# Patient Record
Sex: MALE | Race: WHITE | NOT HISPANIC OR LATINO | Employment: OTHER | URBAN - METROPOLITAN AREA
[De-identification: names, ages, dates, MRNs, and addresses within clinical notes are randomized per-mention and may not be internally consistent; named-entity substitution may affect disease eponyms.]

---

## 2017-01-23 ENCOUNTER — LAB VISIT (OUTPATIENT)
Dept: LAB | Facility: HOSPITAL | Age: 62
End: 2017-01-23
Attending: PEDIATRICS
Payer: MEDICARE

## 2017-01-23 DIAGNOSIS — Z00.00 WELL ADULT EXAM: Primary | ICD-10-CM

## 2017-01-23 DIAGNOSIS — I10 BENIGN ESSENTIAL HTN: ICD-10-CM

## 2017-01-23 LAB
ALT SERPL W/O P-5'-P-CCNC: 13 U/L
ANION GAP SERPL CALC-SCNC: 9 MMOL/L
AST SERPL-CCNC: 14 U/L
BUN SERPL-MCNC: 20 MG/DL
CALCIUM SERPL-MCNC: 8.9 MG/DL
CHLORIDE SERPL-SCNC: 105 MMOL/L
CHOLEST/HDLC SERPL: 5.7 {RATIO}
CO2 SERPL-SCNC: 26 MMOL/L
CREAT SERPL-MCNC: 1.2 MG/DL
EST. GFR  (AFRICAN AMERICAN): >60 ML/MIN/1.73 M^2
EST. GFR  (NON AFRICAN AMERICAN): >60 ML/MIN/1.73 M^2
GLUCOSE SERPL-MCNC: 104 MG/DL
HDL/CHOLESTEROL RATIO: 17.5 %
HDLC SERPL-MCNC: 189 MG/DL
HDLC SERPL-MCNC: 33 MG/DL
LDLC SERPL CALC-MCNC: 123 MG/DL
NONHDLC SERPL-MCNC: 156 MG/DL
POTASSIUM SERPL-SCNC: 4.4 MMOL/L
SODIUM SERPL-SCNC: 140 MMOL/L
TRIGL SERPL-MCNC: 165 MG/DL

## 2017-01-23 PROCEDURE — 80048 BASIC METABOLIC PNL TOTAL CA: CPT

## 2017-01-23 PROCEDURE — 84460 ALANINE AMINO (ALT) (SGPT): CPT

## 2017-01-23 PROCEDURE — 36415 COLL VENOUS BLD VENIPUNCTURE: CPT | Mod: PO

## 2017-01-23 PROCEDURE — 84450 TRANSFERASE (AST) (SGOT): CPT

## 2017-01-23 PROCEDURE — 80061 LIPID PANEL: CPT

## 2017-01-26 ENCOUNTER — TELEPHONE (OUTPATIENT)
Dept: GASTROENTEROLOGY | Facility: CLINIC | Age: 62
End: 2017-01-26

## 2017-01-26 NOTE — TELEPHONE ENCOUNTER
Mr Jared wants to reschedule his 1/27/17 apt for next month when he receives his SS check. How would you like for me to proceed?

## 2017-01-27 ENCOUNTER — INITIAL CONSULT (OUTPATIENT)
Dept: PSYCHIATRY | Facility: CLINIC | Age: 62
End: 2017-01-27
Payer: MEDICARE

## 2017-01-27 DIAGNOSIS — F43.23 ADJUSTMENT DISORDER WITH MIXED ANXIETY AND DEPRESSED MOOD: ICD-10-CM

## 2017-01-27 PROCEDURE — 99499 UNLISTED E&M SERVICE: CPT | Mod: S$GLB,,, | Performed by: PSYCHIATRY & NEUROLOGY

## 2017-01-27 PROCEDURE — 90792 PSYCH DIAG EVAL W/MED SRVCS: CPT | Mod: S$GLB,,, | Performed by: PSYCHIATRY & NEUROLOGY

## 2017-01-27 RX ORDER — TRAZODONE HYDROCHLORIDE 100 MG/1
TABLET ORAL
Qty: 30 TABLET | Refills: 1 | Status: SHIPPED | OUTPATIENT
Start: 2017-01-27 | End: 2017-06-06

## 2017-01-30 ENCOUNTER — OFFICE VISIT (OUTPATIENT)
Dept: INTERNAL MEDICINE | Facility: CLINIC | Age: 62
End: 2017-01-30
Payer: MEDICARE

## 2017-01-30 VITALS
SYSTOLIC BLOOD PRESSURE: 148 MMHG | BODY MASS INDEX: 38.24 KG/M2 | OXYGEN SATURATION: 96 % | WEIGHT: 273.13 LBS | TEMPERATURE: 98 F | DIASTOLIC BLOOD PRESSURE: 84 MMHG | HEART RATE: 100 BPM | HEIGHT: 71 IN

## 2017-01-30 DIAGNOSIS — I10 ESSENTIAL HYPERTENSION: ICD-10-CM

## 2017-01-30 DIAGNOSIS — G89.29 CHRONIC MIDLINE LOW BACK PAIN WITHOUT SCIATICA: Chronic | ICD-10-CM

## 2017-01-30 DIAGNOSIS — F33.41 RECURRENT MAJOR DEPRESSIVE DISORDER, IN PARTIAL REMISSION: ICD-10-CM

## 2017-01-30 DIAGNOSIS — G47.00 INSOMNIA, UNSPECIFIED TYPE: ICD-10-CM

## 2017-01-30 DIAGNOSIS — Z72.0 TOBACCO ABUSE: ICD-10-CM

## 2017-01-30 DIAGNOSIS — M54.50 CHRONIC MIDLINE LOW BACK PAIN WITHOUT SCIATICA: Chronic | ICD-10-CM

## 2017-01-30 DIAGNOSIS — E66.01 MORBID OBESITY, UNSPECIFIED OBESITY TYPE: ICD-10-CM

## 2017-01-30 DIAGNOSIS — I10 BENIGN ESSENTIAL HTN: Primary | ICD-10-CM

## 2017-01-30 PROCEDURE — 90688 IIV4 VACCINE SPLT 0.5 ML IM: CPT | Mod: S$GLB,,, | Performed by: PEDIATRICS

## 2017-01-30 PROCEDURE — 3077F SYST BP >= 140 MM HG: CPT | Mod: S$GLB,,, | Performed by: PEDIATRICS

## 2017-01-30 PROCEDURE — G0008 ADMIN INFLUENZA VIRUS VAC: HCPCS | Mod: S$GLB,,, | Performed by: PEDIATRICS

## 2017-01-30 PROCEDURE — 99214 OFFICE O/P EST MOD 30 MIN: CPT | Mod: S$GLB,,, | Performed by: PEDIATRICS

## 2017-01-30 PROCEDURE — 99499 UNLISTED E&M SERVICE: CPT | Mod: S$GLB,,, | Performed by: PEDIATRICS

## 2017-01-30 PROCEDURE — 99999 PR PBB SHADOW E&M-EST. PATIENT-LVL III: CPT | Mod: PBBFAC,,, | Performed by: PEDIATRICS

## 2017-01-30 PROCEDURE — 3079F DIAST BP 80-89 MM HG: CPT | Mod: S$GLB,,, | Performed by: PEDIATRICS

## 2017-01-30 PROCEDURE — 1159F MED LIST DOCD IN RCRD: CPT | Mod: S$GLB,,, | Performed by: PEDIATRICS

## 2017-01-30 RX ORDER — CARVEDILOL 3.12 MG/1
3.12 TABLET ORAL 2 TIMES DAILY WITH MEALS
Qty: 60 TABLET | Refills: 11 | Status: SHIPPED | OUTPATIENT
Start: 2017-01-30 | End: 2019-10-09

## 2017-01-30 RX ORDER — SILDENAFIL 100 MG/1
100 TABLET, FILM COATED ORAL DAILY PRN
Qty: 5 TABLET | Refills: 1 | Status: SHIPPED | OUTPATIENT
Start: 2017-01-30 | End: 2017-06-06

## 2017-01-30 NOTE — MR AVS SNAPSHOT
[facility] Internal Medicine  [address]  [place] [address]  Phone: [phone]  Fax: [phone]                  Lj Coleman   2017 1:40 PM   Office Visit    Description:  Male : 1955   Provider:  TIMOTHY Felzi Jr., MD   Department:  [facility] - Internal Medicine           Reason for Visit     Follow-up           Diagnoses this Visit        Comments    Benign essential HTN    -  Primary     Tobacco abuse         Chronic midline low back pain without sciatica         Insomnia, unspecified type         Recurrent major depressive disorder, in partial remission         Essential hypertension         Morbid obesity, unspecified obesity type                To Do List           Future Appointments        Provider Department Dept Phone    2017 8:25 AM LABORATORY, [facility] [phone]    2017 7:55 AM LABORATORY, [facility] [phone]    2017 1:20 PM TIMOTHY Feliz Jr., MD [facility] Internal [facility] [phone]      Goals (5 Years of Data)     None      Follow-Up and Disposition     Return in about 6 months (around 2017).    Follow-up and Disposition History       These Medications        Disp Refills Start End    carvedilol (COREG) 3.125 MG tablet 60 tablet 11 2017    Take 1 tablet (3.125 mg total) by mouth 2 (two) times daily with meals. - Oral    Pharmacy: [facility]/pharmacy #[id number]  [place] - [address] AT [facility] Ph #: [phone]       sildenafil (VIAGRA) 100 MG tablet 5 tablet 1 2017    Take 1 tablet (100 mg total) by mouth daily as needed for Erectile Dysfunction. - Oral    Pharmacy: [facility]/pharmacy #[id number] - [place] - [address] AT [facility] Ph #: [phone]         [facility] On Call     [facility] On Call Nurse Care Line -  Assistance  Registered nurses in the [facility] provide clinical  "advisement, health education, appointment booking, and other advisory services.  Call for this free service at [phone].             Medications           Message regarding Medications     Verify the changes and/or additions to your medication regime listed below are the same as discussed with your clinician today.  If any of these changes or additions are incorrect, please notify your healthcare provider.        START taking these NEW medications        Refills    sildenafil (VIAGRA) 100 MG tablet 1    Sig: Take 1 tablet (100 mg total) by mouth daily as needed for Erectile Dysfunction.    Class: Normal    Route: Oral           Verify that the below list of medications is an accurate representation of the medications you are currently taking.  If none reported, the list may be blank. If incorrect, please contact your healthcare provider. Carry this list with you in case of emergency.           Current Medications     carvedilol (COREG) 3.125 MG tablet Take 1 tablet (3.125 mg total) by mouth 2 (two) times daily with meals.    mycophenolate (MYFORTIC) 360 MG TbEC Take 1 tablet (360 mg total) by mouth every 12 (twelve) hours.    NEXIUM 40 mg capsule TAKE 1 CAPSULE (40 MG TOTAL) BY MOUTH ONCE DAILY.    oxycodone (ROXICODONE) 10 mg Tab immediate release tablet Take 10 mg by mouth 2 (two) times daily as needed.    tacrolimus (PROGRAF) 0.5 MG Cap TAKE  (1)  CAPSULE  TWICE DAILY.    trazodone (DESYREL) 100 MG tablet Take 1/2 to 1 tablet at bedtime as needed for sleep.    sildenafil (VIAGRA) 100 MG tablet Take 1 tablet (100 mg total) by mouth daily as needed for Erectile Dysfunction.           Clinical Reference Information           Vital Signs - Last Recorded  Most recent update: 1/30/2017  1:46 PM by Oxana Leung    BP Pulse Temp Ht    (!) 148/84 (BP Location: Left arm, Patient Position: Sitting, BP Method: Manual) 100 97.5 °F (36.4 °C) (Tympanic) 5' 10.5" (1.791 m)    Wt SpO2 BMI    123.9 kg (273 lb 2.4 oz) 96% " 38.64 kg/m2      Blood Pressure          Most Recent Value    BP  (!)  148/84      Allergies as of 1/30/2017     Codeine      Immunizations Administered on Date of Encounter - 1/30/2017     None      Orders Placed During Today's Visit     Future Labs/Procedures Expected by Expires    Basic metabolic panel  1/30/2017 3/31/2018    Lipid panel  1/30/2017 1/30/2018      Maintenance Dialysis History     Patient has no recorded history of maintenance dialysis.      Transplant Information        Txp Date Organ Coordinator Care Team     Liver Estrella Horn RN Current Hepatologist:  Teresa Gonzalez MD   Current PCP:  Cristopher Jo MD

## 2017-01-30 NOTE — PROGRESS NOTES
Subjective:        Patient ID: Lj Coleman is a 61 y.o. male.     Chief Complaint: Follow-up     HPI Comments: HTN: B/P had been good, no HTNive symptoms. He stopped taking coreg and stopped checking B/P.  He is trying to follow D&E as best he can, limited by chronic back pain. He will address his narcotic use with PMR in effort to go off.  Still smoking cigarettes.  Insomnia is mildly symptomatic, seeing psychiatry for depression      LABS REVIEWED AND DISCUSSED WITH PATIENT     Review of Systems   Constitutional: Negative for fever and unexpected weight change.   HENT: Negative for congestion and rhinorrhea.   Eyes: Negative for discharge and redness.   Respiratory: Negative for cough and wheezing.   Cardiovascular: Negative for chest pain, palpitations and leg swelling.   Gastrointestinal: Negative for constipation, diarrhea and vomiting.   Endocrine: Negative for cold intolerance, heat intolerance, polydipsia, polyphagia and polyuria.   Genitourinary: Negative for decreased urine volume and difficulty urinating.   Musculoskeletal: Positive for back pain. Negative for arthralgias and joint swelling.   Skin: Negative for rash and wound.   Neurological: Negative for syncope and headaches.   Psychiatric/Behavioral: Negative for behavioral problems and sleep disturbance.       Objective:      Physical Exam   Constitutional: He is oriented to person, place, and time. He appears well-developed and well-nourished. No distress.   Neck: Trachea normal and normal range of motion. Neck supple. No JVD present. No thyromegaly present.   Cardiovascular: Normal rate, regular rhythm, S1 normal, S2 normal, normal heart sounds and normal pulses. Exam reveals no gallop and no friction rub.   No murmur heard.  Pulmonary/Chest: Effort normal and breath sounds normal. He has no wheezes. He has no rales.   Abdominal: Soft. Normal appearance and bowel sounds are normal. He exhibits no mass. There is no hepatosplenomegaly.  There is no tenderness. There is no rebound and no guarding.   Large ventral hernia.   Musculoskeletal: Normal range of motion. He exhibits no edema.   Moves stiffly due to back.   Lymphadenopathy:   He has no cervical adenopathy.   Neurological: He is alert and oriented to person, place, and time. He has normal strength. Coordination and gait normal.   Skin: Skin is warm and intact. No rash noted.   Psychiatric: He has a normal mood and affect. His speech is normal and behavior is normal. Judgment and thought content normal.       Assessment:       1. Insomnia, unspecified insomnia    2. Benign essential HTN    3. History of smoking    4. Chronic low back pain   5. depression   6. obesity           Plan:    He will be having Dr Padilla perform ventral hernia, stop smoking. D&E, weight loss discussed. Restart coreg, diet and exercise, weight loss. F/U 6 months with labs.

## 2017-02-02 PROBLEM — F43.23 ADJUSTMENT DISORDER WITH MIXED ANXIETY AND DEPRESSED MOOD: Status: ACTIVE | Noted: 2017-02-02

## 2017-02-02 NOTE — PROGRESS NOTES
"Outpatient Psychiatry Initial Visit (MD/NP)    2017    Lj Coleman, a 61 y.o. male, presenting for initial evaluation visit. Met with patient.    Reason for Encounter: Referral from Dr. Gonzalez. Patient complains of depression, anxiety..    History of Present Illness: This 60 y/o WM with hx of HepC & liver CA s/p liver transplant, DDD, no previous psych hx presents for psych eval due to ongoing mood disturbance for past ~2 years in context of grief related to wife's death, ongoing stress related to troubles with his teenage dtr, & his own health problems. Reports worry, decreased interest in previously enjoyed activities, sad moods, sleep disturbance, anergia, self-reproach, increased appetite & weight gain. Has been instructed to lose weight to be eligible for surgery, but has been able to do so. Daughter moved to [place] with bf, & he feels she's being taken advantage of, but she dismisses his concerns & relationship is tense & complicated.     PsychHx: rebellious rule-breaking behaviors as a teen. Spent 6 months in long-term facility, didn't have further problems.   MedHx: s/p liver transplant following liver CA & hepC, incisional hernia, DDD with chronic back pain  FamHx: dtr with hx of 3 week hospitalization for what he was told was "rosana" (though doesn't think she has bipolar dx?).   SocHx: Grew up in . Normal milestones & development until adolescence, began to rebel, defy authority.  x2; 2nd wife had bpad & hepc &  due to complications of illness while she lacked health insurance. Has 39 y/o son from first marriage & 19 y/o daughter who is living with bf in [place]. His wealthy family supports him, & patient says enables his drug use. Dtr had baby in last year. Also helped raise two stepsons. Former , previously travelled widely.     Review Of Systems:     GENERAL:  No weight gain or loss  SKIN:  No rashes or lacerations  HEAD:  No headaches  EYES:  No exophthalmos, " jaundice or blindness  EARS:  No dizziness, tinnitus or hearing loss  NOSE:  No changes in smell  MOUTH & THROAT:  No dyskinetic movements or obvious goiter  CHEST:  No shortness of breath, hyperventilation or cough  CARDIOVASCULAR:  No tachycardia or chest pain  ABDOMEN:  No nausea, vomiting, pain, constipation or diarrhea  URINARY:  No frequency, dysuria or sexual dysfunction  ENDOCRINE:  No polydipsia, polyuria  MUSCULOSKELETAL:  No pain or stiffness of the joints  NEUROLOGIC:  No weakness, sensory changes, seizures, confusion, memory loss, tremor or other abnormal movements    Current Evaluation:     Nutritional Screening: Considering the patient's height and weight, medications, medical history and preferences, should a referral be made to the dietitian? no    Constitutional  Vitals:  Most recent vital signs, dated less than 90 days prior to this appointment, were not reviewed.    There were no vitals filed for this visit.     General:  age appropriate, overweight     Musculoskeletal  Muscle Strength/Tone:  no tremor, no tic   Gait & Station:  non-ataxic     Psychiatric  Appearance: casually dressed & groomed;   Behavior: calm,   Cooperation: cooperative with assessment  Speech: normal rate, volume, tone  Thought Process: linear, goal-directed  Thought Content: No suicidal or homicidal ideation; no delusions  Affect: anxious  Mood: anxious  Perceptions: No auditory or visual hallucinations  Level of Consciousness: alert throughout interview  Insight: fair  Cognition: Oriented to person, place, time, & situation  Memory: no apparent deficits to general clinical interview; not formally assessed  Attention/Concentration: no apparent deficits to general clinical interview; not formally assessed  Fund of Knowledge: average by vocabulary/education      Relevant Elements of Neurological Exam: normal gait    Functioning in Relationships:  Spouse/partner:    Peers: decreased contact  Employers:  disabled    Laboratory Data  Lab Visit on 01/23/2017   Component Date Value Ref Range Status    Specimen UA 01/23/2017 Urine, Clean Catch   Final    Color, UA 01/23/2017 Yellow  Yellow, Straw, Yahaira Final    Appearance, UA 01/23/2017 Clear  Clear Final    pH, UA 01/23/2017 7.0  5.0 - 8.0 Final    Specific Gravity, UA 01/23/2017 1.015  1.005 - 1.030 Final    Protein, UA 01/23/2017 Negative  Negative Final    Glucose, UA 01/23/2017 Negative  Negative Final    Ketones, UA 01/23/2017 Negative  Negative Final    Bilirubin (UA) 01/23/2017 Negative  Negative Final    Occult Blood UA 01/23/2017 Negative  Negative Final    Nitrite, UA 01/23/2017 Negative  Negative Final    Leukocytes, UA 01/23/2017 Negative  Negative Final   Lab Visit on 01/23/2017   Component Date Value Ref Range Status    Cholesterol 01/23/2017 189  120 - 199 mg/dL Final    Triglycerides 01/23/2017 165* 30 - 150 mg/dL Final    HDL 01/23/2017 33* 40 - 75 mg/dL Final    LDL Cholesterol 01/23/2017 123.0  63.0 - 159.0 mg/dL Final    HDL/Chol Ratio 01/23/2017 17.5* 20.0 - 50.0 % Final    Total Cholesterol/HDL Ratio 01/23/2017 5.7* 2.0 - 5.0 Final    Non-HDL Cholesterol 01/23/2017 156  mg/dL Final    ALT 01/23/2017 13  10 - 44 U/L Final    AST 01/23/2017 14  10 - 40 U/L Final    Sodium 01/23/2017 140  136 - 145 mmol/L Final    Potassium 01/23/2017 4.4  3.5 - 5.1 mmol/L Final    Chloride 01/23/2017 105  95 - 110 mmol/L Final    CO2 01/23/2017 26  23 - 29 mmol/L Final    Glucose 01/23/2017 104  70 - 110 mg/dL Final    BUN, Bld 01/23/2017 20  8 - 23 mg/dL Final    Creatinine 01/23/2017 1.2  0.5 - 1.4 mg/dL Final    Calcium 01/23/2017 8.9  8.7 - 10.5 mg/dL Final    Anion Gap 01/23/2017 9  8 - 16 mmol/L Final    eGFR if African American 01/23/2017 >60.0  >60 mL/min/1.73 m^2 Final    eGFR if non African American 01/23/2017 >60.0  >60 mL/min/1.73 m^2 Final         Medications  Outpatient Encounter Prescriptions as of 1/27/2017    Medication Sig Dispense Refill    mycophenolate (MYFORTIC) 360 MG TbEC Take 1 tablet (360 mg total) by mouth every 12 (twelve) hours. 60 tablet 0    NEXIUM 40 mg capsule TAKE 1 CAPSULE (40 MG TOTAL) BY MOUTH ONCE DAILY. 30 capsule 3    oxycodone (ROXICODONE) 10 mg Tab immediate release tablet Take 10 mg by mouth 2 (two) times daily as needed.      tacrolimus (PROGRAF) 0.5 MG Cap TAKE  (1)  CAPSULE  TWICE DAILY. 60 capsule 0    trazodone (DESYREL) 100 MG tablet Take 1/2 to 1 tablet at bedtime as needed for sleep. 30 tablet 1    [DISCONTINUED] carvedilol (COREG) 3.125 MG tablet Take 1 tablet (3.125 mg total) by mouth 2 (two) times daily with meals. 60 tablet 6     No facility-administered encounter medications on file as of 1/27/2017.        Assessment - Diagnosis - Goals:     Impression: This 60 y/o WM with no previous psych hx, presents for ongoing depression & anxiety amidst numerous stressors related to adolescent dtr, health, disability, grief.     Dx: adjustment disorder with depressed & anxious mood    Treatment Goals:  Specify outcomes written in observable, behavioral terms:   Decrease depression & anxiety by self-report    Treatment Plan/Recommendations:   · psychoeducation today re: stress, functioning amidst stress, self-care, psychotherapy.   · Therapy referral  · Trazodone prn sleep.  · F/u in 2 months, re-evaluate      Return to Clinic: 2 months  Counseling time: 10 minutes  Total time: 50 minutes    MITALI Pierre MD

## 2017-02-24 ENCOUNTER — TELEPHONE (OUTPATIENT)
Dept: GASTROENTEROLOGY | Facility: CLINIC | Age: 62
End: 2017-02-24

## 2017-02-24 NOTE — TELEPHONE ENCOUNTER
Left message on answering machine to return a call to Dr. Gonzalez's office at [facility].  Reschedule appointment.

## 2017-03-20 ENCOUNTER — TELEPHONE (OUTPATIENT)
Dept: TRANSPLANT | Facility: CLINIC | Age: 62
End: 2017-03-20

## 2017-03-20 NOTE — TELEPHONE ENCOUNTER
Pt no showed lab appointment again after multiple no show and reschedule, will send missed lab letter.

## 2017-05-19 ENCOUNTER — TELEPHONE (OUTPATIENT)
Dept: TRANSPLANT | Facility: CLINIC | Age: 62
End: 2017-05-19

## 2017-06-06 ENCOUNTER — OFFICE VISIT (OUTPATIENT)
Dept: INTERNAL MEDICINE | Facility: CLINIC | Age: 62
End: 2017-06-06
Payer: MEDICARE

## 2017-06-06 VITALS
DIASTOLIC BLOOD PRESSURE: 82 MMHG | OXYGEN SATURATION: 96 % | HEIGHT: 71 IN | HEART RATE: 101 BPM | WEIGHT: 255.38 LBS | BODY MASS INDEX: 35.75 KG/M2 | SYSTOLIC BLOOD PRESSURE: 135 MMHG

## 2017-06-06 DIAGNOSIS — Z85.05 HISTORY OF LIVER CANCER: ICD-10-CM

## 2017-06-06 DIAGNOSIS — I25.10 ATHEROSCLEROSIS OF NATIVE CORONARY ARTERY OF NATIVE HEART WITHOUT ANGINA PECTORIS: ICD-10-CM

## 2017-06-06 DIAGNOSIS — M54.50 CHRONIC MIDLINE LOW BACK PAIN WITHOUT SCIATICA: Chronic | ICD-10-CM

## 2017-06-06 DIAGNOSIS — F43.23 ADJUSTMENT DISORDER WITH MIXED ANXIETY AND DEPRESSED MOOD: ICD-10-CM

## 2017-06-06 DIAGNOSIS — G89.29 CHRONIC MIDLINE LOW BACK PAIN WITHOUT SCIATICA: Chronic | ICD-10-CM

## 2017-06-06 DIAGNOSIS — Z94.4 S/P LIVER TRANSPLANT: ICD-10-CM

## 2017-06-06 DIAGNOSIS — F10.21 HISTORY OF ALCOHOL DEPENDENCE: ICD-10-CM

## 2017-06-06 DIAGNOSIS — K21.9 GASTROESOPHAGEAL REFLUX DISEASE WITHOUT ESOPHAGITIS: Chronic | ICD-10-CM

## 2017-06-06 DIAGNOSIS — Z00.00 ENCOUNTER FOR PREVENTIVE HEALTH EXAMINATION: Primary | ICD-10-CM

## 2017-06-06 DIAGNOSIS — K43.9 VENTRAL HERNIA WITHOUT OBSTRUCTION OR GANGRENE: ICD-10-CM

## 2017-06-06 DIAGNOSIS — D84.9 IMMUNOSUPPRESSION: ICD-10-CM

## 2017-06-06 DIAGNOSIS — I10 BENIGN ESSENTIAL HTN: ICD-10-CM

## 2017-06-06 DIAGNOSIS — D69.6 THROMBOCYTOPENIA: ICD-10-CM

## 2017-06-06 DIAGNOSIS — E66.01 SEVERE OBESITY (BMI 35.0-39.9) WITH COMORBIDITY: ICD-10-CM

## 2017-06-06 PROCEDURE — 99499 UNLISTED E&M SERVICE: CPT | Mod: S$GLB,,, | Performed by: PHYSICIAN ASSISTANT

## 2017-06-06 PROCEDURE — 99999 PR PBB SHADOW E&M-EST. PATIENT-LVL III: CPT | Mod: PBBFAC,,, | Performed by: PHYSICIAN ASSISTANT

## 2017-06-06 PROCEDURE — G0439 PPPS, SUBSEQ VISIT: HCPCS | Mod: S$GLB,,, | Performed by: PHYSICIAN ASSISTANT

## 2017-06-06 NOTE — PROGRESS NOTES
"Lj Coleman presented for a  Medicare AWV and comprehensive Health Risk Assessment today. The following components were reviewed and updated:    · Medical history  · Family History  · Social history  · Allergies and Current Medications  · Health Risk Assessment  · Health Maintenance  · Care Team     ** See Completed Assessments for Annual Wellness Visit within the encounter summary.**       The following assessments were completed:  · Living Situation  · CAGE  · Depression Screening  · Timed Get Up and Go  · Whisper Test  · Cognitive Function Screening  · Nutrition Screening  · ADL Screening  · PAQ Screening    Vitals:    06/06/17 1332   BP: 135/82   Pulse: 101   SpO2: 96%   Weight: 115.9 kg (255 lb 6.5 oz)   Height: 5' 10.5" (1.791 m)     Body mass index is 36.13 kg/m².  Physical Exam   Constitutional: He appears well-nourished. He is cooperative. No distress.   HENT:   Head: Normocephalic and atraumatic.   Eyes: Conjunctivae and EOM are normal. Right eye exhibits no discharge. Left eye exhibits no discharge.   Neck: Normal range of motion. Neck supple. No tracheal deviation present. No thyromegaly present.   Cardiovascular: Normal rate, regular rhythm and normal heart sounds.    No murmur heard.  Pulses:       Radial pulses are 2+ on the right side, and 2+ on the left side.   Pulmonary/Chest: Effort normal and breath sounds normal. No respiratory distress. He has no wheezes.   Abdominal: Soft. Bowel sounds are normal. He exhibits no distension. There is no tenderness. There is no rebound and no guarding.   Large ventral hernia   Musculoskeletal: Normal range of motion. He exhibits no edema or tenderness.   Lower back pain   Neurological: He displays no tremor. No cranial nerve deficit.   Grasp equal both hands,No tremors, or muscle fasciculations noted. Toes downgoing, Sensation intact to soft touch. Gait: No ataxia.    Skin: Skin is warm and dry. No rash noted. He is not diaphoretic. No erythema. "   Psychiatric: He has a normal mood and affect. His behavior is normal. Judgment and thought content normal.   Nursing note and vitals reviewed.        Diagnoses and health risks identified today and associated recommendations/orders:    1. Encounter for preventive health examination  Completed today    2. Ventral hernia without obstruction or gangrene  Considering surgery. Following Dr. Ariane Kuo Jr. Continue follow Dr. Kuo.    3. Chronic midline low back pain without sciatica  Stable. On Roxicodone.  Continue current treatment plan as previously prescribed with pain management, Dr. Adonis Mcgarry.    4. Benign essential HTN  Stable. On Coreg. Continue current treatment plan as previously prescribed with your PCP.    5. Gastroesophageal reflux disease without esophagitis  Stable and controlled on Nexium. Continue current treatment plan as previously prescribed with your gastroenterologist.    6. History of liver cancer  S/P Hepatoma  removal 2012. Pt states no  Previous chemo or radiation treatment   S/p liver transplant. Hx of hep C. Stable and controlled. Followed  Liver transplant team and Dr. Gonzalez.    7. Severe obesity (BMI 35.0-39.9) with comorbidity  Encourage wt loss, diet/ exercise. Continue follow PCP    8. Adjustment disorder with mixed anxiety and depressed mood  Follows Dr. Ireland. Precribed Trazodone. Pt reports not taking. Please continue follow with psychiatry.    9. S/P liver transplant  See#6    10. Immunosuppression  Stable and controlled Prograf  And Myfortic daily . Pt asymptomatic    11. Thrombocytopenia  This is a new problem that has been identified during today's visit. Please follow up with your PCP.    12. History of alcohol dependence  Pt reports heavy drinker in the past. Went to AA after DWI. Reports quit drinking after liver transplant.     13. Atherosclerosis of native coronary artery of native heart without angina pectoris  CTA chest  6/11/ 2015 There is some calcific  atherosclerotic plaque in the left anterior descending coronary artery.  Discussed need to continue control BP, lipids, glucose, diet/ exercise, avoid smoking to avoid further arterial wall buildup.    Provided Lj with a 5-10 year written screening schedule and personal prevention plan. Recommendations were developed using the USPSTF age appropriate recommendations. Education, counseling, and referrals were provided as needed. After Visit Summary printed and given to patient which includes a list of additional screenings\tests needed.  Continue to follow with your PCP as scheduled 7/31/17or sooner if necessary.    Vazquez Su PA-C

## 2017-06-06 NOTE — PATIENT INSTRUCTIONS
Counseling and Referral of Other Preventative  (Italic type indicates deductible and co-insurance are waived)    Patient Name: Lj Coleman  Today's Date: 6/6/2017      SERVICE LIMITATIONS RECOMMENDATION    Vaccines    · Pneumococcal (once after 65)    · Influenza (annually)    · Hepatitis B (if medium/high risk)    · Prevnar 13      Hepatitis B medium/high risk factors:       - End-stage renal disease       - Hemophiliacs who received Factor VII or         IX concentrates       - Clients of institutions for the mentally             retarded       - Persons who live in the same house as          a HepB carrier       - Homosexual men       - Illicit injectable drug abusers     Pneumococcal: done 7/2016     Influenza:1/2017 Done, repeat in one year     Hepatitis B: N/A     Prevnar 13: N/A Follow PCP    Prostate cancer screening (annually to age 75)     Prostate specific antigen (PSA) Shared decision making with Provider. Sometimes a co-pay may be required if the patient decides to have this test. The USPSTF no longer recommends prostate cancer screening routinely in medicine: done 4/2015. Follow PCP    Colorectal cancer screening (to age 75)    · Fecal occult blood test (annual)  · Flexible sigmoidoscopy (5y)  · Screening colonoscopy (10y)  · Barium enema   Pt cancel last appt. Will follow with PCP to reschedule.    Diabetes self-management training (no USPSTF recommendations)  Requires referral by treating physician for patient with diabetes or renal disease. 10 hours of initial DSMT sessions of no less than 30 minutes each in a continuous 12-month period. 2 hours of follow-up DSMT in subsequent years.  N/A    Glaucoma screening (no USPSTF recommendation)  Diabetes mellitus, family history   , age 50 or over    American, age 65 or over  Will follow with outside opt    Medical nutrition therapy for diabetes or renal disease (no recommended schedule)  Requires referral by treating  physician for patient with diabetes or renal disease or kidney transplant within the past 3 years.  Can be provided in same year as diabetes self-management training (DSMT), and CMS recommends medical nutrition therapy take place after DSMT. Up to 3 hours for initial year and 2 hours in subsequent years.  N/A    Cardiovascular screening blood tests (every 5 years)  · Fasting lipid panel  Order as a panel if possible  Done this year, repeat every year    Diabetes screening tests (at least every 3 years, Medicare covers annually or at 6-month intervals for prediabetic patients)  · Fasting blood sugar (FBS) or glucose tolerance test (GTT)  Patient must be diagnosed with one of the following:       - Hypertension       - Dyslipidemia       - Obesity (BMI 30kg/m2)       - Previous elevated impaired FBS or GTT       ... or any two of the following:       - Overweight (BMI 25 but <30)       - Family history of diabetes       - Age 65 or older       - History of gestational diabetes or birth of baby weighing more than 9 pounds  Follow PCP    Abdominal aortic aneurysm screening (once)  · Sonogram   Limited to patients who meet one of the following criteria:       - Men who are 65-75 years old and have smoked more than 100 cigarette in their lifetime       - Anyone with a family history of abdominal aortic aneurysm       - Anyone recommended for screening by the USPSTF  Follow PCP    HIV screening (annually for increased risk patients)  · HIV-1 and HIV-2 by EIA, or MIKAYLA, rapid antibody test or oral mucosa transudate  Patients must be at increased risk for HIV infection per USPSTF guidelines or pregnant. Tests covered annually for patient at increased risk or as requested by the patient. Pregnant patients may receive up to 3 tests during pregnancy.  Risks discussed, screening is not recommended    Smoking cessation counseling (up to 8 sessions per year)  Patients must be asymptomatic of tobacco-related conditions to receive  as a preventative service.  Non-smoker    Subsequent annual wellness visit  At least 12 months since last AWV  Return in one year     The following information is provided to all patients.  This information is to help you find resources for any of the problems found today that may be affecting your health:                Living healthy guide: www.[facility].[place].[facility]      Understanding Diabetes: www.diabetes.org      Eating healthy: www.cdc.gov/healthyweight      CDC home safety checklist: www.cdc.gov/steadi/patient.html      Agency on Aging: www.goea.[place].[facility]      Alcoholics anonymous (AA): www.aa.org      Physical Activity: www.elena.nih.gov/zv6glmd      Tobacco use: www.quitwithusla.org

## 2017-07-18 ENCOUNTER — TELEPHONE (OUTPATIENT)
Dept: TRANSPLANT | Facility: CLINIC | Age: 62
End: 2017-07-18

## 2017-08-25 ENCOUNTER — TELEPHONE (OUTPATIENT)
Dept: TRANSPLANT | Facility: CLINIC | Age: 62
End: 2017-08-25

## 2017-08-25 NOTE — TELEPHONE ENCOUNTER
No response from patient after receiving certified missed lab letter. Will changes status to lost to follow. Dr. Gonzalez notified.

## 2018-03-20 ENCOUNTER — PES CALL (OUTPATIENT)
Dept: ADMINISTRATIVE | Facility: CLINIC | Age: 63
End: 2018-03-20

## 2018-05-07 ENCOUNTER — TELEPHONE (OUTPATIENT)
Dept: INTERNAL MEDICINE | Facility: CLINIC | Age: 63
End: 2018-05-07

## 2018-05-07 ENCOUNTER — TELEPHONE (OUTPATIENT)
Dept: GASTROENTEROLOGY | Facility: CLINIC | Age: 63
End: 2018-05-07

## 2018-05-07 NOTE — TELEPHONE ENCOUNTER
----- Message from Caryn Burroughs MA sent at 5/7/2018 10:55 AM CDT -----  Transplant pt would like to speak to nurse about  his next appointment and to set up for blood work... Thank you

## 2018-05-07 NOTE — TELEPHONE ENCOUNTER
----- Message from Caryn Burroughs MA sent at 5/7/2018 10:52 AM CDT -----  Transplant pt. Would like to speak to nurse in reference to his next appt...Thanks for signing up for [facility]!    To access your account, go to [email or web address] and enter your [facility] Username and password.    If you have forgotten your Username and/or password, click the Forgot [facility] Username? or Forgot Password? links to recover your login information.    If you have any further difficulties accessing your account, please call [facility]PL[facility] REAL # or email [email or web address].    [facility] Username: _____________________    PSafe Password: _____________________

## 2018-10-22 ENCOUNTER — PATIENT MESSAGE (OUTPATIENT)
Dept: ADMINISTRATIVE | Facility: HOSPITAL | Age: 63
End: 2018-10-22

## 2019-01-15 ENCOUNTER — DOCUMENTATION ONLY (OUTPATIENT)
Dept: INTERNAL MEDICINE | Facility: CLINIC | Age: 64
End: 2019-01-15

## 2019-05-17 ENCOUNTER — PATIENT OUTREACH (OUTPATIENT)
Dept: ADMINISTRATIVE | Facility: HOSPITAL | Age: 64
End: 2019-05-17

## 2019-05-17 NOTE — PROGRESS NOTES
Contacted patient to schedule annual pcp appointment. Patient will call back at a later date to schedule an appointment.

## 2019-08-06 ENCOUNTER — PES CALL (OUTPATIENT)
Dept: ADMINISTRATIVE | Facility: CLINIC | Age: 64
End: 2019-08-06

## 2019-09-24 ENCOUNTER — TELEPHONE (OUTPATIENT)
Dept: GASTROENTEROLOGY | Facility: CLINIC | Age: 64
End: 2019-09-24

## 2019-09-24 DIAGNOSIS — Z94.4 HISTORY OF TRANSPLANTATION, LIVER: ICD-10-CM

## 2019-09-24 NOTE — TELEPHONE ENCOUNTER
Patient calling for refill of myfortic 360 mg and tacrolimus 0.5 mg, asked about recent labwork, admits to not having labs for quite some time because he moved to [place] and then experienced multiple deaths in family

## 2019-09-24 NOTE — TELEPHONE ENCOUNTER
No answer left voicemail    ----- Message from Leonidas Gómez sent at 9/24/2019 10:53 AM CDT -----  Contact: self [phone]  Pt would like return call regarding question about transplant medication. Please call back at [phone].  Md Klever

## 2019-09-25 RX ORDER — MYCOPHENOLIC ACID 360 MG/1
360 TABLET, DELAYED RELEASE ORAL EVERY 12 HOURS
Qty: 60 TABLET | Refills: 0 | OUTPATIENT
Start: 2019-09-25

## 2019-09-25 RX ORDER — TACROLIMUS 0.5 MG/1
0.5 CAPSULE ORAL
Qty: 60 CAPSULE | Refills: 0 | OUTPATIENT
Start: 2019-09-25

## 2019-09-25 NOTE — TELEPHONE ENCOUNTER
----- Message from Rob Gore sent at 9/25/2019  1:27 PM CDT -----  Contact: Pt  Type:  Patient Returning Call    Who Called:Lj Cloeman  Who Left Message for Patient: JAVIER GRULLON  Does the patient know what this is regarding?: Medication Refill   Would the patient rather a call back or a response via [facility]? Call Back  Best Call Back Number:[phone] ([place])   Additional Information:

## 2019-09-27 ENCOUNTER — TELEPHONE (OUTPATIENT)
Dept: GASTROENTEROLOGY | Facility: CLINIC | Age: 64
End: 2019-09-27

## 2019-09-27 NOTE — TELEPHONE ENCOUNTER
----- Message from Elizabeth Merino sent at 9/27/2019 10:49 AM CDT -----  Contact: patient  Calling concerning needing a refill on his medication Myco phenolic acid 360 mg 2 pills am & 2 pills pm daily and Tacrolimus 0.5 mg 1 cap am & 1 cap @ pm. Patient have not been seen since 2018. [place] for work. Would like to be seen ASAP and requesting medication today please. Please call patient ASAP TODAY URGENT!! @ [phone]. Thanks, lachelle [facility] - [place], [place] - [address]  [address] [place] [address]  Phone: [phone] Fax: [phone]

## 2019-10-04 DIAGNOSIS — Z94.4 HISTORY OF TRANSPLANTATION, LIVER: ICD-10-CM

## 2019-10-04 RX ORDER — TACROLIMUS 0.5 MG/1
CAPSULE ORAL
Qty: 60 CAPSULE | Refills: 11 | Status: SHIPPED | OUTPATIENT
Start: 2019-10-04 | End: 2020-09-17

## 2019-10-04 RX ORDER — MYCOPHENOLIC ACID 360 MG/1
TABLET, DELAYED RELEASE ORAL
Qty: 120 TABLET | Refills: 11 | Status: SHIPPED | OUTPATIENT
Start: 2019-10-04 | End: 2020-09-17

## 2019-10-09 ENCOUNTER — OFFICE VISIT (OUTPATIENT)
Dept: GASTROENTEROLOGY | Facility: CLINIC | Age: 64
End: 2019-10-09
Payer: MEDICARE

## 2019-10-09 VITALS
BODY MASS INDEX: 34.66 KG/M2 | SYSTOLIC BLOOD PRESSURE: 118 MMHG | HEIGHT: 71 IN | DIASTOLIC BLOOD PRESSURE: 76 MMHG | RESPIRATION RATE: 16 BRPM | HEART RATE: 73 BPM | WEIGHT: 247.56 LBS

## 2019-10-09 DIAGNOSIS — D84.9 IMMUNOSUPPRESSION: Primary | ICD-10-CM

## 2019-10-09 DIAGNOSIS — Z94.4 S/P LIVER TRANSPLANT: ICD-10-CM

## 2019-10-09 PROCEDURE — 99499 RISK ADDL DX/OHS AUDIT: ICD-10-PCS | Mod: HCNC,S$GLB,, | Performed by: NURSE PRACTITIONER

## 2019-10-09 PROCEDURE — 3078F PR MOST RECENT DIASTOLIC BLOOD PRESSURE < 80 MM HG: ICD-10-PCS | Mod: HCNC,CPTII,S$GLB, | Performed by: NURSE PRACTITIONER

## 2019-10-09 PROCEDURE — 1101F PT FALLS ASSESS-DOCD LE1/YR: CPT | Mod: HCNC,CPTII,S$GLB, | Performed by: NURSE PRACTITIONER

## 2019-10-09 PROCEDURE — 1101F PR PT FALLS ASSESS DOC 0-1 FALLS W/OUT INJ PAST YR: ICD-10-PCS | Mod: HCNC,CPTII,S$GLB, | Performed by: NURSE PRACTITIONER

## 2019-10-09 PROCEDURE — 3008F PR BODY MASS INDEX (BMI) DOCUMENTED: ICD-10-PCS | Mod: HCNC,CPTII,S$GLB, | Performed by: NURSE PRACTITIONER

## 2019-10-09 PROCEDURE — 3008F BODY MASS INDEX DOCD: CPT | Mod: HCNC,CPTII,S$GLB, | Performed by: NURSE PRACTITIONER

## 2019-10-09 PROCEDURE — 99499 UNLISTED E&M SERVICE: CPT | Mod: HCNC,S$GLB,, | Performed by: NURSE PRACTITIONER

## 2019-10-09 PROCEDURE — 99999 PR PBB SHADOW E&M-EST. PATIENT-LVL III: ICD-10-PCS | Mod: PBBFAC,HCNC,, | Performed by: NURSE PRACTITIONER

## 2019-10-09 PROCEDURE — 3074F PR MOST RECENT SYSTOLIC BLOOD PRESSURE < 130 MM HG: ICD-10-PCS | Mod: HCNC,CPTII,S$GLB, | Performed by: NURSE PRACTITIONER

## 2019-10-09 PROCEDURE — 3074F SYST BP LT 130 MM HG: CPT | Mod: HCNC,CPTII,S$GLB, | Performed by: NURSE PRACTITIONER

## 2019-10-09 PROCEDURE — 99999 PR PBB SHADOW E&M-EST. PATIENT-LVL III: CPT | Mod: PBBFAC,HCNC,, | Performed by: NURSE PRACTITIONER

## 2019-10-09 PROCEDURE — 99214 OFFICE O/P EST MOD 30 MIN: CPT | Mod: HCNC,S$GLB,, | Performed by: NURSE PRACTITIONER

## 2019-10-09 PROCEDURE — 99214 PR OFFICE/OUTPT VISIT, EST, LEVL IV, 30-39 MIN: ICD-10-PCS | Mod: HCNC,S$GLB,, | Performed by: NURSE PRACTITIONER

## 2019-10-09 PROCEDURE — 3078F DIAST BP <80 MM HG: CPT | Mod: HCNC,CPTII,S$GLB, | Performed by: NURSE PRACTITIONER

## 2019-10-10 NOTE — PROGRESS NOTES
Clinic Follow Up:  [facility] Follow Up Note    Reason for Follow Up:  The primary encounter diagnosis was Immunosuppression. A diagnosis of S/P liver transplant was also pertinent to this visit.    PCP: KATE Feliz Jr       HPI:  This is a 63 y.o. male here for follow up of the above  Pt states that he has been feeling overall well without any GI complaints  Pt is S/P liver transplant in 2015.  He has been lost to follow up since 2016 for unclear reason.   He is requesting a refill of medications today.   Denies any abdominal pain.  No nausea or vomiting.  No change in bowel pattern.  No melena or hematochezia. No weight loss.  No upper GI bleeding.  No ascites or BLE.  No overt confusion.      Review of Systems   Constitutional: Negative for chills, fever, malaise/fatigue and weight loss.   Respiratory: Negative for cough.    Cardiovascular: Negative for chest pain.   Gastrointestinal:        Per HPI   Musculoskeletal: Negative for myalgias.   Skin: Negative for itching and rash.   Neurological: Negative for headaches.   Psychiatric/Behavioral: The patient is not nervous/anxious.        Medical History:  Past Medical History:   Diagnosis Date    Alcohol dependence     stopped 2012    Angina pectoris     Arthritis     back    Atherosclerosis of native coronary artery without angina pectoris/ LAD 9/8/2016    Back pain     Chronic hepatitis C with cirrhosis     Depression     Hepatoma     Prior to liver transplant    History of colon polyps 9/9/2015    History of smoking Quit 4/2012    History of transplantation, liver April 2012    History of vertebral fracture     Hypertension     Immune deficiency disorder     Incisional hernia following transplant 4/9/2014    Liver failure November 2011    Related to chemotherapy for hepatoma    Liver transplanted 04/2012    Obesity     Tobacco abuse 9/9/2016    Tobacco dependence     Trouble in sleeping     Vertebral fracture 1975  "      Surgical History:   Past Surgical History:   Procedure Laterality Date    HERNIA REPAIR Right 2013    incisional    LIVER TRANSPLANT  April 2012    MOUTH SURGERY      TONSILLECTOMY  1958       Family History:   Family History   Problem Relation Age of Onset    Cancer Unknown     Cancer Father         bladder    Cancer Maternal Grandmother        Social History:   Social History     Tobacco Use    Smoking status: Former Smoker     Packs/day: 1.00     Years: 35.00     Pack years: 35.00     Types: Cigarettes     Last attempt to quit: 9/2/2016     Years since quitting: 3.1    Smokeless tobacco: Never Used    Tobacco comment: pt states he stop smoking around the 9/2/16   Substance Use Topics    Alcohol use: No     Alcohol/week: 0.0 standard drinks     Comment: Quit alcohol after some alcohol abuse, prior to his liver transplant    Drug use: No       Allergies: Reviewed    Home Medications:  Current Outpatient Medications on File Prior to Visit   Medication Sig Dispense Refill    mycophenolate (MYFORTIC) 360 MG TbEC TAKE (2) TABLETS TWICE DAILY. 120 tablet 11    NEXIUM 40 mg capsule TAKE 1 CAPSULE (40 MG TOTAL) BY MOUTH ONCE DAILY. 30 capsule 3    tacrolimus (PROGRAF) 0.5 MG Cap TAKE  (1)  CAPSULE  TWICE DAILY. 60 capsule 11    oxycodone (ROXICODONE) 10 mg Tab immediate release tablet Take 10 mg by mouth 2 (two) times daily as needed.       No current facility-administered medications on file prior to visit.        Physical Exam:  Vital Signs:  /76 (BP Location: Right arm, Patient Position: Sitting, BP Method: Large (Manual))   Pulse 73   Resp 16   Ht 5' 10.5" (1.791 m)   Wt 112.3 kg (247 lb 9.2 oz)   BMI 35.02 kg/m²   Body mass index is 35.02 kg/m².  Physical Exam   Constitutional: He is oriented to person, place, and time. He appears well-developed.   HENT:   Head: Normocephalic.   Eyes: No scleral icterus.   Neck: Normal range of motion.   Cardiovascular: Normal rate and regular rhythm. "   Pulmonary/Chest: Effort normal and breath sounds normal.   Abdominal: Soft. Bowel sounds are normal. He exhibits no distension. There is no tenderness.   Musculoskeletal: Normal range of motion.   Neurological: He is alert and oriented to person, place, and time.   Skin: Skin is warm and dry.   Psychiatric: He has a normal mood and affect.   Vitals reviewed.      Labs: Pertinent labs reviewed.  Assessment:  1. Immunosuppression    2. S/P liver transplant        Recommendations:  - case discussed with Dr. Gonzalez  - will have pt get updated labs on Friday   - discussed importance of regular labs and follow up with pt.  Pt states understanding.       Return to Clinic:  Follow up to be determined by results of labs.

## 2019-10-11 ENCOUNTER — LAB VISIT (OUTPATIENT)
Dept: LAB | Facility: HOSPITAL | Age: 64
End: 2019-10-11
Payer: MEDICARE

## 2019-10-11 DIAGNOSIS — Z94.4 S/P LIVER TRANSPLANT: ICD-10-CM

## 2019-10-11 DIAGNOSIS — D84.9 IMMUNOSUPPRESSION: ICD-10-CM

## 2019-10-11 LAB
ALBUMIN SERPL BCP-MCNC: 3.7 G/DL (ref 3.5–5.2)
ALP SERPL-CCNC: 102 U/L (ref 55–135)
ALT SERPL W/O P-5'-P-CCNC: 24 U/L (ref 10–44)
ANION GAP SERPL CALC-SCNC: 6 MMOL/L (ref 8–16)
AST SERPL-CCNC: 41 U/L (ref 10–40)
BASOPHILS # BLD AUTO: 0.05 K/UL (ref 0–0.2)
BASOPHILS NFR BLD: 0.7 % (ref 0–1.9)
BILIRUB SERPL-MCNC: 0.5 MG/DL (ref 0.1–1)
BUN SERPL-MCNC: 19 MG/DL (ref 8–23)
CALCIUM SERPL-MCNC: 8.9 MG/DL (ref 8.7–10.5)
CHLORIDE SERPL-SCNC: 108 MMOL/L (ref 95–110)
CO2 SERPL-SCNC: 25 MMOL/L (ref 23–29)
CREAT SERPL-MCNC: 1 MG/DL (ref 0.5–1.4)
DIFFERENTIAL METHOD: ABNORMAL
EOSINOPHIL # BLD AUTO: 0.2 K/UL (ref 0–0.5)
EOSINOPHIL NFR BLD: 3.3 % (ref 0–8)
ERYTHROCYTE [DISTWIDTH] IN BLOOD BY AUTOMATED COUNT: 14.6 % (ref 11.5–14.5)
EST. GFR  (AFRICAN AMERICAN): >60 ML/MIN/1.73 M^2
EST. GFR  (NON AFRICAN AMERICAN): >60 ML/MIN/1.73 M^2
GLUCOSE SERPL-MCNC: 94 MG/DL (ref 70–110)
HCT VFR BLD AUTO: 50.8 % (ref 40–54)
HGB BLD-MCNC: 15.7 G/DL (ref 14–18)
IMM GRANULOCYTES # BLD AUTO: 0.03 K/UL (ref 0–0.04)
IMM GRANULOCYTES NFR BLD AUTO: 0.4 % (ref 0–0.5)
LYMPHOCYTES # BLD AUTO: 1.3 K/UL (ref 1–4.8)
LYMPHOCYTES NFR BLD: 18.9 % (ref 18–48)
MCH RBC QN AUTO: 27.8 PG (ref 27–31)
MCHC RBC AUTO-ENTMCNC: 30.9 G/DL (ref 32–36)
MCV RBC AUTO: 90 FL (ref 82–98)
MONOCYTES # BLD AUTO: 0.6 K/UL (ref 0.3–1)
MONOCYTES NFR BLD: 7.9 % (ref 4–15)
NEUTROPHILS # BLD AUTO: 4.8 K/UL (ref 1.8–7.7)
NEUTROPHILS NFR BLD: 68.8 % (ref 38–73)
NRBC BLD-RTO: 0 /100 WBC
PLATELET # BLD AUTO: 137 K/UL (ref 150–350)
PMV BLD AUTO: 12.5 FL (ref 9.2–12.9)
POTASSIUM SERPL-SCNC: 5.7 MMOL/L (ref 3.5–5.1)
PROT SERPL-MCNC: 7.6 G/DL (ref 6–8.4)
RBC # BLD AUTO: 5.65 M/UL (ref 4.6–6.2)
SODIUM SERPL-SCNC: 139 MMOL/L (ref 136–145)
WBC # BLD AUTO: 6.93 K/UL (ref 3.9–12.7)

## 2019-10-11 PROCEDURE — 36415 COLL VENOUS BLD VENIPUNCTURE: CPT | Mod: HCNC

## 2019-10-11 PROCEDURE — 80053 COMPREHEN METABOLIC PANEL: CPT | Mod: HCNC

## 2019-10-11 PROCEDURE — 85025 COMPLETE CBC W/AUTO DIFF WBC: CPT | Mod: HCNC

## 2019-10-11 PROCEDURE — 80197 ASSAY OF TACROLIMUS: CPT | Mod: HCNC

## 2019-10-12 LAB — TACROLIMUS BLD-MCNC: <1.5 NG/ML (ref 5–15)

## 2019-10-16 ENCOUNTER — CONFERENCE (OUTPATIENT)
Dept: TRANSPLANT | Facility: CLINIC | Age: 64
End: 2019-10-16

## 2019-10-16 ENCOUNTER — TELEPHONE (OUTPATIENT)
Dept: TRANSPLANT | Facility: CLINIC | Age: 64
End: 2019-10-16

## 2019-10-16 NOTE — TELEPHONE ENCOUNTER
Patient's case discussed in the liver meeting today.  Patient w/ h/o noncompliance.   Transferred from [facility] and lost to follow-up in 2017. Seen by Maggie Vargas NP in [place].  Plan/ Recommendation:  Patient to continue care w/ [facility].  If issues of non-compliance persist, will re-discuss patient in the liver meeting.

## 2019-10-16 NOTE — TELEPHONE ENCOUNTER
----- Message from Teersa Gonzalez MD sent at 10/14/2019 12:14 PM CDT -----  Tacrolimus level is undetected.  Need to confirm that patient is in fact taking medication.  Please confirm dosing.  Liver tests slightly up.  Potassium is also slightly up but this seems to be hemolysis.  Repeat labs this week.

## 2019-10-16 NOTE — TELEPHONE ENCOUNTER
Attempted to call pt to discuss lab results, no answer and no voicemail available. Will continue to try and reach pt.

## 2019-10-22 ENCOUNTER — TELEPHONE (OUTPATIENT)
Dept: TRANSPLANT | Facility: CLINIC | Age: 64
End: 2019-10-22

## 2019-10-22 ENCOUNTER — PATIENT MESSAGE (OUTPATIENT)
Dept: GASTROENTEROLOGY | Facility: CLINIC | Age: 64
End: 2019-10-22

## 2019-10-22 ENCOUNTER — TELEPHONE (OUTPATIENT)
Dept: GASTROENTEROLOGY | Facility: CLINIC | Age: 64
End: 2019-10-22

## 2019-10-22 NOTE — TELEPHONE ENCOUNTER
----- Message from Shannan Doe sent at 10/22/2019 11:54 AM CDT -----  Contact: self  needs call back to discuss lab result...[phone]

## 2019-10-22 NOTE — TELEPHONE ENCOUNTER
Attempted to reach pt again to discuss labs, no answer, no voicemail available. Will continue to try and reach pt.

## 2019-10-30 ENCOUNTER — TELEPHONE (OUTPATIENT)
Dept: TRANSPLANT | Facility: CLINIC | Age: 64
End: 2019-10-30

## 2019-11-04 ENCOUNTER — TELEPHONE (OUTPATIENT)
Dept: TRANSPLANT | Facility: CLINIC | Age: 64
End: 2019-11-04

## 2019-11-04 DIAGNOSIS — Z94.4 S/P LIVER TRANSPLANT: Primary | ICD-10-CM

## 2019-11-04 NOTE — TELEPHONE ENCOUNTER
Received call from pt who states he received letter. Informed pt prograf level was undetected, he denies missing any doses of medication. Will schedule repeat labs Thursday 11/7. He verbalizes understanding.

## 2019-11-13 ENCOUNTER — LAB VISIT (OUTPATIENT)
Dept: LAB | Facility: HOSPITAL | Age: 64
End: 2019-11-13
Attending: INTERNAL MEDICINE
Payer: MEDICARE

## 2019-11-13 DIAGNOSIS — Z94.4 S/P LIVER TRANSPLANT: ICD-10-CM

## 2019-11-13 LAB
ALBUMIN SERPL BCP-MCNC: 3.7 G/DL (ref 3.5–5.2)
ALP SERPL-CCNC: 114 U/L (ref 55–135)
ALT SERPL W/O P-5'-P-CCNC: 20 U/L (ref 10–44)
ANION GAP SERPL CALC-SCNC: 7 MMOL/L (ref 8–16)
AST SERPL-CCNC: 23 U/L (ref 10–40)
BASOPHILS # BLD AUTO: 0.08 K/UL (ref 0–0.2)
BASOPHILS NFR BLD: 1 % (ref 0–1.9)
BILIRUB SERPL-MCNC: 0.5 MG/DL (ref 0.1–1)
BUN SERPL-MCNC: 14 MG/DL (ref 8–23)
CALCIUM SERPL-MCNC: 8.8 MG/DL (ref 8.7–10.5)
CHLORIDE SERPL-SCNC: 100 MMOL/L (ref 95–110)
CO2 SERPL-SCNC: 28 MMOL/L (ref 23–29)
CREAT SERPL-MCNC: 1 MG/DL (ref 0.5–1.4)
DIFFERENTIAL METHOD: ABNORMAL
EOSINOPHIL # BLD AUTO: 0.3 K/UL (ref 0–0.5)
EOSINOPHIL NFR BLD: 3.7 % (ref 0–8)
ERYTHROCYTE [DISTWIDTH] IN BLOOD BY AUTOMATED COUNT: 14.1 % (ref 11.5–14.5)
EST. GFR  (AFRICAN AMERICAN): >60 ML/MIN/1.73 M^2
EST. GFR  (NON AFRICAN AMERICAN): >60 ML/MIN/1.73 M^2
GLUCOSE SERPL-MCNC: 90 MG/DL (ref 70–110)
HCT VFR BLD AUTO: 51.6 % (ref 40–54)
HGB BLD-MCNC: 15.7 G/DL (ref 14–18)
IMM GRANULOCYTES # BLD AUTO: 0.07 K/UL (ref 0–0.04)
IMM GRANULOCYTES NFR BLD AUTO: 0.9 % (ref 0–0.5)
LYMPHOCYTES # BLD AUTO: 2.2 K/UL (ref 1–4.8)
LYMPHOCYTES NFR BLD: 26.7 % (ref 18–48)
MCH RBC QN AUTO: 27.9 PG (ref 27–31)
MCHC RBC AUTO-ENTMCNC: 30.4 G/DL (ref 32–36)
MCV RBC AUTO: 92 FL (ref 82–98)
MONOCYTES # BLD AUTO: 0.7 K/UL (ref 0.3–1)
MONOCYTES NFR BLD: 8.9 % (ref 4–15)
NEUTROPHILS # BLD AUTO: 4.8 K/UL (ref 1.8–7.7)
NEUTROPHILS NFR BLD: 58.8 % (ref 38–73)
NRBC BLD-RTO: 0 /100 WBC
PLATELET # BLD AUTO: 143 K/UL (ref 150–350)
PMV BLD AUTO: 12.4 FL (ref 9.2–12.9)
POTASSIUM SERPL-SCNC: 4.1 MMOL/L (ref 3.5–5.1)
PROT SERPL-MCNC: 7.1 G/DL (ref 6–8.4)
RBC # BLD AUTO: 5.63 M/UL (ref 4.6–6.2)
SODIUM SERPL-SCNC: 135 MMOL/L (ref 136–145)
WBC # BLD AUTO: 8.09 K/UL (ref 3.9–12.7)

## 2019-11-13 PROCEDURE — 36415 COLL VENOUS BLD VENIPUNCTURE: CPT | Mod: HCNC

## 2019-11-13 PROCEDURE — 80053 COMPREHEN METABOLIC PANEL: CPT | Mod: HCNC

## 2019-11-13 PROCEDURE — 85025 COMPLETE CBC W/AUTO DIFF WBC: CPT | Mod: HCNC

## 2019-11-13 PROCEDURE — 80197 ASSAY OF TACROLIMUS: CPT | Mod: HCNC

## 2019-11-14 LAB — TACROLIMUS BLD-MCNC: 3 NG/ML (ref 5–15)

## 2019-11-18 ENCOUNTER — TELEPHONE (OUTPATIENT)
Dept: TRANSPLANT | Facility: CLINIC | Age: 64
End: 2019-11-18

## 2019-11-18 DIAGNOSIS — Z94.4 S/P LIVER TRANSPLANT: Primary | ICD-10-CM

## 2019-11-18 NOTE — TELEPHONE ENCOUNTER
----- Message from Teresa Gonzalez MD sent at 11/15/2019  1:35 PM CST -----  Tacrolimus level still low but improved from previous.  Liver tests have also improved.  Repeat labs in 4 weeks.

## 2019-12-09 ENCOUNTER — LAB VISIT (OUTPATIENT)
Dept: LAB | Facility: HOSPITAL | Age: 64
End: 2019-12-09
Attending: INTERNAL MEDICINE
Payer: MEDICARE

## 2019-12-09 DIAGNOSIS — Z94.4 S/P LIVER TRANSPLANT: ICD-10-CM

## 2019-12-09 LAB
ALBUMIN SERPL BCP-MCNC: 3.6 G/DL (ref 3.5–5.2)
ALP SERPL-CCNC: 112 U/L (ref 55–135)
ALT SERPL W/O P-5'-P-CCNC: 10 U/L (ref 10–44)
ANION GAP SERPL CALC-SCNC: 8 MMOL/L (ref 8–16)
AST SERPL-CCNC: 13 U/L (ref 10–40)
BASOPHILS # BLD AUTO: 0.07 K/UL (ref 0–0.2)
BASOPHILS NFR BLD: 0.9 % (ref 0–1.9)
BILIRUB SERPL-MCNC: 0.4 MG/DL (ref 0.1–1)
BUN SERPL-MCNC: 22 MG/DL (ref 8–23)
CALCIUM SERPL-MCNC: 9.1 MG/DL (ref 8.7–10.5)
CHLORIDE SERPL-SCNC: 103 MMOL/L (ref 95–110)
CO2 SERPL-SCNC: 28 MMOL/L (ref 23–29)
CREAT SERPL-MCNC: 1 MG/DL (ref 0.5–1.4)
DIFFERENTIAL METHOD: ABNORMAL
EOSINOPHIL # BLD AUTO: 0.3 K/UL (ref 0–0.5)
EOSINOPHIL NFR BLD: 3.8 % (ref 0–8)
ERYTHROCYTE [DISTWIDTH] IN BLOOD BY AUTOMATED COUNT: 14 % (ref 11.5–14.5)
EST. GFR  (AFRICAN AMERICAN): >60 ML/MIN/1.73 M^2
EST. GFR  (NON AFRICAN AMERICAN): >60 ML/MIN/1.73 M^2
GLUCOSE SERPL-MCNC: 90 MG/DL (ref 70–110)
HCT VFR BLD AUTO: 48.9 % (ref 40–54)
HGB BLD-MCNC: 15 G/DL (ref 14–18)
IMM GRANULOCYTES # BLD AUTO: 0.06 K/UL (ref 0–0.04)
IMM GRANULOCYTES NFR BLD AUTO: 0.8 % (ref 0–0.5)
LYMPHOCYTES # BLD AUTO: 1.6 K/UL (ref 1–4.8)
LYMPHOCYTES NFR BLD: 21.4 % (ref 18–48)
MCH RBC QN AUTO: 27.8 PG (ref 27–31)
MCHC RBC AUTO-ENTMCNC: 30.7 G/DL (ref 32–36)
MCV RBC AUTO: 91 FL (ref 82–98)
MONOCYTES # BLD AUTO: 0.8 K/UL (ref 0.3–1)
MONOCYTES NFR BLD: 10.2 % (ref 4–15)
NEUTROPHILS # BLD AUTO: 4.7 K/UL (ref 1.8–7.7)
NEUTROPHILS NFR BLD: 62.9 % (ref 38–73)
NRBC BLD-RTO: 0 /100 WBC
PLATELET # BLD AUTO: 159 K/UL (ref 150–350)
PMV BLD AUTO: 12.3 FL (ref 9.2–12.9)
POTASSIUM SERPL-SCNC: 4.8 MMOL/L (ref 3.5–5.1)
PROT SERPL-MCNC: 6.9 G/DL (ref 6–8.4)
RBC # BLD AUTO: 5.39 M/UL (ref 4.6–6.2)
SODIUM SERPL-SCNC: 139 MMOL/L (ref 136–145)
WBC # BLD AUTO: 7.42 K/UL (ref 3.9–12.7)

## 2019-12-09 PROCEDURE — 85025 COMPLETE CBC W/AUTO DIFF WBC: CPT | Mod: HCNC

## 2019-12-09 PROCEDURE — 80053 COMPREHEN METABOLIC PANEL: CPT | Mod: HCNC

## 2019-12-09 PROCEDURE — 36415 COLL VENOUS BLD VENIPUNCTURE: CPT | Mod: HCNC

## 2019-12-09 PROCEDURE — 80197 ASSAY OF TACROLIMUS: CPT | Mod: HCNC

## 2019-12-10 LAB — TACROLIMUS BLD-MCNC: 2.3 NG/ML (ref 5–15)

## 2019-12-11 ENCOUNTER — TELEPHONE (OUTPATIENT)
Dept: TRANSPLANT | Facility: CLINIC | Age: 64
End: 2019-12-11

## 2019-12-11 NOTE — TELEPHONE ENCOUNTER
----- Message from Teresa Gonzalez MD sent at 12/11/2019 10:18 AM CST -----  Reviewed, nothing to do; repeat per routine

## 2019-12-27 NOTE — TELEPHONE ENCOUNTER
Left message on answering machine to return a call to Dr. Gonzalez's office at [facility], Appointment made and mailed to patient, SUSAN.   English

## 2020-02-26 RX ORDER — ESOMEPRAZOLE MAGNESIUM 40 MG/1
CAPSULE, DELAYED RELEASE ORAL
Qty: 30 CAPSULE | Refills: 11 | Status: SHIPPED | OUTPATIENT
Start: 2020-02-26 | End: 2021-11-10 | Stop reason: SDUPTHER

## 2020-02-27 ENCOUNTER — OFFICE VISIT (OUTPATIENT)
Dept: INTERNAL MEDICINE | Facility: CLINIC | Age: 65
End: 2020-02-27
Payer: MEDICARE

## 2020-02-27 ENCOUNTER — TELEPHONE (OUTPATIENT)
Dept: ENDOSCOPY | Facility: HOSPITAL | Age: 65
End: 2020-02-27

## 2020-02-27 VITALS
TEMPERATURE: 96 F | WEIGHT: 261.94 LBS | HEART RATE: 103 BPM | SYSTOLIC BLOOD PRESSURE: 142 MMHG | DIASTOLIC BLOOD PRESSURE: 82 MMHG | HEIGHT: 71 IN | BODY MASS INDEX: 36.67 KG/M2 | OXYGEN SATURATION: 96 %

## 2020-02-27 DIAGNOSIS — R60.0 PERIPHERAL EDEMA: Primary | ICD-10-CM

## 2020-02-27 DIAGNOSIS — E78.2 MIXED HYPERLIPIDEMIA: ICD-10-CM

## 2020-02-27 DIAGNOSIS — Z13.6 SCREENING FOR AAA (ABDOMINAL AORTIC ANEURYSM): ICD-10-CM

## 2020-02-27 DIAGNOSIS — Z12.11 COLON CANCER SCREENING: ICD-10-CM

## 2020-02-27 DIAGNOSIS — E66.01 SEVERE OBESITY: ICD-10-CM

## 2020-02-27 DIAGNOSIS — D69.6 THROMBOCYTOPENIA: ICD-10-CM

## 2020-02-27 DIAGNOSIS — F10.21 HISTORY OF ALCOHOL DEPENDENCE: ICD-10-CM

## 2020-02-27 DIAGNOSIS — D84.9 IMMUNOSUPPRESSION: ICD-10-CM

## 2020-02-27 DIAGNOSIS — Z94.4 S/P LIVER TRANSPLANT: ICD-10-CM

## 2020-02-27 DIAGNOSIS — F17.200 TOBACCO USE DISORDER: ICD-10-CM

## 2020-02-27 LAB
BILIRUB UR QL STRIP: NEGATIVE
CLARITY UR REFRACT.AUTO: CLEAR
COLOR UR AUTO: YELLOW
GLUCOSE UR QL STRIP: NEGATIVE
HGB UR QL STRIP: NEGATIVE
KETONES UR QL STRIP: NEGATIVE
LEUKOCYTE ESTERASE UR QL STRIP: NEGATIVE
NITRITE UR QL STRIP: NEGATIVE
PH UR STRIP: 6 [PH] (ref 5–8)
PROT UR QL STRIP: NEGATIVE
SP GR UR STRIP: 1.02 (ref 1–1.03)
URN SPEC COLLECT METH UR: NORMAL

## 2020-02-27 PROCEDURE — 3079F PR MOST RECENT DIASTOLIC BLOOD PRESSURE 80-89 MM HG: ICD-10-PCS | Mod: HCNC,CPTII,S$GLB, | Performed by: INTERNAL MEDICINE

## 2020-02-27 PROCEDURE — 99214 PR OFFICE/OUTPT VISIT, EST, LEVL IV, 30-39 MIN: ICD-10-PCS | Mod: HCNC,S$GLB,, | Performed by: INTERNAL MEDICINE

## 2020-02-27 PROCEDURE — 99999 PR PBB SHADOW E&M-EST. PATIENT-LVL III: ICD-10-PCS | Mod: PBBFAC,HCNC,, | Performed by: INTERNAL MEDICINE

## 2020-02-27 PROCEDURE — 1101F PT FALLS ASSESS-DOCD LE1/YR: CPT | Mod: HCNC,CPTII,S$GLB, | Performed by: INTERNAL MEDICINE

## 2020-02-27 PROCEDURE — 99999 PR PBB SHADOW E&M-EST. PATIENT-LVL III: CPT | Mod: PBBFAC,HCNC,, | Performed by: INTERNAL MEDICINE

## 2020-02-27 PROCEDURE — 1159F MED LIST DOCD IN RCRD: CPT | Mod: HCNC,S$GLB,, | Performed by: INTERNAL MEDICINE

## 2020-02-27 PROCEDURE — 99499 RISK ADDL DX/OHS AUDIT: ICD-10-PCS | Mod: HCNC,S$GLB,, | Performed by: INTERNAL MEDICINE

## 2020-02-27 PROCEDURE — 3077F SYST BP >= 140 MM HG: CPT | Mod: HCNC,CPTII,S$GLB, | Performed by: INTERNAL MEDICINE

## 2020-02-27 PROCEDURE — 1125F PR PAIN SEVERITY QUANTIFIED, PAIN PRESENT: ICD-10-PCS | Mod: HCNC,S$GLB,, | Performed by: INTERNAL MEDICINE

## 2020-02-27 PROCEDURE — 81003 URINALYSIS AUTO W/O SCOPE: CPT | Mod: HCNC

## 2020-02-27 PROCEDURE — 3077F PR MOST RECENT SYSTOLIC BLOOD PRESSURE >= 140 MM HG: ICD-10-PCS | Mod: HCNC,CPTII,S$GLB, | Performed by: INTERNAL MEDICINE

## 2020-02-27 PROCEDURE — 1101F PR PT FALLS ASSESS DOC 0-1 FALLS W/OUT INJ PAST YR: ICD-10-PCS | Mod: HCNC,CPTII,S$GLB, | Performed by: INTERNAL MEDICINE

## 2020-02-27 PROCEDURE — 1125F AMNT PAIN NOTED PAIN PRSNT: CPT | Mod: HCNC,S$GLB,, | Performed by: INTERNAL MEDICINE

## 2020-02-27 PROCEDURE — 99214 OFFICE O/P EST MOD 30 MIN: CPT | Mod: HCNC,S$GLB,, | Performed by: INTERNAL MEDICINE

## 2020-02-27 PROCEDURE — 1159F PR MEDICATION LIST DOCUMENTED IN MEDICAL RECORD: ICD-10-PCS | Mod: HCNC,S$GLB,, | Performed by: INTERNAL MEDICINE

## 2020-02-27 PROCEDURE — 99499 UNLISTED E&M SERVICE: CPT | Mod: HCNC,S$GLB,, | Performed by: INTERNAL MEDICINE

## 2020-02-27 PROCEDURE — 3079F DIAST BP 80-89 MM HG: CPT | Mod: HCNC,CPTII,S$GLB, | Performed by: INTERNAL MEDICINE

## 2020-02-27 NOTE — TELEPHONE ENCOUNTER
Attempted to schedule procedure with patient, he declined.  Stated he was in the middle of doing something else and would call office when ready to schedule- number provided.

## 2020-02-27 NOTE — PROGRESS NOTES
Subjective:       Patient ID: Lj Coleman is a 64 y.o. male.    Chief Complaint: Establish Care    64 y.o. White male     Patient presents with:  Establish Care    HPI: Here today to establish care.  He has been having swelling in his ankles and hands lately. He admits to eating high sodium foods. He denies shortness of breath, orthopnea, PND or decreased urine output.   He has a history of a liver transplant in 2012. He is on immune suppressive agents and managed by hepatology.                     ALT                      10                  12/09/2019                 AST                      13                  12/09/2019                     ALKPHOS                  112                 12/09/2019                 BILITOT                  0.4                 12/09/2019            He has a history of thrombocytopenia and his last platelet count was within normal range.                   PLT                      159                 12/09/2019            HLD--not on medication. His lipids have not been checked recently.                  CHOL                     189                 01/23/2017                      HDL                      33 (L)              01/23/2017                 LDLCALC                  123.0               01/23/2017                 TRIG                     165 (H)             01/23/2017            He is a smoker. He is not interested in quitting.          Past Medical History:  Alcohol dependence      Comment:  stopped 2012  Angina pectoris  Arthritis      Comment:  back  9/8/2016: Atherosclerosis of native coronary artery without angina   pectoris/ LAD  Back pain  Chronic hepatitis C with cirrhosis  Depression  Hepatoma      Comment:  Prior to liver transplant  9/9/2015: History of colon polyps  April 2012: History of transplantation, liver  History of vertebral fracture  Hypertension  Immune deficiency disorder  4/9/2014: Incisional hernia following transplant  November 2011: Liver  failure      Comment:  Related to chemotherapy for hepatoma  04/2012: Liver transplanted  Obesity  9/9/2016: Tobacco abuse  Trouble in sleeping  1975: Vertebral fracture    Past Surgical History:  2013: HERNIA REPAIR; Right      Comment:  incisional  April 2012: LIVER TRANSPLANT  No date: MOUTH SURGERY  1958: TONSILLECTOMY    Review of patient's family history indicates:  Problem: Cancer      Relation: Unknown          Age of Onset: (Not Specified)  Problem: Cancer      Relation: Father          Age of Onset: (Not Specified)          Comment: bladder  Problem: Cancer      Relation: Maternal Grandmother          Age of Onset: (Not Specified)      Current Outpatient Medications on File Prior to Visit:  esomeprazole (NEXIUM) 40 MG capsule, TAKE (1) CAPSULE DAILY., Disp: 30 capsule, Rfl: 11  mycophenolate (MYFORTIC) 360 MG TbEC, TAKE (2) TABLETS TWICE DAILY., Disp: 120 tablet, Rfl: 11  tacrolimus (PROGRAF) 0.5 MG Cap, TAKE  (1)  CAPSULE  TWICE DAILY., Disp: 60 capsule, Rfl: 11    Allergies:   Review of patient's allergies indicates:   -- Codeine -- Other (See Comments)    --  Upset stomach            Review of Systems   Constitutional: Negative for fever and unexpected weight change.   Respiratory: Positive for cough (occasionally). Negative for shortness of breath.    Cardiovascular: Positive for leg swelling. Negative for chest pain.   Gastrointestinal: Negative for abdominal pain.   Genitourinary: Negative for decreased urine volume and difficulty urinating.   Musculoskeletal: Positive for back pain.   Neurological: Negative for dizziness and headaches.       Objective:      Physical Exam   Constitutional: He is oriented to person, place, and time. He appears well-developed and well-nourished. No distress.   Eyes: No scleral icterus.   Cardiovascular: Normal rate, regular rhythm and normal heart sounds.   Pulmonary/Chest: Effort normal and breath sounds normal. No respiratory distress.   Abdominal: Soft. Bowel sounds  are normal. There is no tenderness.   Obese abdomen; no ascites detected   Musculoskeletal: He exhibits edema (bilateral ankles).   Neurological: He is alert and oriented to person, place, and time.   Skin: Skin is warm and dry.   Psychiatric: He has a normal mood and affect.   Vitals reviewed.      Assessment:       1. Peripheral edema    2. S/P liver transplant    3. Immunosuppression    4. Thrombocytopenia    5. Mixed hyperlipidemia    6. Tobacco use disorder    7. History of alcohol dependence    8. Severe obesity    9. Screening for AAA (abdominal aortic aneurysm)    10. Colon cancer screening        Plan:       Lj was seen today for establish care.    Diagnoses and all orders for this visit:    Peripheral edema  -     CBC auto differential; Future  -     Comprehensive metabolic panel; Future  -     Brain natriuretic peptide; Future  -     Urinalysis    S/P liver transplant    Immunosuppression    Thrombocytopenia  -     CBC auto differential; Future    Mixed hyperlipidemia  -     Lipid panel; Future    Tobacco use disorder  -     Discussed cessation    History of alcohol dependence    Severe obesity  -     Lifestyle modifications discussed    Screening for AAA (abdominal aortic aneurysm)  -     US Abdominal Aorta; Future    Colon cancer screening  -     Case request GI: COLONOSCOPY    Schedule labs.

## 2020-03-09 ENCOUNTER — LAB VISIT (OUTPATIENT)
Dept: LAB | Facility: HOSPITAL | Age: 65
End: 2020-03-09
Attending: INTERNAL MEDICINE
Payer: MEDICARE

## 2020-03-09 ENCOUNTER — TELEPHONE (OUTPATIENT)
Dept: RADIOLOGY | Facility: HOSPITAL | Age: 65
End: 2020-03-09

## 2020-03-09 DIAGNOSIS — Z94.4 S/P LIVER TRANSPLANT: ICD-10-CM

## 2020-03-09 LAB
ALBUMIN SERPL BCP-MCNC: 3.4 G/DL (ref 3.5–5.2)
ALP SERPL-CCNC: 100 U/L (ref 55–135)
ALT SERPL W/O P-5'-P-CCNC: 10 U/L (ref 10–44)
ANION GAP SERPL CALC-SCNC: 10 MMOL/L (ref 8–16)
AST SERPL-CCNC: 13 U/L (ref 10–40)
BASOPHILS # BLD AUTO: 0.07 K/UL (ref 0–0.2)
BASOPHILS NFR BLD: 1.1 % (ref 0–1.9)
BILIRUB SERPL-MCNC: 0.4 MG/DL (ref 0.1–1)
BUN SERPL-MCNC: 14 MG/DL (ref 8–23)
CALCIUM SERPL-MCNC: 8.5 MG/DL (ref 8.7–10.5)
CHLORIDE SERPL-SCNC: 104 MMOL/L (ref 95–110)
CO2 SERPL-SCNC: 24 MMOL/L (ref 23–29)
CREAT SERPL-MCNC: 1.1 MG/DL (ref 0.5–1.4)
DIFFERENTIAL METHOD: ABNORMAL
EOSINOPHIL # BLD AUTO: 0.3 K/UL (ref 0–0.5)
EOSINOPHIL NFR BLD: 4.2 % (ref 0–8)
ERYTHROCYTE [DISTWIDTH] IN BLOOD BY AUTOMATED COUNT: 13.9 % (ref 11.5–14.5)
EST. GFR  (AFRICAN AMERICAN): >60 ML/MIN/1.73 M^2
EST. GFR  (NON AFRICAN AMERICAN): >60 ML/MIN/1.73 M^2
GLUCOSE SERPL-MCNC: 98 MG/DL (ref 70–110)
HCT VFR BLD AUTO: 50.3 % (ref 40–54)
HGB BLD-MCNC: 15.3 G/DL (ref 14–18)
IMM GRANULOCYTES # BLD AUTO: 0.03 K/UL (ref 0–0.04)
IMM GRANULOCYTES NFR BLD AUTO: 0.5 % (ref 0–0.5)
LYMPHOCYTES # BLD AUTO: 1.6 K/UL (ref 1–4.8)
LYMPHOCYTES NFR BLD: 23.7 % (ref 18–48)
MCH RBC QN AUTO: 28.1 PG (ref 27–31)
MCHC RBC AUTO-ENTMCNC: 30.4 G/DL (ref 32–36)
MCV RBC AUTO: 93 FL (ref 82–98)
MONOCYTES # BLD AUTO: 0.7 K/UL (ref 0.3–1)
MONOCYTES NFR BLD: 9.8 % (ref 4–15)
NEUTROPHILS # BLD AUTO: 4 K/UL (ref 1.8–7.7)
NEUTROPHILS NFR BLD: 60.7 % (ref 38–73)
NRBC BLD-RTO: 0 /100 WBC
PLATELET # BLD AUTO: 135 K/UL (ref 150–350)
PMV BLD AUTO: 12.8 FL (ref 9.2–12.9)
POTASSIUM SERPL-SCNC: 4.5 MMOL/L (ref 3.5–5.1)
PROT SERPL-MCNC: 6.7 G/DL (ref 6–8.4)
RBC # BLD AUTO: 5.44 M/UL (ref 4.6–6.2)
SODIUM SERPL-SCNC: 138 MMOL/L (ref 136–145)
WBC # BLD AUTO: 6.63 K/UL (ref 3.9–12.7)

## 2020-03-09 PROCEDURE — 80053 COMPREHEN METABOLIC PANEL: CPT | Mod: HCNC

## 2020-03-09 PROCEDURE — 36415 COLL VENOUS BLD VENIPUNCTURE: CPT | Mod: HCNC

## 2020-03-09 PROCEDURE — 85025 COMPLETE CBC W/AUTO DIFF WBC: CPT | Mod: HCNC

## 2020-03-09 PROCEDURE — 80197 ASSAY OF TACROLIMUS: CPT | Mod: HCNC

## 2020-03-10 LAB — TACROLIMUS BLD-MCNC: 2.5 NG/ML (ref 5–15)

## 2020-03-13 ENCOUNTER — TELEPHONE (OUTPATIENT)
Dept: TRANSPLANT | Facility: CLINIC | Age: 65
End: 2020-03-13

## 2020-03-13 NOTE — TELEPHONE ENCOUNTER
----- Message from Teresa Gonzalez MD sent at 3/13/2020  9:49 AM CDT -----  Reviewed, nothing to do; repeat per routine

## 2020-03-13 NOTE — LETTER
March 13, 2020    Lj Coleman  [address]  [address]  [place] [address]          Dear Lj Jared:  MRN: [id number]    This is a follow up to your recent labs, your lab results were stable.  There are no medicine changes.  Please have your labs drawn again on 6/8/20.      If you cannot have your labs drawn on the scheduled date, it is your responsibility to call the transplant department to reschedule.  To reschedule or make an appointment, please as to speak to or leave a message for my assistant, Norma Cobb or Isabella, at [phone].  When leaving a message for Norma Cobb Angela or myself, we ask that you leave a brief message regarding your request.    Sincerely,    Estrella Horn, RN,BSN,CCTC    Your Liver Transplant Coordinator    [facility] [place]  [address] [place] [id number]  [phone]

## 2020-04-27 ENCOUNTER — PATIENT MESSAGE (OUTPATIENT)
Dept: GASTROENTEROLOGY | Facility: CLINIC | Age: 65
End: 2020-04-27

## 2020-06-16 ENCOUNTER — PATIENT OUTREACH (OUTPATIENT)
Dept: ADMINISTRATIVE | Facility: HOSPITAL | Age: 65
End: 2020-06-16

## 2020-06-18 ENCOUNTER — TELEPHONE (OUTPATIENT)
Dept: TRANSPLANT | Facility: CLINIC | Age: 65
End: 2020-06-18

## 2020-06-18 NOTE — LETTER
June 18, 2020    Lj Coleman  [address]  [address]  [facility] [address]          Dear Lj Coleman:  MRN: [id number]    Your lab work was due to be drawn on 6/18/20.  We contacted your lab and were unable to get test results.  It is very important to get your labs drawn as scheduled.  We cannot monitor you for rejection, infections, or drug toxicity side effects without lab results.  Please call us at [phone] as soon as possible to let us know when you plan to have labs drawn.    Sincerely,    Estrella Horn, RN,BSN,CCTC    Your Liver Transplant Coordinator    [facility] [place]  [address] [place] [id number] [phone]

## 2020-06-23 ENCOUNTER — TELEPHONE (OUTPATIENT)
Dept: TRANSPLANT | Facility: CLINIC | Age: 65
End: 2020-06-23

## 2020-06-26 ENCOUNTER — LAB VISIT (OUTPATIENT)
Dept: LAB | Facility: HOSPITAL | Age: 65
End: 2020-06-26
Attending: INTERNAL MEDICINE
Payer: MEDICARE

## 2020-06-26 DIAGNOSIS — Z94.4 S/P LIVER TRANSPLANT: ICD-10-CM

## 2020-06-26 LAB
ALBUMIN SERPL BCP-MCNC: 3.6 G/DL (ref 3.5–5.2)
ALP SERPL-CCNC: 97 U/L (ref 55–135)
ALT SERPL W/O P-5'-P-CCNC: 14 U/L (ref 10–44)
ANION GAP SERPL CALC-SCNC: 8 MMOL/L (ref 8–16)
AST SERPL-CCNC: 23 U/L (ref 10–40)
BASOPHILS # BLD AUTO: 0.08 K/UL (ref 0–0.2)
BASOPHILS NFR BLD: 1.2 % (ref 0–1.9)
BILIRUB SERPL-MCNC: 0.8 MG/DL (ref 0.1–1)
BUN SERPL-MCNC: 18 MG/DL (ref 8–23)
CALCIUM SERPL-MCNC: 9.1 MG/DL (ref 8.7–10.5)
CHLORIDE SERPL-SCNC: 102 MMOL/L (ref 95–110)
CO2 SERPL-SCNC: 28 MMOL/L (ref 23–29)
CREAT SERPL-MCNC: 1.1 MG/DL (ref 0.5–1.4)
DIFFERENTIAL METHOD: ABNORMAL
EOSINOPHIL # BLD AUTO: 0.2 K/UL (ref 0–0.5)
EOSINOPHIL NFR BLD: 3.5 % (ref 0–8)
ERYTHROCYTE [DISTWIDTH] IN BLOOD BY AUTOMATED COUNT: 13.9 % (ref 11.5–14.5)
EST. GFR  (AFRICAN AMERICAN): >60 ML/MIN/1.73 M^2
EST. GFR  (NON AFRICAN AMERICAN): >60 ML/MIN/1.73 M^2
GLUCOSE SERPL-MCNC: 89 MG/DL (ref 70–110)
HCT VFR BLD AUTO: 52.2 % (ref 40–54)
HGB BLD-MCNC: 15.7 G/DL (ref 14–18)
IMM GRANULOCYTES # BLD AUTO: 0.02 K/UL (ref 0–0.04)
IMM GRANULOCYTES NFR BLD AUTO: 0.3 % (ref 0–0.5)
LYMPHOCYTES # BLD AUTO: 1.3 K/UL (ref 1–4.8)
LYMPHOCYTES NFR BLD: 19.4 % (ref 18–48)
MCH RBC QN AUTO: 27.7 PG (ref 27–31)
MCHC RBC AUTO-ENTMCNC: 30.1 G/DL (ref 32–36)
MCV RBC AUTO: 92 FL (ref 82–98)
MONOCYTES # BLD AUTO: 0.6 K/UL (ref 0.3–1)
MONOCYTES NFR BLD: 9.3 % (ref 4–15)
NEUTROPHILS # BLD AUTO: 4.6 K/UL (ref 1.8–7.7)
NEUTROPHILS NFR BLD: 66.3 % (ref 38–73)
NRBC BLD-RTO: 0 /100 WBC
PLATELET # BLD AUTO: 138 K/UL (ref 150–350)
PMV BLD AUTO: 12.1 FL (ref 9.2–12.9)
POTASSIUM SERPL-SCNC: 4.8 MMOL/L (ref 3.5–5.1)
PROT SERPL-MCNC: 7 G/DL (ref 6–8.4)
RBC # BLD AUTO: 5.66 M/UL (ref 4.6–6.2)
SODIUM SERPL-SCNC: 138 MMOL/L (ref 136–145)
WBC # BLD AUTO: 6.86 K/UL (ref 3.9–12.7)

## 2020-06-26 PROCEDURE — 80197 ASSAY OF TACROLIMUS: CPT | Mod: HCNC

## 2020-06-26 PROCEDURE — 80053 COMPREHEN METABOLIC PANEL: CPT | Mod: HCNC

## 2020-06-26 PROCEDURE — 36415 COLL VENOUS BLD VENIPUNCTURE: CPT | Mod: HCNC

## 2020-06-26 PROCEDURE — 85025 COMPLETE CBC W/AUTO DIFF WBC: CPT | Mod: HCNC

## 2020-06-27 LAB — TACROLIMUS BLD-MCNC: 4.4 NG/ML (ref 5–15)

## 2020-06-29 ENCOUNTER — TELEPHONE (OUTPATIENT)
Dept: TRANSPLANT | Facility: CLINIC | Age: 65
End: 2020-06-29

## 2020-06-29 NOTE — LETTER
June 29, 2020    Lj [address]  [address]  [facility] [address]          Dear Lj Coleman:  MRN: [id number]    This is a follow up to your recent labs, your lab results were stable.  There are no medicine changes.  Please have your labs drawn again on 9/28/20.      If you cannot have your labs drawn on the scheduled date, it is your responsibility to call the transplant department to reschedule.  Please call [phone] and ask to speak to Isabella CASTILLO   for all scheduling requests.     Sincerely,    Estrella Horn, RN,BSN,CCTC  Your Liver Transplant Coordinator    [facility] [place]  [address] [place] [phone]

## 2020-06-29 NOTE — TELEPHONE ENCOUNTER
----- Message from Teresa Gonzalez MD sent at 6/29/2020  2:12 PM CDT -----  Reviewed, nothing to do; repeat per routine

## 2020-09-28 ENCOUNTER — LAB VISIT (OUTPATIENT)
Dept: LAB | Facility: HOSPITAL | Age: 65
End: 2020-09-28
Attending: INTERNAL MEDICINE
Payer: MEDICARE

## 2020-09-28 ENCOUNTER — OFFICE VISIT (OUTPATIENT)
Dept: GASTROENTEROLOGY | Facility: CLINIC | Age: 65
End: 2020-09-28
Payer: MEDICARE

## 2020-09-28 VITALS
HEART RATE: 96 BPM | SYSTOLIC BLOOD PRESSURE: 116 MMHG | HEIGHT: 71 IN | WEIGHT: 258.63 LBS | BODY MASS INDEX: 36.21 KG/M2 | DIASTOLIC BLOOD PRESSURE: 78 MMHG

## 2020-09-28 DIAGNOSIS — Z94.4 LIVER TRANSPLANTED: ICD-10-CM

## 2020-09-28 DIAGNOSIS — Z94.4 S/P LIVER TRANSPLANT: ICD-10-CM

## 2020-09-28 DIAGNOSIS — D84.9 IMMUNOSUPPRESSION: Primary | ICD-10-CM

## 2020-09-28 LAB
ALBUMIN SERPL BCP-MCNC: 3.7 G/DL (ref 3.5–5.2)
ALP SERPL-CCNC: 94 U/L (ref 55–135)
ALT SERPL W/O P-5'-P-CCNC: 11 U/L (ref 10–44)
ANION GAP SERPL CALC-SCNC: 8 MMOL/L (ref 8–16)
AST SERPL-CCNC: 15 U/L (ref 10–40)
BASOPHILS # BLD AUTO: 0.09 K/UL (ref 0–0.2)
BASOPHILS NFR BLD: 1.2 % (ref 0–1.9)
BILIRUB SERPL-MCNC: 0.5 MG/DL (ref 0.1–1)
BUN SERPL-MCNC: 20 MG/DL (ref 8–23)
CALCIUM SERPL-MCNC: 8.7 MG/DL (ref 8.7–10.5)
CHLORIDE SERPL-SCNC: 105 MMOL/L (ref 95–110)
CO2 SERPL-SCNC: 24 MMOL/L (ref 23–29)
CREAT SERPL-MCNC: 1 MG/DL (ref 0.5–1.4)
DIFFERENTIAL METHOD: ABNORMAL
EOSINOPHIL # BLD AUTO: 0.3 K/UL (ref 0–0.5)
EOSINOPHIL NFR BLD: 3.9 % (ref 0–8)
ERYTHROCYTE [DISTWIDTH] IN BLOOD BY AUTOMATED COUNT: 13.8 % (ref 11.5–14.5)
EST. GFR  (AFRICAN AMERICAN): >60 ML/MIN/1.73 M^2
EST. GFR  (NON AFRICAN AMERICAN): >60 ML/MIN/1.73 M^2
GLUCOSE SERPL-MCNC: 104 MG/DL (ref 70–110)
HCT VFR BLD AUTO: 53 % (ref 40–54)
HGB BLD-MCNC: 16.2 G/DL (ref 14–18)
IMM GRANULOCYTES # BLD AUTO: 0.04 K/UL (ref 0–0.04)
IMM GRANULOCYTES NFR BLD AUTO: 0.5 % (ref 0–0.5)
LYMPHOCYTES # BLD AUTO: 1.5 K/UL (ref 1–4.8)
LYMPHOCYTES NFR BLD: 19.7 % (ref 18–48)
MCH RBC QN AUTO: 28 PG (ref 27–31)
MCHC RBC AUTO-ENTMCNC: 30.6 G/DL (ref 32–36)
MCV RBC AUTO: 92 FL (ref 82–98)
MONOCYTES # BLD AUTO: 0.7 K/UL (ref 0.3–1)
MONOCYTES NFR BLD: 8.9 % (ref 4–15)
NEUTROPHILS # BLD AUTO: 5 K/UL (ref 1.8–7.7)
NEUTROPHILS NFR BLD: 65.8 % (ref 38–73)
NRBC BLD-RTO: 0 /100 WBC
PLATELET # BLD AUTO: 148 K/UL (ref 150–350)
PMV BLD AUTO: 12.4 FL (ref 9.2–12.9)
POTASSIUM SERPL-SCNC: 4.5 MMOL/L (ref 3.5–5.1)
PROT SERPL-MCNC: 7 G/DL (ref 6–8.4)
RBC # BLD AUTO: 5.79 M/UL (ref 4.6–6.2)
SODIUM SERPL-SCNC: 137 MMOL/L (ref 136–145)
WBC # BLD AUTO: 7.61 K/UL (ref 3.9–12.7)

## 2020-09-28 PROCEDURE — 80053 COMPREHEN METABOLIC PANEL: CPT | Mod: HCNC

## 2020-09-28 PROCEDURE — 99213 PR OFFICE/OUTPT VISIT, EST, LEVL III, 20-29 MIN: ICD-10-PCS | Mod: HCNC,S$GLB,, | Performed by: INTERNAL MEDICINE

## 2020-09-28 PROCEDURE — 1159F PR MEDICATION LIST DOCUMENTED IN MEDICAL RECORD: ICD-10-PCS | Mod: HCNC,S$GLB,, | Performed by: INTERNAL MEDICINE

## 2020-09-28 PROCEDURE — 3078F PR MOST RECENT DIASTOLIC BLOOD PRESSURE < 80 MM HG: ICD-10-PCS | Mod: HCNC,CPTII,S$GLB, | Performed by: INTERNAL MEDICINE

## 2020-09-28 PROCEDURE — 36415 COLL VENOUS BLD VENIPUNCTURE: CPT | Mod: HCNC

## 2020-09-28 PROCEDURE — 99499 RISK ADDL DX/OHS AUDIT: ICD-10-PCS | Mod: HCNC,S$GLB,, | Performed by: INTERNAL MEDICINE

## 2020-09-28 PROCEDURE — 85025 COMPLETE CBC W/AUTO DIFF WBC: CPT | Mod: HCNC

## 2020-09-28 PROCEDURE — 3074F PR MOST RECENT SYSTOLIC BLOOD PRESSURE < 130 MM HG: ICD-10-PCS | Mod: HCNC,CPTII,S$GLB, | Performed by: INTERNAL MEDICINE

## 2020-09-28 PROCEDURE — 1101F PR PT FALLS ASSESS DOC 0-1 FALLS W/OUT INJ PAST YR: ICD-10-PCS | Mod: HCNC,CPTII,S$GLB, | Performed by: INTERNAL MEDICINE

## 2020-09-28 PROCEDURE — 1125F AMNT PAIN NOTED PAIN PRSNT: CPT | Mod: HCNC,S$GLB,, | Performed by: INTERNAL MEDICINE

## 2020-09-28 PROCEDURE — 99213 OFFICE O/P EST LOW 20 MIN: CPT | Mod: HCNC,S$GLB,, | Performed by: INTERNAL MEDICINE

## 2020-09-28 PROCEDURE — 3078F DIAST BP <80 MM HG: CPT | Mod: HCNC,CPTII,S$GLB, | Performed by: INTERNAL MEDICINE

## 2020-09-28 PROCEDURE — 1101F PT FALLS ASSESS-DOCD LE1/YR: CPT | Mod: HCNC,CPTII,S$GLB, | Performed by: INTERNAL MEDICINE

## 2020-09-28 PROCEDURE — 1125F PR PAIN SEVERITY QUANTIFIED, PAIN PRESENT: ICD-10-PCS | Mod: HCNC,S$GLB,, | Performed by: INTERNAL MEDICINE

## 2020-09-28 PROCEDURE — 99999 PR PBB SHADOW E&M-EST. PATIENT-LVL III: ICD-10-PCS | Mod: PBBFAC,HCNC,, | Performed by: INTERNAL MEDICINE

## 2020-09-28 PROCEDURE — 3074F SYST BP LT 130 MM HG: CPT | Mod: HCNC,CPTII,S$GLB, | Performed by: INTERNAL MEDICINE

## 2020-09-28 PROCEDURE — 99999 PR PBB SHADOW E&M-EST. PATIENT-LVL III: CPT | Mod: PBBFAC,HCNC,, | Performed by: INTERNAL MEDICINE

## 2020-09-28 PROCEDURE — 3008F PR BODY MASS INDEX (BMI) DOCUMENTED: ICD-10-PCS | Mod: HCNC,CPTII,S$GLB, | Performed by: INTERNAL MEDICINE

## 2020-09-28 PROCEDURE — 3008F BODY MASS INDEX DOCD: CPT | Mod: HCNC,CPTII,S$GLB, | Performed by: INTERNAL MEDICINE

## 2020-09-28 PROCEDURE — 80197 ASSAY OF TACROLIMUS: CPT | Mod: HCNC

## 2020-09-28 PROCEDURE — 99499 UNLISTED E&M SERVICE: CPT | Mod: HCNC,S$GLB,, | Performed by: INTERNAL MEDICINE

## 2020-09-28 PROCEDURE — 1159F MED LIST DOCD IN RCRD: CPT | Mod: HCNC,S$GLB,, | Performed by: INTERNAL MEDICINE

## 2020-09-28 NOTE — PROGRESS NOTES
Transplant Hepatology  Liver Transplant Recipient Follow-up    Transplant Date: 1/2/2012  UNOS Native Liver Dx:     Lj is here for follow up of his liver transplant.        Subjective:     Interval History:  Currently, he is doing adequately. Current complaints include back issues.  He was taking narcotics for this in seem to have gotten to an issue with narcotics and therefore is now on medications set therapy.  He is also trying to work with his doctors to get off of this.  He denies any major issues in the last year.  He does acknowledge that occasionally he forgets to take his medication.  He is interested in getting off the Myfortic if possible.    Review of Systems    Objective:     Physical Exam  Vitals signs reviewed.   Constitutional:       General: He is not in acute distress.     Appearance: He is well-developed.   HENT:      Head: Normocephalic and atraumatic.      Mouth/Throat:      Pharynx: No oropharyngeal exudate.   Eyes:      General: No scleral icterus.        Right eye: No discharge.         Left eye: No discharge.      Conjunctiva/sclera: Conjunctivae normal.      Pupils: Pupils are equal, round, and reactive to light.   Pulmonary:      Effort: Pulmonary effort is normal. No respiratory distress.      Breath sounds: Normal breath sounds. No wheezing.   Abdominal:      General: There is no distension.      Palpations: Abdomen is soft.      Tenderness: There is no abdominal tenderness.   Neurological:      Mental Status: He is alert and oriented to person, place, and time.   Psychiatric:         Behavior: Behavior normal.         WBC   Date Value Ref Range Status   06/26/2020 6.86 3.90 - 12.70 K/uL Final     Hemoglobin   Date Value Ref Range Status   06/26/2020 15.7 14.0 - 18.0 g/dL Final     Hematocrit   Date Value Ref Range Status   06/26/2020 52.2 40.0 - 54.0 % Final     Platelets   Date Value Ref Range Status   06/26/2020 138 (L) 150 - 350 K/uL Final     BUN, Bld   Date Value Ref Range  Status   06/26/2020 18 8 - 23 mg/dL Final     Creatinine   Date Value Ref Range Status   06/26/2020 1.1 0.5 - 1.4 mg/dL Final     Glucose   Date Value Ref Range Status   06/26/2020 89 70 - 110 mg/dL Final     Calcium   Date Value Ref Range Status   06/26/2020 9.1 8.7 - 10.5 mg/dL Final     Sodium   Date Value Ref Range Status   06/26/2020 138 136 - 145 mmol/L Final     Potassium   Date Value Ref Range Status   06/26/2020 4.8 3.5 - 5.1 mmol/L Final     Chloride   Date Value Ref Range Status   06/26/2020 102 95 - 110 mmol/L Final     Magnesium   Date Value Ref Range Status   06/13/2015 2.1 1.6 - 2.6 mg/dL Final     AST   Date Value Ref Range Status   06/26/2020 23 10 - 40 U/L Final     ALT   Date Value Ref Range Status   06/26/2020 14 10 - 44 U/L Final     Alkaline Phosphatase   Date Value Ref Range Status   06/26/2020 97 55 - 135 U/L Final     Total Bilirubin   Date Value Ref Range Status   06/26/2020 0.8 0.1 - 1.0 mg/dL Final     Comment:     For infants and newborns, interpretation of results should be based  on gestational age, weight and in agreement with clinical  observations.  Premature Infant recommended reference ranges:  Up to 24 hours.............<8.0 mg/dL  Up to 48 hours............<12.0 mg/dL  3-5 days..................<15.0 mg/dL  6-29 days.................<15.0 mg/dL       Albumin   Date Value Ref Range Status   06/26/2020 3.6 3.5 - 5.2 g/dL Final     No results found for: INR  Lab Results   Component Value Date    TACROLIMUS 4.4 (L) 06/26/2020           Assessment/Plan:     1. Immunosuppression    2. Liver transplanted        Immunosuppression  -patient is far out in a from transplant that we can discontinue Myfortic.  Explained that we will need to keep his tacrolimus level between 3-5.  Strongly advised compliance with medications.  -awaiting labs from today  -repeat labs next week to monitor off Myfortic    Liver transplant-good allograft function  -Continue with routine lab monitoring      Return  to clinic in 1 year    Patient was seen in the liver transplant department at [facility].      Teresa Gonzalez MD           [facility] Patient Status  Functional Status: 70% - Cares for self: unable to carry on normal activity or active work  Physical Capacity: Limited Mobility

## 2020-09-29 ENCOUNTER — PATIENT MESSAGE (OUTPATIENT)
Dept: OTHER | Facility: OTHER | Age: 65
End: 2020-09-29

## 2020-09-29 LAB — TACROLIMUS BLD-MCNC: 1.8 NG/ML (ref 5–15)

## 2020-10-02 RX ORDER — TACROLIMUS 0.5 MG/1
1 CAPSULE ORAL EVERY 12 HOURS
Qty: 120 CAPSULE | Refills: 11 | Status: SHIPPED | OUTPATIENT
Start: 2020-10-02 | End: 2021-09-15

## 2020-10-02 NOTE — TELEPHONE ENCOUNTER
Informed pt labs reviewed by Dr. Gonzalez, instructed to increase prograf to 1 mg BID. Will repeat labs 10/5.

## 2020-10-02 NOTE — TELEPHONE ENCOUNTER
----- Message from Teresa Gonzalez MD sent at 10/2/2020  9:48 AM CDT -----  Tacro level is low increase dose to 1mg twice a day and repeat labs in a week

## 2020-10-05 ENCOUNTER — PATIENT MESSAGE (OUTPATIENT)
Dept: GASTROENTEROLOGY | Facility: CLINIC | Age: 65
End: 2020-10-05

## 2020-10-06 ENCOUNTER — LAB VISIT (OUTPATIENT)
Dept: LAB | Facility: HOSPITAL | Age: 65
End: 2020-10-06
Attending: INTERNAL MEDICINE
Payer: MEDICARE

## 2020-10-06 DIAGNOSIS — Z94.4 S/P LIVER TRANSPLANT: ICD-10-CM

## 2020-10-06 LAB
ALBUMIN SERPL BCP-MCNC: 3.7 G/DL (ref 3.5–5.2)
ALP SERPL-CCNC: 93 U/L (ref 55–135)
ALT SERPL W/O P-5'-P-CCNC: 13 U/L (ref 10–44)
ANION GAP SERPL CALC-SCNC: 7 MMOL/L (ref 8–16)
AST SERPL-CCNC: 13 U/L (ref 10–40)
BASOPHILS # BLD AUTO: 0.1 K/UL (ref 0–0.2)
BASOPHILS NFR BLD: 1.2 % (ref 0–1.9)
BILIRUB SERPL-MCNC: 0.5 MG/DL (ref 0.1–1)
BUN SERPL-MCNC: 22 MG/DL (ref 8–23)
CALCIUM SERPL-MCNC: 8.9 MG/DL (ref 8.7–10.5)
CHLORIDE SERPL-SCNC: 104 MMOL/L (ref 95–110)
CO2 SERPL-SCNC: 27 MMOL/L (ref 23–29)
CREAT SERPL-MCNC: 1.1 MG/DL (ref 0.5–1.4)
DIFFERENTIAL METHOD: ABNORMAL
EOSINOPHIL # BLD AUTO: 0.3 K/UL (ref 0–0.5)
EOSINOPHIL NFR BLD: 3.9 % (ref 0–8)
ERYTHROCYTE [DISTWIDTH] IN BLOOD BY AUTOMATED COUNT: 13.8 % (ref 11.5–14.5)
EST. GFR  (AFRICAN AMERICAN): >60 ML/MIN/1.73 M^2
EST. GFR  (NON AFRICAN AMERICAN): >60 ML/MIN/1.73 M^2
GLUCOSE SERPL-MCNC: 95 MG/DL (ref 70–110)
HCT VFR BLD AUTO: 51.2 % (ref 40–54)
HGB BLD-MCNC: 15.6 G/DL (ref 14–18)
IMM GRANULOCYTES # BLD AUTO: 0.03 K/UL (ref 0–0.04)
IMM GRANULOCYTES NFR BLD AUTO: 0.4 % (ref 0–0.5)
LYMPHOCYTES # BLD AUTO: 2 K/UL (ref 1–4.8)
LYMPHOCYTES NFR BLD: 24.7 % (ref 18–48)
MCH RBC QN AUTO: 28.2 PG (ref 27–31)
MCHC RBC AUTO-ENTMCNC: 30.5 G/DL (ref 32–36)
MCV RBC AUTO: 92 FL (ref 82–98)
MONOCYTES # BLD AUTO: 0.7 K/UL (ref 0.3–1)
MONOCYTES NFR BLD: 9.2 % (ref 4–15)
NEUTROPHILS # BLD AUTO: 4.9 K/UL (ref 1.8–7.7)
NEUTROPHILS NFR BLD: 60.6 % (ref 38–73)
NRBC BLD-RTO: 0 /100 WBC
PLATELET # BLD AUTO: 149 K/UL (ref 150–350)
PMV BLD AUTO: 11.9 FL (ref 9.2–12.9)
POTASSIUM SERPL-SCNC: 4.7 MMOL/L (ref 3.5–5.1)
PROT SERPL-MCNC: 6.9 G/DL (ref 6–8.4)
RBC # BLD AUTO: 5.54 M/UL (ref 4.6–6.2)
SODIUM SERPL-SCNC: 138 MMOL/L (ref 136–145)
WBC # BLD AUTO: 8.03 K/UL (ref 3.9–12.7)

## 2020-10-06 PROCEDURE — 36415 COLL VENOUS BLD VENIPUNCTURE: CPT | Mod: HCNC

## 2020-10-06 PROCEDURE — 80197 ASSAY OF TACROLIMUS: CPT | Mod: HCNC

## 2020-10-06 PROCEDURE — 80053 COMPREHEN METABOLIC PANEL: CPT | Mod: HCNC

## 2020-10-06 PROCEDURE — 85025 COMPLETE CBC W/AUTO DIFF WBC: CPT | Mod: HCNC

## 2020-10-07 ENCOUNTER — TELEPHONE (OUTPATIENT)
Dept: GASTROENTEROLOGY | Facility: CLINIC | Age: 65
End: 2020-10-07

## 2020-10-07 LAB — TACROLIMUS BLD-MCNC: 3.7 NG/ML (ref 5–15)

## 2020-10-07 NOTE — TELEPHONE ENCOUNTER
----- Message from Alecia Romero sent at 10/7/2020 12:33 PM CDT -----  Would like to consult with nurse regarding test results, and some questions about medication. Also wants to know when he is due to have more labs. Please give a call back at [phone].

## 2020-10-07 NOTE — TELEPHONE ENCOUNTER
Spoke with patient and made him aware that Dr. Gonzalez has not reviewed lab results as of now.    Once results have been read by physician our office will contact to discuss.    Patient verbalized understanding with no further concerns or questions.    Thanks

## 2020-10-09 ENCOUNTER — TELEPHONE (OUTPATIENT)
Dept: TRANSPLANT | Facility: CLINIC | Age: 65
End: 2020-10-09

## 2020-10-09 ENCOUNTER — PATIENT MESSAGE (OUTPATIENT)
Dept: TRANSPLANT | Facility: CLINIC | Age: 65
End: 2020-10-09

## 2020-10-14 ENCOUNTER — TELEPHONE (OUTPATIENT)
Dept: INTERNAL MEDICINE | Facility: CLINIC | Age: 65
End: 2020-10-14

## 2020-10-14 DIAGNOSIS — Z12.11 COLON CANCER SCREENING: Primary | ICD-10-CM

## 2020-10-14 NOTE — TELEPHONE ENCOUNTER
----- Message from Loan Wheatley sent at 10/14/2020 11:21 AM CDT -----  Contact: pt  The pt has questions concerning a colonoscopy appt, no additional info given and can be reached at [phone]///[email or web address]

## 2020-10-15 ENCOUNTER — PATIENT MESSAGE (OUTPATIENT)
Dept: INTERNAL MEDICINE | Facility: CLINIC | Age: 65
End: 2020-10-15

## 2020-10-19 ENCOUNTER — TELEPHONE (OUTPATIENT)
Dept: GASTROENTEROLOGY | Facility: CLINIC | Age: 65
End: 2020-10-19

## 2020-10-19 NOTE — TELEPHONE ENCOUNTER
----- Message from Yoni Martinez sent at 10/19/2020  8:12 AM CDT -----  Type:  Needs Medical Advice    Who Called: pt  Symptoms (please be specific):    How long has patient had these symptoms:    Pharmacy name and phone #:    Would the patient rather a call back or a response via [facility]? Call   Best Call Back Number: [phone] (home)   Additional Information:   Pt would like a call back in regards to appt on tomorrow 10/20/21

## 2020-10-19 NOTE — TELEPHONE ENCOUNTER
Spoke with patient who informed me that he has rescheduled his labs to tomorrow, 10/29/2020.    Patient states that he will schedule his follow-up appointment once Dr. Gonzalez has reviewed his lab results.    Thanks

## 2020-10-20 ENCOUNTER — LAB VISIT (OUTPATIENT)
Dept: LAB | Facility: HOSPITAL | Age: 65
End: 2020-10-20
Attending: INTERNAL MEDICINE
Payer: MEDICARE

## 2020-10-20 DIAGNOSIS — Z94.4 S/P LIVER TRANSPLANT: ICD-10-CM

## 2020-10-20 LAB
ALBUMIN SERPL BCP-MCNC: 3.7 G/DL (ref 3.5–5.2)
ALP SERPL-CCNC: 88 U/L (ref 55–135)
ALT SERPL W/O P-5'-P-CCNC: 13 U/L (ref 10–44)
ANION GAP SERPL CALC-SCNC: 8 MMOL/L (ref 8–16)
AST SERPL-CCNC: 16 U/L (ref 10–40)
BASOPHILS # BLD AUTO: 0.06 K/UL (ref 0–0.2)
BASOPHILS NFR BLD: 0.8 % (ref 0–1.9)
BILIRUB SERPL-MCNC: 0.6 MG/DL (ref 0.1–1)
BUN SERPL-MCNC: 20 MG/DL (ref 8–23)
CALCIUM SERPL-MCNC: 8.7 MG/DL (ref 8.7–10.5)
CHLORIDE SERPL-SCNC: 108 MMOL/L (ref 95–110)
CO2 SERPL-SCNC: 22 MMOL/L (ref 23–29)
CREAT SERPL-MCNC: 1 MG/DL (ref 0.5–1.4)
DIFFERENTIAL METHOD: ABNORMAL
EOSINOPHIL # BLD AUTO: 0.2 K/UL (ref 0–0.5)
EOSINOPHIL NFR BLD: 3.3 % (ref 0–8)
ERYTHROCYTE [DISTWIDTH] IN BLOOD BY AUTOMATED COUNT: 13.8 % (ref 11.5–14.5)
EST. GFR  (AFRICAN AMERICAN): >60 ML/MIN/1.73 M^2
EST. GFR  (NON AFRICAN AMERICAN): >60 ML/MIN/1.73 M^2
GLUCOSE SERPL-MCNC: 103 MG/DL (ref 70–110)
HCT VFR BLD AUTO: 50.3 % (ref 40–54)
HGB BLD-MCNC: 15.5 G/DL (ref 14–18)
IMM GRANULOCYTES # BLD AUTO: 0.04 K/UL (ref 0–0.04)
IMM GRANULOCYTES NFR BLD AUTO: 0.5 % (ref 0–0.5)
LYMPHOCYTES # BLD AUTO: 1.4 K/UL (ref 1–4.8)
LYMPHOCYTES NFR BLD: 18.8 % (ref 18–48)
MCH RBC QN AUTO: 27.9 PG (ref 27–31)
MCHC RBC AUTO-ENTMCNC: 30.8 G/DL (ref 32–36)
MCV RBC AUTO: 91 FL (ref 82–98)
MONOCYTES # BLD AUTO: 0.6 K/UL (ref 0.3–1)
MONOCYTES NFR BLD: 7.6 % (ref 4–15)
NEUTROPHILS # BLD AUTO: 5 K/UL (ref 1.8–7.7)
NEUTROPHILS NFR BLD: 69 % (ref 38–73)
NRBC BLD-RTO: 0 /100 WBC
PLATELET # BLD AUTO: 137 K/UL (ref 150–350)
PMV BLD AUTO: 12.5 FL (ref 9.2–12.9)
POTASSIUM SERPL-SCNC: 4.5 MMOL/L (ref 3.5–5.1)
PROT SERPL-MCNC: 6.8 G/DL (ref 6–8.4)
RBC # BLD AUTO: 5.55 M/UL (ref 4.6–6.2)
SODIUM SERPL-SCNC: 138 MMOL/L (ref 136–145)
WBC # BLD AUTO: 7.28 K/UL (ref 3.9–12.7)

## 2020-10-20 PROCEDURE — 80197 ASSAY OF TACROLIMUS: CPT | Mod: HCNC

## 2020-10-20 PROCEDURE — 85025 COMPLETE CBC W/AUTO DIFF WBC: CPT | Mod: HCNC

## 2020-10-20 PROCEDURE — 36415 COLL VENOUS BLD VENIPUNCTURE: CPT | Mod: HCNC

## 2020-10-20 PROCEDURE — 80053 COMPREHEN METABOLIC PANEL: CPT | Mod: HCNC

## 2020-10-21 ENCOUNTER — PATIENT MESSAGE (OUTPATIENT)
Dept: GASTROENTEROLOGY | Facility: CLINIC | Age: 65
End: 2020-10-21

## 2020-10-21 LAB — TACROLIMUS BLD-MCNC: 2.6 NG/ML (ref 5–15)

## 2020-10-22 ENCOUNTER — TELEPHONE (OUTPATIENT)
Dept: TRANSPLANT | Facility: CLINIC | Age: 65
End: 2020-10-22

## 2020-10-22 NOTE — LETTER
October 22, 2020    Lj Melara [address]  [address]  [facility] [address]          Dear Lj Coleman:  MRN: [id number]    This is a follow up to your recent labs, your lab results were stable.  There are no medicine changes.  Please have your labs drawn again on 1/19/21.      If you cannot have your labs drawn on the scheduled date, it is your responsibility to call the transplant department to reschedule.  Please call [phone] and ask to speak to Isabella CASTILLO   for all scheduling requests.     Sincerely,    Estrella Horn, RN,BSN,CCTC    Your Liver Transplant Coordinator    [facility]  [address] [place] [address] [phone]

## 2020-10-22 NOTE — TELEPHONE ENCOUNTER
Labs reviewed and are stable, continue q 3 months. Letter sent.     ----- Message from Teresa Gonzalez MD sent at 10/21/2020  6:01 PM CDT -----  Reviewed, nothing to do; repeat per routine

## 2020-10-23 ENCOUNTER — TELEPHONE (OUTPATIENT)
Dept: ENDOSCOPY | Facility: HOSPITAL | Age: 65
End: 2020-10-23

## 2020-10-23 DIAGNOSIS — Z13.9 SCREENING PROCEDURE: Primary | ICD-10-CM

## 2020-10-23 DIAGNOSIS — E66.9 SUPER OBESE: Primary | ICD-10-CM

## 2020-10-23 RX ORDER — SODIUM, POTASSIUM,MAG SULFATES 17.5-3.13G
1 SOLUTION, RECONSTITUTED, ORAL ORAL DAILY
Qty: 1 KIT | Refills: 0 | Status: SHIPPED | OUTPATIENT
Start: 2020-10-23 | End: 2020-10-25

## 2020-10-23 NOTE — TELEPHONE ENCOUNTER
Pt scheduled on 11-04-20 w/ Dr. Ellsworth. Emailed  Instructions, order covid, rrouted prep to [facility].

## 2020-11-01 ENCOUNTER — LAB VISIT (OUTPATIENT)
Dept: URGENT CARE | Facility: CLINIC | Age: 65
End: 2020-11-01
Payer: MEDICARE

## 2020-11-01 DIAGNOSIS — Z13.9 SCREENING PROCEDURE: ICD-10-CM

## 2020-11-01 PROCEDURE — U0003 INFECTIOUS AGENT DETECTION BY NUCLEIC ACID (DNA OR RNA); SEVERE ACUTE RESPIRATORY SYNDROME CORONAVIRUS 2 (SARS-COV-2) (CORONAVIRUS DISEASE [COVID-19]), AMPLIFIED PROBE TECHNIQUE, MAKING USE OF HIGH THROUGHPUT TECHNOLOGIES AS DESCRIBED BY CMS-2020-01-R: HCPCS | Mod: HCNC

## 2020-11-02 ENCOUNTER — OFFICE VISIT (OUTPATIENT)
Dept: INTERNAL MEDICINE | Facility: CLINIC | Age: 65
End: 2020-11-02
Payer: MEDICARE

## 2020-11-02 ENCOUNTER — TELEPHONE (OUTPATIENT)
Dept: ENDOSCOPY | Facility: HOSPITAL | Age: 65
End: 2020-11-02

## 2020-11-02 VITALS
SYSTOLIC BLOOD PRESSURE: 142 MMHG | WEIGHT: 258.19 LBS | HEIGHT: 69 IN | DIASTOLIC BLOOD PRESSURE: 90 MMHG | OXYGEN SATURATION: 95 % | HEART RATE: 101 BPM | BODY MASS INDEX: 38.24 KG/M2

## 2020-11-02 DIAGNOSIS — F43.23 ADJUSTMENT DISORDER WITH MIXED ANXIETY AND DEPRESSED MOOD: ICD-10-CM

## 2020-11-02 DIAGNOSIS — F10.21 HISTORY OF ALCOHOL DEPENDENCE: ICD-10-CM

## 2020-11-02 DIAGNOSIS — D69.6 THROMBOCYTOPENIA: ICD-10-CM

## 2020-11-02 DIAGNOSIS — R03.0 ELEVATED BLOOD PRESSURE READING: ICD-10-CM

## 2020-11-02 DIAGNOSIS — Z86.19 HISTORY OF HEPATITIS C: ICD-10-CM

## 2020-11-02 DIAGNOSIS — I25.10 ATHEROSCLEROSIS OF CORONARY ARTERY, ANGINA PRESENCE UNSPECIFIED, UNSPECIFIED VESSEL OR LESION TYPE, UNSPECIFIED WHETHER NATIVE OR TRANSPLANTED HEART: ICD-10-CM

## 2020-11-02 DIAGNOSIS — E66.9 OBESITY (BMI 35.0-39.9 WITHOUT COMORBIDITY): ICD-10-CM

## 2020-11-02 DIAGNOSIS — Z85.05 HISTORY OF LIVER CANCER: ICD-10-CM

## 2020-11-02 DIAGNOSIS — D84.9 IMMUNOSUPPRESSION: ICD-10-CM

## 2020-11-02 DIAGNOSIS — Z72.0 TOBACCO USE: ICD-10-CM

## 2020-11-02 DIAGNOSIS — Z94.4 S/P LIVER TRANSPLANT: ICD-10-CM

## 2020-11-02 DIAGNOSIS — Z00.00 ENCOUNTER FOR PREVENTIVE HEALTH EXAMINATION: Primary | ICD-10-CM

## 2020-11-02 LAB — SARS-COV-2 RNA RESP QL NAA+PROBE: NOT DETECTED

## 2020-11-02 PROCEDURE — 3077F PR MOST RECENT SYSTOLIC BLOOD PRESSURE >= 140 MM HG: ICD-10-PCS | Mod: HCNC,CPTII,S$GLB, | Performed by: NURSE PRACTITIONER

## 2020-11-02 PROCEDURE — 99499 RISK ADDL DX/OHS AUDIT: ICD-10-PCS | Mod: S$GLB,,, | Performed by: NURSE PRACTITIONER

## 2020-11-02 PROCEDURE — G0439 PR MEDICARE ANNUAL WELLNESS SUBSEQUENT VISIT: ICD-10-PCS | Mod: HCNC,S$GLB,, | Performed by: NURSE PRACTITIONER

## 2020-11-02 PROCEDURE — G0439 PPPS, SUBSEQ VISIT: HCPCS | Mod: HCNC,S$GLB,, | Performed by: NURSE PRACTITIONER

## 2020-11-02 PROCEDURE — 99499 UNLISTED E&M SERVICE: CPT | Mod: S$GLB,,, | Performed by: NURSE PRACTITIONER

## 2020-11-02 PROCEDURE — 3080F DIAST BP >= 90 MM HG: CPT | Mod: HCNC,CPTII,S$GLB, | Performed by: NURSE PRACTITIONER

## 2020-11-02 PROCEDURE — 99999 PR PBB SHADOW E&M-EST. PATIENT-LVL III: ICD-10-PCS | Mod: PBBFAC,HCNC,, | Performed by: NURSE PRACTITIONER

## 2020-11-02 PROCEDURE — 3080F PR MOST RECENT DIASTOLIC BLOOD PRESSURE >= 90 MM HG: ICD-10-PCS | Mod: HCNC,CPTII,S$GLB, | Performed by: NURSE PRACTITIONER

## 2020-11-02 PROCEDURE — 3077F SYST BP >= 140 MM HG: CPT | Mod: HCNC,CPTII,S$GLB, | Performed by: NURSE PRACTITIONER

## 2020-11-02 PROCEDURE — 99999 PR PBB SHADOW E&M-EST. PATIENT-LVL III: CPT | Mod: PBBFAC,HCNC,, | Performed by: NURSE PRACTITIONER

## 2020-11-02 RX ORDER — BUPRENORPHINE HYDROCHLORIDE 8 MG/1
TABLET SUBLINGUAL DAILY
COMMUNITY
End: 2021-11-10

## 2020-11-02 NOTE — PATIENT INSTRUCTIONS
Counseling and Referral of Other Preventative  (Italic type indicates deductible and co-insurance are waived)    Patient Name: Lj Coleman  Today's Date: 11/2/2020    Health Maintenance       Date Due Completion Date    TETANUS VACCINE 12/23/1971 ---    Aspirin/Antiplatelet Therapy 12/23/1971 ---    High Dose Statin 12/23/1974 ---    Shingles Vaccine (1 of 2) 12/23/2003 ---    Colorectal Cancer Screening 12/23/2003 ---    Lipid Panel 01/23/2018 1/23/2017    Pneumococcal Vaccine (65+ High/Highest Risk) (1 of 2 - PCV13) 12/23/2018 7/28/2016    Abdominal Aortic Aneurysm Screening 12/23/2018 ---    Influenza Vaccine (1) 08/01/2020 1/30/2017        No orders of the defined types were placed in this encounter.    The following information is provided to all patients.  This information is to help you find resources for any of the problems found today that may be affecting your health:                Living healthy guide: www.[facility].[place]      Understanding Diabetes: www.diabetes.org      Eating healthy: www.cdc.gov/healthyweight      CDC home safety checklist: www.cdc.gov/steadi/patient.html      Agency on Aging: www.goea.[place]      Alcoholics anonymous (AA): www.aa.org      Physical Activity: www.elena.nih.gov/rg4zogl      Tobacco use: www.quitwithusla.org

## 2020-11-02 NOTE — Clinical Note
Your patient was seen today for AWV. Abnormalities bolded. Please review.   Thank you,   SERJIO Palma

## 2020-11-02 NOTE — PROGRESS NOTES
"  Lj Coleman presented for a  Medicare AWV and comprehensive Health Risk Assessment today. The following components were reviewed and updated:    · Medical history  · Family History  · Social history  · Allergies and Current Medications  · Health Risk Assessment  · Health Maintenance  · Care Team     ** See Completed Assessments for Annual Wellness Visit within the encounter summary.**         The following assessments were completed:  · Living Situation  · CAGE  · Depression Screening  · Timed Get Up and Go  · Whisper Test-na  · Cognitive Function Screening  · Nutrition Screening  · ADL Screening  · PAQ Screening        Vitals:    11/02/20 1053   BP: (!) 142/90   Pulse: 101   SpO2: 95%   Weight: 117.1 kg (258 lb 2.5 oz)   Height: 5' 9" (1.753 m)     Body mass index is 38.12 kg/m².  Physical Exam  Vitals signs and nursing note reviewed.   Constitutional:       Appearance: He is well-developed.   HENT:      Head: Normocephalic.   Cardiovascular:      Rate and Rhythm: Normal rate and regular rhythm.      Heart sounds: Normal heart sounds. No murmur.   Pulmonary:      Effort: Pulmonary effort is normal. No respiratory distress.      Breath sounds: Normal breath sounds.   Abdominal:      Palpations: Abdomen is soft. There is no mass.      Tenderness: There is no abdominal tenderness.   Musculoskeletal: Normal range of motion.   Skin:     General: Skin is warm and dry.   Neurological:      Mental Status: He is alert and oriented to person, place, and time.      Motor: No abnormal muscle tone.   Psychiatric:         Speech: Speech normal.         Behavior: Behavior normal.               Diagnoses and health risks identified today and associated recommendations/orders:    1. Encounter for preventive health examination  He will discuss vaccines with gastroenterologist.   He will discuss health maintenance with PCP at upcoming appointment.      Scheduled  PCP    2. Atherosclerosis of coronary artery, angina presence " unspecified, unspecified vessel or lesion type, unspecified whether native or transplanted heart  cta 6/2015  Discussed diagnosis and risk reduction.   Advised to follow up with PCP for further recommendations. Patient expressed understanding.       3. Adjustment disorder with mixed anxiety and depressed mood  PHQ 2-1  Discussed counseling. Psychiatry department contact information given to patient, along with information on providers in department.    Advised to follow up with PCP for further evaluation and treatment. He expressed understanding.      4. S/P liver transplant  Continue current treatment plan as previously prescribed with your gastroenterologist.     5. Immunosuppression  See #4    6. Thrombocytopenia  Decreased from prior check. Continue current treatment plan as previously prescribed with your PCP.     7. History of liver cancer  See #4    8. History of alcohol dependence  Sober since 2012  Continue current treatment plan as previously prescribed with your providers.     9. History of hepatitis C  See #4    10. Tobacco use  Discussed the importance of smoking cessation and advised to quit smoking. Patient expressed understanding. Declines smoking cessation program.     11. Obesity (BMI 35.0-39.9 without comorbidity)  Encouraged healthy diet and exercise as tolerated to help bring BMI into normal range.   Continue current treatment plan as previously prescribed with your PCP.     12. Elevated blood pressure reading  BP elevated at today's visit. Discussed s/s of MI and stroke (patient denies any s/s) and advised to go to the ER if occur. Advised patient to monitor BP (keep a log) and if continues to stay elevated (greater than 130/80) to follow up with PCP for further evaluation and treatment. He will also bring in machine to correlate.  He expressed understanding.        Provided Lj with a 5-10 year written screening schedule and personal prevention plan. Recommendations were developed using  the USPSTF age appropriate recommendations. Education, counseling, and referrals were provided as needed. After Visit Summary printed and given to patient which includes a list of additional screenings\tests needed.    Follow up in about 1 year (around 11/2/2021) for awv.    Aicha Kirkland NP  I offered to discuss end of life issues, including information on how to make advance directives that the patient could use to name someone who would make medical decisions on their behalf if they became too ill to make themselves.    ___Patient declined  _X_Patient is interested, I provided paper work and offered to discuss.

## 2020-11-02 NOTE — TELEPHONE ENCOUNTER
Pt called regarding procedure on 11-. Per recording phone not taking calls at this time.Tried  emergency contact no answer.

## 2020-11-03 ENCOUNTER — TELEPHONE (OUTPATIENT)
Dept: ENDOSCOPY | Facility: HOSPITAL | Age: 65
End: 2020-11-03

## 2020-11-03 NOTE — TELEPHONE ENCOUNTER
----- Message from Josué Conde sent at 11/3/2020  3:28 PM CST -----  Contact: Lj Calhoun is requesting a call back to reschedule procedure that is schedule for tomorrow 11/4/2020. Please call him back at [phone].    Thanks   DD

## 2020-11-03 NOTE — TELEPHONE ENCOUNTER
----- Message from Lucille Ray sent at 11/3/2020  4:43 PM CST -----  Regarding: pt  Pt would like a call from nurse in regards to rescheduling his procedure . Please kunal back at .[phone] (home)         Thank You ,   Lucille Ray

## 2020-11-03 NOTE — TELEPHONE ENCOUNTER
Attempted to contact patient to reschedule procedure, no answer. Unable to leave message due to phone kept ringing and voicemail never picked up.

## 2020-11-03 NOTE — TELEPHONE ENCOUNTER
Spoke with patient in regards to cancelling procedure for 11/04/2020. Patient states he will call back to reschedule, number provided.

## 2020-11-04 ENCOUNTER — TELEPHONE (OUTPATIENT)
Dept: ENDOSCOPY | Facility: HOSPITAL | Age: 65
End: 2020-11-04

## 2020-11-04 DIAGNOSIS — Z12.11 SCREENING FOR COLON CANCER: Primary | ICD-10-CM

## 2020-11-04 NOTE — TELEPHONE ENCOUNTER
Spoke with patient in regards to rescheduling colonoscopy procedure. Procedure instructions with new dates and times of pre-procedure covid test and prep instructions sent through patient portal.

## 2020-11-27 ENCOUNTER — TELEPHONE (OUTPATIENT)
Dept: ENDOSCOPY | Facility: HOSPITAL | Age: 65
End: 2020-11-27

## 2020-11-27 NOTE — TELEPHONE ENCOUNTER
Spoke with patient regarding procedure scheduled for 12-2-2020.  Patient declined same day procedure with a different provider.  Rescheduled procedure to 12- with dr mcadams and instructions sent via portal.

## 2020-12-11 ENCOUNTER — PATIENT MESSAGE (OUTPATIENT)
Dept: OTHER | Facility: OTHER | Age: 65
End: 2020-12-11

## 2020-12-14 ENCOUNTER — PATIENT OUTREACH (OUTPATIENT)
Dept: ADMINISTRATIVE | Facility: HOSPITAL | Age: 65
End: 2020-12-14

## 2020-12-15 ENCOUNTER — TELEPHONE (OUTPATIENT)
Dept: ENDOSCOPY | Facility: HOSPITAL | Age: 65
End: 2020-12-15

## 2021-01-08 ENCOUNTER — PATIENT OUTREACH (OUTPATIENT)
Dept: ADMINISTRATIVE | Facility: HOSPITAL | Age: 66
End: 2021-01-08

## 2021-01-08 ENCOUNTER — PATIENT MESSAGE (OUTPATIENT)
Dept: TRANSPLANT | Facility: CLINIC | Age: 66
End: 2021-01-08

## 2021-01-14 ENCOUNTER — TELEPHONE (OUTPATIENT)
Dept: ENDOSCOPY | Facility: HOSPITAL | Age: 66
End: 2021-01-14

## 2021-01-17 ENCOUNTER — LAB VISIT (OUTPATIENT)
Dept: URGENT CARE | Facility: CLINIC | Age: 66
End: 2021-01-17
Payer: MEDICARE

## 2021-01-17 DIAGNOSIS — Z12.11 SCREENING FOR COLON CANCER: ICD-10-CM

## 2021-01-17 PROCEDURE — U0003 INFECTIOUS AGENT DETECTION BY NUCLEIC ACID (DNA OR RNA); SEVERE ACUTE RESPIRATORY SYNDROME CORONAVIRUS 2 (SARS-COV-2) (CORONAVIRUS DISEASE [COVID-19]), AMPLIFIED PROBE TECHNIQUE, MAKING USE OF HIGH THROUGHPUT TECHNOLOGIES AS DESCRIBED BY CMS-2020-01-R: HCPCS

## 2021-01-18 LAB — SARS-COV-2 RNA RESP QL NAA+PROBE: NOT DETECTED

## 2021-01-19 ENCOUNTER — LAB VISIT (OUTPATIENT)
Dept: LAB | Facility: HOSPITAL | Age: 66
End: 2021-01-19
Attending: INTERNAL MEDICINE
Payer: MEDICARE

## 2021-01-19 DIAGNOSIS — Z94.4 S/P LIVER TRANSPLANT: ICD-10-CM

## 2021-01-19 LAB
ALBUMIN SERPL BCP-MCNC: 3.9 G/DL (ref 3.5–5.2)
ALP SERPL-CCNC: 97 U/L (ref 55–135)
ALT SERPL W/O P-5'-P-CCNC: 12 U/L (ref 10–44)
ANION GAP SERPL CALC-SCNC: 10 MMOL/L (ref 8–16)
AST SERPL-CCNC: 14 U/L (ref 10–40)
BASOPHILS # BLD AUTO: 0.09 K/UL (ref 0–0.2)
BASOPHILS NFR BLD: 1 % (ref 0–1.9)
BILIRUB SERPL-MCNC: 0.8 MG/DL (ref 0.1–1)
BUN SERPL-MCNC: 27 MG/DL (ref 8–23)
CALCIUM SERPL-MCNC: 8.9 MG/DL (ref 8.7–10.5)
CHLORIDE SERPL-SCNC: 104 MMOL/L (ref 95–110)
CO2 SERPL-SCNC: 23 MMOL/L (ref 23–29)
CREAT SERPL-MCNC: 1.2 MG/DL (ref 0.5–1.4)
DIFFERENTIAL METHOD: NORMAL
EOSINOPHIL # BLD AUTO: 0.3 K/UL (ref 0–0.5)
EOSINOPHIL NFR BLD: 3.3 % (ref 0–8)
ERYTHROCYTE [DISTWIDTH] IN BLOOD BY AUTOMATED COUNT: 14.1 % (ref 11.5–14.5)
EST. GFR  (AFRICAN AMERICAN): >60 ML/MIN/1.73 M^2
EST. GFR  (NON AFRICAN AMERICAN): >60 ML/MIN/1.73 M^2
GLUCOSE SERPL-MCNC: 93 MG/DL (ref 70–110)
HCT VFR BLD AUTO: 50.6 % (ref 40–54)
HGB BLD-MCNC: 16.2 G/DL (ref 14–18)
IMM GRANULOCYTES # BLD AUTO: 0.04 K/UL (ref 0–0.04)
IMM GRANULOCYTES NFR BLD AUTO: 0.4 % (ref 0–0.5)
LYMPHOCYTES # BLD AUTO: 2 K/UL (ref 1–4.8)
LYMPHOCYTES NFR BLD: 21.7 % (ref 18–48)
MCH RBC QN AUTO: 28.7 PG (ref 27–31)
MCHC RBC AUTO-ENTMCNC: 32 G/DL (ref 32–36)
MCV RBC AUTO: 90 FL (ref 82–98)
MONOCYTES # BLD AUTO: 0.7 K/UL (ref 0.3–1)
MONOCYTES NFR BLD: 7.3 % (ref 4–15)
NEUTROPHILS # BLD AUTO: 6 K/UL (ref 1.8–7.7)
NEUTROPHILS NFR BLD: 66.3 % (ref 38–73)
NRBC BLD-RTO: 0 /100 WBC
PLATELET # BLD AUTO: 155 K/UL (ref 150–350)
PMV BLD AUTO: 11.8 FL (ref 9.2–12.9)
POTASSIUM SERPL-SCNC: 4.7 MMOL/L (ref 3.5–5.1)
PROT SERPL-MCNC: 7.3 G/DL (ref 6–8.4)
RBC # BLD AUTO: 5.64 M/UL (ref 4.6–6.2)
SODIUM SERPL-SCNC: 137 MMOL/L (ref 136–145)
WBC # BLD AUTO: 9.03 K/UL (ref 3.9–12.7)

## 2021-01-19 PROCEDURE — 85025 COMPLETE CBC W/AUTO DIFF WBC: CPT

## 2021-01-19 PROCEDURE — 80053 COMPREHEN METABOLIC PANEL: CPT

## 2021-01-19 PROCEDURE — 36415 COLL VENOUS BLD VENIPUNCTURE: CPT

## 2021-01-19 PROCEDURE — 80197 ASSAY OF TACROLIMUS: CPT

## 2021-01-20 ENCOUNTER — HOSPITAL ENCOUNTER (OUTPATIENT)
Facility: HOSPITAL | Age: 66
Discharge: HOME | End: 2021-01-20
Attending: COLON & RECTAL SURGERY | Admitting: COLON & RECTAL SURGERY
Payer: MEDICARE

## 2021-01-20 ENCOUNTER — ANESTHESIA (OUTPATIENT)
Dept: ENDOSCOPY | Facility: HOSPITAL | Age: 66
End: 2021-01-20
Payer: MEDICARE

## 2021-01-20 ENCOUNTER — ANESTHESIA EVENT (OUTPATIENT)
Dept: ENDOSCOPY | Facility: HOSPITAL | Age: 66
End: 2021-01-20
Payer: MEDICARE

## 2021-01-20 VITALS
RESPIRATION RATE: 18 BRPM | SYSTOLIC BLOOD PRESSURE: 132 MMHG | HEIGHT: 69 IN | TEMPERATURE: 98 F | HEART RATE: 82 BPM | DIASTOLIC BLOOD PRESSURE: 79 MMHG | OXYGEN SATURATION: 95 % | WEIGHT: 254.63 LBS | BODY MASS INDEX: 37.71 KG/M2

## 2021-01-20 DIAGNOSIS — D84.9 IMMUNOSUPPRESSION: ICD-10-CM

## 2021-01-20 DIAGNOSIS — Z86.010 PERSONAL HISTORY OF COLONIC POLYPS: ICD-10-CM

## 2021-01-20 DIAGNOSIS — Z94.4 S/P LIVER TRANSPLANT: ICD-10-CM

## 2021-01-20 DIAGNOSIS — Z12.11 SPECIAL SCREENING FOR MALIGNANT NEOPLASMS, COLON: Primary | ICD-10-CM

## 2021-01-20 LAB — TACROLIMUS BLD-MCNC: 5.3 NG/ML (ref 5–15)

## 2021-01-20 PROCEDURE — 45385 PR COLONOSCOPY,REMV LESN,SNARE: ICD-10-PCS | Mod: 22,PT,, | Performed by: COLON & RECTAL SURGERY

## 2021-01-20 PROCEDURE — 27201089 HC SNARE, DISP (ANY): Performed by: COLON & RECTAL SURGERY

## 2021-01-20 PROCEDURE — 45385 COLONOSCOPY W/LESION REMOVAL: CPT | Mod: 22,PT,, | Performed by: COLON & RECTAL SURGERY

## 2021-01-20 PROCEDURE — 45385 COLONOSCOPY W/LESION REMOVAL: CPT | Performed by: COLON & RECTAL SURGERY

## 2021-01-20 PROCEDURE — 88305 TISSUE EXAM BY PATHOLOGIST: ICD-10-PCS | Mod: 26,,, | Performed by: PATHOLOGY

## 2021-01-20 PROCEDURE — 25000003 PHARM REV CODE 250: Performed by: NURSE ANESTHETIST, CERTIFIED REGISTERED

## 2021-01-20 PROCEDURE — 88305 TISSUE EXAM BY PATHOLOGIST: CPT | Mod: 59 | Performed by: PATHOLOGY

## 2021-01-20 PROCEDURE — 37000008 HC ANESTHESIA 1ST 15 MINUTES: Performed by: COLON & RECTAL SURGERY

## 2021-01-20 PROCEDURE — 88305 TISSUE EXAM BY PATHOLOGIST: CPT | Mod: 26,,, | Performed by: PATHOLOGY

## 2021-01-20 PROCEDURE — 37000009 HC ANESTHESIA EA ADD 15 MINS: Performed by: COLON & RECTAL SURGERY

## 2021-01-20 PROCEDURE — 45380 COLONOSCOPY AND BIOPSY: CPT | Performed by: COLON & RECTAL SURGERY

## 2021-01-20 PROCEDURE — 45380 PR COLONOSCOPY,BIOPSY: ICD-10-PCS | Mod: 59,,, | Performed by: COLON & RECTAL SURGERY

## 2021-01-20 PROCEDURE — 63600175 PHARM REV CODE 636 W HCPCS: Performed by: SURGERY

## 2021-01-20 PROCEDURE — 63600175 PHARM REV CODE 636 W HCPCS: Performed by: NURSE ANESTHETIST, CERTIFIED REGISTERED

## 2021-01-20 PROCEDURE — 27201012 HC FORCEPS, HOT/COLD, DISP: Performed by: COLON & RECTAL SURGERY

## 2021-01-20 PROCEDURE — 45380 COLONOSCOPY AND BIOPSY: CPT | Mod: 59,,, | Performed by: COLON & RECTAL SURGERY

## 2021-01-20 RX ORDER — SODIUM CHLORIDE 0.9 % (FLUSH) 0.9 %
2 SYRINGE (ML) INJECTION
Status: ACTIVE | OUTPATIENT
Start: 2021-01-20

## 2021-01-20 RX ORDER — SODIUM CHLORIDE, SODIUM LACTATE, POTASSIUM CHLORIDE, CALCIUM CHLORIDE 600; 310; 30; 20 MG/100ML; MG/100ML; MG/100ML; MG/100ML
INJECTION, SOLUTION INTRAVENOUS CONTINUOUS
Status: ACTIVE | OUTPATIENT
Start: 2021-01-20

## 2021-01-20 RX ORDER — LIDOCAINE HYDROCHLORIDE 10 MG/ML
INJECTION, SOLUTION EPIDURAL; INFILTRATION; INTRACAUDAL; PERINEURAL
Status: DISCONTINUED | OUTPATIENT
Start: 2021-01-20 | End: 2021-01-20

## 2021-01-20 RX ORDER — PROPOFOL 10 MG/ML
VIAL (ML) INTRAVENOUS
Status: DISCONTINUED | OUTPATIENT
Start: 2021-01-20 | End: 2021-01-20

## 2021-01-20 RX ORDER — SODIUM CHLORIDE, SODIUM LACTATE, POTASSIUM CHLORIDE, CALCIUM CHLORIDE 600; 310; 30; 20 MG/100ML; MG/100ML; MG/100ML; MG/100ML
INJECTION, SOLUTION INTRAVENOUS CONTINUOUS
Status: DISCONTINUED | OUTPATIENT
Start: 2021-01-20 | End: 2021-01-20 | Stop reason: HOSPADM

## 2021-01-20 RX ADMIN — PROPOFOL 30 MG: 10 INJECTION, EMULSION INTRAVENOUS at 08:01

## 2021-01-20 RX ADMIN — PROPOFOL 80 MG: 10 INJECTION, EMULSION INTRAVENOUS at 07:01

## 2021-01-20 RX ADMIN — LIDOCAINE HYDROCHLORIDE 50 MG: 10 INJECTION, SOLUTION EPIDURAL; INFILTRATION; INTRACAUDAL; PERINEURAL at 07:01

## 2021-01-20 RX ADMIN — PROPOFOL 40 MG: 10 INJECTION, EMULSION INTRAVENOUS at 07:01

## 2021-01-20 RX ADMIN — SODIUM CHLORIDE, SODIUM LACTATE, POTASSIUM CHLORIDE, AND CALCIUM CHLORIDE: 600; 310; 30; 20 INJECTION, SOLUTION INTRAVENOUS at 07:01

## 2021-01-21 ENCOUNTER — PATIENT MESSAGE (OUTPATIENT)
Dept: HEPATOLOGY | Facility: CLINIC | Age: 66
End: 2021-01-21

## 2021-01-21 ENCOUNTER — TELEPHONE (OUTPATIENT)
Dept: TRANSPLANT | Facility: CLINIC | Age: 66
End: 2021-01-21

## 2021-01-25 LAB
FINAL PATHOLOGIC DIAGNOSIS: NORMAL
GROSS: NORMAL
Lab: NORMAL

## 2021-03-30 ENCOUNTER — PATIENT MESSAGE (OUTPATIENT)
Dept: HEPATOLOGY | Facility: CLINIC | Age: 66
End: 2021-03-30

## 2021-04-13 ENCOUNTER — PATIENT MESSAGE (OUTPATIENT)
Dept: ADMINISTRATIVE | Facility: HOSPITAL | Age: 66
End: 2021-04-13

## 2021-04-13 ENCOUNTER — PATIENT OUTREACH (OUTPATIENT)
Dept: ADMINISTRATIVE | Facility: HOSPITAL | Age: 66
End: 2021-04-13

## 2021-04-21 ENCOUNTER — TELEPHONE (OUTPATIENT)
Dept: TRANSPLANT | Facility: CLINIC | Age: 66
End: 2021-04-21

## 2021-04-21 DIAGNOSIS — Z94.4 S/P LIVER TRANSPLANT: Primary | ICD-10-CM

## 2021-04-22 ENCOUNTER — LAB VISIT (OUTPATIENT)
Dept: LAB | Facility: HOSPITAL | Age: 66
End: 2021-04-22
Attending: INTERNAL MEDICINE
Payer: MEDICARE

## 2021-04-22 DIAGNOSIS — Z94.4 S/P LIVER TRANSPLANT: ICD-10-CM

## 2021-04-22 LAB
ALBUMIN SERPL BCP-MCNC: 3.5 G/DL (ref 3.5–5.2)
ALP SERPL-CCNC: 110 U/L (ref 55–135)
ALT SERPL W/O P-5'-P-CCNC: 13 U/L (ref 10–44)
ANION GAP SERPL CALC-SCNC: 9 MMOL/L (ref 8–16)
AST SERPL-CCNC: 18 U/L (ref 10–40)
BASOPHILS # BLD AUTO: 0.1 K/UL (ref 0–0.2)
BASOPHILS NFR BLD: 1.2 % (ref 0–1.9)
BILIRUB SERPL-MCNC: 0.4 MG/DL (ref 0.1–1)
BUN SERPL-MCNC: 18 MG/DL (ref 8–23)
CALCIUM SERPL-MCNC: 8.7 MG/DL (ref 8.7–10.5)
CHLORIDE SERPL-SCNC: 105 MMOL/L (ref 95–110)
CO2 SERPL-SCNC: 25 MMOL/L (ref 23–29)
CREAT SERPL-MCNC: 1.1 MG/DL (ref 0.5–1.4)
DIFFERENTIAL METHOD: ABNORMAL
EOSINOPHIL # BLD AUTO: 0.5 K/UL (ref 0–0.5)
EOSINOPHIL NFR BLD: 5.2 % (ref 0–8)
ERYTHROCYTE [DISTWIDTH] IN BLOOD BY AUTOMATED COUNT: 14.1 % (ref 11.5–14.5)
EST. GFR  (AFRICAN AMERICAN): >60 ML/MIN/1.73 M^2
EST. GFR  (NON AFRICAN AMERICAN): >60 ML/MIN/1.73 M^2
GLUCOSE SERPL-MCNC: 91 MG/DL (ref 70–110)
HCT VFR BLD AUTO: 51.5 % (ref 40–54)
HGB BLD-MCNC: 16.2 G/DL (ref 14–18)
IMM GRANULOCYTES # BLD AUTO: 0.06 K/UL (ref 0–0.04)
IMM GRANULOCYTES NFR BLD AUTO: 0.7 % (ref 0–0.5)
LYMPHOCYTES # BLD AUTO: 2.1 K/UL (ref 1–4.8)
LYMPHOCYTES NFR BLD: 24.2 % (ref 18–48)
MCH RBC QN AUTO: 29 PG (ref 27–31)
MCHC RBC AUTO-ENTMCNC: 31.5 G/DL (ref 32–36)
MCV RBC AUTO: 92 FL (ref 82–98)
MONOCYTES # BLD AUTO: 0.7 K/UL (ref 0.3–1)
MONOCYTES NFR BLD: 8.2 % (ref 4–15)
NEUTROPHILS # BLD AUTO: 5.2 K/UL (ref 1.8–7.7)
NEUTROPHILS NFR BLD: 60.5 % (ref 38–73)
NRBC BLD-RTO: 0 /100 WBC
PLATELET # BLD AUTO: 167 K/UL (ref 150–450)
PMV BLD AUTO: 12.2 FL (ref 9.2–12.9)
POTASSIUM SERPL-SCNC: 5 MMOL/L (ref 3.5–5.1)
PROT SERPL-MCNC: 7.1 G/DL (ref 6–8.4)
RBC # BLD AUTO: 5.58 M/UL (ref 4.6–6.2)
SODIUM SERPL-SCNC: 139 MMOL/L (ref 136–145)
WBC # BLD AUTO: 8.64 K/UL (ref 3.9–12.7)

## 2021-04-22 PROCEDURE — 36415 COLL VENOUS BLD VENIPUNCTURE: CPT | Performed by: INTERNAL MEDICINE

## 2021-04-22 PROCEDURE — 85025 COMPLETE CBC W/AUTO DIFF WBC: CPT | Performed by: INTERNAL MEDICINE

## 2021-04-22 PROCEDURE — 80053 COMPREHEN METABOLIC PANEL: CPT | Performed by: INTERNAL MEDICINE

## 2021-04-22 PROCEDURE — 80197 ASSAY OF TACROLIMUS: CPT | Performed by: INTERNAL MEDICINE

## 2021-04-23 LAB — TACROLIMUS BLD-MCNC: 3.5 NG/ML (ref 5–15)

## 2021-04-26 ENCOUNTER — TELEPHONE (OUTPATIENT)
Dept: TRANSPLANT | Facility: CLINIC | Age: 66
End: 2021-04-26

## 2021-04-28 ENCOUNTER — PATIENT MESSAGE (OUTPATIENT)
Dept: RESEARCH | Facility: HOSPITAL | Age: 66
End: 2021-04-28

## 2021-05-07 ENCOUNTER — IMMUNIZATION (OUTPATIENT)
Dept: INTERNAL MEDICINE | Facility: CLINIC | Age: 66
End: 2021-05-07
Payer: MEDICARE

## 2021-05-07 DIAGNOSIS — Z23 NEED FOR VACCINATION: Primary | ICD-10-CM

## 2021-05-07 PROCEDURE — 91300 COVID-19, MRNA, LNP-S, PF, 30 MCG/0.3 ML DOSE VACCINE: CPT | Mod: PBBFAC | Performed by: FAMILY MEDICINE

## 2021-05-27 ENCOUNTER — PATIENT MESSAGE (OUTPATIENT)
Dept: INTERNAL MEDICINE | Facility: CLINIC | Age: 66
End: 2021-05-27

## 2021-05-27 ENCOUNTER — PATIENT OUTREACH (OUTPATIENT)
Dept: INTERNAL MEDICINE | Facility: CLINIC | Age: 66
End: 2021-05-27

## 2021-05-27 ENCOUNTER — TELEPHONE (OUTPATIENT)
Dept: INTERNAL MEDICINE | Facility: CLINIC | Age: 66
End: 2021-05-27

## 2021-05-28 ENCOUNTER — IMMUNIZATION (OUTPATIENT)
Dept: INTERNAL MEDICINE | Facility: CLINIC | Age: 66
End: 2021-05-28
Payer: MEDICARE

## 2021-05-28 DIAGNOSIS — Z23 NEED FOR VACCINATION: Primary | ICD-10-CM

## 2021-05-28 PROCEDURE — 91300 COVID-19, MRNA, LNP-S, PF, 30 MCG/0.3 ML DOSE VACCINE: CPT | Mod: PBBFAC | Performed by: FAMILY MEDICINE

## 2021-05-28 PROCEDURE — 0002A COVID-19, MRNA, LNP-S, PF, 30 MCG/0.3 ML DOSE VACCINE: CPT | Mod: PBBFAC | Performed by: FAMILY MEDICINE

## 2021-06-04 ENCOUNTER — OFFICE VISIT (OUTPATIENT)
Dept: INTERNAL MEDICINE | Facility: CLINIC | Age: 66
End: 2021-06-04
Payer: MEDICARE

## 2021-06-04 VITALS
DIASTOLIC BLOOD PRESSURE: 72 MMHG | SYSTOLIC BLOOD PRESSURE: 120 MMHG | RESPIRATION RATE: 17 BRPM | HEIGHT: 66 IN | WEIGHT: 252.44 LBS | OXYGEN SATURATION: 96 % | BODY MASS INDEX: 40.57 KG/M2 | HEART RATE: 110 BPM | TEMPERATURE: 98 F

## 2021-06-04 DIAGNOSIS — F17.200 TOBACCO USE DISORDER: ICD-10-CM

## 2021-06-04 DIAGNOSIS — R06.02 SHORTNESS OF BREATH: ICD-10-CM

## 2021-06-04 DIAGNOSIS — Z00.00 ANNUAL PHYSICAL EXAM: Primary | ICD-10-CM

## 2021-06-04 DIAGNOSIS — F11.21 OPIOID DEPENDENCE IN REMISSION: ICD-10-CM

## 2021-06-04 DIAGNOSIS — I25.10 ATHEROSCLEROSIS OF NATIVE CORONARY ARTERY OF NATIVE HEART WITHOUT ANGINA PECTORIS: ICD-10-CM

## 2021-06-04 PROCEDURE — 3288F PR FALLS RISK ASSESSMENT DOCUMENTED: ICD-10-PCS | Mod: CPTII,S$GLB,, | Performed by: INTERNAL MEDICINE

## 2021-06-04 PROCEDURE — 99499 RISK ADDL DX/OHS AUDIT: ICD-10-PCS | Mod: HCNC,S$GLB,, | Performed by: INTERNAL MEDICINE

## 2021-06-04 PROCEDURE — 1159F MED LIST DOCD IN RCRD: CPT | Mod: S$GLB,,, | Performed by: INTERNAL MEDICINE

## 2021-06-04 PROCEDURE — 99214 PR OFFICE/OUTPT VISIT, EST, LEVL IV, 30-39 MIN: ICD-10-PCS | Mod: S$GLB,,, | Performed by: INTERNAL MEDICINE

## 2021-06-04 PROCEDURE — 99999 PR PBB SHADOW E&M-EST. PATIENT-LVL IV: ICD-10-PCS | Mod: PBBFAC,,, | Performed by: INTERNAL MEDICINE

## 2021-06-04 PROCEDURE — 1125F AMNT PAIN NOTED PAIN PRSNT: CPT | Mod: S$GLB,,, | Performed by: INTERNAL MEDICINE

## 2021-06-04 PROCEDURE — 99499 UNLISTED E&M SERVICE: CPT | Mod: HCNC,S$GLB,, | Performed by: INTERNAL MEDICINE

## 2021-06-04 PROCEDURE — 1101F PR PT FALLS ASSESS DOC 0-1 FALLS W/OUT INJ PAST YR: ICD-10-PCS | Mod: CPTII,S$GLB,, | Performed by: INTERNAL MEDICINE

## 2021-06-04 PROCEDURE — 3288F FALL RISK ASSESSMENT DOCD: CPT | Mod: CPTII,S$GLB,, | Performed by: INTERNAL MEDICINE

## 2021-06-04 PROCEDURE — 1159F PR MEDICATION LIST DOCUMENTED IN MEDICAL RECORD: ICD-10-PCS | Mod: S$GLB,,, | Performed by: INTERNAL MEDICINE

## 2021-06-04 PROCEDURE — 1125F PR PAIN SEVERITY QUANTIFIED, PAIN PRESENT: ICD-10-PCS | Mod: S$GLB,,, | Performed by: INTERNAL MEDICINE

## 2021-06-04 PROCEDURE — 3008F BODY MASS INDEX DOCD: CPT | Mod: CPTII,S$GLB,, | Performed by: INTERNAL MEDICINE

## 2021-06-04 PROCEDURE — 3008F PR BODY MASS INDEX (BMI) DOCUMENTED: ICD-10-PCS | Mod: CPTII,S$GLB,, | Performed by: INTERNAL MEDICINE

## 2021-06-04 PROCEDURE — 99214 OFFICE O/P EST MOD 30 MIN: CPT | Mod: S$GLB,,, | Performed by: INTERNAL MEDICINE

## 2021-06-04 PROCEDURE — 99999 PR PBB SHADOW E&M-EST. PATIENT-LVL IV: CPT | Mod: PBBFAC,,, | Performed by: INTERNAL MEDICINE

## 2021-06-04 PROCEDURE — 1101F PT FALLS ASSESS-DOCD LE1/YR: CPT | Mod: CPTII,S$GLB,, | Performed by: INTERNAL MEDICINE

## 2021-06-07 DIAGNOSIS — I10 BENIGN ESSENTIAL HTN: Primary | ICD-10-CM

## 2021-06-14 ENCOUNTER — HOSPITAL ENCOUNTER (OUTPATIENT)
Dept: CARDIOLOGY | Facility: HOSPITAL | Age: 66
Discharge: HOME | End: 2021-06-14
Attending: INTERNAL MEDICINE
Payer: MEDICARE

## 2021-06-14 ENCOUNTER — OFFICE VISIT (OUTPATIENT)
Dept: CARDIOLOGY | Facility: CLINIC | Age: 66
End: 2021-06-14
Attending: INTERNAL MEDICINE
Payer: MEDICARE

## 2021-06-14 VITALS
WEIGHT: 255.75 LBS | HEART RATE: 80 BPM | OXYGEN SATURATION: 98 % | SYSTOLIC BLOOD PRESSURE: 118 MMHG | BODY MASS INDEX: 41.28 KG/M2 | DIASTOLIC BLOOD PRESSURE: 66 MMHG

## 2021-06-14 DIAGNOSIS — R06.09 DOE (DYSPNEA ON EXERTION): ICD-10-CM

## 2021-06-14 DIAGNOSIS — R07.89 OTHER CHEST PAIN: Primary | ICD-10-CM

## 2021-06-14 DIAGNOSIS — I25.10 ATHEROSCLEROSIS OF NATIVE CORONARY ARTERY OF NATIVE HEART WITHOUT ANGINA PECTORIS: ICD-10-CM

## 2021-06-14 DIAGNOSIS — I10 BENIGN ESSENTIAL HTN: ICD-10-CM

## 2021-06-14 DIAGNOSIS — R06.02 SHORTNESS OF BREATH: ICD-10-CM

## 2021-06-14 DIAGNOSIS — F17.200 SMOKER: ICD-10-CM

## 2021-06-14 DIAGNOSIS — I49.3 PVC (PREMATURE VENTRICULAR CONTRACTION): ICD-10-CM

## 2021-06-14 PROCEDURE — 99999 PR PBB SHADOW E&M-EST. PATIENT-LVL III: ICD-10-PCS | Mod: PBBFAC,,, | Performed by: INTERNAL MEDICINE

## 2021-06-14 PROCEDURE — 3008F BODY MASS INDEX DOCD: CPT | Mod: CPTII,S$GLB,, | Performed by: INTERNAL MEDICINE

## 2021-06-14 PROCEDURE — 99205 OFFICE O/P NEW HI 60 MIN: CPT | Mod: S$GLB,,, | Performed by: INTERNAL MEDICINE

## 2021-06-14 PROCEDURE — 93010 ELECTROCARDIOGRAM REPORT: CPT | Mod: ,,, | Performed by: INTERNAL MEDICINE

## 2021-06-14 PROCEDURE — 3008F PR BODY MASS INDEX (BMI) DOCUMENTED: ICD-10-PCS | Mod: CPTII,S$GLB,, | Performed by: INTERNAL MEDICINE

## 2021-06-14 PROCEDURE — 93005 ELECTROCARDIOGRAM TRACING: CPT

## 2021-06-14 PROCEDURE — 99205 PR OFFICE/OUTPT VISIT, NEW, LEVL V, 60-74 MIN: ICD-10-PCS | Mod: S$GLB,,, | Performed by: INTERNAL MEDICINE

## 2021-06-14 PROCEDURE — 99999 PR PBB SHADOW E&M-EST. PATIENT-LVL III: CPT | Mod: PBBFAC,,, | Performed by: INTERNAL MEDICINE

## 2021-06-14 PROCEDURE — 93010 EKG 12-LEAD: ICD-10-PCS | Mod: ,,, | Performed by: INTERNAL MEDICINE

## 2021-06-14 PROCEDURE — 1159F PR MEDICATION LIST DOCUMENTED IN MEDICAL RECORD: ICD-10-PCS | Mod: S$GLB,,, | Performed by: INTERNAL MEDICINE

## 2021-06-14 PROCEDURE — 1159F MED LIST DOCD IN RCRD: CPT | Mod: S$GLB,,, | Performed by: INTERNAL MEDICINE

## 2021-07-15 ENCOUNTER — HOSPITAL ENCOUNTER (OUTPATIENT)
Dept: RADIOLOGY | Facility: HOSPITAL | Age: 66
Discharge: HOME | End: 2021-07-15
Attending: INTERNAL MEDICINE
Payer: MEDICARE

## 2021-07-15 ENCOUNTER — HOSPITAL ENCOUNTER (OUTPATIENT)
Dept: CARDIOLOGY | Facility: HOSPITAL | Age: 66
Discharge: HOME | End: 2021-07-15
Attending: INTERNAL MEDICINE
Payer: MEDICARE

## 2021-07-15 VITALS
SYSTOLIC BLOOD PRESSURE: 118 MMHG | BODY MASS INDEX: 40.98 KG/M2 | HEIGHT: 66 IN | WEIGHT: 255 LBS | DIASTOLIC BLOOD PRESSURE: 66 MMHG

## 2021-07-15 DIAGNOSIS — R07.89 OTHER CHEST PAIN: ICD-10-CM

## 2021-07-15 LAB
AORTIC ROOT ANNULUS: 3.5 CM
AV INDEX (PROSTH): 0.75
AV MEAN GRADIENT: 3 MMHG
AV PEAK GRADIENT: 5 MMHG
AV VALVE AREA: 2.44 CM2
AV VELOCITY RATIO: 0.76
BSA FOR ECHO PROCEDURE: 2.32 M2
CV ECHO LV RWT: 0.49 CM
CV STRESS BASE HR: 91 BPM
DIASTOLIC BLOOD PRESSURE: 76 MMHG
DOP CALC AO PEAK VEL: 1.15 M/S
DOP CALC AO VTI: 23.58 CM
DOP CALC LVOT AREA: 3.2 CM2
DOP CALC LVOT DIAMETER: 2.03 CM
DOP CALC LVOT PEAK VEL: 0.87 M/S
DOP CALC LVOT STROKE VOLUME: 57.48 CM3
DOP CALC RVOT PEAK VEL: 0.64 M/S
DOP CALC RVOT VTI: 14.43 CM
DOP CALCLVOT PEAK VEL VTI: 17.77 CM
E WAVE DECELERATION TIME: 203.3 MSEC
E/A RATIO: 0.99
E/E' RATIO: 8.22 M/S
ECHO LV POSTERIOR WALL: 1.16 CM (ref 0.6–1.1)
EJECTION FRACTION: 60 %
FRACTIONAL SHORTENING: 27 % (ref 28–44)
INTERVENTRICULAR SEPTUM: 1.02 CM (ref 0.6–1.1)
IVRT: 99.9 MSEC
LA MAJOR: 4.59 CM
LA MINOR: 5.03 CM
LA WIDTH: 3.31 CM
LEFT ATRIUM SIZE: 3.68 CM
LEFT ATRIUM VOLUME INDEX: 22.4 ML/M2
LEFT ATRIUM VOLUME: 49.7 CM3
LEFT INTERNAL DIMENSION IN SYSTOLE: 3.45 CM (ref 2.1–4)
LEFT VENTRICLE DIASTOLIC VOLUME INDEX: 46.57 ML/M2
LEFT VENTRICLE DIASTOLIC VOLUME: 103.38 ML
LEFT VENTRICLE MASS INDEX: 84 G/M2
LEFT VENTRICLE SYSTOLIC VOLUME INDEX: 22.1 ML/M2
LEFT VENTRICLE SYSTOLIC VOLUME: 49.1 ML
LEFT VENTRICULAR INTERNAL DIMENSION IN DIASTOLE: 4.72 CM (ref 3.5–6)
LEFT VENTRICULAR MASS: 186.43 G
LV LATERAL E/E' RATIO: 7.4 M/S
LV SEPTAL E/E' RATIO: 9.25 M/S
MV PEAK A VEL: 0.75 M/S
MV PEAK E VEL: 0.74 M/S
MV STENOSIS PRESSURE HALF TIME: 58.96 MS
MV VALVE AREA P 1/2 METHOD: 3.73 CM2
NUC REST EJECTION FRACTION: 72
NUC STRESS EJECTION FRACTION: 63 %
OHS CV CPX 85 PERCENT MAX PREDICTED HEART RATE MALE: 130
OHS CV CPX MAX PREDICTED HEART RATE: 153
OHS CV CPX PATIENT IS FEMALE: 0
OHS CV CPX PATIENT IS MALE: 1
OHS CV CPX PEAK DIASTOLIC BLOOD PRESSURE: 79 MMHG
OHS CV CPX PEAK HEAR RATE: 110 BPM
OHS CV CPX PEAK RATE PRESSURE PRODUCT: NORMAL
OHS CV CPX PEAK SYSTOLIC BLOOD PRESSURE: 144 MMHG
OHS CV CPX PERCENT MAX PREDICTED HEART RATE ACHIEVED: 72
OHS CV CPX RATE PRESSURE PRODUCT PRESENTING: 9919
PISA TR MAX VEL: 2.33 M/S
PULM VEIN S/D RATIO: 1.81
PV MEAN GRADIENT: 1 MMHG
PV PEAK D VEL: 0.37 M/S
PV PEAK S VEL: 0.67 M/S
PV PEAK VELOCITY: 1.05 CM/S
RA MAJOR: 4.12 CM
RA PRESSURE: 3 MMHG
RA WIDTH: 3.93 CM
RIGHT VENTRICULAR END-DIASTOLIC DIMENSION: 4.17 CM
SINUS: 3.42 CM
STJ: 3.28 CM
STRESS ECHO POST EXERCISE DUR MIN: 1 MINUTES
STRESS ECHO POST EXERCISE DUR SEC: 7 SECONDS
SYSTOLIC BLOOD PRESSURE: 109 MMHG
TDI LATERAL: 0.1 M/S
TDI SEPTAL: 0.08 M/S
TDI: 0.09 M/S
TR MAX PG: 22 MMHG
TRICUSPID ANNULAR PLANE SYSTOLIC EXCURSION: 2.62 CM
TV REST PULMONARY ARTERY PRESSURE: 25 MMHG

## 2021-07-15 PROCEDURE — 93227 XTRNL ECG REC<48 HR R&I: CPT | Mod: ,,, | Performed by: INTERNAL MEDICINE

## 2021-07-15 PROCEDURE — 93018 STRESS TEST WITH MYOCARDIAL PERFUSION (CUPID ONLY): ICD-10-PCS | Mod: ,,, | Performed by: INTERNAL MEDICINE

## 2021-07-15 PROCEDURE — A9502 TC99M TETROFOSMIN: HCPCS

## 2021-07-15 PROCEDURE — 93227 HOLTER MONITOR - 48 HOUR (CUPID ONLY): ICD-10-PCS | Mod: ,,, | Performed by: INTERNAL MEDICINE

## 2021-07-15 PROCEDURE — 93226 XTRNL ECG REC<48 HR SCAN A/R: CPT

## 2021-07-15 PROCEDURE — 63600175 PHARM REV CODE 636 W HCPCS: Performed by: INTERNAL MEDICINE

## 2021-07-15 PROCEDURE — 93016 STRESS TEST WITH MYOCARDIAL PERFUSION (CUPID ONLY): ICD-10-PCS | Mod: ,,, | Performed by: INTERNAL MEDICINE

## 2021-07-15 PROCEDURE — 78452 HT MUSCLE IMAGE SPECT MULT: CPT

## 2021-07-15 PROCEDURE — 93018 CV STRESS TEST I&R ONLY: CPT | Mod: ,,, | Performed by: INTERNAL MEDICINE

## 2021-07-15 PROCEDURE — 93016 CV STRESS TEST SUPVJ ONLY: CPT | Mod: ,,, | Performed by: INTERNAL MEDICINE

## 2021-07-15 PROCEDURE — 93306 ECHO (CUPID ONLY): ICD-10-PCS | Mod: 26,,, | Performed by: INTERNAL MEDICINE

## 2021-07-15 PROCEDURE — 93306 TTE W/DOPPLER COMPLETE: CPT

## 2021-07-15 PROCEDURE — 93017 CV STRESS TEST TRACING ONLY: CPT

## 2021-07-15 PROCEDURE — 78452 HT MUSCLE IMAGE SPECT MULT: CPT | Mod: 26,,, | Performed by: INTERNAL MEDICINE

## 2021-07-15 PROCEDURE — 78452 STRESS TEST WITH MYOCARDIAL PERFUSION (CUPID ONLY): ICD-10-PCS | Mod: 26,,, | Performed by: INTERNAL MEDICINE

## 2021-07-15 PROCEDURE — 93306 TTE W/DOPPLER COMPLETE: CPT | Mod: 26,,, | Performed by: INTERNAL MEDICINE

## 2021-07-15 RX ORDER — REGADENOSON 0.08 MG/ML
0.4 INJECTION, SOLUTION INTRAVENOUS ONCE
Status: COMPLETED | OUTPATIENT
Start: 2021-07-15 | End: 2021-07-15

## 2021-07-15 RX ADMIN — REGADENOSON 0.4 MG: 0.08 INJECTION, SOLUTION INTRAVENOUS at 01:07

## 2021-07-16 ENCOUNTER — TELEPHONE (OUTPATIENT)
Dept: CARDIOLOGY | Facility: CLINIC | Age: 66
End: 2021-07-16

## 2021-07-19 LAB
OHS CV EVENT MONITOR DAY: 2
OHS CV HOLTER LENGTH DECIMAL HOURS: 96
OHS CV HOLTER LENGTH HOURS: 48
OHS CV HOLTER LENGTH MINUTES: 0

## 2021-07-20 ENCOUNTER — PES CALL (OUTPATIENT)
Dept: ADMINISTRATIVE | Facility: CLINIC | Age: 66
End: 2021-07-20

## 2021-07-21 ENCOUNTER — TELEPHONE (OUTPATIENT)
Dept: CARDIOLOGY | Facility: CLINIC | Age: 66
End: 2021-07-21

## 2021-07-21 ENCOUNTER — PATIENT MESSAGE (OUTPATIENT)
Dept: TRANSPLANT | Facility: CLINIC | Age: 66
End: 2021-07-21

## 2021-07-23 ENCOUNTER — LAB VISIT (OUTPATIENT)
Dept: LAB | Facility: HOSPITAL | Age: 66
End: 2021-07-23
Attending: INTERNAL MEDICINE
Payer: MEDICARE

## 2021-07-23 ENCOUNTER — OFFICE VISIT (OUTPATIENT)
Dept: CARDIOLOGY | Facility: CLINIC | Age: 66
End: 2021-07-23
Payer: MEDICARE

## 2021-07-23 VITALS
HEART RATE: 93 BPM | DIASTOLIC BLOOD PRESSURE: 84 MMHG | WEIGHT: 254.19 LBS | OXYGEN SATURATION: 98 % | BODY MASS INDEX: 41.03 KG/M2 | SYSTOLIC BLOOD PRESSURE: 146 MMHG

## 2021-07-23 DIAGNOSIS — I10 BENIGN ESSENTIAL HTN: ICD-10-CM

## 2021-07-23 DIAGNOSIS — Z94.4 S/P LIVER TRANSPLANT: ICD-10-CM

## 2021-07-23 DIAGNOSIS — I49.3 PVC (PREMATURE VENTRICULAR CONTRACTION): Primary | ICD-10-CM

## 2021-07-23 DIAGNOSIS — I25.10 ATHEROSCLEROSIS OF NATIVE CORONARY ARTERY OF NATIVE HEART WITHOUT ANGINA PECTORIS: ICD-10-CM

## 2021-07-23 DIAGNOSIS — Z94.4 LIVER TRANSPLANTED: ICD-10-CM

## 2021-07-23 LAB
ALBUMIN SERPL BCP-MCNC: 3.7 G/DL (ref 3.5–5.2)
ALP SERPL-CCNC: 101 U/L (ref 55–135)
ALT SERPL W/O P-5'-P-CCNC: 18 U/L (ref 10–44)
ANION GAP SERPL CALC-SCNC: 11 MMOL/L (ref 8–16)
AST SERPL-CCNC: 20 U/L (ref 10–40)
BASOPHILS # BLD AUTO: 0.09 K/UL (ref 0–0.2)
BASOPHILS NFR BLD: 1.1 % (ref 0–1.9)
BILIRUB SERPL-MCNC: 0.5 MG/DL (ref 0.1–1)
BUN SERPL-MCNC: 19 MG/DL (ref 8–23)
CALCIUM SERPL-MCNC: 9.1 MG/DL (ref 8.7–10.5)
CHLORIDE SERPL-SCNC: 103 MMOL/L (ref 95–110)
CO2 SERPL-SCNC: 23 MMOL/L (ref 23–29)
CREAT SERPL-MCNC: 1.2 MG/DL (ref 0.5–1.4)
DIFFERENTIAL METHOD: ABNORMAL
EOSINOPHIL # BLD AUTO: 0.4 K/UL (ref 0–0.5)
EOSINOPHIL NFR BLD: 4.9 % (ref 0–8)
ERYTHROCYTE [DISTWIDTH] IN BLOOD BY AUTOMATED COUNT: 14 % (ref 11.5–14.5)
EST. GFR  (AFRICAN AMERICAN): >60 ML/MIN/1.73 M^2
EST. GFR  (NON AFRICAN AMERICAN): >60 ML/MIN/1.73 M^2
GLUCOSE SERPL-MCNC: 102 MG/DL (ref 70–110)
HCT VFR BLD AUTO: 50.5 % (ref 40–54)
HGB BLD-MCNC: 16.1 G/DL (ref 14–18)
IMM GRANULOCYTES # BLD AUTO: 0.05 K/UL (ref 0–0.04)
IMM GRANULOCYTES NFR BLD AUTO: 0.6 % (ref 0–0.5)
LYMPHOCYTES # BLD AUTO: 2.1 K/UL (ref 1–4.8)
LYMPHOCYTES NFR BLD: 25.3 % (ref 18–48)
MCH RBC QN AUTO: 28.9 PG (ref 27–31)
MCHC RBC AUTO-ENTMCNC: 31.9 G/DL (ref 32–36)
MCV RBC AUTO: 91 FL (ref 82–98)
MONOCYTES # BLD AUTO: 0.7 K/UL (ref 0.3–1)
MONOCYTES NFR BLD: 8.3 % (ref 4–15)
NEUTROPHILS # BLD AUTO: 5 K/UL (ref 1.8–7.7)
NEUTROPHILS NFR BLD: 59.8 % (ref 38–73)
NRBC BLD-RTO: 0 /100 WBC
PLATELET # BLD AUTO: 156 K/UL (ref 150–450)
PMV BLD AUTO: 12.9 FL (ref 9.2–12.9)
POTASSIUM SERPL-SCNC: 4.3 MMOL/L (ref 3.5–5.1)
PROT SERPL-MCNC: 7.1 G/DL (ref 6–8.4)
RBC # BLD AUTO: 5.57 M/UL (ref 4.6–6.2)
SODIUM SERPL-SCNC: 137 MMOL/L (ref 136–145)
WBC # BLD AUTO: 8.31 K/UL (ref 3.9–12.7)

## 2021-07-23 PROCEDURE — 3079F PR MOST RECENT DIASTOLIC BLOOD PRESSURE 80-89 MM HG: ICD-10-PCS | Mod: CPTII,S$GLB,, | Performed by: INTERNAL MEDICINE

## 2021-07-23 PROCEDURE — 99499 RISK ADDL DX/OHS AUDIT: ICD-10-PCS | Mod: HCNC,S$GLB,, | Performed by: INTERNAL MEDICINE

## 2021-07-23 PROCEDURE — 3008F BODY MASS INDEX DOCD: CPT | Mod: CPTII,S$GLB,, | Performed by: INTERNAL MEDICINE

## 2021-07-23 PROCEDURE — 1159F MED LIST DOCD IN RCRD: CPT | Mod: CPTII,S$GLB,, | Performed by: INTERNAL MEDICINE

## 2021-07-23 PROCEDURE — 3077F SYST BP >= 140 MM HG: CPT | Mod: CPTII,S$GLB,, | Performed by: INTERNAL MEDICINE

## 2021-07-23 PROCEDURE — 99214 OFFICE O/P EST MOD 30 MIN: CPT | Mod: S$GLB,,, | Performed by: INTERNAL MEDICINE

## 2021-07-23 PROCEDURE — 99214 PR OFFICE/OUTPT VISIT, EST, LEVL IV, 30-39 MIN: ICD-10-PCS | Mod: S$GLB,,, | Performed by: INTERNAL MEDICINE

## 2021-07-23 PROCEDURE — 36415 COLL VENOUS BLD VENIPUNCTURE: CPT | Performed by: INTERNAL MEDICINE

## 2021-07-23 PROCEDURE — 85025 COMPLETE CBC W/AUTO DIFF WBC: CPT | Performed by: INTERNAL MEDICINE

## 2021-07-23 PROCEDURE — 80197 ASSAY OF TACROLIMUS: CPT | Performed by: INTERNAL MEDICINE

## 2021-07-23 PROCEDURE — 99499 UNLISTED E&M SERVICE: CPT | Mod: HCNC,S$GLB,, | Performed by: INTERNAL MEDICINE

## 2021-07-23 PROCEDURE — 3079F DIAST BP 80-89 MM HG: CPT | Mod: CPTII,S$GLB,, | Performed by: INTERNAL MEDICINE

## 2021-07-23 PROCEDURE — 1159F PR MEDICATION LIST DOCUMENTED IN MEDICAL RECORD: ICD-10-PCS | Mod: CPTII,S$GLB,, | Performed by: INTERNAL MEDICINE

## 2021-07-23 PROCEDURE — 3008F PR BODY MASS INDEX (BMI) DOCUMENTED: ICD-10-PCS | Mod: CPTII,S$GLB,, | Performed by: INTERNAL MEDICINE

## 2021-07-23 PROCEDURE — 1126F PR PAIN SEVERITY QUANTIFIED, NO PAIN PRESENT: ICD-10-PCS | Mod: CPTII,S$GLB,, | Performed by: INTERNAL MEDICINE

## 2021-07-23 PROCEDURE — 1126F AMNT PAIN NOTED NONE PRSNT: CPT | Mod: CPTII,S$GLB,, | Performed by: INTERNAL MEDICINE

## 2021-07-23 PROCEDURE — 80053 COMPREHEN METABOLIC PANEL: CPT | Performed by: INTERNAL MEDICINE

## 2021-07-23 PROCEDURE — 99999 PR PBB SHADOW E&M-EST. PATIENT-LVL III: ICD-10-PCS | Mod: PBBFAC,,, | Performed by: INTERNAL MEDICINE

## 2021-07-23 PROCEDURE — 99999 PR PBB SHADOW E&M-EST. PATIENT-LVL III: CPT | Mod: PBBFAC,,, | Performed by: INTERNAL MEDICINE

## 2021-07-23 PROCEDURE — 3077F PR MOST RECENT SYSTOLIC BLOOD PRESSURE >= 140 MM HG: ICD-10-PCS | Mod: CPTII,S$GLB,, | Performed by: INTERNAL MEDICINE

## 2021-07-23 RX ORDER — METOPROLOL SUCCINATE 25 MG/1
25 TABLET, EXTENDED RELEASE ORAL DAILY
Qty: 90 TABLET | Refills: 2 | Status: SHIPPED | OUTPATIENT
Start: 2021-07-23 | End: 2021-09-24

## 2021-07-23 RX ORDER — METOPROLOL SUCCINATE 25 MG/1
25 TABLET, EXTENDED RELEASE ORAL DAILY
Qty: 30 TABLET | Refills: 5 | Status: SHIPPED | OUTPATIENT
Start: 2021-07-23 | End: 2021-07-23 | Stop reason: SDUPTHER

## 2021-07-24 LAB
TACROLIMUS BLD-MCNC: 2.8 NG/ML (ref 5–15)
TACROLIMUS, NORMALIZED: 2.8 NG/ML (ref 5–15)

## 2021-07-25 ENCOUNTER — TELEPHONE (OUTPATIENT)
Dept: CARDIOLOGY | Facility: CLINIC | Age: 66
End: 2021-07-25

## 2021-07-26 ENCOUNTER — TELEPHONE (OUTPATIENT)
Dept: TRANSPLANT | Facility: CLINIC | Age: 66
End: 2021-07-26

## 2021-07-26 ENCOUNTER — TELEPHONE (OUTPATIENT)
Dept: CARDIOLOGY | Facility: CLINIC | Age: 66
End: 2021-07-26

## 2021-08-20 ENCOUNTER — PATIENT MESSAGE (OUTPATIENT)
Dept: INTERNAL MEDICINE | Facility: CLINIC | Age: 66
End: 2021-08-20

## 2021-09-16 ENCOUNTER — PATIENT OUTREACH (OUTPATIENT)
Dept: ADMINISTRATIVE | Facility: HOSPITAL | Age: 66
End: 2021-09-16

## 2021-11-10 ENCOUNTER — OFFICE VISIT (OUTPATIENT)
Dept: HEPATOLOGY | Facility: CLINIC | Age: 66
End: 2021-11-10
Payer: MEDICARE

## 2021-11-10 ENCOUNTER — IMMUNIZATION (OUTPATIENT)
Dept: INTERNAL MEDICINE | Facility: CLINIC | Age: 66
End: 2021-11-10
Payer: MEDICARE

## 2021-11-10 VITALS
HEIGHT: 68 IN | DIASTOLIC BLOOD PRESSURE: 70 MMHG | WEIGHT: 250.69 LBS | HEART RATE: 66 BPM | SYSTOLIC BLOOD PRESSURE: 120 MMHG | BODY MASS INDEX: 37.99 KG/M2

## 2021-11-10 DIAGNOSIS — Z12.83 SKIN CANCER SCREENING: ICD-10-CM

## 2021-11-10 DIAGNOSIS — K63.5 POLYP OF COLON, UNSPECIFIED PART OF COLON, UNSPECIFIED TYPE: ICD-10-CM

## 2021-11-10 DIAGNOSIS — D84.9 IMMUNOSUPPRESSION: Primary | ICD-10-CM

## 2021-11-10 DIAGNOSIS — K21.9 GASTROESOPHAGEAL REFLUX DISEASE WITHOUT ESOPHAGITIS: ICD-10-CM

## 2021-11-10 DIAGNOSIS — Z94.4 LIVER TRANSPLANTED: ICD-10-CM

## 2021-11-10 PROCEDURE — 99213 PR OFFICE/OUTPT VISIT, EST, LEVL III, 20-29 MIN: ICD-10-PCS | Mod: HCNC,S$GLB,, | Performed by: INTERNAL MEDICINE

## 2021-11-10 PROCEDURE — 99999 PR PBB SHADOW E&M-EST. PATIENT-LVL III: CPT | Mod: PBBFAC,,, | Performed by: INTERNAL MEDICINE

## 2021-11-10 PROCEDURE — G0008 FLU VACCINE - QUADRIVALENT - ADJUVANTED: ICD-10-PCS | Mod: HCNC,S$GLB,, | Performed by: INTERNAL MEDICINE

## 2021-11-10 PROCEDURE — 1160F RVW MEDS BY RX/DR IN RCRD: CPT | Mod: HCNC,CPTII,S$GLB, | Performed by: INTERNAL MEDICINE

## 2021-11-10 PROCEDURE — 99499 RISK ADDL DX/OHS AUDIT: ICD-10-PCS | Mod: HCNC,S$GLB,, | Performed by: INTERNAL MEDICINE

## 2021-11-10 PROCEDURE — 99213 OFFICE O/P EST LOW 20 MIN: CPT | Mod: HCNC,S$GLB,, | Performed by: INTERNAL MEDICINE

## 2021-11-10 PROCEDURE — 3074F PR MOST RECENT SYSTOLIC BLOOD PRESSURE < 130 MM HG: ICD-10-PCS | Mod: HCNC,CPTII,S$GLB, | Performed by: INTERNAL MEDICINE

## 2021-11-10 PROCEDURE — 1159F PR MEDICATION LIST DOCUMENTED IN MEDICAL RECORD: ICD-10-PCS | Mod: HCNC,CPTII,S$GLB, | Performed by: INTERNAL MEDICINE

## 2021-11-10 PROCEDURE — 3078F PR MOST RECENT DIASTOLIC BLOOD PRESSURE < 80 MM HG: ICD-10-PCS | Mod: HCNC,CPTII,S$GLB, | Performed by: INTERNAL MEDICINE

## 2021-11-10 PROCEDURE — 99499 UNLISTED E&M SERVICE: CPT | Mod: HCNC,S$GLB,, | Performed by: INTERNAL MEDICINE

## 2021-11-10 PROCEDURE — 99999 PR PBB SHADOW E&M-EST. PATIENT-LVL III: ICD-10-PCS | Mod: PBBFAC,,, | Performed by: INTERNAL MEDICINE

## 2021-11-10 PROCEDURE — 3288F PR FALLS RISK ASSESSMENT DOCUMENTED: ICD-10-PCS | Mod: HCNC,CPTII,S$GLB, | Performed by: INTERNAL MEDICINE

## 2021-11-10 PROCEDURE — G0008 ADMIN INFLUENZA VIRUS VAC: HCPCS | Mod: HCNC,S$GLB,, | Performed by: INTERNAL MEDICINE

## 2021-11-10 PROCEDURE — 3288F FALL RISK ASSESSMENT DOCD: CPT | Mod: HCNC,CPTII,S$GLB, | Performed by: INTERNAL MEDICINE

## 2021-11-10 PROCEDURE — 3078F DIAST BP <80 MM HG: CPT | Mod: HCNC,CPTII,S$GLB, | Performed by: INTERNAL MEDICINE

## 2021-11-10 PROCEDURE — 1160F PR REVIEW ALL MEDS BY PRESCRIBER/CLIN PHARMACIST DOCUMENTED: ICD-10-PCS | Mod: HCNC,CPTII,S$GLB, | Performed by: INTERNAL MEDICINE

## 2021-11-10 PROCEDURE — 90694 VACC AIIV4 NO PRSRV 0.5ML IM: CPT | Mod: HCNC,S$GLB,, | Performed by: INTERNAL MEDICINE

## 2021-11-10 PROCEDURE — 3008F PR BODY MASS INDEX (BMI) DOCUMENTED: ICD-10-PCS | Mod: HCNC,CPTII,S$GLB, | Performed by: INTERNAL MEDICINE

## 2021-11-10 PROCEDURE — 3074F SYST BP LT 130 MM HG: CPT | Mod: HCNC,CPTII,S$GLB, | Performed by: INTERNAL MEDICINE

## 2021-11-10 PROCEDURE — 1159F MED LIST DOCD IN RCRD: CPT | Mod: HCNC,CPTII,S$GLB, | Performed by: INTERNAL MEDICINE

## 2021-11-10 PROCEDURE — 1101F PR PT FALLS ASSESS DOC 0-1 FALLS W/OUT INJ PAST YR: ICD-10-PCS | Mod: HCNC,CPTII,S$GLB, | Performed by: INTERNAL MEDICINE

## 2021-11-10 PROCEDURE — 1126F AMNT PAIN NOTED NONE PRSNT: CPT | Mod: HCNC,CPTII,S$GLB, | Performed by: INTERNAL MEDICINE

## 2021-11-10 PROCEDURE — 3008F BODY MASS INDEX DOCD: CPT | Mod: HCNC,CPTII,S$GLB, | Performed by: INTERNAL MEDICINE

## 2021-11-10 PROCEDURE — 1126F PR PAIN SEVERITY QUANTIFIED, NO PAIN PRESENT: ICD-10-PCS | Mod: HCNC,CPTII,S$GLB, | Performed by: INTERNAL MEDICINE

## 2021-11-10 PROCEDURE — 1101F PT FALLS ASSESS-DOCD LE1/YR: CPT | Mod: HCNC,CPTII,S$GLB, | Performed by: INTERNAL MEDICINE

## 2021-11-10 PROCEDURE — 90694 FLU VACCINE - QUADRIVALENT - ADJUVANTED: ICD-10-PCS | Mod: HCNC,S$GLB,, | Performed by: INTERNAL MEDICINE

## 2021-11-10 RX ORDER — ESOMEPRAZOLE MAGNESIUM 40 MG/1
40 CAPSULE, DELAYED RELEASE ORAL DAILY
Qty: 90 CAPSULE | Refills: 3 | Status: SHIPPED | OUTPATIENT
Start: 2021-11-10 | End: 2023-12-12 | Stop reason: SDUPTHER

## 2021-11-10 RX ORDER — TACROLIMUS 0.5 MG/1
1 CAPSULE ORAL EVERY 12 HOURS
Qty: 180 CAPSULE | Refills: 3 | Status: SHIPPED | OUTPATIENT
Start: 2021-11-10 | End: 2022-04-06

## 2021-11-15 ENCOUNTER — IMMUNIZATION (OUTPATIENT)
Dept: PHARMACY | Facility: CLINIC | Age: 66
End: 2021-11-15
Payer: MEDICARE

## 2021-11-15 DIAGNOSIS — Z23 NEED FOR VACCINATION: Primary | ICD-10-CM

## 2021-12-16 ENCOUNTER — TELEPHONE (OUTPATIENT)
Dept: CARDIOLOGY | Facility: CLINIC | Age: 66
End: 2021-12-16
Payer: MEDICARE

## 2022-01-20 DIAGNOSIS — I49.3 PVC (PREMATURE VENTRICULAR CONTRACTION): Primary | ICD-10-CM

## 2022-01-24 ENCOUNTER — OFFICE VISIT (OUTPATIENT)
Dept: CARDIOLOGY | Facility: CLINIC | Age: 67
End: 2022-01-24
Payer: MEDICARE

## 2022-01-24 ENCOUNTER — HOSPITAL ENCOUNTER (OUTPATIENT)
Dept: CARDIOLOGY | Facility: HOSPITAL | Age: 67
Discharge: HOME | End: 2022-01-24
Attending: INTERNAL MEDICINE
Payer: MEDICARE

## 2022-01-24 VITALS
DIASTOLIC BLOOD PRESSURE: 72 MMHG | WEIGHT: 252.44 LBS | OXYGEN SATURATION: 97 % | SYSTOLIC BLOOD PRESSURE: 122 MMHG | HEART RATE: 88 BPM | BODY MASS INDEX: 38.38 KG/M2

## 2022-01-24 DIAGNOSIS — I10 BENIGN ESSENTIAL HTN: ICD-10-CM

## 2022-01-24 DIAGNOSIS — Z94.4 LIVER TRANSPLANTED: ICD-10-CM

## 2022-01-24 DIAGNOSIS — I49.3 PVC (PREMATURE VENTRICULAR CONTRACTION): ICD-10-CM

## 2022-01-24 DIAGNOSIS — I49.3 PVC (PREMATURE VENTRICULAR CONTRACTION): Primary | ICD-10-CM

## 2022-01-24 DIAGNOSIS — I25.10 ATHEROSCLEROSIS OF NATIVE CORONARY ARTERY OF NATIVE HEART WITHOUT ANGINA PECTORIS: ICD-10-CM

## 2022-01-24 PROCEDURE — 3078F PR MOST RECENT DIASTOLIC BLOOD PRESSURE < 80 MM HG: ICD-10-PCS | Mod: HCNC,CPTII,S$GLB, | Performed by: INTERNAL MEDICINE

## 2022-01-24 PROCEDURE — 99999 PR PBB SHADOW E&M-EST. PATIENT-LVL III: ICD-10-PCS | Mod: PBBFAC,HCNC,, | Performed by: INTERNAL MEDICINE

## 2022-01-24 PROCEDURE — 3008F BODY MASS INDEX DOCD: CPT | Mod: HCNC,CPTII,S$GLB, | Performed by: INTERNAL MEDICINE

## 2022-01-24 PROCEDURE — 93010 ELECTROCARDIOGRAM REPORT: CPT | Mod: HCNC,,, | Performed by: STUDENT IN AN ORGANIZED HEALTH CARE EDUCATION/TRAINING PROGRAM

## 2022-01-24 PROCEDURE — 3074F PR MOST RECENT SYSTOLIC BLOOD PRESSURE < 130 MM HG: ICD-10-PCS | Mod: HCNC,CPTII,S$GLB, | Performed by: INTERNAL MEDICINE

## 2022-01-24 PROCEDURE — 1159F PR MEDICATION LIST DOCUMENTED IN MEDICAL RECORD: ICD-10-PCS | Mod: HCNC,CPTII,S$GLB, | Performed by: INTERNAL MEDICINE

## 2022-01-24 PROCEDURE — 3074F SYST BP LT 130 MM HG: CPT | Mod: HCNC,CPTII,S$GLB, | Performed by: INTERNAL MEDICINE

## 2022-01-24 PROCEDURE — 3078F DIAST BP <80 MM HG: CPT | Mod: HCNC,CPTII,S$GLB, | Performed by: INTERNAL MEDICINE

## 2022-01-24 PROCEDURE — 1159F MED LIST DOCD IN RCRD: CPT | Mod: HCNC,CPTII,S$GLB, | Performed by: INTERNAL MEDICINE

## 2022-01-24 PROCEDURE — 93005 ELECTROCARDIOGRAM TRACING: CPT | Mod: HCNC

## 2022-01-24 PROCEDURE — 99999 PR PBB SHADOW E&M-EST. PATIENT-LVL III: CPT | Mod: PBBFAC,HCNC,, | Performed by: INTERNAL MEDICINE

## 2022-01-24 PROCEDURE — 1160F PR REVIEW ALL MEDS BY PRESCRIBER/CLIN PHARMACIST DOCUMENTED: ICD-10-PCS | Mod: HCNC,CPTII,S$GLB, | Performed by: INTERNAL MEDICINE

## 2022-01-24 PROCEDURE — 99214 PR OFFICE/OUTPT VISIT, EST, LEVL IV, 30-39 MIN: ICD-10-PCS | Mod: HCNC,S$GLB,, | Performed by: INTERNAL MEDICINE

## 2022-01-24 PROCEDURE — 93010 EKG 12-LEAD: ICD-10-PCS | Mod: HCNC,,, | Performed by: STUDENT IN AN ORGANIZED HEALTH CARE EDUCATION/TRAINING PROGRAM

## 2022-01-24 PROCEDURE — 99214 OFFICE O/P EST MOD 30 MIN: CPT | Mod: HCNC,S$GLB,, | Performed by: INTERNAL MEDICINE

## 2022-01-24 PROCEDURE — 1160F RVW MEDS BY RX/DR IN RCRD: CPT | Mod: HCNC,CPTII,S$GLB, | Performed by: INTERNAL MEDICINE

## 2022-01-24 PROCEDURE — 3008F PR BODY MASS INDEX (BMI) DOCUMENTED: ICD-10-PCS | Mod: HCNC,CPTII,S$GLB, | Performed by: INTERNAL MEDICINE

## 2022-01-24 RX ORDER — INFLUENZA VACCINE, ADJUVANTED 15; 15; 15 UG/.5ML; UG/.5ML; UG/.5ML
INJECTION, SUSPENSION INTRAMUSCULAR
COMMUNITY
Start: 2021-11-10 | End: 2023-04-04

## 2022-01-24 NOTE — PROGRESS NOTES
Subjective:   Patient ID:  Lj Coleman is a 66 y.o. male who presents for follow up of No chief complaint on file.      66 yo male, came in routine f/u  PMH h/o Liver Tx in 2012 at [facility], h/o alcohol, PVCs, ongoing smoker,   2015 echo showed normal EF  2015 Chest CTA showed  atherosclerosis of LAD.  Occasional chest pain, few times a year.   Recent fatigue  Dizziness if moving up quickly  No claudication  EKG NSR and PVCs.  No regular exercise     visit   Phar. MPI showed no ischemia; ECHO normal EF and mild TR; HOLTER 14% PVCs  EKG PVC LBBB pattern with inferior pattern  Pt had occasional palpitation. Dizziness if moving fast    Interval history  After added ToprolX, palpitation improved, and ekg today NSR  Still some smoker. Limited exercise due to lower back pain. The pain med caused him crazy. Wean off the oxycodone  No chest pain .  Some mild SOB and dizziness if getting up fast      Past Medical History:   Diagnosis Date    Alcohol dependence     stopped 2012    Angina pectoris     Arthritis     back    Atherosclerosis of native coronary artery without angina pectoris/ LAD 9/8/2016    Back pain     Chronic hepatitis C with cirrhosis     Depression     Hepatoma     Prior to liver transplant    History of colon polyps 9/9/2015    History of transplantation, liver April 2012    History of vertebral fracture     Hypertension     Immune deficiency disorder     Incisional hernia following transplant 4/9/2014    Liver failure November 2011    Related to chemotherapy for hepatoma    Liver transplanted 04/2012    Obesity     Tobacco abuse 9/9/2016    Trouble in sleeping     Vertebral fracture 1975       Past Surgical History:   Procedure Laterality Date    COLONOSCOPY N/A 1/20/2021    Procedure: COLONOSCOPY;  Surgeon: Emerson Helm MD;  Location: [facility];  Service: Endoscopy;  Laterality: N/A;    HERNIA REPAIR Right 2013    incisional    LIVER TRANSPLANT  April  2012    MOUTH SURGERY      TONSILLECTOMY  1958       Social History     Tobacco Use    Smoking status: Current Some Day Smoker     Packs/day: 0.25     Years: 35.00     Pack years: 8.75     Types: Cigarettes    Smokeless tobacco: Never Used   Substance Use Topics    Alcohol use: No     Alcohol/week: 0.0 standard drinks     Comment: Quit alcohol after some alcohol abuse, prior to his liver transplant    Drug use: No       Family History   Problem Relation Age of Onset    Cancer Unknown     Cancer Mother         lung    Cancer Father         bladder    Cancer Maternal Grandmother         uterine?    Diabetes Neg Hx     Heart disease Neg Hx     Kidney disease Neg Hx     Stroke Neg Hx          Review of Systems   Constitutional: Positive for malaise/fatigue. Negative for decreased appetite, diaphoresis, fever and night sweats.   HENT: Negative for nosebleeds.    Eyes: Negative for blurred vision and double vision.   Cardiovascular: Positive for dyspnea on exertion. Negative for claudication, irregular heartbeat, leg swelling, near-syncope, orthopnea, palpitations, paroxysmal nocturnal dyspnea and syncope.   Respiratory: Negative for cough, shortness of breath, sleep disturbances due to breathing, snoring, sputum production and wheezing.    Endocrine: Negative for cold intolerance and polyuria.   Hematologic/Lymphatic: Does not bruise/bleed easily.   Skin: Negative for rash.   Musculoskeletal: Positive for back pain. Negative for falls, joint pain, joint swelling and neck pain.   Gastrointestinal: Negative for abdominal pain, heartburn, nausea and vomiting.   Genitourinary: Negative for dysuria, frequency and hematuria.   Neurological: Positive for dizziness. Negative for difficulty with concentration, focal weakness, headaches, light-headedness, numbness, seizures and weakness.   Psychiatric/Behavioral: Negative for depression, memory loss and substance abuse. The patient does not have insomnia.     Allergic/Immunologic: Negative for HIV exposure and hives.       Objective:   Physical Exam  HENT:      Head: Normocephalic.   Eyes:      Pupils: Pupils are equal, round, and reactive to light.   Neck:      Thyroid: No thyromegaly.      Vascular: Normal carotid pulses. No carotid bruit or JVD.   Cardiovascular:      Rate and Rhythm: Normal rate and regular rhythm.  No extrasystoles are present.     Chest Wall: PMI is not displaced.      Pulses: Normal pulses.      Heart sounds: Normal heart sounds. No murmur heard.  No gallop. No S3 sounds.    Pulmonary:      Effort: No respiratory distress.      Breath sounds: Normal breath sounds. No stridor.   Abdominal:      General: Bowel sounds are normal.      Palpations: Abdomen is soft.      Tenderness: There is no abdominal tenderness. There is no rebound.   Musculoskeletal:         General: Normal range of motion.   Skin:     Findings: No rash.   Neurological:      Mental Status: He is alert and oriented to person, place, and time.   Psychiatric:         Behavior: Behavior normal.         Lab Results   Component Value Date    CHOL 189 01/23/2017    CHOL 186 07/21/2016    CHOL 176 06/13/2015     Lab Results   Component Value Date    HDL 33 (L) 01/23/2017    HDL 35 (L) 07/21/2016    HDL 44 06/13/2015     Lab Results   Component Value Date    LDLCALC 123.0 01/23/2017    LDLCALC 123.6 07/21/2016    LDLCALC 114.6 06/13/2015     Lab Results   Component Value Date    TRIG 165 (H) 01/23/2017    TRIG 137 07/21/2016    TRIG 87 06/13/2015     Lab Results   Component Value Date    CHOLHDL 17.5 (L) 01/23/2017    CHOLHDL 18.8 (L) 07/21/2016    CHOLHDL 25.0 06/13/2015       Chemistry        Component Value Date/Time     07/23/2021 0753    K 4.3 07/23/2021 0753     07/23/2021 0753    CO2 23 07/23/2021 0753    BUN 19 07/23/2021 0753    CREATININE 1.2 07/23/2021 0753     07/23/2021 0753        Component Value Date/Time    CALCIUM 9.1 07/23/2021 0753    ALKPHOS 101  07/23/2021 0753    AST 20 07/23/2021 0753    ALT 18 07/23/2021 0753    BILITOT 0.5 07/23/2021 0753    ESTGFRAFRICA >60.0 07/23/2021 0753    EGFRNONAA >60.0 07/23/2021 0753          Lab Results   Component Value Date    HGBA1C 5.5 07/21/2016     Lab Results   Component Value Date    TSH 1.184 04/10/2015     No results found for: INR, PROTIME  Lab Results   Component Value Date    WBC 8.31 07/23/2021    HGB 16.1 07/23/2021    HCT 50.5 07/23/2021    MCV 91 07/23/2021     07/23/2021     BMP  Sodium   Date Value Ref Range Status   07/23/2021 137 136 - 145 mmol/L Final     Potassium   Date Value Ref Range Status   07/23/2021 4.3 3.5 - 5.1 mmol/L Final     Chloride   Date Value Ref Range Status   07/23/2021 103 95 - 110 mmol/L Final     CO2   Date Value Ref Range Status   07/23/2021 23 23 - 29 mmol/L Final     BUN   Date Value Ref Range Status   07/23/2021 19 8 - 23 mg/dL Final     Creatinine   Date Value Ref Range Status   07/23/2021 1.2 0.5 - 1.4 mg/dL Final     Calcium   Date Value Ref Range Status   07/23/2021 9.1 8.7 - 10.5 mg/dL Final     Anion Gap   Date Value Ref Range Status   07/23/2021 11 8 - 16 mmol/L Final     eGFR if    Date Value Ref Range Status   07/23/2021 >60.0 >60 mL/min/1.73 m^2 Final     eGFR if non    Date Value Ref Range Status   07/23/2021 >60.0 >60 mL/min/1.73 m^2 Final     Comment:     Calculation used to obtain the estimated glomerular filtration  rate (eGFR) is the CKD-EPI equation.        BNP  @LABRCNTIP(BNP,BNPTRIAGEBLO)@  @LABRCNTIP(troponini)@  CrCl cannot be calculated (Patient's most recent lab result is older than the maximum 7 days allowed.).  No results found in the last 24 hours.  No results found in the last 24 hours.  No results found in the last 24 hours.    Assessment:      1. PVC (premature ventricular contraction)    2. Benign essential HTN    3. Atherosclerosis of native coronary artery of native heart without angina pectoris    4. Liver  transplanted        Plan:   Continue ToprolX 2 5mg  Encourage  Hydration    Counseled DASH  Check Lipid profile in 6 months  Recommend heart-healthy diet, weight control and regular exercise.  Benjy. Risk modification.   I have reviewed all pertinent labs and cardiac studies independently. Plans and recommendations have been formulated under my direct supervision. All questions answered and patient voiced understanding.   If symptoms persist go to the ED  RTC in 6 months

## 2022-01-26 ENCOUNTER — PATIENT OUTREACH (OUTPATIENT)
Dept: ADMINISTRATIVE | Facility: HOSPITAL | Age: 67
End: 2022-01-26
Payer: MEDICARE

## 2022-01-26 ENCOUNTER — PATIENT MESSAGE (OUTPATIENT)
Dept: ADMINISTRATIVE | Facility: HOSPITAL | Age: 67
End: 2022-01-26
Payer: MEDICARE

## 2022-01-26 NOTE — PROGRESS NOTES
Working [employer] Report:     Reviewing chart for statin therapy compliance, or intolerance. Neither noted. Sent message to PCP to review.

## 2022-01-26 NOTE — Clinical Note
Marly Griffin has flagged this patient for needing  High dose statin. Patient is not on it currently. Please review for the need or if patient has an intolerance.   Thank You, Theresa Downing Coordination

## 2022-01-26 NOTE — PROGRESS NOTES
Received message from PCP:     DO Theresa Watson LPN  He has not had a lipid panel since 2017. If you can get that scheduled and f/u I will discuss with him.     Called to discuss scheduling, no answer left message. Also sent [employer] message.

## 2022-01-27 ENCOUNTER — TELEPHONE (OUTPATIENT)
Dept: TRANSPLANT | Facility: CLINIC | Age: 67
End: 2022-01-27
Payer: MEDICARE

## 2022-01-27 NOTE — LETTER
January 27, 2022    Lj Melara [address]  [address]  [facility] [address]          Dear Lj Coleman:  MRN: [id number]    This is a follow up to your recent labs, your lab results were stable.  There are no medicine changes.  Please have your labs drawn again on 6/6/2022.      If you cannot have your labs drawn on the scheduled date, it is your responsibility to call the transplant department to reschedule.  Please call [phone] and ask to speak to Isabella CASTILLO   for all scheduling requests.     Sincerely,    Gertrudis SLOANN, RN    Your Liver Transplant Coordinator    [facility]  [address] [place] [address]  [phone]

## 2022-01-27 NOTE — TELEPHONE ENCOUNTER
Letter sent to patient stating: Your labs have been reviewed by your Transplant physician, no action required. Next labs due 6/6/2022        ----- Message from Teresa Gonzalez MD sent at 1/27/2022 11:16 AM CST -----  Reviewed, nothing to do; repeat per routine

## 2022-04-27 ENCOUNTER — PATIENT MESSAGE (OUTPATIENT)
Dept: ADMINISTRATIVE | Facility: HOSPITAL | Age: 67
End: 2022-04-27
Payer: MEDICARE

## 2022-05-11 ENCOUNTER — PATIENT MESSAGE (OUTPATIENT)
Dept: RESEARCH | Facility: CLINIC | Age: 67
End: 2022-05-11
Payer: MEDICARE

## 2022-06-07 ENCOUNTER — PATIENT MESSAGE (OUTPATIENT)
Dept: TRANSPLANT | Facility: CLINIC | Age: 67
End: 2022-06-07
Payer: MEDICARE

## 2022-06-08 ENCOUNTER — LAB VISIT (OUTPATIENT)
Dept: LAB | Facility: HOSPITAL | Age: 67
End: 2022-06-08
Attending: INTERNAL MEDICINE
Payer: MEDICARE

## 2022-06-08 DIAGNOSIS — D84.9 IMMUNOSUPPRESSION: ICD-10-CM

## 2022-06-08 DIAGNOSIS — Z94.4 LIVER TRANSPLANTED: ICD-10-CM

## 2022-06-08 LAB
ALBUMIN SERPL BCP-MCNC: 3.7 G/DL (ref 3.5–5.2)
ALP SERPL-CCNC: 108 U/L (ref 55–135)
ALT SERPL W/O P-5'-P-CCNC: 9 U/L (ref 10–44)
ANION GAP SERPL CALC-SCNC: 11 MMOL/L (ref 8–16)
AST SERPL-CCNC: 14 U/L (ref 10–40)
BASOPHILS # BLD AUTO: 0.09 K/UL (ref 0–0.2)
BASOPHILS NFR BLD: 1.2 % (ref 0–1.9)
BILIRUB SERPL-MCNC: 0.5 MG/DL (ref 0.1–1)
BUN SERPL-MCNC: 18 MG/DL (ref 8–23)
CALCIUM SERPL-MCNC: 9 MG/DL (ref 8.7–10.5)
CHLORIDE SERPL-SCNC: 104 MMOL/L (ref 95–110)
CO2 SERPL-SCNC: 21 MMOL/L (ref 23–29)
CREAT SERPL-MCNC: 1.2 MG/DL (ref 0.5–1.4)
DIFFERENTIAL METHOD: NORMAL
EOSINOPHIL # BLD AUTO: 0.4 K/UL (ref 0–0.5)
EOSINOPHIL NFR BLD: 5.3 % (ref 0–8)
ERYTHROCYTE [DISTWIDTH] IN BLOOD BY AUTOMATED COUNT: 13.5 % (ref 11.5–14.5)
EST. GFR  (AFRICAN AMERICAN): >60 ML/MIN/1.73 M^2
EST. GFR  (NON AFRICAN AMERICAN): >60 ML/MIN/1.73 M^2
GLUCOSE SERPL-MCNC: 92 MG/DL (ref 70–110)
HCT VFR BLD AUTO: 49.4 % (ref 40–54)
HGB BLD-MCNC: 15.9 G/DL (ref 14–18)
IMM GRANULOCYTES # BLD AUTO: 0.03 K/UL (ref 0–0.04)
IMM GRANULOCYTES NFR BLD AUTO: 0.4 % (ref 0–0.5)
LYMPHOCYTES # BLD AUTO: 1.9 K/UL (ref 1–4.8)
LYMPHOCYTES NFR BLD: 24.8 % (ref 18–48)
MCH RBC QN AUTO: 29 PG (ref 27–31)
MCHC RBC AUTO-ENTMCNC: 32.2 G/DL (ref 32–36)
MCV RBC AUTO: 90 FL (ref 82–98)
MONOCYTES # BLD AUTO: 0.6 K/UL (ref 0.3–1)
MONOCYTES NFR BLD: 7.8 % (ref 4–15)
NEUTROPHILS # BLD AUTO: 4.6 K/UL (ref 1.8–7.7)
NEUTROPHILS NFR BLD: 60.5 % (ref 38–73)
NRBC BLD-RTO: 0 /100 WBC
PLATELET # BLD AUTO: 162 K/UL (ref 150–450)
PMV BLD AUTO: 12 FL (ref 9.2–12.9)
POTASSIUM SERPL-SCNC: 4.5 MMOL/L (ref 3.5–5.1)
PROT SERPL-MCNC: 6.9 G/DL (ref 6–8.4)
RBC # BLD AUTO: 5.49 M/UL (ref 4.6–6.2)
SODIUM SERPL-SCNC: 136 MMOL/L (ref 136–145)
WBC # BLD AUTO: 7.53 K/UL (ref 3.9–12.7)

## 2022-06-08 PROCEDURE — 80197 ASSAY OF TACROLIMUS: CPT | Performed by: INTERNAL MEDICINE

## 2022-06-08 PROCEDURE — 85025 COMPLETE CBC W/AUTO DIFF WBC: CPT | Performed by: INTERNAL MEDICINE

## 2022-06-08 PROCEDURE — 36415 COLL VENOUS BLD VENIPUNCTURE: CPT | Performed by: INTERNAL MEDICINE

## 2022-06-08 PROCEDURE — 80053 COMPREHEN METABOLIC PANEL: CPT | Performed by: INTERNAL MEDICINE

## 2022-06-09 ENCOUNTER — TELEPHONE (OUTPATIENT)
Dept: TRANSPLANT | Facility: CLINIC | Age: 67
End: 2022-06-09
Payer: MEDICARE

## 2022-06-09 LAB — TACROLIMUS BLD-MCNC: 4.3 NG/ML (ref 5–15)

## 2022-06-09 NOTE — LETTER
June 9, 2022    Lj Melara [address]  [address]  [facility] [address]          Dear Lj Coleman:  MRN: [id number]    This is a follow up to your recent labs, your lab results were stable.  There are no medicine changes.  Please have your labs drawn again on 12/5/22.      If you cannot have your labs drawn on the scheduled date, it is your responsibility to call the transplant department to reschedule.  Please call [phone] and ask to speak to Isabella CASTILLO   for all scheduling requests.     Sincerely,    Gertrudis SLOANN, RN        Your Liver Transplant Coordinator    [facility]  [address] [place] [id number]  [phone]

## 2022-06-09 NOTE — TELEPHONE ENCOUNTER
Letter sent to patient stating: Your labs have been reviewed by your Transplant physician, no action required. Next labs due 12/5/22        ----- Message from Teresa Gonzalez MD sent at 6/9/2022 11:01 AM CDT -----  Reviewed, nothing to do; repeat per routine

## 2022-07-19 DIAGNOSIS — I49.3 PVC (PREMATURE VENTRICULAR CONTRACTION): Primary | ICD-10-CM

## 2022-07-26 DIAGNOSIS — Z94.4 S/P LIVER TRANSPLANT: Primary | ICD-10-CM

## 2022-07-27 ENCOUNTER — PATIENT MESSAGE (OUTPATIENT)
Dept: ADMINISTRATIVE | Facility: HOSPITAL | Age: 67
End: 2022-07-27
Payer: MEDICARE

## 2022-08-17 ENCOUNTER — PATIENT OUTREACH (OUTPATIENT)
Dept: ADMINISTRATIVE | Facility: HOSPITAL | Age: 67
End: 2022-08-17
Payer: MEDICARE

## 2022-08-17 NOTE — PROGRESS NOTES
Working [employer] Statin Report: Patient is currently not on any statin.  Pt last seen June 2021.  Attempted to contact pt to schedule annual exam. No answer, voice mail not set up.  Contacted emergency number and was told they did not know pt.

## 2022-08-25 ENCOUNTER — PES CALL (OUTPATIENT)
Dept: ADMINISTRATIVE | Facility: CLINIC | Age: 67
End: 2022-08-25
Payer: MEDICARE

## 2022-08-30 ENCOUNTER — TELEPHONE (OUTPATIENT)
Dept: INTERNAL MEDICINE | Facility: CLINIC | Age: 67
End: 2022-08-30
Payer: MEDICARE

## 2022-08-30 NOTE — TELEPHONE ENCOUNTER
Unable to reach patient to confirm the AWV with Aicha Kirkland scheduled for 8/31/22. The was no voicemail available.

## 2022-09-07 ENCOUNTER — OFFICE VISIT (OUTPATIENT)
Dept: INTERNAL MEDICINE | Facility: CLINIC | Age: 67
End: 2022-09-07
Payer: MEDICARE

## 2022-09-07 VITALS
WEIGHT: 241.38 LBS | HEIGHT: 69 IN | DIASTOLIC BLOOD PRESSURE: 78 MMHG | BODY MASS INDEX: 35.75 KG/M2 | SYSTOLIC BLOOD PRESSURE: 130 MMHG | OXYGEN SATURATION: 95 % | HEART RATE: 89 BPM

## 2022-09-07 DIAGNOSIS — Z00.00 ENCOUNTER FOR PREVENTIVE HEALTH EXAMINATION: Primary | ICD-10-CM

## 2022-09-07 DIAGNOSIS — Z59.9 FINANCIAL DIFFICULTIES: ICD-10-CM

## 2022-09-07 DIAGNOSIS — E66.01 SEVERE OBESITY (BMI 35.0-39.9) WITH COMORBIDITY: ICD-10-CM

## 2022-09-07 DIAGNOSIS — F43.23 ADJUSTMENT DISORDER WITH MIXED ANXIETY AND DEPRESSED MOOD: ICD-10-CM

## 2022-09-07 DIAGNOSIS — R41.3 MEMORY DIFFICULTIES: ICD-10-CM

## 2022-09-07 DIAGNOSIS — Z94.4 LIVER TRANSPLANTED: ICD-10-CM

## 2022-09-07 DIAGNOSIS — R20.0 NUMBNESS AND TINGLING IN BOTH HANDS: ICD-10-CM

## 2022-09-07 DIAGNOSIS — D84.9 IMMUNOSUPPRESSION: ICD-10-CM

## 2022-09-07 DIAGNOSIS — R68.81 EARLY SATIETY: ICD-10-CM

## 2022-09-07 DIAGNOSIS — R20.2 NUMBNESS AND TINGLING IN BOTH HANDS: ICD-10-CM

## 2022-09-07 DIAGNOSIS — Z85.05 HISTORY OF LIVER CANCER: ICD-10-CM

## 2022-09-07 DIAGNOSIS — I10 HYPERTENSION, UNSPECIFIED TYPE: ICD-10-CM

## 2022-09-07 DIAGNOSIS — Z13.6 ENCOUNTER FOR ABDOMINAL AORTIC ANEURYSM (AAA) SCREENING: ICD-10-CM

## 2022-09-07 DIAGNOSIS — D69.6 THROMBOCYTOPENIA: ICD-10-CM

## 2022-09-07 DIAGNOSIS — I25.10 ATHEROSCLEROSIS OF CORONARY ARTERY, UNSPECIFIED VESSEL OR LESION TYPE, UNSPECIFIED WHETHER ANGINA PRESENT, UNSPECIFIED WHETHER NATIVE OR TRANSPLANTED HEART: ICD-10-CM

## 2022-09-07 DIAGNOSIS — Z72.0 TOBACCO USE: ICD-10-CM

## 2022-09-07 DIAGNOSIS — R05.3 CHRONIC COUGH: ICD-10-CM

## 2022-09-07 PROCEDURE — 99499 RISK ADDL DX/OHS AUDIT: ICD-10-PCS | Mod: HCNC,S$GLB,, | Performed by: NURSE PRACTITIONER

## 2022-09-07 PROCEDURE — 1160F RVW MEDS BY RX/DR IN RCRD: CPT | Mod: CPTII,S$GLB,, | Performed by: NURSE PRACTITIONER

## 2022-09-07 PROCEDURE — 3288F FALL RISK ASSESSMENT DOCD: CPT | Mod: CPTII,S$GLB,, | Performed by: NURSE PRACTITIONER

## 2022-09-07 PROCEDURE — 1170F FXNL STATUS ASSESSED: CPT | Mod: CPTII,S$GLB,, | Performed by: NURSE PRACTITIONER

## 2022-09-07 PROCEDURE — 99999 PR PBB SHADOW E&M-EST. PATIENT-LVL IV: CPT | Mod: PBBFAC,,, | Performed by: NURSE PRACTITIONER

## 2022-09-07 PROCEDURE — G0439 PR MEDICARE ANNUAL WELLNESS SUBSEQUENT VISIT: ICD-10-PCS | Mod: S$GLB,,, | Performed by: NURSE PRACTITIONER

## 2022-09-07 PROCEDURE — 3288F PR FALLS RISK ASSESSMENT DOCUMENTED: ICD-10-PCS | Mod: CPTII,S$GLB,, | Performed by: NURSE PRACTITIONER

## 2022-09-07 PROCEDURE — 1160F PR REVIEW ALL MEDS BY PRESCRIBER/CLIN PHARMACIST DOCUMENTED: ICD-10-PCS | Mod: CPTII,S$GLB,, | Performed by: NURSE PRACTITIONER

## 2022-09-07 PROCEDURE — 1170F PR FUNCTIONAL STATUS ASSESSED: ICD-10-PCS | Mod: CPTII,S$GLB,, | Performed by: NURSE PRACTITIONER

## 2022-09-07 PROCEDURE — G0439 PPPS, SUBSEQ VISIT: HCPCS | Mod: S$GLB,,, | Performed by: NURSE PRACTITIONER

## 2022-09-07 PROCEDURE — 3078F DIAST BP <80 MM HG: CPT | Mod: CPTII,S$GLB,, | Performed by: NURSE PRACTITIONER

## 2022-09-07 PROCEDURE — 1159F PR MEDICATION LIST DOCUMENTED IN MEDICAL RECORD: ICD-10-PCS | Mod: CPTII,S$GLB,, | Performed by: NURSE PRACTITIONER

## 2022-09-07 PROCEDURE — 1125F PR PAIN SEVERITY QUANTIFIED, PAIN PRESENT: ICD-10-PCS | Mod: CPTII,S$GLB,, | Performed by: NURSE PRACTITIONER

## 2022-09-07 PROCEDURE — 3075F PR MOST RECENT SYSTOLIC BLOOD PRESS GE 130-139MM HG: ICD-10-PCS | Mod: CPTII,S$GLB,, | Performed by: NURSE PRACTITIONER

## 2022-09-07 PROCEDURE — 3075F SYST BP GE 130 - 139MM HG: CPT | Mod: CPTII,S$GLB,, | Performed by: NURSE PRACTITIONER

## 2022-09-07 PROCEDURE — 1101F PT FALLS ASSESS-DOCD LE1/YR: CPT | Mod: CPTII,S$GLB,, | Performed by: NURSE PRACTITIONER

## 2022-09-07 PROCEDURE — 3008F PR BODY MASS INDEX (BMI) DOCUMENTED: ICD-10-PCS | Mod: CPTII,S$GLB,, | Performed by: NURSE PRACTITIONER

## 2022-09-07 PROCEDURE — 1125F AMNT PAIN NOTED PAIN PRSNT: CPT | Mod: CPTII,S$GLB,, | Performed by: NURSE PRACTITIONER

## 2022-09-07 PROCEDURE — 99499 UNLISTED E&M SERVICE: CPT | Mod: HCNC,S$GLB,, | Performed by: NURSE PRACTITIONER

## 2022-09-07 PROCEDURE — 1159F MED LIST DOCD IN RCRD: CPT | Mod: CPTII,S$GLB,, | Performed by: NURSE PRACTITIONER

## 2022-09-07 PROCEDURE — 1101F PR PT FALLS ASSESS DOC 0-1 FALLS W/OUT INJ PAST YR: ICD-10-PCS | Mod: CPTII,S$GLB,, | Performed by: NURSE PRACTITIONER

## 2022-09-07 PROCEDURE — 99999 PR PBB SHADOW E&M-EST. PATIENT-LVL IV: ICD-10-PCS | Mod: PBBFAC,,, | Performed by: NURSE PRACTITIONER

## 2022-09-07 PROCEDURE — 3078F PR MOST RECENT DIASTOLIC BLOOD PRESSURE < 80 MM HG: ICD-10-PCS | Mod: CPTII,S$GLB,, | Performed by: NURSE PRACTITIONER

## 2022-09-07 PROCEDURE — 3008F BODY MASS INDEX DOCD: CPT | Mod: CPTII,S$GLB,, | Performed by: NURSE PRACTITIONER

## 2022-09-07 SDOH — SOCIAL DETERMINANTS OF HEALTH (SDOH): PROBLEM RELATED TO HOUSING AND ECONOMIC CIRCUMSTANCES, UNSPECIFIED: Z59.9

## 2022-09-07 NOTE — PROGRESS NOTES
"  Lj Coleman presented for a  Medicare AWV and comprehensive Health Risk Assessment today. The following components were reviewed and updated:    Medical history  Family History  Social history  Allergies and Current Medications  Health Risk Assessment  Health Maintenance  Care Team         ** See Completed Assessments for Annual Wellness Visit within the encounter summary.**         The following assessments were completed:  Living Situation  CAGE  Depression Screening  Timed Get Up and Go  Whisper Test-na  Cognitive Function Screening  Nutrition Screening  ADL Screening  PAQ Screening        Vitals:    09/07/22 1014   BP: 130/78   Pulse: 89   SpO2: 95%   Weight: 109.5 kg (241 lb 6.5 oz)   Height: 5' 8.5" (1.74 m)     Body mass index is 36.17 kg/m².  Physical Exam  Vitals and nursing note reviewed.   Constitutional:       Appearance: He is well-developed.   HENT:      Head: Normocephalic.   Cardiovascular:      Rate and Rhythm: Normal rate.      Heart sounds: Normal heart sounds.      Comments: Ectopic beats noted.   Pulmonary:      Effort: Pulmonary effort is normal. No respiratory distress.      Breath sounds: Normal breath sounds.   Abdominal:      Palpations: Abdomen is soft. There is no mass.      Tenderness: There is no abdominal tenderness.   Musculoskeletal:         General: Normal range of motion.   Skin:     General: Skin is warm and dry.   Neurological:      Mental Status: He is alert and oriented to person, place, and time.      Motor: No abnormal muscle tone.   Psychiatric:         Speech: Speech normal.         Behavior: Behavior normal.             Diagnoses and health risks identified today and associated recommendations/orders:    1. Encounter for preventive health examination  Discussed locations to receive flu vaccine  He will discuss vaccines with transplant.   Defer lipid and PSA to PCP    Reports cardiac conditions are stable.    Reports he is at his respiratory baseline. "     Discussed s/s of heart failure (patient denies any s/s) and advised to follow up with cardiologist/ER (if severe) if occur. Patient expressed understanding.      MA to schedule  PCP  Cardiology  Optometry     2. Atherosclerosis of coronary artery, unspecified vessel or lesion type, unspecified whether angina present, unspecified whether native or transplanted heart  Continue current treatment plan as previously prescribed with your  cardiologist.     3. Adjustment disorder with mixed anxiety and depressed mood  Reports stress  PHQ 2-1  Discussed counseling. Psychiatry department contact information given to patient, along with information on providers in department.    Advised to follow up with PCP for further evaluation and recommendations. Patient expressed understanding.      4. Liver transplanted  Continue current treatment plan as previously prescribed with transplant.     5. Immunosuppression  See #4    6. Thrombocytopenia  Continue current treatment plan as previously prescribed with your  pcp     7. History of liver cancer  See #4    8. Hypertension, unspecified type  Continue current treatment plan as previously prescribed with your  cardiologist.     9. Severe obesity (BMI 35.0-39.9) with comorbidity  Encouraged healthy diet and exercise as tolerated to help bring BMI into normal range.    Continue current treatment plan as previously prescribed with your  pcp     10. Tobacco use  Discussed the importance of smoking cessation and advised to quit smoking. Patient expressed understanding. Declines smoking cessation program.     11. Financial difficulties  [facility] financial resources information page given to patient.    - Ambulatory referral/consult to Outpatient Case Management    12. Numbness and tingling in both hands  Intermittent   Chronic  Advised to follow up with PCP for further evaluation and recommendations. Patient expressed understanding.      13. Chronic cough  Intermittent, mainly at night  and in am  Minimally productive  Advised to follow up with PCP for further evaluation and recommendations. Patient expressed understanding.      14. Early satiety  Causing a decrease in food intake  Advised to follow up with PCP for further evaluation and recommendations. Patient expressed understanding.      15. Memory difficulties  Reported per pt  Normal cognitive function screening.     Advised to follow up with PCP for further evaluation and recommendations. Patient expressed understanding.      16. Encounter for abdominal aortic aneurysm (AAA) screening  - [place] AAA Screening; Future      Provided Lj with a 5-10 year written screening schedule and personal prevention plan. Recommendations were developed using the USPSTF age appropriate recommendations. Education, counseling, and referrals were provided as needed. After Visit Summary printed and given to patient which includes a list of additional screenings\tests needed.    Follow up in about 1 year (around 9/7/2023) for awv.    Aicha Kirkland NP  I offered to discuss advanced care planning, including how to pick a person who would make decisions for you if you were unable to make them for yourself, called a health care power of , and what kind of decisions you might make such as use of life sustaining treatments such as ventilators and tube feeding when faced with a life limiting illness recorded on a living will that they will need to know. (How you want to be cared for as you near the end of your natural life)     X Patient is interested in learning more about how to make advanced directives.  I provided them paperwork and offered to discuss this with them.

## 2022-09-07 NOTE — PATIENT INSTRUCTIONS
Counseling and Referral of Other Preventative  (Italic type indicates deductible and co-insurance are waived)    Patient Name: Lj Coleman  Today's Date: 9/7/2022    Health Maintenance       Date Due Completion Date    TETANUS VACCINE Never done ---    Aspirin/Antiplatelet Therapy Never done ---    Shingles Vaccine (1 of 2) Never done ---    High Dose Statin Never done ---    Pneumococcal Vaccines (Age 65+) (2 - PCV) 07/28/2017 7/28/2016    Lipid Panel 01/23/2018 1/23/2017    Abdominal Aortic Aneurysm Screening Never done ---    COVID-19 Vaccine (4 - Booster for Pfizer series) 02/07/2022 11/15/2021    Influenza Vaccine (1) 09/01/2022 11/10/2021    Colorectal Cancer Screening 01/20/2031 1/20/2021        Orders Placed This Encounter   Procedures    US AAA Screening    Ambulatory referral/consult to Outpatient Case Management       The following information is provided to all patients.  This information is to help you find resources for any of the problems found today that may be affecting your health:                Living healthy guide: www.[facility].[place].gov      Understanding Diabetes: www.diabetes.org      Eating healthy: www.cdc.gov/healthyweight      CDC home safety checklist: www.cdc.gov/steadi/patient.html      Agency on Aging: www.goea.[place].gov      Alcoholics anonymous (AA): www.aa.org      Physical Activity: www.elena.nih.gov/ca7nbfq      Tobacco use: www.quitwithusla.org

## 2022-09-12 ENCOUNTER — TELEPHONE (OUTPATIENT)
Dept: INTERNAL MEDICINE | Facility: CLINIC | Age: 67
End: 2022-09-12
Payer: MEDICARE

## 2022-09-12 NOTE — TELEPHONE ENCOUNTER
Mr Calhoun has scheduled his appointments that were recommended by Aicha Kirkland from [facility].

## 2022-09-12 NOTE — TELEPHONE ENCOUNTER
----- Message from Aicha Kirkland NP sent at 9/7/2022 10:23 AM CDT -----  Please schedule  Diaz-fu  PCP/MITALI Duran- annual/fu awv-sooner than later  Optometry-annual eye exam, new pt  AAA screening  Thanks,   CS

## 2022-09-13 ENCOUNTER — OUTPATIENT CASE MANAGEMENT (OUTPATIENT)
Dept: ADMINISTRATIVE | Facility: OTHER | Age: 67
End: 2022-09-13
Payer: MEDICARE

## 2022-09-13 NOTE — PROGRESS NOTES
Attempt #:  1  This LCSW attempted to reach patient/caregiver to provide resource and LCSW unable to leave a message requesting a return call.

## 2022-09-14 ENCOUNTER — TELEPHONE (OUTPATIENT)
Dept: HEPATOLOGY | Facility: CLINIC | Age: 67
End: 2022-09-14
Payer: MEDICARE

## 2022-09-14 NOTE — TELEPHONE ENCOUNTER
"Attempted to return patient's call, phone rang several times with no answer or voicemail. Patient phoned stating "that someone from Hepatology cancelled his medication" Upon checking his chart we did not cancel his medication.   "

## 2022-09-14 NOTE — TELEPHONE ENCOUNTER
----- Message from Yael Louis LCSW sent at 9/14/2022  2:03 PM CDT -----  Regarding: Nexium prescription  Hello,    Patient reports that he recently received notification that his esomeprazole (Nexium) prescription was canceled by his provider. Pt is trying to find out if it was cancelled by someone with Hepatology. Please call patient to advised about this prescription. He says he cannot go without it.    Thank you,     Yael Louis LCSW  Outpatient Care Management

## 2022-09-14 NOTE — PROGRESS NOTES
Outpatient Care Management   - Low Risk Patient Assessment    Patient: Lj Coleman  MRN:  [id number]  Date of Service:  9/14/2022  Completed by:  Yael Louis LCSW  Referral Date: 09/07/2022    Reason for Visit   Patient presents with    OPCM SW First Assessment Attempt     9/13/2022    Social Work Assessment - Low/Mod Risk    Plan Of Care    Case Closure     Brief Summary:  received referral for patient from Aicha Kirkland NP. Patient reported living alone and reported being independent with ADLs. Pt stated that he did not use assistive devices to ambulate. Pt reported providing his own transportation to doctor's appointments. He reported having difficulty affording food cost. Pt was agreeable to receiving local food resources. Pt reported having part time employment at this time. He reported having limited contact with family including daughter who he indicated being worried and stressed about issues she is facing. Pt reported most stressors related to family struggles. Sw discussed possible referral to counseling/therapy services and Pt declined. Sw offered to send Pt information via mail and he stated that she would seek resources if needed in the future. Pt reported he has not completed HPOA forms and expressed interest in receiving forms in the mail to review. No other needs or concerns identified by Pt. Resources mailed to Pt's address on file. Case closed    Patient Summary     OPCM Social Work Assessment (Low/Moderate Risk)    General  Level of Caregiver support: Member independent and does not need caregiver assistance  Have you had to make a decision between paying for any of the following in the last 2 months?: Food  Transportation means: Self  Employment status: Part time  Assessments  Was the PHQ Depression Screening completed this visit?: No  Was the STEVAN-7 Screening completed this visit?: No         Complex Care Plan     Care plan was discussed and completed  today with input from patient and/or caregiver.    Patient Instructions     No follow-ups on file.    Todays OPCM Self-Management Care Plan was developed with the patients/caregivers input and was based on identified barriers from todays assessment.  Goals were written today with the patient/caregiver and the patient has agreed to work towards these goals to improve his/her overall well-being. Patient verbalized understanding of the care plan, goals, and all of today's instructions. Encouraged patient/caregiver to communicate with his/her physician and health care team about health conditions and the treatment plan.  Provided my contact information today and encouraged patient/caregiver to call me with any questions as needed.

## 2022-09-29 ENCOUNTER — OFFICE VISIT (OUTPATIENT)
Dept: OPHTHALMOLOGY | Facility: CLINIC | Age: 67
End: 2022-09-29
Payer: MEDICARE

## 2022-09-29 DIAGNOSIS — H25.13 CATARACT, NUCLEAR SCLEROTIC SENILE, BILATERAL: Primary | ICD-10-CM

## 2022-09-29 DIAGNOSIS — H52.7 REFRACTIVE ERRORS: ICD-10-CM

## 2022-09-29 DIAGNOSIS — I10 BENIGN ESSENTIAL HTN: ICD-10-CM

## 2022-09-29 PROCEDURE — 92004 COMPRE OPH EXAM NEW PT 1/>: CPT | Mod: S$GLB,,, | Performed by: OPTOMETRIST

## 2022-09-29 PROCEDURE — 1159F PR MEDICATION LIST DOCUMENTED IN MEDICAL RECORD: ICD-10-PCS | Mod: CPTII,S$GLB,, | Performed by: OPTOMETRIST

## 2022-09-29 PROCEDURE — 99999 PR PBB SHADOW E&M-EST. PATIENT-LVL II: ICD-10-PCS | Mod: PBBFAC,,, | Performed by: OPTOMETRIST

## 2022-09-29 PROCEDURE — 92004 PR EYE EXAM, NEW PATIENT,COMPREHESV: ICD-10-PCS | Mod: S$GLB,,, | Performed by: OPTOMETRIST

## 2022-09-29 PROCEDURE — 99999 PR PBB SHADOW E&M-EST. PATIENT-LVL II: CPT | Mod: PBBFAC,,, | Performed by: OPTOMETRIST

## 2022-09-29 PROCEDURE — 1159F MED LIST DOCD IN RCRD: CPT | Mod: CPTII,S$GLB,, | Performed by: OPTOMETRIST

## 2022-09-29 PROCEDURE — 92015 PR REFRACTION: ICD-10-PCS | Mod: S$GLB,,, | Performed by: OPTOMETRIST

## 2022-09-29 PROCEDURE — 92015 DETERMINE REFRACTIVE STATE: CPT | Mod: S$GLB,,, | Performed by: OPTOMETRIST

## 2022-09-29 NOTE — PROGRESS NOTES
HPI    Decrease near visual acuity.  Patient wears OTC readers.  New patient last eye exam 5 years  Update glasses RX.  Last edited by Cassy Wheatley MA on 9/29/2022  9:52 AM.            Assessment /Plan     For exam results, see Encounter Report.    Cataract, nuclear sclerotic senile, bilateral    Benign essential HTN    Refractive errors      Moderate cataracts OU, not surgical    No HTN Retinopathy    Dispense Final Rx for glasses.  RTC 1 year  Discussed above and answered questions.

## 2022-10-11 ENCOUNTER — PATIENT OUTREACH (OUTPATIENT)
Dept: ADMINISTRATIVE | Facility: HOSPITAL | Age: 67
End: 2022-10-11
Payer: MEDICARE

## 2022-10-11 NOTE — PROGRESS NOTES
Pt  returned call.  Advised due for annual exam, Appt scheduled, 10/17/22 while in clinic to see cardio.  Discussed statin. States he has never taken anything for his cholesterol.

## 2022-10-11 NOTE — PROGRESS NOTES
[employer] Statin Report-CVD    [employer] recommends moderate/high dose statin due to diagnosis of CAD.  Pt last seen by pcp, June 2021.  Attempted to contact pt to schedule annual exam, discuss statin. No answer, voice mail not set up.

## 2022-10-17 ENCOUNTER — PATIENT MESSAGE (OUTPATIENT)
Dept: RESEARCH | Facility: HOSPITAL | Age: 67
End: 2022-10-17
Payer: MEDICARE

## 2022-11-11 ENCOUNTER — IMMUNIZATION (OUTPATIENT)
Dept: INTERNAL MEDICINE | Facility: CLINIC | Age: 67
End: 2022-11-11
Payer: MEDICARE

## 2022-11-11 PROCEDURE — G0008 ADMIN INFLUENZA VIRUS VAC: HCPCS | Mod: S$GLB,,, | Performed by: INTERNAL MEDICINE

## 2022-11-11 PROCEDURE — G0008 FLU VACCINE - QUADRIVALENT - ADJUVANTED: ICD-10-PCS | Mod: S$GLB,,, | Performed by: INTERNAL MEDICINE

## 2022-11-11 PROCEDURE — 90694 FLU VACCINE - QUADRIVALENT - ADJUVANTED: ICD-10-PCS | Mod: S$GLB,,, | Performed by: INTERNAL MEDICINE

## 2022-11-11 PROCEDURE — 90694 VACC AIIV4 NO PRSRV 0.5ML IM: CPT | Mod: S$GLB,,, | Performed by: INTERNAL MEDICINE

## 2022-12-09 ENCOUNTER — LAB VISIT (OUTPATIENT)
Dept: LAB | Facility: HOSPITAL | Age: 67
End: 2022-12-09
Attending: INTERNAL MEDICINE
Payer: MEDICARE

## 2022-12-09 DIAGNOSIS — Z94.4 S/P LIVER TRANSPLANT: ICD-10-CM

## 2022-12-09 LAB
ALBUMIN SERPL BCP-MCNC: 3.6 G/DL (ref 3.5–5.2)
ALP SERPL-CCNC: 110 U/L (ref 55–135)
ALT SERPL W/O P-5'-P-CCNC: 12 U/L (ref 10–44)
ANION GAP SERPL CALC-SCNC: 8 MMOL/L (ref 8–16)
AST SERPL-CCNC: 16 U/L (ref 10–40)
BASOPHILS # BLD AUTO: 0.09 K/UL (ref 0–0.2)
BASOPHILS NFR BLD: 1.1 % (ref 0–1.9)
BILIRUB SERPL-MCNC: 0.8 MG/DL (ref 0.1–1)
BUN SERPL-MCNC: 17 MG/DL (ref 8–23)
CALCIUM SERPL-MCNC: 9.2 MG/DL (ref 8.7–10.5)
CHLORIDE SERPL-SCNC: 104 MMOL/L (ref 95–110)
CO2 SERPL-SCNC: 23 MMOL/L (ref 23–29)
CREAT SERPL-MCNC: 1.1 MG/DL (ref 0.5–1.4)
DIFFERENTIAL METHOD: ABNORMAL
EOSINOPHIL # BLD AUTO: 0.4 K/UL (ref 0–0.5)
EOSINOPHIL NFR BLD: 4.6 % (ref 0–8)
ERYTHROCYTE [DISTWIDTH] IN BLOOD BY AUTOMATED COUNT: 13.4 % (ref 11.5–14.5)
EST. GFR  (NO RACE VARIABLE): >60 ML/MIN/1.73 M^2
GLUCOSE SERPL-MCNC: 93 MG/DL (ref 70–110)
HCT VFR BLD AUTO: 50.1 % (ref 40–54)
HGB BLD-MCNC: 16.3 G/DL (ref 14–18)
IMM GRANULOCYTES # BLD AUTO: 0.05 K/UL (ref 0–0.04)
IMM GRANULOCYTES NFR BLD AUTO: 0.6 % (ref 0–0.5)
LYMPHOCYTES # BLD AUTO: 1.9 K/UL (ref 1–4.8)
LYMPHOCYTES NFR BLD: 22.1 % (ref 18–48)
MCH RBC QN AUTO: 29.9 PG (ref 27–31)
MCHC RBC AUTO-ENTMCNC: 32.5 G/DL (ref 32–36)
MCV RBC AUTO: 92 FL (ref 82–98)
MONOCYTES # BLD AUTO: 0.7 K/UL (ref 0.3–1)
MONOCYTES NFR BLD: 7.7 % (ref 4–15)
NEUTROPHILS # BLD AUTO: 5.4 K/UL (ref 1.8–7.7)
NEUTROPHILS NFR BLD: 63.9 % (ref 38–73)
NRBC BLD-RTO: 0 /100 WBC
PLATELET # BLD AUTO: 140 K/UL (ref 150–450)
PMV BLD AUTO: 12.5 FL (ref 9.2–12.9)
POTASSIUM SERPL-SCNC: 4.4 MMOL/L (ref 3.5–5.1)
PROT SERPL-MCNC: 7.3 G/DL (ref 6–8.4)
RBC # BLD AUTO: 5.45 M/UL (ref 4.6–6.2)
SODIUM SERPL-SCNC: 135 MMOL/L (ref 136–145)
WBC # BLD AUTO: 8.41 K/UL (ref 3.9–12.7)

## 2022-12-09 PROCEDURE — 85025 COMPLETE CBC W/AUTO DIFF WBC: CPT | Performed by: INTERNAL MEDICINE

## 2022-12-09 PROCEDURE — 80053 COMPREHEN METABOLIC PANEL: CPT | Performed by: INTERNAL MEDICINE

## 2022-12-09 PROCEDURE — 36415 COLL VENOUS BLD VENIPUNCTURE: CPT | Performed by: INTERNAL MEDICINE

## 2022-12-09 PROCEDURE — 80197 ASSAY OF TACROLIMUS: CPT | Performed by: INTERNAL MEDICINE

## 2022-12-10 LAB — TACROLIMUS BLD-MCNC: 5.4 NG/ML (ref 5–15)

## 2022-12-15 ENCOUNTER — TELEPHONE (OUTPATIENT)
Dept: TRANSPLANT | Facility: CLINIC | Age: 67
End: 2022-12-15
Payer: MEDICARE

## 2022-12-15 NOTE — TELEPHONE ENCOUNTER
Letter sent to patient stating: Your labs have been reviewed by your Transplant physician, no action required. Next labs due 4/3/2023 so that they are done prior to your April physician visit.          ----- Message from Teresa Gonzalez MD sent at 12/14/2022  8:12 AM CST -----  Reviewed, nothing to do; repeat per routine

## 2022-12-15 NOTE — LETTER
December 15, 2022    Lj Melara [address]  [address]  [facility] [address]          Dear Lj Coleman:  MRN: [id number]    This is a follow up to your recent labs, your lab results were stable.  There are no medicine changes.  Please have your labs drawn again on 4/3/23 so they are done prior to your April physician visit.      If you cannot have your labs drawn on the scheduled date, it is your responsibility to call the transplant department to reschedule.  Please call [phone] and ask to speak to Isabella CASTILLO -  for all scheduling requests.     Sincerely,    Gertrudis SLOANN, RN        Your Liver Transplant Coordinator    [facility]  [address] [place] [id number]  [phone]

## 2023-01-09 ENCOUNTER — TELEPHONE (OUTPATIENT)
Dept: INTERNAL MEDICINE | Facility: CLINIC | Age: 68
End: 2023-01-09
Payer: MEDICARE

## 2023-01-09 NOTE — TELEPHONE ENCOUNTER
Patient states that he notice that his toe was infected on Friday. Denies any fever. Its swollen no pain at this time. Just swollen and infected. I advised patient if he beginning to run a fever he should go to ER or urgent care. Patient is scheduled to see Jemma Alva on 1/10/23.

## 2023-01-10 ENCOUNTER — OFFICE VISIT (OUTPATIENT)
Dept: INTERNAL MEDICINE | Facility: CLINIC | Age: 68
End: 2023-01-10
Payer: MEDICARE

## 2023-01-10 VITALS
SYSTOLIC BLOOD PRESSURE: 124 MMHG | BODY MASS INDEX: 36.99 KG/M2 | HEART RATE: 62 BPM | DIASTOLIC BLOOD PRESSURE: 72 MMHG | OXYGEN SATURATION: 96 % | HEIGHT: 68 IN | WEIGHT: 244.06 LBS | TEMPERATURE: 98 F | RESPIRATION RATE: 18 BRPM

## 2023-01-10 DIAGNOSIS — M79.674 GREAT TOE PAIN, RIGHT: ICD-10-CM

## 2023-01-10 DIAGNOSIS — S91.109A OPEN WOUND OF TOE, INITIAL ENCOUNTER: Primary | ICD-10-CM

## 2023-01-10 PROCEDURE — 1125F PR PAIN SEVERITY QUANTIFIED, PAIN PRESENT: ICD-10-PCS | Mod: HCNC,CPTII,S$GLB, | Performed by: NURSE PRACTITIONER

## 2023-01-10 PROCEDURE — 3008F PR BODY MASS INDEX (BMI) DOCUMENTED: ICD-10-PCS | Mod: HCNC,CPTII,S$GLB, | Performed by: NURSE PRACTITIONER

## 2023-01-10 PROCEDURE — 3078F DIAST BP <80 MM HG: CPT | Mod: HCNC,CPTII,S$GLB, | Performed by: NURSE PRACTITIONER

## 2023-01-10 PROCEDURE — 3074F PR MOST RECENT SYSTOLIC BLOOD PRESSURE < 130 MM HG: ICD-10-PCS | Mod: HCNC,CPTII,S$GLB, | Performed by: NURSE PRACTITIONER

## 2023-01-10 PROCEDURE — 1101F PR PT FALLS ASSESS DOC 0-1 FALLS W/OUT INJ PAST YR: ICD-10-PCS | Mod: HCNC,CPTII,S$GLB, | Performed by: NURSE PRACTITIONER

## 2023-01-10 PROCEDURE — 3288F FALL RISK ASSESSMENT DOCD: CPT | Mod: HCNC,CPTII,S$GLB, | Performed by: NURSE PRACTITIONER

## 2023-01-10 PROCEDURE — 1159F PR MEDICATION LIST DOCUMENTED IN MEDICAL RECORD: ICD-10-PCS | Mod: HCNC,CPTII,S$GLB, | Performed by: NURSE PRACTITIONER

## 2023-01-10 PROCEDURE — 3074F SYST BP LT 130 MM HG: CPT | Mod: HCNC,CPTII,S$GLB, | Performed by: NURSE PRACTITIONER

## 2023-01-10 PROCEDURE — 1160F RVW MEDS BY RX/DR IN RCRD: CPT | Mod: HCNC,CPTII,S$GLB, | Performed by: NURSE PRACTITIONER

## 2023-01-10 PROCEDURE — 1159F MED LIST DOCD IN RCRD: CPT | Mod: HCNC,CPTII,S$GLB, | Performed by: NURSE PRACTITIONER

## 2023-01-10 PROCEDURE — 99999 PR PBB SHADOW E&M-EST. PATIENT-LVL IV: ICD-10-PCS | Mod: PBBFAC,HCNC,, | Performed by: NURSE PRACTITIONER

## 2023-01-10 PROCEDURE — 3008F BODY MASS INDEX DOCD: CPT | Mod: HCNC,CPTII,S$GLB, | Performed by: NURSE PRACTITIONER

## 2023-01-10 PROCEDURE — 1125F AMNT PAIN NOTED PAIN PRSNT: CPT | Mod: HCNC,CPTII,S$GLB, | Performed by: NURSE PRACTITIONER

## 2023-01-10 PROCEDURE — 1160F PR REVIEW ALL MEDS BY PRESCRIBER/CLIN PHARMACIST DOCUMENTED: ICD-10-PCS | Mod: HCNC,CPTII,S$GLB, | Performed by: NURSE PRACTITIONER

## 2023-01-10 PROCEDURE — 99214 OFFICE O/P EST MOD 30 MIN: CPT | Mod: HCNC,S$GLB,, | Performed by: NURSE PRACTITIONER

## 2023-01-10 PROCEDURE — 3078F PR MOST RECENT DIASTOLIC BLOOD PRESSURE < 80 MM HG: ICD-10-PCS | Mod: HCNC,CPTII,S$GLB, | Performed by: NURSE PRACTITIONER

## 2023-01-10 PROCEDURE — 3288F PR FALLS RISK ASSESSMENT DOCUMENTED: ICD-10-PCS | Mod: HCNC,CPTII,S$GLB, | Performed by: NURSE PRACTITIONER

## 2023-01-10 PROCEDURE — 99214 PR OFFICE/OUTPT VISIT, EST, LEVL IV, 30-39 MIN: ICD-10-PCS | Mod: HCNC,S$GLB,, | Performed by: NURSE PRACTITIONER

## 2023-01-10 PROCEDURE — 99999 PR PBB SHADOW E&M-EST. PATIENT-LVL IV: CPT | Mod: PBBFAC,HCNC,, | Performed by: NURSE PRACTITIONER

## 2023-01-10 PROCEDURE — 1101F PT FALLS ASSESS-DOCD LE1/YR: CPT | Mod: HCNC,CPTII,S$GLB, | Performed by: NURSE PRACTITIONER

## 2023-01-10 RX ORDER — CEPHALEXIN 500 MG/1
500 CAPSULE ORAL EVERY 8 HOURS
Qty: 21 CAPSULE | Refills: 0 | Status: SHIPPED | OUTPATIENT
Start: 2023-01-10 | End: 2023-01-17

## 2023-01-10 RX ORDER — MUPIROCIN 20 MG/G
OINTMENT TOPICAL 3 TIMES DAILY
Qty: 1 EACH | Refills: 0 | Status: SHIPPED | OUTPATIENT
Start: 2023-01-10 | End: 2023-04-04

## 2023-01-10 NOTE — PROGRESS NOTES
Lj Marquezramandeep  01/12/2023  [id number]    Isabella Sutton DO  Patient Care Team:  Isabella Sutton DO as PCP - General (Internal Medicine)  Teresa Gonzalez MD (Gastroenterology)  Justin Diaz MD as Consulting Physician (Cardiology)          Visit Type:an urgent visit for a new problem    Chief Complaint:  Chief Complaint   Patient presents with    Toe Injury     (R) Toe, Last Td about 2years ago.       History of Present Illness:      68 yo male reports he was cutting Friday night pt cut his right great toe toe nails with clipper and cut his skin. The next day skin was red, swollen and red and has been using epson salt soaks and neosporin. This am pt reports improvement ut painful when ambulating. Reports he is UTD on tetanus   History:  Past Medical History:   Diagnosis Date    Alcohol dependence     stopped 2012    Angina pectoris     Arthritis     back    Atherosclerosis of native coronary artery without angina pectoris/ LAD 09/08/2016    Back pain     Chronic hepatitis C with cirrhosis     Depression     Hepatoma     Prior to liver transplant    History of colon polyps 09/09/2015    History of transplantation, liver 04/2012    History of vertebral fracture     Hypertension     Immune deficiency disorder     Incisional hernia following transplant 04/09/2014    Liver failure 11/2011    Related to chemotherapy for hepatoma    Liver transplanted 04/2012    Obesity     Tobacco abuse 09/09/2016    Trouble in sleeping     Vertebral fracture 1975     Past Surgical History:   Procedure Laterality Date    COLONOSCOPY N/A 1/20/2021    Procedure: COLONOSCOPY;  Surgeon: Emerson Helm MD;  Location: [facility];  Service: Endoscopy;  Laterality: N/A;    HERNIA REPAIR Right 2013    incisional    LIVER TRANSPLANT  April 2012    MOUTH SURGERY      TONSILLECTOMY  1958     Family History   Problem Relation Age of Onset    Cancer Mother         lung    Cancer Father         bladder    Diabetes Maternal Uncle     Cancer Maternal  Grandmother         uterine?    Cancer Other     Heart disease Neg Hx     Kidney disease Neg Hx     Stroke Neg Hx      Social History     Socioeconomic History    Marital status:     Highest education level: 10th grade   Tobacco Use    Smoking status: Every Day     Packs/day: 0.25     Years: 35.00     Pack years: 8.75     Types: Cigarettes    Smokeless tobacco: Never   Substance and Sexual Activity    Alcohol use: No     Alcohol/week: 0.0 standard drinks     Comment: Quit alcohol after some alcohol abuse, prior to his liver transplant    Drug use: No    Sexual activity: Not Currently     Social Determinants of Health     Financial Resource Strain: High Risk    Difficulty of Paying Living Expenses: Hard   Food Insecurity: No Food Insecurity    Worried About Running Out of Food in the Last Year: Never true    Ran Out of Food in the Last Year: Never true   Transportation Needs: No Transportation Needs    Lack of Transportation (Medical): No    Lack of Transportation (Non-Medical): No   Physical Activity: Inactive    Days of Exercise per Week: 0 days    Minutes of Exercise per Session: 0 min   Stress: Stress Concern Present    Feeling of Stress : To some extent   Social Connections: Moderately Integrated    Frequency of Communication with Friends and Family: More than three times a week    Frequency of Social Gatherings with Friends and Family: Never    Attends Protestant Services: More than 4 times per year    Active Member of Clubs or Organizations: Yes    Attends Club or Organization Meetings: More than 4 times per year    Marital Status:    Housing Stability: Low Risk     Unable to Pay for Housing in the Last Year: No    Number of Places Lived in the Last Year: 1    Unstable Housing in the Last Year: No     Patient Active Problem List   Diagnosis    Obesity (BMI 35.0-39.9 without comorbidity)    Chronic low back pain    GERD (gastroesophageal reflux disease)    Insomnia    History of hepatitis C     Immunosuppression    Liver transplanted    History of liver cancer    Benign essential HTN    Tinnitus    Atherosclerosis of native coronary artery without angina pectoris/ LAD    Adjustment disorder with mixed anxiety and depressed mood    History of alcohol dependence    Thrombocytopenia    Ventral hernia    Other chest pain    HARDING (dyspnea on exertion)    PVC (premature ventricular contraction)    Smoker     Review of patient's allergies indicates:   Allergen Reactions    Codeine Other (See Comments)     Upset stomach       The following were reviewed at this visit: active problem list, medication list, allergies, family history, social history, and health maintenance.    Medications:  Current Outpatient Medications on File Prior to Visit   Medication Sig Dispense Refill    esomeprazole (NEXIUM) 40 MG capsule Take 1 capsule (40 mg total) by mouth once daily. 90 capsule 3    metoprolol succinate (TOPROL-XL) 25 MG 24 hr tablet TAKE 1 TABLET EVERY DAY 90 tablet 2    tacrolimus (PROGRAF) 0.5 MG Cap TAKE 2 CAPSULES EVERY 12 HOURS 360 capsule 3    FLUAD [id number], 65 YR UP,,PF, 45 mcg (15 mcg x 3)/0.5 mL Syrg        Current Facility-Administered Medications on File Prior to Visit   Medication Dose Route Frequency Provider Last Rate Last Admin    lactated ringers infusion   Intravenous Continuous Linwood Ellsworth MD   New Bag at 01/20/21 0734    sodium chloride 0.9% flush 2 mL  2 mL Intravenous PRN Linwood Ellsworth MD           Medications have been reviewed and reconciled with patient at this visit.  Barriers to medications reviewed with patient.    Adverse reactions to current medications reviewed with patient..    Over the counter medications reviewed and reconciled with patient.    Exam:  Wt Readings from Last 3 Encounters:   01/10/23 110.7 kg (244 lb 0.8 oz)   09/07/22 109.5 kg (241 lb 6.5 oz)   01/24/22 114.5 kg (252 lb 6.8 oz)     Temp Readings from Last 3 Encounters:   01/10/23 97.5 °F (36.4 °C) (Tympanic)   06/04/21 98.3  °F (36.8 °C)   01/20/21 97.7 °F (36.5 °C) (Temporal)     BP Readings from Last 3 Encounters:   01/10/23 124/72   09/07/22 130/78   01/24/22 122/72     Pulse Readings from Last 3 Encounters:   01/10/23 62   09/07/22 89   01/24/22 88     Body mass index is 37.11 kg/m².      Review of Systems   Skin:  Rash: wound right great toe.   All other systems reviewed and are negative.  Physical Exam  Vitals and nursing note reviewed.   Constitutional:       Appearance: Normal appearance. He is obese.   HENT:      Head: Normocephalic and atraumatic.      Right Ear: Tympanic membrane, ear canal and external ear normal.      Left Ear: Tympanic membrane, ear canal and external ear normal.      Nose: Nose normal.      Mouth/Throat:      Mouth: Mucous membranes are moist.      Pharynx: Oropharynx is clear.   Eyes:      Extraocular Movements: Extraocular movements intact.      Conjunctiva/sclera: Conjunctivae normal.      Pupils: Pupils are equal, round, and reactive to light.   Cardiovascular:      Rate and Rhythm: Normal rate and regular rhythm.      Pulses: Normal pulses.      Heart sounds: Normal heart sounds.   Pulmonary:      Effort: Pulmonary effort is normal.      Breath sounds: Normal breath sounds.   Abdominal:      General: Bowel sounds are normal.      Palpations: Abdomen is soft.   Musculoskeletal:         General: Normal range of motion.      Cervical back: Normal range of motion and neck supple.   Skin:     Capillary Refill: Capillary refill takes less than 2 seconds.             Comments: See photo   Neurological:      General: No focal deficit present.      Mental Status: He is alert and oriented to person, place, and time.   Psychiatric:         Mood and Affect: Mood normal.         Behavior: Behavior normal.         Thought Content: Thought content normal.         Judgment: Judgment normal.       Laboratory Reviewed ({Yes)  Lab Results   Component Value Date    WBC 8.41 12/09/2022    HGB 16.3 12/09/2022    HCT 50.1  12/09/2022     (L) 12/09/2022    CHOL 189 01/23/2017    TRIG 165 (H) 01/23/2017    HDL 33 (L) 01/23/2017    ALT 12 12/09/2022    AST 16 12/09/2022     (L) 12/09/2022    K 4.4 12/09/2022     12/09/2022    CREATININE 1.1 12/09/2022    BUN 17 12/09/2022    CO2 23 12/09/2022    TSH 1.184 04/10/2015    PSA 0.41 04/10/2015    HGBA1C 5.5 07/21/2016       Lj was seen today for toe injury.    Diagnoses and all orders for this visit:    Open wound of toe, initial encounter  -     Ambulatory referral/consult to Podiatry; Future    Great toe pain, right    Other orders  -     Td Vaccine (Adult)  -     cephALEXin (KEFLEX) 500 MG capsule; Take 1 capsule (500 mg total) by mouth every 8 (eight) hours. for 7 days  -     mupirocin (BACTROBAN) 2 % ointment; Apply topically 3 (three) times daily.            Care Plan/Goals: Reviewed    Goals    None       Lj was seen today for toe injury.    Diagnoses and all orders for this visit:    Open wound of toe, initial encounter  -     Ambulatory referral/consult to Podiatry; Future    Great toe pain, right    Other orders  -     Td Vaccine (Adult)  -     cephALEXin (KEFLEX) 500 MG capsule; Take 1 capsule (500 mg total) by mouth every 8 (eight) hours. for 7 days  -     mupirocin (BACTROBAN) 2 % ointment; Apply topically 3 (three) times daily.       Follow up: Follow up if symptoms worsen or fail to improve.    After visit summary was printed and given to patient upon discharge today.  Patient goals and care plan are included in After Visit Summary.

## 2023-01-17 ENCOUNTER — OFFICE VISIT (OUTPATIENT)
Dept: PODIATRY | Facility: CLINIC | Age: 68
End: 2023-01-17
Payer: MEDICARE

## 2023-01-17 DIAGNOSIS — L03.031 CELLULITIS OF GREAT TOE, RIGHT: Primary | ICD-10-CM

## 2023-01-17 DIAGNOSIS — Z86.19 HISTORY OF HEPATITIS C: ICD-10-CM

## 2023-01-17 DIAGNOSIS — D84.9 IMMUNOSUPPRESSION: ICD-10-CM

## 2023-01-17 DIAGNOSIS — Z94.4 LIVER TRANSPLANTED: ICD-10-CM

## 2023-01-17 DIAGNOSIS — S91.109S OPEN WOUND OF TOE, SEQUELA: ICD-10-CM

## 2023-01-17 PROCEDURE — 1101F PR PT FALLS ASSESS DOC 0-1 FALLS W/OUT INJ PAST YR: ICD-10-PCS | Mod: HCNC,CPTII,S$GLB, | Performed by: PODIATRIST

## 2023-01-17 PROCEDURE — 3288F FALL RISK ASSESSMENT DOCD: CPT | Mod: HCNC,CPTII,S$GLB, | Performed by: PODIATRIST

## 2023-01-17 PROCEDURE — 99999 PR PBB SHADOW E&M-EST. PATIENT-LVL II: ICD-10-PCS | Mod: PBBFAC,HCNC,, | Performed by: PODIATRIST

## 2023-01-17 PROCEDURE — 1160F PR REVIEW ALL MEDS BY PRESCRIBER/CLIN PHARMACIST DOCUMENTED: ICD-10-PCS | Mod: HCNC,CPTII,S$GLB, | Performed by: PODIATRIST

## 2023-01-17 PROCEDURE — 99204 PR OFFICE/OUTPT VISIT, NEW, LEVL IV, 45-59 MIN: ICD-10-PCS | Mod: HCNC,S$GLB,, | Performed by: PODIATRIST

## 2023-01-17 PROCEDURE — 1159F MED LIST DOCD IN RCRD: CPT | Mod: HCNC,CPTII,S$GLB, | Performed by: PODIATRIST

## 2023-01-17 PROCEDURE — 1101F PT FALLS ASSESS-DOCD LE1/YR: CPT | Mod: HCNC,CPTII,S$GLB, | Performed by: PODIATRIST

## 2023-01-17 PROCEDURE — 3288F PR FALLS RISK ASSESSMENT DOCUMENTED: ICD-10-PCS | Mod: HCNC,CPTII,S$GLB, | Performed by: PODIATRIST

## 2023-01-17 PROCEDURE — 99999 PR PBB SHADOW E&M-EST. PATIENT-LVL II: CPT | Mod: PBBFAC,HCNC,, | Performed by: PODIATRIST

## 2023-01-17 PROCEDURE — 1159F PR MEDICATION LIST DOCUMENTED IN MEDICAL RECORD: ICD-10-PCS | Mod: HCNC,CPTII,S$GLB, | Performed by: PODIATRIST

## 2023-01-17 PROCEDURE — 1160F RVW MEDS BY RX/DR IN RCRD: CPT | Mod: HCNC,CPTII,S$GLB, | Performed by: PODIATRIST

## 2023-01-17 PROCEDURE — 99204 OFFICE O/P NEW MOD 45 MIN: CPT | Mod: HCNC,S$GLB,, | Performed by: PODIATRIST

## 2023-01-17 RX ORDER — SULFAMETHOXAZOLE AND TRIMETHOPRIM 800; 160 MG/1; MG/1
1 TABLET ORAL 2 TIMES DAILY
Qty: 20 TABLET | Refills: 0 | Status: SHIPPED | OUTPATIENT
Start: 2023-01-17 | End: 2023-01-27

## 2023-01-17 NOTE — PROGRESS NOTES
Subjective:       Patient ID: Lj Coleman is a 67 y.o. male.    Chief Complaint: Nail Problem (Patient reports clipping right hallux nail with pliers and clipping skin due to not being able to see well. He complains of 4/10 pain at present to area. Hallux is red and swollen. )    HPI: Lj Coleman presents to the office today today at the referral of Jemma davis NP after patient sustained an injury to the right great toe.  He relates that he was cutting his toenails with a pair of pliers and has difficulty seeing.  Relates that he subsequently developed a wound to this toe.  Was seen by Internal Medicine and started on oral Keflex in conjunction with topical mupirocin.  He states seeing no significant improvement with the mupirocin and subsequently switched to over-the-counter Neosporin medication.  States 4/10 pain.  Does states some increase in redness and swelling to the great toe.  Does have history of hepatitis and liver transplant.  Currently on immunosuppressive medication.      Review of patient's allergies indicates:   Allergen Reactions    Codeine Other (See Comments)     Upset stomach       Past Medical History:   Diagnosis Date    Alcohol dependence     stopped 2012    Angina pectoris     Arthritis     back    Atherosclerosis of native coronary artery without angina pectoris/ LAD 09/08/2016    Back pain     Chronic hepatitis C with cirrhosis     Depression     Hepatoma     Prior to liver transplant    History of colon polyps 09/09/2015    History of transplantation, liver 04/2012    History of vertebral fracture     Hypertension     Immune deficiency disorder     Incisional hernia following transplant 04/09/2014    Liver failure 11/2011    Related to chemotherapy for hepatoma    Liver transplanted 04/2012    Obesity     Tobacco abuse 09/09/2016    Trouble in sleeping     Vertebral fracture 1975       Family History   Problem Relation Age of Onset    Cancer Mother         lung     Cancer Father         bladder    Diabetes Maternal Uncle     Cancer Maternal Grandmother         uterine?    Cancer Other     Heart disease Neg Hx     Kidney disease Neg Hx     Stroke Neg Hx        Social History     Socioeconomic History    Marital status:     Highest education level: 10th grade   Tobacco Use    Smoking status: Every Day     Packs/day: 0.25     Years: 35.00     Pack years: 8.75     Types: Cigarettes    Smokeless tobacco: Never   Substance and Sexual Activity    Alcohol use: No     Alcohol/week: 0.0 standard drinks     Comment: Quit alcohol after some alcohol abuse, prior to his liver transplant    Drug use: No    Sexual activity: Not Currently     Social Determinants of Health     Financial Resource Strain: High Risk    Difficulty of Paying Living Expenses: Hard   Food Insecurity: No Food Insecurity    Worried About Running Out of Food in the Last Year: Never true    Ran Out of Food in the Last Year: Never true   Transportation Needs: No Transportation Needs    Lack of Transportation (Medical): No    Lack of Transportation (Non-Medical): No   Physical Activity: Inactive    Days of Exercise per Week: 0 days    Minutes of Exercise per Session: 0 min   Stress: Stress Concern Present    Feeling of Stress : To some extent   Social Connections: Moderately Integrated    Frequency of Communication with Friends and Family: More than three times a week    Frequency of Social Gatherings with Friends and Family: Never    Attends Jain Services: More than 4 times per year    Active Member of Clubs or Organizations: Yes    Attends Club or Organization Meetings: More than 4 times per year    Marital Status:    Housing Stability: Low Risk     Unable to Pay for Housing in the Last Year: No    Number of Places Lived in the Last Year: 1    Unstable Housing in the Last Year: No       Past Surgical History:   Procedure Laterality Date    COLONOSCOPY N/A 1/20/2021    Procedure: COLONOSCOPY;   Surgeon: Emerson Helm MD;  Location: [facility];  Service: Endoscopy;  Laterality: N/A;    HERNIA REPAIR Right 2013    incisional    LIVER TRANSPLANT  April 2012    MOUTH SURGERY      TONSILLECTOMY  1958       Review of Systems       Objective:   There were no vitals taken for this visit.    Holter monitor - 48 hour  · Rhythm Predominant Rhythm Sinus rhythm with heart rates varying between   63 and 118 bpm with an average of 83 bpm.  · PVC Ventricular Arrhythmias There were very frequent PVCs totalling   76303 and averaging 314.59 per hour.  · PAC Supraventricular Arrhythmias There were very rare PACs totalling 120   and averaging 1.25 per hour.  · SVT There were rare runs sinus tachycardia. The rate varied between 78   and 118 bpm.  · Circadian The circadian pattern of sinus rate variability followed a   typical curve.     Monitoring started at 3:04 PM and continued for 47 hr 59 min. The average   heart rate was 83 BPM. The minimum heart  rate was 63 BPM, occurring at 5:56:19 AM D2. The maximum heart rate was   118 BPM, occurring at 8:28:04 AM D1.  Ventricular ectopic activity consisted of 92405 beats, of which, 9 were in   triplets, 264 were in couplets, 33027 were in  single PVCs, 1765 were single VEs, 1794 were in bigeminy, 27936 were in   trigeminy.  The patient's rhythm included 22 min 16 sec of tachycardia. The fastest   single episode of tachycardia occurred at 8:27:53  AM D1, lasting 14 sec, with maximum heart rate of 118 BPM.  Supraventricular ectopic activity consisted of 120 beats, of which, 38   were in 8 runs, 6 were in atrial couplets, 1 was late  beat, 75 were single PACs. The longest R-R interval was 1.4 seconds   occurring at 4:45:12 AM D2. The longest N-N interval was  1.1 seconds occurring at 5:59:46 AM D2. The longest supraventricular run   occurred at 1:20:57 AM D2 consisting of 10 beats, with  maximum heart rate of 138 BPM. The fastest supraventricular run occurred   at 5:30:21 AM D2,  consisting of 3 beats, with  maximum heart rate of 160 BPM.       Physical Exam   LOWER EXTREMITY PHYSICAL EXAMINATION    Vascular:  The right dorsalis pedis pulse 2/4 and the right posterior tibial pulse 2/4.  The left dorsalis pedis pulse 2/4 and posterior tibial pulse on the left is 2/4.  Capillary refill is intact.  Pedal hair growth intact     Musculoskeletal: Manual Muscle Testing is 5/5 with dorsiflexion, plantar flexion, abduction, and adduction.   There is normal range of motion in the forefoot, hindfoot, and Ankle joint.       Dermatological:  Skin is supple and moist.  Cellulitis present to the right great toe.  There is no evidence of open wound visible.  Moderate cellulitis does progress proximally to the metatarsophalangeal joint.  There is mild to moderate swelling present.  Hallux nail is thickened dystrophic elevated from the underlying nail bed.  Does continue to be firmly attached proximal aspect of the nail plate and nail bed.      Assessment:     1. Cellulitis of great toe, right    2. Open wound of toe, sequela    3. History of hepatitis C    4. Immunosuppression    5. Liver transplanted        Plan:     Cellulitis of great toe, right    Open wound of toe, sequela  -     Ambulatory referral/consult to Podiatry    History of hepatitis C    Immunosuppression    Liver transplanted    Other orders  -     sulfamethoxazole-trimethoprim 800-160mg (BACTRIM DS) 800-160 mg Tab; Take 1 tablet by mouth 2 (two) times daily. for 10 days  Dispense: 20 tablet; Refill: 0      Thorough discussion is had with the patient today, concerning the diagnosis, its etiology, and the treatment algorithm at present.    Based on patient's symptoms, would recommend that he discontinue the use of Keflex as this does not have MRSA coverage.  Will initiate patient on Bactrim which has broad-spectrum coverage and does typically a better job with bacteria that can cause cellulitis to the foot and ankle region.    Patient is at  increased risk opportunistic infection as he is currently on long-term immunosuppressive medication.    Updated labs were also reviewed showing normal renal function.  Continue to monitor this.    We discussed the importance of not utilizing a pair of pliers which likely were not disinfected to cut toenails as this could lead to risk of complications and could theoretically cause a iatrogenic amputation.      Future Appointments   Date Time Provider Department Center   4/17/2023  9:30 AM Teresa Gonzalez MD [facility]

## 2023-01-25 ENCOUNTER — HOSPITAL ENCOUNTER (OUTPATIENT)
Dept: RADIOLOGY | Facility: HOSPITAL | Age: 68
Discharge: HOME | End: 2023-01-25
Attending: PODIATRIST
Payer: MEDICARE

## 2023-01-25 ENCOUNTER — OFFICE VISIT (OUTPATIENT)
Dept: PODIATRY | Facility: CLINIC | Age: 68
End: 2023-01-25
Payer: MEDICARE

## 2023-01-25 DIAGNOSIS — Z86.19 HISTORY OF HEPATITIS C: ICD-10-CM

## 2023-01-25 DIAGNOSIS — M10.9 GOUT INVOLVING TOE OF RIGHT FOOT, UNSPECIFIED CAUSE, UNSPECIFIED CHRONICITY: Primary | ICD-10-CM

## 2023-01-25 DIAGNOSIS — D84.9 IMMUNOSUPPRESSION: ICD-10-CM

## 2023-01-25 DIAGNOSIS — L03.031 ABSCESS OF GREAT TOENAIL, RIGHT: Primary | ICD-10-CM

## 2023-01-25 DIAGNOSIS — L03.031 CELLULITIS OF GREAT TOE, RIGHT: ICD-10-CM

## 2023-01-25 DIAGNOSIS — Z94.4 LIVER TRANSPLANTED: ICD-10-CM

## 2023-01-25 DIAGNOSIS — M10.9 GOUT INVOLVING TOE OF RIGHT FOOT, UNSPECIFIED CAUSE, UNSPECIFIED CHRONICITY: ICD-10-CM

## 2023-01-25 PROCEDURE — 1160F PR REVIEW ALL MEDS BY PRESCRIBER/CLIN PHARMACIST DOCUMENTED: ICD-10-PCS | Mod: HCNC,CPTII,S$GLB, | Performed by: PODIATRIST

## 2023-01-25 PROCEDURE — 73630 XR FOOT COMPLETE 3 VIEW RIGHT: ICD-10-PCS | Mod: 26,HCNC,RT, | Performed by: RADIOLOGY

## 2023-01-25 PROCEDURE — 99999 PR PBB SHADOW E&M-EST. PATIENT-LVL I: CPT | Mod: PBBFAC,HCNC,, | Performed by: PODIATRIST

## 2023-01-25 PROCEDURE — 1159F MED LIST DOCD IN RCRD: CPT | Mod: HCNC,CPTII,S$GLB, | Performed by: PODIATRIST

## 2023-01-25 PROCEDURE — 1160F RVW MEDS BY RX/DR IN RCRD: CPT | Mod: HCNC,CPTII,S$GLB, | Performed by: PODIATRIST

## 2023-01-25 PROCEDURE — 99214 OFFICE O/P EST MOD 30 MIN: CPT | Mod: HCNC,S$GLB,, | Performed by: PODIATRIST

## 2023-01-25 PROCEDURE — 99214 PR OFFICE/OUTPT VISIT, EST, LEVL IV, 30-39 MIN: ICD-10-PCS | Mod: HCNC,S$GLB,, | Performed by: PODIATRIST

## 2023-01-25 PROCEDURE — 99999 PR PBB SHADOW E&M-EST. PATIENT-LVL I: ICD-10-PCS | Mod: PBBFAC,HCNC,, | Performed by: PODIATRIST

## 2023-01-25 PROCEDURE — 73630 X-RAY EXAM OF FOOT: CPT | Mod: TC,HCNC,RT

## 2023-01-25 PROCEDURE — 87070 CULTURE OTHR SPECIMN AEROBIC: CPT | Mod: HCNC | Performed by: PODIATRIST

## 2023-01-25 PROCEDURE — 73630 X-RAY EXAM OF FOOT: CPT | Mod: 26,HCNC,RT, | Performed by: RADIOLOGY

## 2023-01-25 PROCEDURE — 1159F PR MEDICATION LIST DOCUMENTED IN MEDICAL RECORD: ICD-10-PCS | Mod: HCNC,CPTII,S$GLB, | Performed by: PODIATRIST

## 2023-01-25 NOTE — PROGRESS NOTES
Subjective:       Patient ID: Lj Coleman is a 67 y.o. male.    Chief Complaint: Toe Pain (Patient complains of 8/10 pain to right hallux. He states pain is increasing to entire toe now. )    HPI: Lj Coleman presents to the office today to follow up on the  right great toe.  Stated 8/10 pain. Tolerating bactrim well. Relates worsening pain. Does states some increase in redness and swelling to the great toe.  Does have history of hepatitis and liver transplant.  Currently on immunosuppressive medication.      Review of patient's allergies indicates:   Allergen Reactions    Codeine Other (See Comments)     Upset stomach       Past Medical History:   Diagnosis Date    Alcohol dependence     stopped 2012    Angina pectoris     Arthritis     back    Atherosclerosis of native coronary artery without angina pectoris/ LAD 09/08/2016    Back pain     Chronic hepatitis C with cirrhosis     Depression     Hepatoma     Prior to liver transplant    History of colon polyps 09/09/2015    History of transplantation, liver 04/2012    History of vertebral fracture     Hypertension     Immune deficiency disorder     Incisional hernia following transplant 04/09/2014    Liver failure 11/2011    Related to chemotherapy for hepatoma    Liver transplanted 04/2012    Obesity     Tobacco abuse 09/09/2016    Trouble in sleeping     Vertebral fracture 1975       Family History   Problem Relation Age of Onset    Cancer Mother         lung    Cancer Father         bladder    Diabetes Maternal Uncle     Cancer Maternal Grandmother         uterine?    Cancer Other     Heart disease Neg Hx     Kidney disease Neg Hx     Stroke Neg Hx        Social History     Socioeconomic History    Marital status:     Highest education level: 10th grade   Tobacco Use    Smoking status: Every Day     Packs/day: 0.25     Years: 35.00     Pack years: 8.75     Types: Cigarettes    Smokeless tobacco: Never   Substance and Sexual Activity     Alcohol use: No     Alcohol/week: 0.0 standard drinks     Comment: Quit alcohol after some alcohol abuse, prior to his liver transplant    Drug use: No    Sexual activity: Not Currently     Social Determinants of Health     Financial Resource Strain: High Risk    Difficulty of Paying Living Expenses: Hard   Food Insecurity: No Food Insecurity    Worried About Running Out of Food in the Last Year: Never true    Ran Out of Food in the Last Year: Never true   Transportation Needs: No Transportation Needs    Lack of Transportation (Medical): No    Lack of Transportation (Non-Medical): No   Physical Activity: Inactive    Days of Exercise per Week: 0 days    Minutes of Exercise per Session: 0 min   Stress: Stress Concern Present    Feeling of Stress : To some extent   Social Connections: Moderately Integrated    Frequency of Communication with Friends and Family: More than three times a week    Frequency of Social Gatherings with Friends and Family: Never    Attends Anabaptism Services: More than 4 times per year    Active Member of Clubs or Organizations: Yes    Attends Club or Organization Meetings: More than 4 times per year    Marital Status:    Housing Stability: Low Risk     Unable to Pay for Housing in the Last Year: No    Number of Places Lived in the Last Year: 1    Unstable Housing in the Last Year: No       Past Surgical History:   Procedure Laterality Date    COLONOSCOPY N/A 1/20/2021    Procedure: COLONOSCOPY;  Surgeon: Emerson Helm MD;  Location: [facility];  Service: Endoscopy;  Laterality: N/A;    HERNIA REPAIR Right 2013    incisional    LIVER TRANSPLANT  April 2012    MOUTH SURGERY      TONSILLECTOMY  1958       Review of Systems       Objective:   There were no vitals taken for this visit.    X-Ray Foot Complete Right  Narrative: EXAM: XR FOOT COMPLETE 3 VIEW RIGHT    CLINICAL INDICATION:   [M10.9]-Gout, unspecified.    FINDINGS:  No acute fracture or dislocation.  Bandage material  overlies the great toe obscuring osseous detail.  There is some osseous erosion near the distal tuft of the great toe.  Impression: Osseous erosion involving the distal tuft of the right great toe findings suggest osteomyelitis.    Finalized on: 1/25/2023 4:20 PM By:  Myles Mcghee MD  R# [id number]      2023-01-25 16:22:[id number]    [facility]       Physical Exam   LOWER EXTREMITY PHYSICAL EXAMINATION    Vascular:  The right dorsalis pedis pulse 2/4 and the right posterior tibial pulse 2/4.  The left dorsalis pedis pulse 2/4 and posterior tibial pulse on the left is 2/4.  Capillary refill is intact.  Pedal hair growth intact     Musculoskeletal: Manual Muscle Testing is 5/5 with dorsiflexion, plantar flexion, abduction, and adduction.   There is normal range of motion in the forefoot, hindfoot, and Ankle joint.       Dermatological:  Skin is supple and moist.  Cellulitis present to the right great toe.  There is no evidence of open wound visible.  Moderate cellulitis does progress proximally to the metatarsophalangeal joint.  There is mild to moderate swelling present.  Hallux nail is thickened dystrophic elevated from the underlying nail bed.  Does continue to be firmly attached proximal aspect of the nail plate and nail bed. Fluctuance present to the proximal nail fold    Assessment:     1. Abscess of great toenail, right    2. Cellulitis of great toe, right    3. History of hepatitis C    4. Liver transplanted    5. Immunosuppression        Plan:     Abscess of great toenail, right  -     Aerobic culture    Cellulitis of great toe, right    History of hepatitis C    Liver transplanted    Immunosuppression      Thorough discussion is had with the patient today, concerning the diagnosis, its etiology, and the treatment algorithm at present.    Patient is at increased risk opportunistic infection as he is currently on long-term immunosuppressive medication.    Discuss potential for an abscess to the proximal nail fold.   Hallux was locally injected with 3 cc of 1% lidocaine plain.  Once the hallux was notably anesthetize a small elevator was inserted to elevate the proximal nail fold and the medial nail fold.  Upon direct pressure of the hallux proximally, there was noted to be projection of purulent material from the distal medial nail fold.  This was cultured.  All purulence was expressed.    Did discussed possibility of total nail avulsion foot patient was hesitant given his history transplant and fear of needles.  He would like to salvage the nail if possible.    We did discuss taking x-rays of the foot to assess for underlying infection given his history of transplant and hepatitis make him increased risk for opportunistic infections.  He will continue and complete his antibiotic therapy until this has been completed.  He will apply antibiotic cream to the area and continues change dressings daily.    Future Appointments   Date Time Provider Department Center   4/17/2023  9:30 AM Teresa Gonzalez MD [facility]

## 2023-01-28 LAB — BACTERIA SPEC AEROBE CULT: NORMAL

## 2023-01-31 ENCOUNTER — PATIENT MESSAGE (OUTPATIENT)
Dept: PODIATRY | Facility: CLINIC | Age: 68
End: 2023-01-31
Payer: MEDICARE

## 2023-01-31 RX ORDER — SULFAMETHOXAZOLE AND TRIMETHOPRIM 800; 160 MG/1; MG/1
1 TABLET ORAL 2 TIMES DAILY
Qty: 14 TABLET | Refills: 0 | Status: SHIPPED | OUTPATIENT
Start: 2023-01-31 | End: 2023-02-07

## 2023-02-06 ENCOUNTER — OFFICE VISIT (OUTPATIENT)
Dept: PODIATRY | Facility: CLINIC | Age: 68
End: 2023-02-06
Payer: MEDICARE

## 2023-02-06 VITALS — HEIGHT: 68 IN | BODY MASS INDEX: 36.98 KG/M2 | WEIGHT: 244 LBS

## 2023-02-06 DIAGNOSIS — D84.9 IMMUNOSUPPRESSION: ICD-10-CM

## 2023-02-06 DIAGNOSIS — Z94.4 LIVER TRANSPLANTED: ICD-10-CM

## 2023-02-06 DIAGNOSIS — L03.031 CELLULITIS OF GREAT TOE, RIGHT: ICD-10-CM

## 2023-02-06 DIAGNOSIS — L60.3 ONYCHODYSTROPHY: Primary | ICD-10-CM

## 2023-02-06 DIAGNOSIS — Z86.19 HISTORY OF HEPATITIS C: ICD-10-CM

## 2023-02-06 DIAGNOSIS — L03.031 ABSCESS OF GREAT TOENAIL, RIGHT: ICD-10-CM

## 2023-02-06 PROCEDURE — 1159F PR MEDICATION LIST DOCUMENTED IN MEDICAL RECORD: ICD-10-PCS | Mod: HCNC,CPTII,S$GLB, | Performed by: PODIATRIST

## 2023-02-06 PROCEDURE — 11730 AVULSION NAIL PLATE SIMPLE 1: CPT | Mod: T5,HCNC,S$GLB, | Performed by: PODIATRIST

## 2023-02-06 PROCEDURE — 3008F PR BODY MASS INDEX (BMI) DOCUMENTED: ICD-10-PCS | Mod: HCNC,CPTII,S$GLB, | Performed by: PODIATRIST

## 2023-02-06 PROCEDURE — 1101F PR PT FALLS ASSESS DOC 0-1 FALLS W/OUT INJ PAST YR: ICD-10-PCS | Mod: HCNC,CPTII,S$GLB, | Performed by: PODIATRIST

## 2023-02-06 PROCEDURE — 1159F MED LIST DOCD IN RCRD: CPT | Mod: HCNC,CPTII,S$GLB, | Performed by: PODIATRIST

## 2023-02-06 PROCEDURE — 1125F PR PAIN SEVERITY QUANTIFIED, PAIN PRESENT: ICD-10-PCS | Mod: HCNC,CPTII,S$GLB, | Performed by: PODIATRIST

## 2023-02-06 PROCEDURE — 99999 PR PBB SHADOW E&M-EST. PATIENT-LVL III: CPT | Mod: PBBFAC,HCNC,, | Performed by: PODIATRIST

## 2023-02-06 PROCEDURE — 1160F RVW MEDS BY RX/DR IN RCRD: CPT | Mod: HCNC,CPTII,S$GLB, | Performed by: PODIATRIST

## 2023-02-06 PROCEDURE — 99214 OFFICE O/P EST MOD 30 MIN: CPT | Mod: 25,HCNC,S$GLB, | Performed by: PODIATRIST

## 2023-02-06 PROCEDURE — 1160F PR REVIEW ALL MEDS BY PRESCRIBER/CLIN PHARMACIST DOCUMENTED: ICD-10-PCS | Mod: HCNC,CPTII,S$GLB, | Performed by: PODIATRIST

## 2023-02-06 PROCEDURE — 3288F FALL RISK ASSESSMENT DOCD: CPT | Mod: HCNC,CPTII,S$GLB, | Performed by: PODIATRIST

## 2023-02-06 PROCEDURE — 11730 NAIL REMOVAL: ICD-10-PCS | Mod: T5,HCNC,S$GLB, | Performed by: PODIATRIST

## 2023-02-06 PROCEDURE — 1125F AMNT PAIN NOTED PAIN PRSNT: CPT | Mod: HCNC,CPTII,S$GLB, | Performed by: PODIATRIST

## 2023-02-06 PROCEDURE — 3008F BODY MASS INDEX DOCD: CPT | Mod: HCNC,CPTII,S$GLB, | Performed by: PODIATRIST

## 2023-02-06 PROCEDURE — 1101F PT FALLS ASSESS-DOCD LE1/YR: CPT | Mod: HCNC,CPTII,S$GLB, | Performed by: PODIATRIST

## 2023-02-06 PROCEDURE — 3288F PR FALLS RISK ASSESSMENT DOCUMENTED: ICD-10-PCS | Mod: HCNC,CPTII,S$GLB, | Performed by: PODIATRIST

## 2023-02-06 PROCEDURE — 99999 PR PBB SHADOW E&M-EST. PATIENT-LVL III: ICD-10-PCS | Mod: PBBFAC,HCNC,, | Performed by: PODIATRIST

## 2023-02-06 PROCEDURE — 99214 PR OFFICE/OUTPT VISIT, EST, LEVL IV, 30-39 MIN: ICD-10-PCS | Mod: 25,HCNC,S$GLB, | Performed by: PODIATRIST

## 2023-02-06 RX ORDER — TRAMADOL HYDROCHLORIDE 50 MG/1
50 TABLET ORAL EVERY 6 HOURS PRN
Qty: 10 TABLET | Refills: 0 | Status: SHIPPED | OUTPATIENT
Start: 2023-02-06 | End: 2023-04-17

## 2023-02-06 RX ORDER — DOXYCYCLINE HYCLATE 100 MG
100 TABLET ORAL 2 TIMES DAILY
Qty: 20 TABLET | Refills: 0 | Status: SHIPPED | OUTPATIENT
Start: 2023-02-06 | End: 2023-02-16

## 2023-02-06 NOTE — PROGRESS NOTES
Subjective:       Patient ID: Lj Coleman is a 67 y.o. male.    Chief Complaint: Follow-up (F/u for right great toenail removal, right foot, rates pain 7/10, pt states that he has been soaking his right foot in epsom salt and applying antibiotic ointment, non-diabetic, wears slippers and socks, last seen PCP Dr. Sutton on 11/11/22)    HPI: Lj Coleman presents to the office today to follow up on the  right great toe.  Stated 7/10 pain. Does states some increase in redness and swelling to the great toe.  Does have history of hepatitis and liver transplant.  Currently on immunosuppressive medication.  Reports he has been soaking his foot in warm water and Epson salt apply antibiotic cream as previously discussed.    Review of patient's allergies indicates:   Allergen Reactions    Codeine Other (See Comments)     Upset stomach       Past Medical History:   Diagnosis Date    Alcohol dependence     stopped 2012    Angina pectoris     Arthritis     back    Atherosclerosis of native coronary artery without angina pectoris/ LAD 09/08/2016    Back pain     Chronic hepatitis C with cirrhosis     Depression     Hepatoma     Prior to liver transplant    History of colon polyps 09/09/2015    History of transplantation, liver 04/2012    History of vertebral fracture     Hypertension     Immune deficiency disorder     Incisional hernia following transplant 04/09/2014    Liver failure 11/2011    Related to chemotherapy for hepatoma    Liver transplanted 04/2012    Obesity     Tobacco abuse 09/09/2016    Trouble in sleeping     Vertebral fracture 1975       Family History   Problem Relation Age of Onset    Cancer Mother         lung    Cancer Father         bladder    Diabetes Maternal Uncle     Cancer Maternal Grandmother         uterine?    Cancer Other     Heart disease Neg Hx     Kidney disease Neg Hx     Stroke Neg Hx        Social History     Socioeconomic History    Marital status:     Highest  "education level: 10th grade   Tobacco Use    Smoking status: Every Day     Packs/day: 0.25     Years: 35.00     Pack years: 8.75     Types: Cigarettes    Smokeless tobacco: Never   Substance and Sexual Activity    Alcohol use: No     Alcohol/week: 0.0 standard drinks     Comment: Quit alcohol after some alcohol abuse, prior to his liver transplant    Drug use: No    Sexual activity: Not Currently     Social Determinants of Health     Financial Resource Strain: High Risk    Difficulty of Paying Living Expenses: Hard   Food Insecurity: No Food Insecurity    Worried About Running Out of Food in the Last Year: Never true    Ran Out of Food in the Last Year: Never true   Transportation Needs: No Transportation Needs    Lack of Transportation (Medical): No    Lack of Transportation (Non-Medical): No   Physical Activity: Inactive    Days of Exercise per Week: 0 days    Minutes of Exercise per Session: 0 min   Stress: Stress Concern Present    Feeling of Stress : To some extent   Social Connections: Moderately Integrated    Frequency of Communication with Friends and Family: More than three times a week    Frequency of Social Gatherings with Friends and Family: Never    Attends Orthodox Services: More than 4 times per year    Active Member of Clubs or Organizations: Yes    Attends Club or Organization Meetings: More than 4 times per year    Marital Status:    Housing Stability: Low Risk     Unable to Pay for Housing in the Last Year: No    Number of Places Lived in the Last Year: 1    Unstable Housing in the Last Year: No       Past Surgical History:   Procedure Laterality Date    COLONOSCOPY N/A 1/20/2021    Procedure: COLONOSCOPY;  Surgeon: Emerson Helm MD;  Location: [facility];  Service: Endoscopy;  Laterality: N/A;    HERNIA REPAIR Right 2013    incisional    LIVER TRANSPLANT  April 2012    MOUTH SURGERY      TONSILLECTOMY  1958       Review of Systems       Objective:   Ht 5' 8" (1.727 m)   Wt 110.7 kg " (244 lb)   BMI 37.10 kg/m²     X-Ray Foot Complete Right  Narrative: EXAM: XR FOOT COMPLETE 3 VIEW RIGHT    CLINICAL INDICATION:   [M10.9]-Gout, unspecified.    FINDINGS:  No acute fracture or dislocation.  Bandage material overlies the great toe obscuring osseous detail.  There is some osseous erosion near the distal tuft of the great toe.  Impression: Osseous erosion involving the distal tuft of the right great toe findings suggest osteomyelitis.    Finalized on: 1/25/2023 4:20 PM By:  Myles Mcghee MD  [facility]# [id number]      2023-01-25 16:22:[id number]    [facility]       Physical Exam   LOWER EXTREMITY PHYSICAL EXAMINATION    Vascular:  The right dorsalis pedis pulse 2/4 and the right posterior tibial pulse 2/4.  The left dorsalis pedis pulse 2/4 and posterior tibial pulse on the left is 2/4.  Capillary refill is intact.  Pedal hair growth intact     Musculoskeletal: Manual Muscle Testing is 5/5 with dorsiflexion, plantar flexion, abduction, and adduction.   There is normal range of motion in the forefoot, hindfoot, and Ankle joint.       Dermatological:  Skin is supple and moist.  Cellulitis present to the right great toe.  There is no evidence of open wound visible.  Moderate cellulitis does progress proximally to the metatarsophalangeal joint.  There is mild to moderate swelling present.  Hallux nail is thickened dystrophic elevated from the underlying nail bed.  Does continue to be firmly attached proximal aspect of the nail plate and nail bed. Fluctuance present to the proximal nail fold    Assessment:     1. Onychodystrophy    2. Abscess of great toenail, right    3. Cellulitis of great toe, right    4. History of hepatitis C    5. Liver transplanted    6. Immunosuppression          Plan:     Onychodystrophy    Abscess of great toenail, right    Cellulitis of great toe, right    History of hepatitis C    Liver transplanted    Immunosuppression    Other orders  -     traMADoL (ULTRAM) 50 mg tablet; Take 1 tablet (50 mg  total) by mouth every 6 (six) hours as needed for Pain.  Dispense: 10 tablet; Refill: 0  -     doxycycline (VIBRA-TABS) 100 MG tablet; Take 1 tablet (100 mg total) by mouth 2 (two) times daily. for 10 days  Dispense: 20 tablet; Refill: 0        Thorough discussion is had with the patient today, concerning the diagnosis, its etiology, and the treatment algorithm at present.    Patient is at increased risk opportunistic infection as he is currently on long-term immunosuppressive medication.    We discussed patient's options for treating the dystrophic toenail.  We temporary complete avulsion, and attempted permanent matricectomy.  Patient would like to proceed with temporary avulsion with expression.  Discussed the risk and benefits of the procedure.  Discussed risk of recurrence.  Patient did give written consent to proceed with procedure.    Patient tolerated procedure well without complications.  Right hallux nail was removed without complications.  History patient will start soaking in warm water and Epson salt twice daily.  Apply antibiotic cream and a Band-Aid to the affected borders following soaking and showering.  Patient will call if there is any acute signs of infection associated with increased redness, swelling, abnormal drainage, increased pain.  Did discussed possibility of total nail avulsion foot patient was hesitant given his history transplant and fear of needles.  He would like to salvage the nail if possible.    X-rays were reviewed and are concerning for possible bone infection to the distal phalanx.  Would recommend referral to Infectious Disease  given his history of transplant and hepatitis make him increased risk for opportunistic infections.  Infectious disease referral placed.     Abscess wound culture was reviewed showing normal skin hair without a predominant organism present.  As he was previously on Bactrim, we will transition him to doxycycline to see if this is helpful to improve the  erythema.  Will defer further antibiotic management to Infectious Disease.    He will continue and complete his antibiotic therapy until this has been completed.  He will apply antibiotic cream to the area and continues change dressings daily.    Future Appointments   Date Time Provider Department Center   2/20/2023  1:15 PM Renee Edwards DPM [facility]   4/17/2023  9:30 AM Teresa Gonzalez MD [facility]

## 2023-02-06 NOTE — PROCEDURES
Nail Removal    Date/Time: 2/6/2023 1:15 PM  Performed by: Renee Edwards DPM  Authorized by: Renee Edwards DPM     Consent Done?:  Yes (Written)  Location:     Location:  Right foot    Location detail:  Right big toe  Anesthesia:     Anesthesia:  Digital block    Local anesthetic:  Lidocaine 1% without epinephrine    Anesthetic total (ml):  3  Procedure Details:     Preparation:  Skin prepped with alcohol and skin prepped with Betadine    Amount removed:  Complete    Wedge excision of skin of nail fold: No      Nail bed sutured?: No      Nail matrix removed:  None    Removed nail replaced and anchored: No      Dressing applied:  4x4, antibiotic ointment and dressing applied    Patient tolerance:  Patient tolerated the procedure well with no immediate complications

## 2023-02-07 DIAGNOSIS — Z00.00 ENCOUNTER FOR MEDICARE ANNUAL WELLNESS EXAM: ICD-10-CM

## 2023-02-08 ENCOUNTER — E-CONSULT (OUTPATIENT)
Dept: INFECTIOUS DISEASES | Facility: HOSPITAL | Age: 68
End: 2023-02-08
Payer: MEDICARE

## 2023-02-08 ENCOUNTER — TELEPHONE (OUTPATIENT)
Dept: INFECTIOUS DISEASES | Facility: CLINIC | Age: 68
End: 2023-02-08
Payer: MEDICARE

## 2023-02-08 DIAGNOSIS — M86.9 OSTEOMYELITIS OF ANKLE AND FOOT: Primary | ICD-10-CM

## 2023-02-08 RX ORDER — METRONIDAZOLE 500 MG/1
500 TABLET ORAL 3 TIMES DAILY
Qty: 123 TABLET | Refills: 0 | Status: SHIPPED | OUTPATIENT
Start: 2023-02-08 | End: 2023-03-21

## 2023-02-08 NOTE — PROGRESS NOTES
Infectious Diseases  Response for E-Consult     Patient Name: Lj Coleman  MRN: [id number]  Primary Care Provider: Isabella Sutton DO   Requesting Provider: Renee Edwards DPM      Findings:   67 year old man .  Referred for     Long standing ingrown nail with abscess to the right great toe. History of transplant. xrays obtained showing possible osteomyelitis.      Plan - will start IV Daptomycin at 700mg daily   IV Cefepime 2 gram every 8 hours   Will treat for 6 weeks   Will need ESR,CRP,CBC,CMP,CPK  Eoc -03/23/23.  Insert PICC line   Contact infusion company    I did not speak to the requesting provider verbally about this.     Total time of Consultation: 5 minute    Percentage of time spent on verbal/written discussion: 50%     *This eConsult is based on the clinical data available to me and is furnished without benefit of a physical examination. The eConsult will need to be interpreted in light of any clinical issues or changes in patient status not available to me at the time of filing this eConsults. Significant changes in patient condition or level of acuity should result in immediate formal consultation and reevaluation. Please alert me if you have further questions.    Thank you for your consult.     Shivam Beavers MD, [facility]  Infectious Diseases

## 2023-02-08 NOTE — TELEPHONE ENCOUNTER
----- Message from Shivam Beavers MD, [facility] sent at 2/8/2023  5:46 AM CST -----  Plan - will start IV Daptomycin at 700mg daily   IV Cefepime 2 gram every 8 hours   Will treat for 6 weeks   Will need ESR,CRP,CBC,CMP,CPK  Eoc -03/23/23.  Insert PICC line   Contact infusion company    See in clinic

## 2023-02-09 DIAGNOSIS — Z00.00 ENCOUNTER FOR MEDICARE ANNUAL WELLNESS EXAM: ICD-10-CM

## 2023-02-20 ENCOUNTER — OFFICE VISIT (OUTPATIENT)
Dept: PODIATRY | Facility: CLINIC | Age: 68
End: 2023-02-20
Payer: MEDICARE

## 2023-02-20 VITALS — HEIGHT: 68 IN | WEIGHT: 244.06 LBS | BODY MASS INDEX: 36.99 KG/M2

## 2023-02-20 DIAGNOSIS — Z86.19 HISTORY OF HEPATITIS C: ICD-10-CM

## 2023-02-20 DIAGNOSIS — D84.9 IMMUNOSUPPRESSION: ICD-10-CM

## 2023-02-20 DIAGNOSIS — Z94.4 LIVER TRANSPLANTED: ICD-10-CM

## 2023-02-20 DIAGNOSIS — L03.031 ABSCESS OF GREAT TOENAIL, RIGHT: Primary | ICD-10-CM

## 2023-02-20 DIAGNOSIS — M84.674S: ICD-10-CM

## 2023-02-20 DIAGNOSIS — M86.671 CHRONIC OSTEOMYELITIS OF TOE OF RIGHT FOOT: ICD-10-CM

## 2023-02-20 PROCEDURE — 99214 OFFICE O/P EST MOD 30 MIN: CPT | Mod: HCNC,S$GLB,, | Performed by: PODIATRIST

## 2023-02-20 PROCEDURE — 3288F PR FALLS RISK ASSESSMENT DOCUMENTED: ICD-10-PCS | Mod: HCNC,CPTII,S$GLB, | Performed by: PODIATRIST

## 2023-02-20 PROCEDURE — 3008F BODY MASS INDEX DOCD: CPT | Mod: HCNC,CPTII,S$GLB, | Performed by: PODIATRIST

## 2023-02-20 PROCEDURE — 1159F PR MEDICATION LIST DOCUMENTED IN MEDICAL RECORD: ICD-10-PCS | Mod: HCNC,CPTII,S$GLB, | Performed by: PODIATRIST

## 2023-02-20 PROCEDURE — 3008F PR BODY MASS INDEX (BMI) DOCUMENTED: ICD-10-PCS | Mod: HCNC,CPTII,S$GLB, | Performed by: PODIATRIST

## 2023-02-20 PROCEDURE — 1160F PR REVIEW ALL MEDS BY PRESCRIBER/CLIN PHARMACIST DOCUMENTED: ICD-10-PCS | Mod: HCNC,CPTII,S$GLB, | Performed by: PODIATRIST

## 2023-02-20 PROCEDURE — 1159F MED LIST DOCD IN RCRD: CPT | Mod: HCNC,CPTII,S$GLB, | Performed by: PODIATRIST

## 2023-02-20 PROCEDURE — 1101F PT FALLS ASSESS-DOCD LE1/YR: CPT | Mod: HCNC,CPTII,S$GLB, | Performed by: PODIATRIST

## 2023-02-20 PROCEDURE — 1160F RVW MEDS BY RX/DR IN RCRD: CPT | Mod: HCNC,CPTII,S$GLB, | Performed by: PODIATRIST

## 2023-02-20 PROCEDURE — 1101F PR PT FALLS ASSESS DOC 0-1 FALLS W/OUT INJ PAST YR: ICD-10-PCS | Mod: HCNC,CPTII,S$GLB, | Performed by: PODIATRIST

## 2023-02-20 PROCEDURE — 99999 PR PBB SHADOW E&M-EST. PATIENT-LVL III: CPT | Mod: PBBFAC,HCNC,, | Performed by: PODIATRIST

## 2023-02-20 PROCEDURE — 99214 PR OFFICE/OUTPT VISIT, EST, LEVL IV, 30-39 MIN: ICD-10-PCS | Mod: HCNC,S$GLB,, | Performed by: PODIATRIST

## 2023-02-20 PROCEDURE — 3288F FALL RISK ASSESSMENT DOCD: CPT | Mod: HCNC,CPTII,S$GLB, | Performed by: PODIATRIST

## 2023-02-20 PROCEDURE — 1125F AMNT PAIN NOTED PAIN PRSNT: CPT | Mod: HCNC,CPTII,S$GLB, | Performed by: PODIATRIST

## 2023-02-20 PROCEDURE — 99999 PR PBB SHADOW E&M-EST. PATIENT-LVL III: ICD-10-PCS | Mod: PBBFAC,HCNC,, | Performed by: PODIATRIST

## 2023-02-20 PROCEDURE — 1125F PR PAIN SEVERITY QUANTIFIED, PAIN PRESENT: ICD-10-PCS | Mod: HCNC,CPTII,S$GLB, | Performed by: PODIATRIST

## 2023-02-20 RX ORDER — DOXYCYCLINE HYCLATE 100 MG
100 TABLET ORAL 2 TIMES DAILY
Qty: 20 TABLET | Refills: 0 | Status: SHIPPED | OUTPATIENT
Start: 2023-02-20 | End: 2023-03-02

## 2023-02-20 NOTE — PROGRESS NOTES
Subjective:       Patient ID: Lj Coleman is a 67 y.o. male.    Chief Complaint: Follow-up (Coming in for f/u on infected toenail,(pt explains the toe is still red and swollen) rates pain 3/10, nondiabetic, wearing socks and shoes, last seen PCP Dr. Sutotn on 11/11/2022.)    HPI: Lj Coleman presents to the office today to follow up on the  right great toe.  Considerable improvement in the swelling and redness.  Still does have swelling present.  States pain has significantly improved and is now rated 3/10.  He reports that he has been compliant with utilizing the doxycycline antibiotic but has not received any correspondence from Infectious Disease.  Does have history of hepatitis and liver transplant.  Currently on immunosuppressive medication.  Reports he has been soaking his foot in warm water and Epson salt apply antibiotic cream as previously discussed.    Review of patient's allergies indicates:   Allergen Reactions    Codeine Other (See Comments)     Upset stomach       Past Medical History:   Diagnosis Date    Alcohol dependence     stopped 2012    Angina pectoris     Arthritis     back    Atherosclerosis of native coronary artery without angina pectoris/ LAD 09/08/2016    Back pain     Chronic hepatitis C with cirrhosis     Depression     Hepatoma     Prior to liver transplant    History of colon polyps 09/09/2015    History of transplantation, liver 04/2012    History of vertebral fracture     Hypertension     Immune deficiency disorder     Incisional hernia following transplant 04/09/2014    Liver failure 11/2011    Related to chemotherapy for hepatoma    Liver transplanted 04/2012    Obesity     Tobacco abuse 09/09/2016    Trouble in sleeping     Vertebral fracture 1975       Family History   Problem Relation Age of Onset    Cancer Mother         lung    Cancer Father         bladder    Diabetes Maternal Uncle     Cancer Maternal Grandmother         uterine?    Cancer Other      Heart disease Neg Hx     Kidney disease Neg Hx     Stroke Neg Hx        Social History     Socioeconomic History    Marital status:     Highest education level: 10th grade   Tobacco Use    Smoking status: Every Day     Packs/day: 0.25     Years: 35.00     Pack years: 8.75     Types: Cigarettes    Smokeless tobacco: Never   Substance and Sexual Activity    Alcohol use: No     Alcohol/week: 0.0 standard drinks     Comment: Quit alcohol after some alcohol abuse, prior to his liver transplant    Drug use: No    Sexual activity: Not Currently     Social Determinants of Health     Financial Resource Strain: High Risk    Difficulty of Paying Living Expenses: Hard   Food Insecurity: No Food Insecurity    Worried About Running Out of Food in the Last Year: Never true    Ran Out of Food in the Last Year: Never true   Transportation Needs: No Transportation Needs    Lack of Transportation (Medical): No    Lack of Transportation (Non-Medical): No   Physical Activity: Inactive    Days of Exercise per Week: 0 days    Minutes of Exercise per Session: 0 min   Stress: Stress Concern Present    Feeling of Stress : To some extent   Social Connections: Moderately Integrated    Frequency of Communication with Friends and Family: More than three times a week    Frequency of Social Gatherings with Friends and Family: Never    Attends Baptism Services: More than 4 times per year    Active Member of Clubs or Organizations: Yes    Attends Club or Organization Meetings: More than 4 times per year    Marital Status:    Housing Stability: Low Risk     Unable to Pay for Housing in the Last Year: No    Number of Places Lived in the Last Year: 1    Unstable Housing in the Last Year: No       Past Surgical History:   Procedure Laterality Date    COLONOSCOPY N/A 1/20/2021    Procedure: COLONOSCOPY;  Surgeon: Emerson Helm MD;  Location: [facility];  Service: Endoscopy;  Laterality: N/A;    HERNIA REPAIR Right 2013    incisional  "   LIVER TRANSPLANT  April 2012    MOUTH SURGERY      TONSILLECTOMY  1958       Review of Systems       Objective:   Ht 5' 8" (1.727 m)   Wt 110.7 kg (244 lb 0.8 oz)   BMI 37.11 kg/m²     X-Ray Foot Complete Right  Narrative: EXAM: XR FOOT COMPLETE 3 VIEW RIGHT    CLINICAL INDICATION:   [M10.9]-Gout, unspecified.    FINDINGS:  No acute fracture or dislocation.  Bandage material overlies the great toe obscuring osseous detail.  There is some osseous erosion near the distal tuft of the great toe.  Impression: Osseous erosion involving the distal tuft of the right great toe findings suggest osteomyelitis.    Finalized on: 1/25/2023 4:20 PM By:  Myles Mcghee MD  [facility]# [id number]      2023-01-25 16:22:[id number]    [facility]       Physical Exam   LOWER EXTREMITY PHYSICAL EXAMINATION    Vascular:  The right dorsalis pedis pulse 2/4 and the right posterior tibial pulse 2/4.  The left dorsalis pedis pulse 2/4 and posterior tibial pulse on the left is 2/4.  Capillary refill is intact.  Pedal hair growth intact     Musculoskeletal: Manual Muscle Testing is 5/5 with dorsiflexion, plantar flexion, abduction, and adduction.   There is normal range of motion in the forefoot, hindfoot, and Ankle joint.       Dermatological:  Skin is supple and moist.  Reducing cellulitis present to the right great toe.  Redness resolved.  Mild swelling remains.  Nail bed appears to be healing well.  There is some serous drainage present to the distal aspect of the great toe.  Skin appears to be slightly macerated to the nail bed.    Assessment:     1. Abscess of great toenail, right    2. Chronic osteomyelitis of toe of right foot    3. Pathological fracture of phalanx of toe of right foot due to other disease, sequela    4. History of hepatitis C    5. Liver transplanted    6. Immunosuppression            Plan:     Abscess of great toenail, right    Chronic osteomyelitis of toe of right foot    Pathological fracture of phalanx of toe of right foot due to " other disease, sequela    History of hepatitis C    Liver transplanted    Immunosuppression    Other orders  -     doxycycline (VIBRA-TABS) 100 MG tablet; Take 1 tablet (100 mg total) by mouth 2 (two) times daily. for 10 days  Dispense: 20 tablet; Refill: 0          Thorough discussion is had with the patient today, concerning the diagnosis, its etiology, and the treatment algorithm at present.    Patient is at increased risk opportunistic infection as he is currently on long-term immunosuppressive medication.    Currently awaiting referral from Infectious Disease.  E consult was placed in completed on 02/08 per his records.  Although patient has not received any correspondence in regards to the antibiotic plan, we will refill his doxycycline as there has been considerable improvement in his cellulitis. -message sent on 2/20/2023 to follow up    Awaiting antibiotic plan and IV antibiotics per  Infectious Disease  given his history of transplant and hepatitis make him increased risk for opportunistic infections.  Infectious disease referral placed.     He will continue and complete his antibiotic therapy until this has been completed.  He will apply antibiotic cream to the area and continues change dressings daily.    Will place patient in a postoperative shoe as there appears to be pathologic fracture of the distal phalanx at the distal tuft.  This will help limit excessive range of motion with ambulation to protect the toe and allow this to heal the appropriate position.  Did discuss that another opportunity would be to go in and remove the fragment but given patient's increased history of infection due to immunosuppressive, we will attempt to utilize antibiotics for treatment to decrease risk to the patient.    Future Appointments   Date Time Provider Department Center   4/3/2023  8:30 AM LABORATORY, [facility]   4/17/2023  9:30 AM Teresa Gonzalez MD [facility]

## 2023-02-20 NOTE — H&P (VIEW-ONLY)
Subjective:       Patient ID: Lj Coleman is a 67 y.o. male.    Chief Complaint: Follow-up (Coming in for f/u on infected toenail,(pt explains the toe is still red and swollen) rates pain 3/10, nondiabetic, wearing socks and shoes, last seen PCP Dr. Sutton on 11/11/2022.)    HPI: Lj Coleman presents to the office today to follow up on the  right great toe.  Considerable improvement in the swelling and redness.  Still does have swelling present.  States pain has significantly improved and is now rated 3/10.  He reports that he has been compliant with utilizing the doxycycline antibiotic but has not received any correspondence from Infectious Disease.  Does have history of hepatitis and liver transplant.  Currently on immunosuppressive medication.  Reports he has been soaking his foot in warm water and Epson salt apply antibiotic cream as previously discussed.    Review of patient's allergies indicates:   Allergen Reactions    Codeine Other (See Comments)     Upset stomach       Past Medical History:   Diagnosis Date    Alcohol dependence     stopped 2012    Angina pectoris     Arthritis     back    Atherosclerosis of native coronary artery without angina pectoris/ LAD 09/08/2016    Back pain     Chronic hepatitis C with cirrhosis     Depression     Hepatoma     Prior to liver transplant    History of colon polyps 09/09/2015    History of transplantation, liver 04/2012    History of vertebral fracture     Hypertension     Immune deficiency disorder     Incisional hernia following transplant 04/09/2014    Liver failure 11/2011    Related to chemotherapy for hepatoma    Liver transplanted 04/2012    Obesity     Tobacco abuse 09/09/2016    Trouble in sleeping     Vertebral fracture 1975       Family History   Problem Relation Age of Onset    Cancer Mother         lung    Cancer Father         bladder    Diabetes Maternal Uncle     Cancer Maternal Grandmother         uterine?    Cancer Other      Heart disease Neg Hx     Kidney disease Neg Hx     Stroke Neg Hx        Social History     Socioeconomic History    Marital status:     Highest education level: 10th grade   Tobacco Use    Smoking status: Every Day     Packs/day: 0.25     Years: 35.00     Pack years: 8.75     Types: Cigarettes    Smokeless tobacco: Never   Substance and Sexual Activity    Alcohol use: No     Alcohol/week: 0.0 standard drinks     Comment: Quit alcohol after some alcohol abuse, prior to his liver transplant    Drug use: No    Sexual activity: Not Currently     Social Determinants of Health     Financial Resource Strain: High Risk    Difficulty of Paying Living Expenses: Hard   Food Insecurity: No Food Insecurity    Worried About Running Out of Food in the Last Year: Never true    Ran Out of Food in the Last Year: Never true   Transportation Needs: No Transportation Needs    Lack of Transportation (Medical): No    Lack of Transportation (Non-Medical): No   Physical Activity: Inactive    Days of Exercise per Week: 0 days    Minutes of Exercise per Session: 0 min   Stress: Stress Concern Present    Feeling of Stress : To some extent   Social Connections: Moderately Integrated    Frequency of Communication with Friends and Family: More than three times a week    Frequency of Social Gatherings with Friends and Family: Never    Attends Yazdanism Services: More than 4 times per year    Active Member of Clubs or Organizations: Yes    Attends Club or Organization Meetings: More than 4 times per year    Marital Status:    Housing Stability: Low Risk     Unable to Pay for Housing in the Last Year: No    Number of Places Lived in the Last Year: 1    Unstable Housing in the Last Year: No       Past Surgical History:   Procedure Laterality Date    COLONOSCOPY N/A 1/20/2021    Procedure: COLONOSCOPY;  Surgeon: Emerson Helm MD;  Location: [facility];  Service: Endoscopy;  Laterality: N/A;    HERNIA REPAIR Right 2013    incisional  "   LIVER TRANSPLANT  April 2012    MOUTH SURGERY      TONSILLECTOMY  1958       Review of Systems       Objective:   Ht 5' 8" (1.727 m)   Wt 110.7 kg (244 lb 0.8 oz)   BMI 37.11 kg/m²     X-Ray Foot Complete Right  Narrative: EXAM: XR FOOT COMPLETE 3 VIEW RIGHT    CLINICAL INDICATION:   [M10.9]-Gout, unspecified.    FINDINGS:  No acute fracture or dislocation.  Bandage material overlies the great toe obscuring osseous detail.  There is some osseous erosion near the distal tuft of the great toe.  Impression: Osseous erosion involving the distal tuft of the right great toe findings suggest osteomyelitis.    Finalized on: 1/25/2023 4:20 PM By:  Myles Mcghee MD  [facility]# [id number]      2023-01-25 16:22:25.767    [facility]       Physical Exam   LOWER EXTREMITY PHYSICAL EXAMINATION    Vascular:  The right dorsalis pedis pulse 2/4 and the right posterior tibial pulse 2/4.  The left dorsalis pedis pulse 2/4 and posterior tibial pulse on the left is 2/4.  Capillary refill is intact.  Pedal hair growth intact     Musculoskeletal: Manual Muscle Testing is 5/5 with dorsiflexion, plantar flexion, abduction, and adduction.   There is normal range of motion in the forefoot, hindfoot, and Ankle joint.       Dermatological:  Skin is supple and moist.  Reducing cellulitis present to the right great toe.  Redness resolved.  Mild swelling remains.  Nail bed appears to be healing well.  There is some serous drainage present to the distal aspect of the great toe.  Skin appears to be slightly macerated to the nail bed.    Assessment:     1. Abscess of great toenail, right    2. Chronic osteomyelitis of toe of right foot    3. Pathological fracture of phalanx of toe of right foot due to other disease, sequela    4. History of hepatitis C    5. Liver transplanted    6. Immunosuppression            Plan:     Abscess of great toenail, right    Chronic osteomyelitis of toe of right foot    Pathological fracture of phalanx of toe of right foot due to " other disease, sequela    History of hepatitis C    Liver transplanted    Immunosuppression    Other orders  -     doxycycline (VIBRA-TABS) 100 MG tablet; Take 1 tablet (100 mg total) by mouth 2 (two) times daily. for 10 days  Dispense: 20 tablet; Refill: 0          Thorough discussion is had with the patient today, concerning the diagnosis, its etiology, and the treatment algorithm at present.    Patient is at increased risk opportunistic infection as he is currently on long-term immunosuppressive medication.    Currently awaiting referral from Infectious Disease.  E consult was placed in completed on 02/08 per his records.  Although patient has not received any correspondence in regards to the antibiotic plan, we will refill his doxycycline as there has been considerable improvement in his cellulitis. -message sent on 2/20/2023 to follow up    Awaiting antibiotic plan and IV antibiotics per  Infectious Disease  given his history of transplant and hepatitis make him increased risk for opportunistic infections.  Infectious disease referral placed.     He will continue and complete his antibiotic therapy until this has been completed.  He will apply antibiotic cream to the area and continues change dressings daily.    Will place patient in a postoperative shoe as there appears to be pathologic fracture of the distal phalanx at the distal tuft.  This will help limit excessive range of motion with ambulation to protect the toe and allow this to heal the appropriate position.  Did discuss that another opportunity would be to go in and remove the fragment but given patient's increased history of infection due to immunosuppressive, we will attempt to utilize antibiotics for treatment to decrease risk to the patient.    Future Appointments   Date Time Provider Department Center   4/3/2023  8:30 AM LABORATORY, [facility]   4/17/2023  9:30 AM Teresa Gonzalez MD [facility]

## 2023-02-22 ENCOUNTER — TELEPHONE (OUTPATIENT)
Dept: INFECTIOUS DISEASES | Facility: CLINIC | Age: 68
End: 2023-02-22
Payer: MEDICARE

## 2023-02-22 NOTE — TELEPHONE ENCOUNTER
This patient was called on 2/9/23.  He refused iv meds when [facility] called to get him started. He was very confused about the whole process. I spoke with him at length today about IV meds and he is ready to get started. I called [facility] so they could get him started.

## 2023-02-22 NOTE — TELEPHONE ENCOUNTER
----- Message from Shivam Beavers MD, MORENA sent at 2/22/2023  8:15 AM CST -----  Regarding: FW: consult  Did we call this patient for an appointment?  Let us see in clinic    ----- Message -----  From: Renee Edwards DPM  Sent: 2/20/2023   1:25 PM CST  To: Shivam Beavers MD, MORENA  Subject: consult                                          Patient is here in clinic today to request refill of antibiotics. He is unaware of any e-consult being placed or discussing with ID specialist. Please call to discuss with patient given his immunosupressed state. Thanks

## 2023-02-22 NOTE — TELEPHONE ENCOUNTER
S/w pt today.  He was originally contacted on 2/9/23 and he refused treatment when [facility] called him. After talking to him today, he was confused about the 1st call. After much information and instruction has been given he is ok to start treatment.  I will again advise [facility] to start treatment.

## 2023-02-24 ENCOUNTER — PATIENT MESSAGE (OUTPATIENT)
Dept: PODIATRY | Facility: CLINIC | Age: 68
End: 2023-02-24
Payer: MEDICARE

## 2023-02-24 ENCOUNTER — TELEPHONE (OUTPATIENT)
Dept: INFECTIOUS DISEASES | Facility: CLINIC | Age: 68
End: 2023-02-24
Payer: MEDICARE

## 2023-02-24 NOTE — TELEPHONE ENCOUNTER
----- Message from Zaire Ray sent at 2/24/2023 11:37 AM CST -----  Contact: [phone]  Pt is calling regarding IV medication/appt . Please call back at [phone] . Thanks/dj

## 2023-02-24 NOTE — TELEPHONE ENCOUNTER
I am trying to find out if the [facility] indigent program willl help with pts medication through [facility].

## 2023-03-01 ENCOUNTER — TELEPHONE (OUTPATIENT)
Dept: INFECTIOUS DISEASES | Facility: CLINIC | Age: 68
End: 2023-03-01
Payer: MEDICARE

## 2023-03-01 ENCOUNTER — PATIENT MESSAGE (OUTPATIENT)
Dept: PODIATRY | Facility: CLINIC | Age: 68
End: 2023-03-01
Payer: MEDICARE

## 2023-03-01 NOTE — TELEPHONE ENCOUNTER
S/w pt.  We have tried to get financial assistance for the $1000 his insurance is not paying for infusion therapy.    Pt wants to know if it is possible to take just oral meds.  I sent a message to Dr. Beavers to find out.  Pt is also contacting [employer] financial aid to see if they can cover the extra cost if we use [facility] home infusion.

## 2023-03-01 NOTE — TELEPHONE ENCOUNTER
----- Message from Cyndi Ray sent at 3/1/2023  3:58 PM CST -----  Contact: Lj Storm is calling in regards to the coverage of his medication.Please call back at [phone]          Thanks  NORIS

## 2023-03-02 DIAGNOSIS — M86.9 OSTEOMYELITIS OF ANKLE AND FOOT: Primary | ICD-10-CM

## 2023-03-03 RX ORDER — DOXYCYCLINE 100 MG/1
100 CAPSULE ORAL EVERY 12 HOURS
Qty: 28 CAPSULE | Refills: 0 | Status: SHIPPED | OUTPATIENT
Start: 2023-03-03 | End: 2023-03-17

## 2023-03-06 ENCOUNTER — TELEPHONE (OUTPATIENT)
Dept: INFECTIOUS DISEASES | Facility: CLINIC | Age: 68
End: 2023-03-06
Payer: MEDICARE

## 2023-03-06 ENCOUNTER — HOSPITAL ENCOUNTER (OUTPATIENT)
Dept: RADIOLOGY | Facility: HOSPITAL | Age: 68
Discharge: HOME | End: 2023-03-06
Attending: INTERNAL MEDICINE
Payer: MEDICARE

## 2023-03-06 ENCOUNTER — NURSE TRIAGE (OUTPATIENT)
Dept: ADMINISTRATIVE | Facility: CLINIC | Age: 68
End: 2023-03-06
Payer: MEDICARE

## 2023-03-06 ENCOUNTER — HOSPITAL ENCOUNTER (EMERGENCY)
Facility: HOSPITAL | Age: 68
Discharge: HOME | End: 2023-03-06
Attending: EMERGENCY MEDICINE
Payer: MEDICARE

## 2023-03-06 VITALS
WEIGHT: 237.44 LBS | BODY MASS INDEX: 36.1 KG/M2 | OXYGEN SATURATION: 98 % | HEART RATE: 105 BPM | SYSTOLIC BLOOD PRESSURE: 145 MMHG | RESPIRATION RATE: 20 BRPM | TEMPERATURE: 98 F | DIASTOLIC BLOOD PRESSURE: 88 MMHG

## 2023-03-06 DIAGNOSIS — M86.9 OSTEOMYELITIS OF ANKLE AND FOOT: ICD-10-CM

## 2023-03-06 DIAGNOSIS — Z78.9 PROBLEM WITH VASCULAR ACCESS: Primary | ICD-10-CM

## 2023-03-06 PROCEDURE — 36573 INSJ PICC RS&I 5 YR+: CPT | Mod: HCNC

## 2023-03-06 PROCEDURE — 36573 INSJ PICC RS&I 5 YR+: CPT | Mod: HCNC,,, | Performed by: RADIOLOGY

## 2023-03-06 PROCEDURE — 36573 FL PICC LINE PLACEMENT W/O PORT, W/IMG > 5 Y/O: ICD-10-PCS | Mod: HCNC,,, | Performed by: RADIOLOGY

## 2023-03-06 PROCEDURE — 99281 EMR DPT VST MAYX REQ PHY/QHP: CPT | Mod: HCNC

## 2023-03-06 NOTE — TELEPHONE ENCOUNTER
----- Message from Marya Craven sent at 3/6/2023  9:30 AM CST -----  Pt was advised to fast until the appt this afternoon. He would like the nurse to call him back at [phone]. Thx. EL

## 2023-03-06 NOTE — TELEPHONE ENCOUNTER
Reason for Disposition   Nursing judgment    Additional Information   Negative: SEVERE difficulty breathing (e.g., struggling for each breath, speaks in single words)   Negative: Shock suspected (e.g., cold/pale/clammy skin, too weak to stand, low BP, rapid pulse)   Negative: Cracked or broken central line or PICC Line   Negative: Sounds like a life-threatening emergency to the triager   Negative: [1] Pain, redness, swelling, or pus at IV site AND [2] IV running normally   Negative: [1] Chest pain or shortness of breath AND [2] has central or PICC line   Negative: [1] Chest or neck swelling AND [2] has a central or PICC line  (Exception: Mild puffiness or swelling just around IV site.)   Negative: [1] Arm or leg swelling AND [2] has a central or PICC line  (Exception: Mild puffiness or swelling just around IV site.)   Negative: Patient sounds very sick or weak to the triager   Negative: Fever > 100.4 F (38.0 C)   Negative: Skin swelling at IV site   Negative: Skin redness at IV site   Negative: Pain at IV site or shooting up arm   Negative: IV fluid leaking out of insertion site (skin hole where IV catheter enters skin)   Negative: Central or PICC line has moved out of position (or can see cuff slipping out of skin; or more line externally exposed than before)   Negative: [1] Central line (e.g., [place], [place], [place]) not running or running slowly AND [2] has not tried Care Advice   Negative: [1] PICC line not running or running slowly AND [2] has not tried Care Advice   Negative: [1] Peripheral IV not running or running slowly AND [2] has not tried Care Advice    Protocols used: IV Not Running or Running Slowly-A-, No Guideline or Reference [id number]  Pt states he had a PICC line placed today and has a bandage under his arm. He is trying to reach his doctor to report he thinks the site is bleeding.    He denies a saturated dressing or blood leaking, but states he can't see it where it's located. The   states she cannot reach the office.     Pt advised to go to Urgent Care now to have a Provider look at the site. He refused and hung.

## 2023-03-06 NOTE — TELEPHONE ENCOUNTER
Called patient back and spoke to him about pic line bleeding. He stated that he is nervous and is going to [facility] ER to get it checked out,----- Message from Cyndi Ray sent at 3/6/2023  4:33 PM CST -----  Contact: Lj Storm is calling in regards to his peek line does not look right and its bleeding.Please call back at [phone]          Thanks  NORIS

## 2023-03-06 NOTE — DISCHARGE SUMMARY
[facility])  Discharge Note  Short Stay    FL PICC Line Placement w/o Port w/ Img > 6 Y/O      OUTCOME: Patient tolerated treatment/procedure well without complication and is now ready for discharge.    DISPOSITION: Home or Self Care    FINAL DIAGNOSIS:  <principal problem not specified>    FOLLOWUP: In clinic    DISCHARGE INSTRUCTIONS:  No discharge procedures on file.     TIME SPENT ON DISCHARGE: 15 minutes     Pre Op Diagnosis: osteomyelitis     Post Op Diagnosis: same     Procedure:  PICC line     Procedure performed by: Terri SALVADOR, Dylan AYERS     Written Informed Consent Obtained: Yes     Specimen Removed:  no    Estimated Blood Loss:  minimal     Findings: no     The patient tolerated the procedure well and there were no complications.      Disposition:  F/U in clinic or with ordering physician    Discharge Instructions:  Keep PICC clean and dry.    Sterile technique was performed in the LUE, lidocaine was used as a local anesthetic.  A 4 Ma[place] DLPP 44 cm was inserted into the basilic and into the SVC/Right atrium junction.  Pt tolerated the procedure well without immediate complications.  Please see radiologist report for details. F/u with PCP and/or ordering physician. PICC is ready to use

## 2023-03-07 ENCOUNTER — TELEPHONE (OUTPATIENT)
Dept: INFECTIOUS DISEASES | Facility: CLINIC | Age: 68
End: 2023-03-07
Payer: MEDICARE

## 2023-03-07 NOTE — ED NOTES
Patient identifiers verified and correct for Lj LOVE Jared.    Pt present to er with vas access bleeding and need dsg reinforced..    LOC: The patient is awake, alert and aware of environment with an appropriate affect, the patient is oriented x 3 and speaking appropriately.  APPEARANCE: Patient resting comfortably and in no acute distress, patient is clean and well groomed, patient's clothing is properly fastened.  SKIN: The skin is warm and dry, color consistent with ethnicity, patient has normal skin turgor and moist mucus membranes, skin intact, no breakdown or bruising noted.  MUSCULOSKELETAL: Patient moving all extremities spontaneously.  RESPIRATORY: Airway is open and patent, respirations are spontaneous.  CARDIAC: Patient has a normal rate, no periphreal edema noted, capillary refill < 3 seconds.  ABDOMEN: Soft and non tender to palpation.

## 2023-03-07 NOTE — ED PROVIDER NOTES
SCRIBE #1 NOTE: I, Carmen Lovelace, am scribing for, and in the presence of, Mata Duffy Jr., MD. I have scribed the entire note.      History      Chief Complaint   Patient presents with    Vascular Access Problem     PICC line placed today around 1515 today, reports bleeding under bandage.       Review of patient's allergies indicates:   Allergen Reactions    Codeine Other (See Comments)     Upset stomach        HPI   HPI    3/6/2023, 6:00 PM   History obtained from the patient      History of Present Illness: Lj Coleman is a 67 y.o. male patient who presents to the Emergency Department for an evaluation because he is experiencing bleeding underneath his PICC line bandage. Pt had a PICC line placed at approximately 1515 today for osteomyelitis of his ankle and foot. Now, he is noticing blood underneath his PICC line bandage, so he came to the ER for an evaluation. Symptoms are constant and moderate in severity. No mitigating or exacerbating factors reported. No associated sxs reported. Patient denies any light-headedness, dizziness, numbness, weakness, N/V/D, and all other sxs at this time. No prior Tx reported. No further complaints or concerns at this time.         Arrival mode: Personal vehicle    PCP: Isabella Sutton DO       Past Medical History:  Past Medical History:   Diagnosis Date    Alcohol dependence     stopped 2012    Angina pectoris     Arthritis     back    Atherosclerosis of native coronary artery without angina pectoris/ LAD 09/08/2016    Back pain     Chronic hepatitis C with cirrhosis     Depression     Hepatoma     Prior to liver transplant    History of colon polyps 09/09/2015    History of transplantation, liver 04/2012    History of vertebral fracture     Hypertension     Immune deficiency disorder     Incisional hernia following transplant 04/09/2014    Liver failure 11/2011    Related to chemotherapy for hepatoma    Liver transplanted 04/2012    Obesity     Tobacco abuse  09/09/2016    Trouble in sleeping     Vertebral fracture 1975       Past Surgical History:  Past Surgical History:   Procedure Laterality Date    COLONOSCOPY N/A 1/20/2021    Procedure: COLONOSCOPY;  Surgeon: Emerson Helm MD;  Location: [facility];  Service: Endoscopy;  Laterality: N/A;    HERNIA REPAIR Right 2013    incisional    LIVER TRANSPLANT  April 2012    MOUTH SURGERY      TONSILLECTOMY  1958         Family History:  Family History   Problem Relation Age of Onset    Cancer Mother         lung    Cancer Father         bladder    Diabetes Maternal Uncle     Cancer Maternal Grandmother         uterine?    Cancer Other     Heart disease Neg Hx     Kidney disease Neg Hx     Stroke Neg Hx        Social History:  Social History     Tobacco Use    Smoking status: Every Day     Packs/day: 0.25     Years: 35.00     Pack years: 8.75     Types: Cigarettes    Smokeless tobacco: Never   Substance and Sexual Activity    Alcohol use: No     Alcohol/week: 0.0 standard drinks     Comment: Quit alcohol after some alcohol abuse, prior to his liver transplant    Drug use: No    Sexual activity: Not Currently       ROS   Review of Systems   Constitutional:  Negative for fever.   HENT:  Negative for sore throat.    Respiratory:  Negative for shortness of breath.    Cardiovascular:  Negative for chest pain.   Gastrointestinal:  Negative for diarrhea, nausea and vomiting.   Genitourinary:  Negative for dysuria.   Musculoskeletal:  Negative for back pain.   Skin:  Negative for rash.   Neurological:  Negative for dizziness, weakness, light-headedness and numbness.   Hematological:  Does not bruise/bleed easily.        (+) bleeding underneath PICC line bandage    All other systems reviewed and are negative.    Physical Exam      Initial Vitals [03/06/23 1750]   BP Pulse Resp Temp SpO2   (!) 145/88 105 20 98.3 °F (36.8 °C) 98 %      MAP       --          Physical Exam  Nursing Notes and Vital Signs Reviewed.  Constitutional:  Patient is in no acute distress. Well-developed and well-nourished.  Head: Atraumatic. Normocephalic.  Eyes:  EOM intact.  No scleral icterus.  ENT: Mucous membranes are moist.  Nares clear   Musculoskeletal: Moves all extremities. No obvious deformities.  5 x 5 strength in all extremities   Skin: Warm and dry.  PICC line in left AC.  There is blood underneath the bandage.  Bandage is been taken down.  There is no active bleeding.  This appears to be mild skin bleed that has since resolved.  Neurological:  Alert, awake, and appropriate.  Normal speech.  No acute focal neurological deficits are appreciated.  Two through 12 intact bilaterally.  Left median radial ulnar musculocutaneous axillary nerves intact on exam.  Normal pulses.  Psychiatric: Normal affect. Good eye contact. Appropriate in content.      ED Course    Procedures  ED Vital Signs:  Vitals:    03/06/23 1750   BP: (!) 145/88   Pulse: 105   Resp: 20   Temp: 98.3 °F (36.8 °C)   TempSrc: Oral   SpO2: 98%   Weight: 107.7 kg (237 lb 7 oz)       Abnormal Lab Results:  Labs Reviewed - No data to display         Imaging Results:  Imaging Results    None                 The Emergency Provider reviewed the vital signs and test results, which are outlined above.    ED Discussion     6:08 PM: Reassessed pt at this time.   Discussed with pt all pertinent ED information and results. Discussed pt dx and plan of tx. Gave pt all f/u and return to the ED instructions. All questions and concerns were addressed at this time. Pt expresses understanding of information and instructions, and is comfortable with plan to discharge. Pt is stable for discharge.    I discussed with patient and/or family/caretaker that evaluation in the ED does not suggest any emergent or life threatening medical conditions requiring immediate intervention beyond what was provided in the ED, and I believe patient is safe for discharge.  Regardless, an unremarkable evaluation in the ED does not  preclude the development or presence of a serious of life threatening condition. As such, patient was instructed to return immediately for any worsening or change in current symptoms.           ED Medication(s):  Medications - No data to display     Follow-up Information       Isabella Sutton DO.    Specialty: Internal Medicine  Contact information:  [address] [place] [address] DR Yanet ALFONSO [id number] [phone]                             New Prescriptions    No medications on file         Medical Decision Making    Medical Decision Making:   History:   Old Medical Records: I decided to obtain old medical records.  Old Records Summarized: records from clinic visits.       <> Summary of Records: I reviewed patient's chart regarding his insertion of PICC line for IV antibiotics.  Initial Assessment:   Patient with blood on the bandage the PICC.  Differential Diagnosis:   Active bleed verses PICC line malfunction.  Patient is stable nontoxic.  Patient has some mild bleeding post insertion of PICC line which she was not prepared for.  This is since stopped.  Patient will be remanded is discharged home without difficulty.  Patient verbalized understanding room with the plan of care and seems reliable.  Safe discharge my opinion.         Scribe Attestation:   Scribe #1: I performed the above scribed service and the documentation accurately describes the services I performed. I attest to the accuracy of the note.    Attending:   Physician Attestation Statement for Scribe #1: I, Mata Duffy Jr., MD, personally performed the services described in this documentation, as scribed by Carmen Lovelace, in my presence, and it is both accurate and complete.          Clinical Impression       ICD-10-CM ICD-9-CM   1. Problem with vascular access  Z78.9 V49.9       Disposition:   Disposition: Discharged  Condition: Stable       Mata Duffy Jr., MD  03/06/23 5136

## 2023-03-07 NOTE — TELEPHONE ENCOUNTER
----- Message from Telma Graff LPN sent at 3/6/2023  4:53 PM CST -----  Contact: Lj    ----- Message -----  From: Cyndi Ray  Sent: 3/6/2023   4:35 PM CST  To: Nimesh Langley Staff    Pt is calling in regards to his peek line does not look right and its bleeding.Please call back at [phone]          Thanks  NORIS

## 2023-03-14 ENCOUNTER — TELEPHONE (OUTPATIENT)
Dept: INFUSION THERAPY | Facility: HOSPITAL | Age: 68
End: 2023-03-14
Payer: MEDICARE

## 2023-03-14 ENCOUNTER — OFFICE VISIT (OUTPATIENT)
Dept: INFECTIOUS DISEASES | Facility: CLINIC | Age: 68
End: 2023-03-14
Payer: MEDICARE

## 2023-03-14 VITALS
BODY MASS INDEX: 35.99 KG/M2 | HEIGHT: 68 IN | DIASTOLIC BLOOD PRESSURE: 83 MMHG | WEIGHT: 237.44 LBS | SYSTOLIC BLOOD PRESSURE: 132 MMHG | HEART RATE: 99 BPM

## 2023-03-14 DIAGNOSIS — Z86.19 HISTORY OF HEPATITIS C: ICD-10-CM

## 2023-03-14 DIAGNOSIS — I10 BENIGN ESSENTIAL HTN: ICD-10-CM

## 2023-03-14 DIAGNOSIS — M86.9 OSTEOMYELITIS OF ANKLE AND FOOT: ICD-10-CM

## 2023-03-14 DIAGNOSIS — I49.3 PVC (PREMATURE VENTRICULAR CONTRACTION): ICD-10-CM

## 2023-03-14 DIAGNOSIS — Z94.4 LIVER TRANSPLANTED: ICD-10-CM

## 2023-03-14 PROCEDURE — 99999 PR PBB SHADOW E&M-EST. PATIENT-LVL III: CPT | Mod: PBBFAC,HCNC,, | Performed by: INTERNAL MEDICINE

## 2023-03-14 PROCEDURE — 1101F PR PT FALLS ASSESS DOC 0-1 FALLS W/OUT INJ PAST YR: ICD-10-PCS | Mod: HCNC,CPTII,S$GLB, | Performed by: INTERNAL MEDICINE

## 2023-03-14 PROCEDURE — 3288F FALL RISK ASSESSMENT DOCD: CPT | Mod: HCNC,CPTII,S$GLB, | Performed by: INTERNAL MEDICINE

## 2023-03-14 PROCEDURE — 3288F PR FALLS RISK ASSESSMENT DOCUMENTED: ICD-10-PCS | Mod: HCNC,CPTII,S$GLB, | Performed by: INTERNAL MEDICINE

## 2023-03-14 PROCEDURE — 1159F PR MEDICATION LIST DOCUMENTED IN MEDICAL RECORD: ICD-10-PCS | Mod: HCNC,CPTII,S$GLB, | Performed by: INTERNAL MEDICINE

## 2023-03-14 PROCEDURE — 3075F PR MOST RECENT SYSTOLIC BLOOD PRESS GE 130-139MM HG: ICD-10-PCS | Mod: HCNC,CPTII,S$GLB, | Performed by: INTERNAL MEDICINE

## 2023-03-14 PROCEDURE — 3079F PR MOST RECENT DIASTOLIC BLOOD PRESSURE 80-89 MM HG: ICD-10-PCS | Mod: HCNC,CPTII,S$GLB, | Performed by: INTERNAL MEDICINE

## 2023-03-14 PROCEDURE — 99999 PR PBB SHADOW E&M-EST. PATIENT-LVL III: ICD-10-PCS | Mod: PBBFAC,HCNC,, | Performed by: INTERNAL MEDICINE

## 2023-03-14 PROCEDURE — 1101F PT FALLS ASSESS-DOCD LE1/YR: CPT | Mod: HCNC,CPTII,S$GLB, | Performed by: INTERNAL MEDICINE

## 2023-03-14 PROCEDURE — 3075F SYST BP GE 130 - 139MM HG: CPT | Mod: HCNC,CPTII,S$GLB, | Performed by: INTERNAL MEDICINE

## 2023-03-14 PROCEDURE — 3079F DIAST BP 80-89 MM HG: CPT | Mod: HCNC,CPTII,S$GLB, | Performed by: INTERNAL MEDICINE

## 2023-03-14 PROCEDURE — 99204 OFFICE O/P NEW MOD 45 MIN: CPT | Mod: HCNC,S$GLB,, | Performed by: INTERNAL MEDICINE

## 2023-03-14 PROCEDURE — 99204 PR OFFICE/OUTPT VISIT, NEW, LEVL IV, 45-59 MIN: ICD-10-PCS | Mod: HCNC,S$GLB,, | Performed by: INTERNAL MEDICINE

## 2023-03-14 PROCEDURE — 1159F MED LIST DOCD IN RCRD: CPT | Mod: HCNC,CPTII,S$GLB, | Performed by: INTERNAL MEDICINE

## 2023-03-14 PROCEDURE — 3008F PR BODY MASS INDEX (BMI) DOCUMENTED: ICD-10-PCS | Mod: HCNC,CPTII,S$GLB, | Performed by: INTERNAL MEDICINE

## 2023-03-14 PROCEDURE — 3008F BODY MASS INDEX DOCD: CPT | Mod: HCNC,CPTII,S$GLB, | Performed by: INTERNAL MEDICINE

## 2023-03-14 NOTE — TELEPHONE ENCOUNTER
Patient did not come for scheduled appointment. Call placed, no answer, recording states voice mail not set up.

## 2023-03-14 NOTE — PROGRESS NOTES
Subjective:       Patient ID: Lj Coleman is a 67 y.o. male.    Chief Complaint: f/up infection     HPI    Last ID note-  02/08/23  Referred for      Long standing ingrown nail with abscess to the right great toe. History of transplant. xrays obtained showing possible osteomyelitis.           Plan - will start IV Daptomycin at 700mg daily   IV Cefepime 2 gram every 8 hours   Will treat for 6 weeks   Will need ESR,CRP,CBC,CMP,CPK  Eoc -03/23/23.  Insert PICC line   Contact infusion company       3/14- 67 year old man with PMH as listed below who was referred for toe osteomyelitis .  Toe imaging -  01/25-  Osseous erosion involving the distal tuft of the right great toe findings suggest osteomyelitis    Cultures- 01/25- no growth .  0n 03/07- he was given  Daptomycin and Cefepime and developed Hives after Cefepime.        Past Medical History:   Diagnosis Date    Alcohol dependence     stopped 2012    Angina pectoris     Arthritis     back    Atherosclerosis of native coronary artery without angina pectoris/ LAD 09/08/2016    Back pain     Chronic hepatitis C with cirrhosis     Depression     Hepatoma     Prior to liver transplant    History of colon polyps 09/09/2015    History of transplantation, liver 04/2012    History of vertebral fracture     Hypertension     Immune deficiency disorder     Incisional hernia following transplant 04/09/2014    Liver failure 11/2011    Related to chemotherapy for hepatoma    Liver transplanted 04/2012    Obesity     Tobacco abuse 09/09/2016    Trouble in sleeping     Vertebral fracture 1975     Past Surgical History:   Procedure Laterality Date    COLONOSCOPY N/A 1/20/2021    Procedure: COLONOSCOPY;  Surgeon: Emerson Helm MD;  Location: [facility];  Service: Endoscopy;  Laterality: N/A;    HERNIA REPAIR Right 2013    incisional    LIVER TRANSPLANT  April 2012    MOUTH SURGERY      TONSILLECTOMY  1958     Family History   Problem Relation Age of Onset    Cancer Mother          lung    Cancer Father         bladder    Diabetes Maternal Uncle     Cancer Maternal Grandmother         uterine?    Cancer Other     Heart disease Neg Hx     Kidney disease Neg Hx     Stroke Neg Hx      Social History     Socioeconomic History    Marital status:     Highest education level: 10th grade   Tobacco Use    Smoking status: Every Day     Packs/day: 0.25     Years: 35.00     Pack years: 8.75     Types: Cigarettes    Smokeless tobacco: Never   Substance and Sexual Activity    Alcohol use: No     Alcohol/week: 0.0 standard drinks     Comment: Quit alcohol after some alcohol abuse, prior to his liver transplant    Drug use: No    Sexual activity: Not Currently     Social Determinants of Health     Financial Resource Strain: High Risk    Difficulty of Paying Living Expenses: Hard   Food Insecurity: No Food Insecurity    Worried About Running Out of Food in the Last Year: Never true    Ran Out of Food in the Last Year: Never true   Transportation Needs: No Transportation Needs    Lack of Transportation (Medical): No    Lack of Transportation (Non-Medical): No   Physical Activity: Inactive    Days of Exercise per Week: 0 days    Minutes of Exercise per Session: 0 min   Stress: Stress Concern Present    Feeling of Stress : To some extent   Social Connections: Moderately Integrated    Frequency of Communication with Friends and Family: More than three times a week    Frequency of Social Gatherings with Friends and Family: Never    Attends Gnosticism Services: More than 4 times per year    Active Member of Clubs or Organizations: Yes    Attends Club or Organization Meetings: More than 4 times per year    Marital Status:    Housing Stability: Low Risk     Unable to Pay for Housing in the Last Year: No    Number of Places Lived in the Last Year: 1    Unstable Housing in the Last Year: No     Review of Systems   Constitutional:  Negative for activity change and appetite change.   HENT:  Negative  for congestion.    Eyes:  Negative for discharge.   Respiratory:  Negative for apnea and chest tightness.    Neurological:  Negative for dizziness and facial asymmetry.   Hematological:  Negative for adenopathy.   Psychiatric/Behavioral:  Negative for agitation.      Objective:      Physical Exam  Vitals and nursing note reviewed.   HENT:      Head: Normocephalic.      Nose: Nose normal.      Mouth/Throat:      Mouth: Mucous membranes are moist.   Cardiovascular:      Rate and Rhythm: Normal rate.   Pulmonary:      Effort: Pulmonary effort is normal.   Abdominal:      General: Abdomen is flat.   Musculoskeletal:         General: Normal range of motion.      Comments: See pics   Neurological:      Mental Status: He is alert and oriented to person, place, and time.         Assessment:       1. Benign essential HTN        2. PVC (premature ventricular contraction)        3. Osteomyelitis of ankle and foot        4. History of hepatitis C             Plan:             Problem List Items Addressed This Visit       History of hepatitis C     GI follow up         Liver transplanted     GI fellow up          Benign essential HTN     BP control as per primary tem          PVC (premature ventricular contraction)     On metoprolol         Osteomyelitis of ankle and foot     Wound cultures neg till date -  He developed hives with Cefepime and had history of PVCS ,so levaquin us ewill need  Will continue Daptomycin.  IV Aztreonam 2 gram every 8 hours will be challenging to do for him a she lives alone.  IV Ertapenem can be another option for GNR coverage   Will inform infusion company to add IV Ertapenem one gram daily for now.  Will use Daptomycin and Ertapenem to complete regime .  EOC-

## 2023-03-14 NOTE — ASSESSMENT & PLAN NOTE
Wound cultures neg till date -  He developed hives with Cefepime and had history of PVCS ,so levaquin us ewill need  Will continue Daptomycin.  IV Aztreonam 2 gram every 8 hours will be challenging to do for him a she lives alone.  IV Ertapenem can be another option for GNR coverage   Will inform infusion company to add IV Ertapenem one gram daily for now.  Will use Daptomycin and Ertapenem to complete regime .  EOC-

## 2023-03-17 ENCOUNTER — TREATMENT PLANNING (OUTPATIENT)
Dept: INFECTIOUS DISEASES | Facility: HOSPITAL | Age: 68
End: 2023-03-17
Payer: MEDICARE

## 2023-03-17 ENCOUNTER — INFUSION (OUTPATIENT)
Dept: INFUSION THERAPY | Facility: HOSPITAL | Age: 68
End: 2023-03-17
Attending: INTERNAL MEDICINE
Payer: MEDICARE

## 2023-03-17 VITALS
HEART RATE: 91 BPM | OXYGEN SATURATION: 94 % | DIASTOLIC BLOOD PRESSURE: 83 MMHG | RESPIRATION RATE: 18 BRPM | TEMPERATURE: 97 F | SYSTOLIC BLOOD PRESSURE: 134 MMHG

## 2023-03-17 DIAGNOSIS — M86.9 OSTEOMYELITIS OF ANKLE AND FOOT: Primary | ICD-10-CM

## 2023-03-17 PROCEDURE — 25000003 PHARM REV CODE 250: Mod: HCNC | Performed by: INTERNAL MEDICINE

## 2023-03-17 PROCEDURE — A4216 STERILE WATER/SALINE, 10 ML: HCPCS | Mod: HCNC | Performed by: INTERNAL MEDICINE

## 2023-03-17 PROCEDURE — 36592 COLLECT BLOOD FROM PICC: CPT | Mod: HCNC

## 2023-03-17 PROCEDURE — 96523 IRRIG DRUG DELIVERY DEVICE: CPT | Mod: HCNC

## 2023-03-17 RX ORDER — SODIUM CHLORIDE 0.9 % (FLUSH) 0.9 %
10 SYRINGE (ML) INJECTION
Status: CANCELLED | OUTPATIENT
Start: 2023-03-17

## 2023-03-17 RX ORDER — SODIUM CHLORIDE 0.9 % (FLUSH) 0.9 %
10 SYRINGE (ML) INJECTION
Status: COMPLETED | OUTPATIENT
Start: 2023-03-17 | End: 2023-03-17

## 2023-03-17 RX ORDER — HEPARIN 100 UNIT/ML
500 SYRINGE INTRAVENOUS
Status: CANCELLED | OUTPATIENT
Start: 2023-03-17

## 2023-03-17 RX ADMIN — Medication 10 ML: at 08:03

## 2023-03-17 NOTE — DISCHARGE INSTRUCTIONS
..[facility]  [id number] [facility]  [address]  [phone] phone     [phone] fax  Hours of Operation: Monday- Friday 8:00am- 5:00pm  After hours phone  [phone]  Hematology / Oncology Physicians on call    Dr. Fabian Bashir      Nurse Practitioners:    Lynnette Sloan, ROBER Oates, ROBER Gardiner, ROBER Gómez, ROBER Salamanca, ALDA Pierre      Please don't hesitate to call if you have any concerns.

## 2023-03-17 NOTE — PROGRESS NOTES
Therapy plan addendum -  I was contacted by home health to add a therapy plan for PICC line.    Patient will need picc line flush.  Will follow with home health

## 2023-03-23 ENCOUNTER — INFUSION (OUTPATIENT)
Dept: INFUSION THERAPY | Facility: HOSPITAL | Age: 68
End: 2023-03-23
Attending: INTERNAL MEDICINE
Payer: MEDICARE

## 2023-03-23 VITALS
OXYGEN SATURATION: 95 % | RESPIRATION RATE: 18 BRPM | HEART RATE: 90 BPM | TEMPERATURE: 97 F | DIASTOLIC BLOOD PRESSURE: 82 MMHG | SYSTOLIC BLOOD PRESSURE: 131 MMHG

## 2023-03-23 DIAGNOSIS — M86.9 OSTEOMYELITIS OF TOE: ICD-10-CM

## 2023-03-23 DIAGNOSIS — M86.9 OSTEOMYELITIS OF ANKLE AND FOOT: Primary | ICD-10-CM

## 2023-03-23 LAB
ALBUMIN SERPL BCP-MCNC: 3.4 G/DL (ref 3.5–5.2)
ALP SERPL-CCNC: 116 U/L (ref 55–135)
ALT SERPL W/O P-5'-P-CCNC: 17 U/L (ref 10–44)
ANION GAP SERPL CALC-SCNC: 9 MMOL/L (ref 8–16)
AST SERPL-CCNC: 25 U/L (ref 10–40)
BASOPHILS # BLD AUTO: 0.11 K/UL (ref 0–0.2)
BASOPHILS NFR BLD: 1.2 % (ref 0–1.9)
BILIRUB SERPL-MCNC: 0.5 MG/DL (ref 0.1–1)
BUN SERPL-MCNC: 15 MG/DL (ref 8–23)
CALCIUM SERPL-MCNC: 9.4 MG/DL (ref 8.7–10.5)
CHLORIDE SERPL-SCNC: 103 MMOL/L (ref 95–110)
CK SERPL-CCNC: 147 U/L (ref 20–200)
CO2 SERPL-SCNC: 27 MMOL/L (ref 23–29)
CREAT SERPL-MCNC: 1 MG/DL (ref 0.5–1.4)
CRP SERPL-MCNC: 25.4 MG/L (ref 0–8.2)
DIFFERENTIAL METHOD: NORMAL
EOSINOPHIL # BLD AUTO: 0.4 K/UL (ref 0–0.5)
EOSINOPHIL NFR BLD: 4.5 % (ref 0–8)
ERYTHROCYTE [DISTWIDTH] IN BLOOD BY AUTOMATED COUNT: 13.3 % (ref 11.5–14.5)
ERYTHROCYTE [SEDIMENTATION RATE] IN BLOOD BY PHOTOMETRIC METHOD: 45 MM/HR (ref 0–23)
EST. GFR  (NO RACE VARIABLE): >60 ML/MIN/1.73 M^2
GLUCOSE SERPL-MCNC: 89 MG/DL (ref 70–110)
HCT VFR BLD AUTO: 45.8 % (ref 40–54)
HGB BLD-MCNC: 15.3 G/DL (ref 14–18)
IMM GRANULOCYTES # BLD AUTO: 0.04 K/UL (ref 0–0.04)
IMM GRANULOCYTES NFR BLD AUTO: 0.4 % (ref 0–0.5)
LYMPHOCYTES # BLD AUTO: 1.9 K/UL (ref 1–4.8)
LYMPHOCYTES NFR BLD: 20.4 % (ref 18–48)
MCH RBC QN AUTO: 29.1 PG (ref 27–31)
MCHC RBC AUTO-ENTMCNC: 33.4 G/DL (ref 32–36)
MCV RBC AUTO: 87 FL (ref 82–98)
MONOCYTES # BLD AUTO: 0.5 K/UL (ref 0.3–1)
MONOCYTES NFR BLD: 5.7 % (ref 4–15)
NEUTROPHILS # BLD AUTO: 6.3 K/UL (ref 1.8–7.7)
NEUTROPHILS NFR BLD: 67.8 % (ref 38–73)
NRBC BLD-RTO: 0 /100 WBC
PLATELET # BLD AUTO: 186 K/UL (ref 150–450)
PMV BLD AUTO: 11.8 FL (ref 9.2–12.9)
POTASSIUM SERPL-SCNC: 4.1 MMOL/L (ref 3.5–5.1)
PROT SERPL-MCNC: 7.4 G/DL (ref 6–8.4)
RBC # BLD AUTO: 5.26 M/UL (ref 4.6–6.2)
SODIUM SERPL-SCNC: 139 MMOL/L (ref 136–145)
WBC # BLD AUTO: 9.27 K/UL (ref 3.9–12.7)

## 2023-03-23 PROCEDURE — 36592 COLLECT BLOOD FROM PICC: CPT | Mod: HCNC

## 2023-03-23 PROCEDURE — 85025 COMPLETE CBC W/AUTO DIFF WBC: CPT | Mod: HCNC | Performed by: INTERNAL MEDICINE

## 2023-03-23 PROCEDURE — 85652 RBC SED RATE AUTOMATED: CPT | Mod: HCNC | Performed by: INTERNAL MEDICINE

## 2023-03-23 PROCEDURE — 80053 COMPREHEN METABOLIC PANEL: CPT | Mod: HCNC | Performed by: INTERNAL MEDICINE

## 2023-03-23 PROCEDURE — 63600175 PHARM REV CODE 636 W HCPCS: Mod: HCNC | Performed by: INTERNAL MEDICINE

## 2023-03-23 PROCEDURE — 86140 C-REACTIVE PROTEIN: CPT | Mod: HCNC | Performed by: INTERNAL MEDICINE

## 2023-03-23 PROCEDURE — 82550 ASSAY OF CK (CPK): CPT | Mod: HCNC | Performed by: INTERNAL MEDICINE

## 2023-03-23 RX ORDER — HEPARIN 100 UNIT/ML
500 SYRINGE INTRAVENOUS
Status: DISCONTINUED | OUTPATIENT
Start: 2023-03-23 | End: 2023-03-23 | Stop reason: HOSPADM

## 2023-03-23 RX ORDER — HEPARIN 100 UNIT/ML
500 SYRINGE INTRAVENOUS
Status: CANCELLED | OUTPATIENT
Start: 2023-03-23

## 2023-03-23 RX ORDER — SODIUM CHLORIDE 0.9 % (FLUSH) 0.9 %
10 SYRINGE (ML) INJECTION
Status: DISCONTINUED | OUTPATIENT
Start: 2023-03-23 | End: 2023-03-23 | Stop reason: HOSPADM

## 2023-03-23 RX ORDER — SODIUM CHLORIDE 0.9 % (FLUSH) 0.9 %
10 SYRINGE (ML) INJECTION
Status: CANCELLED | OUTPATIENT
Start: 2023-03-23

## 2023-03-23 RX ADMIN — HEPARIN 500 UNITS: 100 SYRINGE at 11:03

## 2023-03-23 NOTE — NURSING
Lab draw and dressing change w/o incident. Next appts verified w/ pt and pt asked that next weeks drsg change and labs be on Wednesday. Appt rescheduled as requested.

## 2023-03-29 ENCOUNTER — INFUSION (OUTPATIENT)
Dept: INFUSION THERAPY | Facility: HOSPITAL | Age: 68
End: 2023-03-29
Attending: INTERNAL MEDICINE
Payer: MEDICARE

## 2023-03-29 VITALS
HEART RATE: 85 BPM | OXYGEN SATURATION: 97 % | TEMPERATURE: 97 F | DIASTOLIC BLOOD PRESSURE: 80 MMHG | RESPIRATION RATE: 18 BRPM | SYSTOLIC BLOOD PRESSURE: 150 MMHG

## 2023-03-29 DIAGNOSIS — M86.9 OSTEOMYELITIS OF ANKLE AND FOOT: Primary | ICD-10-CM

## 2023-03-29 PROCEDURE — 36592 COLLECT BLOOD FROM PICC: CPT | Mod: HCNC

## 2023-03-29 PROCEDURE — A4216 STERILE WATER/SALINE, 10 ML: HCPCS | Mod: HCNC | Performed by: INTERNAL MEDICINE

## 2023-03-29 PROCEDURE — 63600175 PHARM REV CODE 636 W HCPCS: Mod: HCNC | Performed by: INTERNAL MEDICINE

## 2023-03-29 PROCEDURE — 25000003 PHARM REV CODE 250: Mod: HCNC | Performed by: INTERNAL MEDICINE

## 2023-03-29 RX ORDER — SODIUM CHLORIDE 0.9 % (FLUSH) 0.9 %
10 SYRINGE (ML) INJECTION
Status: COMPLETED | OUTPATIENT
Start: 2023-03-29 | End: 2023-03-29

## 2023-03-29 RX ORDER — SODIUM CHLORIDE 0.9 % (FLUSH) 0.9 %
10 SYRINGE (ML) INJECTION
OUTPATIENT
Start: 2023-03-29

## 2023-03-29 RX ORDER — HEPARIN 100 UNIT/ML
500 SYRINGE INTRAVENOUS
Status: COMPLETED | OUTPATIENT
Start: 2023-03-29 | End: 2023-03-29

## 2023-03-29 RX ORDER — HEPARIN 100 UNIT/ML
500 SYRINGE INTRAVENOUS
OUTPATIENT
Start: 2023-03-29

## 2023-03-29 RX ADMIN — Medication 10 ML: at 11:03

## 2023-03-29 RX ADMIN — HEPARIN 500 UNITS: 100 SYRINGE at 11:03

## 2023-04-03 ENCOUNTER — LAB VISIT (OUTPATIENT)
Dept: LAB | Facility: HOSPITAL | Age: 68
End: 2023-04-03
Attending: INTERNAL MEDICINE
Payer: MEDICARE

## 2023-04-03 DIAGNOSIS — Z94.4 S/P LIVER TRANSPLANT: ICD-10-CM

## 2023-04-03 LAB
ALBUMIN SERPL BCP-MCNC: 3.4 G/DL (ref 3.5–5.2)
ALP SERPL-CCNC: 139 U/L (ref 55–135)
ALT SERPL W/O P-5'-P-CCNC: 14 U/L (ref 10–44)
ANION GAP SERPL CALC-SCNC: 11 MMOL/L (ref 8–16)
AST SERPL-CCNC: 17 U/L (ref 10–40)
BASOPHILS # BLD AUTO: 0.08 K/UL (ref 0–0.2)
BASOPHILS NFR BLD: 0.8 % (ref 0–1.9)
BILIRUB SERPL-MCNC: 0.7 MG/DL (ref 0.1–1)
BUN SERPL-MCNC: 12 MG/DL (ref 8–23)
CALCIUM SERPL-MCNC: 8.8 MG/DL (ref 8.7–10.5)
CHLORIDE SERPL-SCNC: 105 MMOL/L (ref 95–110)
CO2 SERPL-SCNC: 22 MMOL/L (ref 23–29)
CREAT SERPL-MCNC: 1.1 MG/DL (ref 0.5–1.4)
DIFFERENTIAL METHOD: ABNORMAL
EOSINOPHIL # BLD AUTO: 0.4 K/UL (ref 0–0.5)
EOSINOPHIL NFR BLD: 3.7 % (ref 0–8)
ERYTHROCYTE [DISTWIDTH] IN BLOOD BY AUTOMATED COUNT: 13.6 % (ref 11.5–14.5)
EST. GFR  (NO RACE VARIABLE): >60 ML/MIN/1.73 M^2
GLUCOSE SERPL-MCNC: 119 MG/DL (ref 70–110)
HCT VFR BLD AUTO: 49.6 % (ref 40–54)
HGB BLD-MCNC: 15.8 G/DL (ref 14–18)
IMM GRANULOCYTES # BLD AUTO: 0.06 K/UL (ref 0–0.04)
IMM GRANULOCYTES NFR BLD AUTO: 0.6 % (ref 0–0.5)
LYMPHOCYTES # BLD AUTO: 1.6 K/UL (ref 1–4.8)
LYMPHOCYTES NFR BLD: 16.8 % (ref 18–48)
MCH RBC QN AUTO: 28.1 PG (ref 27–31)
MCHC RBC AUTO-ENTMCNC: 31.9 G/DL (ref 32–36)
MCV RBC AUTO: 88 FL (ref 82–98)
MONOCYTES # BLD AUTO: 0.6 K/UL (ref 0.3–1)
MONOCYTES NFR BLD: 6.4 % (ref 4–15)
NEUTROPHILS # BLD AUTO: 7 K/UL (ref 1.8–7.7)
NEUTROPHILS NFR BLD: 71.7 % (ref 38–73)
NRBC BLD-RTO: 0 /100 WBC
PLATELET # BLD AUTO: 189 K/UL (ref 150–450)
PMV BLD AUTO: 12.2 FL (ref 9.2–12.9)
POTASSIUM SERPL-SCNC: 3.9 MMOL/L (ref 3.5–5.1)
PROT SERPL-MCNC: 7.1 G/DL (ref 6–8.4)
RBC # BLD AUTO: 5.62 M/UL (ref 4.6–6.2)
SODIUM SERPL-SCNC: 138 MMOL/L (ref 136–145)
WBC # BLD AUTO: 9.72 K/UL (ref 3.9–12.7)

## 2023-04-03 PROCEDURE — 85025 COMPLETE CBC W/AUTO DIFF WBC: CPT | Mod: HCNC | Performed by: INTERNAL MEDICINE

## 2023-04-03 PROCEDURE — 36415 COLL VENOUS BLD VENIPUNCTURE: CPT | Mod: HCNC | Performed by: INTERNAL MEDICINE

## 2023-04-03 PROCEDURE — 80197 ASSAY OF TACROLIMUS: CPT | Mod: HCNC | Performed by: INTERNAL MEDICINE

## 2023-04-03 PROCEDURE — 80053 COMPREHEN METABOLIC PANEL: CPT | Mod: HCNC | Performed by: INTERNAL MEDICINE

## 2023-04-04 ENCOUNTER — HOSPITAL ENCOUNTER (OUTPATIENT)
Facility: HOSPITAL | Age: 68
Discharge: HOME | End: 2023-04-06
Attending: EMERGENCY MEDICINE | Admitting: INTERNAL MEDICINE
Payer: MEDICARE

## 2023-04-04 DIAGNOSIS — Z45.2 PICC (PERIPHERALLY INSERTED CENTRAL CATHETER) IN PLACE: ICD-10-CM

## 2023-04-04 DIAGNOSIS — I82.622 ARM DVT (DEEP VENOUS THROMBOEMBOLISM), ACUTE, LEFT: Primary | ICD-10-CM

## 2023-04-04 DIAGNOSIS — R07.9 CHEST PAIN: ICD-10-CM

## 2023-04-04 LAB
ALBUMIN SERPL BCP-MCNC: 3.2 G/DL (ref 3.5–5.2)
ALP SERPL-CCNC: 123 U/L (ref 55–135)
ALT SERPL W/O P-5'-P-CCNC: 13 U/L (ref 10–44)
ANION GAP SERPL CALC-SCNC: 11 MMOL/L (ref 8–16)
APTT PPP: 25 SEC (ref 21–32)
APTT PPP: 27.3 SEC (ref 21–32)
APTT PPP: 49.8 SEC (ref 21–32)
AST SERPL-CCNC: 17 U/L (ref 10–40)
BASOPHILS # BLD AUTO: 0.07 K/UL (ref 0–0.2)
BASOPHILS # BLD AUTO: 0.09 K/UL (ref 0–0.2)
BASOPHILS NFR BLD: 0.8 % (ref 0–1.9)
BASOPHILS NFR BLD: 1 % (ref 0–1.9)
BILIRUB SERPL-MCNC: 0.4 MG/DL (ref 0.1–1)
BUN SERPL-MCNC: 16 MG/DL (ref 8–23)
CALCIUM SERPL-MCNC: 8.5 MG/DL (ref 8.7–10.5)
CHLORIDE SERPL-SCNC: 106 MMOL/L (ref 95–110)
CO2 SERPL-SCNC: 23 MMOL/L (ref 23–29)
CREAT SERPL-MCNC: 1 MG/DL (ref 0.5–1.4)
DIFFERENTIAL METHOD: ABNORMAL
DIFFERENTIAL METHOD: NORMAL
EOSINOPHIL # BLD AUTO: 0.4 K/UL (ref 0–0.5)
EOSINOPHIL # BLD AUTO: 0.5 K/UL (ref 0–0.5)
EOSINOPHIL NFR BLD: 4.8 % (ref 0–8)
EOSINOPHIL NFR BLD: 5 % (ref 0–8)
ERYTHROCYTE [DISTWIDTH] IN BLOOD BY AUTOMATED COUNT: 13.5 % (ref 11.5–14.5)
ERYTHROCYTE [DISTWIDTH] IN BLOOD BY AUTOMATED COUNT: 13.7 % (ref 11.5–14.5)
EST. GFR  (NO RACE VARIABLE): >60 ML/MIN/1.73 M^2
GLUCOSE SERPL-MCNC: 131 MG/DL (ref 70–110)
HCT VFR BLD AUTO: 43.5 % (ref 40–54)
HCT VFR BLD AUTO: 45.1 % (ref 40–54)
HGB BLD-MCNC: 14.8 G/DL (ref 14–18)
HGB BLD-MCNC: 15.1 G/DL (ref 14–18)
HIV 1+2 AB+HIV1 P24 AG SERPL QL IA: NEGATIVE
IMM GRANULOCYTES # BLD AUTO: 0.03 K/UL (ref 0–0.04)
IMM GRANULOCYTES # BLD AUTO: 0.04 K/UL (ref 0–0.04)
IMM GRANULOCYTES NFR BLD AUTO: 0.3 % (ref 0–0.5)
IMM GRANULOCYTES NFR BLD AUTO: 0.4 % (ref 0–0.5)
INR PPP: 1 (ref 0.8–1.2)
INR PPP: 1 (ref 0.8–1.2)
LYMPHOCYTES # BLD AUTO: 1.5 K/UL (ref 1–4.8)
LYMPHOCYTES # BLD AUTO: 1.9 K/UL (ref 1–4.8)
LYMPHOCYTES NFR BLD: 16.7 % (ref 18–48)
LYMPHOCYTES NFR BLD: 20.3 % (ref 18–48)
MCH RBC QN AUTO: 28.8 PG (ref 27–31)
MCH RBC QN AUTO: 29.2 PG (ref 27–31)
MCHC RBC AUTO-ENTMCNC: 33.5 G/DL (ref 32–36)
MCHC RBC AUTO-ENTMCNC: 34 G/DL (ref 32–36)
MCV RBC AUTO: 86 FL (ref 82–98)
MCV RBC AUTO: 86 FL (ref 82–98)
MONOCYTES # BLD AUTO: 0.5 K/UL (ref 0.3–1)
MONOCYTES # BLD AUTO: 0.6 K/UL (ref 0.3–1)
MONOCYTES NFR BLD: 6.1 % (ref 4–15)
MONOCYTES NFR BLD: 6.4 % (ref 4–15)
NEUTROPHILS # BLD AUTO: 6.2 K/UL (ref 1.8–7.7)
NEUTROPHILS # BLD AUTO: 6.3 K/UL (ref 1.8–7.7)
NEUTROPHILS NFR BLD: 66.9 % (ref 38–73)
NEUTROPHILS NFR BLD: 71.3 % (ref 38–73)
NRBC BLD-RTO: 0 /100 WBC
NRBC BLD-RTO: 0 /100 WBC
PLATELET # BLD AUTO: 150 K/UL (ref 150–450)
PLATELET # BLD AUTO: 167 K/UL (ref 150–450)
PMV BLD AUTO: 10.7 FL (ref 9.2–12.9)
PMV BLD AUTO: 11.2 FL (ref 9.2–12.9)
POTASSIUM SERPL-SCNC: 3.4 MMOL/L (ref 3.5–5.1)
PROT SERPL-MCNC: 7.2 G/DL (ref 6–8.4)
PROTHROMBIN TIME: 11 SEC (ref 9–12.5)
PROTHROMBIN TIME: 11.2 SEC (ref 9–12.5)
RBC # BLD AUTO: 5.07 M/UL (ref 4.6–6.2)
RBC # BLD AUTO: 5.24 M/UL (ref 4.6–6.2)
SODIUM SERPL-SCNC: 140 MMOL/L (ref 136–145)
TACROLIMUS BLD-MCNC: 5.8 NG/ML (ref 5–15)
WBC # BLD AUTO: 8.81 K/UL (ref 3.9–12.7)
WBC # BLD AUTO: 9.33 K/UL (ref 3.9–12.7)

## 2023-04-04 PROCEDURE — G0378 HOSPITAL OBSERVATION PER HR: HCPCS | Mod: HCNC

## 2023-04-04 PROCEDURE — 25000003 PHARM REV CODE 250: Mod: HCNC | Performed by: NURSE PRACTITIONER

## 2023-04-04 PROCEDURE — 85025 COMPLETE CBC W/AUTO DIFF WBC: CPT | Mod: 91,HCNC | Performed by: NURSE PRACTITIONER

## 2023-04-04 PROCEDURE — 80197 ASSAY OF TACROLIMUS: CPT | Mod: HCNC | Performed by: NURSE PRACTITIONER

## 2023-04-04 PROCEDURE — 99285 EMERGENCY DEPT VISIT HI MDM: CPT | Mod: 25,HCNC

## 2023-04-04 PROCEDURE — 85730 THROMBOPLASTIN TIME PARTIAL: CPT | Mod: 91,HCNC | Performed by: INTERNAL MEDICINE

## 2023-04-04 PROCEDURE — S4991 NICOTINE PATCH NONLEGEND: HCPCS | Mod: HCNC | Performed by: NURSE PRACTITIONER

## 2023-04-04 PROCEDURE — 85610 PROTHROMBIN TIME: CPT | Mod: HCNC | Performed by: NURSE PRACTITIONER

## 2023-04-04 PROCEDURE — 80053 COMPREHEN METABOLIC PANEL: CPT | Mod: HCNC | Performed by: PHYSICIAN ASSISTANT

## 2023-04-04 PROCEDURE — 85730 THROMBOPLASTIN TIME PARTIAL: CPT | Mod: HCNC | Performed by: NURSE PRACTITIONER

## 2023-04-04 PROCEDURE — 36415 COLL VENOUS BLD VENIPUNCTURE: CPT | Mod: HCNC | Performed by: NURSE PRACTITIONER

## 2023-04-04 PROCEDURE — 96376 TX/PRO/DX INJ SAME DRUG ADON: CPT | Mod: HCNC

## 2023-04-04 PROCEDURE — 96367 TX/PROPH/DG ADDL SEQ IV INF: CPT | Mod: HCNC

## 2023-04-04 PROCEDURE — 87389 HIV-1 AG W/HIV-1&-2 AB AG IA: CPT | Mod: HCNC | Performed by: EMERGENCY MEDICINE

## 2023-04-04 PROCEDURE — 36415 COLL VENOUS BLD VENIPUNCTURE: CPT | Mod: HCNC | Performed by: INTERNAL MEDICINE

## 2023-04-04 PROCEDURE — 85730 THROMBOPLASTIN TIME PARTIAL: CPT | Mod: 91,HCNC | Performed by: EMERGENCY MEDICINE

## 2023-04-04 PROCEDURE — 85025 COMPLETE CBC W/AUTO DIFF WBC: CPT | Mod: HCNC | Performed by: PHYSICIAN ASSISTANT

## 2023-04-04 PROCEDURE — 96366 THER/PROPH/DIAG IV INF ADDON: CPT

## 2023-04-04 PROCEDURE — 25500020 PHARM REV CODE 255: Mod: HCNC | Performed by: INTERNAL MEDICINE

## 2023-04-04 PROCEDURE — 96366 THER/PROPH/DIAG IV INF ADDON: CPT | Mod: HCNC

## 2023-04-04 PROCEDURE — 85610 PROTHROMBIN TIME: CPT | Mod: 91,HCNC | Performed by: EMERGENCY MEDICINE

## 2023-04-04 PROCEDURE — 63600175 PHARM REV CODE 636 W HCPCS: Mod: HCNC | Performed by: NURSE PRACTITIONER

## 2023-04-04 PROCEDURE — 96365 THER/PROPH/DIAG IV INF INIT: CPT | Mod: HCNC

## 2023-04-04 RX ORDER — NALOXONE HCL 0.4 MG/ML
0.02 VIAL (ML) INJECTION
Status: DISCONTINUED | OUTPATIENT
Start: 2023-04-04 | End: 2023-04-06 | Stop reason: HOSPADM

## 2023-04-04 RX ORDER — DAPTOMYCIN 50 MG/ML
500 INJECTION, POWDER, LYOPHILIZED, FOR SOLUTION INTRAVENOUS DAILY
Status: ON HOLD | COMMUNITY
Start: 2023-03-30 | End: 2023-04-06 | Stop reason: HOSPADM

## 2023-04-04 RX ORDER — TALC
6 POWDER (GRAM) TOPICAL NIGHTLY PRN
Status: DISCONTINUED | OUTPATIENT
Start: 2023-04-04 | End: 2023-04-06 | Stop reason: HOSPADM

## 2023-04-04 RX ORDER — METOPROLOL SUCCINATE 25 MG/1
25 TABLET, EXTENDED RELEASE ORAL DAILY
Status: DISCONTINUED | OUTPATIENT
Start: 2023-04-05 | End: 2023-04-06 | Stop reason: HOSPADM

## 2023-04-04 RX ORDER — HEPARIN SODIUM,PORCINE/D5W 25000/250
0-40 INTRAVENOUS SOLUTION INTRAVENOUS CONTINUOUS
Status: DISCONTINUED | OUTPATIENT
Start: 2023-04-04 | End: 2023-04-06

## 2023-04-04 RX ORDER — DEXTROSE 40 %
30 GEL (GRAM) ORAL
Status: DISCONTINUED | OUTPATIENT
Start: 2023-04-04 | End: 2023-04-06 | Stop reason: HOSPADM

## 2023-04-04 RX ORDER — ERTAPENEM 1 G/1
1 INJECTION, POWDER, LYOPHILIZED, FOR SOLUTION INTRAMUSCULAR; INTRAVENOUS DAILY
Status: ON HOLD | COMMUNITY
Start: 2023-03-30 | End: 2023-04-06 | Stop reason: HOSPADM

## 2023-04-04 RX ORDER — TACROLIMUS 1 MG/1
1 CAPSULE ORAL 2 TIMES DAILY
Status: DISCONTINUED | OUTPATIENT
Start: 2023-04-04 | End: 2023-04-06 | Stop reason: HOSPADM

## 2023-04-04 RX ORDER — SODIUM CHLORIDE 0.9 % (FLUSH) 0.9 %
10 SYRINGE (ML) INJECTION EVERY 12 HOURS PRN
Status: DISCONTINUED | OUTPATIENT
Start: 2023-04-04 | End: 2023-04-06 | Stop reason: HOSPADM

## 2023-04-04 RX ORDER — ONDANSETRON 2 MG/ML
4 INJECTION INTRAMUSCULAR; INTRAVENOUS EVERY 8 HOURS PRN
Status: DISCONTINUED | OUTPATIENT
Start: 2023-04-04 | End: 2023-04-06 | Stop reason: HOSPADM

## 2023-04-04 RX ORDER — ACETAMINOPHEN 325 MG/1
650 TABLET ORAL EVERY 4 HOURS PRN
Status: DISCONTINUED | OUTPATIENT
Start: 2023-04-04 | End: 2023-04-06 | Stop reason: HOSPADM

## 2023-04-04 RX ORDER — ERTAPENEM 1 G/1
1 INJECTION, POWDER, LYOPHILIZED, FOR SOLUTION INTRAMUSCULAR; INTRAVENOUS DAILY
Status: DISCONTINUED | OUTPATIENT
Start: 2023-04-04 | End: 2023-04-04

## 2023-04-04 RX ORDER — IBUPROFEN 200 MG
1 TABLET ORAL DAILY
Status: DISCONTINUED | OUTPATIENT
Start: 2023-04-04 | End: 2023-04-06 | Stop reason: HOSPADM

## 2023-04-04 RX ORDER — TRAMADOL HYDROCHLORIDE 50 MG/1
50 TABLET ORAL EVERY 6 HOURS PRN
Status: DISCONTINUED | OUTPATIENT
Start: 2023-04-04 | End: 2023-04-06 | Stop reason: HOSPADM

## 2023-04-04 RX ORDER — BISACODYL 10 MG
10 SUPPOSITORY, RECTAL RECTAL DAILY PRN
Status: DISCONTINUED | OUTPATIENT
Start: 2023-04-04 | End: 2023-04-06 | Stop reason: HOSPADM

## 2023-04-04 RX ORDER — HEPARIN SODIUM,PORCINE/D5W 25000/250
0-40 INTRAVENOUS SOLUTION INTRAVENOUS CONTINUOUS
Status: DISCONTINUED | OUTPATIENT
Start: 2023-04-04 | End: 2023-04-04 | Stop reason: SDUPTHER

## 2023-04-04 RX ORDER — DAPTOMYCIN 50 MG/ML
500 INJECTION, POWDER, LYOPHILIZED, FOR SOLUTION INTRAVENOUS DAILY
Status: DISCONTINUED | OUTPATIENT
Start: 2023-04-04 | End: 2023-04-04

## 2023-04-04 RX ORDER — DEXTROSE 40 %
15 GEL (GRAM) ORAL
Status: DISCONTINUED | OUTPATIENT
Start: 2023-04-04 | End: 2023-04-06 | Stop reason: HOSPADM

## 2023-04-04 RX ORDER — GLUCAGON 1 MG
1 KIT INJECTION
Status: DISCONTINUED | OUTPATIENT
Start: 2023-04-04 | End: 2023-04-06 | Stop reason: HOSPADM

## 2023-04-04 RX ADMIN — ERTAPENEM 1 G: 1 INJECTION INTRAMUSCULAR; INTRAVENOUS at 07:04

## 2023-04-04 RX ADMIN — TACROLIMUS 1 MG: 1 CAPSULE ORAL at 05:04

## 2023-04-04 RX ADMIN — IOHEXOL 100 ML: 350 INJECTION, SOLUTION INTRAVENOUS at 04:04

## 2023-04-04 RX ADMIN — NICOTINE 1 PATCH: 21 PATCH, EXTENDED RELEASE TRANSDERMAL at 05:04

## 2023-04-04 RX ADMIN — DAPTOMYCIN 700 MG: 350 INJECTION, POWDER, LYOPHILIZED, FOR SOLUTION INTRAVENOUS at 06:04

## 2023-04-04 RX ADMIN — HEPARIN SODIUM 18 UNITS/KG/HR: 10000 INJECTION, SOLUTION INTRAVENOUS at 03:04

## 2023-04-04 NOTE — SUBJECTIVE & OBJECTIVE
Past Medical History:   Diagnosis Date    Alcohol dependence     stopped 2012    Angina pectoris     Arthritis     back    Atherosclerosis of native coronary artery without angina pectoris/ LAD 09/08/2016    Back pain     Chronic hepatitis C with cirrhosis     Depression     Hepatoma     Prior to liver transplant    History of colon polyps 09/09/2015    History of transplantation, liver 04/2012    History of vertebral fracture     Hypertension     Immune deficiency disorder     Incisional hernia following transplant 04/09/2014    Liver failure 11/2011    Related to chemotherapy for hepatoma    Liver transplanted 04/2012    Obesity     Tobacco abuse 09/09/2016    Trouble in sleeping     Vertebral fracture 1975       Past Surgical History:   Procedure Laterality Date    COLONOSCOPY N/A 1/20/2021    Procedure: COLONOSCOPY;  Surgeon: Emerson Helm MD;  Location: [facility];  Service: Endoscopy;  Laterality: N/A;    HERNIA REPAIR Right 2013    incisional    LIVER TRANSPLANT  April 2012    MOUTH SURGERY      TONSILLECTOMY  1958       Review of patient's allergies indicates:   Allergen Reactions    Codeine Other (See Comments)     Upset stomach       Current Facility-Administered Medications on File Prior to Encounter   Medication    lactated ringers infusion    sodium chloride 0.9% flush 2 mL     Current Outpatient Medications on File Prior to Encounter   Medication Sig    DAPTOmycin (CUBICIN) 500 mg injection Inject 500 mg into the vein once daily.    ertapenem (INVANZ) 1 gram injection Inject 1 g into the vein once daily.    esomeprazole (NEXIUM) 40 MG capsule Take 1 capsule (40 mg total) by mouth once daily.    metoprolol succinate (TOPROL-XL) 25 MG 24 hr tablet Take 1 tablet (25 mg total) by mouth once daily.    tacrolimus (PROGRAF) 0.5 MG Cap TAKE 2 CAPSULES EVERY 12 HOURS    traMADoL (ULTRAM) 50 mg tablet Take 1 tablet (50 mg total) by mouth every 6 (six) hours as needed for Pain.    [DISCONTINUED] FLUAD  7330-0029, 65 YR UP,,PF, 45 mcg (15 mcg x 3)/0.5 mL Syrg     [DISCONTINUED] mupirocin (BACTROBAN) 2 % ointment Apply topically 3 (three) times daily.     Family History       Problem Relation (Age of Onset)    Cancer Mother, Father, Maternal Grandmother, Other    Diabetes Maternal Uncle          Tobacco Use    Smoking status: Every Day     Packs/day: 0.25     Years: 35.00     Pack years: 8.75     Types: Cigarettes    Smokeless tobacco: Never   Substance and Sexual Activity    Alcohol use: No     Alcohol/week: 0.0 standard drinks     Comment: Quit alcohol after some alcohol abuse, prior to his liver transplant    Drug use: No    Sexual activity: Not Currently     Review of Systems   Constitutional:  Positive for fatigue. Negative for activity change, appetite change, chills, diaphoresis, fever and unexpected weight change.   HENT:  Negative for congestion, hearing loss, nosebleeds, postnasal drip, rhinorrhea and trouble swallowing.    Eyes:  Negative for discharge and visual disturbance.   Respiratory:  Positive for shortness of breath. Negative for cough and chest tightness.    Cardiovascular:  Negative for chest pain, palpitations and leg swelling.   Gastrointestinal:  Negative for abdominal distention, abdominal pain, constipation, diarrhea, nausea and vomiting.   Endocrine: Negative for cold intolerance and heat intolerance.   Genitourinary:  Negative for difficulty urinating, dysuria, frequency and hematuria.   Musculoskeletal:  Positive for arthralgias and back pain. Negative for gait problem and myalgias.   Skin:  Positive for color change (LUE purple discoloration).   Neurological:  Negative for dizziness, weakness, light-headedness and headaches.   Hematological:  Negative for adenopathy. Does not bruise/bleed easily.   Psychiatric/Behavioral:  Negative for agitation, behavioral problems and confusion. The patient is nervous/anxious.    Objective:     Vital Signs (Most Recent):  Temp: 98.5 °F (36.9 °C)  (04/04/23 1251)  Pulse: (!) 111 (04/04/23 1251)  Resp: 20 (04/04/23 1251)  BP: (!) 175/89 (04/04/23 1251)  SpO2: 95 % (04/04/23 1251)   Vital Signs (24h Range):  Temp:  [98.5 °F (36.9 °C)] 98.5 °F (36.9 °C)  Pulse:  [111] 111  Resp:  [20] 20  SpO2:  [95 %] 95 %  BP: (175)/(89) 175/89     Weight: 105.8 kg (233 lb 5.7 oz)  Body mass index is 35.48 kg/m².    Physical Exam  Vitals and nursing note reviewed.   Constitutional:       General: He is not in acute distress.     Appearance: He is well-developed. He is not diaphoretic.   HENT:      Head: Normocephalic and atraumatic.      Right Ear: Hearing and external ear normal.      Left Ear: Hearing and external ear normal.      Nose: Nose normal. No mucosal edema or rhinorrhea.      Mouth/Throat:      Pharynx: Uvula midline.   Eyes:      General:         Right eye: No discharge.         Left eye: No discharge.      Conjunctiva/sclera: Conjunctivae normal.      Right eye: No chemosis.     Left eye: No chemosis.     Pupils: Pupils are equal, round, and reactive to light.   Neck:      Thyroid: No thyroid mass or thyromegaly.      Trachea: Trachea normal.   Cardiovascular:      Rate and Rhythm: Normal rate and regular rhythm.      Pulses:           Radial pulses are 2+ on the right side and 1+ on the left side.        Dorsalis pedis pulses are 2+ on the right side and 2+ on the left side.      Heart sounds: Normal heart sounds. No murmur heard.     Comments: LUE- edema, darkened  Pulmonary:      Effort: Pulmonary effort is normal. No respiratory distress.      Breath sounds: Normal breath sounds. No decreased breath sounds or wheezing.   Abdominal:      General: Bowel sounds are normal. There is no distension.      Palpations: Abdomen is soft.      Tenderness: There is no abdominal tenderness.   Musculoskeletal:         General: Normal range of motion.      Cervical back: Normal range of motion and neck supple.   Lymphadenopathy:      Cervical: No cervical adenopathy.       Upper Body:      Right upper body: No supraclavicular adenopathy.      Left upper body: No supraclavicular adenopathy.   Skin:     General: Skin is warm and dry.      Capillary Refill: Capillary refill takes less than 2 seconds.      Findings: No rash.          Neurological:      Mental Status: He is alert and oriented to person, place, and time.   Psychiatric:         Mood and Affect: Mood is not anxious.         Speech: Speech normal.         Behavior: Behavior normal.         Thought Content: Thought content normal.         Judgment: Judgment normal.         CRANIAL NERVES     CN III, IV, VI   Pupils are equal, round, and reactive to light.     Significant Labs: All pertinent labs within the past 24 hours have been reviewed.  CBC:   Recent Labs   Lab 04/03/23  0813 04/04/23  1327 04/04/23  1532   WBC 9.72 8.81 9.33   HGB 15.8 15.1 14.8   HCT 49.6 45.1 43.5    167 150     CMP:   Recent Labs   Lab 04/03/23  0813 04/04/23  1327    140   K 3.9 3.4*    106   CO2 22* 23   * 131*   BUN 12 16   CREATININE 1.1 1.0   CALCIUM 8.8 8.5*   PROT 7.1 7.2   ALBUMIN 3.4* 3.2*   BILITOT 0.7 0.4   ALKPHOS 139* 123   AST 17 17   ALT 14 13   ANIONGAP 11 11       Significant Imaging: I have reviewed all pertinent imaging results/findings within the past 24 hours.

## 2023-04-04 NOTE — FIRST PROVIDER EVALUATION
Emergency Department TeleTriage Encounter Note      CHIEF COMPLAINT    Chief Complaint   Patient presents with    Arm Swelling     LUE swelling since this am; on IV abx at home for osteomyelitis of toe       VITAL SIGNS   Initial Vitals [04/04/23 1251]   BP Pulse Resp Temp SpO2   (!) 175/89 (!) 111 20 98.5 °F (36.9 °C) 95 %      MAP       --            ALLERGIES    Review of patient's allergies indicates:   Allergen Reactions    Codeine Other (See Comments)     Upset stomach       PROVIDER TRIAGE NOTE  Patient presents with complaint of left arm swelling that started today. Has PICC Line in place for IV antibiotics. Feels like he may have pulled it out some. Denied fever. No redness to arm, per patient. Had labs done yesterday,adelaide awan.      Phy:   Constitutional: well nourished, well developed, appearing stated age, NAD   HEENT: NCAT, symmetrical lids, No obvious facial deformity.  Normal phonation. Normal Conjunctiva   Neck: NAROM   Respiratory: Normal effort.  No obvious use of accessory muscles   Musculoskeletal: Moved upper extremities well   Neuro: Alert, answers questions appropriately    Psych: appropriate mood and affect      Initial orders will be placed and care will be transferred to an alternate provider when patient is roomed for a full evaluation. Any additional orders and the final disposition will be determined by that provider.        ORDERS  Labs Reviewed   HIV 1 / 2 ANTIBODY   CBC W/ AUTO DIFFERENTIAL   COMPREHENSIVE METABOLIC PANEL       ED Orders (720h ago, onward)      Start Ordered     Status Ordering Provider    04/04/23 1315 04/04/23 1315  CBC auto differential  STAT         Ordered PEYMAN WILLIS    04/04/23 1315 04/04/23 1315  Comprehensive metabolic panel  STAT         Ordered PEYMAN WILLIS    04/04/23 1315 04/04/23 1315  US Upper Extremity Veins Left  1 time imaging         Ordered PEYMAN WILLIS    04/04/23 1315 04/04/23 1315  X-Ray Chest 1  View  1 time imaging         Ordered CHRISSYMONICA PEYMAN ZAPATA    04/04/23 1253 04/04/23 1252  HIV 1/2 Ag/Ab (4th Gen)  STAT         Ordered MARITZA MARCOS              Virtual Visit Note: The provider triage portion of this emergency department evaluation and documentation was performed via [employer], a HIPAA-compliant telemedicine application, in concert with a tele-presenter in the room. A face to face patient evaluation with one of my colleagues will occur once the patient is placed in an emergency department room.      DISCLAIMER: This note was prepared with [employer] voice recognition transcription software. Garbled syntax, mangled pronouns, and other bizarre constructions may be attributed to that software system.

## 2023-04-04 NOTE — ED PROVIDER NOTES
"SCRIBE #1 NOTE: I, Carmen Lovelace, am scribing for, and in the presence of, Vani Mack MD. I have scribed the entire note.      History      Chief Complaint   Patient presents with    Arm Swelling     LUE swelling since this am; on IV abx at home for osteomyelitis of toe       Review of patient's allergies indicates:   Allergen Reactions    Codeine Other (See Comments)     Upset stomach        HPI   HPI    4/4/2023, 3:20 PM   History obtained from the patient      History of Present Illness: Lj Coleman is a 67 y.o. male patient with a PMHx of angina pectoris, atherosclerosis of native coronary, chronic hepatitis C with cirrhosis, liver transplant, HTN, and tobacco abuse who presents to the Emergency Department for LUE swelling which onset at 1130 this morning. Pt states, "It feels like I have no circulation in my L arm." Pt currently has a LUE PICC line as he is receiving IV abx for osteomyelitis of his toe. Symptoms are constant and moderate in severity. No mitigating or exacerbating factors reported. No associated sxs reported. Patient denies any fever, chills, N/V/D, CP, SOB, weakness, and all other sxs at this time. No prior Tx reported. No further complaints or concerns at this time.         Arrival mode: Personal vehicle      PCP: Isabella Sutton DO       Past Medical History:  Past Medical History:   Diagnosis Date    Alcohol dependence     stopped 2012    Angina pectoris     Arthritis     back    Atherosclerosis of native coronary artery without angina pectoris/ LAD 09/08/2016    Back pain     Chronic hepatitis C with cirrhosis     Depression     Hepatoma     Prior to liver transplant    History of colon polyps 09/09/2015    History of transplantation, liver 04/2012    History of vertebral fracture     Hypertension     Immune deficiency disorder     Incisional hernia following transplant 04/09/2014    Liver failure 11/2011    Related to chemotherapy for hepatoma    Liver transplanted 04/2012 "    Obesity     Tobacco abuse 09/09/2016    Trouble in sleeping     Vertebral fracture 1975       Past Surgical History:  Past Surgical History:   Procedure Laterality Date    COLONOSCOPY N/A 1/20/2021    Procedure: COLONOSCOPY;  Surgeon: Emerson Helm MD;  Location: [facility];  Service: Endoscopy;  Laterality: N/A;    HERNIA REPAIR Right 2013    incisional    LIVER TRANSPLANT  April 2012    MOUTH SURGERY      TONSILLECTOMY  1958         Family History:  Family History   Problem Relation Age of Onset    Cancer Mother         lung    Cancer Father         bladder    Diabetes Maternal Uncle     Cancer Maternal Grandmother         uterine?    Cancer Other     Heart disease Neg Hx     Kidney disease Neg Hx     Stroke Neg Hx        Social History:  Social History     Tobacco Use    Smoking status: Every Day     Packs/day: 0.25     Years: 35.00     Pack years: 8.75     Types: Cigarettes    Smokeless tobacco: Never   Substance and Sexual Activity    Alcohol use: No     Alcohol/week: 0.0 standard drinks     Comment: Quit alcohol after some alcohol abuse, prior to his liver transplant    Drug use: No    Sexual activity: Not Currently       ROS   Review of Systems   Constitutional:  Negative for chills and fever.   HENT:  Negative for sore throat.    Respiratory:  Negative for shortness of breath.    Cardiovascular:  Negative for chest pain.   Gastrointestinal:  Negative for diarrhea, nausea and vomiting.   Genitourinary:  Negative for dysuria.   Musculoskeletal:  Negative for back pain.        (+) LUE swelling   Skin:  Negative for rash.   Neurological:  Negative for weakness.   Hematological:  Does not bruise/bleed easily.   All other systems reviewed and are negative.    Physical Exam      Initial Vitals [04/04/23 1251]   BP Pulse Resp Temp SpO2   (!) 175/89 (!) 111 20 98.5 °F (36.9 °C) 95 %      MAP       --          Physical Exam  Nursing Notes and Vital Signs Reviewed.  Constitutional: Patient is in no acute  distress. Well-developed and well-nourished.  Head: Atraumatic. Normocephalic.  Eyes: PERRL. EOM intact. Conjunctivae are not pale. No scleral icterus.  ENT: Mucous membranes are moist. Oropharynx is clear and symmetric.    Neck: Supple. Full ROM. No lymphadenopathy.  Cardiovascular: Regular rate. Regular rhythm. No murmurs, rubs, or gallops. Distal pulses are 2+ and symmetric.  Pulmonary/Chest: No respiratory distress. Clear to auscultation bilaterally. No wheezing or rales.  Abdominal: Soft and non-distended.  There is no tenderness.  No rebound, guarding, or rigidity.   Musculoskeletal: Moves all extremities. No obvious deformities.   LUE: There is extensive soft tissue swelling from the L hand all the way up the L arm. Good pulses. Pt is neurovascularly intact.  Skin: Warm and dry.  Neurological:  Alert, awake, and appropriate.  Normal speech.  No acute focal neurological deficits are appreciated.  Psychiatric: Normal affect. Good eye contact. Appropriate in content.    ED Course    Critical Care    Date/Time: 4/4/2023 3:37 PM  Performed by: Vani Mack MD  Authorized by: Vani Mack MD   Direct patient critical care time: 25 minutes  Additional history critical care time: 5 minutes  Ordering / reviewing critical care time: 5 minutes  Documentation critical care time: 5 minutes  Consulting other physicians critical care time: 5 minutes  Total critical care time (exclusive of procedural time) : 45 minutes  Critical care was necessary to treat or prevent imminent or life-threatening deterioration of the following conditions: left arm dvt from picc line.  Critical care was time spent personally by me on the following activities: blood draw for specimens, development of treatment plan with patient or surrogate, discussions with consultants, discussions with primary provider, interpretation of cardiac output measurements, evaluation of patient's response to treatment, examination of patient, obtaining  "history from patient or surrogate, ordering and performing treatments and interventions, pulse oximetry, re-evaluation of patient's condition and review of old charts.      ED Vital Signs:  Vitals:    04/04/23 1251   BP: (!) 175/89   Pulse: (!) 111   Resp: 20   Temp: 98.5 °F (36.9 °C)   TempSrc: Oral   SpO2: 95%   Weight: 105.8 kg (233 lb 5.7 oz)   Height: 5' 8" (1.727 m)       Abnormal Lab Results:  Labs Reviewed   CBC W/ AUTO DIFFERENTIAL - Abnormal; Notable for the following components:       Result Value    Lymph % 16.7 (*)     All other components within normal limits   COMPREHENSIVE METABOLIC PANEL - Abnormal; Notable for the following components:    Potassium 3.4 (*)     Glucose 131 (*)     Calcium 8.5 (*)     Albumin 3.2 (*)     All other components within normal limits   HIV 1 / 2 ANTIBODY    Narrative:     Release to patient->Immediate   PROTIME-INR   APTT   APTT   PROTIME-INR   CBC W/ AUTO DIFFERENTIAL        All Lab Results:  Results for orders placed or performed during the hospital encounter of 04/04/23   HIV 1/2 Ag/Ab (4th Gen)   Result Value Ref Range    HIV 1/2 Ag/Ab Negative Negative   CBC auto differential   Result Value Ref Range    WBC 8.81 3.90 - 12.70 K/uL    RBC 5.24 4.60 - 6.20 M/uL    Hemoglobin 15.1 14.0 - 18.0 g/dL    Hematocrit 45.1 40.0 - 54.0 %    MCV 86 82 - 98 fL    MCH 28.8 27.0 - 31.0 pg    MCHC 33.5 32.0 - 36.0 g/dL    RDW 13.5 11.5 - 14.5 %    Platelets 167 150 - 450 K/uL    MPV 11.2 9.2 - 12.9 fL    Immature Granulocytes 0.3 0.0 - 0.5 %    Gran # (ANC) 6.3 1.8 - 7.7 K/uL    Immature Grans (Abs) 0.03 0.00 - 0.04 K/uL    Lymph # 1.5 1.0 - 4.8 K/uL    Mono # 0.5 0.3 - 1.0 K/uL    Eos # 0.4 0.0 - 0.5 K/uL    Baso # 0.07 0.00 - 0.20 K/uL    nRBC 0 0 /100 WBC    Gran % 71.3 38.0 - 73.0 %    Lymph % 16.7 (L) 18.0 - 48.0 %    Mono % 6.1 4.0 - 15.0 %    Eosinophil % 4.8 0.0 - 8.0 %    Basophil % 0.8 0.0 - 1.9 %    Differential Method Automated    Comprehensive metabolic panel   Result " Value Ref Range    Sodium 140 136 - 145 mmol/L    Potassium 3.4 (L) 3.5 - 5.1 mmol/L    Chloride 106 95 - 110 mmol/L    CO2 23 23 - 29 mmol/L    Glucose 131 (H) 70 - 110 mg/dL    BUN 16 8 - 23 mg/dL    Creatinine 1.0 0.5 - 1.4 mg/dL    Calcium 8.5 (L) 8.7 - 10.5 mg/dL    Total Protein 7.2 6.0 - 8.4 g/dL    Albumin 3.2 (L) 3.5 - 5.2 g/dL    Total Bilirubin 0.4 0.1 - 1.0 mg/dL    Alkaline Phosphatase 123 55 - 135 U/L    AST 17 10 - 40 U/L    ALT 13 10 - 44 U/L    Anion Gap 11 8 - 16 mmol/L    eGFR >60 >60 mL/min/1.73 m^2   Protime-INR   Result Value Ref Range    Prothrombin Time 11.0 9.0 - 12.5 sec    INR 1.0 0.8 - 1.2   APTT   Result Value Ref Range    aPTT 25.0 21.0 - 32.0 sec         Imaging Results:  Imaging Results              X-Ray Chest 1 View (Final result)  Result time 04/04/23 15:18:26      Final result by Marlon Caballero MD (04/04/23 15:18:26)                   Impression:      1.  Negative for acute process involving the chest.    2.  Stable and incidental findings as noted above.      Electronically signed by: Marlon Caballero MD  Date:    04/04/2023  Time:    15:18               Narrative:    EXAMINATION:  XR CHEST 1 VIEW    CLINICAL HISTORY:  Encounter for adjustment and management of vascular access device    COMPARISON:  June 11, 2015    FINDINGS:  The lungs are clear. The cardiac silhouette size is normal. The trachea is midline and the mediastinal width is normal. Negative for focal infiltrate, effusion or pneumothorax. Pulmonary vasculature is normal. Negative for osseous abnormalities. Cardiophrenic fat pads.  Tortuous aorta.  Degenerative changes of the spine and both shoulder girdles.  Convex-right curvature of the thoracolumbar junction.    A left arm PICC line is in place with the tip in the proximal SVC.                                       US Upper Extremity Veins Left (Final result)  Result time 04/04/23 14:54:38      Final result by Myles Murray MD (04/04/23 14:54:38)                    Impression:      Upper extremity deep venous thrombus associated with the PICC line.  Thrombus is also present in the left brachial vein.    This critical information above was relayed by myself  by epic chat to MD Martina and Brent AYERS on April 4, 2023 at 1451.      Electronically signed by: Myles Murray  Date:    04/04/2023  Time:    14:54               Narrative:    EXAMINATION:  US UPPER EXTREMITY VEINS LEFT    CLINICAL HISTORY:  ARM SWELLING;    TECHNIQUE:  Duplex and color flow Doppler evaluation and dynamic compression was performed of the left upper extremity veins.    COMPARISON:  None    FINDINGS:  Central veins: The internal jugular vein is patent and free of thrombus.  There is thrombus in the left subclavian vein.    Arm veins: There is thrombus in the left axillary, left brachial, and left basilic veins.  There is a left upper extremity PICC line.    Contralateral subclavian/internal jugular veins: The right subclavian and internal jugular veins are patent and free of thrombus.    Other findings: None.                                              The Emergency Provider reviewed the vital signs and test results, which are outlined above.    ED Discussion     3:25 PM: Discussed case with Dr. Lovelace ([facility]). Dr. Lovelace agrees with current care and management of pt and accepts admission.   Admitting Service: [facility]  Admitting Physician: Dr. Lovelace  Admit to: Obs    3:26 PM: Re-evaluated pt. I have discussed test results, shared treatment plan, and the need for admission with patient and family at bedside. Pt and family express understanding at this time and agree with all information. All questions answered. Pt and family have no further questions or concerns at this time. Pt is ready for admit.                 ED Medication(s):  Medications   heparin 25,000 units in dextrose 5% (100 units/ml) IV bolus from bag INITIAL BOLUS (has no administration in time range)   heparin  25,000 units in dextrose 5% 250 mL (100 units/mL) infusion HIGH INTENSITY nomogram - [facility] (has no administration in time range)   heparin 25,000 units in dextrose 5% (100 units/ml) IV bolus from bag - ADDITIONAL PRN BOLUS - 60 units/kg (has no administration in time range)   heparin 25,000 units in dextrose 5% (100 units/ml) IV bolus from bag - ADDITIONAL PRN BOLUS - 30 units/kg (has no administration in time range)   heparin 25,000 units in dextrose 5% (100 units/ml) IV bolus from bag INITIAL BOLUS (has no administration in time range)   heparin 25,000 units in dextrose 5% 250 mL (100 units/mL) infusion HIGH INTENSITY nomogram - [facility] (has no administration in time range)   heparin 25,000 units in dextrose 5% (100 units/ml) IV bolus from bag - ADDITIONAL PRN BOLUS - 60 units/kg (has no administration in time range)   heparin 25,000 units in dextrose 5% (100 units/ml) IV bolus from bag - ADDITIONAL PRN BOLUS - 30 units/kg (has no administration in time range)           New Prescriptions    No medications on file         Medical Decision Making    Medical Decision Making:   Clinical Tests:   Lab Tests: Ordered and Reviewed  Radiological Study: Ordered and Reviewed  ED Management:  Patient with picc line for 4 weeks receiving iv antibiotics at home for osteo of his foot, developed arm swelling today, lab work reviewed and at baseline, US consistent with extensive DVT, hospital medicine consulted for admission, heparin drip started and picc line pulled.          Scribe Attestation:   Scribe #1: I performed the above scribed service and the documentation accurately describes the services I performed. I attest to the accuracy of the note.    Attending:   Physician Attestation Statement for Scribe #1: I, Vani Mack MD, personally performed the services described in this documentation, as scribed by Carmen Lovelace, in my presence, and it is both accurate and complete.          Clinical Impression       ICD-10-CM ICD-9-CM    1. Arm DVT (deep venous thromboembolism), acute, left  I82.622 453.82   2. PICC (peripherally inserted central catheter) in place  Z45.2 V58.81       Disposition:   Disposition: Placed in Observation  Condition: Susy Mack MD  04/04/23 8519

## 2023-04-04 NOTE — PHARMACY MED REC
"Admission Medication History     The home medication history was taken by Paul Wheatley.    You may go to "Admission" then "Reconcile Home Medications" tabs to review and/or act upon these items.     The home medication list has been updated by the Pharmacy department.   Please read ALL comments highlighted in yellow.   Please address this information as you see fit.    Feel free to contact us if you have any questions or require assistance.      Medications listed below were obtained from: Analytic software- [employer] and Medical records  (Not in a hospital admission)        Paul Wheatley  [id number]    Current Outpatient Medications on File Prior to Encounter   Medication Sig Dispense Refill Last Dose    esomeprazole (NEXIUM) 40 MG capsule Take 1 capsule (40 mg total) by mouth once daily. 90 capsule 3 4/4/2023    metoprolol succinate (TOPROL-XL) 25 MG 24 hr tablet Take 1 tablet (25 mg total) by mouth once daily. 30 tablet 11 4/4/2023    tacrolimus (PROGRAF) 0.5 MG Cap TAKE 2 CAPSULES EVERY 12 HOURS 360 capsule 3 4/4/2023    traMADoL (ULTRAM) 50 mg tablet Take 1 tablet (50 mg total) by mouth every 6 (six) hours as needed for Pain. 10 tablet 0 4/4/2023                         .        " [FreeTextEntry1] : no lab testing is needed since the took the prophylactic dose of doxycycline, she will follow up in the office for any bullseye rash, fever or new joint pain

## 2023-04-04 NOTE — ASSESSMENT & PLAN NOTE
Assistance with smoking cessation was offered, including:  [x]  Medications  [x]  Counseling  []  Printed Information on Smoking Cessation  [x]  Referral to a Smoking Cessation Program    Patient was counseled regarding smoking for >10 minutes.    --Nicotine patch ordered

## 2023-04-04 NOTE — ASSESSMENT & PLAN NOTE
US LUE: extensive DVT to left subclavian vein, Left axillary, left brachial, left basilic veins associated with PICC line. PICC line to LUE for long-term abx.     --D/C PICC Line  --Heparin Infusion per nomogram  --Consult IR for thrombectomy- Neurovascular intact, will eval in AM

## 2023-04-04 NOTE — HPI
"67 y.o. male patient with a PMHx of angina pectoris, atherosclerosis of native coronary, chronic hepatitis C with cirrhosis, liver transplant, HTN, and tobacco abuse who presents to the Emergency Department for LUE swelling which onset at 1130 this morning. Pt states, "It feels like I have no circulation in my L arm." Pt currently has a LUE PICC line as he is receiving IV abx for osteomyelitis of his toe. Symptoms are constant and moderate in severity. No mitigating or exacerbating factors reported. No associated sxs reported. Patient denies any fever, chills, N/V/D, CP, SOB, weakness, and all other sxs at this time. In the ED, US of LUE: DVT associated with PICC line. CTA chest: Negative for PE. Initiated on Heparin. Patient is a full code, surrogate decision maker is . Placed in observation under the care of Hospital Medicine for management of DVT LUE.   "

## 2023-04-04 NOTE — ASSESSMENT & PLAN NOTE
Managed as OP by Dr. Beavers with Daptomycin and Ertapenem, Patient wants to discuss with ID if changing to a PO regimen is possible. Does not want to continue IV Abx if able to change    --Continue current regimen  --Consult ID

## 2023-04-04 NOTE — H&P
"[facility] Emergency Dept.  [facility] Medicine  History & Physical    Patient Name: Lj Coleman  MRN: [id number]  Patient Class: OP- Observation  Admission Date: 4/4/2023  Attending Physician: Kristina Lovelace MD   Primary Care Provider: Isabella Sutton DO         Patient information was obtained from patient, past medical records and ER records.     Subjective:     Principal Problem:Acute deep vein thrombosis (DVT) of left upper extremity    Chief Complaint:   Chief Complaint   Patient presents with    Arm Swelling     LUE swelling since this am; on IV abx at home for osteomyelitis of toe        HPI: 67 y.o. male patient with a PMHx of angina pectoris, atherosclerosis of native coronary, chronic hepatitis C with cirrhosis, liver transplant, HTN, and tobacco abuse who presents to the Emergency Department for LUE swelling which onset at 1130 this morning. Pt states, "It feels like I have no circulation in my L arm." Pt currently has a LUE PICC line as he is receiving IV abx for osteomyelitis of his toe. Symptoms are constant and moderate in severity. No mitigating or exacerbating factors reported. No associated sxs reported. Patient denies any fever, chills, N/V/D, CP, SOB, weakness, and all other sxs at this time. In the ED, US of LUE: DVT associated with PICC line. CTA chest: Negative for PE. Initiated on Heparin. Patient is a full code, surrogate decision maker is . Placed in observation under the care of Hospital Medicine for management of DVT LUE.       Past Medical History:   Diagnosis Date    Alcohol dependence     stopped 2012    Angina pectoris     Arthritis     back    Atherosclerosis of native coronary artery without angina pectoris/ LAD 09/08/2016    Back pain     Chronic hepatitis C with cirrhosis     Depression     Hepatoma     Prior to liver transplant    History of colon polyps 09/09/2015    History of transplantation, liver 04/2012    History of vertebral fracture     Hypertension  "    Immune deficiency disorder     Incisional hernia following transplant 04/09/2014    Liver failure 11/2011    Related to chemotherapy for hepatoma    Liver transplanted 04/2012    Obesity     Tobacco abuse 09/09/2016    Trouble in sleeping     Vertebral fracture 1975       Past Surgical History:   Procedure Laterality Date    COLONOSCOPY N/A 1/20/2021    Procedure: COLONOSCOPY;  Surgeon: Emerson Helm MD;  Location: [facility];  Service: Endoscopy;  Laterality: N/A;    HERNIA REPAIR Right 2013    incisional    LIVER TRANSPLANT  April 2012    MOUTH SURGERY      TONSILLECTOMY  1958       Review of patient's allergies indicates:   Allergen Reactions    Codeine Other (See Comments)     Upset stomach       Current Facility-Administered Medications on File Prior to Encounter   Medication    lactated ringers infusion    sodium chloride 0.9% flush 2 mL     Current Outpatient Medications on File Prior to Encounter   Medication Sig    DAPTOmycin (CUBICIN) 500 mg injection Inject 500 mg into the vein once daily.    ertapenem (INVANZ) 1 gram injection Inject 1 g into the vein once daily.    esomeprazole (NEXIUM) 40 MG capsule Take 1 capsule (40 mg total) by mouth once daily.    metoprolol succinate (TOPROL-XL) 25 MG 24 hr tablet Take 1 tablet (25 mg total) by mouth once daily.    tacrolimus (PROGRAF) 0.5 MG Cap TAKE 2 CAPSULES EVERY 12 HOURS    traMADoL (ULTRAM) 50 mg tablet Take 1 tablet (50 mg total) by mouth every 6 (six) hours as needed for Pain.    [DISCONTINUED] FLUAD 0811-5541, 65 YR UP,,PF, 45 mcg (15 mcg x 3)/0.5 mL Syrg     [DISCONTINUED] mupirocin (BACTROBAN) 2 % ointment Apply topically 3 (three) times daily.     Family History       Problem Relation (Age of Onset)    Cancer Mother, Father, Maternal Grandmother, Other    Diabetes Maternal Uncle          Tobacco Use    Smoking status: Every Day     Packs/day: 0.25     Years: 35.00     Pack years: 8.75     Types: Cigarettes    Smokeless  tobacco: Never   Substance and Sexual Activity    Alcohol use: No     Alcohol/week: 0.0 standard drinks     Comment: Quit alcohol after some alcohol abuse, prior to his liver transplant    Drug use: No    Sexual activity: Not Currently     Review of Systems   Constitutional:  Positive for fatigue. Negative for activity change, appetite change, chills, diaphoresis, fever and unexpected weight change.   HENT:  Negative for congestion, hearing loss, nosebleeds, postnasal drip, rhinorrhea and trouble swallowing.    Eyes:  Negative for discharge and visual disturbance.   Respiratory:  Positive for shortness of breath. Negative for cough and chest tightness.    Cardiovascular:  Negative for chest pain, palpitations and leg swelling.   Gastrointestinal:  Negative for abdominal distention, abdominal pain, constipation, diarrhea, nausea and vomiting.   Endocrine: Negative for cold intolerance and heat intolerance.   Genitourinary:  Negative for difficulty urinating, dysuria, frequency and hematuria.   Musculoskeletal:  Positive for arthralgias and back pain. Negative for gait problem and myalgias.   Skin:  Positive for color change (LUE purple discoloration).   Neurological:  Negative for dizziness, weakness, light-headedness and headaches.   Hematological:  Negative for adenopathy. Does not bruise/bleed easily.   Psychiatric/Behavioral:  Negative for agitation, behavioral problems and confusion. The patient is nervous/anxious.    Objective:     Vital Signs (Most Recent):  Temp: 98.5 °F (36.9 °C) (04/04/23 1251)  Pulse: (!) 111 (04/04/23 1251)  Resp: 20 (04/04/23 1251)  BP: (!) 175/89 (04/04/23 1251)  SpO2: 95 % (04/04/23 1251)   Vital Signs (24h Range):  Temp:  [98.5 °F (36.9 °C)] 98.5 °F (36.9 °C)  Pulse:  [111] 111  Resp:  [20] 20  SpO2:  [95 %] 95 %  BP: (175)/(89) 175/89     Weight: 105.8 kg (233 lb 5.7 oz)  Body mass index is 35.48 kg/m².    Physical Exam  Vitals and nursing note reviewed.   Constitutional:        General: He is not in acute distress.     Appearance: He is well-developed. He is not diaphoretic.   HENT:      Head: Normocephalic and atraumatic.      Right Ear: Hearing and external ear normal.      Left Ear: Hearing and external ear normal.      Nose: Nose normal. No mucosal edema or rhinorrhea.      Mouth/Throat:      Pharynx: Uvula midline.   Eyes:      General:         Right eye: No discharge.         Left eye: No discharge.      Conjunctiva/sclera: Conjunctivae normal.      Right eye: No chemosis.     Left eye: No chemosis.     Pupils: Pupils are equal, round, and reactive to light.   Neck:      Thyroid: No thyroid mass or thyromegaly.      Trachea: Trachea normal.   Cardiovascular:      Rate and Rhythm: Normal rate and regular rhythm.      Pulses:           Radial pulses are 2+ on the right side and 1+ on the left side.        Dorsalis pedis pulses are 2+ on the right side and 2+ on the left side.      Heart sounds: Normal heart sounds. No murmur heard.     Comments: LUE- edema, darkened  Pulmonary:      Effort: Pulmonary effort is normal. No respiratory distress.      Breath sounds: Normal breath sounds. No decreased breath sounds or wheezing.   Abdominal:      General: Bowel sounds are normal. There is no distension.      Palpations: Abdomen is soft.      Tenderness: There is no abdominal tenderness.   Musculoskeletal:         General: Normal range of motion.      Cervical back: Normal range of motion and neck supple.   Lymphadenopathy:      Cervical: No cervical adenopathy.      Upper Body:      Right upper body: No supraclavicular adenopathy.      Left upper body: No supraclavicular adenopathy.   Skin:     General: Skin is warm and dry.      Capillary Refill: Capillary refill takes less than 2 seconds.      Findings: No rash.          Neurological:      Mental Status: He is alert and oriented to person, place, and time.   Psychiatric:         Mood and Affect: Mood is not anxious.         Speech: Speech  normal.         Behavior: Behavior normal.         Thought Content: Thought content normal.         Judgment: Judgment normal.         CRANIAL NERVES     CN III, IV, VI   Pupils are equal, round, and reactive to light.     Significant Labs: All pertinent labs within the past 24 hours have been reviewed.  CBC:   Recent Labs   Lab 04/03/23  0813 04/04/23  1327 04/04/23  1532   WBC 9.72 8.81 9.33   HGB 15.8 15.1 14.8   HCT 49.6 45.1 43.5    167 150     CMP:   Recent Labs   Lab 04/03/23  0813 04/04/23  1327    140   K 3.9 3.4*    106   CO2 22* 23   * 131*   BUN 12 16   CREATININE 1.1 1.0   CALCIUM 8.8 8.5*   PROT 7.1 7.2   ALBUMIN 3.4* 3.2*   BILITOT 0.7 0.4   ALKPHOS 139* 123   AST 17 17   ALT 14 13   ANIONGAP 11 11       Significant Imaging: I have reviewed all pertinent imaging results/findings within the past 24 hours.    Assessment/Plan:     * Acute deep vein thrombosis (DVT) of left upper extremity  US LUE: extensive DVT to left subclavian vein, Left axillary, left brachial, left basilic veins associated with PICC line. PICC line to LUE for long-term abx.     --D/C PICC Line  --Heparin Infusion per nomogram  --Consult IR for thrombectomy        Osteomyelitis of ankle and foot  Managed as OP by Dr. Beavers with Daptomycin and Ertapenem, Patient wants to discuss with ID if changing to a PO regimen is possible. Does not want to continue IV Abx if able to change    --Continue current regimen  --Consult ID      Smoker  Assistance with smoking cessation was offered, including:  [x]  Medications  [x]  Counseling  []  Printed Information on Smoking Cessation  [x]  Referral to a Smoking Cessation Program    Patient was counseled regarding smoking for >10 minutes.    --Nicotine patch ordered        Benign essential HTN  Stable, controlled    --Continue metoprolol      Liver transplanted  S/P Liver transplant 2012, managed with prograf. Last level 4/3/23: 5.8    --Continue prograf 1mg PO  BID      Immunosuppression  Chronic due to prograf secondary to Liver Transplant    --Continue prograf 1mg PO BID          VTE Risk Mitigation (From admission, onward)         Ordered     heparin 25,000 units in dextrose 5% (100 units/ml) IV bolus from bag - ADDITIONAL PRN BOLUS - 60 units/kg  As needed (PRN)        Question:  Heparin Infusion Adjustment (DO NOT MODIFY ANSWER)  Answer:  \\[email or web address]\epic\Images\Pharmacy\HeparinInfusions\heparin HIGH INTENSITY nomogram for [facility] [id number].pdf    04/04/23 1525     heparin 25,000 units in dextrose 5% (100 units/ml) IV bolus from bag - ADDITIONAL PRN BOLUS - 30 units/kg  As needed (PRN)        Question:  Heparin Infusion Adjustment (DO NOT MODIFY ANSWER)  Answer:  \\[email or web address]\epic\Images\Pharmacy\HeparinInfusions\heparin HIGH INTENSITY nomogram for [facility] [id number].pdf    04/04/23 1525     Reason for No Pharmacological VTE Prophylaxis  Once        Question:  Reasons:  Answer:  Physician Provided (leave comment)    04/04/23 1603     IP VTE HIGH RISK PATIENT  Once         04/04/23 1603     Place sequential compression device  Until discontinued         04/04/23 1603     heparin 25,000 units in dextrose 5% 250 mL (100 units/mL) infusion HIGH INTENSITY nomogram - [facility]  Continuous        Question Answer Comment   Heparin Infusion Adjustment (DO NOT MODIFY ANSWER) \\[email or web address]\epic\Images\Pharmacy\HeparinInfusions\heparin HIGH INTENSITY nomogram for [facility] [id number].pdf    Begin at (in units/kg/hr) 18        04/04/23 1525                     On 04/04/2023, patient should be placed in hospital observation services under my care in collaboration with Kristina Lovelace MD.      Asya Gregory NP  Department of Hospital Medicine  [facility] - Emergency Dept.

## 2023-04-04 NOTE — ASSESSMENT & PLAN NOTE
S/P Liver transplant 2012, managed with prograf. Last level 4/3/23: 5.8    --Continue prograf 1mg PO BID

## 2023-04-05 ENCOUNTER — TELEPHONE (OUTPATIENT)
Dept: TRANSPLANT | Facility: CLINIC | Age: 68
End: 2023-04-05
Payer: MEDICARE

## 2023-04-05 ENCOUNTER — CLINICAL SUPPORT (OUTPATIENT)
Dept: SMOKING CESSATION | Facility: CLINIC | Age: 68
End: 2023-04-05
Payer: COMMERCIAL

## 2023-04-05 DIAGNOSIS — F17.200 NICOTINE DEPENDENCE: Primary | ICD-10-CM

## 2023-04-05 LAB
ALBUMIN SERPL BCP-MCNC: 2.8 G/DL (ref 3.5–5.2)
ALP SERPL-CCNC: 112 U/L (ref 55–135)
ALT SERPL W/O P-5'-P-CCNC: 18 U/L (ref 10–44)
ANION GAP SERPL CALC-SCNC: 8 MMOL/L (ref 8–16)
APTT PPP: 39 SEC (ref 21–32)
AST SERPL-CCNC: 27 U/L (ref 10–40)
BASOPHILS # BLD AUTO: 0.09 K/UL (ref 0–0.2)
BASOPHILS NFR BLD: 1.2 % (ref 0–1.9)
BILIRUB SERPL-MCNC: 0.5 MG/DL (ref 0.1–1)
BUN SERPL-MCNC: 15 MG/DL (ref 8–23)
CALCIUM SERPL-MCNC: 8.2 MG/DL (ref 8.7–10.5)
CHLORIDE SERPL-SCNC: 108 MMOL/L (ref 95–110)
CO2 SERPL-SCNC: 23 MMOL/L (ref 23–29)
CREAT SERPL-MCNC: 1 MG/DL (ref 0.5–1.4)
DIFFERENTIAL METHOD: ABNORMAL
EOSINOPHIL # BLD AUTO: 0.5 K/UL (ref 0–0.5)
EOSINOPHIL NFR BLD: 7 % (ref 0–8)
ERYTHROCYTE [DISTWIDTH] IN BLOOD BY AUTOMATED COUNT: 13.5 % (ref 11.5–14.5)
EST. GFR  (NO RACE VARIABLE): >60 ML/MIN/1.73 M^2
GLUCOSE SERPL-MCNC: 102 MG/DL (ref 70–110)
HCT VFR BLD AUTO: 41.4 % (ref 40–54)
HGB BLD-MCNC: 13.8 G/DL (ref 14–18)
IMM GRANULOCYTES # BLD AUTO: 0.04 K/UL (ref 0–0.04)
IMM GRANULOCYTES NFR BLD AUTO: 0.5 % (ref 0–0.5)
LYMPHOCYTES # BLD AUTO: 1.7 K/UL (ref 1–4.8)
LYMPHOCYTES NFR BLD: 22.9 % (ref 18–48)
MAGNESIUM SERPL-MCNC: 2 MG/DL (ref 1.6–2.6)
MCH RBC QN AUTO: 28.5 PG (ref 27–31)
MCHC RBC AUTO-ENTMCNC: 33.3 G/DL (ref 32–36)
MCV RBC AUTO: 86 FL (ref 82–98)
MONOCYTES # BLD AUTO: 0.6 K/UL (ref 0.3–1)
MONOCYTES NFR BLD: 8.1 % (ref 4–15)
NEUTROPHILS # BLD AUTO: 4.5 K/UL (ref 1.8–7.7)
NEUTROPHILS NFR BLD: 60.3 % (ref 38–73)
NRBC BLD-RTO: 0 /100 WBC
PHOSPHATE SERPL-MCNC: 2.5 MG/DL (ref 2.7–4.5)
PLATELET # BLD AUTO: 133 K/UL (ref 150–450)
PMV BLD AUTO: 11 FL (ref 9.2–12.9)
POTASSIUM SERPL-SCNC: 3.7 MMOL/L (ref 3.5–5.1)
PROT SERPL-MCNC: 6.3 G/DL (ref 6–8.4)
RBC # BLD AUTO: 4.84 M/UL (ref 4.6–6.2)
SODIUM SERPL-SCNC: 139 MMOL/L (ref 136–145)
TACROLIMUS BLD-MCNC: 5.9 NG/ML (ref 5–15)
WBC # BLD AUTO: 7.52 K/UL (ref 3.9–12.7)

## 2023-04-05 PROCEDURE — S4991 NICOTINE PATCH NONLEGEND: HCPCS | Mod: HCNC | Performed by: NURSE PRACTITIONER

## 2023-04-05 PROCEDURE — 99407 BEHAV CHNG SMOKING > 10 MIN: CPT | Mod: S$GLB,,,

## 2023-04-05 PROCEDURE — 63600175 PHARM REV CODE 636 W HCPCS: Mod: HCNC | Performed by: NURSE PRACTITIONER

## 2023-04-05 PROCEDURE — 85730 THROMBOPLASTIN TIME PARTIAL: CPT | Mod: HCNC | Performed by: STUDENT IN AN ORGANIZED HEALTH CARE EDUCATION/TRAINING PROGRAM

## 2023-04-05 PROCEDURE — 99407 PR TOBACCO USE CESSATION INTENSIVE >10 MINUTES: ICD-10-PCS | Mod: S$GLB,,,

## 2023-04-05 PROCEDURE — 36415 COLL VENOUS BLD VENIPUNCTURE: CPT | Mod: HCNC | Performed by: NURSE PRACTITIONER

## 2023-04-05 PROCEDURE — 84100 ASSAY OF PHOSPHORUS: CPT | Mod: HCNC | Performed by: NURSE PRACTITIONER

## 2023-04-05 PROCEDURE — 80053 COMPREHEN METABOLIC PANEL: CPT | Mod: HCNC | Performed by: NURSE PRACTITIONER

## 2023-04-05 PROCEDURE — 25000003 PHARM REV CODE 250: Mod: HCNC | Performed by: NURSE PRACTITIONER

## 2023-04-05 PROCEDURE — G0378 HOSPITAL OBSERVATION PER HR: HCPCS | Mod: HCNC

## 2023-04-05 PROCEDURE — 83735 ASSAY OF MAGNESIUM: CPT | Mod: HCNC | Performed by: NURSE PRACTITIONER

## 2023-04-05 PROCEDURE — 85025 COMPLETE CBC W/AUTO DIFF WBC: CPT | Mod: HCNC | Performed by: NURSE PRACTITIONER

## 2023-04-05 PROCEDURE — 96366 THER/PROPH/DIAG IV INF ADDON: CPT

## 2023-04-05 RX ADMIN — HEPARIN SODIUM 18 UNITS/KG/HR: 10000 INJECTION, SOLUTION INTRAVENOUS at 03:04

## 2023-04-05 RX ADMIN — NICOTINE 1 PATCH: 21 PATCH, EXTENDED RELEASE TRANSDERMAL at 08:04

## 2023-04-05 RX ADMIN — TACROLIMUS 1 MG: 1 CAPSULE ORAL at 05:04

## 2023-04-05 RX ADMIN — HEPARIN SODIUM 18 UNITS/KG/HR: 10000 INJECTION, SOLUTION INTRAVENOUS at 10:04

## 2023-04-05 RX ADMIN — TACROLIMUS 1 MG: 1 CAPSULE ORAL at 08:04

## 2023-04-05 RX ADMIN — METOPROLOL SUCCINATE 25 MG: 25 TABLET, EXTENDED RELEASE ORAL at 08:04

## 2023-04-05 RX ADMIN — DAPTOMYCIN 700 MG: 350 INJECTION, POWDER, LYOPHILIZED, FOR SOLUTION INTRAVENOUS at 08:04

## 2023-04-05 RX ADMIN — ERTAPENEM 1 G: 1 INJECTION INTRAMUSCULAR; INTRAVENOUS at 06:04

## 2023-04-05 NOTE — PROGRESS NOTES
"[facility]  Progress Note    Patient Name: Lj Coleman  MRN: [id number]  Patient Class: OP- Observation   Admission Date: 4/4/2023  Length of Stay: 0 days  Attending Physician: Bia Sims, *  Primary Care Provider: Isabella Sutton DO        Subjective:     Principal Problem:Acute deep vein thrombosis (DVT) of left upper extremity        HPI:  67 y.o. male patient with a PMHx of angina pectoris, atherosclerosis of native coronary, chronic hepatitis C with cirrhosis, liver transplant, HTN, and tobacco abuse who presents to the Emergency Department for LUE swelling which onset at 1130 this morning. Pt states, "It feels like I have no circulation in my L arm." Pt currently has a LUE PICC line as he is receiving IV abx for osteomyelitis of his toe. Symptoms are constant and moderate in severity. No mitigating or exacerbating factors reported. No associated sxs reported. Patient denies any fever, chills, N/V/D, CP, SOB, weakness, and all other sxs at this time. In the ED, US of LUE: DVT associated with PICC line. CTA chest: Negative for PE. Initiated on Heparin. Patient is a full code, surrogate decision maker is . Placed in observation under the care of Hospital Medicine for management of DVT LUE.       Overview/Hospital Course:  66 yo male s/p liver transplant with recent osteomyelitis of right great toe requiring PICC for long term antibiotics admitted with extensive LUE DVT. PICC line removed. IR and ID consulted. Per IR continue medical management, no plan for thrombectomy, recommend repeat US in 10-14 days. Patient to follow up OP. Patient request to complete therapy with po antibiotics if possible. ID recs pending.                 Interval History: no acute events overnight. No plan for intervention per IR. Awaiting ID recs.     Review of Systems   Constitutional:  Negative for fever.   Respiratory:  Negative for shortness of breath.    Cardiovascular:  Negative for chest " pain.   Gastrointestinal:  Negative for abdominal pain, nausea and vomiting.   Musculoskeletal:         Left arm swelling    Skin:  Positive for color change.   Objective:     Vital Signs (Most Recent):  Temp: 98.6 °F (37 °C) (04/05/23 1301)  Pulse: 84 (04/05/23 1301)  Resp: 18 (04/05/23 1301)  BP: 133/78 (04/05/23 1301)  SpO2: 95 % (04/05/23 1301) Vital Signs (24h Range):  Temp:  [97.7 °F (36.5 °C)-98.6 °F (37 °C)] 98.6 °F (37 °C)  Pulse:  [74-88] 84  Resp:  [18] 18  SpO2:  [91 %-98 %] 95 %  BP: (130-158)/(77-90) 133/78     Weight: 105.8 kg (233 lb 5.7 oz)  Body mass index is 35.48 kg/m².    Intake/Output Summary (Last 24 hours) at 4/5/2023 1458  Last data filed at 4/5/2023 1317  Gross per 24 hour   Intake 1007.37 ml   Output 600 ml   Net 407.37 ml      Physical Exam  Vitals and nursing note reviewed.   Constitutional:       General: He is not in acute distress.     Appearance: He is well-developed.   HENT:      Head: Normocephalic and atraumatic.   Eyes:      Conjunctiva/sclera: Conjunctivae normal.      Pupils: Pupils are equal, round, and reactive to light.   Neck:      Vascular: No JVD.   Cardiovascular:      Rate and Rhythm: Normal rate and regular rhythm.      Heart sounds: Normal heart sounds.   Pulmonary:      Effort: Pulmonary effort is normal. No respiratory distress.      Breath sounds: Normal breath sounds. No wheezing.   Abdominal:      General: Bowel sounds are normal. There is no distension.      Palpations: Abdomen is soft.      Tenderness: There is no abdominal tenderness. There is no guarding.   Musculoskeletal:         General: Swelling (LUE) present. No tenderness. Normal range of motion.      Cervical back: Normal range of motion and neck supple.   Skin:     General: Skin is warm and dry.      Capillary Refill: Capillary refill takes less than 2 seconds.      Findings: Bruising present. No erythema.   Neurological:      Mental Status: He is alert and oriented to person, place, and time.       Sensory: No sensory deficit.   Psychiatric:         Behavior: Behavior normal.       Significant Labs: All pertinent labs within the past 24 hours have been reviewed.  CBC:   Recent Labs   Lab 04/04/23  1327 04/04/23  1532 04/05/23  0421   WBC 8.81 9.33 7.52   HGB 15.1 14.8 13.8*   HCT 45.1 43.5 41.4    150 133*     CMP:   Recent Labs   Lab 04/04/23  1327 04/05/23  0421    139   K 3.4* 3.7    108   CO2 23 23   * 102   BUN 16 15   CREATININE 1.0 1.0   CALCIUM 8.5* 8.2*   PROT 7.2 6.3   ALBUMIN 3.2* 2.8*   BILITOT 0.4 0.5   ALKPHOS 123 112   AST 17 27   ALT 13 18   ANIONGAP 11 8       Significant Imaging: I have reviewed all pertinent imaging results/findings within the past 24 hours.      Assessment/Plan:      * Acute deep vein thrombosis (DVT) of left upper extremity  US LUE: extensive DVT to left subclavian vein, Left axillary, left brachial, left basilic veins associated with PICC line. PICC line to LUE for long-term abx.     --D/C PICC Line  --Heparin Infusion per nomogram  --Consult IR for thrombectomy- Neurovascular intact, will eval in AM    4/5  --No plan for thrombectomy per IR   -- continue medical management   -- NV intact on exam         Osteomyelitis of ankle and foot  Managed as OP by Dr. Beavers with Daptomycin and Ertapenem, Patient wants to discuss with ID if changing to a PO regimen is possible. Does not want to continue IV Abx if able to change    --Continue current regimen  --Consult ID      Smoker  Assistance with smoking cessation was offered, including:  [x]  Medications  [x]  Counseling  []  Printed Information on Smoking Cessation  [x]  Referral to a Smoking Cessation Program    Patient was counseled regarding smoking for >10 minutes.    --Nicotine patch ordered        Benign essential HTN  Stable, controlled    --Continue metoprolol      Liver transplanted  S/P Liver transplant 2012, managed with prograf. Last level 4/3/23: 5.8    --Continue prograf 1mg PO  BID      Immunosuppression  Chronic due to prograf secondary to Liver Transplant    --Continue prograf 1mg PO BID          VTE Risk Mitigation (From admission, onward)         Ordered     heparin 25,000 units in dextrose 5% (100 units/ml) IV bolus from bag - ADDITIONAL PRN BOLUS - 60 units/kg  As needed (PRN)        Question:  Heparin Infusion Adjustment (DO NOT MODIFY ANSWER)  Answer:  \\[email or web address]\epic\Images\Pharmacy\HeparinInfusions\heparin HIGH INTENSITY nomogram for [facility] [id number].pdf    04/04/23 1525     heparin 25,000 units in dextrose 5% (100 units/ml) IV bolus from bag - ADDITIONAL PRN BOLUS - 30 units/kg  As needed (PRN)        Question:  Heparin Infusion Adjustment (DO NOT MODIFY ANSWER)  Answer:  \\[email or web address]\epic\Images\Pharmacy\HeparinInfusions\heparin HIGH INTENSITY nomogram for [facility] [id number].pdf    04/04/23 1525     Reason for No Pharmacological VTE Prophylaxis  Once        Question:  Reasons:  Answer:  Physician Provided (leave comment)    04/04/23 1603     IP VTE HIGH RISK PATIENT  Once         04/04/23 1603     Place sequential compression device  Until discontinued         04/04/23 1603     heparin 25,000 units in dextrose 5% 250 mL (100 units/mL) infusion HIGH INTENSITY nomogram - [facility]  Continuous        Question Answer Comment   Heparin Infusion Adjustment (DO NOT MODIFY ANSWER) \\[email or web address]\epic\Images\Pharmacy\HeparinInfusions\heparin HIGH INTENSITY nomogram for [facility] [id number].pdf    Begin at (in units/kg/hr) 18        04/04/23 1525                Discharge Planning   ALEXIS: 4/6/23    Code Status: Full Code   Is the patient medically ready for discharge?:     Reason for patient still in hospital (select all that apply): Patient trending condition, Consult recommendations and Pending disposition  Discharge Plan A: Home with family                  Norma Bower NP  Department of Hospital Medicine   [facility] - Med Surg

## 2023-04-05 NOTE — PLAN OF CARE
Discussed poc with pt, pt verbalized understanding    Purposeful rounding every 2hours    VS wnl  Cardiac monitoring in use, pt is NSR, tele monitor #[id number]    Fall precautions in place, remains injury free      Heparin Infusing  Accurate I&Os    Bed locked at lowest position  Call light within reach    Chart check complete  Will cont with POC

## 2023-04-05 NOTE — CONSULTS
IR consulted regarding EMILY DVT.  68 yo man with liver transplant and extensive LUE DVT Patient had PICC line for IV Abx for toe infection.  Line has been removed.  . Bedside evaluation performed.  US reviewed.   There is some discoloration of the left upper extremity.  There is some swelling as well. He says that the pain he had previously has resolved.  Motor function equal to right UE.      Recommendations:  Continue medical management.  Please order epeat US in 10-14 days if swelling persists.  Refer to IR for consideration of OP procedure if repeat US is positive and symptoms persist.  IR is signing off.  Thank you for this consult.

## 2023-04-05 NOTE — TELEPHONE ENCOUNTER
Letter sent to patient stating: Your labs have been reviewed by your Transplant physician, no action required. Next labs due 4/17/2023 as a follow up to hospitalization.          ----- Message from Teresa Gonzalez MD sent at 4/5/2023  1:03 PM CDT -----  Reviewed, nothing to do; repeat per routine

## 2023-04-05 NOTE — SUBJECTIVE & OBJECTIVE
Interval History: no acute events overnight. No plan for intervention per IR. Awaiting ID recs.     Review of Systems   Constitutional:  Negative for fever.   Respiratory:  Negative for shortness of breath.    Cardiovascular:  Negative for chest pain.   Gastrointestinal:  Negative for abdominal pain, nausea and vomiting.   Musculoskeletal:         Left arm swelling    Skin:  Positive for color change.   Objective:     Vital Signs (Most Recent):  Temp: 98.6 °F (37 °C) (04/05/23 1301)  Pulse: 84 (04/05/23 1301)  Resp: 18 (04/05/23 1301)  BP: 133/78 (04/05/23 1301)  SpO2: 95 % (04/05/23 1301) Vital Signs (24h Range):  Temp:  [97.7 °F (36.5 °C)-98.6 °F (37 °C)] 98.6 °F (37 °C)  Pulse:  [74-88] 84  Resp:  [18] 18  SpO2:  [91 %-98 %] 95 %  BP: (130-158)/(77-90) 133/78     Weight: 105.8 kg (233 lb 5.7 oz)  Body mass index is 35.48 kg/m².    Intake/Output Summary (Last 24 hours) at 4/5/2023 1458  Last data filed at 4/5/2023 1317  Gross per 24 hour   Intake 1007.37 ml   Output 600 ml   Net 407.37 ml      Physical Exam  Vitals and nursing note reviewed.   Constitutional:       General: He is not in acute distress.     Appearance: He is well-developed.   HENT:      Head: Normocephalic and atraumatic.   Eyes:      Conjunctiva/sclera: Conjunctivae normal.      Pupils: Pupils are equal, round, and reactive to light.   Neck:      Vascular: No JVD.   Cardiovascular:      Rate and Rhythm: Normal rate and regular rhythm.      Heart sounds: Normal heart sounds.   Pulmonary:      Effort: Pulmonary effort is normal. No respiratory distress.      Breath sounds: Normal breath sounds. No wheezing.   Abdominal:      General: Bowel sounds are normal. There is no distension.      Palpations: Abdomen is soft.      Tenderness: There is no abdominal tenderness. There is no guarding.   Musculoskeletal:         General: Swelling (LUE) present. No tenderness. Normal range of motion.      Cervical back: Normal range of motion and neck supple.    Skin:     General: Skin is warm and dry.      Capillary Refill: Capillary refill takes less than 2 seconds.      Findings: Bruising present. No erythema.   Neurological:      Mental Status: He is alert and oriented to person, place, and time.      Sensory: No sensory deficit.   Psychiatric:         Behavior: Behavior normal.       Significant Labs: All pertinent labs within the past 24 hours have been reviewed.  CBC:   Recent Labs   Lab 04/04/23  1327 04/04/23  1532 04/05/23  0421   WBC 8.81 9.33 7.52   HGB 15.1 14.8 13.8*   HCT 45.1 43.5 41.4    150 133*     CMP:   Recent Labs   Lab 04/04/23  1327 04/05/23  0421    139   K 3.4* 3.7    108   CO2 23 23   * 102   BUN 16 15   CREATININE 1.0 1.0   CALCIUM 8.5* 8.2*   PROT 7.2 6.3   ALBUMIN 3.2* 2.8*   BILITOT 0.4 0.5   ALKPHOS 123 112   AST 17 27   ALT 13 18   ANIONGAP 11 8       Significant Imaging: I have reviewed all pertinent imaging results/findings within the past 24 hours.

## 2023-04-05 NOTE — ASSESSMENT & PLAN NOTE
US LUE: extensive DVT to left subclavian vein, Left axillary, left brachial, left basilic veins associated with PICC line. PICC line to LUE for long-term abx.     --D/C PICC Line  --Heparin Infusion per nomogram  --Consult IR for thrombectomy- Neurovascular intact, will eval in AM    4/5  --No plan for thrombectomy per IR   -- continue medical management   -- NV intact on exam

## 2023-04-05 NOTE — HOSPITAL COURSE
66 yo male s/p liver transplant with recent osteomyelitis of right great toe requiring PICC for long term antibiotics admitted with extensive LUE DVT. PICC line removed. Heparin gtt initiated. IR and ID consulted. Per IR continue medical management, no plan for thrombectomy, recommend repeat US in 10-14 days. Will initiate apixaban at discharge. Patient to follow up OP. Per ID complete treatment for right great toe osteomyelitis with doxycycline 100 mg  BID x 2 weeks. Follow up with Dr. Beavers in 1 week. Patient stable for discharge. Recommend f/u with pcp in 1 week.

## 2023-04-05 NOTE — PLAN OF CARE
[facility]  Initial Discharge Assessment       Primary Care Provider: Isabella Sutton DO    Admission Diagnosis: PICC (peripherally inserted central catheter) in place [Z45.2]  Chest pain [R07.9]  Arm DVT (deep venous thromboembolism), acute, left [I82.622]    Admission Date: 4/4/2023  Expected Discharge Date:     Discharge Barriers Identified: None    Payor: HU[employer] MANAGED MEDICARE / Plan: HU[employer] TOTAL CARE ADVANTAGE / Product Type: Medicare Advantage /     Extended Emergency Contact Information  Primary Emergency Contact: Linda Khan  Mobile Phone: [phone]  Relation: Daughter  Secondary Emergency Contact: Shaneka Brody  Mobile Phone: [phone]  Relation: Sister    Discharge Plan A: Home with family         [id number] - [place] - [address] [place]  [address] [place] [address]  Phone: [phone] Fax: [phone]    [facility] - [place]  [address]  [address] [place] [address] [address] [place] [address]  Phone: [phone] Fax: [phone]    [id number]  [place] [facility] [facility] & [address]  [place] [address]  Phone: [phone] Fax: [phone]    [id number] - [place] - [address] [facility] [address]  [place] [address]  Phone: [phone] Fax: [phone]    [facility] - [place], [place] - [address]  [address] [facility] [place] [address]  Phone: [phone] Fax: [phone]    [id number] - [place] - [address] [facility]  [address]  [place] [address]  Phone: [phone] Fax: [phone]    [facility] - [place], [place] - [address]  [address]  [facility] [address]  Phone: [phone] Fax: [phone]      Initial Assessment (most recent)       Adult  Discharge Assessment - 04/05/23 1003          Discharge Assessment    Assessment Type Discharge Planning Assessment     Confirmed/corrected address, phone number and insurance Yes     Confirmed Demographics Correct on Facesheet     Source of Information patient;family     Communicated ALEXIS with patient/caregiver Date not available/Unable to determine     Reason For Admission Acute deep vein thrombosis (DVT) of left upper extremity     People in Home alone     Facility Arrived From: home     Do you expect to return to your current living situation? Yes     Do you have help at home or someone to help you manage your care at home? Yes     Who are your caregiver(s) and their phone number(s)? family     Prior to hospitilization cognitive status: Alert/Oriented     Current cognitive status: Alert/Oriented     Equipment Currently Used at Home none     Readmission within 30 days? No     Patient currently being followed by outpatient case management? No     Do you currently have service(s) that help you manage your care at home? No     Do you take prescription medications? Yes     Do you have prescription coverage? Yes     Coverage [employer] Medicare - Total Care Advantage     Do you have any problems affording any of your prescribed medications? No     Is the patient taking medications as prescribed? yes     Who is going to help you get home at discharge? family     How do you get to doctors appointments? car, drives self     Are you on dialysis? No     Do you take coumadin? No     Discharge Plan A Home with family     DME Needed Upon Discharge  none     Discharge Plan discussed with: Patient     Discharge Barriers Identified None                      Anticipated DC dispo: home with family  Prior Level of Function: independent with ADLs  PCP: Isabella Sutton DO     Comments:  Jer met with patient at bedside to introduce role and discuss discharge planning. Patient lives alone, but has family who will be able to help at home and  can provide transport at time of discharge. CM discharge needs depends on hospital progress. Swer will continue following to assist with other needs.

## 2023-04-05 NOTE — PLAN OF CARE
AOx3 resp even, unlabored VSS on RA, telemetry monitor in place. Denies any pain/discomfort,call light in reach, bed in low position side rails up X2 NAD noted.

## 2023-04-05 NOTE — PROGRESS NOTES
Pt does not want an appt with the smoking cessation clinic, but accepted a pamphlet. Denies need for nicotine patches.

## 2023-04-06 ENCOUNTER — TELEPHONE (OUTPATIENT)
Dept: INTERNAL MEDICINE | Facility: CLINIC | Age: 68
End: 2023-04-06
Payer: MEDICARE

## 2023-04-06 ENCOUNTER — NURSE TRIAGE (OUTPATIENT)
Dept: ADMINISTRATIVE | Facility: CLINIC | Age: 68
End: 2023-04-06
Payer: MEDICARE

## 2023-04-06 VITALS
SYSTOLIC BLOOD PRESSURE: 103 MMHG | TEMPERATURE: 98 F | HEIGHT: 68 IN | BODY MASS INDEX: 35.37 KG/M2 | WEIGHT: 233.38 LBS | RESPIRATION RATE: 18 BRPM | OXYGEN SATURATION: 96 % | DIASTOLIC BLOOD PRESSURE: 67 MMHG | HEART RATE: 84 BPM

## 2023-04-06 DIAGNOSIS — I82.622 ACUTE DEEP VEIN THROMBOSIS (DVT) OF LEFT UPPER EXTREMITY, UNSPECIFIED VEIN: Primary | ICD-10-CM

## 2023-04-06 LAB
ALBUMIN SERPL BCP-MCNC: 2.8 G/DL (ref 3.5–5.2)
ALP SERPL-CCNC: 129 U/L (ref 55–135)
ALT SERPL W/O P-5'-P-CCNC: 24 U/L (ref 10–44)
ANION GAP SERPL CALC-SCNC: 8 MMOL/L (ref 8–16)
APTT PPP: 52.8 SEC (ref 21–32)
AST SERPL-CCNC: 34 U/L (ref 10–40)
BASOPHILS # BLD AUTO: 0.07 K/UL (ref 0–0.2)
BASOPHILS # BLD AUTO: 0.1 K/UL (ref 0–0.2)
BASOPHILS NFR BLD: 1.1 % (ref 0–1.9)
BASOPHILS NFR BLD: 1.5 % (ref 0–1.9)
BILIRUB SERPL-MCNC: 0.5 MG/DL (ref 0.1–1)
BUN SERPL-MCNC: 13 MG/DL (ref 8–23)
CALCIUM SERPL-MCNC: 8.4 MG/DL (ref 8.7–10.5)
CHLORIDE SERPL-SCNC: 107 MMOL/L (ref 95–110)
CO2 SERPL-SCNC: 21 MMOL/L (ref 23–29)
CREAT SERPL-MCNC: 0.9 MG/DL (ref 0.5–1.4)
DIFFERENTIAL METHOD: ABNORMAL
DIFFERENTIAL METHOD: ABNORMAL
EOSINOPHIL # BLD AUTO: 0.4 K/UL (ref 0–0.5)
EOSINOPHIL # BLD AUTO: 0.4 K/UL (ref 0–0.5)
EOSINOPHIL NFR BLD: 6.4 % (ref 0–8)
EOSINOPHIL NFR BLD: 6.4 % (ref 0–8)
ERYTHROCYTE [DISTWIDTH] IN BLOOD BY AUTOMATED COUNT: 13.3 % (ref 11.5–14.5)
ERYTHROCYTE [DISTWIDTH] IN BLOOD BY AUTOMATED COUNT: 13.4 % (ref 11.5–14.5)
EST. GFR  (NO RACE VARIABLE): >60 ML/MIN/1.73 M^2
GLUCOSE SERPL-MCNC: 88 MG/DL (ref 70–110)
HCT VFR BLD AUTO: 42.4 % (ref 40–54)
HCT VFR BLD AUTO: 42.8 % (ref 40–54)
HGB BLD-MCNC: 14.3 G/DL (ref 14–18)
HGB BLD-MCNC: 14.3 G/DL (ref 14–18)
IMM GRANULOCYTES # BLD AUTO: 0.03 K/UL (ref 0–0.04)
IMM GRANULOCYTES # BLD AUTO: 0.04 K/UL (ref 0–0.04)
IMM GRANULOCYTES NFR BLD AUTO: 0.5 % (ref 0–0.5)
IMM GRANULOCYTES NFR BLD AUTO: 0.6 % (ref 0–0.5)
LYMPHOCYTES # BLD AUTO: 1.3 K/UL (ref 1–4.8)
LYMPHOCYTES # BLD AUTO: 1.4 K/UL (ref 1–4.8)
LYMPHOCYTES NFR BLD: 20.5 % (ref 18–48)
LYMPHOCYTES NFR BLD: 20.8 % (ref 18–48)
MAGNESIUM SERPL-MCNC: 1.8 MG/DL (ref 1.6–2.6)
MCH RBC QN AUTO: 28.5 PG (ref 27–31)
MCH RBC QN AUTO: 28.8 PG (ref 27–31)
MCHC RBC AUTO-ENTMCNC: 33.4 G/DL (ref 32–36)
MCHC RBC AUTO-ENTMCNC: 33.7 G/DL (ref 32–36)
MCV RBC AUTO: 85 FL (ref 82–98)
MCV RBC AUTO: 86 FL (ref 82–98)
MONOCYTES # BLD AUTO: 0.5 K/UL (ref 0.3–1)
MONOCYTES # BLD AUTO: 0.5 K/UL (ref 0.3–1)
MONOCYTES NFR BLD: 7.2 % (ref 4–15)
MONOCYTES NFR BLD: 7.2 % (ref 4–15)
NEUTROPHILS # BLD AUTO: 4.2 K/UL (ref 1.8–7.7)
NEUTROPHILS # BLD AUTO: 4.2 K/UL (ref 1.8–7.7)
NEUTROPHILS NFR BLD: 63.6 % (ref 38–73)
NEUTROPHILS NFR BLD: 64.2 % (ref 38–73)
NRBC BLD-RTO: 0 /100 WBC
NRBC BLD-RTO: 0 /100 WBC
PHOSPHATE SERPL-MCNC: 2 MG/DL (ref 2.7–4.5)
PLATELET # BLD AUTO: 131 K/UL (ref 150–450)
PLATELET # BLD AUTO: 139 K/UL (ref 150–450)
PMV BLD AUTO: 11.2 FL (ref 9.2–12.9)
PMV BLD AUTO: 11.5 FL (ref 9.2–12.9)
POTASSIUM SERPL-SCNC: 4 MMOL/L (ref 3.5–5.1)
PROT SERPL-MCNC: 6.6 G/DL (ref 6–8.4)
RBC # BLD AUTO: 4.96 M/UL (ref 4.6–6.2)
RBC # BLD AUTO: 5.02 M/UL (ref 4.6–6.2)
SODIUM SERPL-SCNC: 136 MMOL/L (ref 136–145)
WBC # BLD AUTO: 6.54 K/UL (ref 3.9–12.7)
WBC # BLD AUTO: 6.55 K/UL (ref 3.9–12.7)

## 2023-04-06 PROCEDURE — 25000003 PHARM REV CODE 250: Mod: HCNC | Performed by: NURSE PRACTITIONER

## 2023-04-06 PROCEDURE — 63600175 PHARM REV CODE 636 W HCPCS: Mod: HCNC | Performed by: NURSE PRACTITIONER

## 2023-04-06 PROCEDURE — 83735 ASSAY OF MAGNESIUM: CPT | Mod: HCNC | Performed by: NURSE PRACTITIONER

## 2023-04-06 PROCEDURE — 80053 COMPREHEN METABOLIC PANEL: CPT | Mod: HCNC | Performed by: NURSE PRACTITIONER

## 2023-04-06 PROCEDURE — 85730 THROMBOPLASTIN TIME PARTIAL: CPT | Mod: HCNC | Performed by: STUDENT IN AN ORGANIZED HEALTH CARE EDUCATION/TRAINING PROGRAM

## 2023-04-06 PROCEDURE — 85025 COMPLETE CBC W/AUTO DIFF WBC: CPT | Mod: 91,HCNC | Performed by: NURSE PRACTITIONER

## 2023-04-06 PROCEDURE — 36415 COLL VENOUS BLD VENIPUNCTURE: CPT | Mod: HCNC | Performed by: NURSE PRACTITIONER

## 2023-04-06 PROCEDURE — G0378 HOSPITAL OBSERVATION PER HR: HCPCS | Mod: HCNC

## 2023-04-06 PROCEDURE — 96366 THER/PROPH/DIAG IV INF ADDON: CPT

## 2023-04-06 PROCEDURE — 84100 ASSAY OF PHOSPHORUS: CPT | Mod: HCNC | Performed by: NURSE PRACTITIONER

## 2023-04-06 PROCEDURE — S4991 NICOTINE PATCH NONLEGEND: HCPCS | Mod: HCNC | Performed by: NURSE PRACTITIONER

## 2023-04-06 RX ORDER — DOXYCYCLINE 100 MG/1
100 CAPSULE ORAL EVERY 12 HOURS
Qty: 28 CAPSULE | Refills: 0 | Status: SHIPPED | OUTPATIENT
Start: 2023-04-06 | End: 2023-04-20

## 2023-04-06 RX ADMIN — HEPARIN SODIUM 18 UNITS/KG/HR: 10000 INJECTION, SOLUTION INTRAVENOUS at 02:04

## 2023-04-06 RX ADMIN — TACROLIMUS 1 MG: 1 CAPSULE ORAL at 08:04

## 2023-04-06 RX ADMIN — APIXABAN 10 MG: 2.5 TABLET, FILM COATED ORAL at 11:04

## 2023-04-06 RX ADMIN — NICOTINE 1 PATCH: 21 PATCH, EXTENDED RELEASE TRANSDERMAL at 08:04

## 2023-04-06 RX ADMIN — METOPROLOL SUCCINATE 25 MG: 25 TABLET, EXTENDED RELEASE ORAL at 08:04

## 2023-04-06 NOTE — DISCHARGE SUMMARY
"[facility]  Discharge Summary      Patient Name: Lj Coleman  MRN: [id number]  MARCI: [id number]  Patient Class: OP- Observation  Admission Date: 4/4/2023  Hospital Length of Stay: 0 days  Discharge Date and Time:  04/06/2023 1:36 PM  Attending Physician: Bia Sims, *   Discharging Provider: Norma Bower NP  Primary Care Provider: Isabella Sutton DO    Primary Care Team: Networked reference to record PCT     HPI:   67 y.o. male patient with a PMHx of angina pectoris, atherosclerosis of native coronary, chronic hepatitis C with cirrhosis, liver transplant, HTN, and tobacco abuse who presents to the Emergency Department for LUE swelling which onset at 1130 this morning. Pt states, "It feels like I have no circulation in my L arm." Pt currently has a LUE PICC line as he is receiving IV abx for osteomyelitis of his toe. Symptoms are constant and moderate in severity. No mitigating or exacerbating factors reported. No associated sxs reported. Patient denies any fever, chills, N/V/D, CP, SOB, weakness, and all other sxs at this time. In the ED, US of LUE: DVT associated with PICC line. CTA chest: Negative for PE. Initiated on Heparin. Patient is a full code, surrogate decision maker is . Placed in observation under the care of Hospital Medicine for management of DVT LUE.       * No surgery found *      Hospital Course:   66 yo male s/p liver transplant with recent osteomyelitis of right great toe requiring PICC for long term antibiotics admitted with extensive LUE DVT. PICC line removed. Heparin gtt initiated. IR and ID consulted. Per IR continue medical management, no plan for thrombectomy, recommend repeat US in 10-14 days. Will initiate apixaban at discharge. Patient to follow up OP. Per ID complete treatment for right great toe osteomyelitis with doxycycline 100 mg  BID x 2 weeks. Follow up with Dr. Beavers in 1 week. Patient stable for discharge. Recommend f/u with pcp " in 1 week.                        Goals of Care Treatment Preferences:  Code Status: Full Code      Consults:   Consults (From admission, onward)        Status Ordering Provider     Inpatient consult to Interventional Radiology  Once        Provider:  Myles Murray MD    Completed URVASHI JEAN BAPTISTE     Inpatient consult to Infectious Diseases  Once        Provider:  Shivam Beavers MD, FIDSA    Acknowledged URVASHI JEAN BAPTISTE.          ID  Osteomyelitis of ankle and foot  Managed as OP by Dr. Beavers with Daptomycin and Ertapenem, Patient wants to discuss with ID if changing to a PO regimen is possible. Does not want to continue IV Abx if able to change    --Continue current regimen  --Consult ID    4/6  Discontinue IV Daptomycin and Ertapenem  complete therapy with doxycycline 100 mg BID x 2 weeks   Follow up with ID in 1 week     Other  * Acute deep vein thrombosis (DVT) of left upper extremity  US LUE: extensive DVT to left subclavian vein, Left axillary, left brachial, left basilic veins associated with PICC line. PICC line to LUE for long-term abx.     --D/C PICC Line  --Heparin Infusion per nomogram  --Consult IR for thrombectomy- Neurovascular intact, will eval in AM    4/5  --No plan for thrombectomy per IR   -- continue medical management   -- NV intact on exam   --Apixaban at discharge         Final Active Diagnoses:    Diagnosis Date Noted POA    PRINCIPAL PROBLEM:  Acute deep vein thrombosis (DVT) of left upper extremity [I82.622] 04/04/2023 Yes    Osteomyelitis of ankle and foot [M86.9] 03/14/2023 Yes    Smoker [F17.200] 06/14/2021 Yes    Benign essential HTN [I10] 07/28/2016 Yes    Liver transplanted [Z94.4] 09/09/2015 Not Applicable    Immunosuppression [D84.9] 09/09/2015 Yes      Problems Resolved During this Admission:       Discharged Condition: stable    Disposition: Home or Self Care    Follow Up:   Follow-up Information     Isabella Sutton DO. Go in 1 week(s).    Specialty: Internal  Medicine  Why: Recomemnd repeat left upper extremity US in 2 weeks  Contact information:  [address] [place] [address]   [place] [address]  [phone]             Shivam Beavers MD, [facility]. Schedule an appointment as soon as possible for a visit in 1 week(s).    Specialties: Infectious Diseases, Hospitalist  Contact information:  [address] [place] [address] [place] [address]  [phone]                       Patient Instructions:   No discharge procedures on file.    Significant Diagnostic Studies: Labs:   CMP   Recent Labs   Lab 04/05/23  0421 04/06/23  0633    136   K 3.7 4.0    107   CO2 23 21*    88   BUN 15 13   CREATININE 1.0 0.9   CALCIUM 8.2* 8.4*   PROT 6.3 6.6   ALBUMIN 2.8* 2.8*   BILITOT 0.5 0.5   ALKPHOS 112 129   AST 27 34   ALT 18 24   ANIONGAP 8 8    and CBC   Recent Labs   Lab 04/04/23  1532 04/05/23  0421 04/06/23  0633   WBC 9.33 7.52 6.55  6.54   HGB 14.8 13.8* 14.3  14.3   HCT 43.5 41.4 42.8  42.4    133* 139*  131*       Pending Diagnostic Studies:     None         Medications:  Reconciled Home Medications:      Medication List      START taking these medications    * apixaban 5 mg Tab  Commonly known as: ELIQUIS  Take 2 tablets (10 mg total) by mouth 2 (two) times daily. for 7 days     * apixaban 5 mg (74 tabs) Dspk  Commonly known as: ELIQUIS  Take 2 tablets (10 mg total) by mouth 2 (two) times daily for 7 days then Take 1 tablet (5 mg total) by mouth 2 (two) times daily thereafter     doxycycline 100 MG Cap  Commonly known as: VIBRAMYCIN  Take 1 capsule (100 mg total) by mouth every 12 (twelve) hours. for 14 days         * This list has 2 medication(s) that are the same as other medications prescribed for you. Read the directions carefully, and ask your doctor or other care provider to review them with you.            CONTINUE taking these medications    esomeprazole 40 MG capsule  Commonly known as: NEXIUM  Take 1 capsule (40 mg total) by mouth once daily.      metoprolol succinate 25 MG 24 hr tablet  Commonly known as: TOPROL-XL  Take 1 tablet (25 mg total) by mouth once daily.     tacrolimus 0.5 MG Cap  Commonly known as: PROGRAF  TAKE 2 CAPSULES EVERY 12 HOURS     traMADoL 50 mg tablet  Commonly known as: ULTRAM  Take 1 tablet (50 mg total) by mouth every 6 (six) hours as needed for Pain.        STOP taking these medications    DAPTOmycin 500 mg injection  Commonly known as: CUBICIN     ertapenem 1 gram injection  Commonly known as: INVANZ            Indwelling Lines/Drains at time of discharge:   Lines/Drains/Airways     None                 Time spent on the discharge of patient: 30 minutes         Norma Bower NP  Department of Hospital Medicine  [facility]

## 2023-04-06 NOTE — ASSESSMENT & PLAN NOTE
Managed as OP by Dr. Beavers with Daptomycin and Ertapenem, Patient wants to discuss with ID if changing to a PO regimen is possible. Does not want to continue IV Abx if able to change    --Continue current regimen  --Consult ID    4/6  Discontinue IV Daptomycin and Ertapenem  complete therapy with doxycycline 100 mg BID x 2 weeks   Follow up with ID in 1 week

## 2023-04-06 NOTE — HPI
Previous ID notes   02/08/23  Referred for      Long standing ingrown nail with abscess to the right great toe. History of transplant. xrays obtained showing possible osteomyelitis.           Plan - will start IV Daptomycin at 700mg daily   IV Cefepime 2 gram every 8 hours   Will treat for 6 weeks   Will need ESR,CRP,CBC,CMP,CPK  Eoc -03/23/23.  Insert PICC line   Contact infusion [employer]        3/14- 67 year old man with PMH as listed below who was referred for toe osteomyelitis .  Toe imaging -  01/25-  Osseous erosion involving the distal tuft of the right great toe findings suggest osteomyelitis     Cultures- 01/25- no growth .  0n 03/07- he was given  Daptomycin and Cefepime and developed Hives after Cefepime.

## 2023-04-06 NOTE — PLAN OF CARE
[facility]  Discharge Final Note    Primary Care Provider: Isabella Sutton DO    Expected Discharge Date: 4/6/2023    Final Discharge Note (most recent)       Final Note - 04/06/23 1542          Final Note    Assessment Type Final Discharge Note     Anticipated Discharge Disposition Home or Self Care     Hospital Resources/Appts/Education Provided Appointments scheduled and added to AVS        Post-Acute Status    Discharge Delays None known at this time                     Important Message from Medicare             Contact Info       Isabella Sutton DO   Specialty: Internal Medicine   Relationship: PCP - General    [address] [place] [address] [address] [place] [place] [address]   Phone: [phone]       Next Steps: Go in 1 week(s)    Instructions: Recomemnd repeat left upper extremity US in 2 weeks    Shivam Beavers MD, JEFFREY   Specialty: Infectious Diseases, Hospitalist    [address] [place] [address] [place] [facility] [place] [address]   Phone: [phone]       Next Steps: Schedule an appointment as soon as possible for a visit in 1 week(s)

## 2023-04-06 NOTE — TELEPHONE ENCOUNTER
Reason for Disposition   Caller has medicine question only, adult not sick, AND triager answers question    Additional Information   Negative: [1] Intentional drug overdose AND [2] suicidal thoughts or ideas   Negative: Drug overdose and triager unable to answer question   Negative: Caller requesting a renewal or refill of a medicine patient is currently taking   Negative: Caller requesting information unrelated to medicine   Negative: Caller requesting information about COVID-19 Vaccine   Negative: Caller requesting information about Emergency Contraception   Negative: Caller requesting information about Combined Birth Control Pills   Negative: Caller requesting information about Progestin Birth Control Pills   Negative: Caller requesting information about Post-Op pain or medicines   Negative: Caller requesting a prescription antibiotic (such as Penicillin) for Strep throat and has a positive culture result   Negative: Caller requesting a prescription anti-viral med (such as Tamiflu) and has influenza (flu) symptoms   Negative: Immunization reaction suspected   Negative: Rash while taking a medicine or within 3 days of stopping it   Negative: [1] Asthma and [2] having symptoms of asthma (cough, wheezing, etc.)   Negative: [1] Symptom of illness (e.g., headache, abdominal pain, earache, vomiting) AND [2] more than mild   Negative: Breastfeeding questions about mother's medicines and diet   Negative: MORE THAN A DOUBLE DOSE of a prescription or over-the-counter (OTC) drug   Negative: [1] DOUBLE DOSE (an extra dose or lesser amount) of prescription drug AND [2] any symptoms (e.g., dizziness, nausea, pain, sleepiness)   Negative: [1] DOUBLE DOSE (an extra dose or lesser amount) of over-the-counter (OTC) drug AND [2] any symptoms (e.g., dizziness, nausea, pain, sleepiness)   Negative: Took another person's prescription drug   Negative: [1] DOUBLE DOSE (an extra dose or lesser amount) of prescription drug AND [2] NO  symptoms (Exception: a double dose of antibiotics)   Negative: Diabetes drug error or overdose (e.g., took wrong type of insulin or took extra dose)   Negative: [1] Prescription not at pharmacy AND [2] was prescribed by PCP recently (Exception: triager has access to EMR and prescription is recorded there. Go to Home Care and confirm for pharmacy.)   Negative: [1] Pharmacy calling with prescription question AND [2] triager unable to answer question   Negative: [1] Caller has URGENT medicine question about med that PCP or specialist prescribed AND [2] triager unable to answer question   Negative: Medicine patch causing local rash or itching   Negative: [1] Caller has medicine question about med NOT prescribed by PCP AND [2] triager unable to answer question (e.g., compatibility with other med, storage)   Negative: Prescription request for new medicine (not a refill)   Negative: [1] Caller has NON-URGENT medicine question about med that PCP prescribed AND [2] triager unable to answer question   Negative: Caller wants to use a complementary or alternative medicine   Negative: [1] Prescription prescribed recently is not at pharmacy AND [2] triager has access to patient's EMR AND [3] prescription is recorded in the EMR   Negative: [1] DOUBLE DOSE (an extra dose or lesser amount) of over-the-counter (OTC) drug AND [2] NO symptoms   Negative: [1] DOUBLE DOSE (an extra dose or lesser amount) of antibiotic drug AND [2] NO symptoms    Protocols used: Medication Question Call-A-  Pt states she just discharged from [facility]. States he needs to know if he took his Eliquis today. Reviewed Hospital MAR. Advised pt he received 10 mg at 1116 by Nurse Jacob.  Pt asked about the Prograft. Advised that was dispensed at 0839 and due again tonight. Pt verbalized understanding and has no further questions.

## 2023-04-06 NOTE — ASSESSMENT & PLAN NOTE
US LUE: extensive DVT to left subclavian vein, Left axillary, left brachial, left basilic veins associated with PICC line. PICC line to LUE for long-term abx.     --D/C PICC Line  --Heparin Infusion per nomogram  --Consult IR for thrombectomy- Neurovascular intact, will eval in AM    4/5  --No plan for thrombectomy per IR   -- continue medical management   -- NV intact on exam   --Apixaban at discharge

## 2023-04-06 NOTE — PLAN OF CARE
Discussed poc with pt, pt verbalized understanding    Purposeful rounding every 2hours    VS wnl  Cardiac monitoring in use, pt is NSR, tele monitor #[id number]    Fall precautions in place, remains injury free    Heparin infusing  Accurate I&Os    Bed locked at lowest position  Call light within reach    Chart check complete  Will cont with POC

## 2023-04-06 NOTE — TELEPHONE ENCOUNTER
----- Message from Noni Jacob RN sent at 4/6/2023  2:46 PM CDT -----  Regarding: hospital f/u  Please schedule pt 2 week hospital f/u and US to [facility]. Thanks.

## 2023-04-14 ENCOUNTER — TELEPHONE (OUTPATIENT)
Dept: ADMINISTRATIVE | Facility: HOSPITAL | Age: 68
End: 2023-04-14
Payer: MEDICARE

## 2023-04-14 NOTE — TELEPHONE ENCOUNTER
Pt states the site where he had is picline is causing him pain. He states the pain comes and goes but at times it hurts very bad like a sharp stabbing pain. He would like a call back to ask further questions and to see how to resolve it. Pt can be reached at [phone]

## 2023-04-17 ENCOUNTER — OFFICE VISIT (OUTPATIENT)
Dept: HEPATOLOGY | Facility: CLINIC | Age: 68
End: 2023-04-17
Payer: MEDICARE

## 2023-04-17 ENCOUNTER — LAB VISIT (OUTPATIENT)
Dept: LAB | Facility: HOSPITAL | Age: 68
End: 2023-04-17
Attending: INTERNAL MEDICINE
Payer: MEDICARE

## 2023-04-17 VITALS
DIASTOLIC BLOOD PRESSURE: 83 MMHG | OXYGEN SATURATION: 96 % | SYSTOLIC BLOOD PRESSURE: 128 MMHG | WEIGHT: 226.19 LBS | HEART RATE: 95 BPM | BODY MASS INDEX: 34.28 KG/M2 | HEIGHT: 68 IN

## 2023-04-17 DIAGNOSIS — Z86.010 HISTORY OF COLONIC POLYPS: ICD-10-CM

## 2023-04-17 DIAGNOSIS — Z94.4 LIVER TRANSPLANTED: ICD-10-CM

## 2023-04-17 DIAGNOSIS — D84.9 IMMUNOSUPPRESSION: Primary | ICD-10-CM

## 2023-04-17 DIAGNOSIS — Z94.4 S/P LIVER TRANSPLANT: ICD-10-CM

## 2023-04-17 LAB
ALBUMIN SERPL BCP-MCNC: 3.3 G/DL (ref 3.5–5.2)
ALP SERPL-CCNC: 128 U/L (ref 55–135)
ALT SERPL W/O P-5'-P-CCNC: 14 U/L (ref 10–44)
ANION GAP SERPL CALC-SCNC: 12 MMOL/L (ref 8–16)
AST SERPL-CCNC: 15 U/L (ref 10–40)
BASOPHILS # BLD AUTO: 0.07 K/UL (ref 0–0.2)
BASOPHILS NFR BLD: 1.1 % (ref 0–1.9)
BILIRUB SERPL-MCNC: 0.6 MG/DL (ref 0.1–1)
BUN SERPL-MCNC: 12 MG/DL (ref 8–23)
CALCIUM SERPL-MCNC: 9.1 MG/DL (ref 8.7–10.5)
CHLORIDE SERPL-SCNC: 104 MMOL/L (ref 95–110)
CO2 SERPL-SCNC: 23 MMOL/L (ref 23–29)
CREAT SERPL-MCNC: 1.1 MG/DL (ref 0.5–1.4)
DIFFERENTIAL METHOD: NORMAL
EOSINOPHIL # BLD AUTO: 0.3 K/UL (ref 0–0.5)
EOSINOPHIL NFR BLD: 5.2 % (ref 0–8)
ERYTHROCYTE [DISTWIDTH] IN BLOOD BY AUTOMATED COUNT: 14.3 % (ref 11.5–14.5)
EST. GFR  (NO RACE VARIABLE): >60 ML/MIN/1.73 M^2
GLUCOSE SERPL-MCNC: 92 MG/DL (ref 70–110)
HCT VFR BLD AUTO: 45.9 % (ref 40–54)
HGB BLD-MCNC: 14.8 G/DL (ref 14–18)
IMM GRANULOCYTES # BLD AUTO: 0.03 K/UL (ref 0–0.04)
IMM GRANULOCYTES NFR BLD AUTO: 0.5 % (ref 0–0.5)
LYMPHOCYTES # BLD AUTO: 1.2 K/UL (ref 1–4.8)
LYMPHOCYTES NFR BLD: 18.7 % (ref 18–48)
MCH RBC QN AUTO: 28.6 PG (ref 27–31)
MCHC RBC AUTO-ENTMCNC: 32.2 G/DL (ref 32–36)
MCV RBC AUTO: 89 FL (ref 82–98)
MONOCYTES # BLD AUTO: 0.5 K/UL (ref 0.3–1)
MONOCYTES NFR BLD: 7.6 % (ref 4–15)
NEUTROPHILS # BLD AUTO: 4.3 K/UL (ref 1.8–7.7)
NEUTROPHILS NFR BLD: 66.9 % (ref 38–73)
NRBC BLD-RTO: 0 /100 WBC
PLATELET # BLD AUTO: 172 K/UL (ref 150–450)
PMV BLD AUTO: 12.2 FL (ref 9.2–12.9)
POTASSIUM SERPL-SCNC: 4.2 MMOL/L (ref 3.5–5.1)
PROT SERPL-MCNC: 7.1 G/DL (ref 6–8.4)
RBC # BLD AUTO: 5.17 M/UL (ref 4.6–6.2)
SODIUM SERPL-SCNC: 139 MMOL/L (ref 136–145)
WBC # BLD AUTO: 6.48 K/UL (ref 3.9–12.7)

## 2023-04-17 PROCEDURE — 1160F PR REVIEW ALL MEDS BY PRESCRIBER/CLIN PHARMACIST DOCUMENTED: ICD-10-PCS | Mod: HCNC,CPTII,S$GLB, | Performed by: INTERNAL MEDICINE

## 2023-04-17 PROCEDURE — 99214 PR OFFICE/OUTPT VISIT, EST, LEVL IV, 30-39 MIN: ICD-10-PCS | Mod: HCNC,S$GLB,, | Performed by: INTERNAL MEDICINE

## 2023-04-17 PROCEDURE — 3074F SYST BP LT 130 MM HG: CPT | Mod: HCNC,CPTII,S$GLB, | Performed by: INTERNAL MEDICINE

## 2023-04-17 PROCEDURE — 99999 PR PBB SHADOW E&M-EST. PATIENT-LVL III: CPT | Mod: PBBFAC,HCNC,, | Performed by: INTERNAL MEDICINE

## 2023-04-17 PROCEDURE — 3079F PR MOST RECENT DIASTOLIC BLOOD PRESSURE 80-89 MM HG: ICD-10-PCS | Mod: HCNC,CPTII,S$GLB, | Performed by: INTERNAL MEDICINE

## 2023-04-17 PROCEDURE — 1159F PR MEDICATION LIST DOCUMENTED IN MEDICAL RECORD: ICD-10-PCS | Mod: HCNC,CPTII,S$GLB, | Performed by: INTERNAL MEDICINE

## 2023-04-17 PROCEDURE — 36415 COLL VENOUS BLD VENIPUNCTURE: CPT | Mod: HCNC | Performed by: INTERNAL MEDICINE

## 2023-04-17 PROCEDURE — 3074F PR MOST RECENT SYSTOLIC BLOOD PRESSURE < 130 MM HG: ICD-10-PCS | Mod: HCNC,CPTII,S$GLB, | Performed by: INTERNAL MEDICINE

## 2023-04-17 PROCEDURE — 80053 COMPREHEN METABOLIC PANEL: CPT | Mod: HCNC | Performed by: INTERNAL MEDICINE

## 2023-04-17 PROCEDURE — 80197 ASSAY OF TACROLIMUS: CPT | Mod: HCNC | Performed by: INTERNAL MEDICINE

## 2023-04-17 PROCEDURE — 1159F MED LIST DOCD IN RCRD: CPT | Mod: HCNC,CPTII,S$GLB, | Performed by: INTERNAL MEDICINE

## 2023-04-17 PROCEDURE — 3008F PR BODY MASS INDEX (BMI) DOCUMENTED: ICD-10-PCS | Mod: HCNC,CPTII,S$GLB, | Performed by: INTERNAL MEDICINE

## 2023-04-17 PROCEDURE — 85025 COMPLETE CBC W/AUTO DIFF WBC: CPT | Mod: HCNC | Performed by: INTERNAL MEDICINE

## 2023-04-17 PROCEDURE — 99999 PR PBB SHADOW E&M-EST. PATIENT-LVL III: ICD-10-PCS | Mod: PBBFAC,HCNC,, | Performed by: INTERNAL MEDICINE

## 2023-04-17 PROCEDURE — 99214 OFFICE O/P EST MOD 30 MIN: CPT | Mod: HCNC,S$GLB,, | Performed by: INTERNAL MEDICINE

## 2023-04-17 PROCEDURE — 3008F BODY MASS INDEX DOCD: CPT | Mod: HCNC,CPTII,S$GLB, | Performed by: INTERNAL MEDICINE

## 2023-04-17 PROCEDURE — 3079F DIAST BP 80-89 MM HG: CPT | Mod: HCNC,CPTII,S$GLB, | Performed by: INTERNAL MEDICINE

## 2023-04-17 PROCEDURE — 1160F RVW MEDS BY RX/DR IN RCRD: CPT | Mod: HCNC,CPTII,S$GLB, | Performed by: INTERNAL MEDICINE

## 2023-04-17 NOTE — PROGRESS NOTES
Transplant Hepatology  Liver Transplant Recipient Follow-up    Transplant Date: 1/2/2012  [facility] Native Liver Dx:     Lj is here for follow up of his liver transplant.        Subjective:     Interval History:  Currently, he is doing adequately. Current complaints include with infection his right toe for which he was on IV antibiotics.  He then developed a blood clot at the site of his catheter.  He is now on anticoagulation with Eliquis.  Since last visit he did have a colonoscopy where he had multiple polyps.  The recommendation was for follow-up in 3 months but the patient never had that done.  He also has for to Dermatology but is uncertain if he did see a dermatologist and has not been seen 1 regularly.    As relates to his liver there been no major issues.  His liver tests have remained normal.    Review of Systems    Objective:     Physical Exam  Vitals reviewed.   Constitutional:       General: He is not in acute distress.     Appearance: He is well-developed.   HENT:      Head: Normocephalic and atraumatic.      Mouth/Throat:      Pharynx: No oropharyngeal exudate.   Eyes:      General: No scleral icterus.        Right eye: No discharge.         Left eye: No discharge.      Conjunctiva/sclera: Conjunctivae normal.      Pupils: Pupils are equal, round, and reactive to light.   Pulmonary:      Effort: Pulmonary effort is normal. No respiratory distress.      Breath sounds: Normal breath sounds. No wheezing.   Abdominal:      General: There is no distension.      Palpations: Abdomen is soft.      Tenderness: There is no abdominal tenderness.   Neurological:      Mental Status: He is alert and oriented to person, place, and time.   Psychiatric:         Behavior: Behavior normal.       WBC   Date Value Ref Range Status   04/06/2023 6.54 3.90 - 12.70 K/uL Final   04/06/2023 6.55 3.90 - 12.70 K/uL Final     Hemoglobin   Date Value Ref Range Status   04/06/2023 14.3 14.0 - 18.0 g/dL Final   04/06/2023 14.3 14.0 -  18.0 g/dL Final     Hematocrit   Date Value Ref Range Status   04/06/2023 42.4 40.0 - 54.0 % Final   04/06/2023 42.8 40.0 - 54.0 % Final     Platelets   Date Value Ref Range Status   04/06/2023 131 (L) 150 - 450 K/uL Final   04/06/2023 139 (L) 150 - 450 K/uL Final     BUN   Date Value Ref Range Status   04/06/2023 13 8 - 23 mg/dL Final     Creatinine   Date Value Ref Range Status   04/06/2023 0.9 0.5 - 1.4 mg/dL Final     Glucose   Date Value Ref Range Status   04/06/2023 88 70 - 110 mg/dL Final     Calcium   Date Value Ref Range Status   04/06/2023 8.4 (L) 8.7 - 10.5 mg/dL Final     Sodium   Date Value Ref Range Status   04/06/2023 136 136 - 145 mmol/L Final     Potassium   Date Value Ref Range Status   04/06/2023 4.0 3.5 - 5.1 mmol/L Final     Chloride   Date Value Ref Range Status   04/06/2023 107 95 - 110 mmol/L Final     Magnesium   Date Value Ref Range Status   04/06/2023 1.8 1.6 - 2.6 mg/dL Final     AST   Date Value Ref Range Status   04/06/2023 34 10 - 40 U/L Final     ALT   Date Value Ref Range Status   04/06/2023 24 10 - 44 U/L Final     Alkaline Phosphatase   Date Value Ref Range Status   04/06/2023 129 55 - 135 U/L Final     Total Bilirubin   Date Value Ref Range Status   04/06/2023 0.5 0.1 - 1.0 mg/dL Final     Comment:     For infants and newborns, interpretation of results should be based  on gestational age, weight and in agreement with clinical  observations.    Premature Infant recommended reference ranges:  Up to 24 hours.............<8.0 mg/dL  Up to 48 hours............<12.0 mg/dL  3-5 days..................<15.0 mg/dL  6-29 days.................<15.0 mg/dL       Albumin   Date Value Ref Range Status   04/06/2023 2.8 (L) 3.5 - 5.2 g/dL Final     INR   Date Value Ref Range Status   04/04/2023 1.0 0.8 - 1.2 Final     Comment:     Coumadin Therapy:  2.0 - 3.0 for INR for all indicators except mechanical heart valves  and antiphospholipid syndromes which should use 2.5 - 3.5.       Lab Results    Component Value Date    TACROLIMUS 5.9 04/04/2023           Assessment/Plan:     1. Immunosuppression    2. Liver transplanted    3. History of colonic polyps        Immunosuppression  -Continue current IS  -advised patient that if he has recurrent issues with infection that we need to consider decreasing immunosuppression    Liver transplant-good allograft function  -Continue with routine lab monitoring  -Continue with PCP f/u  -Cancer screening- patient advised about increased risks of cancer and need for skin protection, skin exam and regular age appropriate cancer screening    History of colon polyp-overdue for follow-up  -advised patient once he is done with his antibiotics and anticoagulation that he should proceed with repeat colonoscopy as he is high risk for colon cancer given his history of significant colon polyps and he is overdue for his colonoscopy    Return to clinic in 1 year    Patient was seen in the liver transplant department at [facility].    Teresa Gonzalez MD           [facility] Patient Status  Functional Status: 80% - Normal activity with effort: some symptoms of disease  Physical Capacity: No Limitations

## 2023-04-18 ENCOUNTER — OFFICE VISIT (OUTPATIENT)
Dept: INFECTIOUS DISEASES | Facility: CLINIC | Age: 68
End: 2023-04-18
Payer: MEDICARE

## 2023-04-18 VITALS
SYSTOLIC BLOOD PRESSURE: 162 MMHG | WEIGHT: 226.19 LBS | BODY MASS INDEX: 34.28 KG/M2 | HEART RATE: 93 BPM | HEIGHT: 68 IN | DIASTOLIC BLOOD PRESSURE: 99 MMHG

## 2023-04-18 DIAGNOSIS — F17.200 SMOKER: ICD-10-CM

## 2023-04-18 DIAGNOSIS — I82.622 ACUTE DEEP VEIN THROMBOSIS (DVT) OF OTHER VEIN OF LEFT UPPER EXTREMITY: ICD-10-CM

## 2023-04-18 DIAGNOSIS — Z94.4 LIVER TRANSPLANTED: ICD-10-CM

## 2023-04-18 DIAGNOSIS — M86.9 OSTEOMYELITIS OF ANKLE AND FOOT: ICD-10-CM

## 2023-04-18 LAB — TACROLIMUS BLD-MCNC: 5.9 NG/ML (ref 5–15)

## 2023-04-18 PROCEDURE — 99214 OFFICE O/P EST MOD 30 MIN: CPT | Mod: HCNC,S$GLB,, | Performed by: INTERNAL MEDICINE

## 2023-04-18 PROCEDURE — 3288F FALL RISK ASSESSMENT DOCD: CPT | Mod: HCNC,CPTII,S$GLB, | Performed by: INTERNAL MEDICINE

## 2023-04-18 PROCEDURE — 3077F SYST BP >= 140 MM HG: CPT | Mod: HCNC,CPTII,S$GLB, | Performed by: INTERNAL MEDICINE

## 2023-04-18 PROCEDURE — 3008F BODY MASS INDEX DOCD: CPT | Mod: HCNC,CPTII,S$GLB, | Performed by: INTERNAL MEDICINE

## 2023-04-18 PROCEDURE — 3288F PR FALLS RISK ASSESSMENT DOCUMENTED: ICD-10-PCS | Mod: HCNC,CPTII,S$GLB, | Performed by: INTERNAL MEDICINE

## 2023-04-18 PROCEDURE — 1101F PR PT FALLS ASSESS DOC 0-1 FALLS W/OUT INJ PAST YR: ICD-10-PCS | Mod: HCNC,CPTII,S$GLB, | Performed by: INTERNAL MEDICINE

## 2023-04-18 PROCEDURE — 99999 PR PBB SHADOW E&M-EST. PATIENT-LVL III: ICD-10-PCS | Mod: PBBFAC,HCNC,, | Performed by: INTERNAL MEDICINE

## 2023-04-18 PROCEDURE — 1101F PT FALLS ASSESS-DOCD LE1/YR: CPT | Mod: HCNC,CPTII,S$GLB, | Performed by: INTERNAL MEDICINE

## 2023-04-18 PROCEDURE — 3077F PR MOST RECENT SYSTOLIC BLOOD PRESSURE >= 140 MM HG: ICD-10-PCS | Mod: HCNC,CPTII,S$GLB, | Performed by: INTERNAL MEDICINE

## 2023-04-18 PROCEDURE — 3080F PR MOST RECENT DIASTOLIC BLOOD PRESSURE >= 90 MM HG: ICD-10-PCS | Mod: HCNC,CPTII,S$GLB, | Performed by: INTERNAL MEDICINE

## 2023-04-18 PROCEDURE — 99999 PR PBB SHADOW E&M-EST. PATIENT-LVL III: CPT | Mod: PBBFAC,HCNC,, | Performed by: INTERNAL MEDICINE

## 2023-04-18 PROCEDURE — 1159F PR MEDICATION LIST DOCUMENTED IN MEDICAL RECORD: ICD-10-PCS | Mod: HCNC,CPTII,S$GLB, | Performed by: INTERNAL MEDICINE

## 2023-04-18 PROCEDURE — 1159F MED LIST DOCD IN RCRD: CPT | Mod: HCNC,CPTII,S$GLB, | Performed by: INTERNAL MEDICINE

## 2023-04-18 PROCEDURE — 3008F PR BODY MASS INDEX (BMI) DOCUMENTED: ICD-10-PCS | Mod: HCNC,CPTII,S$GLB, | Performed by: INTERNAL MEDICINE

## 2023-04-18 PROCEDURE — 99214 PR OFFICE/OUTPT VISIT, EST, LEVL IV, 30-39 MIN: ICD-10-PCS | Mod: HCNC,S$GLB,, | Performed by: INTERNAL MEDICINE

## 2023-04-18 PROCEDURE — 3080F DIAST BP >= 90 MM HG: CPT | Mod: HCNC,CPTII,S$GLB, | Performed by: INTERNAL MEDICINE

## 2023-04-18 NOTE — PROGRESS NOTES
Subjective     Patient ID: Lj Coleman is a 67 y.o. male.    Chief Complaint: Follow-up    HPI  Last ID note-  02/08/23  Referred for      Long standing ingrown nail with abscess to the right great toe. History of transplant. xrays obtained showing possible osteomyelitis.           Plan - will start IV Daptomycin at 700mg daily   IV Cefepime 2 gram every 8 hours   Will treat for 6 weeks   Will need ESR,CRP,CBC,CMP,CPK  Eoc -03/23/23.  Insert PICC line   Contact infusion company        3/14- 67 year old man with PMH as listed below who was referred for toe osteomyelitis .  Toe imaging -  01/25-  Osseous erosion involving the distal tuft of the right great toe findings suggest osteomyelitis     Cultures- 01/25- no growth .  0n 03/07- he was given  Daptomycin and Cefepime and developed Hives after Cefepime.       04/18-   He comes for follow up.  He was recently seen in the hospital -04/06/23   admitted with extensive LUE DVT. He was now switched with doxycycline 100 mg  BID x 2 weeks.   He is concerned about the blood clot.  Review of Systems   Constitutional:  Negative for activity change and appetite change.   HENT:  Negative for dental problem.    Respiratory:  Negative for apnea and chest tightness.    Neurological:  Negative for dizziness, coordination difficulties and coordination difficulties.   Hematological:  Negative for adenopathy. Does not bruise/bleed easily.        Objective     Physical Exam  Vitals and nursing note reviewed.   HENT:      Head: Normocephalic.      Nose: Nose normal.   Cardiovascular:      Rate and Rhythm: Normal rate.      Pulses: Normal pulses.   Pulmonary:      Effort: Pulmonary effort is normal.   Musculoskeletal:         General: Normal range of motion.   Skin:     General: Skin is warm.   Neurological:      General: No focal deficit present.      Mental Status: He is alert and oriented to person, place, and time.            Assessment and Plan     Problem List Items  Addressed This Visit       Liver transplanted     Follow transplant team           Smoker     Encouraged to quit            Osteomyelitis of ankle and foot     Now treated , no further need for therapy -complete 4 day of doxycycline           Acute deep vein thrombosis (DVT) of left upper extremity     On eliquis

## 2023-04-19 ENCOUNTER — HOSPITAL ENCOUNTER (OUTPATIENT)
Dept: RADIOLOGY | Facility: HOSPITAL | Age: 68
Discharge: HOME | End: 2023-04-19
Attending: INTERNAL MEDICINE
Payer: MEDICARE

## 2023-04-19 DIAGNOSIS — I82.622 ACUTE DEEP VEIN THROMBOSIS (DVT) OF LEFT UPPER EXTREMITY, UNSPECIFIED VEIN: ICD-10-CM

## 2023-04-19 PROCEDURE — 93971 EXTREMITY STUDY: CPT | Mod: 26,HCNC,LT, | Performed by: RADIOLOGY

## 2023-04-19 PROCEDURE — 93971 EXTREMITY STUDY: CPT | Mod: TC,HCNC,LT

## 2023-04-19 PROCEDURE — 93971 US UPPER EXTREMITY VEINS LEFT: ICD-10-PCS | Mod: 26,HCNC,LT, | Performed by: RADIOLOGY

## 2023-05-01 ENCOUNTER — PATIENT MESSAGE (OUTPATIENT)
Dept: INTERNAL MEDICINE | Facility: CLINIC | Age: 68
End: 2023-05-01
Payer: MEDICARE

## 2023-05-04 NOTE — TELEPHONE ENCOUNTER
----- Message from Balbina Bronson sent at 5/4/2023  2:44 PM CDT -----  Pt is requesting a call back concerning a medication. Call back at [phone]  Thx jm

## 2023-05-05 ENCOUNTER — OFFICE VISIT (OUTPATIENT)
Dept: INTERNAL MEDICINE | Facility: CLINIC | Age: 68
End: 2023-05-05
Payer: MEDICARE

## 2023-05-05 ENCOUNTER — LAB VISIT (OUTPATIENT)
Dept: LAB | Facility: HOSPITAL | Age: 68
End: 2023-05-05
Attending: INTERNAL MEDICINE
Payer: MEDICARE

## 2023-05-05 ENCOUNTER — PATIENT MESSAGE (OUTPATIENT)
Dept: INTERNAL MEDICINE | Facility: CLINIC | Age: 68
End: 2023-05-05

## 2023-05-05 VITALS
SYSTOLIC BLOOD PRESSURE: 124 MMHG | HEIGHT: 68 IN | OXYGEN SATURATION: 97 % | WEIGHT: 232.13 LBS | DIASTOLIC BLOOD PRESSURE: 80 MMHG | BODY MASS INDEX: 35.18 KG/M2 | RESPIRATION RATE: 20 BRPM | TEMPERATURE: 97 F | HEART RATE: 90 BPM

## 2023-05-05 DIAGNOSIS — I82.622 ACUTE DEEP VEIN THROMBOSIS (DVT) OF OTHER VEIN OF LEFT UPPER EXTREMITY: ICD-10-CM

## 2023-05-05 DIAGNOSIS — R73.9 BLOOD GLUCOSE ELEVATED: ICD-10-CM

## 2023-05-05 DIAGNOSIS — Z79.01 ON ANTICOAGULANT THERAPY: ICD-10-CM

## 2023-05-05 DIAGNOSIS — Z13.6 SCREENING, ISCHEMIC HEART DISEASE: ICD-10-CM

## 2023-05-05 DIAGNOSIS — M79.674 GREAT TOE PAIN, RIGHT: Primary | ICD-10-CM

## 2023-05-05 DIAGNOSIS — Z23 IMMUNIZATION DUE: ICD-10-CM

## 2023-05-05 LAB
CHOLEST SERPL-MCNC: 181 MG/DL (ref 120–199)
CHOLEST/HDLC SERPL: 6 {RATIO} (ref 2–5)
ESTIMATED AVG GLUCOSE: 108 MG/DL (ref 68–131)
HBA1C MFR BLD: 5.4 % (ref 4–5.6)
HDLC SERPL-MCNC: 30 MG/DL (ref 40–75)
HDLC SERPL: 16.6 % (ref 20–50)
LDLC SERPL CALC-MCNC: 126.6 MG/DL (ref 63–159)
NONHDLC SERPL-MCNC: 151 MG/DL
TRIGL SERPL-MCNC: 122 MG/DL (ref 30–150)

## 2023-05-05 PROCEDURE — 36415 COLL VENOUS BLD VENIPUNCTURE: CPT | Mod: HCNC | Performed by: INTERNAL MEDICINE

## 2023-05-05 PROCEDURE — 3074F SYST BP LT 130 MM HG: CPT | Mod: HCNC,CPTII,S$GLB, | Performed by: INTERNAL MEDICINE

## 2023-05-05 PROCEDURE — 90677 PNEUMOCOCCAL CONJUGATE VACCINE 20-VALENT: ICD-10-PCS | Mod: HCNC,S$GLB,, | Performed by: INTERNAL MEDICINE

## 2023-05-05 PROCEDURE — 80061 LIPID PANEL: CPT | Mod: HCNC | Performed by: INTERNAL MEDICINE

## 2023-05-05 PROCEDURE — 1125F AMNT PAIN NOTED PAIN PRSNT: CPT | Mod: HCNC,CPTII,S$GLB, | Performed by: INTERNAL MEDICINE

## 2023-05-05 PROCEDURE — 1159F PR MEDICATION LIST DOCUMENTED IN MEDICAL RECORD: ICD-10-PCS | Mod: HCNC,CPTII,S$GLB, | Performed by: INTERNAL MEDICINE

## 2023-05-05 PROCEDURE — 3008F PR BODY MASS INDEX (BMI) DOCUMENTED: ICD-10-PCS | Mod: HCNC,CPTII,S$GLB, | Performed by: INTERNAL MEDICINE

## 2023-05-05 PROCEDURE — 1160F PR REVIEW ALL MEDS BY PRESCRIBER/CLIN PHARMACIST DOCUMENTED: ICD-10-PCS | Mod: HCNC,CPTII,S$GLB, | Performed by: INTERNAL MEDICINE

## 2023-05-05 PROCEDURE — 3079F DIAST BP 80-89 MM HG: CPT | Mod: HCNC,CPTII,S$GLB, | Performed by: INTERNAL MEDICINE

## 2023-05-05 PROCEDURE — G0009 PNEUMOCOCCAL CONJUGATE VACCINE 20-VALENT: ICD-10-PCS | Mod: HCNC,S$GLB,, | Performed by: INTERNAL MEDICINE

## 2023-05-05 PROCEDURE — 99999 PR PBB SHADOW E&M-EST. PATIENT-LVL IV: CPT | Mod: PBBFAC,HCNC,, | Performed by: INTERNAL MEDICINE

## 2023-05-05 PROCEDURE — 3079F PR MOST RECENT DIASTOLIC BLOOD PRESSURE 80-89 MM HG: ICD-10-PCS | Mod: HCNC,CPTII,S$GLB, | Performed by: INTERNAL MEDICINE

## 2023-05-05 PROCEDURE — 3288F FALL RISK ASSESSMENT DOCD: CPT | Mod: HCNC,CPTII,S$GLB, | Performed by: INTERNAL MEDICINE

## 2023-05-05 PROCEDURE — G0009 ADMIN PNEUMOCOCCAL VACCINE: HCPCS | Mod: HCNC,S$GLB,, | Performed by: INTERNAL MEDICINE

## 2023-05-05 PROCEDURE — 1159F MED LIST DOCD IN RCRD: CPT | Mod: HCNC,CPTII,S$GLB, | Performed by: INTERNAL MEDICINE

## 2023-05-05 PROCEDURE — 99214 PR OFFICE/OUTPT VISIT, EST, LEVL IV, 30-39 MIN: ICD-10-PCS | Mod: 25,HCNC,S$GLB, | Performed by: INTERNAL MEDICINE

## 2023-05-05 PROCEDURE — 1125F PR PAIN SEVERITY QUANTIFIED, PAIN PRESENT: ICD-10-PCS | Mod: HCNC,CPTII,S$GLB, | Performed by: INTERNAL MEDICINE

## 2023-05-05 PROCEDURE — 1101F PR PT FALLS ASSESS DOC 0-1 FALLS W/OUT INJ PAST YR: ICD-10-PCS | Mod: HCNC,CPTII,S$GLB, | Performed by: INTERNAL MEDICINE

## 2023-05-05 PROCEDURE — 3288F PR FALLS RISK ASSESSMENT DOCUMENTED: ICD-10-PCS | Mod: HCNC,CPTII,S$GLB, | Performed by: INTERNAL MEDICINE

## 2023-05-05 PROCEDURE — 83036 HEMOGLOBIN GLYCOSYLATED A1C: CPT | Mod: HCNC | Performed by: INTERNAL MEDICINE

## 2023-05-05 PROCEDURE — 1160F RVW MEDS BY RX/DR IN RCRD: CPT | Mod: HCNC,CPTII,S$GLB, | Performed by: INTERNAL MEDICINE

## 2023-05-05 PROCEDURE — 99214 OFFICE O/P EST MOD 30 MIN: CPT | Mod: 25,HCNC,S$GLB, | Performed by: INTERNAL MEDICINE

## 2023-05-05 PROCEDURE — 90677 PCV20 VACCINE IM: CPT | Mod: HCNC,S$GLB,, | Performed by: INTERNAL MEDICINE

## 2023-05-05 PROCEDURE — 99999 PR PBB SHADOW E&M-EST. PATIENT-LVL IV: ICD-10-PCS | Mod: PBBFAC,HCNC,, | Performed by: INTERNAL MEDICINE

## 2023-05-05 PROCEDURE — 3074F PR MOST RECENT SYSTOLIC BLOOD PRESSURE < 130 MM HG: ICD-10-PCS | Mod: HCNC,CPTII,S$GLB, | Performed by: INTERNAL MEDICINE

## 2023-05-05 PROCEDURE — 3008F BODY MASS INDEX DOCD: CPT | Mod: HCNC,CPTII,S$GLB, | Performed by: INTERNAL MEDICINE

## 2023-05-05 PROCEDURE — 1101F PT FALLS ASSESS-DOCD LE1/YR: CPT | Mod: HCNC,CPTII,S$GLB, | Performed by: INTERNAL MEDICINE

## 2023-05-05 RX ORDER — TRAMADOL HYDROCHLORIDE 50 MG/1
50 TABLET ORAL EVERY 6 HOURS PRN
Qty: 28 TABLET | Refills: 0 | Status: SHIPPED | OUTPATIENT
Start: 2023-05-05 | End: 2023-10-03 | Stop reason: SDUPTHER

## 2023-05-05 RX ORDER — LINEZOLID 600 MG/1
600 TABLET, FILM COATED ORAL 2 TIMES DAILY
Qty: 20 TABLET | Refills: 0 | Status: SHIPPED | OUTPATIENT
Start: 2023-05-05 | End: 2023-05-15

## 2023-05-05 NOTE — PROGRESS NOTES
Lj Sextonviolet  67 y.o. White male  [id number]    Chief Complaint:  Chief Complaint   Patient presents with    Toe Pain     Right foot        History of Present Illness:  Presents to the clinic with complaint of right great toe pain. He was recently treated for osteomyelitis of the right great toe. His last antibiotic was approximately 2 weeks ago. He was under the care of ID.   He  noticed his toe is more red and swollen for the past 3 days.   He does not have diabetes but has had some mildly elevated glucoses recently.   He had a left upper extremity DVT due to a PICC line, which has been removed. He is on apixaban.   DVT was on 4/4/23.     Laboratory:  Lab Results   Component Value Date    WBC 6.48 04/17/2023    HGB 14.8 04/17/2023    HCT 45.9 04/17/2023     04/17/2023    CHOL 189 01/23/2017    TRIG 165 (H) 01/23/2017    HDL 33 (L) 01/23/2017    ALT 14 04/17/2023    AST 15 04/17/2023     04/17/2023    K 4.2 04/17/2023     04/17/2023    CREATININE 1.1 04/17/2023    BUN 12 04/17/2023    CO2 23 04/17/2023    TSH 1.184 04/10/2015    PSA 0.41 04/10/2015    INR 1.0 04/04/2023    HGBA1C 5.5 07/21/2016       History:  Past Medical History:   Diagnosis Date    Alcohol dependence     stopped 2012    Angina pectoris     Arthritis     back    Atherosclerosis of native coronary artery without angina pectoris/ LAD 09/08/2016    Back pain     Chronic hepatitis C with cirrhosis     Depression     Hepatoma     Prior to liver transplant    History of colon polyps 09/09/2015    History of transplantation, liver 04/2012    History of vertebral fracture     Hypertension     Immune deficiency disorder     Incisional hernia following transplant 04/09/2014    Liver failure 11/2011    Related to chemotherapy for hepatoma    Liver transplanted 04/2012    Obesity     Tobacco abuse 09/09/2016    Trouble in sleeping     Vertebral fracture 1975       Medications:  Current Outpatient Medications on File Prior to Visit    Medication Sig Dispense Refill    esomeprazole (NEXIUM) 40 MG capsule Take 1 capsule (40 mg total) by mouth once daily. 90 capsule 3    metoprolol succinate (TOPROL-XL) 25 MG 24 hr tablet Take 1 tablet (25 mg total) by mouth once daily. 30 tablet 11    tacrolimus (PROGRAF) 0.5 MG Cap TAKE 2 CAPSULES EVERY 12 HOURS 360 capsule 3    [DISCONTINUED] apixaban (ELIQUIS) 5 mg Tab Take 1 tablet (5 mg total) by mouth 2 (two) times daily. 60 tablet 2     Current Facility-Administered Medications on File Prior to Visit   Medication Dose Route Frequency Provider Last Rate Last Admin    lactated ringers infusion   Intravenous Continuous Linwood Ellsworth MD   New Bag at 01/20/21 0734    sodium chloride 0.9% flush 2 mL  2 mL Intravenous PRN Linwood Ellsworth MD           Allergies:  Review of patient's allergies indicates:   Allergen Reactions    Codeine Other (See Comments)     Upset stomach       Review of Systems   Constitutional:  Negative for chills and fever.   Cardiovascular:  Negative for leg swelling.   Musculoskeletal:  Positive for back pain and joint pain.   Neurological:  Negative for tingling and weakness.     Exam:  Vitals:    05/05/23 0811   BP: 124/80   Pulse: 90   Resp: 20   Temp: 96.5 °F (35.8 °C)     Weight: 105.3 kg (232 lb 2.3 oz)   Body mass index is 35.3 kg/m².      Physical Exam  Vitals reviewed.   Constitutional:       General: He is not in acute distress.     Appearance: He is well-developed. He is not ill-appearing.   Eyes:      General: No scleral icterus.  Cardiovascular:      Rate and Rhythm: Normal rate.   Pulmonary:      Effort: Pulmonary effort is normal. No respiratory distress.   Skin:     General: Skin is warm and dry.      Comments: Right great toe erythema and swelling   Neurological:      Mental Status: He is alert and oriented to person, place, and time.   Psychiatric:         Behavior: Behavior normal.           Assessment:  The primary encounter diagnosis was Great toe pain, right. Diagnoses of  Blood glucose elevated, Acute deep vein thrombosis (DVT) of other vein of left upper extremity, On anticoagulant therapy, Screening, ischemic heart disease, and Immunization due were also pertinent to this visit.    Plan:  Great toe pain, right  -     traMADoL (ULTRAM) 50 mg tablet; Take 1 tablet (50 mg total) by mouth every 6 (six) hours as needed for Pain.  Dispense: 28 tablet; Refill: 0  -     spoke with Dr. Beavers and patient will need more antibiotics--patient notified    Blood glucose elevated  -     Hemoglobin A1C; Future; Expected date: 08/05/2023    Acute deep vein thrombosis (DVT) of other vein of left upper extremity  -     continue apixaban (ELIQUIS) 5 mg Tab; Take 1 tablet (5 mg total) by mouth 2 (two) times daily.  Dispense: 60 tablet; Refill: 2    On anticoagulant therapy    Screening, ischemic heart disease  -     Lipid Panel; Future; Expected date: 11/05/2023    Immunization due  -     (In Office Administered) Pneumococcal Conjugate Vaccine (20 Valent) (IM)    Labs today.     F/U with ID.

## 2023-05-09 ENCOUNTER — OFFICE VISIT (OUTPATIENT)
Dept: INFECTIOUS DISEASES | Facility: CLINIC | Age: 68
End: 2023-05-09
Payer: MEDICARE

## 2023-05-09 VITALS
TEMPERATURE: 98 F | BODY MASS INDEX: 35.18 KG/M2 | SYSTOLIC BLOOD PRESSURE: 131 MMHG | HEART RATE: 93 BPM | HEIGHT: 68 IN | WEIGHT: 232.13 LBS | DIASTOLIC BLOOD PRESSURE: 84 MMHG

## 2023-05-09 DIAGNOSIS — Z94.4 LIVER TRANSPLANTED: ICD-10-CM

## 2023-05-09 DIAGNOSIS — I82.622 ACUTE DEEP VEIN THROMBOSIS (DVT) OF OTHER VEIN OF LEFT UPPER EXTREMITY: ICD-10-CM

## 2023-05-09 DIAGNOSIS — M86.179 OTHER ACUTE OSTEOMYELITIS, UNSPECIFIED ANKLE AND FOOT: Primary | ICD-10-CM

## 2023-05-09 DIAGNOSIS — Z94.4 LIVER TRANSPLANTED: Primary | ICD-10-CM

## 2023-05-09 DIAGNOSIS — M86.9 OSTEOMYELITIS OF ANKLE AND FOOT: ICD-10-CM

## 2023-05-09 DIAGNOSIS — F17.200 SMOKER: ICD-10-CM

## 2023-05-09 PROCEDURE — 1159F MED LIST DOCD IN RCRD: CPT | Mod: HCNC,CPTII,S$GLB, | Performed by: INTERNAL MEDICINE

## 2023-05-09 PROCEDURE — 3008F PR BODY MASS INDEX (BMI) DOCUMENTED: ICD-10-PCS | Mod: HCNC,CPTII,S$GLB, | Performed by: INTERNAL MEDICINE

## 2023-05-09 PROCEDURE — 3075F PR MOST RECENT SYSTOLIC BLOOD PRESS GE 130-139MM HG: ICD-10-PCS | Mod: HCNC,CPTII,S$GLB, | Performed by: INTERNAL MEDICINE

## 2023-05-09 PROCEDURE — 3075F SYST BP GE 130 - 139MM HG: CPT | Mod: HCNC,CPTII,S$GLB, | Performed by: INTERNAL MEDICINE

## 2023-05-09 PROCEDURE — 1101F PT FALLS ASSESS-DOCD LE1/YR: CPT | Mod: HCNC,CPTII,S$GLB, | Performed by: INTERNAL MEDICINE

## 2023-05-09 PROCEDURE — 3079F PR MOST RECENT DIASTOLIC BLOOD PRESSURE 80-89 MM HG: ICD-10-PCS | Mod: HCNC,CPTII,S$GLB, | Performed by: INTERNAL MEDICINE

## 2023-05-09 PROCEDURE — 99999 PR PBB SHADOW E&M-EST. PATIENT-LVL III: ICD-10-PCS | Mod: PBBFAC,HCNC,, | Performed by: INTERNAL MEDICINE

## 2023-05-09 PROCEDURE — 3288F FALL RISK ASSESSMENT DOCD: CPT | Mod: HCNC,CPTII,S$GLB, | Performed by: INTERNAL MEDICINE

## 2023-05-09 PROCEDURE — 3079F DIAST BP 80-89 MM HG: CPT | Mod: HCNC,CPTII,S$GLB, | Performed by: INTERNAL MEDICINE

## 2023-05-09 PROCEDURE — 99214 PR OFFICE/OUTPT VISIT, EST, LEVL IV, 30-39 MIN: ICD-10-PCS | Mod: HCNC,S$GLB,, | Performed by: INTERNAL MEDICINE

## 2023-05-09 PROCEDURE — 1159F PR MEDICATION LIST DOCUMENTED IN MEDICAL RECORD: ICD-10-PCS | Mod: HCNC,CPTII,S$GLB, | Performed by: INTERNAL MEDICINE

## 2023-05-09 PROCEDURE — 1101F PR PT FALLS ASSESS DOC 0-1 FALLS W/OUT INJ PAST YR: ICD-10-PCS | Mod: HCNC,CPTII,S$GLB, | Performed by: INTERNAL MEDICINE

## 2023-05-09 PROCEDURE — 99214 OFFICE O/P EST MOD 30 MIN: CPT | Mod: HCNC,S$GLB,, | Performed by: INTERNAL MEDICINE

## 2023-05-09 PROCEDURE — 3044F PR MOST RECENT HEMOGLOBIN A1C LEVEL <7.0%: ICD-10-PCS | Mod: HCNC,CPTII,S$GLB, | Performed by: INTERNAL MEDICINE

## 2023-05-09 PROCEDURE — 3288F PR FALLS RISK ASSESSMENT DOCUMENTED: ICD-10-PCS | Mod: HCNC,CPTII,S$GLB, | Performed by: INTERNAL MEDICINE

## 2023-05-09 PROCEDURE — 3044F HG A1C LEVEL LT 7.0%: CPT | Mod: HCNC,CPTII,S$GLB, | Performed by: INTERNAL MEDICINE

## 2023-05-09 PROCEDURE — 3008F BODY MASS INDEX DOCD: CPT | Mod: HCNC,CPTII,S$GLB, | Performed by: INTERNAL MEDICINE

## 2023-05-09 PROCEDURE — 99999 PR PBB SHADOW E&M-EST. PATIENT-LVL III: CPT | Mod: PBBFAC,HCNC,, | Performed by: INTERNAL MEDICINE

## 2023-05-09 NOTE — PROGRESS NOTES
Subjective     Patient ID: Lj Coleman is a 67 y.o. male.    Chief Complaint:   Follow up  HPI    Last ID note-  02/08/23  Referred for      Long standing ingrown nail with abscess to the right great toe. History of transplant. xrays obtained showing possible osteomyelitis.           Plan - will start IV Daptomycin at 700mg daily   IV Cefepime 2 gram every 8 hours   Will treat for 6 weeks   Will need ESR,CRP,CBC,CMP,CPK  Eoc -03/23/23.  Insert PICC line   Contact infusion company        3/14- 67 year old man with PMH as listed below who was referred for toe osteomyelitis .  Toe imaging -  01/25-  Osseous erosion involving the distal tuft of the right great toe findings suggest osteomyelitis     Cultures- 01/25- no growth .  0n 03/07- he was given  Daptomycin and Cefepime and developed Hives after Cefepime.        04/18-   He comes for follow up.  He was recently seen in the hospital -04/06/23   admitted with extensive LUE DVT. He was now switched with doxycycline 100 mg  BID x 2 weeks.   He is concerned about the blood clot.    05/9/23  He was recently seen by his PCP on 05/05 and on that visit,it was noted that he had more erythema- zyvox was given .  He reported that he bumped the toe at the bottom of the washing machine and noticed purulence after the visit with DR Sutton      Review of Systems   Constitutional:  Negative for activity change, appetite change, chills and diaphoresis.   Respiratory:  Negative for apnea and chest tightness.    Neurological:  Negative for dizziness, headaches, coordination difficulties and coordination difficulties.        Objective     Physical Exam  Vitals and nursing note reviewed.   HENT:      Head: Normocephalic.   Musculoskeletal:         General: Normal range of motion.      Cervical back: Normal range of motion.      Comments: See pics   Neurological:      Mental Status: He is alert and oriented to person, place, and time.            Assessment and Plan     Problem  List Items Addressed This Visit       Liver transplanted     Follow transplant team            Smoker     ADVISED TO QUIT smoking            Osteomyelitis of ankle and foot     This is suspected- will do MRI of the right foot to rule out osteomyelitis   Will do ESR and CRP. He was tarted on Zyvox for 10 days on 05/ 5/23           Acute deep vein thrombosis (DVT) of left upper extremity     On eliquis             Other Visit Diagnoses       Other acute osteomyelitis, unspecified ankle and foot    -  Primary    Relevant Orders    MRI Foot (Forefoot) Right W W/O Contrast

## 2023-05-09 NOTE — ASSESSMENT & PLAN NOTE
This is suspected- will do MRI of the right foot to rule out osteomyelitis   Will do ESR and CRP. He was tarted on Zyvox for 10 days on 05/ 5/23

## 2023-05-14 ENCOUNTER — PATIENT MESSAGE (OUTPATIENT)
Dept: INTERNAL MEDICINE | Facility: CLINIC | Age: 68
End: 2023-05-14
Payer: MEDICARE

## 2023-05-15 ENCOUNTER — TELEPHONE (OUTPATIENT)
Dept: INFECTIOUS DISEASES | Facility: CLINIC | Age: 68
End: 2023-05-15

## 2023-05-15 RX ORDER — LINEZOLID 600 MG/1
600 TABLET, FILM COATED ORAL 2 TIMES DAILY
Qty: 20 TABLET | Refills: 0 | Status: CANCELLED | OUTPATIENT
Start: 2023-05-15 | End: 2023-05-25

## 2023-05-15 NOTE — TELEPHONE ENCOUNTER
----- Message from Willy Briggs sent at 5/15/2023  9:46 AM CDT -----  Contact: Patient  Lj Coleman would like a call back at [phone], in regards to if he can get a prescription renewal for his antibiotics. Pt preferred pharmacy is listed below.   [id number] - [address] [place] [address] [place] [address]  Phone: [phone] Fax: [phone]

## 2023-05-16 ENCOUNTER — HOSPITAL ENCOUNTER (OUTPATIENT)
Dept: RADIOLOGY | Facility: HOSPITAL | Age: 68
Discharge: HOME | End: 2023-05-16
Attending: INTERNAL MEDICINE
Payer: MEDICARE

## 2023-05-16 DIAGNOSIS — M86.179 OTHER ACUTE OSTEOMYELITIS, UNSPECIFIED ANKLE AND FOOT: ICD-10-CM

## 2023-05-16 PROCEDURE — 73720 MRI LWR EXTREMITY W/O&W/DYE: CPT | Mod: TC,HCNC,RT

## 2023-05-16 PROCEDURE — 73720 MRI LWR EXTREMITY W/O&W/DYE: CPT | Mod: 26,HCNC,RT, | Performed by: RADIOLOGY

## 2023-05-16 PROCEDURE — 25500020 PHARM REV CODE 255: Mod: HCNC | Performed by: INTERNAL MEDICINE

## 2023-05-16 PROCEDURE — 73720 MRI FOOT (FOREFOOT) RIGHT W W/O CONTRAST: ICD-10-PCS | Mod: 26,HCNC,RT, | Performed by: RADIOLOGY

## 2023-05-16 PROCEDURE — A9585 GADOBUTROL INJECTION: HCPCS | Mod: HCNC | Performed by: INTERNAL MEDICINE

## 2023-05-16 RX ORDER — GADOBUTROL 604.72 MG/ML
10 INJECTION INTRAVENOUS
Status: COMPLETED | OUTPATIENT
Start: 2023-05-16 | End: 2023-05-16

## 2023-05-16 RX ADMIN — GADOBUTROL 10 ML: 604.72 INJECTION INTRAVENOUS at 10:05

## 2023-05-18 ENCOUNTER — TELEPHONE (OUTPATIENT)
Dept: INFECTIOUS DISEASES | Facility: CLINIC | Age: 68
End: 2023-05-18
Payer: MEDICARE

## 2023-05-18 NOTE — TELEPHONE ENCOUNTER
----- Message from Shivam Beavers MD, MORENA sent at 5/18/2023  8:44 AM CDT -----  Contact: Lj  Which medication does he need  ----- Message -----  From: Telma Graff LPN  Sent: 5/17/2023   8:31 AM CDT  To: Shivam Beavers MD, MORENA      ----- Message -----  From: Carla Steven  Sent: 5/17/2023   8:20 AM CDT  To: Nimesh Langley Staff Cal[place] is needing a call back regarding him being out of his antibiotics and needing them sent in. Please call him back at [phone].

## 2023-05-19 DIAGNOSIS — M86.9 OSTEOMYELITIS OF ANKLE AND FOOT: Primary | ICD-10-CM

## 2023-05-19 RX ORDER — LINEZOLID 600 MG/1
600 TABLET, FILM COATED ORAL 2 TIMES DAILY
Qty: 20 TABLET | Refills: 0 | Status: SHIPPED | OUTPATIENT
Start: 2023-05-19 | End: 2023-05-30

## 2023-05-30 ENCOUNTER — TELEPHONE (OUTPATIENT)
Dept: CARDIOLOGY | Facility: CLINIC | Age: 68
End: 2023-05-30
Payer: MEDICARE

## 2023-05-30 ENCOUNTER — OFFICE VISIT (OUTPATIENT)
Dept: INTERNAL MEDICINE | Facility: CLINIC | Age: 68
End: 2023-05-30
Payer: MEDICARE

## 2023-05-30 VITALS
WEIGHT: 232.56 LBS | DIASTOLIC BLOOD PRESSURE: 74 MMHG | HEART RATE: 98 BPM | BODY MASS INDEX: 34.45 KG/M2 | HEIGHT: 69 IN | OXYGEN SATURATION: 96 % | SYSTOLIC BLOOD PRESSURE: 102 MMHG

## 2023-05-30 DIAGNOSIS — Z00.00 ENCOUNTER FOR MEDICARE ANNUAL WELLNESS EXAM: ICD-10-CM

## 2023-05-30 DIAGNOSIS — D84.9 IMMUNOSUPPRESSION: ICD-10-CM

## 2023-05-30 DIAGNOSIS — I25.10 ATHEROSCLEROSIS OF NATIVE CORONARY ARTERY OF NATIVE HEART WITHOUT ANGINA PECTORIS: ICD-10-CM

## 2023-05-30 DIAGNOSIS — R20.2 NUMBNESS AND TINGLING: ICD-10-CM

## 2023-05-30 DIAGNOSIS — E66.9 OBESITY (BMI 30.0-34.9): ICD-10-CM

## 2023-05-30 DIAGNOSIS — Z72.0 TOBACCO USE: ICD-10-CM

## 2023-05-30 DIAGNOSIS — R20.0 NUMBNESS AND TINGLING: ICD-10-CM

## 2023-05-30 DIAGNOSIS — Z00.00 ENCOUNTER FOR PREVENTIVE HEALTH EXAMINATION: Primary | ICD-10-CM

## 2023-05-30 DIAGNOSIS — Z85.05 HISTORY OF LIVER CANCER: ICD-10-CM

## 2023-05-30 DIAGNOSIS — I10 HYPERTENSION, UNSPECIFIED TYPE: ICD-10-CM

## 2023-05-30 DIAGNOSIS — F10.21 HISTORY OF ALCOHOL DEPENDENCE: ICD-10-CM

## 2023-05-30 DIAGNOSIS — F43.23 ADJUSTMENT DISORDER WITH MIXED ANXIETY AND DEPRESSED MOOD: ICD-10-CM

## 2023-05-30 DIAGNOSIS — Z59.9 FINANCIAL DIFFICULTIES: ICD-10-CM

## 2023-05-30 DIAGNOSIS — I82.90 THROMBUS: ICD-10-CM

## 2023-05-30 DIAGNOSIS — M86.9 OSTEOMYELITIS OF ANKLE AND FOOT: ICD-10-CM

## 2023-05-30 DIAGNOSIS — Z94.4 LIVER TRANSPLANTED: ICD-10-CM

## 2023-05-30 DIAGNOSIS — D69.6 THROMBOCYTOPENIA: ICD-10-CM

## 2023-05-30 PROCEDURE — G0439 PR MEDICARE ANNUAL WELLNESS SUBSEQUENT VISIT: ICD-10-PCS | Mod: HCNC,S$GLB,, | Performed by: NURSE PRACTITIONER

## 2023-05-30 PROCEDURE — 3288F PR FALLS RISK ASSESSMENT DOCUMENTED: ICD-10-PCS | Mod: HCNC,CPTII,S$GLB, | Performed by: NURSE PRACTITIONER

## 2023-05-30 PROCEDURE — 3074F SYST BP LT 130 MM HG: CPT | Mod: HCNC,CPTII,S$GLB, | Performed by: NURSE PRACTITIONER

## 2023-05-30 PROCEDURE — 1159F MED LIST DOCD IN RCRD: CPT | Mod: HCNC,CPTII,S$GLB, | Performed by: NURSE PRACTITIONER

## 2023-05-30 PROCEDURE — 1170F PR FUNCTIONAL STATUS ASSESSED: ICD-10-PCS | Mod: HCNC,CPTII,S$GLB, | Performed by: NURSE PRACTITIONER

## 2023-05-30 PROCEDURE — 3078F PR MOST RECENT DIASTOLIC BLOOD PRESSURE < 80 MM HG: ICD-10-PCS | Mod: HCNC,CPTII,S$GLB, | Performed by: NURSE PRACTITIONER

## 2023-05-30 PROCEDURE — 3008F PR BODY MASS INDEX (BMI) DOCUMENTED: ICD-10-PCS | Mod: HCNC,CPTII,S$GLB, | Performed by: NURSE PRACTITIONER

## 2023-05-30 PROCEDURE — 1160F RVW MEDS BY RX/DR IN RCRD: CPT | Mod: HCNC,CPTII,S$GLB, | Performed by: NURSE PRACTITIONER

## 2023-05-30 PROCEDURE — 99999 PR PBB SHADOW E&M-EST. PATIENT-LVL V: ICD-10-PCS | Mod: PBBFAC,HCNC,, | Performed by: NURSE PRACTITIONER

## 2023-05-30 PROCEDURE — 1159F PR MEDICATION LIST DOCUMENTED IN MEDICAL RECORD: ICD-10-PCS | Mod: HCNC,CPTII,S$GLB, | Performed by: NURSE PRACTITIONER

## 2023-05-30 PROCEDURE — 3044F HG A1C LEVEL LT 7.0%: CPT | Mod: HCNC,CPTII,S$GLB, | Performed by: NURSE PRACTITIONER

## 2023-05-30 PROCEDURE — 1125F PR PAIN SEVERITY QUANTIFIED, PAIN PRESENT: ICD-10-PCS | Mod: HCNC,CPTII,S$GLB, | Performed by: NURSE PRACTITIONER

## 2023-05-30 PROCEDURE — 3044F PR MOST RECENT HEMOGLOBIN A1C LEVEL <7.0%: ICD-10-PCS | Mod: HCNC,CPTII,S$GLB, | Performed by: NURSE PRACTITIONER

## 2023-05-30 PROCEDURE — 3074F PR MOST RECENT SYSTOLIC BLOOD PRESSURE < 130 MM HG: ICD-10-PCS | Mod: HCNC,CPTII,S$GLB, | Performed by: NURSE PRACTITIONER

## 2023-05-30 PROCEDURE — G0439 PPPS, SUBSEQ VISIT: HCPCS | Mod: HCNC,S$GLB,, | Performed by: NURSE PRACTITIONER

## 2023-05-30 PROCEDURE — 1125F AMNT PAIN NOTED PAIN PRSNT: CPT | Mod: HCNC,CPTII,S$GLB, | Performed by: NURSE PRACTITIONER

## 2023-05-30 PROCEDURE — 1170F FXNL STATUS ASSESSED: CPT | Mod: HCNC,CPTII,S$GLB, | Performed by: NURSE PRACTITIONER

## 2023-05-30 PROCEDURE — 99999 PR PBB SHADOW E&M-EST. PATIENT-LVL V: CPT | Mod: PBBFAC,HCNC,, | Performed by: NURSE PRACTITIONER

## 2023-05-30 PROCEDURE — 3008F BODY MASS INDEX DOCD: CPT | Mod: HCNC,CPTII,S$GLB, | Performed by: NURSE PRACTITIONER

## 2023-05-30 PROCEDURE — 1101F PT FALLS ASSESS-DOCD LE1/YR: CPT | Mod: HCNC,CPTII,S$GLB, | Performed by: NURSE PRACTITIONER

## 2023-05-30 PROCEDURE — 3288F FALL RISK ASSESSMENT DOCD: CPT | Mod: HCNC,CPTII,S$GLB, | Performed by: NURSE PRACTITIONER

## 2023-05-30 PROCEDURE — 3078F DIAST BP <80 MM HG: CPT | Mod: HCNC,CPTII,S$GLB, | Performed by: NURSE PRACTITIONER

## 2023-05-30 PROCEDURE — 1101F PR PT FALLS ASSESS DOC 0-1 FALLS W/OUT INJ PAST YR: ICD-10-PCS | Mod: HCNC,CPTII,S$GLB, | Performed by: NURSE PRACTITIONER

## 2023-05-30 PROCEDURE — 1160F PR REVIEW ALL MEDS BY PRESCRIBER/CLIN PHARMACIST DOCUMENTED: ICD-10-PCS | Mod: HCNC,CPTII,S$GLB, | Performed by: NURSE PRACTITIONER

## 2023-05-30 SDOH — SOCIAL DETERMINANTS OF HEALTH (SDOH): PROBLEM RELATED TO HOUSING AND ECONOMIC CIRCUMSTANCES, UNSPECIFIED: Z59.9

## 2023-05-30 NOTE — PATIENT INSTRUCTIONS
Counseling and Referral of Other Preventative  (Italic type indicates deductible and co-insurance are waived)    Patient Name: Lj Coleman  Today's Date: 5/30/2023    Health Maintenance       Date Due Completion Date    TETANUS VACCINE Never done ---    Shingles Vaccine (1 of 2) Never done ---    High Dose Statin Never done ---    Abdominal Aortic Aneurysm Screening Never done ---    COVID-19 Vaccine (4 - Booster for Pfizer series) 01/10/2022 11/15/2021    LDCT Lung Screen 04/04/2024 4/4/2023    Lipid Panel 05/05/2024 5/5/2023    Hemoglobin A1c (Diabetic Prevention Screening) 05/05/2026 5/5/2023    Colorectal Cancer Screening 01/20/2031 1/20/2021        Orders Placed This Encounter   Procedures    Ambulatory referral/consult to Smoking Cessation Program    Ambulatory referral/consult to Outpatient Case Management       The following information is provided to all patients.  This information is to help you find resources for any of the problems found today that may be affecting your health:                Living healthy guide: www.[facility].[place].gov      Understanding Diabetes: www.diabetes.org      Eating healthy: www.cdc.gov/healthyweight      CDC home safety checklist: www.cdc.gov/steadi/patient.html      Agency on Aging: www.goea.[place].gov      Alcoholics anonymous (AA): www.aa.org      Physical Activity: www.elena.nih.gov/mg6pcqz      Tobacco use: www.quitwithusla.org

## 2023-05-30 NOTE — PROGRESS NOTES
"  Lj Coleman presented for a  Medicare AWV and comprehensive Health Risk Assessment today. The following components were reviewed and updated:    Medical history  Family History  Social history  Allergies and Current Medications  Health Risk Assessment  Health Maintenance  Care Team         ** See Completed Assessments for Annual Wellness Visit within the encounter summary.**         The following assessments were completed:  Living Situation  CAGE  Depression Screening  Timed Get Up and Go  Whisper Test-na  Cognitive Function Screening  Nutrition Screening  ADL Screening  PAQ Screening        Vitals:    05/30/23 1341   BP: 102/74   Pulse: 98   SpO2: 96%   Weight: 105.5 kg (232 lb 9.4 oz)   Height: 5' 8.7" (1.745 m)     Body mass index is 34.65 kg/m².  Physical Exam  Vitals and nursing note reviewed.   Constitutional:       Appearance: He is well-developed.   HENT:      Head: Normocephalic.   Cardiovascular:      Rate and Rhythm: Normal rate and regular rhythm.      Heart sounds: Normal heart sounds.   Pulmonary:      Effort: Pulmonary effort is normal. No respiratory distress.      Breath sounds: Normal breath sounds.   Abdominal:      Palpations: Abdomen is soft. There is no mass.      Tenderness: There is no abdominal tenderness.   Musculoskeletal:         General: Normal range of motion.   Skin:     General: Skin is warm and dry.   Neurological:      Mental Status: He is alert and oriented to person, place, and time.      Motor: No abnormal muscle tone.   Psychiatric:         Speech: Speech normal.         Behavior: Behavior normal.             Diagnoses and health risks identified today and associated recommendations/orders:    1. Encounter for preventive health examination     Review for Opioid Screening: Pt does not have Rx for Opioids      Review for Substance Use Disorders: Patient does not use substance  per chart     Scheduled  PCP  Message sent to Dr. Diaz's staff to please contact to schedule. "     He will discuss PSA with PCP  Declines scheduling AAA screening at this time    He will discuss all vaccines with transplant and ID provider.     2. Osteomyelitis of ankle and foot  Continue current treatment plan as previously prescribed with your  ID provider.     3. Liver transplanted  Continue current treatment plan as previously prescribed with your  transplant provider.     4. Immunosuppression  Continue current treatment plan as previously prescribed with your  transplant provider.     5. Thrombocytopenia  .Stable and controlled. Continue current treatment plan as previously prescribed with your PCP.      6. Obesity (BMI 30.0-34.9)  Encouraged healthy diet and exercise as tolerated to help bring BMI into normal range.    Continue current treatment plan as previously prescribed with your  pcp     7. Atherosclerosis of native coronary artery of native heart without angina pectoris  Continue current treatment plan as previously prescribed with your  cardiologist.     8. Adjustment disorder with mixed anxiety and depressed mood  Discussed counseling. Psychiatry department contact information given to patient.   PHQ 2-1\  Advised to follow up with PCP for further evaluation and recommendations. Patient expressed understanding.        9. Thrombus  US 4/23  Continue current treatment plan as previously prescribed with your  pcp and ID provider.     10. History of liver cancer  Continue current treatment plan as previously prescribed with your  transplant provider.     11. Hypertension, unspecified type  Continue current treatment plan as previously prescribed with your  providers.     12. Tobacco use  Discussed the importance of smoking cessation and advised to quit smoking. Patient expressed understanding.   - Ambulatory referral/consult to Smoking Cessation Program; Future    13. Encounter for Medicare annual wellness exam  - Ambulatory Referral/Consult to Enhanced Annual Wellness Visit (eAWV)    14. History  of alcohol dependence  CAGE-1  See #1  Continue current treatment plan as previously prescribed with your  pcp     15. Financial difficulties  [facility] financial resources information page given to patient.    - Ambulatory referral/consult to Outpatient Case Management    16. Numbness and tingling  Chronic  Advised to follow up with PCP for further evaluation and recommendations. Patient expressed understanding.      Reports fatigue  Discussed s/s of heart failure (patient denies any s/s) and advised to follow up with cardiologist/ER (if severe) if occur. Patient expressed understanding.    Advised to follow up with PCP/cardiologist for further evaluation and recommendations. Patient expressed understanding.      Provided Lj with a 5-10 year written screening schedule and personal prevention plan. Recommendations were developed using the USPSTF age appropriate recommendations. Education, counseling, and referrals were provided as needed. After Visit Summary printed and given to patient which includes a list of additional screenings\tests needed.    Follow up in about 1 year (around 5/30/2024) for awv.    Aicha Kirkland NP  I offered to discuss advanced care planning, including how to pick a person who would make decisions for you if you were unable to make them for yourself, called a health care power of , and what kind of decisions you might make such as use of life sustaining treatments such as ventilators and tube feeding when faced with a life limiting illness recorded on a living will that they will need to know. (How you want to be cared for as you near the end of your natural life)     X Patient is interested in learning more about how to make advanced directives.  I provided them paperwork and offered to discuss this with them.

## 2023-05-30 NOTE — TELEPHONE ENCOUNTER
LVM for pt to call back in regards to scheduling appt.          ----- Message from Aicha Kirkland NP sent at 5/30/2023  2:35 PM CDT -----  Pt would like a call back to discuss scheduling apt.   Thanks,   Aicha

## 2023-05-31 DIAGNOSIS — I82.622 ACUTE DEEP VEIN THROMBOSIS (DVT) OF OTHER VEIN OF LEFT UPPER EXTREMITY: ICD-10-CM

## 2023-06-06 ENCOUNTER — LAB VISIT (OUTPATIENT)
Dept: LAB | Facility: HOSPITAL | Age: 68
End: 2023-06-06
Attending: PHYSICIAN ASSISTANT
Payer: MEDICARE

## 2023-06-06 ENCOUNTER — OFFICE VISIT (OUTPATIENT)
Dept: INTERNAL MEDICINE | Facility: CLINIC | Age: 68
End: 2023-06-06
Payer: MEDICARE

## 2023-06-06 VITALS
SYSTOLIC BLOOD PRESSURE: 122 MMHG | OXYGEN SATURATION: 96 % | HEIGHT: 69 IN | HEART RATE: 106 BPM | TEMPERATURE: 98 F | BODY MASS INDEX: 34.12 KG/M2 | DIASTOLIC BLOOD PRESSURE: 78 MMHG | WEIGHT: 230.38 LBS | RESPIRATION RATE: 17 BRPM

## 2023-06-06 DIAGNOSIS — R39.198 DECREASED URINE STREAM: ICD-10-CM

## 2023-06-06 DIAGNOSIS — Z72.0 TOBACCO ABUSE DISORDER: ICD-10-CM

## 2023-06-06 DIAGNOSIS — Z12.5 SCREENING FOR PROSTATE CANCER: ICD-10-CM

## 2023-06-06 DIAGNOSIS — R53.83 FATIGUE, UNSPECIFIED TYPE: ICD-10-CM

## 2023-06-06 DIAGNOSIS — I10 BENIGN ESSENTIAL HTN: Primary | ICD-10-CM

## 2023-06-06 DIAGNOSIS — R06.02 SHORTNESS OF BREATH: ICD-10-CM

## 2023-06-06 DIAGNOSIS — Z80.52 FAMILY HISTORY OF BLADDER CANCER: ICD-10-CM

## 2023-06-06 LAB
BILIRUB UR QL STRIP: NEGATIVE
CLARITY UR: CLEAR
COLOR UR: YELLOW
GLUCOSE UR QL STRIP: NEGATIVE
HGB UR QL STRIP: NEGATIVE
KETONES UR QL STRIP: NEGATIVE
LEUKOCYTE ESTERASE UR QL STRIP: NEGATIVE
NITRITE UR QL STRIP: NEGATIVE
PH UR STRIP: 6 [PH] (ref 5–8)
PROT UR QL STRIP: ABNORMAL
SP GR UR STRIP: 1.02 (ref 1–1.03)
URN SPEC COLLECT METH UR: ABNORMAL
UROBILINOGEN UR STRIP-ACNC: NEGATIVE EU/DL

## 2023-06-06 PROCEDURE — 99999 PR PBB SHADOW E&M-EST. PATIENT-LVL V: ICD-10-PCS | Mod: PBBFAC,HCNC,, | Performed by: PHYSICIAN ASSISTANT

## 2023-06-06 PROCEDURE — 3078F DIAST BP <80 MM HG: CPT | Mod: HCNC,CPTII,S$GLB, | Performed by: PHYSICIAN ASSISTANT

## 2023-06-06 PROCEDURE — 99214 OFFICE O/P EST MOD 30 MIN: CPT | Mod: HCNC,S$GLB,, | Performed by: PHYSICIAN ASSISTANT

## 2023-06-06 PROCEDURE — 1159F PR MEDICATION LIST DOCUMENTED IN MEDICAL RECORD: ICD-10-PCS | Mod: HCNC,CPTII,S$GLB, | Performed by: PHYSICIAN ASSISTANT

## 2023-06-06 PROCEDURE — 3074F SYST BP LT 130 MM HG: CPT | Mod: HCNC,CPTII,S$GLB, | Performed by: PHYSICIAN ASSISTANT

## 2023-06-06 PROCEDURE — 3044F HG A1C LEVEL LT 7.0%: CPT | Mod: HCNC,CPTII,S$GLB, | Performed by: PHYSICIAN ASSISTANT

## 2023-06-06 PROCEDURE — 99214 PR OFFICE/OUTPT VISIT, EST, LEVL IV, 30-39 MIN: ICD-10-PCS | Mod: HCNC,S$GLB,, | Performed by: PHYSICIAN ASSISTANT

## 2023-06-06 PROCEDURE — 3008F BODY MASS INDEX DOCD: CPT | Mod: HCNC,CPTII,S$GLB, | Performed by: PHYSICIAN ASSISTANT

## 2023-06-06 PROCEDURE — 1101F PT FALLS ASSESS-DOCD LE1/YR: CPT | Mod: HCNC,CPTII,S$GLB, | Performed by: PHYSICIAN ASSISTANT

## 2023-06-06 PROCEDURE — 81003 URINALYSIS AUTO W/O SCOPE: CPT | Mod: HCNC | Performed by: PHYSICIAN ASSISTANT

## 2023-06-06 PROCEDURE — 1126F PR PAIN SEVERITY QUANTIFIED, NO PAIN PRESENT: ICD-10-PCS | Mod: HCNC,CPTII,S$GLB, | Performed by: PHYSICIAN ASSISTANT

## 2023-06-06 PROCEDURE — 3008F PR BODY MASS INDEX (BMI) DOCUMENTED: ICD-10-PCS | Mod: HCNC,CPTII,S$GLB, | Performed by: PHYSICIAN ASSISTANT

## 2023-06-06 PROCEDURE — 1160F PR REVIEW ALL MEDS BY PRESCRIBER/CLIN PHARMACIST DOCUMENTED: ICD-10-PCS | Mod: HCNC,CPTII,S$GLB, | Performed by: PHYSICIAN ASSISTANT

## 2023-06-06 PROCEDURE — 3288F PR FALLS RISK ASSESSMENT DOCUMENTED: ICD-10-PCS | Mod: HCNC,CPTII,S$GLB, | Performed by: PHYSICIAN ASSISTANT

## 2023-06-06 PROCEDURE — 1160F RVW MEDS BY RX/DR IN RCRD: CPT | Mod: HCNC,CPTII,S$GLB, | Performed by: PHYSICIAN ASSISTANT

## 2023-06-06 PROCEDURE — 3044F PR MOST RECENT HEMOGLOBIN A1C LEVEL <7.0%: ICD-10-PCS | Mod: HCNC,CPTII,S$GLB, | Performed by: PHYSICIAN ASSISTANT

## 2023-06-06 PROCEDURE — 3074F PR MOST RECENT SYSTOLIC BLOOD PRESSURE < 130 MM HG: ICD-10-PCS | Mod: HCNC,CPTII,S$GLB, | Performed by: PHYSICIAN ASSISTANT

## 2023-06-06 PROCEDURE — 1101F PR PT FALLS ASSESS DOC 0-1 FALLS W/OUT INJ PAST YR: ICD-10-PCS | Mod: HCNC,CPTII,S$GLB, | Performed by: PHYSICIAN ASSISTANT

## 2023-06-06 PROCEDURE — 87086 URINE CULTURE/COLONY COUNT: CPT | Mod: HCNC | Performed by: PHYSICIAN ASSISTANT

## 2023-06-06 PROCEDURE — 3288F FALL RISK ASSESSMENT DOCD: CPT | Mod: HCNC,CPTII,S$GLB, | Performed by: PHYSICIAN ASSISTANT

## 2023-06-06 PROCEDURE — 99999 PR PBB SHADOW E&M-EST. PATIENT-LVL V: CPT | Mod: PBBFAC,HCNC,, | Performed by: PHYSICIAN ASSISTANT

## 2023-06-06 PROCEDURE — 1159F MED LIST DOCD IN RCRD: CPT | Mod: HCNC,CPTII,S$GLB, | Performed by: PHYSICIAN ASSISTANT

## 2023-06-06 PROCEDURE — 1126F AMNT PAIN NOTED NONE PRSNT: CPT | Mod: HCNC,CPTII,S$GLB, | Performed by: PHYSICIAN ASSISTANT

## 2023-06-06 PROCEDURE — 3078F PR MOST RECENT DIASTOLIC BLOOD PRESSURE < 80 MM HG: ICD-10-PCS | Mod: HCNC,CPTII,S$GLB, | Performed by: PHYSICIAN ASSISTANT

## 2023-06-06 NOTE — PROGRESS NOTES
"Subjective:      Patient ID: Lj Coleman is a 67 y.o. male.    Chief Complaint: Fatigue and Weak urine Stream    Patient is new to me, being seen today for fatigue and weak urine stream, gradually worsening over the past year.     Labs recently completed April/May, stable     Last visit May 2023 with PCP     Review of Systems   Constitutional:  Positive for fatigue. Negative for chills, diaphoresis and fever.   HENT:  Negative for congestion, rhinorrhea and sore throat.    Respiratory:  Positive for cough (at night, dry, chronic) and shortness of breath (winded with exertion). Negative for wheezing.         Smoker, 3cigs/day, much less than he used to  CXR and CT chest April 2203, no acute findings   Gastrointestinal:  Negative for abdominal pain, constipation, diarrhea, nausea and vomiting.   Genitourinary:  Positive for decreased urine volume (stream with decreased pressure). Negative for dysuria, frequency and hematuria.        Denies nocturia   Dad with h/o bladder cancer   Skin:  Negative for rash.   Neurological:  Negative for dizziness, light-headedness and headaches.     Objective:   /78   Pulse 106   Temp 97.7 °F (36.5 °C)   Resp 17   Ht 5' 8.7" (1.745 m)   Wt 104.5 kg (230 lb 6.1 oz)   SpO2 96%   BMI 34.32 kg/m²   Physical Exam  Constitutional:       General: He is not in acute distress.     Appearance: Normal appearance. He is well-developed. He is not ill-appearing.   HENT:      Head: Normocephalic and atraumatic.   Cardiovascular:      Rate and Rhythm: Normal rate and regular rhythm.      Heart sounds: Normal heart sounds. No murmur heard.  Pulmonary:      Effort: Pulmonary effort is normal. No respiratory distress.      Breath sounds: Normal breath sounds. No decreased breath sounds.   Musculoskeletal:      Right lower leg: No edema.      Left lower leg: No edema.   Skin:     General: Skin is warm and dry.      Findings: No rash.   Psychiatric:         Speech: Speech normal.    "      Behavior: Behavior normal.         Thought Content: Thought content normal.     Assessment:      1. Benign essential HTN    2. Decreased urine stream    3. Fatigue, unspecified type    4. Screening for prostate cancer    5. Shortness of breath    6. Tobacco abuse disorder    7. Family history of bladder cancer       Plan:   Benign essential HTN   Controlled     Decreased urine stream  -     Urinalysis; Future; Expected date: 06/06/2023  -     Urine culture; Future; Expected date: 06/06/2023  -     PSA, Screening; Future; Expected date: 06/06/2023  -     Ambulatory referral/consult to Urology; Future; Expected date: 06/13/2023    Fatigue, unspecified type  -     CBC Auto Differential; Future; Expected date: 06/06/2023  -     Comprehensive Metabolic Panel; Future; Expected date: 06/06/2023  -     TSH; Future; Expected date: 06/06/2023    Screening for prostate cancer  -     PSA, Screening; Future; Expected date: 06/06/2023    Shortness of breath  -     Ambulatory referral/consult to Pulmonology; Future; Expected date: 06/13/2023    Tobacco abuse disorder  -     Ambulatory referral/consult to Pulmonology; Future; Expected date: 06/13/2023    Family history of bladder cancer  -     Ambulatory referral/consult to Urology; Future; Expected date: 06/13/2023      Additional recs pending above eval    6mth f/u PCP Nov 2023    Discussed worsening signs/symptoms and when to return to clinic or go to ED.   Patient expresses understanding and agrees with treatment plan.

## 2023-06-07 ENCOUNTER — TELEPHONE (OUTPATIENT)
Dept: INFECTIOUS DISEASES | Facility: CLINIC | Age: 68
End: 2023-06-07
Payer: MEDICARE

## 2023-06-08 LAB — BACTERIA UR CULT: NORMAL

## 2023-06-12 ENCOUNTER — OUTPATIENT CASE MANAGEMENT (OUTPATIENT)
Dept: ADMINISTRATIVE | Facility: OTHER | Age: 68
End: 2023-06-12
Payer: MEDICARE

## 2023-06-26 DIAGNOSIS — I82.622 ACUTE DEEP VEIN THROMBOSIS (DVT) OF OTHER VEIN OF LEFT UPPER EXTREMITY: ICD-10-CM

## 2023-06-26 NOTE — TELEPHONE ENCOUNTER
Refill Routing Note   Medication(s) are not appropriate for processing by [facility] for the following reason(s):      Medication outside of protocol    [facility] action(s):  Route None identified          Appointments  past 12m or future 3m with PCP    Date Provider   Last Visit   Visit date not found Shraddha Troncoso MD   Next Visit   Visit date not found Shraddha Troncoso MD   ED visits in past 90 days: 0        Note composed:6:11 PM 06/26/2023

## 2023-06-27 RX ORDER — APIXABAN 5 MG (74)
KIT ORAL
Qty: 74 EACH | OUTPATIENT
Start: 2023-06-27

## 2023-06-30 ENCOUNTER — TELEPHONE (OUTPATIENT)
Dept: INTERNAL MEDICINE | Facility: CLINIC | Age: 68
End: 2023-06-30
Payer: MEDICARE

## 2023-06-30 NOTE — TELEPHONE ENCOUNTER
----- Message from Cora Chiang sent at 6/30/2023 10:14 AM CDT -----  Contact: Lj Storm called in regarding his medication apixaban (ELIQUIS) 5 mg Tab. The pharmacy states his prescription is delayed. He also needs clarification if he is supposed to take 1 pill 2 times per day or 2 pills 2 times per day. Please call him back at [phone].    Thanks  TS

## 2023-07-01 NOTE — TELEPHONE ENCOUNTER
Called patient back and explained that he should be taking one pill twice a day. He will need a refill sent to his pharmacy since he has been taking them as two pills bid. He has about 5 days left of one pill bid.     Explained to him that if we send it in they may make him pay OOP because of insurance. Explained that he may could try a good rx card or another discount card to help buffer that OOP cost. I have sent you a refill request.

## 2023-07-10 ENCOUNTER — TELEPHONE (OUTPATIENT)
Dept: INTERNAL MEDICINE | Facility: CLINIC | Age: 68
End: 2023-07-10
Payer: MEDICARE

## 2023-07-10 DIAGNOSIS — I82.622 ACUTE DEEP VEIN THROMBOSIS (DVT) OF OTHER VEIN OF LEFT UPPER EXTREMITY: ICD-10-CM

## 2023-07-10 RX ORDER — PNEUMOCOCCAL 20-VALENT CONJUGATE VACCINE 2.2; 2.2; 2.2; 2.2; 2.2; 2.2; 2.2; 2.2; 2.2; 2.2; 2.2; 2.2; 2.2; 2.2; 2.2; 2.2; 4.4; 2.2; 2.2; 2.2 UG/.5ML; UG/.5ML; UG/.5ML; UG/.5ML; UG/.5ML; UG/.5ML; UG/.5ML; UG/.5ML; UG/.5ML; UG/.5ML; UG/.5ML; UG/.5ML; UG/.5ML; UG/.5ML; UG/.5ML; UG/.5ML; UG/.5ML; UG/.5ML; UG/.5ML; UG/.5ML
INJECTION, SUSPENSION INTRAMUSCULAR
COMMUNITY
Start: 2023-05-05 | End: 2023-09-27

## 2023-07-10 NOTE — TELEPHONE ENCOUNTER
----- Message from Margareth Villagran sent at 7/10/2023  9:17 AM CDT -----  Contact: [phone]  Pharmacy is telling pt they did not get refill auth that was e prescribed on 6/27/23 for apixaban (ELIQUIS) 5 mg Tab. Pt states he is out of medication and left a message last week but has not heard back. He is concerned about suddenly stopping the blood thinner Can you please resend this to the pharmacy and contact pt to let him know?    Thanks      [id number] - [address] [facility] [address] [place] [address] [address] [address]  Phone: [phone] Fax: [phone]

## 2023-07-19 ENCOUNTER — OFFICE VISIT (OUTPATIENT)
Dept: UROLOGY | Facility: CLINIC | Age: 68
End: 2023-07-19
Payer: MEDICARE

## 2023-07-19 VITALS
WEIGHT: 230.5 LBS | BODY MASS INDEX: 34.93 KG/M2 | HEART RATE: 90 BPM | HEIGHT: 68 IN | DIASTOLIC BLOOD PRESSURE: 91 MMHG | SYSTOLIC BLOOD PRESSURE: 142 MMHG

## 2023-07-19 DIAGNOSIS — N40.0 BENIGN PROSTATIC HYPERPLASIA WITHOUT LOWER URINARY TRACT SYMPTOMS: Primary | ICD-10-CM

## 2023-07-19 DIAGNOSIS — R39.198 DECREASED URINE STREAM: ICD-10-CM

## 2023-07-19 DIAGNOSIS — Z80.52 FAMILY HISTORY OF BLADDER CANCER: ICD-10-CM

## 2023-07-19 PROBLEM — R07.89 OTHER CHEST PAIN: Status: RESOLVED | Noted: 2021-06-14 | Resolved: 2023-07-19

## 2023-07-19 PROCEDURE — 99204 OFFICE O/P NEW MOD 45 MIN: CPT | Mod: HCNC,S$GLB,, | Performed by: NURSE PRACTITIONER

## 2023-07-19 PROCEDURE — 3080F PR MOST RECENT DIASTOLIC BLOOD PRESSURE >= 90 MM HG: ICD-10-PCS | Mod: HCNC,CPTII,S$GLB, | Performed by: NURSE PRACTITIONER

## 2023-07-19 PROCEDURE — 99204 PR OFFICE/OUTPT VISIT, NEW, LEVL IV, 45-59 MIN: ICD-10-PCS | Mod: HCNC,S$GLB,, | Performed by: NURSE PRACTITIONER

## 2023-07-19 PROCEDURE — 3044F HG A1C LEVEL LT 7.0%: CPT | Mod: HCNC,CPTII,S$GLB, | Performed by: NURSE PRACTITIONER

## 2023-07-19 PROCEDURE — 3077F PR MOST RECENT SYSTOLIC BLOOD PRESSURE >= 140 MM HG: ICD-10-PCS | Mod: HCNC,CPTII,S$GLB, | Performed by: NURSE PRACTITIONER

## 2023-07-19 PROCEDURE — 51798 US URINE CAPACITY MEASURE: CPT | Mod: HCNC,S$GLB,, | Performed by: NURSE PRACTITIONER

## 2023-07-19 PROCEDURE — 1126F AMNT PAIN NOTED NONE PRSNT: CPT | Mod: HCNC,CPTII,S$GLB, | Performed by: NURSE PRACTITIONER

## 2023-07-19 PROCEDURE — 3080F DIAST BP >= 90 MM HG: CPT | Mod: HCNC,CPTII,S$GLB, | Performed by: NURSE PRACTITIONER

## 2023-07-19 PROCEDURE — 1159F PR MEDICATION LIST DOCUMENTED IN MEDICAL RECORD: ICD-10-PCS | Mod: HCNC,CPTII,S$GLB, | Performed by: NURSE PRACTITIONER

## 2023-07-19 PROCEDURE — 3044F PR MOST RECENT HEMOGLOBIN A1C LEVEL <7.0%: ICD-10-PCS | Mod: HCNC,CPTII,S$GLB, | Performed by: NURSE PRACTITIONER

## 2023-07-19 PROCEDURE — 51798 PR MEAS,POST-VOID RES,US,NON-IMAGING: ICD-10-PCS | Mod: HCNC,S$GLB,, | Performed by: NURSE PRACTITIONER

## 2023-07-19 PROCEDURE — 81003 URINALYSIS AUTO W/O SCOPE: CPT | Mod: QW,HCNC,S$GLB, | Performed by: NURSE PRACTITIONER

## 2023-07-19 PROCEDURE — 81003 PR URINALYSIS, AUTO, W/O SCOPE: ICD-10-PCS | Mod: QW,HCNC,S$GLB, | Performed by: NURSE PRACTITIONER

## 2023-07-19 PROCEDURE — 3008F PR BODY MASS INDEX (BMI) DOCUMENTED: ICD-10-PCS | Mod: HCNC,CPTII,S$GLB, | Performed by: NURSE PRACTITIONER

## 2023-07-19 PROCEDURE — 3077F SYST BP >= 140 MM HG: CPT | Mod: HCNC,CPTII,S$GLB, | Performed by: NURSE PRACTITIONER

## 2023-07-19 PROCEDURE — 3008F BODY MASS INDEX DOCD: CPT | Mod: HCNC,CPTII,S$GLB, | Performed by: NURSE PRACTITIONER

## 2023-07-19 PROCEDURE — 99999 PR PBB SHADOW E&M-EST. PATIENT-LVL III: CPT | Mod: PBBFAC,HCNC,, | Performed by: NURSE PRACTITIONER

## 2023-07-19 PROCEDURE — 1126F PR PAIN SEVERITY QUANTIFIED, NO PAIN PRESENT: ICD-10-PCS | Mod: HCNC,CPTII,S$GLB, | Performed by: NURSE PRACTITIONER

## 2023-07-19 PROCEDURE — 1159F MED LIST DOCD IN RCRD: CPT | Mod: HCNC,CPTII,S$GLB, | Performed by: NURSE PRACTITIONER

## 2023-07-19 PROCEDURE — 99999 PR PBB SHADOW E&M-EST. PATIENT-LVL III: ICD-10-PCS | Mod: PBBFAC,HCNC,, | Performed by: NURSE PRACTITIONER

## 2023-07-19 RX ORDER — TAMSULOSIN HYDROCHLORIDE 0.4 MG/1
0.4 CAPSULE ORAL DAILY
Qty: 30 CAPSULE | Refills: 11 | Status: SHIPPED | OUTPATIENT
Start: 2023-07-19 | End: 2024-07-18

## 2023-07-19 NOTE — PROGRESS NOTES
Chief Complaint:   BPH    HPI:   Patient is 67-year-old male that is presenting with nocturia, 3-4 times nightly and a slow urinary stream.  Urine in clinic is negative and PVR is 29 mL.  Patient denies gross hematuria.  Recent PSA was normal.  Patient states that father  of bladder cancer.  Allergies:  Codeine    Medications:  has a current medication list which includes the following prescription(s): apixaban, esomeprazole, metoprolol succinate, tacrolimus, prevnar 20 (pf), and tamsulosin, and the following Facility-Administered Medications: lactated ringers and sodium chloride 0.9%.    Review of Systems:  General: No fever, chills, fatigability, or weight loss.  Skin: No rashes, itching, or changes in color or texture of skin.  Chest: Denies HARDING, cyanosis, wheezing, cough, and sputum production.  Abdomen: Appetite fine. No weight loss. Denies diarrhea, abdominal pain, hematemesis, or blood in stool.  Musculoskeletal: No joint stiffness or swelling. Denies back pain.  : As above.  All other review of systems negative.    PMH:   has a past medical history of Alcohol dependence, Angina pectoris, Arthritis, Atherosclerosis of native coronary artery without angina pectoris/ LAD (2016), Back pain, Chronic hepatitis C with cirrhosis, Depression, Hepatoma, History of colon polyps (2015), History of transplantation, liver (2012), History of vertebral fracture, Hypertension, Immune deficiency disorder, Incisional hernia following transplant (2014), Liver failure (2011), Liver transplanted (2012), Obesity, Tobacco abuse (2016), Trouble in sleeping, and Vertebral fracture ().    PSH:   has a past surgical history that includes Liver transplant (2012); Hernia repair (Right, ); Tonsillectomy (); Mouth surgery; and Colonoscopy (N/A, 2021).    FamHx: family history includes Cancer in his father, maternal grandmother, mother, and another family member; Diabetes in his  maternal uncle.    SocHx:  reports that he has been smoking cigarettes. He started smoking about 35 years ago. He has a 26.25 pack-year smoking history. He has never used smokeless tobacco. He reports that he does not drink alcohol and does not use drugs.      Physical Exam:  Vitals:    07/19/23 0925   BP: (!) 142/91   Pulse: 90     General:  Morbidly obese male, A&Ox3, no apparent distress, no deformities, strong tobacco odor  Neck: No masses, normal thyroid  Lungs: normal inspiration, no use of accessory muscles  Heart: normal pulse, no arrhythmias  Abdomen: Soft, NT, ND, no masses, no hernias, no hepatosplenomegaly  Lymphatic: Neck and groin nodes negative  Skin: The skin is warm and dry. No jaundice.    Labs/Studies:   See HPI   Latest Reference Range & Units 04/10/15 09:44 06/06/23 11:45   PSA, Screen 0.00 - 4.00 ng/mL 0.41 0.55     Impression/Plan:   BPH  Patient was educated on behavior modifications needed to decrease BPH symptoms.  Tamsulosin was sent to his local pharmacy and he will return to clinic for re-evaluation in 6-8 weeks.

## 2023-08-15 ENCOUNTER — OFFICE VISIT (OUTPATIENT)
Dept: PULMONOLOGY | Facility: CLINIC | Age: 68
End: 2023-08-15
Payer: MEDICARE

## 2023-08-15 VITALS
BODY MASS INDEX: 34.46 KG/M2 | RESPIRATION RATE: 18 BRPM | OXYGEN SATURATION: 96 % | WEIGHT: 227.38 LBS | HEART RATE: 84 BPM | HEIGHT: 68 IN | SYSTOLIC BLOOD PRESSURE: 133 MMHG | DIASTOLIC BLOOD PRESSURE: 70 MMHG

## 2023-08-15 DIAGNOSIS — R06.02 SHORTNESS OF BREATH: ICD-10-CM

## 2023-08-15 DIAGNOSIS — I10 BENIGN ESSENTIAL HTN: ICD-10-CM

## 2023-08-15 DIAGNOSIS — J44.9 COPD SUGGESTED BY INITIAL EVALUATION: Primary | ICD-10-CM

## 2023-08-15 DIAGNOSIS — D84.9 IMMUNOSUPPRESSION: ICD-10-CM

## 2023-08-15 DIAGNOSIS — Z72.0 TOBACCO ABUSE DISORDER: ICD-10-CM

## 2023-08-15 PROCEDURE — 1101F PR PT FALLS ASSESS DOC 0-1 FALLS W/OUT INJ PAST YR: ICD-10-PCS | Mod: HCNC,CPTII,S$GLB, | Performed by: PHYSICIAN ASSISTANT

## 2023-08-15 PROCEDURE — 1101F PT FALLS ASSESS-DOCD LE1/YR: CPT | Mod: HCNC,CPTII,S$GLB, | Performed by: PHYSICIAN ASSISTANT

## 2023-08-15 PROCEDURE — 1160F PR REVIEW ALL MEDS BY PRESCRIBER/CLIN PHARMACIST DOCUMENTED: ICD-10-PCS | Mod: HCNC,CPTII,S$GLB, | Performed by: PHYSICIAN ASSISTANT

## 2023-08-15 PROCEDURE — 3078F PR MOST RECENT DIASTOLIC BLOOD PRESSURE < 80 MM HG: ICD-10-PCS | Mod: HCNC,CPTII,S$GLB, | Performed by: PHYSICIAN ASSISTANT

## 2023-08-15 PROCEDURE — 3008F BODY MASS INDEX DOCD: CPT | Mod: HCNC,CPTII,S$GLB, | Performed by: PHYSICIAN ASSISTANT

## 2023-08-15 PROCEDURE — 3075F PR MOST RECENT SYSTOLIC BLOOD PRESS GE 130-139MM HG: ICD-10-PCS | Mod: HCNC,CPTII,S$GLB, | Performed by: PHYSICIAN ASSISTANT

## 2023-08-15 PROCEDURE — 3075F SYST BP GE 130 - 139MM HG: CPT | Mod: HCNC,CPTII,S$GLB, | Performed by: PHYSICIAN ASSISTANT

## 2023-08-15 PROCEDURE — 3288F PR FALLS RISK ASSESSMENT DOCUMENTED: ICD-10-PCS | Mod: HCNC,CPTII,S$GLB, | Performed by: PHYSICIAN ASSISTANT

## 2023-08-15 PROCEDURE — 3288F FALL RISK ASSESSMENT DOCD: CPT | Mod: HCNC,CPTII,S$GLB, | Performed by: PHYSICIAN ASSISTANT

## 2023-08-15 PROCEDURE — 99999 PR PBB SHADOW E&M-EST. PATIENT-LVL IV: CPT | Mod: PBBFAC,HCNC,, | Performed by: PHYSICIAN ASSISTANT

## 2023-08-15 PROCEDURE — 1159F MED LIST DOCD IN RCRD: CPT | Mod: HCNC,CPTII,S$GLB, | Performed by: PHYSICIAN ASSISTANT

## 2023-08-15 PROCEDURE — 3044F HG A1C LEVEL LT 7.0%: CPT | Mod: HCNC,CPTII,S$GLB, | Performed by: PHYSICIAN ASSISTANT

## 2023-08-15 PROCEDURE — 3044F PR MOST RECENT HEMOGLOBIN A1C LEVEL <7.0%: ICD-10-PCS | Mod: HCNC,CPTII,S$GLB, | Performed by: PHYSICIAN ASSISTANT

## 2023-08-15 PROCEDURE — 99999 PR PBB SHADOW E&M-EST. PATIENT-LVL IV: ICD-10-PCS | Mod: PBBFAC,HCNC,, | Performed by: PHYSICIAN ASSISTANT

## 2023-08-15 PROCEDURE — 99204 PR OFFICE/OUTPT VISIT, NEW, LEVL IV, 45-59 MIN: ICD-10-PCS | Mod: HCNC,S$GLB,, | Performed by: PHYSICIAN ASSISTANT

## 2023-08-15 PROCEDURE — 3078F DIAST BP <80 MM HG: CPT | Mod: HCNC,CPTII,S$GLB, | Performed by: PHYSICIAN ASSISTANT

## 2023-08-15 PROCEDURE — 3008F PR BODY MASS INDEX (BMI) DOCUMENTED: ICD-10-PCS | Mod: HCNC,CPTII,S$GLB, | Performed by: PHYSICIAN ASSISTANT

## 2023-08-15 PROCEDURE — 1160F RVW MEDS BY RX/DR IN RCRD: CPT | Mod: HCNC,CPTII,S$GLB, | Performed by: PHYSICIAN ASSISTANT

## 2023-08-15 PROCEDURE — 99204 OFFICE O/P NEW MOD 45 MIN: CPT | Mod: HCNC,S$GLB,, | Performed by: PHYSICIAN ASSISTANT

## 2023-08-15 PROCEDURE — 1159F PR MEDICATION LIST DOCUMENTED IN MEDICAL RECORD: ICD-10-PCS | Mod: HCNC,CPTII,S$GLB, | Performed by: PHYSICIAN ASSISTANT

## 2023-08-15 NOTE — PROGRESS NOTES
Subjective:       Patient ID: Lj Coleman is a 67 y.o. male.    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  Mother  of lung cancer  S/p liver transplant  for liver cancer    Immunization History   Administered Date(s) Administered    COVID-19, MRNA, LN-S, PF (Pfizer) (Purple Cap) 2021, 2021, 11/15/2021    Hepatitis A / Hepatitis B 2009    Influenza (FLUAD) - Quadrivalent - Adjuvanted - PF *Preferred* (65+) 11/10/2021, 2022    Influenza - Quadrivalent 2017    Pneumococcal Conjugate - 20 Valent 2023    Pneumococcal Polysaccharide - 23 Valent 2016      Tobacco Use: High Risk (8/15/2023)    Patient History     Smoking Tobacco Use: Every Day     Smokeless Tobacco Use: Never     Passive Exposure: Not on file      Past Medical History:   Diagnosis Date    Alcohol dependence     stopped     Angina pectoris     Arthritis     back    Atherosclerosis of native coronary artery without angina pectoris/ LAD 2016    Back pain     Chronic hepatitis C with cirrhosis     Depression     Hepatoma     Prior to liver transplant    History of colon polyps 2015    History of transplantation, liver 2012    History of vertebral fracture     Hypertension     Immune deficiency disorder     Incisional hernia following transplant 2014    Liver failure 2011    Related to chemotherapy for hepatoma    Liver transplanted 2012    Obesity     Tobacco abuse 2016    Trouble in sleeping     Vertebral fracture 1975      Current  "Outpatient Medications on File Prior to Visit   Medication Sig Dispense Refill    apixaban (ELIQUIS) 5 mg Tab Take 1 tablet (5 mg total) by mouth 2 (two) times daily. 180 tablet 0    esomeprazole (NEXIUM) 40 MG capsule Take 1 capsule (40 mg total) by mouth once daily. 90 capsule 3    tacrolimus (PROGRAF) 0.5 MG Cap TAKE 2 CAPSULES EVERY 12 HOURS 360 capsule 3    tamsulosin (FLOMAX) 0.4 mg Cap Take 1 capsule (0.4 mg total) by mouth once daily. 30 capsule 11    metoprolol succinate (TOPROL-XL) 25 MG 24 hr tablet Take 1 tablet (25 mg total) by mouth once daily. (Patient not taking: Reported on 8/15/2023) 30 tablet 11    PREVNAR 20, PF, 0.5 mL Syrg injection        Current Facility-Administered Medications on File Prior to Visit   Medication Dose Route Frequency Provider Last Rate Last Admin    lactated ringers infusion   Intravenous Continuous Linwood Ellsworth MD   New Bag at 01/20/21 0734    sodium chloride 0.9% flush 2 mL  2 mL Intravenous PRN Linwood Ellsworth MD            Review of Systems   Constitutional:  Negative for fever, weight loss, appetite change and weakness.   HENT:  Negative for postnasal drip, rhinorrhea, sinus pressure, trouble swallowing and congestion.    Respiratory:  Positive for cough and dyspnea on extertion. Negative for sputum production, choking, chest tightness, shortness of breath and wheezing.    Cardiovascular:  Negative for chest pain and leg swelling.   Musculoskeletal:  Negative for joint swelling.   Gastrointestinal:  Negative for nausea, vomiting and abdominal pain.   Neurological:  Negative for dizziness, weakness and headaches.   All other systems reviewed and are negative.      Objective:       Vitals:    08/15/23 0829   BP: 133/70   Pulse: 84   Resp: 18   SpO2: 96%   Weight: 103.1 kg (227 lb 6.5 oz)   Height: 5' 8" (1.727 m)       Physical Exam   Constitutional: He is oriented to person, place, and time. He appears well-developed and well-nourished. No distress.   HENT:   Head: " Normocephalic.   Nose: Nose normal.   Mouth/Throat: Oropharynx is clear and moist.   Cardiovascular: Normal rate and regular rhythm.   Pulmonary/Chest: Effort normal. No respiratory distress. He has no wheezes. He has no rhonchi. He has no rales.   Musculoskeletal:         General: No edema.      Cervical back: Normal range of motion and neck supple.   Neurological: He is alert and oriented to person, place, and time. Gait normal.   Skin: Skin is warm and dry.   Psychiatric: He has a normal mood and affect.   Vitals reviewed.    Personal Diagnostic Review    EXAMINATION:  CTA CHEST NON CORONARY (PE STUDIES)     CLINICAL HISTORY:  Pulmonary embolism (PE) suspected, high prob;     TECHNIQUE:  Angiographic technique PE protocol with MIPS and post processing volumetric     Iterative technique with low-dose parameters for diminishing radiation dose as reasonably achievable     COMPARISON:  Radiographic correlation     FINDINGS:  No pulmonary embolus seen     No pleural effusion midline intrathoracic lymph nodes similar to prior exam      Soft tissue stranding in the left axilla may be inflammatory     No sizable pulmonary consolidation.  Bronchial wall thickening likely bilateral.  Endobronchial mucous.     Impression:     Negative for PE        Electronically signed by: Jennifer Kolb  Date:                                            2023  Time:                                           16:58    Assessment/Plan:       Problem List Items Addressed This Visit          Pulmonary    COPD suggested by initial evaluation - Primary    Relevant Orders    IgE    Alpha-1-Antitrypsin    Alpha 1 Antitrypsin Phenotype    Stress test, pulmonary    Complete PFT with bronchodilator    Fraction of  Nitric Oxide       Cardiac/Vascular    Benign essential HTN     Stable, continue current medication management             Immunology/Multi System    Immunosuppression     prograf            Other    Tobacco abuse  disorder     Assistance with smoking cessation was offered, including:  [x]  Medications  [x]  Counseling  [x]  Printed Information on Smoking Cessation  Patient was counseled regarding smoking for 3-10 minutes.  Patient declines and says he wants to go to [facility] smoking cessation.                Other Visit Diagnoses       Shortness of breath                Follow up in about 5 weeks (around 9/19/2023) for labs, pft, walk, feno .    Discussed diagnosis, its evaluation, treatment and usual course. All questions answered.    Patient verbalized understanding of plan and left in no acute distress    Thank you for the courtesy of participating in the care of this patient    Jody Campos PA-C  [facility]

## 2023-08-15 NOTE — ASSESSMENT & PLAN NOTE
Assistance with smoking cessation was offered, including:  [x]  Medications  [x]  Counseling  [x]  Printed Information on Smoking Cessation  Patient was counseled regarding smoking for 3-10 minutes.  [facility] declines [facility]

## 2023-09-18 ENCOUNTER — TELEPHONE (OUTPATIENT)
Dept: PULMONOLOGY | Facility: CLINIC | Age: 68
End: 2023-09-18
Payer: MEDICARE

## 2023-09-19 ENCOUNTER — TELEPHONE (OUTPATIENT)
Dept: PULMONOLOGY | Facility: CLINIC | Age: 68
End: 2023-09-19
Payer: MEDICARE

## 2023-09-20 DIAGNOSIS — I82.622 ACUTE DEEP VEIN THROMBOSIS (DVT) OF OTHER VEIN OF LEFT UPPER EXTREMITY: ICD-10-CM

## 2023-09-20 NOTE — TELEPHONE ENCOUNTER
Refill Routing Note     Refill Routing Note   Medication(s) are not appropriate for processing by [facility] for the following reason(s):      Medication outside of protocol    [facility] action(s):  Route Care Due:  None identified            Appointments  past 12m or future 3m with PCP    Date Provider   Last Visit   5/5/2023 Isabella Sutton DO   Next Visit   12/12/2023 Isabella Sutton DO   ED visits in past 90 days: 0        Note composed:11:31 AM 09/20/2023

## 2023-09-25 ENCOUNTER — TELEPHONE (OUTPATIENT)
Dept: UROLOGY | Facility: CLINIC | Age: 68
End: 2023-09-25
Payer: MEDICARE

## 2023-09-25 NOTE — TELEPHONE ENCOUNTER
I personally called patient and he is requesting to reschedule Wednesdays appointment.  Staff is aware.

## 2023-09-25 NOTE — TELEPHONE ENCOUNTER
----- Message from Melina Hanna sent at 9/25/2023 11:44 AM CDT -----  Contact: Lj  Patient is calling to speak with a nurse regarding urinalysis . Please give a call back at [phone] (home)  or respond through portal .

## 2023-09-27 ENCOUNTER — OFFICE VISIT (OUTPATIENT)
Dept: INTERNAL MEDICINE | Facility: CLINIC | Age: 68
End: 2023-09-27
Payer: MEDICARE

## 2023-09-27 VITALS
TEMPERATURE: 97 F | BODY MASS INDEX: 34.15 KG/M2 | WEIGHT: 225.31 LBS | OXYGEN SATURATION: 94 % | HEIGHT: 68 IN | HEART RATE: 98 BPM | RESPIRATION RATE: 23 BRPM | SYSTOLIC BLOOD PRESSURE: 124 MMHG | DIASTOLIC BLOOD PRESSURE: 74 MMHG

## 2023-09-27 DIAGNOSIS — I10 BENIGN ESSENTIAL HTN: ICD-10-CM

## 2023-09-27 DIAGNOSIS — G89.29 CHRONIC MIDLINE LOW BACK PAIN WITHOUT SCIATICA: Primary | Chronic | ICD-10-CM

## 2023-09-27 DIAGNOSIS — R06.2 WHEEZING: ICD-10-CM

## 2023-09-27 DIAGNOSIS — Z72.0 TOBACCO ABUSE DISORDER: ICD-10-CM

## 2023-09-27 DIAGNOSIS — M54.50 CHRONIC MIDLINE LOW BACK PAIN WITHOUT SCIATICA: Primary | Chronic | ICD-10-CM

## 2023-09-27 PROCEDURE — 3074F PR MOST RECENT SYSTOLIC BLOOD PRESSURE < 130 MM HG: ICD-10-PCS | Mod: HCNC,CPTII,S$GLB, | Performed by: PHYSICIAN ASSISTANT

## 2023-09-27 PROCEDURE — 1159F PR MEDICATION LIST DOCUMENTED IN MEDICAL RECORD: ICD-10-PCS | Mod: HCNC,CPTII,S$GLB, | Performed by: PHYSICIAN ASSISTANT

## 2023-09-27 PROCEDURE — 99214 PR OFFICE/OUTPT VISIT, EST, LEVL IV, 30-39 MIN: ICD-10-PCS | Mod: HCNC,S$GLB,, | Performed by: PHYSICIAN ASSISTANT

## 2023-09-27 PROCEDURE — 3074F SYST BP LT 130 MM HG: CPT | Mod: HCNC,CPTII,S$GLB, | Performed by: PHYSICIAN ASSISTANT

## 2023-09-27 PROCEDURE — 1101F PT FALLS ASSESS-DOCD LE1/YR: CPT | Mod: HCNC,CPTII,S$GLB, | Performed by: PHYSICIAN ASSISTANT

## 2023-09-27 PROCEDURE — 99999 PR PBB SHADOW E&M-EST. PATIENT-LVL V: CPT | Mod: PBBFAC,HCNC,, | Performed by: PHYSICIAN ASSISTANT

## 2023-09-27 PROCEDURE — 3078F DIAST BP <80 MM HG: CPT | Mod: HCNC,CPTII,S$GLB, | Performed by: PHYSICIAN ASSISTANT

## 2023-09-27 PROCEDURE — 1159F MED LIST DOCD IN RCRD: CPT | Mod: HCNC,CPTII,S$GLB, | Performed by: PHYSICIAN ASSISTANT

## 2023-09-27 PROCEDURE — 1160F RVW MEDS BY RX/DR IN RCRD: CPT | Mod: HCNC,CPTII,S$GLB, | Performed by: PHYSICIAN ASSISTANT

## 2023-09-27 PROCEDURE — 1125F PR PAIN SEVERITY QUANTIFIED, PAIN PRESENT: ICD-10-PCS | Mod: HCNC,CPTII,S$GLB, | Performed by: PHYSICIAN ASSISTANT

## 2023-09-27 PROCEDURE — 3044F HG A1C LEVEL LT 7.0%: CPT | Mod: HCNC,CPTII,S$GLB, | Performed by: PHYSICIAN ASSISTANT

## 2023-09-27 PROCEDURE — 3008F PR BODY MASS INDEX (BMI) DOCUMENTED: ICD-10-PCS | Mod: HCNC,CPTII,S$GLB, | Performed by: PHYSICIAN ASSISTANT

## 2023-09-27 PROCEDURE — 3078F PR MOST RECENT DIASTOLIC BLOOD PRESSURE < 80 MM HG: ICD-10-PCS | Mod: HCNC,CPTII,S$GLB, | Performed by: PHYSICIAN ASSISTANT

## 2023-09-27 PROCEDURE — 3288F FALL RISK ASSESSMENT DOCD: CPT | Mod: HCNC,CPTII,S$GLB, | Performed by: PHYSICIAN ASSISTANT

## 2023-09-27 PROCEDURE — 3288F PR FALLS RISK ASSESSMENT DOCUMENTED: ICD-10-PCS | Mod: HCNC,CPTII,S$GLB, | Performed by: PHYSICIAN ASSISTANT

## 2023-09-27 PROCEDURE — 1160F PR REVIEW ALL MEDS BY PRESCRIBER/CLIN PHARMACIST DOCUMENTED: ICD-10-PCS | Mod: HCNC,CPTII,S$GLB, | Performed by: PHYSICIAN ASSISTANT

## 2023-09-27 PROCEDURE — 3044F PR MOST RECENT HEMOGLOBIN A1C LEVEL <7.0%: ICD-10-PCS | Mod: HCNC,CPTII,S$GLB, | Performed by: PHYSICIAN ASSISTANT

## 2023-09-27 PROCEDURE — 1101F PR PT FALLS ASSESS DOC 0-1 FALLS W/OUT INJ PAST YR: ICD-10-PCS | Mod: HCNC,CPTII,S$GLB, | Performed by: PHYSICIAN ASSISTANT

## 2023-09-27 PROCEDURE — 3008F BODY MASS INDEX DOCD: CPT | Mod: HCNC,CPTII,S$GLB, | Performed by: PHYSICIAN ASSISTANT

## 2023-09-27 PROCEDURE — 1125F AMNT PAIN NOTED PAIN PRSNT: CPT | Mod: HCNC,CPTII,S$GLB, | Performed by: PHYSICIAN ASSISTANT

## 2023-09-27 PROCEDURE — 99999 PR PBB SHADOW E&M-EST. PATIENT-LVL V: ICD-10-PCS | Mod: PBBFAC,HCNC,, | Performed by: PHYSICIAN ASSISTANT

## 2023-09-27 PROCEDURE — 99214 OFFICE O/P EST MOD 30 MIN: CPT | Mod: HCNC,S$GLB,, | Performed by: PHYSICIAN ASSISTANT

## 2023-09-27 RX ORDER — DICLOFENAC SODIUM 10 MG/G
2 GEL TOPICAL 4 TIMES DAILY
Qty: 100 G | Refills: 0 | Status: SHIPPED | OUTPATIENT
Start: 2023-09-27 | End: 2023-09-27 | Stop reason: SDUPTHER

## 2023-09-27 RX ORDER — DICLOFENAC SODIUM 10 MG/G
2 GEL TOPICAL 4 TIMES DAILY
Qty: 100 G | Refills: 0 | Status: SHIPPED | OUTPATIENT
Start: 2023-09-27 | End: 2023-12-12

## 2023-09-27 NOTE — PROGRESS NOTES
"Subjective:      Patient ID: Lj Coleman is a 67 y.o. male.    Chief Complaint: Back Pain and GI Problem    Patient is known to me, being seen today for back pain for years, worse lately.  Pain does not radiate.  Denies numbness/tingling.  Worse in AM and improves as day goes on.    Saw someone many years ago and was prescribed pain medication, then had to be weaned off  Previously received injections as well   Has been years since seeing specialist   Tried tramadol with PCP months ago with some relief, taking prn     Last visit June 2023 with myself.       Review of Systems   Constitutional:  Negative for chills, diaphoresis and fever.   HENT:  Negative for congestion, rhinorrhea and sore throat.    Respiratory:  Positive for cough (chronic, no change) and shortness of breath (patient reports this is ongoing, no recent change). Negative for wheezing.         Suspect underlying COPD, being followed by Pulm, testing pended   Gastrointestinal:  Negative for abdominal pain, constipation, diarrhea, nausea and vomiting.   Musculoskeletal:  Positive for back pain.   Skin:  Negative for rash.   Neurological:  Negative for dizziness, light-headedness and headaches.       Objective:   /74   Pulse 98   Temp 97.2 °F (36.2 °C)   Resp (!) 23   Ht 5' 8" (1.727 m)   Wt 102.2 kg (225 lb 5 oz)   SpO2 (!) 94%   BMI 34.26 kg/m²   Physical Exam  Constitutional:       General: He is not in acute distress.     Appearance: Normal appearance. He is well-developed. He is not ill-appearing.   HENT:      Head: Normocephalic and atraumatic.   Cardiovascular:      Rate and Rhythm: Normal rate and regular rhythm.      Heart sounds: Normal heart sounds. No murmur heard.  Pulmonary:      Effort: Pulmonary effort is normal. No respiratory distress.      Breath sounds: Wheezing (occasional) present.   Musculoskeletal:      Lumbar back: No bony tenderness (describes pain as deep mid-back).      Right lower leg: No edema.      " Left lower leg: No edema.   Skin:     General: Skin is warm and dry.      Findings: No rash.   Psychiatric:         Speech: Speech normal.         Behavior: Behavior normal.         Thought Content: Thought content normal.       Assessment:      1. Chronic midline low back pain without sciatica    2. Benign essential HTN    3. Tobacco abuse disorder    4. Wheezing       Plan:   Chronic midline low back pain without sciatica  -     X-Ray Lumbar Spine AP And Lateral; Future; Expected date: 09/27/2023  -     Discontinue: diclofenac sodium (VOLTAREN) 1 % Gel; Apply 2 g topically 4 (four) times daily. for 10 days  Dispense: 100 g; Refill: 0  -     Ambulatory referral/consult to Physical/Occupational Therapy; Future; Expected date: 10/04/2023  -     diclofenac sodium (VOLTAREN) 1 % Gel; Apply 2 g topically 4 (four) times daily. for 10 days  Dispense: 100 g; Refill: 0  -     Ambulatory referral/consult to Pain Clinic; Future; Expected date: 10/04/2023    Benign essential HTN   Controlled     Tobacco abuse disorder    Wheezing  -     X-Ray Chest PA And Lateral; Future; Expected date: 09/27/2023      F/u Pulm regarding rescheduling pulmonary tests     Discussed topical NSAIDs, warm heat, PT  F/u Pain Med to be scheduled   Requests refill on tramadol, reports PCP prescribed previously and stated she would continue if needed, will discuss with PCP     Discussed worsening signs/symptoms and when to return to clinic or go to ED.   Patient expresses understanding and agrees with treatment plan.     Addendum: At the close of visit patient states he had a very sharp pain to his L lower abdomen last week, it caused him to double over.  He is unsure of cause.  States this pain is more concerning to him than his back pain.  Unfortunately, due to time constraints and the mention of this problem at the end of our visit we can not address today.  He is not currently having pain and has not had fever.  Recommend a follow up visit to  address and evaluation so we can order appropriate tests at that time.  Should his symptoms worsen in the mean time, he is to go to ER.  He expresses understanding and agrees.

## 2023-09-27 NOTE — Clinical Note
Patient seen today for back pain for years, worse lately.  Pain does not radiate.  Denies numbness/tingling.  Worse in AM and improves as day goes on.   Saw someone many years ago and was prescribed narcotic pain medication, then had to be weaned off Previously received injections as well  Has been years since seeing specialist  States was prescribed tramadol by you months ago with some relief, taking prn Requests refill on tramadol, patient states you told him you would refill if needed Upon chart review this was originally prescribed for foot pain  He can not take oral NSAIDs  I did order x-ray of lumbar spine and referred to PT and Pain Med, was also given topical voltaren  Advises patient I would defer to you regarding tramadol refill and let him know Please let me know if this is something you would be comfortable filling.   Thanks,  Catia

## 2023-10-02 ENCOUNTER — TELEPHONE (OUTPATIENT)
Dept: UROLOGY | Facility: CLINIC | Age: 68
End: 2023-10-02
Payer: MEDICARE

## 2023-10-02 NOTE — TELEPHONE ENCOUNTER
Patient wanted to follow-up to make sure that appointment was rescheduled correctly. Informed patient that appointment was scheduled correctly, patient verbalized understanding.

## 2023-10-02 NOTE — TELEPHONE ENCOUNTER
----- Message from Marya Craven sent at 10/2/2023 10:58 AM CDT -----  Pt is requesting a call back regarding the appointment he had today. Call back number is [phone]. Thx EL

## 2023-10-03 DIAGNOSIS — M54.50 CHRONIC MIDLINE LOW BACK PAIN WITHOUT SCIATICA: ICD-10-CM

## 2023-10-03 DIAGNOSIS — G89.29 CHRONIC MIDLINE LOW BACK PAIN WITHOUT SCIATICA: ICD-10-CM

## 2023-10-03 NOTE — TELEPHONE ENCOUNTER
No care due was identified.  [facility] Embedded Care Due Messages. Reference number: [id number].   10/03/2023 4:14:16 PM CDT

## 2023-10-04 ENCOUNTER — LAB VISIT (OUTPATIENT)
Dept: LAB | Facility: HOSPITAL | Age: 68
End: 2023-10-04
Attending: INTERNAL MEDICINE
Payer: MEDICARE

## 2023-10-04 DIAGNOSIS — Z94.4 S/P LIVER TRANSPLANT: ICD-10-CM

## 2023-10-04 LAB
ALBUMIN SERPL BCP-MCNC: 3.6 G/DL (ref 3.5–5.2)
ALP SERPL-CCNC: 107 U/L (ref 55–135)
ALT SERPL W/O P-5'-P-CCNC: 11 U/L (ref 10–44)
ANION GAP SERPL CALC-SCNC: 7 MMOL/L (ref 8–16)
AST SERPL-CCNC: 13 U/L (ref 10–40)
BASOPHILS # BLD AUTO: 0.08 K/UL (ref 0–0.2)
BASOPHILS NFR BLD: 1 % (ref 0–1.9)
BILIRUB SERPL-MCNC: 0.6 MG/DL (ref 0.1–1)
BUN SERPL-MCNC: 12 MG/DL (ref 8–23)
CALCIUM SERPL-MCNC: 8.7 MG/DL (ref 8.7–10.5)
CHLORIDE SERPL-SCNC: 106 MMOL/L (ref 95–110)
CO2 SERPL-SCNC: 22 MMOL/L (ref 23–29)
CREAT SERPL-MCNC: 1.1 MG/DL (ref 0.5–1.4)
DIFFERENTIAL METHOD: NORMAL
EOSINOPHIL # BLD AUTO: 0.4 K/UL (ref 0–0.5)
EOSINOPHIL NFR BLD: 5.3 % (ref 0–8)
ERYTHROCYTE [DISTWIDTH] IN BLOOD BY AUTOMATED COUNT: 13.8 % (ref 11.5–14.5)
EST. GFR  (NO RACE VARIABLE): >60 ML/MIN/1.73 M^2
GLUCOSE SERPL-MCNC: 90 MG/DL (ref 70–110)
HCT VFR BLD AUTO: 47.1 % (ref 40–54)
HGB BLD-MCNC: 15.7 G/DL (ref 14–18)
IMM GRANULOCYTES # BLD AUTO: 0.04 K/UL (ref 0–0.04)
IMM GRANULOCYTES NFR BLD AUTO: 0.5 % (ref 0–0.5)
LYMPHOCYTES # BLD AUTO: 1.8 K/UL (ref 1–4.8)
LYMPHOCYTES NFR BLD: 22.2 % (ref 18–48)
MCH RBC QN AUTO: 28.7 PG (ref 27–31)
MCHC RBC AUTO-ENTMCNC: 33.3 G/DL (ref 32–36)
MCV RBC AUTO: 86 FL (ref 82–98)
MONOCYTES # BLD AUTO: 0.7 K/UL (ref 0.3–1)
MONOCYTES NFR BLD: 8.2 % (ref 4–15)
NEUTROPHILS # BLD AUTO: 4.9 K/UL (ref 1.8–7.7)
NEUTROPHILS NFR BLD: 62.8 % (ref 38–73)
NRBC BLD-RTO: 0 /100 WBC
PLATELET # BLD AUTO: 172 K/UL (ref 150–450)
PMV BLD AUTO: 12.1 FL (ref 9.2–12.9)
POTASSIUM SERPL-SCNC: 3.9 MMOL/L (ref 3.5–5.1)
PROT SERPL-MCNC: 7.3 G/DL (ref 6–8.4)
RBC # BLD AUTO: 5.47 M/UL (ref 4.6–6.2)
SODIUM SERPL-SCNC: 135 MMOL/L (ref 136–145)
WBC # BLD AUTO: 7.88 K/UL (ref 3.9–12.7)

## 2023-10-04 PROCEDURE — 36415 COLL VENOUS BLD VENIPUNCTURE: CPT | Mod: HCNC | Performed by: INTERNAL MEDICINE

## 2023-10-04 PROCEDURE — 80053 COMPREHEN METABOLIC PANEL: CPT | Mod: HCNC | Performed by: INTERNAL MEDICINE

## 2023-10-04 PROCEDURE — 85025 COMPLETE CBC W/AUTO DIFF WBC: CPT | Mod: HCNC | Performed by: INTERNAL MEDICINE

## 2023-10-04 PROCEDURE — 80197 ASSAY OF TACROLIMUS: CPT | Mod: HCNC | Performed by: INTERNAL MEDICINE

## 2023-10-05 ENCOUNTER — HOSPITAL ENCOUNTER (OUTPATIENT)
Dept: RADIOLOGY | Facility: HOSPITAL | Age: 68
Discharge: HOME | End: 2023-10-05
Attending: PHYSICIAN ASSISTANT
Payer: MEDICARE

## 2023-10-05 ENCOUNTER — TELEPHONE (OUTPATIENT)
Dept: TRANSPLANT | Facility: CLINIC | Age: 68
End: 2023-10-05
Payer: MEDICARE

## 2023-10-05 ENCOUNTER — OFFICE VISIT (OUTPATIENT)
Dept: INTERNAL MEDICINE | Facility: CLINIC | Age: 68
End: 2023-10-05
Payer: MEDICARE

## 2023-10-05 VITALS
DIASTOLIC BLOOD PRESSURE: 80 MMHG | HEART RATE: 92 BPM | RESPIRATION RATE: 20 BRPM | BODY MASS INDEX: 34.17 KG/M2 | HEIGHT: 68 IN | WEIGHT: 225.5 LBS | TEMPERATURE: 98 F | OXYGEN SATURATION: 97 % | SYSTOLIC BLOOD PRESSURE: 138 MMHG

## 2023-10-05 DIAGNOSIS — R06.2 WHEEZING: ICD-10-CM

## 2023-10-05 DIAGNOSIS — G89.29 CHRONIC MIDLINE LOW BACK PAIN WITHOUT SCIATICA: Chronic | ICD-10-CM

## 2023-10-05 DIAGNOSIS — R10.32 LEFT LOWER QUADRANT ABDOMINAL PAIN: ICD-10-CM

## 2023-10-05 DIAGNOSIS — I10 BENIGN ESSENTIAL HTN: ICD-10-CM

## 2023-10-05 DIAGNOSIS — M54.50 CHRONIC MIDLINE LOW BACK PAIN WITHOUT SCIATICA: Chronic | ICD-10-CM

## 2023-10-05 DIAGNOSIS — R10.32 LEFT LOWER QUADRANT ABDOMINAL PAIN: Primary | ICD-10-CM

## 2023-10-05 LAB — TACROLIMUS BLD-MCNC: 3.7 NG/ML (ref 5–15)

## 2023-10-05 PROCEDURE — 3008F BODY MASS INDEX DOCD: CPT | Mod: HCNC,CPTII,S$GLB, | Performed by: PHYSICIAN ASSISTANT

## 2023-10-05 PROCEDURE — 72100 X-RAY EXAM L-S SPINE 2/3 VWS: CPT | Mod: TC,HCNC

## 2023-10-05 PROCEDURE — 99214 OFFICE O/P EST MOD 30 MIN: CPT | Mod: HCNC,S$GLB,, | Performed by: PHYSICIAN ASSISTANT

## 2023-10-05 PROCEDURE — 74019 RADEX ABDOMEN 2 VIEWS: CPT | Mod: TC,HCNC

## 2023-10-05 PROCEDURE — 3288F PR FALLS RISK ASSESSMENT DOCUMENTED: ICD-10-PCS | Mod: HCNC,CPTII,S$GLB, | Performed by: PHYSICIAN ASSISTANT

## 2023-10-05 PROCEDURE — 3075F PR MOST RECENT SYSTOLIC BLOOD PRESS GE 130-139MM HG: ICD-10-PCS | Mod: HCNC,CPTII,S$GLB, | Performed by: PHYSICIAN ASSISTANT

## 2023-10-05 PROCEDURE — G0008 ADMIN INFLUENZA VIRUS VAC: HCPCS | Mod: HCNC,S$GLB,, | Performed by: PHYSICIAN ASSISTANT

## 2023-10-05 PROCEDURE — 1159F MED LIST DOCD IN RCRD: CPT | Mod: HCNC,CPTII,S$GLB, | Performed by: PHYSICIAN ASSISTANT

## 2023-10-05 PROCEDURE — 1101F PR PT FALLS ASSESS DOC 0-1 FALLS W/OUT INJ PAST YR: ICD-10-PCS | Mod: HCNC,CPTII,S$GLB, | Performed by: PHYSICIAN ASSISTANT

## 2023-10-05 PROCEDURE — 72100 X-RAY EXAM L-S SPINE 2/3 VWS: CPT | Mod: 26,HCNC,, | Performed by: RADIOLOGY

## 2023-10-05 PROCEDURE — 1160F PR REVIEW ALL MEDS BY PRESCRIBER/CLIN PHARMACIST DOCUMENTED: ICD-10-PCS | Mod: HCNC,CPTII,S$GLB, | Performed by: PHYSICIAN ASSISTANT

## 2023-10-05 PROCEDURE — 74019 XR ABDOMEN FLAT AND ERECT: ICD-10-PCS | Mod: 26,HCNC,, | Performed by: RADIOLOGY

## 2023-10-05 PROCEDURE — 3079F DIAST BP 80-89 MM HG: CPT | Mod: HCNC,CPTII,S$GLB, | Performed by: PHYSICIAN ASSISTANT

## 2023-10-05 PROCEDURE — 71046 X-RAY EXAM CHEST 2 VIEWS: CPT | Mod: 26,HCNC,, | Performed by: RADIOLOGY

## 2023-10-05 PROCEDURE — 99214 PR OFFICE/OUTPT VISIT, EST, LEVL IV, 30-39 MIN: ICD-10-PCS | Mod: HCNC,S$GLB,, | Performed by: PHYSICIAN ASSISTANT

## 2023-10-05 PROCEDURE — 1125F PR PAIN SEVERITY QUANTIFIED, PAIN PRESENT: ICD-10-PCS | Mod: HCNC,CPTII,S$GLB, | Performed by: PHYSICIAN ASSISTANT

## 2023-10-05 PROCEDURE — 99999 PR PBB SHADOW E&M-EST. PATIENT-LVL IV: CPT | Mod: PBBFAC,HCNC,, | Performed by: PHYSICIAN ASSISTANT

## 2023-10-05 PROCEDURE — 3044F PR MOST RECENT HEMOGLOBIN A1C LEVEL <7.0%: ICD-10-PCS | Mod: HCNC,CPTII,S$GLB, | Performed by: PHYSICIAN ASSISTANT

## 2023-10-05 PROCEDURE — 99999 PR PBB SHADOW E&M-EST. PATIENT-LVL IV: ICD-10-PCS | Mod: PBBFAC,HCNC,, | Performed by: PHYSICIAN ASSISTANT

## 2023-10-05 PROCEDURE — G0008 FLU VACCINE - QUADRIVALENT - ADJUVANTED: ICD-10-PCS | Mod: HCNC,S$GLB,, | Performed by: PHYSICIAN ASSISTANT

## 2023-10-05 PROCEDURE — 1159F PR MEDICATION LIST DOCUMENTED IN MEDICAL RECORD: ICD-10-PCS | Mod: HCNC,CPTII,S$GLB, | Performed by: PHYSICIAN ASSISTANT

## 2023-10-05 PROCEDURE — 1101F PT FALLS ASSESS-DOCD LE1/YR: CPT | Mod: HCNC,CPTII,S$GLB, | Performed by: PHYSICIAN ASSISTANT

## 2023-10-05 PROCEDURE — 72100 XR LUMBAR SPINE AP AND LATERAL: ICD-10-PCS | Mod: 26,HCNC,, | Performed by: RADIOLOGY

## 2023-10-05 PROCEDURE — 3288F FALL RISK ASSESSMENT DOCD: CPT | Mod: HCNC,CPTII,S$GLB, | Performed by: PHYSICIAN ASSISTANT

## 2023-10-05 PROCEDURE — 71046 XR CHEST PA AND LATERAL: ICD-10-PCS | Mod: 26,HCNC,, | Performed by: RADIOLOGY

## 2023-10-05 PROCEDURE — 3044F HG A1C LEVEL LT 7.0%: CPT | Mod: HCNC,CPTII,S$GLB, | Performed by: PHYSICIAN ASSISTANT

## 2023-10-05 PROCEDURE — 1160F RVW MEDS BY RX/DR IN RCRD: CPT | Mod: HCNC,CPTII,S$GLB, | Performed by: PHYSICIAN ASSISTANT

## 2023-10-05 PROCEDURE — 1125F AMNT PAIN NOTED PAIN PRSNT: CPT | Mod: HCNC,CPTII,S$GLB, | Performed by: PHYSICIAN ASSISTANT

## 2023-10-05 PROCEDURE — 3079F PR MOST RECENT DIASTOLIC BLOOD PRESSURE 80-89 MM HG: ICD-10-PCS | Mod: HCNC,CPTII,S$GLB, | Performed by: PHYSICIAN ASSISTANT

## 2023-10-05 PROCEDURE — 90694 VACC AIIV4 NO PRSRV 0.5ML IM: CPT | Mod: HCNC,S$GLB,, | Performed by: PHYSICIAN ASSISTANT

## 2023-10-05 PROCEDURE — 71046 X-RAY EXAM CHEST 2 VIEWS: CPT | Mod: TC,HCNC

## 2023-10-05 PROCEDURE — 74019 RADEX ABDOMEN 2 VIEWS: CPT | Mod: 26,HCNC,, | Performed by: RADIOLOGY

## 2023-10-05 PROCEDURE — 3075F SYST BP GE 130 - 139MM HG: CPT | Mod: HCNC,CPTII,S$GLB, | Performed by: PHYSICIAN ASSISTANT

## 2023-10-05 PROCEDURE — 3008F PR BODY MASS INDEX (BMI) DOCUMENTED: ICD-10-PCS | Mod: HCNC,CPTII,S$GLB, | Performed by: PHYSICIAN ASSISTANT

## 2023-10-05 PROCEDURE — 90694 FLU VACCINE - QUADRIVALENT - ADJUVANTED: ICD-10-PCS | Mod: HCNC,S$GLB,, | Performed by: PHYSICIAN ASSISTANT

## 2023-10-05 NOTE — LETTER
October 5, 2023    [address]  [address]  [facility] [address]          Dear Lj Coleman:  MRN: [id number]    This is a follow up to your recent labs, your lab results were stable.  There are no medicine changes.  Please have your labs drawn again on 4/1/2024.      If you cannot have your labs drawn on the scheduled date, it is your responsibility to call the transplant department to reschedule.  Please call [phone] and ask to speak to Isabella CASTILLO   for all scheduling requests.     Sincerely,    Gertrudis SLOANN, RN      Your Liver Transplant Coordinator    [facility]  [address] [place] [id number]  [phone]

## 2023-10-05 NOTE — PROGRESS NOTES
"Subjective:      Patient ID: Lj Coleman is a 67 y.o. male.    Chief Complaint: Follow-up (Abd Pain)    Patient is known to me, being seen today for abdominal pain.  Reports when he moves his R leg, he has stabbing/burning pain to L lower abdomen.     CBC and CMP     Last visit Sept 2023 with myself.       Review of Systems   Constitutional:  Negative for chills, diaphoresis and fever.   HENT:  Negative for congestion, rhinorrhea and sore throat.    Respiratory:  Negative for cough, shortness of breath and wheezing.    Gastrointestinal:  Positive for abdominal pain (LLQ), constipation and diarrhea. Negative for blood in stool, nausea and vomiting.        Reports some diarrhea but not "real bowel movement" in a few days   Genitourinary:  Negative for dysuria, flank pain and hematuria.   Skin:  Negative for rash.   Neurological:  Negative for dizziness, light-headedness and headaches.       Objective:   /80   Pulse 92   Temp 97.7 °F (36.5 °C)   Resp 20   Ht 5' 8" (1.727 m)   Wt 102.3 kg (225 lb 8.5 oz)   SpO2 97%   BMI 34.29 kg/m²   Physical Exam  Constitutional:       General: He is not in acute distress.     Appearance: Normal appearance. He is well-developed. He is not ill-appearing.   HENT:      Head: Normocephalic and atraumatic.   Cardiovascular:      Rate and Rhythm: Normal rate and regular rhythm.      Heart sounds: Normal heart sounds. No murmur heard.  Pulmonary:      Effort: Pulmonary effort is normal. No respiratory distress.      Breath sounds: Normal breath sounds. No decreased breath sounds.   Abdominal:      General: Bowel sounds are normal.      Palpations: Abdomen is soft.      Tenderness: There is abdominal tenderness in the left lower quadrant.   Musculoskeletal:      Right lower leg: No edema.      Left lower leg: No edema.   Skin:     General: Skin is warm and dry.      Findings: No rash.   Psychiatric:         Speech: Speech normal.         Behavior: Behavior normal.       "   Thought Content: Thought content normal.       Assessment:      1. Left lower quadrant abdominal pain    2. Benign essential HTN       Plan:   Left lower quadrant abdominal pain  -     Urinalysis; Future; Expected date: 10/05/2023  -     Urine culture  -     X-Ray Abdomen Flat And Erect; Future; Expected date: 10/05/2023    Benign essential HTN   Controlled     Ensure adequate hydration and fiber, daily stool softeners, miralax prn     Discussed worsening signs/symptoms and when to return to clinic or go to ED.   Patient expresses understanding and agrees with treatment plan.

## 2023-10-05 NOTE — TELEPHONE ENCOUNTER
Letter sent to patient stating: Your labs have been reviewed by your Transplant physician, no action required. Next labs due 4/1/2024        ----- Message from Teresa Cartagena MD sent at 10/5/2023 10:40 AM CDT -----  Reviewed, nothing to do; repeat per routine

## 2023-10-05 NOTE — LETTER
October 5, 2023    Lj Melara [address]  [address]  [facility] [address]          Dear Lj Coleman:  MRN: [id number]    This is a follow up to your recent labs, your lab results were stable.  There are no medicine changes.  Please have your labs drawn again on 4/1/2024.      If you cannot have your labs drawn on the scheduled date, it is your responsibility to call the transplant department to reschedule.  Please call [phone] and ask to speak to Isabella CASTILLO   for all scheduling requests.     Sincerely,  Gertrudis SLOANN, RN      Your Liver Transplant Coordinator    [facility]  [address] [place] [id number]  [phone]

## 2023-10-06 ENCOUNTER — PATIENT MESSAGE (OUTPATIENT)
Dept: ADMINISTRATIVE | Facility: OTHER | Age: 68
End: 2023-10-06
Payer: MEDICARE

## 2023-10-07 DIAGNOSIS — I82.622 ACUTE DEEP VEIN THROMBOSIS (DVT) OF OTHER VEIN OF LEFT UPPER EXTREMITY: ICD-10-CM

## 2023-10-09 ENCOUNTER — TELEPHONE (OUTPATIENT)
Dept: INTERNAL MEDICINE | Facility: CLINIC | Age: 68
End: 2023-10-09
Payer: MEDICARE

## 2023-10-09 NOTE — TELEPHONE ENCOUNTER
----- Message from Karlee Moffett sent at 10/9/2023 11:32 AM CDT -----  Regarding: RX Refill  Contact: Lj  .Type:  RX Refill Request    Who Called:  Lj   Refill or New Rx: refill   RX Name and Strength: apixaban (ELIQUIS) 5 mg Tab  How is the patient currently taking it? (ex. 1XDay): Route: Take 1 tablet (5 mg total) by mouth 2 (two) times daily. - Oral  Is this a 30 day or 90 day RX: 180 tablet  Preferred Pharmacy with phone number:   Local or Mail Order: local   Ordering Provider: SUZANNE Chiang   Would the patient rather a call back or a response via [facility]? call  Best Call Back Number:[phone]  Additional Information: The patient received medication ointment that he knows nothing about.

## 2023-10-09 NOTE — TELEPHONE ENCOUNTER
Refill Routing Note   Medication(s) are not appropriate for processing by [facility] for the following reason(s):      Medication outside of protocol    [facility] action(s):  Route Care Due:  None identified            Appointments  past 12m or future 3m with PCP    Date Provider   Last Visit   5/5/2023 Isabella Sutton DO   Next Visit   12/12/2023 Isabella Sutton DO   ED visits in past 90 days: 0        Note composed:7:54 AM 10/09/2023

## 2023-10-10 RX ORDER — TRAMADOL HYDROCHLORIDE 50 MG/1
50 TABLET ORAL DAILY PRN
Qty: 30 TABLET | Refills: 0 | Status: SHIPPED | OUTPATIENT
Start: 2023-10-10

## 2023-10-11 ENCOUNTER — OFFICE VISIT (OUTPATIENT)
Dept: UROLOGY | Facility: CLINIC | Age: 68
End: 2023-10-11
Payer: MEDICARE

## 2023-10-11 VITALS
BODY MASS INDEX: 34.59 KG/M2 | SYSTOLIC BLOOD PRESSURE: 133 MMHG | DIASTOLIC BLOOD PRESSURE: 88 MMHG | HEART RATE: 95 BPM | WEIGHT: 227.5 LBS | RESPIRATION RATE: 18 BRPM

## 2023-10-11 DIAGNOSIS — Z12.5 PROSTATE CANCER SCREENING: Primary | ICD-10-CM

## 2023-10-11 PROCEDURE — 1159F PR MEDICATION LIST DOCUMENTED IN MEDICAL RECORD: ICD-10-PCS | Mod: HCNC,CPTII,S$GLB, | Performed by: NURSE PRACTITIONER

## 2023-10-11 PROCEDURE — 3044F PR MOST RECENT HEMOGLOBIN A1C LEVEL <7.0%: ICD-10-PCS | Mod: HCNC,CPTII,S$GLB, | Performed by: NURSE PRACTITIONER

## 2023-10-11 PROCEDURE — 3075F SYST BP GE 130 - 139MM HG: CPT | Mod: HCNC,CPTII,S$GLB, | Performed by: NURSE PRACTITIONER

## 2023-10-11 PROCEDURE — 3008F BODY MASS INDEX DOCD: CPT | Mod: HCNC,CPTII,S$GLB, | Performed by: NURSE PRACTITIONER

## 2023-10-11 PROCEDURE — 3075F PR MOST RECENT SYSTOLIC BLOOD PRESS GE 130-139MM HG: ICD-10-PCS | Mod: HCNC,CPTII,S$GLB, | Performed by: NURSE PRACTITIONER

## 2023-10-11 PROCEDURE — 3079F PR MOST RECENT DIASTOLIC BLOOD PRESSURE 80-89 MM HG: ICD-10-PCS | Mod: HCNC,CPTII,S$GLB, | Performed by: NURSE PRACTITIONER

## 2023-10-11 PROCEDURE — 99999 PR PBB SHADOW E&M-EST. PATIENT-LVL III: ICD-10-PCS | Mod: PBBFAC,HCNC,, | Performed by: NURSE PRACTITIONER

## 2023-10-11 PROCEDURE — 1159F MED LIST DOCD IN RCRD: CPT | Mod: HCNC,CPTII,S$GLB, | Performed by: NURSE PRACTITIONER

## 2023-10-11 PROCEDURE — 1126F PR PAIN SEVERITY QUANTIFIED, NO PAIN PRESENT: ICD-10-PCS | Mod: HCNC,CPTII,S$GLB, | Performed by: NURSE PRACTITIONER

## 2023-10-11 PROCEDURE — 3288F PR FALLS RISK ASSESSMENT DOCUMENTED: ICD-10-PCS | Mod: HCNC,CPTII,S$GLB, | Performed by: NURSE PRACTITIONER

## 2023-10-11 PROCEDURE — 1101F PT FALLS ASSESS-DOCD LE1/YR: CPT | Mod: HCNC,CPTII,S$GLB, | Performed by: NURSE PRACTITIONER

## 2023-10-11 PROCEDURE — 1101F PR PT FALLS ASSESS DOC 0-1 FALLS W/OUT INJ PAST YR: ICD-10-PCS | Mod: HCNC,CPTII,S$GLB, | Performed by: NURSE PRACTITIONER

## 2023-10-11 PROCEDURE — 99214 OFFICE O/P EST MOD 30 MIN: CPT | Mod: HCNC,S$GLB,, | Performed by: NURSE PRACTITIONER

## 2023-10-11 PROCEDURE — 3044F HG A1C LEVEL LT 7.0%: CPT | Mod: HCNC,CPTII,S$GLB, | Performed by: NURSE PRACTITIONER

## 2023-10-11 PROCEDURE — 3079F DIAST BP 80-89 MM HG: CPT | Mod: HCNC,CPTII,S$GLB, | Performed by: NURSE PRACTITIONER

## 2023-10-11 PROCEDURE — 3288F FALL RISK ASSESSMENT DOCD: CPT | Mod: HCNC,CPTII,S$GLB, | Performed by: NURSE PRACTITIONER

## 2023-10-11 PROCEDURE — 99999 PR PBB SHADOW E&M-EST. PATIENT-LVL III: CPT | Mod: PBBFAC,HCNC,, | Performed by: NURSE PRACTITIONER

## 2023-10-11 PROCEDURE — 1126F AMNT PAIN NOTED NONE PRSNT: CPT | Mod: HCNC,CPTII,S$GLB, | Performed by: NURSE PRACTITIONER

## 2023-10-11 PROCEDURE — 3008F PR BODY MASS INDEX (BMI) DOCUMENTED: ICD-10-PCS | Mod: HCNC,CPTII,S$GLB, | Performed by: NURSE PRACTITIONER

## 2023-10-11 PROCEDURE — 99214 PR OFFICE/OUTPT VISIT, EST, LEVL IV, 30-39 MIN: ICD-10-PCS | Mod: HCNC,S$GLB,, | Performed by: NURSE PRACTITIONER

## 2023-10-11 NOTE — PROGRESS NOTES
Chief Complaint:   BPH    HPI:   Patient is presenting as a follow-up to tamsulosin.  Patient was prescribed tamsulosin 6 weeks ago, however, started medication 1 week ago.  States that stream is much stronger nocturia is once nightly.  Denies adverse side effects to medication.  Urine in clinic is negative and PVR is 43 mL.  2023  Patient is 67-year-old male that is presenting with nocturia, 3-4 times nightly and a slow urinary stream.  Urine in clinic is negative and PVR is 29 mL.  Patient denies gross hematuria.  Recent PSA was normal.  Patient states that father  of bladder cancer.    Allergies:  Codeine    Medications:  has a current medication list which includes the following prescription(s): apixaban, diclofenac sodium, esomeprazole, metoprolol succinate, tacrolimus, tamsulosin, and tramadol, and the following Facility-Administered Medications: lactated ringers and sodium chloride 0.9%.    Review of Systems:  General: No fever, chills, fatigability, or weight loss.  Skin: No rashes, itching, or changes in color or texture of skin.  Chest: Denies HARDING, cyanosis, wheezing, cough, and sputum production.  Abdomen: Appetite fine. No weight loss. Denies diarrhea, abdominal pain, hematemesis, or blood in stool.  Musculoskeletal: No joint stiffness or swelling. Denies back pain.  : As above.  All other review of systems negative.    PMH:   has a past medical history of Alcohol dependence, Angina pectoris, Arthritis, Atherosclerosis of native coronary artery without angina pectoris/ LAD (2016), Back pain, Chronic hepatitis C with cirrhosis, Depression, Hepatoma, History of colon polyps (2015), History of transplantation, liver (2012), History of vertebral fracture, Hypertension, Immune deficiency disorder, Incisional hernia following transplant (2014), Liver failure (2011), Liver transplanted (2012), Obesity, Tobacco abuse (2016), Trouble in sleeping, and Vertebral fracture  (1975).    PSH:   has a past surgical history that includes Liver transplant (April 2012); Hernia repair (Right, 2013); Tonsillectomy (1958); Mouth surgery; and Colonoscopy (N/A, 1/20/2021).    FamHx: family history includes Cancer in his father, maternal grandmother, mother, and another family member; Diabetes in his maternal uncle.    SocHx:  reports that he has been smoking cigarettes. He started smoking about 35 years ago. He has a 26.8 pack-year smoking history. He has never used smokeless tobacco. He reports that he does not drink alcohol and does not use drugs.      Physical Exam:  General: A&Ox3, no apparent distress, no deformities  Neck: No masses, normal thyroid  Lungs: normal inspiration, no use of accessory muscles  Heart: normal pulse, no arrhythmias  Abdomen: Soft, NT, ND, no masses, no hernias, no hepatosplenomegaly  Lymphatic: Neck and groin nodes negative  Skin: The skin is warm and dry. No jaundice.  Labs/Studies:    Latest Reference Range & Units 04/10/15 09:44 06/06/23 11:45   Prostate Specific Antigen 0.00 - 4.00 ng/mL 0.41 0.55       Impression/Plan:  BPH  Patient reports an improvement in BPH symptoms with an one-week, without adverse side effects.  Will remain on tamsulosin and return to clinic in 8 months for TRACY and PSA.     DISPLAY PLAN FREE TEXT

## 2023-10-13 ENCOUNTER — TELEPHONE (OUTPATIENT)
Dept: TRANSPLANT | Facility: CLINIC | Age: 68
End: 2023-10-13
Payer: MEDICARE

## 2023-10-18 ENCOUNTER — OFFICE VISIT (OUTPATIENT)
Dept: INTERNAL MEDICINE | Facility: CLINIC | Age: 68
End: 2023-10-18
Payer: MEDICARE

## 2023-10-18 VITALS
RESPIRATION RATE: 20 BRPM | TEMPERATURE: 96 F | HEIGHT: 68 IN | BODY MASS INDEX: 34.38 KG/M2 | WEIGHT: 226.88 LBS | OXYGEN SATURATION: 97 % | SYSTOLIC BLOOD PRESSURE: 126 MMHG | DIASTOLIC BLOOD PRESSURE: 70 MMHG | HEART RATE: 72 BPM

## 2023-10-18 DIAGNOSIS — I10 ESSENTIAL HYPERTENSION: Primary | ICD-10-CM

## 2023-10-18 DIAGNOSIS — Z13.6 SCREENING FOR AAA (ABDOMINAL AORTIC ANEURYSM): ICD-10-CM

## 2023-10-18 PROCEDURE — 3078F PR MOST RECENT DIASTOLIC BLOOD PRESSURE < 80 MM HG: ICD-10-PCS | Mod: HCNC,CPTII,S$GLB, | Performed by: INTERNAL MEDICINE

## 2023-10-18 PROCEDURE — 3044F HG A1C LEVEL LT 7.0%: CPT | Mod: HCNC,CPTII,S$GLB, | Performed by: INTERNAL MEDICINE

## 2023-10-18 PROCEDURE — 1125F AMNT PAIN NOTED PAIN PRSNT: CPT | Mod: HCNC,CPTII,S$GLB, | Performed by: INTERNAL MEDICINE

## 2023-10-18 PROCEDURE — 99999 PR PBB SHADOW E&M-EST. PATIENT-LVL IV: CPT | Mod: PBBFAC,HCNC,, | Performed by: INTERNAL MEDICINE

## 2023-10-18 PROCEDURE — 3288F FALL RISK ASSESSMENT DOCD: CPT | Mod: HCNC,CPTII,S$GLB, | Performed by: INTERNAL MEDICINE

## 2023-10-18 PROCEDURE — 1160F RVW MEDS BY RX/DR IN RCRD: CPT | Mod: HCNC,CPTII,S$GLB, | Performed by: INTERNAL MEDICINE

## 2023-10-18 PROCEDURE — 99999 PR PBB SHADOW E&M-EST. PATIENT-LVL IV: ICD-10-PCS | Mod: PBBFAC,HCNC,, | Performed by: INTERNAL MEDICINE

## 2023-10-18 PROCEDURE — 1159F PR MEDICATION LIST DOCUMENTED IN MEDICAL RECORD: ICD-10-PCS | Mod: HCNC,CPTII,S$GLB, | Performed by: INTERNAL MEDICINE

## 2023-10-18 PROCEDURE — 3078F DIAST BP <80 MM HG: CPT | Mod: HCNC,CPTII,S$GLB, | Performed by: INTERNAL MEDICINE

## 2023-10-18 PROCEDURE — 3044F PR MOST RECENT HEMOGLOBIN A1C LEVEL <7.0%: ICD-10-PCS | Mod: HCNC,CPTII,S$GLB, | Performed by: INTERNAL MEDICINE

## 2023-10-18 PROCEDURE — 1101F PR PT FALLS ASSESS DOC 0-1 FALLS W/OUT INJ PAST YR: ICD-10-PCS | Mod: HCNC,CPTII,S$GLB, | Performed by: INTERNAL MEDICINE

## 2023-10-18 PROCEDURE — 1160F PR REVIEW ALL MEDS BY PRESCRIBER/CLIN PHARMACIST DOCUMENTED: ICD-10-PCS | Mod: HCNC,CPTII,S$GLB, | Performed by: INTERNAL MEDICINE

## 2023-10-18 PROCEDURE — 99213 OFFICE O/P EST LOW 20 MIN: CPT | Mod: HCNC,S$GLB,, | Performed by: INTERNAL MEDICINE

## 2023-10-18 PROCEDURE — 99213 PR OFFICE/OUTPT VISIT, EST, LEVL III, 20-29 MIN: ICD-10-PCS | Mod: HCNC,S$GLB,, | Performed by: INTERNAL MEDICINE

## 2023-10-18 PROCEDURE — 1101F PT FALLS ASSESS-DOCD LE1/YR: CPT | Mod: HCNC,CPTII,S$GLB, | Performed by: INTERNAL MEDICINE

## 2023-10-18 PROCEDURE — 1159F MED LIST DOCD IN RCRD: CPT | Mod: HCNC,CPTII,S$GLB, | Performed by: INTERNAL MEDICINE

## 2023-10-18 PROCEDURE — 1125F PR PAIN SEVERITY QUANTIFIED, PAIN PRESENT: ICD-10-PCS | Mod: HCNC,CPTII,S$GLB, | Performed by: INTERNAL MEDICINE

## 2023-10-18 PROCEDURE — 3008F BODY MASS INDEX DOCD: CPT | Mod: HCNC,CPTII,S$GLB, | Performed by: INTERNAL MEDICINE

## 2023-10-18 PROCEDURE — 3288F PR FALLS RISK ASSESSMENT DOCUMENTED: ICD-10-PCS | Mod: HCNC,CPTII,S$GLB, | Performed by: INTERNAL MEDICINE

## 2023-10-18 PROCEDURE — 3074F SYST BP LT 130 MM HG: CPT | Mod: HCNC,CPTII,S$GLB, | Performed by: INTERNAL MEDICINE

## 2023-10-18 PROCEDURE — 3074F PR MOST RECENT SYSTOLIC BLOOD PRESSURE < 130 MM HG: ICD-10-PCS | Mod: HCNC,CPTII,S$GLB, | Performed by: INTERNAL MEDICINE

## 2023-10-18 PROCEDURE — 3008F PR BODY MASS INDEX (BMI) DOCUMENTED: ICD-10-PCS | Mod: HCNC,CPTII,S$GLB, | Performed by: INTERNAL MEDICINE

## 2023-10-18 NOTE — PROGRESS NOTES
Lj Coleman  67 y.o. White male    HPI: Presents to the clinic to follow up on hypertension. He is participating in the Digital Hypertension program and his b/p readings have been consistently higher than 140/90. His office readings have been in the 120's-130's/70's-80's.   He is on metoprolol but it was prescribed for PVC's/palpitations.     Past Medical History:   Diagnosis Date    Alcohol dependence     stopped 2012    Angina pectoris     Arthritis     back    Atherosclerosis of native coronary artery without angina pectoris/ LAD 09/08/2016    Back pain     Chronic hepatitis C with cirrhosis     Depression     Hepatoma     Prior to liver transplant    History of colon polyps 09/09/2015    History of transplantation, liver 04/2012    History of vertebral fracture     Hypertension     Immune deficiency disorder     Incisional hernia following transplant 04/09/2014    Liver failure 11/2011    Related to chemotherapy for hepatoma    Liver transplanted 04/2012    Obesity     PVCs (premature ventricular contractions)     Tobacco abuse 09/09/2016    Trouble in sleeping     Vertebral fracture 1975         Current Outpatient Medications:     apixaban (ELIQUIS) 5 mg Tab, Take 1 tablet (5 mg total) by mouth 2 (two) times daily., Disp: 180 tablet, Rfl: 0    diclofenac sodium (VOLTAREN) 1 % Gel, Apply 2 g topically 4 (four) times daily. for 10 days, Disp: 100 g, Rfl: 0    esomeprazole (NEXIUM) 40 MG capsule, Take 1 capsule (40 mg total) by mouth once daily., Disp: 90 capsule, Rfl: 3    metoprolol succinate (TOPROL-XL) 25 MG 24 hr tablet, Take 1 tablet (25 mg total) by mouth once daily., Disp: 30 tablet, Rfl: 11    tacrolimus (PROGRAF) 0.5 MG Cap, TAKE 2 CAPSULES EVERY 12 HOURS, Disp: 360 capsule, Rfl: 3    tamsulosin (FLOMAX) 0.4 mg Cap, Take 1 capsule (0.4 mg total) by mouth once daily., Disp: 30 capsule, Rfl: 11    traMADoL (ULTRAM) 50 mg tablet, Take 1 tablet (50 mg total) by mouth daily as needed for Pain.,  Disp: 30 tablet, Rfl: 0  No current facility-administered medications for this visit.    Facility-Administered Medications Ordered in Other Visits:     lactated ringers infusion, , Intravenous, Continuous, Linwood Ellsworth MD, New Bag at 01/20/21 0734    sodium chloride 0.9% flush 2 mL, 2 mL, Intravenous, PRN, Linwood Ellsworth MD    Review of patient's allergies indicates:   Allergen Reactions    Codeine Other (See Comments)     Upset stomach       ROS: Denies dizziness, headache, chest pain, shortness of breath or edema    PE: Vital signs reviewed   GEN: Alert and oriented, no acute distress  HEART: Regular rate   LUNGS: Respirations unlabored    ASSESSMENT/PLAN:  Lj was seen today for hypertension.    Diagnoses and all orders for this visit:    Essential hypertension  -     recommend bringing b/p cuff in to the clinic to be checked    Screening for AAA (abdominal aortic aneurysm)  -     US Abdominal Aorta; Future    Schedule nurse visit.

## 2023-10-25 ENCOUNTER — TELEPHONE (OUTPATIENT)
Dept: INTERNAL MEDICINE | Facility: CLINIC | Age: 68
End: 2023-10-25
Payer: MEDICARE

## 2023-10-25 NOTE — TELEPHONE ENCOUNTER
----- Message from Isabella Sutton DO sent at 10/24/2023  8:12 PM CDT -----  It's a one time screening test for any male over the age of 65 who has ever smoked. It is looking for an aneurysm in the abdominal aorta (large blood vessel coming from the heart that can be detrimental if it ruptures).   We discussed this at his visit.   ----- Message -----  From: Albert Flores  Sent: 10/24/2023   2:18 PM CDT  To: Isabella Sutton DO; Herman Clark    Good Afternoon,    Mr. Coleman would like someone to call him regarding his ultrasound scheduled for tomorrow. He has questions and would like to know why this exam is needed.   Please call him at your earliest convenience.     Thank you,  Albert  Radiology Dept.

## 2023-11-03 ENCOUNTER — CLINICAL SUPPORT (OUTPATIENT)
Dept: INTERNAL MEDICINE | Facility: CLINIC | Age: 68
End: 2023-11-03
Payer: MEDICARE

## 2023-11-03 VITALS
HEART RATE: 100 BPM | RESPIRATION RATE: 20 BRPM | HEIGHT: 68 IN | OXYGEN SATURATION: 98 % | BODY MASS INDEX: 34.49 KG/M2 | DIASTOLIC BLOOD PRESSURE: 82 MMHG | SYSTOLIC BLOOD PRESSURE: 124 MMHG

## 2023-11-03 DIAGNOSIS — I10 ESSENTIAL HYPERTENSION: Primary | ICD-10-CM

## 2023-11-03 PROCEDURE — 99999 PR PBB SHADOW E&M-EST. PATIENT-LVL II: ICD-10-PCS | Mod: PBBFAC,HCNC,,

## 2023-11-03 PROCEDURE — 99999 PR PBB SHADOW E&M-EST. PATIENT-LVL II: CPT | Mod: PBBFAC,HCNC,,

## 2023-12-04 ENCOUNTER — HOSPITAL ENCOUNTER (OUTPATIENT)
Dept: RADIOLOGY | Facility: HOSPITAL | Age: 68
Discharge: HOME | End: 2023-12-04
Attending: INTERNAL MEDICINE
Payer: MEDICARE

## 2023-12-04 DIAGNOSIS — I82.622 ACUTE DEEP VEIN THROMBOSIS (DVT) OF OTHER VEIN OF LEFT UPPER EXTREMITY: ICD-10-CM

## 2023-12-04 DIAGNOSIS — Z13.6 SCREENING FOR AAA (ABDOMINAL AORTIC ANEURYSM): ICD-10-CM

## 2023-12-04 PROCEDURE — 76775 US EXAM ABDO BACK WALL LIM: CPT | Mod: 26,HCNC,, | Performed by: RADIOLOGY

## 2023-12-04 PROCEDURE — 76775 US EXAM ABDO BACK WALL LIM: CPT | Mod: TC,HCNC

## 2023-12-04 PROCEDURE — 76775 US ABDOMINAL AORTA: ICD-10-PCS | Mod: 26,HCNC,, | Performed by: RADIOLOGY

## 2023-12-04 RX ORDER — INFLUENZA A VIRUS A/VICTORIA/4897/2022 IVR-238 (H1N1) ANTIGEN (FORMALDEHYDE INACTIVATED), INFLUENZA A VIRUS A/DARWIN/6/2021 IVR-227 (H3N2) ANTIGEN (FORMALDEHYDE INACTIVATED), INFLUENZA B VIRUS B/AUSTRIA/1359417/2021 BVR-26 ANTIGEN (FORMALDEHYDE INACTIVATED), INFLUENZA B VIRUS B/PHUKET/3073/2013 BVR-1B ANTIGEN (FORMALDEHYDE INACTIVATED) 15; 15; 15; 15 UG/.5ML; UG/.5ML; UG/.5ML; UG/.5ML
INJECTION, SUSPENSION INTRAMUSCULAR
COMMUNITY
Start: 2023-10-05

## 2023-12-04 NOTE — TELEPHONE ENCOUNTER
Refill Routing Note   Medication(s) are not appropriate for processing by [facility] for the following reason(s):        Outside of protocol    [facility] action(s):  Route               Appointments  past 12m or future 3m with PCP    Date Provider   Last Visit   10/18/2023 Isabella Sutton DO   Next Visit   12/12/2023 Isabella Sutton DO   ED visits in past 90 days: 0        Note composed:11:46 AM 12/04/2023

## 2023-12-12 ENCOUNTER — OFFICE VISIT (OUTPATIENT)
Dept: INTERNAL MEDICINE | Facility: CLINIC | Age: 68
End: 2023-12-12
Payer: MEDICARE

## 2023-12-12 VITALS
OXYGEN SATURATION: 99 % | TEMPERATURE: 96 F | RESPIRATION RATE: 20 BRPM | SYSTOLIC BLOOD PRESSURE: 124 MMHG | DIASTOLIC BLOOD PRESSURE: 72 MMHG | WEIGHT: 222.69 LBS | HEIGHT: 68 IN | BODY MASS INDEX: 33.75 KG/M2 | HEART RATE: 95 BPM

## 2023-12-12 DIAGNOSIS — I10 ESSENTIAL HYPERTENSION: Primary | ICD-10-CM

## 2023-12-12 DIAGNOSIS — K21.9 GASTROESOPHAGEAL REFLUX DISEASE WITHOUT ESOPHAGITIS: ICD-10-CM

## 2023-12-12 DIAGNOSIS — I82.622 DEEP VEIN THROMBOSIS (DVT) OF LEFT UPPER EXTREMITY, UNSPECIFIED CHRONICITY, UNSPECIFIED VEIN: ICD-10-CM

## 2023-12-12 DIAGNOSIS — Z12.11 COLON CANCER SCREENING: ICD-10-CM

## 2023-12-12 DIAGNOSIS — F17.210 CIGARETTE NICOTINE DEPENDENCE WITHOUT COMPLICATION: ICD-10-CM

## 2023-12-12 PROCEDURE — 1101F PR PT FALLS ASSESS DOC 0-1 FALLS W/OUT INJ PAST YR: ICD-10-PCS | Mod: HCNC,CPTII,S$GLB, | Performed by: INTERNAL MEDICINE

## 2023-12-12 PROCEDURE — 3288F PR FALLS RISK ASSESSMENT DOCUMENTED: ICD-10-PCS | Mod: HCNC,CPTII,S$GLB, | Performed by: INTERNAL MEDICINE

## 2023-12-12 PROCEDURE — 3044F PR MOST RECENT HEMOGLOBIN A1C LEVEL <7.0%: ICD-10-PCS | Mod: HCNC,CPTII,S$GLB, | Performed by: INTERNAL MEDICINE

## 2023-12-12 PROCEDURE — 3044F HG A1C LEVEL LT 7.0%: CPT | Mod: HCNC,CPTII,S$GLB, | Performed by: INTERNAL MEDICINE

## 2023-12-12 PROCEDURE — 1125F AMNT PAIN NOTED PAIN PRSNT: CPT | Mod: HCNC,CPTII,S$GLB, | Performed by: INTERNAL MEDICINE

## 2023-12-12 PROCEDURE — 3008F BODY MASS INDEX DOCD: CPT | Mod: HCNC,CPTII,S$GLB, | Performed by: INTERNAL MEDICINE

## 2023-12-12 PROCEDURE — 3074F SYST BP LT 130 MM HG: CPT | Mod: HCNC,CPTII,S$GLB, | Performed by: INTERNAL MEDICINE

## 2023-12-12 PROCEDURE — 1125F PR PAIN SEVERITY QUANTIFIED, PAIN PRESENT: ICD-10-PCS | Mod: HCNC,CPTII,S$GLB, | Performed by: INTERNAL MEDICINE

## 2023-12-12 PROCEDURE — 1160F RVW MEDS BY RX/DR IN RCRD: CPT | Mod: HCNC,CPTII,S$GLB, | Performed by: INTERNAL MEDICINE

## 2023-12-12 PROCEDURE — 99999 PR PBB SHADOW E&M-EST. PATIENT-LVL V: CPT | Mod: PBBFAC,HCNC,, | Performed by: INTERNAL MEDICINE

## 2023-12-12 PROCEDURE — 3288F FALL RISK ASSESSMENT DOCD: CPT | Mod: HCNC,CPTII,S$GLB, | Performed by: INTERNAL MEDICINE

## 2023-12-12 PROCEDURE — 3074F PR MOST RECENT SYSTOLIC BLOOD PRESSURE < 130 MM HG: ICD-10-PCS | Mod: HCNC,CPTII,S$GLB, | Performed by: INTERNAL MEDICINE

## 2023-12-12 PROCEDURE — 3078F PR MOST RECENT DIASTOLIC BLOOD PRESSURE < 80 MM HG: ICD-10-PCS | Mod: HCNC,CPTII,S$GLB, | Performed by: INTERNAL MEDICINE

## 2023-12-12 PROCEDURE — 3008F PR BODY MASS INDEX (BMI) DOCUMENTED: ICD-10-PCS | Mod: HCNC,CPTII,S$GLB, | Performed by: INTERNAL MEDICINE

## 2023-12-12 PROCEDURE — 1160F PR REVIEW ALL MEDS BY PRESCRIBER/CLIN PHARMACIST DOCUMENTED: ICD-10-PCS | Mod: HCNC,CPTII,S$GLB, | Performed by: INTERNAL MEDICINE

## 2023-12-12 PROCEDURE — 99214 OFFICE O/P EST MOD 30 MIN: CPT | Mod: HCNC,S$GLB,, | Performed by: INTERNAL MEDICINE

## 2023-12-12 PROCEDURE — 3078F DIAST BP <80 MM HG: CPT | Mod: HCNC,CPTII,S$GLB, | Performed by: INTERNAL MEDICINE

## 2023-12-12 PROCEDURE — 99214 PR OFFICE/OUTPT VISIT, EST, LEVL IV, 30-39 MIN: ICD-10-PCS | Mod: HCNC,S$GLB,, | Performed by: INTERNAL MEDICINE

## 2023-12-12 PROCEDURE — 1101F PT FALLS ASSESS-DOCD LE1/YR: CPT | Mod: HCNC,CPTII,S$GLB, | Performed by: INTERNAL MEDICINE

## 2023-12-12 PROCEDURE — 99999 PR PBB SHADOW E&M-EST. PATIENT-LVL V: ICD-10-PCS | Mod: PBBFAC,HCNC,, | Performed by: INTERNAL MEDICINE

## 2023-12-12 PROCEDURE — 1159F PR MEDICATION LIST DOCUMENTED IN MEDICAL RECORD: ICD-10-PCS | Mod: HCNC,CPTII,S$GLB, | Performed by: INTERNAL MEDICINE

## 2023-12-12 PROCEDURE — 1159F MED LIST DOCD IN RCRD: CPT | Mod: HCNC,CPTII,S$GLB, | Performed by: INTERNAL MEDICINE

## 2023-12-12 RX ORDER — ESOMEPRAZOLE MAGNESIUM 40 MG/1
40 CAPSULE, DELAYED RELEASE ORAL DAILY
Qty: 90 CAPSULE | Refills: 3 | Status: SHIPPED | OUTPATIENT
Start: 2023-12-12

## 2023-12-12 NOTE — PROGRESS NOTES
Lj LOVE Jared  67 y.o. White male  [id number]    Chief Complaint:  Chief Complaint   Patient presents with    Follow-up       History of Present Illness:  HTN--stable.   DVT--left arm. Has been on apixaban since April. DVT was due to a PICC line.   GERD--stable on Nexium. Needs refills.   He smokes, but has cut back.     Laboratory:  Lab Results   Component Value Date    WBC 7.88 10/04/2023    HGB 15.7 10/04/2023    HCT 47.1 10/04/2023     10/04/2023    CHOL 181 05/05/2023    TRIG 122 05/05/2023    HDL 30 (L) 05/05/2023    ALT 11 10/04/2023    AST 13 10/04/2023     (L) 10/04/2023    K 3.9 10/04/2023     10/04/2023    CREATININE 1.1 10/04/2023    BUN 12 10/04/2023    CO2 22 (L) 10/04/2023    TSH 1.168 06/06/2023    PSA 0.55 06/06/2023    INR 1.0 04/04/2023    HGBA1C 5.4 05/05/2023       History:  Past Medical History:   Diagnosis Date    Alcohol dependence     stopped 2012    Angina pectoris     Arthritis     back    Atherosclerosis of native coronary artery without angina pectoris/ LAD 09/08/2016    Back pain     Chronic hepatitis C with cirrhosis     Depression     Hepatoma     Prior to liver transplant    History of colon polyps 09/09/2015    History of transplantation, liver 04/2012    History of vertebral fracture     Hypertension     Immune deficiency disorder     Incisional hernia following transplant 04/09/2014    Liver failure 11/2011    Related to chemotherapy for hepatoma    Liver transplanted 04/2012    Obesity     PVCs (premature ventricular contractions)     Tobacco abuse 09/09/2016    Trouble in sleeping     Vertebral fracture 1975       Medications:  Current Outpatient Medications on File Prior to Visit   Medication Sig Dispense Refill    apixaban (ELIQUIS) 5 mg Tab Take 1 tablet (5 mg total) by mouth 2 (two) times daily. 180 tablet 0    diclofenac sodium (VOLTAREN) 1 % Gel Apply 2 g topically 4 (four) times daily. for 10 days 100 g 0    FLUAD QUAD 2023-24,65Y UP,,PF, 60 mcg  (15 mcg x 4)/0.5 mL Syrg       tacrolimus (PROGRAF) 0.5 MG Cap TAKE 2 CAPSULES EVERY 12 HOURS 360 capsule 3    tamsulosin (FLOMAX) 0.4 mg Cap Take 1 capsule (0.4 mg total) by mouth once daily. 30 capsule 11    traMADoL (ULTRAM) 50 mg tablet Take 1 tablet (50 mg total) by mouth daily as needed for Pain. 30 tablet 0     Current Facility-Administered Medications on File Prior to Visit   Medication Dose Route Frequency Provider Last Rate Last Admin    lactated ringers infusion   Intravenous Continuous Linwood Ellsworth MD   New Bag at 01/20/21 0734    sodium chloride 0.9% flush 2 mL  2 mL Intravenous PRN Linwood Ellsworth MD           Allergies:  Review of patient's allergies indicates:   Allergen Reactions    Codeine Other (See Comments)     Upset stomach       Review of Systems   Respiratory:  Negative for shortness of breath.    Cardiovascular:  Negative for chest pain.   Gastrointestinal:  Negative for blood in stool.   Genitourinary:  Negative for dysuria.   Neurological:  Negative for dizziness and headaches.       Exam:  Vitals:    12/12/23 1016   BP: 124/72   Pulse: 95   Resp: 20   Temp: 96 °F (35.6 °C)     Weight: 101 kg (222 lb 10.6 oz)   Body mass index is 33.86 kg/m².      Physical Exam  Vitals reviewed.   Constitutional:       General: He is not in acute distress.     Appearance: He is well-developed. He is not ill-appearing.   Eyes:      General: No scleral icterus.  Cardiovascular:      Rate and Rhythm: Normal rate and regular rhythm.      Heart sounds: Normal heart sounds.   Pulmonary:      Effort: Pulmonary effort is normal. No respiratory distress.      Breath sounds: Normal breath sounds.   Skin:     General: Skin is warm and dry.   Neurological:      Mental Status: He is alert and oriented to person, place, and time.   Psychiatric:         Behavior: Behavior normal.         Assessment:  The primary encounter diagnosis was Essential hypertension. Diagnoses of Deep vein thrombosis (DVT) of left upper extremity,  unspecified chronicity, unspecified vein, Gastroesophageal reflux disease without esophagitis, Cigarette nicotine dependence without complication, and Colon cancer screening were also pertinent to this visit.    Plan:  Essential hypertension  -     continue  metoprolol    Deep vein thrombosis (DVT) of left upper extremity, unspecified chronicity, unspecified vein  -      Upper Extremity Veins Left; Future; Expected date: 12/12/2023    Gastroesophageal reflux disease without esophagitis  -     refill esomeprazole (NEXIUM) 40 MG capsule; Take 1 capsule (40 mg total) by mouth once daily.  Dispense: 90 capsule; Refill: 3    Cigarette nicotine dependence without complication  -     recommend cessation    Colon cancer screening  -     Ambulatory referral/consult to Endo Procedure ; Future; Expected date: 12/13/2023    Follow up in about 6 months (around 6/12/2024).

## 2023-12-13 ENCOUNTER — HOSPITAL ENCOUNTER (OUTPATIENT)
Dept: PREADMISSION TESTING | Facility: HOSPITAL | Age: 68
Discharge: HOME | End: 2023-12-13
Attending: INTERNAL MEDICINE
Payer: MEDICARE

## 2023-12-13 DIAGNOSIS — Z12.11 COLON CANCER SCREENING: ICD-10-CM

## 2023-12-18 RX ORDER — METOPROLOL SUCCINATE 25 MG/1
25 TABLET, EXTENDED RELEASE ORAL
Qty: 90 TABLET | Refills: 3 | Status: SHIPPED | OUTPATIENT
Start: 2023-12-18

## 2023-12-19 ENCOUNTER — HOSPITAL ENCOUNTER (OUTPATIENT)
Dept: PREADMISSION TESTING | Facility: HOSPITAL | Age: 68
Discharge: HOME | End: 2023-12-19
Attending: INTERNAL MEDICINE
Payer: MEDICARE

## 2023-12-28 ENCOUNTER — HOSPITAL ENCOUNTER (OUTPATIENT)
Dept: RADIOLOGY | Facility: HOSPITAL | Age: 68
Discharge: HOME | End: 2023-12-28
Attending: INTERNAL MEDICINE
Payer: MEDICARE

## 2023-12-28 DIAGNOSIS — I82.622 DEEP VEIN THROMBOSIS (DVT) OF LEFT UPPER EXTREMITY, UNSPECIFIED CHRONICITY, UNSPECIFIED VEIN: ICD-10-CM

## 2023-12-28 PROCEDURE — 93971 EXTREMITY STUDY: CPT | Mod: 26,HCNC,LT, | Performed by: RADIOLOGY

## 2023-12-28 PROCEDURE — 93971 US UPPER EXTREMITY VEINS LEFT: ICD-10-PCS | Mod: 26,HCNC,LT, | Performed by: RADIOLOGY

## 2023-12-28 PROCEDURE — 93971 EXTREMITY STUDY: CPT | Mod: TC,HCNC,LT

## 2024-01-10 ENCOUNTER — OFFICE VISIT (OUTPATIENT)
Dept: PAIN MEDICINE | Facility: CLINIC | Age: 69
End: 2024-01-10
Payer: MEDICARE

## 2024-01-10 VITALS
RESPIRATION RATE: 17 BRPM | BODY MASS INDEX: 33.75 KG/M2 | HEIGHT: 68 IN | HEART RATE: 90 BPM | SYSTOLIC BLOOD PRESSURE: 125 MMHG | WEIGHT: 222.69 LBS | DIASTOLIC BLOOD PRESSURE: 70 MMHG

## 2024-01-10 DIAGNOSIS — G89.29 CHRONIC MIDLINE LOW BACK PAIN WITHOUT SCIATICA: Chronic | ICD-10-CM

## 2024-01-10 DIAGNOSIS — M54.50 CHRONIC MIDLINE LOW BACK PAIN WITHOUT SCIATICA: Chronic | ICD-10-CM

## 2024-01-10 DIAGNOSIS — M47.896 OTHER SPONDYLOSIS, LUMBAR REGION: Primary | ICD-10-CM

## 2024-01-10 PROCEDURE — 1125F AMNT PAIN NOTED PAIN PRSNT: CPT | Mod: HCNC,CPTII,S$GLB, | Performed by: PAIN MEDICINE

## 2024-01-10 PROCEDURE — 99204 OFFICE O/P NEW MOD 45 MIN: CPT | Mod: HCNC,S$GLB,, | Performed by: PAIN MEDICINE

## 2024-01-10 PROCEDURE — 3074F SYST BP LT 130 MM HG: CPT | Mod: HCNC,CPTII,S$GLB, | Performed by: PAIN MEDICINE

## 2024-01-10 PROCEDURE — 3078F DIAST BP <80 MM HG: CPT | Mod: HCNC,CPTII,S$GLB, | Performed by: PAIN MEDICINE

## 2024-01-10 PROCEDURE — 3008F BODY MASS INDEX DOCD: CPT | Mod: HCNC,CPTII,S$GLB, | Performed by: PAIN MEDICINE

## 2024-01-10 PROCEDURE — 99999 PR PBB SHADOW E&M-EST. PATIENT-LVL III: CPT | Mod: PBBFAC,HCNC,, | Performed by: PAIN MEDICINE

## 2024-01-10 PROCEDURE — 1159F MED LIST DOCD IN RCRD: CPT | Mod: HCNC,CPTII,S$GLB, | Performed by: PAIN MEDICINE

## 2024-01-10 NOTE — PROGRESS NOTES
New Patient Chronic Pain Note (Initial Visit)    Referring Physician: Mago Chiang, *    PCP: Isabella Sutton DO    Chief Complaint:   Chief Complaint   Patient presents with    Low-back Pain    Leg Pain        SUBJECTIVE:    01/10/2024 Initial visit:  Lj Coleman is a 68 y.o. male who presents to the clinic for the evaluation of chronic low back pain that started 1974 after he sustained a compression fracture in his lower back.  It healed but started having lower back pain since then that gradually worsened over the years.It is axial without radiation to the legs.  Felt as constant ache.  It was worse with moving around and in the mornings and with repetitive flexion.  It is improved with medications.  Denies tingling numbness or weakness in the legs except for occasionally.  Denies loss of bowel or bladder control.  He has chronic prostatism.  He rates it today at 4/10.    Treatment History:  Was treated with oxycodone in 2012 and became dependent on opiates and had to be treated with Suboxone.  Obviously, he does not want narcotics anymore.  Little relief with tramadol with his PCP.  Partial relief with Voltaren gel.  He had only 1 session of physical therapy so far.  Had 50% relief for a few days only after lumbar SHEYLA with Dr. Mcgarry in [place] about 4 years ago.    Radiological workup: No MRI or CT of the spine for more than 4 years.    Pain Disability Index Review:         1/10/2024    12:02 PM   Last 3 PDI Scores   Pain Disability Index (PDI) 28        report:  Reviewed and consistent with medication use as prescribed.      Past Medical History:   Diagnosis Date    Alcohol dependence     stopped 2012    Angina pectoris     Arthritis     back    Atherosclerosis of native coronary artery without angina pectoris/ LAD 09/08/2016    Back pain     Chronic hepatitis C with cirrhosis     Depression     Hepatoma     Prior to liver transplant    History of colon polyps 09/09/2015    History of  transplantation, liver 04/2012    History of vertebral fracture     Hypertension     Immune deficiency disorder     Incisional hernia following transplant 04/09/2014    Liver failure 11/2011    Related to chemotherapy for hepatoma    Liver transplanted 04/2012    Obesity     PVCs (premature ventricular contractions)     Tobacco abuse 09/09/2016    Trouble in sleeping     Vertebral fracture 1975     Past Surgical History:   Procedure Laterality Date    COLONOSCOPY N/A 1/20/2021    Procedure: COLONOSCOPY;  Surgeon: Emerson Helm MD;  Location[facility];  Service: Endoscopy;  Laterality: N/A;    HERNIA REPAIR Right 2013    incisional    LIVER TRANSPLANT  April 2012    MOUTH SURGERY      TONSILLECTOMY  1958     Social History     Socioeconomic History    Marital status:     Highest education level: 10th grade   Tobacco Use    Smoking status: Every Day     Current packs/day: 0.75     Average packs/day: 0.8 packs/day for 36.0 years (27.0 ttl pk-yrs)     Types: Cigarettes     Start date: 1988    Smokeless tobacco: Never   Substance and Sexual Activity    Alcohol use: No     Alcohol/week: 0.0 standard drinks of alcohol     Comment: Quit alcohol after some alcohol abuse, prior to his liver transplant    Drug use: No    Sexual activity: Not Currently     Social Determinants of Health     Financial Resource Strain: Medium Risk (5/30/2023)    Overall Financial Resource Strain (CARDIA)     Difficulty of Paying Living Expenses: Somewhat hard   Food Insecurity: No Food Insecurity (5/30/2023)    Hunger Vital Sign     Worried About Running Out of Food in the Last Year: Never true     Ran Out of Food in the Last Year: Never true   Transportation Needs: No Transportation Needs (5/30/2023)    PRAPARE - Transportation     Lack of Transportation (Medical): No     Lack of Transportation (Non-Medical): No   Physical Activity: Insufficiently Active (5/30/2023)    Exercise Vital Sign     Days of Exercise per Week: 1 day      Minutes of Exercise per Session: 10 min   Stress: No Stress Concern Present (5/30/2023)    [place] [employer] - Occupational Stress Questionnaire     Feeling of Stress : Not at all   Social Connections: Moderately Isolated (5/30/2023)    Social Connection and Isolation Panel [NHANES]     Frequency of Communication with Friends and Family: More than three times a week     Frequency of Social Gatherings with Friends and Family: Once a week     Attends Taoism Services: 1 to 4 times per year     Active Member of Clubs or Organizations: No     Marital Status:    Housing Stability: Low Risk  (5/30/2023)    Housing Stability Vital Sign     Unable to Pay for Housing in the Last Year: No     Number of Places Lived in the Last Year: 1     Unstable Housing in the Last Year: No     Family History   Problem Relation Age of Onset    Cancer Mother         lung    Cancer Father         bladder    Diabetes Maternal Uncle     Cancer Maternal Grandmother         uterine?    Cancer Other     Heart disease Neg Hx     Kidney disease Neg Hx     Stroke Neg Hx        Review of patient's allergies indicates:   Allergen Reactions    Codeine Other (See Comments)     Upset stomach       Current Outpatient Medications   Medication Sig    apixaban (ELIQUIS) 5 mg Tab Take 1 tablet (5 mg total) by mouth 2 (two) times daily.    esomeprazole (NEXIUM) 40 MG capsule Take 1 capsule (40 mg total) by mouth once daily.    FLUAD QUAD 2023-24,65Y UP,,PF, 60 mcg (15 mcg x 4)/0.5 mL Syrg     metoprolol succinate (TOPROL-XL) 25 MG 24 hr tablet TAKE 1 TABLET EVERY DAY    tacrolimus (PROGRAF) 0.5 MG Cap TAKE 2 CAPSULES EVERY 12 HOURS    tamsulosin (FLOMAX) 0.4 mg Cap Take 1 capsule (0.4 mg total) by mouth once daily.    traMADoL (ULTRAM) 50 mg tablet Take 1 tablet (50 mg total) by mouth daily as needed for Pain.    diclofenac sodium (VOLTAREN) 1 % Gel Apply 2 g topically 4 (four) times daily. for 10 days     No current  "facility-administered medications for this visit.     Facility-Administered Medications Ordered in Other Visits   Medication    lactated ringers infusion    sodium chloride 0.9% flush 2 mL       Review of Systems     GENERAL:  No weight loss, malaise or fevers.  HEENT:   No recent changes in vision or hearing  NECK:  Negative for lumps, no difficulty with swallowing.  RESPIRATORY:  Negative for cough, wheezing or shortness of breath, patient denies any recent URI.  CARDIOVASCULAR:  Negative for chest pain, leg swelling or palpitations.  GI:  Negative for abdominal discomfort, blood in stools or black stools or change in bowel habits.  MUSCULOSKELETAL:  See HPI.  SKIN:  Negative for lesions, rash, and itching.  PSYCH:  No mood disorder or recent psychosocial stressors.  Patients sleep is not disturbed secondary to pain.  HEMATOLOGY/LYMPHOLOGY:  Negative for prolonged bleeding, bruising easily or swollen nodes.  Patient is not currently taking any anti-coagulants  NEURO:   No history of headaches, syncope, paralysis, seizures or tremors.  All other reviewed and negative other than HPI.    OBJECTIVE:    /70   Pulse 90   Resp 17   Ht 5' 8" (1.727 m)   Wt 101 kg (222 lb 10.6 oz)   BMI 33.86 kg/m²         Physical Exam    GENERAL: Well appearing, in no acute distress, alert and oriented x3.  PSYCH:  Mood and affect appropriate.  SKIN: Skin color, texture, turgor normal, no rashes or lesions.  HEAD/FACE:  Normocephalic, atraumatic.  CV: No edema.  PULM: No evidence of respiratory difficulty, symmetric chest rise.  GI:  Abdomen soft and non-tender.    GAIT: Normal Slow   LUMBAR SPINE:   Palpation: Tenderness over bilateral facet joints and paraspinous muscles. No trigger points.  ROM: Pain with flexion, extension and rotation.  Straight leg raising is negative.  SI JOINTS: bilateral SI joint, no tenderness, negative Placido's   HIPS: normal and nonpainful ROM of both hip joints.  NEURO:   MOTOR: Bilateral lower " extremity muscle strength is normal. No atrophy or tone abnormalities are noted.  SENSORY: No loss of sensation in L3 through S1 dermatomes bilaterally.  DTR's: Reflexes are physiologic and symmetric in lower extremities. No clonus.    IMAGING:   IMAGING      XR LUMBAR SPINE  10/5/2023     FINDINGS:  No fracture or other acute disease is seen in the lumbar spine.  Slight curvature of the thoracolumbar spine.  Moderate to severe multilevel degenerative disc disease with osteophytosis. Mild to moderate lower lumbar facet arthropathy. The visualized sacrum and sacroiliac joints appear intact.  Possible pseudoarticulation of the L5 transverse process and the sacrum, potential source of pain.        Impression:   No fracture or other acute disease is seen in the lumbar spine.  Possible pseudoarticulation of the L5 transverse process and the sacrum, potential source of pain. Degenerative changes, as above.     Finalized on: 10/5/2023 9:47 AM By:  Myles Mcghee MD      LABS:  Lab Results   Component Value Date    WBC 7.88 10/04/2023    HGB 15.7 10/04/2023    HCT 47.1 10/04/2023    MCV 86 10/04/2023     10/04/2023       CMP  Sodium   Date Value Ref Range Status   10/04/2023 135 (L) 136 - 145 mmol/L Final     Potassium   Date Value Ref Range Status   10/04/2023 3.9 3.5 - 5.1 mmol/L Final     Chloride   Date Value Ref Range Status   10/04/2023 106 95 - 110 mmol/L Final     CO2   Date Value Ref Range Status   10/04/2023 22 (L) 23 - 29 mmol/L Final     Glucose   Date Value Ref Range Status   10/04/2023 90 70 - 110 mg/dL Final     BUN   Date Value Ref Range Status   10/04/2023 12 8 - 23 mg/dL Final     Creatinine   Date Value Ref Range Status   10/04/2023 1.1 0.5 - 1.4 mg/dL Final     Calcium   Date Value Ref Range Status   10/04/2023 8.7 8.7 - 10.5 mg/dL Final     Total Protein   Date Value Ref Range Status   10/04/2023 7.3 6.0 - 8.4 g/dL Final     Albumin   Date Value Ref Range Status   10/04/2023 3.6 3.5 - 5.2 g/dL  Final     Total Bilirubin   Date Value Ref Range Status   10/04/2023 0.6 0.1 - 1.0 mg/dL Final     Comment:     For infants and newborns, interpretation of results should be based  on gestational age, weight and in agreement with clinical  observations.    Premature Infant recommended reference ranges:  Up to 24 hours.............<8.0 mg/dL  Up to 48 hours............<12.0 mg/dL  3-5 days..................<15.0 mg/dL  6-29 days.................<15.0 mg/dL       Alkaline Phosphatase   Date Value Ref Range Status   10/04/2023 107 55 - 135 U/L Final     AST   Date Value Ref Range Status   10/04/2023 13 10 - 40 U/L Final     ALT   Date Value Ref Range Status   10/04/2023 11 10 - 44 U/L Final     Anion Gap   Date Value Ref Range Status   10/04/2023 7 (L) 8 - 16 mmol/L Final     eGFR if    Date Value Ref Range Status   06/08/2022 >60.0 >60 mL/min/1.73 m^2 Final     eGFR if non    Date Value Ref Range Status   06/08/2022 >60.0 >60 mL/min/1.73 m^2 Final     Comment:     Calculation used to obtain the estimated glomerular filtration  rate (eGFR) is the CKD-EPI equation.          Lab Results   Component Value Date    HGBA1C 5.4 05/05/2023         Assessment & Plan      1. Other spondylosis, lumbar region    2. Chronic midline low back pain without sciatica  -     Ambulatory referral/consult to Pain Clinic    I instructed him to resume physical therapy, finish it and continue home exercises.  We discussed diagnostic lumbar facet blocks, and if positive, RFA.  Briefly discussed potential risks and he is scared of doing those.  He prefers to hold off and see how he does with physical therapy.  We will avoid opiates given his history of opiate dependence and alcoholism in the past.  Regarding medication therapy, we will avoid NSAIDs and Tylenol given his liver disease and status post liver transplant.  Return to clinic if not better with physical therapy and home exercises at which time we may  revisit addressing his facet joints.  Reviewed old records from treating physicians and imaging         - Counseled patient regarding the importance of activity modification  - This condition does not require this patient to take time off of work, and the primary goal of our Pain Management services is to improve the patient's functional capacity.  - Patient Questions: Answered all of the patient's questions regarding diagnosis, therapy, and treatment    The above plan and management options were discussed at length with patient. Patient is in agreement with the above and verbalized understanding.      Hao Carrasco MD  Interventional Pain Management  [facility]    Disclaimer:  This note was prepared using voice recognition system and is likely to have sound alike errors that may have been overlooked even after proof reading.  Please call me with any questions

## 2024-01-27 DIAGNOSIS — D84.9 IMMUNOSUPPRESSION: ICD-10-CM

## 2024-01-27 DIAGNOSIS — Z94.4 LIVER TRANSPLANTED: ICD-10-CM

## 2024-01-29 RX ORDER — TACROLIMUS 0.5 MG/1
CAPSULE ORAL
Qty: 360 CAPSULE | Refills: 3 | Status: SHIPPED | OUTPATIENT
Start: 2024-01-29

## 2024-02-08 ENCOUNTER — TELEPHONE (OUTPATIENT)
Dept: TRANSPLANT | Facility: CLINIC | Age: 69
End: 2024-02-08

## 2024-02-08 NOTE — TELEPHONE ENCOUNTER
Writer returned call asking how to address a missed dose and to get back on schedule. Questions answered.      ----- Message from Teresa Cartagena MD sent at 2/8/2024  1:35 PM CST -----  Regarding: FW: Lab  Please assist  ----- Message -----  From: Jacques Villalpando  Sent: 2/7/2024  12:22 PM CST  To: Teresa Cartagena MD  Subject: Lab                                              Good morning, pt has questions about a walk in lab. Please call pt back at [phone]. Thanks!

## 2024-02-16 DIAGNOSIS — I82.622 ACUTE DEEP VEIN THROMBOSIS (DVT) OF OTHER VEIN OF LEFT UPPER EXTREMITY: ICD-10-CM

## 2024-02-16 RX ORDER — APIXABAN 5 MG/1
5 TABLET, FILM COATED ORAL 2 TIMES DAILY
Qty: 180 TABLET | Refills: 0 | Status: SHIPPED | OUTPATIENT
Start: 2024-02-16 | End: 2024-04-29

## 2024-02-29 PROBLEM — Z86.2 HISTORY OF THROMBOCYTOPENIA: Status: ACTIVE | Noted: 2017-06-06

## 2024-02-29 PROBLEM — I82.722 CHRONIC DEEP VEIN THROMBOSIS (DVT) OF LEFT UPPER EXTREMITY: Status: ACTIVE | Noted: 2023-04-04

## 2024-03-06 ENCOUNTER — TELEPHONE (OUTPATIENT)
Dept: INTERNAL MEDICINE | Facility: CLINIC | Age: 69
End: 2024-03-06

## 2024-03-06 ENCOUNTER — OFFICE VISIT (OUTPATIENT)
Dept: URGENT CARE | Facility: CLINIC | Age: 69
End: 2024-03-06
Payer: MEDICARE

## 2024-03-06 VITALS
DIASTOLIC BLOOD PRESSURE: 90 MMHG | HEIGHT: 69 IN | RESPIRATION RATE: 18 BRPM | HEART RATE: 91 BPM | WEIGHT: 219.88 LBS | OXYGEN SATURATION: 96 % | BODY MASS INDEX: 32.57 KG/M2 | SYSTOLIC BLOOD PRESSURE: 149 MMHG | TEMPERATURE: 98 F

## 2024-03-06 DIAGNOSIS — J06.9 VIRAL URI: ICD-10-CM

## 2024-03-06 DIAGNOSIS — R05.9 COUGH, UNSPECIFIED TYPE: Primary | ICD-10-CM

## 2024-03-06 LAB
CTP QC/QA: YES
CTP QC/QA: YES
POC MOLECULAR INFLUENZA A AGN: NEGATIVE
POC MOLECULAR INFLUENZA B AGN: NEGATIVE
SARS-COV-2 AG RESP QL IA.RAPID: NEGATIVE

## 2024-03-06 PROCEDURE — 87811 SARS-COV-2 COVID19 W/OPTIC: CPT | Mod: QW,S$GLB,,

## 2024-03-06 PROCEDURE — 87502 INFLUENZA DNA AMP PROBE: CPT | Mod: QW,S$GLB,,

## 2024-03-06 PROCEDURE — 99213 OFFICE O/P EST LOW 20 MIN: CPT | Mod: S$GLB,,,

## 2024-03-06 RX ORDER — BENZONATATE 100 MG/1
100 CAPSULE ORAL 3 TIMES DAILY PRN
Qty: 30 CAPSULE | Refills: 0 | Status: SHIPPED | OUTPATIENT
Start: 2024-03-06 | End: 2024-03-07 | Stop reason: SDUPTHER

## 2024-03-06 RX ORDER — PREDNISONE 20 MG/1
40 TABLET ORAL
Status: COMPLETED | OUTPATIENT
Start: 2024-03-06 | End: 2024-03-06

## 2024-03-06 RX ORDER — PREDNISONE 20 MG/1
40 TABLET ORAL DAILY
Qty: 8 TABLET | Refills: 0 | Status: SHIPPED | OUTPATIENT
Start: 2024-03-06 | End: 2024-03-07 | Stop reason: SDUPTHER

## 2024-03-06 RX ORDER — FLUTICASONE PROPIONATE 50 MCG
1 SPRAY, SUSPENSION (ML) NASAL DAILY
Qty: 9.9 ML | Refills: 0 | Status: SHIPPED | OUTPATIENT
Start: 2024-03-06 | End: 2024-03-07 | Stop reason: SDUPTHER

## 2024-03-06 RX ADMIN — PREDNISONE 40 MG: 20 TABLET ORAL at 06:03

## 2024-03-06 NOTE — PROGRESS NOTES
"Subjective:      Patient ID: Lj Coleman is a 68 y.o. male.    Vitals:  height is 5' 9.29" (1.76 m) and weight is 99.7 kg (219 lb 14.4 oz). His oral temperature is 98.1 °F (36.7 °C). His blood pressure is 149/90 (abnormal) and his pulse is 91. His respiration is 18.     Chief Complaint: Nasal Congestion    67yo male pt presents to the clinic for nasal congestion, cough, body aches. Pt reports symptom onset x 2 days. No treatments. Pain 7/10.         Constitution: Negative for chills and fever.   HENT:  Positive for congestion and postnasal drip. Negative for ear pain and sore throat.    Respiratory:  Positive for cough. Negative for shortness of breath and wheezing.    Gastrointestinal:  Negative for nausea and vomiting.      Objective:     Physical Exam   Constitutional: He is oriented to person, place, and time. He appears well-developed. He is cooperative.  Non-toxic appearance. He does not appear ill. No distress.   HENT:   Head: Normocephalic and atraumatic.   Ears:   Right Ear: Hearing, tympanic membrane, external ear and ear canal normal.   Left Ear: Hearing, tympanic membrane, external ear and ear canal normal.   Nose: Congestion present. No mucosal edema, rhinorrhea or nasal deformity. Right sinus exhibits no maxillary sinus tenderness and no frontal sinus tenderness. Left sinus exhibits no maxillary sinus tenderness and no frontal sinus tenderness.   Mouth/Throat: Uvula is midline, oropharynx is clear and moist and mucous membranes are normal. No trismus in the jaw. No uvula swelling. No oropharyngeal exudate, posterior oropharyngeal edema or posterior oropharyngeal erythema.   Eyes: Lids are normal.   Neck: Trachea normal and phonation normal. Neck supple. No edema present. No erythema present. No neck rigidity present.   Cardiovascular: Normal rate, regular rhythm, normal heart sounds and normal pulses.   No murmur heard.  Pulmonary/Chest: Effort normal and breath sounds normal. No respiratory " distress. He has no decreased breath sounds. He has no wheezes. He has no rhonchi.   Abdominal: Normal appearance.   Musculoskeletal: Normal range of motion.         General: Normal range of motion.   Neurological: He is alert and oriented to person, place, and time. He exhibits normal muscle tone.   Skin: Skin is warm, dry, intact and not diaphoretic.   Psychiatric: His speech is normal and behavior is normal. Judgment and thought content normal.   Nursing note and vitals reviewed.    Results for orders placed or performed in visit on 03/06/24   SARS Coronavirus 2 Antigen, POCT Manual Read   Result Value Ref Range    SARS Coronavirus 2 Antigen Negative Negative     Acceptable Yes    POCT Influenza A/B MOLECULAR   Result Value Ref Range    POC Molecular Influenza A Ag Negative Negative, Not Reported    POC Molecular Influenza B Ag Negative Negative, Not Reported     Acceptable Yes        Assessment:     1. Cough, unspecified type    2. Viral URI        Plan:       Cough, unspecified type  -     SARS Coronavirus 2 Antigen, POCT Manual Read  -     POCT Influenza A/B MOLECULAR    Viral URI    Other orders  -     benzonatate (TESSALON) 100 MG capsule; Take 1 capsule (100 mg total) by mouth 3 (three) times daily as needed for Cough.  Dispense: 30 capsule; Refill: 0  -     predniSONE (DELTASONE) 20 MG tablet; Take 2 tablets (40 mg total) by mouth once daily. for 4 days  Dispense: 8 tablet; Refill: 0  -     fluticasone propionate (FLONASE) 50 mcg/actuation nasal spray; 1 spray (50 mcg total) by Each Nostril route once daily.  Dispense: 9.9 mL; Refill: 0  -     predniSONE tablet 40 mg      Patient no acute distress.  Vital signs reassuring.  Reviewed negative flu and COVID test results in detail with patient.  Discussed that symptoms are likely due to viral upper respiratory infection.  Discussed OTC medications in detail and prescription sent for Tessalon Perles, oral steroids and flonase. Pt  was requesting steroid injection, discussed SE in detail and discussed oral steroids instead. Discussed the importance of further evaluation if symptoms worsen. Patient stated verbal understanding.    Patient Instructions     Patient Instructions   PLEASE READ YOUR DISCHARGE INSTRUCTIONS ENTIRELY AS IT CONTAINS IMPORTANT INFORMATION.     Please drink plenty of fluids.     Please get plenty of rest.     Please return here or go to the Emergency Department for any concerns or worsening of condition.     Please take an over the counter antihistamine medication (allegra/Claritin/Zyrtec) of your choice as directed.     If you do have Hypertension or palpitations, it is safe to take Coricidin HBP for relief of sinus symptoms.     If not allergic, please take over the counter Tylenol (Acetaminophen) and/or Motrin (Ibuprofen) as directed for control of pain and/or fever.  Please follow up with your primary care doctor or specialist as needed.     Sore throat recommendations: Warm fluids, warm salt water gargles, throat lozenges, tea, honey, soup, rest, hydration.     Use over the counter flonase: one spray each nostril twice daily OR two sprays each nostril once daily.      If you  smoke, please stop smoking.     Please return or see your primary care doctor if you develop new or worsening symptoms.      Please arrange follow up with your primary medical clinic as soon as possible. You must understand that you've received an Urgent Care treatment only and that you may be released before all of your medical problems are known or treated. You, the patient, will arrange for follow up as instructed. If your symptoms worsen or fail to improve you should go to the Emergency Room.

## 2024-03-06 NOTE — TELEPHONE ENCOUNTER
Informed patient that he will need to be evaluated before any medication can be sent to the pharmacy. Patient denies first available. I informed patient he can go to his nearest urgent care if he would like to be seen today. I gave patient [place] location info.

## 2024-03-06 NOTE — TELEPHONE ENCOUNTER
----- Message from Beatris Harrington sent at 3/6/2024  3:45 PM CST -----  Type:  Needs Medical Advice    Who Called: pt   Symptoms (please be specific): sinus cold    How long has patient had these symptoms:  1 day   Pharmacy name and phone #:  [facility] - [place], [place] - [address]   Phone: [phone]  Fax: [phone]        Would the patient rather a call back or a response via [facility]? Call   Best Call Back Number: [phone]  Additional Information:

## 2024-03-07 ENCOUNTER — TELEPHONE (OUTPATIENT)
Dept: URGENT CARE | Facility: CLINIC | Age: 69
End: 2024-03-07

## 2024-03-07 RX ORDER — PREDNISONE 20 MG/1
40 TABLET ORAL DAILY
Qty: 8 TABLET | Refills: 0 | Status: CANCELLED | OUTPATIENT
Start: 2024-03-07 | End: 2024-03-11

## 2024-03-07 RX ORDER — FLUTICASONE PROPIONATE 50 MCG
1 SPRAY, SUSPENSION (ML) NASAL DAILY
Qty: 9.9 ML | Refills: 0 | Status: CANCELLED | OUTPATIENT
Start: 2024-03-07

## 2024-03-07 RX ORDER — FLUTICASONE PROPIONATE 50 MCG
1 SPRAY, SUSPENSION (ML) NASAL DAILY
Qty: 9.9 ML | Refills: 0 | Status: SHIPPED | OUTPATIENT
Start: 2024-03-07

## 2024-03-07 RX ORDER — BENZONATATE 100 MG/1
100 CAPSULE ORAL 3 TIMES DAILY PRN
Qty: 30 CAPSULE | Refills: 0 | Status: SHIPPED | OUTPATIENT
Start: 2024-03-07 | End: 2024-03-21

## 2024-03-07 RX ORDER — BENZONATATE 100 MG/1
100 CAPSULE ORAL 3 TIMES DAILY PRN
Qty: 30 CAPSULE | Refills: 0 | Status: CANCELLED | OUTPATIENT
Start: 2024-03-07 | End: 2024-03-17

## 2024-03-07 RX ORDER — PREDNISONE 20 MG/1
40 TABLET ORAL DAILY
Qty: 8 TABLET | Refills: 0 | Status: SHIPPED | OUTPATIENT
Start: 2024-03-07 | End: 2024-03-11

## 2024-03-07 NOTE — TELEPHONE ENCOUNTER
Pt called regarding Rx sent to his mail order pharmacy yesterday.    He needs them resent to local [facility] on [address] and [facility].  New orders are pending.

## 2024-03-07 NOTE — PATIENT INSTRUCTIONS
Patient Instructions   PLEASE READ YOUR DISCHARGE INSTRUCTIONS ENTIRELY AS IT CONTAINS IMPORTANT INFORMATION.     Please drink plenty of fluids.     Please get plenty of rest.     Please return here or go to the Emergency Department for any concerns or worsening of condition.     Please take an over the counter antihistamine medication (allegra/Claritin/Zyrtec) of your choice as directed.     If you do have Hypertension or palpitations, it is safe to take Coricidin HBP for relief of sinus symptoms.     If not allergic, please take over the counter Tylenol (Acetaminophen) and/or Motrin (Ibuprofen) as directed for control of pain and/or fever.  Please follow up with your primary care doctor or specialist as needed.     Sore throat recommendations: Warm fluids, warm salt water gargles, throat lozenges, tea, honey, soup, rest, hydration.     Use over the counter flonase: one spray each nostril twice daily OR two sprays each nostril once daily.      If you  smoke, please stop smoking.     Please return or see your primary care doctor if you develop new or worsening symptoms.      Please arrange follow up with your primary medical clinic as soon as possible. You must understand that you've received an Urgent Care treatment only and that you may be released before all of your medical problems are known or treated. You, the patient, will arrange for follow up as instructed. If your symptoms worsen or fail to improve you should go to the Emergency Room.

## 2024-03-13 ENCOUNTER — TELEPHONE (OUTPATIENT)
Dept: PULMONOLOGY | Facility: CLINIC | Age: 69
End: 2024-03-13

## 2024-03-13 ENCOUNTER — OFFICE VISIT (OUTPATIENT)
Dept: INTERNAL MEDICINE | Facility: CLINIC | Age: 69
End: 2024-03-13
Payer: MEDICARE

## 2024-03-13 VITALS
HEART RATE: 84 BPM | HEIGHT: 69 IN | WEIGHT: 216.94 LBS | SYSTOLIC BLOOD PRESSURE: 116 MMHG | DIASTOLIC BLOOD PRESSURE: 82 MMHG | BODY MASS INDEX: 32.13 KG/M2 | OXYGEN SATURATION: 95 %

## 2024-03-13 DIAGNOSIS — D69.2 SENILE PURPURA: ICD-10-CM

## 2024-03-13 DIAGNOSIS — Z94.4 LIVER TRANSPLANTED: ICD-10-CM

## 2024-03-13 DIAGNOSIS — Z85.05 HISTORY OF LIVER CANCER: ICD-10-CM

## 2024-03-13 DIAGNOSIS — R41.3 MEMORY DIFFICULTIES: ICD-10-CM

## 2024-03-13 DIAGNOSIS — H91.90 DECREASED HEARING, UNSPECIFIED LATERALITY: ICD-10-CM

## 2024-03-13 DIAGNOSIS — I25.10 ATHEROSCLEROSIS OF NATIVE CORONARY ARTERY OF NATIVE HEART WITHOUT ANGINA PECTORIS: ICD-10-CM

## 2024-03-13 DIAGNOSIS — R63.4 WEIGHT LOSS: ICD-10-CM

## 2024-03-13 DIAGNOSIS — E66.9 OBESITY (BMI 30.0-34.9): ICD-10-CM

## 2024-03-13 DIAGNOSIS — Z72.0 TOBACCO USE: ICD-10-CM

## 2024-03-13 DIAGNOSIS — Z00.00 ENCOUNTER FOR PREVENTIVE HEALTH EXAMINATION: Primary | ICD-10-CM

## 2024-03-13 DIAGNOSIS — R20.2 NUMBNESS AND TINGLING: ICD-10-CM

## 2024-03-13 DIAGNOSIS — F43.23 ADJUSTMENT DISORDER WITH MIXED ANXIETY AND DEPRESSED MOOD: ICD-10-CM

## 2024-03-13 DIAGNOSIS — I82.722 CHRONIC DEEP VEIN THROMBOSIS (DVT) OF LEFT UPPER EXTREMITY, UNSPECIFIED VEIN: ICD-10-CM

## 2024-03-13 DIAGNOSIS — D84.9 IMMUNOSUPPRESSION: ICD-10-CM

## 2024-03-13 DIAGNOSIS — Z59.9 FINANCIAL DIFFICULTIES: ICD-10-CM

## 2024-03-13 DIAGNOSIS — I10 ESSENTIAL HYPERTENSION: ICD-10-CM

## 2024-03-13 DIAGNOSIS — J44.9 COPD SUGGESTED BY INITIAL EVALUATION: ICD-10-CM

## 2024-03-13 DIAGNOSIS — R20.0 NUMBNESS AND TINGLING: ICD-10-CM

## 2024-03-13 PROCEDURE — 3288F FALL RISK ASSESSMENT DOCD: CPT | Mod: HCNC,CPTII,S$GLB, | Performed by: NURSE PRACTITIONER

## 2024-03-13 PROCEDURE — 1170F FXNL STATUS ASSESSED: CPT | Mod: HCNC,CPTII,S$GLB, | Performed by: NURSE PRACTITIONER

## 2024-03-13 PROCEDURE — 3079F DIAST BP 80-89 MM HG: CPT | Mod: HCNC,CPTII,S$GLB, | Performed by: NURSE PRACTITIONER

## 2024-03-13 PROCEDURE — 99999 PR PBB SHADOW E&M-EST. PATIENT-LVL V: CPT | Mod: PBBFAC,HCNC,, | Performed by: NURSE PRACTITIONER

## 2024-03-13 PROCEDURE — 1101F PT FALLS ASSESS-DOCD LE1/YR: CPT | Mod: HCNC,CPTII,S$GLB, | Performed by: NURSE PRACTITIONER

## 2024-03-13 PROCEDURE — 1125F AMNT PAIN NOTED PAIN PRSNT: CPT | Mod: HCNC,CPTII,S$GLB, | Performed by: NURSE PRACTITIONER

## 2024-03-13 PROCEDURE — G0439 PPPS, SUBSEQ VISIT: HCPCS | Mod: HCNC,S$GLB,, | Performed by: NURSE PRACTITIONER

## 2024-03-13 PROCEDURE — 1160F RVW MEDS BY RX/DR IN RCRD: CPT | Mod: HCNC,CPTII,S$GLB, | Performed by: NURSE PRACTITIONER

## 2024-03-13 PROCEDURE — 1159F MED LIST DOCD IN RCRD: CPT | Mod: HCNC,CPTII,S$GLB, | Performed by: NURSE PRACTITIONER

## 2024-03-13 PROCEDURE — 3074F SYST BP LT 130 MM HG: CPT | Mod: HCNC,CPTII,S$GLB, | Performed by: NURSE PRACTITIONER

## 2024-03-13 SDOH — SOCIAL DETERMINANTS OF HEALTH (SDOH): PROBLEM RELATED TO HOUSING AND ECONOMIC CIRCUMSTANCES, UNSPECIFIED: Z59.9

## 2024-03-13 NOTE — PATIENT INSTRUCTIONS
Counseling and Referral of Other Preventative  (Italic type indicates deductible and co-insurance are waived)    Patient Name: Lj Coleman  Today's Date: 3/13/2024    Health Maintenance       Date Due Completion Date    TETANUS VACCINE Never done ---    Shingles Vaccine (1 of 2) Never done ---    High Dose Statin Never done ---    RSV Vaccine (Age 60+ and Pregnant patients) (1 - 1-dose 60+ series) Never done ---    COVID-19 Vaccine (4 - 2023-24 season) 03/13/2025 (Originally 9/1/2023) 11/15/2021    LDCT Lung Screen 04/04/2024 4/4/2023    Lipid Panel 05/05/2024 5/5/2023    Hemoglobin A1c (Diabetic Prevention Screening) 05/05/2026 5/5/2023    Colorectal Cancer Screening 01/20/2031 1/20/2021        Orders Placed This Encounter   Procedures    Ambulatory referral/consult to Audiology    Ambulatory referral/consult to Smoking Cessation Program    Ambulatory referral/consult to Outpatient Case Management       The following information is provided to all patients.  This information is to help you find resources for any of the problems found today that may be affecting your health:                  Living healthy guide: www.[facility].[place].gov      Understanding Diabetes: www.diabetes.org      Eating healthy: www.cdc.gov/healthyweight      CDC home safety checklist: www.cdc.gov/steadi/patient.html      Agency on Aging: www.goea.[place].[facility]      Alcoholics anonymous (AA): www.aa.org      Physical Activity: www.elena.nih.gov/xa0zzhg      Tobacco use: www.quitwithusla.org

## 2024-03-13 NOTE — PROGRESS NOTES
"  Lj Coleman presented for a  Medicare AWV and comprehensive Health Risk Assessment today. The following components were reviewed and updated:    Medical history  Family History  Social history  Allergies and Current Medications  Health Risk Assessment  Health Maintenance  Care Team         ** See Completed Assessments for Annual Wellness Visit within the encounter summary.**         The following assessments were completed:  Living Situation  CAGE  Depression Screening  Timed Get Up and Go  Whisper Test  Cognitive Function Screening  Nutrition Screening  ADL Screening  PAQ Screening      Opioid documentation:      Patient does not have a current opioid prescription.     Review for Substance Use Disorders: Patient does not use substance  per chart         Vitals:    03/13/24 1224   BP: 116/82   Pulse: 84   SpO2: 95%   Weight: 98.4 kg (216 lb 14.9 oz)   Height: 5' 9" (1.753 m)     Body mass index is 32.04 kg/m².  Physical Exam  Vitals and nursing note reviewed.   Constitutional:       Appearance: He is well-developed.   HENT:      Head: Normocephalic.      Right Ear: There is impacted cerumen.   Cardiovascular:      Rate and Rhythm: Normal rate and regular rhythm.      Heart sounds: Normal heart sounds.   Pulmonary:      Effort: Pulmonary effort is normal. No respiratory distress.      Breath sounds: Normal breath sounds.   Abdominal:      Palpations: Abdomen is soft. There is no mass.      Tenderness: There is no abdominal tenderness.   Musculoskeletal:         General: Normal range of motion.   Skin:     General: Skin is warm and dry.   Neurological:      Mental Status: He is alert and oriented to person, place, and time.      Motor: No abnormal muscle tone.   Psychiatric:         Speech: Speech normal.         Behavior: Behavior normal.               Diagnoses and health risks identified today and associated recommendations/orders:    1. Encounter for preventive health examination  Reports he is at his " respiratory baseline  Declines COVID vaccine  He will discuss other vaccines with transplant.   Message sent to pulmonary to please contact to schedule      2. Liver transplanted  Continue current treatment plan as previously prescribed with your  hepatologist.     3. Immunosuppression  Continue current treatment plan as previously prescribed with your  hepatologist.     4. Chronic deep vein thrombosis (DVT) of left upper extremity, unspecified vein  Continue current treatment plan as previously prescribed with your  pcp    5. Senile purpura  Chronic   See pic for documentation  Advised to follow up with PCP for further evaluation and recommendations. Patient expressed understanding.       6. Adjustment disorder with mixed anxiety and depressed mood  PHQ 2-0  Continue current treatment plan as previously prescribed with your  pcp     7. Atherosclerosis of native coronary artery of native heart without angina pectoris  Continue current treatment plan as previously prescribed with your  cardiologist.     8. Essential hypertension  Continue current treatment plan as previously prescribed with your  pcp and cardiologist.     9. History of liver cancer  Continue current treatment plan as previously prescribed with your  hepatologist.     10. Tobacco use  Discussed the importance of smoking cessation and advised to quit smoking. Patient expressed understanding.   - Ambulatory referral/consult to Smoking Cessation Program; Future    11. Obesity (BMI 30.0-34.9)  Encouraged healthy diet and exercise as tolerated to help bring BMI into normal range.    Continue current treatment plan as previously prescribed with your  pcp     12. COPD suggested by initial evaluation  Advised to follow up with pulmonary for further evaluation and recommendations. Patient expressed understanding.      13. Decreased hearing, unspecified laterality  Abnormal whisper test  Cerumen impaction noted on exam  Advise to follow up with PCP/ENT ( ENT  prior to audio) for further evaluation and recommendations. Patient expressed understanding.    - Ambulatory referral/consult to Audiology; Future  Scheduled    14. Financial difficulties  [facility] financial resources information page given to patient.    - Ambulatory referral/consult to Outpatient Case Management    15. Numbness and tingling  BUE  Chronic  Advised to follow up with PCP for further evaluation and recommendations. Patient expressed understanding.      16. Memory difficulties  Reported per pt  Normal cognitive function screening.     Advised to follow up with PCP for further evaluation and recommendations. Patient expressed understanding.      17. Weight loss  Attributes to daughter was sick during last 3 months/stress.   Advised to monitor weight, keep a log, and follow up with PCP IMMEDIATELY for unplanned weight loss. He expressed understanding.    Advised to follow up with PCP for further evaluation and recommendations. Patient expressed understanding.        Provided Lj with a 5-10 year written screening schedule and personal prevention plan. Recommendations were developed using the USPSTF age appropriate recommendations. Education, counseling, and referrals were provided as needed. After Visit Summary printed and given to patient which includes a list of additional screenings\tests needed.    Follow up in about 1 year (around 3/13/2025) for awv.    Aicha Kirkland NP  I offered to discuss advanced care planning, including how to pick a person who would make decisions for you if you were unable to make them for yourself, called a health care power of , and what kind of decisions you might make such as use of life sustaining treatments such as ventilators and tube feeding when faced with a life limiting illness recorded on a living will that they will need to know. (How you want to be cared for as you near the end of your natural life)     X Patient is interested in learning more  about how to make advanced directives.  I provided them paperwork and offered to discuss this with them.

## 2024-03-13 NOTE — TELEPHONE ENCOUNTER
----- Message from Aicha Kirkland NP sent at 3/13/2024 12:21 PM CDT -----  Pt would like a call back to discuss scheduling apt.   Thanks,   Aicha

## 2024-03-21 ENCOUNTER — OFFICE VISIT (OUTPATIENT)
Dept: OTOLARYNGOLOGY | Facility: CLINIC | Age: 69
End: 2024-03-21
Payer: MEDICARE

## 2024-03-21 ENCOUNTER — CLINICAL SUPPORT (OUTPATIENT)
Dept: AUDIOLOGY | Facility: CLINIC | Age: 69
End: 2024-03-21
Payer: MEDICARE

## 2024-03-21 ENCOUNTER — TELEPHONE (OUTPATIENT)
Dept: SMOKING CESSATION | Facility: CLINIC | Age: 69
End: 2024-03-21

## 2024-03-21 VITALS — BODY MASS INDEX: 32.2 KG/M2 | TEMPERATURE: 98 F | HEIGHT: 69 IN | WEIGHT: 217.38 LBS

## 2024-03-21 DIAGNOSIS — H61.21 IMPACTED CERUMEN OF RIGHT EAR: ICD-10-CM

## 2024-03-21 DIAGNOSIS — H91.90 DECREASED HEARING, UNSPECIFIED LATERALITY: ICD-10-CM

## 2024-03-21 DIAGNOSIS — H93.13 TINNITUS OF BOTH EARS: ICD-10-CM

## 2024-03-21 DIAGNOSIS — H91.93 CHANGE IN HEARING, BILATERAL: Primary | ICD-10-CM

## 2024-03-21 DIAGNOSIS — H61.21 IMPACTED CERUMEN OF RIGHT EAR: Primary | ICD-10-CM

## 2024-03-21 PROCEDURE — 69210 REMOVE IMPACTED EAR WAX UNI: CPT | Mod: HCNC,S$GLB,, | Performed by: PHYSICIAN ASSISTANT

## 2024-03-21 PROCEDURE — 99212 OFFICE O/P EST SF 10 MIN: CPT | Mod: 25,HCNC,S$GLB, | Performed by: PHYSICIAN ASSISTANT

## 2024-03-21 PROCEDURE — 99999 PR PBB SHADOW E&M-EST. PATIENT-LVL I: CPT | Mod: PBBFAC,HCNC,, | Performed by: AUDIOLOGIST-HEARING AID FITTER

## 2024-03-21 PROCEDURE — 1126F AMNT PAIN NOTED NONE PRSNT: CPT | Mod: HCNC,CPTII,S$GLB, | Performed by: PHYSICIAN ASSISTANT

## 2024-03-21 PROCEDURE — 99999 PR PBB SHADOW E&M-EST. PATIENT-LVL III: CPT | Mod: PBBFAC,HCNC,, | Performed by: PHYSICIAN ASSISTANT

## 2024-03-21 PROCEDURE — 3008F BODY MASS INDEX DOCD: CPT | Mod: HCNC,CPTII,S$GLB, | Performed by: PHYSICIAN ASSISTANT

## 2024-03-21 PROCEDURE — 1159F MED LIST DOCD IN RCRD: CPT | Mod: HCNC,CPTII,S$GLB, | Performed by: PHYSICIAN ASSISTANT

## 2024-03-21 PROCEDURE — 3288F FALL RISK ASSESSMENT DOCD: CPT | Mod: HCNC,CPTII,S$GLB, | Performed by: PHYSICIAN ASSISTANT

## 2024-03-21 PROCEDURE — 1101F PT FALLS ASSESS-DOCD LE1/YR: CPT | Mod: HCNC,CPTII,S$GLB, | Performed by: PHYSICIAN ASSISTANT

## 2024-03-21 NOTE — PROGRESS NOTES
Referring provider: Aicha Kirkland NP    Lj Coleman was to be seen 03/21/2024 for an audiological evaluation.  Incomplete resolution of cerumen impaction today. Patient will return after using Debrox to see ENT followed by audiogram.

## 2024-03-21 NOTE — PROGRESS NOTES
Subjective     Patient ID: Lj Coleman is a 68 y.o. male.    Chief Complaint: Cerumen Impaction (Ear cleaning)    Patient is a very pleasant 68 y.o. male here to see me today for the first time for evaluation of hearing loss.  He reports hearing loss that has been gradually progressing over the last several years.  He has noted a difference in hearing between the ears, with the left ear being the better hearing ear.  He has noted tinnitus in both ears.  He has not had any recent issues with ear pain or ear drainage.  He denies a family history of hearing loss, and had a previous otologic surgery (TMs lanced in childhood).  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 He denies issues with dizziness.          Review of Systems   Constitutional:  Negative for fever.   HENT:  Positive for hearing loss and tinnitus. Negative for ear discharge and ear pain.    Neurological:  Negative for dizziness.          Objective     Physical Exam  Constitutional:       Appearance: Normal appearance.   HENT:      Head: Normocephalic and atraumatic.      Right Ear: Ear canal and external ear normal. There is impacted cerumen (removal described below).      Left Ear: Tympanic membrane, ear canal and external ear normal.      Nose: Nose normal.      Mouth/Throat:      Mouth: Mucous membranes are moist.      Dentition: Has dentures.   Pulmonary:      Effort: Pulmonary effort is normal.   Neurological:      General: No focal deficit present.      Mental Status: He is alert.       Procedure Note    CHIEF COMPLAINT:  Cerumen Impaction    Description:  The patient was seated in an exam chair.  An ear speculum was placed in the right EAC and was examined under the microscope.  Suction and/or alligator forceps were used to remove pieces of a large cerumen impaction.  The tympanic membrane was NOT visualized.   The procedure was stopped due to patient sensitivity.        Assessment and Plan     1. Change  in hearing, bilateral    2. Tinnitus of both ears    3. Impacted cerumen of right ear         Cerumen impaction:  Unable to remove the cerumen impaction from right ear today due to patient sensitivity and difficulty remaining still.   I would recommend the use of a wax softening drop, either over the counter Debrox or mineral oil, on a nightly basis for one week and then RTC for repeat ear cleaning followed by audiogram.  I also instructed the patient to avoid Qtips.    Tinnitus is most often caused by a hearing loss, and that as the hair cells are damaged, either genetic or as a result of loud noise exposure, they then cause tinnitus.  Some patients find that restricting the salt or caffeine in their diet helps, and there is also an OTC supplement, lipoflavinoids, that some people find to be effective though their benefit is not fully proven.  Tinnitus tends to be louder in times of stress and fatigue, and may decrease with time.  Sound machines may also be an effective masking technique if needed at night.             No follow-ups on file.

## 2024-03-21 NOTE — TELEPHONE ENCOUNTER
Attempt to contact patient in regards to his scheduled Intake appointment. No answer. No alternate numbers listed.

## 2024-04-03 ENCOUNTER — LAB VISIT (OUTPATIENT)
Dept: LAB | Facility: HOSPITAL | Age: 69
End: 2024-04-03
Attending: INTERNAL MEDICINE
Payer: MEDICARE

## 2024-04-03 DIAGNOSIS — Z94.4 S/P LIVER TRANSPLANT: ICD-10-CM

## 2024-04-03 LAB
ALBUMIN SERPL BCP-MCNC: 3.6 G/DL (ref 3.5–5.2)
ALP SERPL-CCNC: 123 U/L (ref 55–135)
ALT SERPL W/O P-5'-P-CCNC: 9 U/L (ref 10–44)
ANION GAP SERPL CALC-SCNC: 5 MMOL/L (ref 8–16)
AST SERPL-CCNC: 15 U/L (ref 10–40)
BASOPHILS # BLD AUTO: 0.09 K/UL (ref 0–0.2)
BASOPHILS NFR BLD: 1.4 % (ref 0–1.9)
BILIRUB SERPL-MCNC: 0.6 MG/DL (ref 0.1–1)
BUN SERPL-MCNC: 19 MG/DL (ref 8–23)
CALCIUM SERPL-MCNC: 8.8 MG/DL (ref 8.7–10.5)
CHLORIDE SERPL-SCNC: 108 MMOL/L (ref 95–110)
CO2 SERPL-SCNC: 26 MMOL/L (ref 23–29)
CREAT SERPL-MCNC: 1.2 MG/DL (ref 0.5–1.4)
DIFFERENTIAL METHOD BLD: ABNORMAL
EOSINOPHIL # BLD AUTO: 0.4 K/UL (ref 0–0.5)
EOSINOPHIL NFR BLD: 6.7 % (ref 0–8)
ERYTHROCYTE [DISTWIDTH] IN BLOOD BY AUTOMATED COUNT: 13.7 % (ref 11.5–14.5)
EST. GFR  (NO RACE VARIABLE): >60 ML/MIN/1.73 M^2
GLUCOSE SERPL-MCNC: 92 MG/DL (ref 70–110)
HCT VFR BLD AUTO: 51.7 % (ref 40–54)
HGB BLD-MCNC: 16.5 G/DL (ref 14–18)
IMM GRANULOCYTES # BLD AUTO: 0.03 K/UL (ref 0–0.04)
IMM GRANULOCYTES NFR BLD AUTO: 0.5 % (ref 0–0.5)
LYMPHOCYTES # BLD AUTO: 1.7 K/UL (ref 1–4.8)
LYMPHOCYTES NFR BLD: 27 % (ref 18–48)
MCH RBC QN AUTO: 29.4 PG (ref 27–31)
MCHC RBC AUTO-ENTMCNC: 31.9 G/DL (ref 32–36)
MCV RBC AUTO: 92 FL (ref 82–98)
MONOCYTES # BLD AUTO: 0.7 K/UL (ref 0.3–1)
MONOCYTES NFR BLD: 10.3 % (ref 4–15)
NEUTROPHILS # BLD AUTO: 3.5 K/UL (ref 1.8–7.7)
NEUTROPHILS NFR BLD: 54.1 % (ref 38–73)
NRBC BLD-RTO: 0 /100 WBC
PLATELET # BLD AUTO: 153 K/UL (ref 150–450)
PMV BLD AUTO: 12.5 FL (ref 9.2–12.9)
POTASSIUM SERPL-SCNC: 4.6 MMOL/L (ref 3.5–5.1)
PROT SERPL-MCNC: 6.6 G/DL (ref 6–8.4)
RBC # BLD AUTO: 5.62 M/UL (ref 4.6–6.2)
SODIUM SERPL-SCNC: 139 MMOL/L (ref 136–145)
WBC # BLD AUTO: 6.38 K/UL (ref 3.9–12.7)

## 2024-04-03 PROCEDURE — 36415 COLL VENOUS BLD VENIPUNCTURE: CPT | Mod: HCNC | Performed by: INTERNAL MEDICINE

## 2024-04-03 PROCEDURE — 80053 COMPREHEN METABOLIC PANEL: CPT | Mod: HCNC | Performed by: INTERNAL MEDICINE

## 2024-04-03 PROCEDURE — 80197 ASSAY OF TACROLIMUS: CPT | Mod: HCNC | Performed by: INTERNAL MEDICINE

## 2024-04-03 PROCEDURE — 85025 COMPLETE CBC W/AUTO DIFF WBC: CPT | Mod: HCNC | Performed by: INTERNAL MEDICINE

## 2024-04-04 ENCOUNTER — OFFICE VISIT (OUTPATIENT)
Dept: DERMATOLOGY | Facility: CLINIC | Age: 69
End: 2024-04-04
Payer: MEDICARE

## 2024-04-04 VITALS — WEIGHT: 217.38 LBS | HEIGHT: 69 IN | BODY MASS INDEX: 32.2 KG/M2

## 2024-04-04 DIAGNOSIS — L82.1 SEBORRHEIC KERATOSES: ICD-10-CM

## 2024-04-04 DIAGNOSIS — L57.0 ACTINIC KERATOSES: Primary | ICD-10-CM

## 2024-04-04 DIAGNOSIS — Z12.83 SCREENING, MALIGNANT NEOPLASM, SKIN: ICD-10-CM

## 2024-04-04 LAB — TACROLIMUS BLD-MCNC: 7.1 NG/ML (ref 5–15)

## 2024-04-04 PROCEDURE — 1101F PT FALLS ASSESS-DOCD LE1/YR: CPT | Mod: HCNC,CPTII,S$GLB, | Performed by: STUDENT IN AN ORGANIZED HEALTH CARE EDUCATION/TRAINING PROGRAM

## 2024-04-04 PROCEDURE — 99203 OFFICE O/P NEW LOW 30 MIN: CPT | Mod: 25,HCNC,S$GLB, | Performed by: STUDENT IN AN ORGANIZED HEALTH CARE EDUCATION/TRAINING PROGRAM

## 2024-04-04 PROCEDURE — 17003 DESTRUCT PREMALG LES 2-14: CPT | Mod: HCNC,S$GLB,, | Performed by: STUDENT IN AN ORGANIZED HEALTH CARE EDUCATION/TRAINING PROGRAM

## 2024-04-04 PROCEDURE — 3288F FALL RISK ASSESSMENT DOCD: CPT | Mod: HCNC,CPTII,S$GLB, | Performed by: STUDENT IN AN ORGANIZED HEALTH CARE EDUCATION/TRAINING PROGRAM

## 2024-04-04 PROCEDURE — 1159F MED LIST DOCD IN RCRD: CPT | Mod: HCNC,CPTII,S$GLB, | Performed by: STUDENT IN AN ORGANIZED HEALTH CARE EDUCATION/TRAINING PROGRAM

## 2024-04-04 PROCEDURE — 1160F RVW MEDS BY RX/DR IN RCRD: CPT | Mod: HCNC,CPTII,S$GLB, | Performed by: STUDENT IN AN ORGANIZED HEALTH CARE EDUCATION/TRAINING PROGRAM

## 2024-04-04 PROCEDURE — 99999 PR PBB SHADOW E&M-EST. PATIENT-LVL III: CPT | Mod: PBBFAC,HCNC,, | Performed by: STUDENT IN AN ORGANIZED HEALTH CARE EDUCATION/TRAINING PROGRAM

## 2024-04-04 PROCEDURE — 17000 DESTRUCT PREMALG LESION: CPT | Mod: HCNC,S$GLB,, | Performed by: STUDENT IN AN ORGANIZED HEALTH CARE EDUCATION/TRAINING PROGRAM

## 2024-04-04 PROCEDURE — 3008F BODY MASS INDEX DOCD: CPT | Mod: HCNC,CPTII,S$GLB, | Performed by: STUDENT IN AN ORGANIZED HEALTH CARE EDUCATION/TRAINING PROGRAM

## 2024-04-04 PROCEDURE — 1126F AMNT PAIN NOTED NONE PRSNT: CPT | Mod: HCNC,CPTII,S$GLB, | Performed by: STUDENT IN AN ORGANIZED HEALTH CARE EDUCATION/TRAINING PROGRAM

## 2024-04-04 NOTE — PROGRESS NOTES
Subjective:       Patient ID:  Lj Coleman is a 68 y.o. male who presents for   Chief Complaint   Patient presents with    Skin Check     History of Present Illness: The patient presents with chief complaint of evaluation of a skin lesion and skin check.  Location: a few lesions on the chin, temple areas  Duration: present for a few months  Signs/Symptoms: red, scaly and crusted.  Prior treatments: none  Patient had a history of a liver transplant.         Review of Systems   Constitutional:  Negative for fever and chills.   Skin:  Negative for itching, rash and dry skin.        Objective:    Physical Exam   Constitutional: He appears well-developed and well-nourished. No distress.   Neurological: He is alert and oriented to person, place, and time. He is not disoriented.   Psychiatric: He has a normal mood and affect.   Skin:   Areas Examined (abnormalities noted in diagram):   Head / Face Inspection Performed  Neck Inspection Performed  Chest / Axilla Inspection Performed  Abdomen Inspection Performed  Back Inspection Performed  RUE Inspected  LUE Inspection Performed                   Diagram Legend     Erythematous scaling macule/papule c/w actinic keratosis       Vascular papule c/w angioma      Pigmented verrucoid papule/plaque c/w seborrheic keratosis      Yellow umbilicated papule c/w sebaceous hyperplasia      Irregularly shaped tan macule c/w lentigo     1-2 mm smooth white papules consistent with Milia      Movable subcutaneous cyst with punctum c/w epidermal inclusion cyst      Subcutaneous movable cyst c/w pilar cyst      Firm pink to brown papule c/w dermatofibroma      Pedunculated fleshy papule(s) c/w skin tag(s)      Evenly pigmented macule c/w junctional nevus     Mildly variegated pigmented, slightly irregular-bordered macule c/w mildly atypical nevus      Flesh colored to evenly pigmented papule c/w intradermal nevus       Pink pearly papule/plaque c/w basal cell carcinoma       Erythematous hyperkeratotic cursted plaque c/w SCC      Surgical scar with no sign of skin cancer recurrence      Open and closed comedones      Inflammatory papules and pustules      Verrucoid papule consistent consistent with wart     Erythematous eczematous patches and plaques     Dystrophic onycholytic nail with subungual debris c/w onychomycosis     Umbilicated papule    Erythematous-base heme-crusted tan verrucoid plaque consistent with inflamed seborrheic keratosis     Erythematous Silvery Scaling Plaque c/w Psoriasis     See annotation      Assessment / Plan:        Actinic keratoses  Cryosurgery Procedure Note    Verbal consent from the patient is obtained including, but not limited to, risk of hypopigmentation/hyperpigmentation, scar, recurrence of lesion. The patient is aware of the precancerous quality and need for treatment of these lesions. Liquid nitrogen cryosurgery is applied to the 4 actinic keratoses, as detailed in the physical exam, to produce a freeze injury. The patient is aware that blisters may form and is instructed on wound care with gentle cleansing and use of vaseline ointment to keep moist until healed. The patient is supplied a handout on cryosurgery and is instructed to call if lesions do not completely resolve.    Seborrheic keratoses  These are benign inherited growths without a malignant potential. Reassurance given to patient. No treatment is necessary.     Screening, malignant neoplasm, skin  upper body skin examination performed today including at least 6 points as noted in physical examination. No lesions suspicious for malignancy noted.  Reassurance provided.    Instructed patient to observe lesion(s) for changes and follow up in clinic if changes are noted. Patient to monitor skin at home for new or changing lesions and follow up in clinic if noted.             Follow up in about 1 year (around 4/4/2025).

## 2024-04-08 ENCOUNTER — OUTPATIENT CASE MANAGEMENT (OUTPATIENT)
Dept: ADMINISTRATIVE | Facility: OTHER | Age: 69
End: 2024-04-08

## 2024-04-08 ENCOUNTER — TELEPHONE (OUTPATIENT)
Dept: OTOLARYNGOLOGY | Facility: CLINIC | Age: 69
End: 2024-04-08

## 2024-04-08 ENCOUNTER — TELEPHONE (OUTPATIENT)
Dept: TRANSPLANT | Facility: CLINIC | Age: 69
End: 2024-04-08

## 2024-04-08 NOTE — TELEPHONE ENCOUNTER
Letter sent to patient stating: Your labs have been reviewed by your Transplant physician, no action required. Next labs due 10/7/2024          ----- Message from Teresa Cartagena MD sent at 4/6/2024 10:41 AM CDT -----  Reviewed, nothing to do; repeat per routine

## 2024-04-08 NOTE — TELEPHONE ENCOUNTER
Pt. Wanted to call and have appt rescheduled he needed enough time to go purchase the Debrox and use it preferably 6 days before his ear cleaning. Pt. Verbalized understanding.

## 2024-04-08 NOTE — LETTER
April 8, 2024    Lj Melara [address]  [address]  [facility] [address]          Dear Lj Coleman:  MRN: [id number]    This is a follow up to your recent labs, your lab results were stable.  There are no medicine changes.  Please have your labs drawn again on 10/7/2024.      If you cannot have your labs drawn on the scheduled date, it is your responsibility to call the transplant department to reschedule.  Please call [phone] and ask to speak to Isabella CASTILLO   for all scheduling requests.     Sincerely,    Gertrudis SLOANN, RN      Your Liver Transplant Coordinator    [facility]  [address] [place] [id number]  [phone]

## 2024-04-08 NOTE — TELEPHONE ENCOUNTER
----- Message from Liss Costa sent at 4/8/2024  9:58 AM CDT -----  Regarding: Reschedule Existing Appointment  Contact: Lj Guerra Existing Appointment     Current appt date:04/09/2024     Type of appt : ENT/ Audio     Physician:Anahy Wheatley     Reason for rescheduling:pt have not received ear drops that's needed before visit. Please advise. Requesting a call back.      Caller:Lj Coleman       Contact Preference:[phone] (home)

## 2024-04-09 NOTE — PROGRESS NOTES
SW contacted patient regarding referral. SW explained to patient reason for referral to assist with social needs. Patient reports medication affordability was a need but not currenlty. Patinet reports not being able to afford a medication that prescribed by urgent care. Patient denied any needs . SW provided her contact information for any future needs that may arise.

## 2024-04-24 ENCOUNTER — TELEPHONE (OUTPATIENT)
Dept: TRANSPLANT | Facility: CLINIC | Age: 69
End: 2024-04-24

## 2024-04-29 DIAGNOSIS — I82.622 ACUTE DEEP VEIN THROMBOSIS (DVT) OF OTHER VEIN OF LEFT UPPER EXTREMITY: ICD-10-CM

## 2024-04-29 NOTE — TELEPHONE ENCOUNTER
Refill Routing Note   Medication(s) are not appropriate for processing by [facility] for the following reason(s):        Outside of protocol    [facility] action(s):  Route               Appointments  past 12m or future 3m with PCP    Date Provider   Last Visit   12/12/2023 Isabella Sutton DO   Next Visit   6/12/2024 Isabella Sutton DO   ED visits in past 90 days: 0        Note composed:9:43 AM 04/29/2024

## 2024-06-11 RX ORDER — TAMSULOSIN HYDROCHLORIDE 0.4 MG/1
0.4 CAPSULE ORAL
Qty: 30 CAPSULE | Refills: 11 | Status: SHIPPED | OUTPATIENT
Start: 2024-06-11

## 2024-06-12 ENCOUNTER — OFFICE VISIT (OUTPATIENT)
Dept: INTERNAL MEDICINE | Facility: CLINIC | Age: 69
End: 2024-06-12
Payer: MEDICARE

## 2024-06-12 VITALS
HEART RATE: 85 BPM | TEMPERATURE: 96 F | RESPIRATION RATE: 21 BRPM | BODY MASS INDEX: 32.72 KG/M2 | SYSTOLIC BLOOD PRESSURE: 120 MMHG | WEIGHT: 221.56 LBS | DIASTOLIC BLOOD PRESSURE: 74 MMHG | OXYGEN SATURATION: 96 %

## 2024-06-12 DIAGNOSIS — K21.9 GASTROESOPHAGEAL REFLUX DISEASE WITHOUT ESOPHAGITIS: ICD-10-CM

## 2024-06-12 DIAGNOSIS — Z94.4 LIVER TRANSPLANTED: ICD-10-CM

## 2024-06-12 DIAGNOSIS — Z12.5 PROSTATE CANCER SCREENING: ICD-10-CM

## 2024-06-12 DIAGNOSIS — I10 ESSENTIAL HYPERTENSION: ICD-10-CM

## 2024-06-12 DIAGNOSIS — Z00.00 ANNUAL PHYSICAL EXAM: Primary | ICD-10-CM

## 2024-06-12 DIAGNOSIS — F17.210 CIGARETTE NICOTINE DEPENDENCE WITHOUT COMPLICATION: ICD-10-CM

## 2024-06-12 DIAGNOSIS — I82.722 CHRONIC DEEP VEIN THROMBOSIS (DVT) OF BRACHIAL VEIN OF LEFT UPPER EXTREMITY: ICD-10-CM

## 2024-06-12 PROCEDURE — 1101F PT FALLS ASSESS-DOCD LE1/YR: CPT | Mod: HCNC,CPTII,S$GLB, | Performed by: INTERNAL MEDICINE

## 2024-06-12 PROCEDURE — 99999 PR PBB SHADOW E&M-EST. PATIENT-LVL IV: CPT | Mod: PBBFAC,HCNC,, | Performed by: INTERNAL MEDICINE

## 2024-06-12 PROCEDURE — 3288F FALL RISK ASSESSMENT DOCD: CPT | Mod: HCNC,CPTII,S$GLB, | Performed by: INTERNAL MEDICINE

## 2024-06-12 PROCEDURE — 3008F BODY MASS INDEX DOCD: CPT | Mod: HCNC,CPTII,S$GLB, | Performed by: INTERNAL MEDICINE

## 2024-06-12 PROCEDURE — 1125F AMNT PAIN NOTED PAIN PRSNT: CPT | Mod: HCNC,CPTII,S$GLB, | Performed by: INTERNAL MEDICINE

## 2024-06-12 PROCEDURE — 3074F SYST BP LT 130 MM HG: CPT | Mod: HCNC,CPTII,S$GLB, | Performed by: INTERNAL MEDICINE

## 2024-06-12 PROCEDURE — 3078F DIAST BP <80 MM HG: CPT | Mod: HCNC,CPTII,S$GLB, | Performed by: INTERNAL MEDICINE

## 2024-06-12 PROCEDURE — 1159F MED LIST DOCD IN RCRD: CPT | Mod: HCNC,CPTII,S$GLB, | Performed by: INTERNAL MEDICINE

## 2024-06-12 PROCEDURE — 99214 OFFICE O/P EST MOD 30 MIN: CPT | Mod: HCNC,S$GLB,, | Performed by: INTERNAL MEDICINE

## 2024-06-12 PROCEDURE — 1160F RVW MEDS BY RX/DR IN RCRD: CPT | Mod: HCNC,CPTII,S$GLB, | Performed by: INTERNAL MEDICINE

## 2024-06-12 RX ORDER — ESOMEPRAZOLE MAGNESIUM 40 MG/1
40 CAPSULE, DELAYED RELEASE ORAL DAILY
Qty: 90 CAPSULE | Refills: 3 | Status: SHIPPED | OUTPATIENT
Start: 2024-06-12

## 2024-06-12 NOTE — PROGRESS NOTES
Lj Sextonviolet  68 y.o. White male  [id number]    Chief Complaint:  Chief Complaint   Patient presents with    Annual Exam       History of Present Illness:  No significant complaints.   HTN--stable.   DVT--LUE in April 2023. Repeat US in December showed partially occlusive thrombus. He is taking apixaban as prescribed.   GERD--needs refills on pantoprazole. Noticed he is taking it almost daily.   He is seeing the transplant team. He has a history of a liver transplant.   He continues to smoke. He plans to quit.     Laboratory   Lab Results   Component Value Date    WBC 6.38 04/03/2024    HGB 16.5 04/03/2024    HCT 51.7 04/03/2024     04/03/2024    CHOL 181 05/05/2023    TRIG 122 05/05/2023    HDL 30 (L) 05/05/2023    ALT 9 (L) 04/03/2024    AST 15 04/03/2024     04/03/2024    K 4.6 04/03/2024     04/03/2024    CREATININE 1.2 04/03/2024    BUN 19 04/03/2024    CO2 26 04/03/2024    TSH 1.168 06/06/2023    PSA 0.55 06/06/2023    INR 1.0 04/04/2023    HGBA1C 5.4 05/05/2023     Lab Results   Component Value Date    LDLCALC 126.6 05/05/2023     Review of Systems   Constitutional:  Negative for fever and weight loss.   Respiratory:  Positive for cough. Negative for hemoptysis and shortness of breath.    Cardiovascular:  Positive for leg swelling. Negative for chest pain.   Gastrointestinal:  Negative for abdominal pain and blood in stool.   Genitourinary:  Negative for dysuria and hematuria.   Neurological:  Negative for dizziness and headaches.       The following were reviewed: Active problem list, medication list, allergies, family history, social history, and Health Maintenance.     History:  Past Medical History:   Diagnosis Date    Acute deep vein thrombosis (DVT) of left upper extremity 04/04/2023    Alcohol dependence     stopped 2012    Angina pectoris     Arthritis     back    Atherosclerosis of native coronary artery without angina pectoris/ LAD 09/08/2016    Back pain     Chronic  hepatitis C with cirrhosis     Depression     Hepatoma     Prior to liver transplant    History of colon polyps 09/09/2015    History of transplantation, liver 04/2012    History of vertebral fracture     Hypertension     Immune deficiency disorder     Incisional hernia following transplant 04/09/2014    Liver failure 11/2011    Related to chemotherapy for hepatoma    Liver transplanted 04/2012    Obesity     PVCs (premature ventricular contractions)     Tobacco abuse 09/09/2016    Trouble in sleeping     Vertebral fracture 1975     Past Surgical History:   Procedure Laterality Date    COLONOSCOPY N/A 1/20/2021    Procedure: COLONOSCOPY;  Surgeon: Emerson Helm MD;  Location: [facility];  Service: Endoscopy;  Laterality: N/A;    HERNIA REPAIR Right 2013    incisional    LIVER TRANSPLANT  April 2012    MOUTH SURGERY      TONSILLECTOMY  1958     Family History   Problem Relation Name Age of Onset    Cancer Mother          lung    Cancer Father          bladder    Diabetes Maternal Uncle      Cancer Maternal Grandmother          uterine?    Cancer Other      Heart disease Neg Hx      Kidney disease Neg Hx      Stroke Neg Hx       Social History     Socioeconomic History    Marital status:     Highest education level: 10th grade   Tobacco Use    Smoking status: Every Day     Current packs/day: 0.75     Average packs/day: 0.8 packs/day for 36.4 years (27.3 ttl pk-yrs)     Types: Cigarettes     Start date: 1988    Smokeless tobacco: Never    Tobacco comments:     Currently smokes a few (3-5) a day as of 6/12/24   Substance and Sexual Activity    Alcohol use: No     Alcohol/week: 0.0 standard drinks of alcohol     Comment: Quit alcohol after some alcohol abuse, prior to his liver transplant    Drug use: No    Sexual activity: Not Currently     Social Determinants of Health     Financial Resource Strain: Medium Risk (3/13/2024)    Overall Financial Resource Strain (CARDIA)     Difficulty of Paying Living  Expenses: Somewhat hard   Food Insecurity: No Food Insecurity (3/13/2024)    Hunger Vital Sign     Worried About Running Out of Food in the Last Year: Never true     Ran Out of Food in the Last Year: Never true   Transportation Needs: Unmet Transportation Needs (3/13/2024)    PRAPARE - Transportation     Lack of Transportation (Medical): Yes     Lack of Transportation (Non-Medical): No   Physical Activity: Inactive (3/13/2024)    Exercise Vital Sign     Days of Exercise per Week: 0 days     Minutes of Exercise per Session: 0 min   Stress: No Stress Concern Present (3/13/2024)    [place] [employer] - Occupational Stress Questionnaire     Feeling of Stress : Not at all   Housing Stability: Low Risk  (3/13/2024)    Housing Stability Vital Sign     Unable to Pay for Housing in the Last Year: No     Number of Places Lived in the Last Year: 1     Unstable Housing in the Last Year: No     Patient Active Problem List   Diagnosis    Obesity (BMI 35.0-39.9 without comorbidity)    Chronic low back pain    GERD (gastroesophageal reflux disease)    Insomnia    History of hepatitis C    Immunosuppression    Liver transplanted    History of liver cancer    Essential hypertension    Atherosclerosis of native coronary artery without angina pectoris/ LAD    Adjustment disorder with mixed anxiety and depressed mood    History of alcohol dependence    History of thrombocytopenia    Ventral hernia    HARDING (dyspnea on exertion)    PVC (premature ventricular contraction)    Tobacco abuse disorder    Osteomyelitis of ankle and foot    Chronic deep vein thrombosis (DVT) of left upper extremity    Family history of bladder cancer    COPD suggested by initial evaluation     Review of patient's allergies indicates:   Allergen Reactions    Codeine Other (See Comments)     Upset stomach       Health Maintenance  Health Maintenance Topics with due status: Not Due       Topic Last Completion Date    Colorectal Cancer Screening  01/20/2021    Hemoglobin A1c (Diabetic Prevention Screening) 05/05/2023     Health Maintenance Due   Topic Date Due    TETANUS VACCINE  Never done    Shingles Vaccine (1 of 2) Never done    RSV Vaccine (Age 60+ and Pregnant patients) (1 - 1-dose 60+ series) Never done    LDCT Lung Screen  04/04/2024    Lipid Panel  05/05/2024       Medications:  Current Outpatient Medications on File Prior to Visit   Medication Sig Dispense Refill    FLUAD QUAD 2023-24,65Y UP,,PF, 60 mcg (15 mcg x 4)/0.5 mL Syrg       fluticasone propionate (FLONASE) 50 mcg/actuation nasal spray 1 spray (50 mcg total) by Each Nostril route once daily. 9.9 mL 0    metoprolol succinate (TOPROL-XL) 25 MG 24 hr tablet TAKE 1 TABLET EVERY DAY 90 tablet 3    tacrolimus (PROGRAF) 0.5 MG Cap TAKE 2 CAPSULES EVERY 12 HOURS 360 capsule 3    tamsulosin (FLOMAX) 0.4 mg Cap TAKE 1 CAPSULE(0.4 MG) BY MOUTH EVERY DAY 30 capsule 11    traMADoL (ULTRAM) 50 mg tablet Take 1 tablet (50 mg total) by mouth daily as needed for Pain. 30 tablet 0    apixaban (ELIQUIS) 5 mg Tab Take 1 tablet (5 mg total) by mouth 2 (two) times daily. 180 tablet 0    esomeprazole (NEXIUM) 40 MG capsule Take 1 capsule (40 mg total) by mouth once daily. 90 capsule 3    diclofenac sodium (VOLTAREN) 1 % Gel Apply 2 g topically 4 (four) times daily. for 10 days 100 g 0     Medications have been reviewed and reconciled with patient at visit today.    Exam:  Vitals:    06/12/24 0831   BP: 120/74   Pulse: 85   Resp: (!) 21   Temp: 96.1 °F (35.6 °C)     Weight: 100.5 kg (221 lb 9 oz)   Body mass index is 32.72 kg/m².      Physical Exam  Vitals reviewed.   Constitutional:       General: He is not in acute distress.     Appearance: He is well-developed. He is not ill-appearing.   Eyes:      General: No scleral icterus.  Cardiovascular:      Rate and Rhythm: Normal rate and regular rhythm.      Heart sounds: Normal heart sounds.   Pulmonary:      Effort: Pulmonary effort is normal. No respiratory  distress.      Breath sounds: Normal breath sounds.   Abdominal:      General: Bowel sounds are normal.      Palpations: Abdomen is soft.   Skin:     General: Skin is warm and dry.   Neurological:      Mental Status: He is alert and oriented to person, place, and time.   Psychiatric:         Behavior: Behavior normal.       Assessment:  The primary encounter diagnosis was Annual physical exam. Diagnoses of Essential hypertension, Chronic deep vein thrombosis (DVT) of brachial vein of left upper extremity, Gastroesophageal reflux disease without esophagitis, Liver transplanted, Cigarette nicotine dependence without complication, and Prostate cancer screening were also pertinent to this visit.    Plan:  Annual physical exam  -     Counseled regarding age appropriate screenings and immunizations  -     Counseled regarding lifestyle modifications    Essential hypertension  -     Continue metoprolol  -     Lipid panel; Future; Expected date: 06/12/2024    Chronic deep vein thrombosis (DVT) of brachial vein of left upper extremity  -     refill apixaban (ELIQUIS) 5 mg Tab; Take 1 tablet (5 mg total) by mouth 2 (two) times daily.  Dispense: 180 tablet; Refill: 3    Gastroesophageal reflux disease without esophagitis  -     refill esomeprazole (NEXIUM) 40 MG capsule; Take 1 capsule (40 mg total) by mouth once daily.  Dispense: 90 capsule; Refill: 3    Liver transplanted    Cigarette nicotine dependence without complication  -     CT Chest Lung Screening Low Dose; Future; Expected date: 06/12/2024    Prostate cancer screening  -     PSA, Screening; Future    Schedule labs and CT.

## 2024-07-12 ENCOUNTER — TELEPHONE (OUTPATIENT)
Dept: HEPATOLOGY | Facility: CLINIC | Age: 69
End: 2024-07-12
Payer: MEDICARE

## 2024-07-12 NOTE — TELEPHONE ENCOUNTER
----- Message from Kaleigh Rey sent at 7/12/2024  8:56 AM CDT -----  Type:  Sooner Apoointment Request    Caller is requesting a sooner appointment.  Caller declined first available appointment listed below.  Caller will not accept being placed on the waitlist and is requesting a message be sent to doctor.  Name of Caller:pt   When is the first available appointment?none   Symptoms:appt rescheduling   Would the patient rather a call back or a response via [facility]? [facility]   Best Call Back Number:[phone]  Additional Information: states he would like to move appt and labs to [facility]

## 2024-07-12 NOTE — TELEPHONE ENCOUNTER
Returned patient's call and moved his 10/7 labs up to 10/3 due to his financial assistance expiring by the draw date.

## 2024-07-15 ENCOUNTER — TELEPHONE (OUTPATIENT)
Dept: PULMONOLOGY | Facility: CLINIC | Age: 69
End: 2024-07-15
Payer: MEDICARE

## 2024-07-16 ENCOUNTER — OFFICE VISIT (OUTPATIENT)
Dept: PULMONOLOGY | Facility: CLINIC | Age: 69
End: 2024-07-16
Payer: MEDICARE

## 2024-07-16 VITALS
HEIGHT: 69 IN | RESPIRATION RATE: 18 BRPM | OXYGEN SATURATION: 96 % | HEART RATE: 96 BPM | DIASTOLIC BLOOD PRESSURE: 92 MMHG | BODY MASS INDEX: 32.56 KG/M2 | SYSTOLIC BLOOD PRESSURE: 148 MMHG | WEIGHT: 219.81 LBS

## 2024-07-16 DIAGNOSIS — J44.9 COPD SUGGESTED BY INITIAL EVALUATION: Primary | ICD-10-CM

## 2024-07-16 DIAGNOSIS — D84.9 IMMUNOSUPPRESSION: ICD-10-CM

## 2024-07-16 DIAGNOSIS — Z72.0 TOBACCO ABUSE DISORDER: ICD-10-CM

## 2024-07-16 DIAGNOSIS — I82.722 CHRONIC DEEP VEIN THROMBOSIS (DVT) OF LEFT UPPER EXTREMITY, UNSPECIFIED VEIN: ICD-10-CM

## 2024-07-16 DIAGNOSIS — Z94.4 LIVER TRANSPLANTED: ICD-10-CM

## 2024-07-16 PROCEDURE — 1101F PT FALLS ASSESS-DOCD LE1/YR: CPT | Mod: HCNC,CPTII,S$GLB, | Performed by: PHYSICIAN ASSISTANT

## 2024-07-16 PROCEDURE — 3080F DIAST BP >= 90 MM HG: CPT | Mod: HCNC,CPTII,S$GLB, | Performed by: PHYSICIAN ASSISTANT

## 2024-07-16 PROCEDURE — 3008F BODY MASS INDEX DOCD: CPT | Mod: HCNC,CPTII,S$GLB, | Performed by: PHYSICIAN ASSISTANT

## 2024-07-16 PROCEDURE — 3077F SYST BP >= 140 MM HG: CPT | Mod: HCNC,CPTII,S$GLB, | Performed by: PHYSICIAN ASSISTANT

## 2024-07-16 PROCEDURE — 3288F FALL RISK ASSESSMENT DOCD: CPT | Mod: HCNC,CPTII,S$GLB, | Performed by: PHYSICIAN ASSISTANT

## 2024-07-16 PROCEDURE — 1160F RVW MEDS BY RX/DR IN RCRD: CPT | Mod: HCNC,CPTII,S$GLB, | Performed by: PHYSICIAN ASSISTANT

## 2024-07-16 PROCEDURE — 99999 PR PBB SHADOW E&M-EST. PATIENT-LVL IV: CPT | Mod: PBBFAC,HCNC,, | Performed by: PHYSICIAN ASSISTANT

## 2024-07-16 PROCEDURE — 1159F MED LIST DOCD IN RCRD: CPT | Mod: HCNC,CPTII,S$GLB, | Performed by: PHYSICIAN ASSISTANT

## 2024-07-16 PROCEDURE — 99214 OFFICE O/P EST MOD 30 MIN: CPT | Mod: HCNC,S$GLB,, | Performed by: PHYSICIAN ASSISTANT

## 2024-07-16 RX ORDER — ALBUTEROL SULFATE 90 UG/1
2 AEROSOL, METERED RESPIRATORY (INHALATION) EVERY 6 HOURS PRN
Qty: 18 G | Refills: 3 | Status: SHIPPED | OUTPATIENT
Start: 2024-07-16 | End: 2024-07-16

## 2024-07-16 RX ORDER — ALBUTEROL SULFATE 90 UG/1
2 AEROSOL, METERED RESPIRATORY (INHALATION) EVERY 6 HOURS PRN
Qty: 18 G | Refills: 3 | Status: SHIPPED | OUTPATIENT
Start: 2024-07-16

## 2024-07-16 NOTE — PROGRESS NOTES
Subjective:       Patient ID: Lj Coleman is a 68 y.o. male.    Chief Complaint: Cough and Shortness of Breath      2024  Established patient here for cough, SOB  Seen last year, did not come back for testing  No changes since last year  Still has cough daily and SOB with moderate to strenuous exertion  Wakes up in the middle of the night coughing  No chest pain, hemoptysis, weight loss, or fever    2023  Here for SOB, smoking history  New patient  60 year smoking history; currently half a pack a day  Lives in [facility], has a small yard; gets short of breath cutting grass  He admits to not getting a lot of exercise  Wakes up in the middle of the night coughing  Had bronchitis one time a few years ago  Denies wheezing  Occupation: retired,  for construction; 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, 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  Mother  of lung cancer  S/p liver transplant  for liver cancer    Immunization History   Administered Date(s) Administered    COVID-19, MRNA, LN-S, PF (Pfizer) (Purple Cap) 2021, 2021, 11/15/2021    Hepatitis A / Hepatitis B 2009    Influenza (FLUAD) - Quadrivalent - Adjuvanted - PF *Preferred* (65+) 11/10/2021, 2022, 10/05/2023    Influenza - Quadrivalent 2017    Influenza - Quadrivalent - PF *Preferred* (6 months and older) 2017    Pneumococcal Conjugate - 20 Valent 2023    Pneumococcal Polysaccharide - 23 Valent 2016      Tobacco Use: High Risk (2024)    Patient History     Smoking Tobacco Use: Every Day     Smokeless Tobacco Use: Never     Passive Exposure: Not on file      Past Medical History:   Diagnosis Date    Acute deep vein thrombosis (DVT) of left upper extremity 2023    Alcohol dependence     stopped     Angina pectoris     Arthritis     back    Atherosclerosis of native coronary artery without angina pectoris/ LAD 2016    Back pain     Chronic hepatitis C with cirrhosis     Depression      Hepatoma     Prior to liver transplant    History of colon polyps 09/09/2015    History of transplantation, liver 04/2012    History of vertebral fracture     Hypertension     Immune deficiency disorder     Incisional hernia following transplant 04/09/2014    Liver failure 11/2011    Related to chemotherapy for hepatoma    Liver transplanted 04/2012    Obesity     PVCs (premature ventricular contractions)     Tobacco abuse 09/09/2016    Trouble in sleeping     Vertebral fracture 1975      Current Outpatient Medications on File Prior to Visit   Medication Sig Dispense Refill    apixaban (ELIQUIS) 5 mg Tab Take 1 tablet (5 mg total) by mouth 2 (two) times daily. 180 tablet 3    esomeprazole (NEXIUM) 40 MG capsule Take 1 capsule (40 mg total) by mouth once daily. 90 capsule 3    metoprolol succinate (TOPROL-XL) 25 MG 24 hr tablet TAKE 1 TABLET EVERY DAY 90 tablet 3    tacrolimus (PROGRAF) 0.5 MG Cap TAKE 2 CAPSULES EVERY 12 HOURS 360 capsule 3    tamsulosin (FLOMAX) 0.4 mg Cap TAKE 1 CAPSULE(0.4 MG) BY MOUTH EVERY DAY 30 capsule 11    diclofenac sodium (VOLTAREN) 1 % Gel Apply 2 g topically 4 (four) times daily. for 10 days 100 g 0    FLUAD QUAD 2023-24,65Y UP,,PF, 60 mcg (15 mcg x 4)/0.5 mL Syrg  (Patient not taking: Reported on 7/16/2024)      fluticasone propionate (FLONASE) 50 mcg/actuation nasal spray 1 spray (50 mcg total) by Each Nostril route once daily. (Patient not taking: Reported on 7/16/2024) 9.9 mL 0    traMADoL (ULTRAM) 50 mg tablet Take 1 tablet (50 mg total) by mouth daily as needed for Pain. 30 tablet 0     Current Facility-Administered Medications on File Prior to Visit   Medication Dose Route Frequency Provider Last Rate Last Admin    lactated ringers infusion   Intravenous Continuous Linwood Ellsworth MD   New Bag at 01/20/21 0734    sodium chloride 0.9% flush 2 mL  2 mL Intravenous PRN Linwood Ellsworth MD            Review of Systems   Constitutional:  Negative for fever, weight loss, appetite change and  "weakness.   HENT:  Negative for postnasal drip, rhinorrhea, sinus pressure, trouble swallowing and congestion.    Respiratory:  Positive for cough and dyspnea on extertion. Negative for sputum production, choking, chest tightness, shortness of breath and wheezing.    Cardiovascular:  Negative for chest pain and leg swelling.   Musculoskeletal:  Negative for joint swelling.   Gastrointestinal:  Negative for nausea, vomiting and abdominal pain.   Neurological:  Negative for dizziness, weakness and headaches.   All other systems reviewed and are negative.      Objective:       Vitals:    07/16/24 0933   BP: (!) 148/92   Pulse: 96   Resp: 18   SpO2: 96%   Weight: 99.7 kg (219 lb 12.8 oz)   Height: 5' 9" (1.753 m)       Physical Exam   Constitutional: He is oriented to person, place, and time. He appears well-developed and well-nourished. No distress.   HENT:   Head: Normocephalic.   Nose: Nose normal.   Mouth/Throat: Oropharynx is clear and moist.   Cardiovascular: Normal rate and regular rhythm.   Pulmonary/Chest: Effort normal. No respiratory distress. He has no wheezes. He has no rhonchi. He has no rales.   Musculoskeletal:         General: No edema.      Cervical back: Normal range of motion and neck supple.   Neurological: He is alert and oriented to person, place, and time. Gait normal.   Skin: Skin is warm and dry.   Psychiatric: He has a normal mood and affect.   Vitals reviewed.    Personal Diagnostic Review    EXAMINATION:  CTA CHEST NON CORONARY (PE STUDIES)     CLINICAL HISTORY:  Pulmonary embolism (PE) suspected, high prob;     TECHNIQUE:  Angiographic technique PE protocol with MIPS and post processing volumetric     Iterative technique with low-dose parameters for diminishing radiation dose as reasonably achievable     COMPARISON:  Radiographic correlation     FINDINGS:  No pulmonary embolus seen     No pleural effusion midline intrathoracic lymph nodes similar to prior exam 2015     Soft tissue stranding " in the left axilla may be inflammatory     No sizable pulmonary consolidation.  Bronchial wall thickening likely bilateral.  Endobronchial mucous.     Impression:     Negative for PE        Electronically signed by: Jennifer Kolb  Date:                                            04/04/2023  Time:                                           16:58    Assessment/Plan:       Problem List Items Addressed This Visit          Pulmonary    COPD suggested by initial evaluation - Primary     Start Trelegy once daily  Albuterol prn  Follow up 3 months with PFT, walk         Relevant Medications    albuterol (VENTOLIN HFA) 90 mcg/actuation inhaler    fluticasone-umeclidin-vilanter (TRELEGY ELLIPTA) 100-62.5-25 mcg DsDv    Other Relevant Orders    Complete PFT with bronchodilator    Stress test, pulmonary    X-Ray Chest PA And Lateral       Immunology/Multi System    Immunosuppression     prograf            Hematology    Chronic deep vein thrombosis (DVT) of left upper extremity     On eliquis             GI    Liver transplanted       Other    Tobacco abuse disorder     Assistance with smoking cessation was offered, including:  [x]  Medications  [x]  Counseling  [x]  Printed Information on Smoking Cessation  Patient was counseled regarding smoking for 3-10 minutes.    Will schedule LDCT                    Follow up in about 3 months (around 10/16/2024) for pft, walk, cxr.    Discussed diagnosis, its evaluation, treatment and usual course. All questions answered.    Patient verbalized understanding of plan and left in no acute distress    Thank you for the courtesy of participating in the care of this patient    Elizabeth G Blough, PA-C [facility]

## 2024-07-16 NOTE — ASSESSMENT & PLAN NOTE
Assistance with smoking cessation was offered, including:  [x]  Medications  [x]  Counseling  [x]  Printed Information on Smoking Cessation  Patient was counseled regarding smoking for 3-10 minutes.    Will schedule LDCT

## 2024-07-18 ENCOUNTER — LAB VISIT (OUTPATIENT)
Dept: LAB | Facility: HOSPITAL | Age: 69
End: 2024-07-18
Attending: INTERNAL MEDICINE
Payer: MEDICARE

## 2024-07-18 DIAGNOSIS — Z12.5 PROSTATE CANCER SCREENING: ICD-10-CM

## 2024-07-18 DIAGNOSIS — I10 ESSENTIAL HYPERTENSION: ICD-10-CM

## 2024-07-18 LAB
CHOLEST SERPL-MCNC: 150 MG/DL (ref 120–199)
CHOLEST/HDLC SERPL: 5.8 {RATIO} (ref 2–5)
COMPLEXED PSA SERPL-MCNC: 0.64 NG/ML (ref 0–4)
HDLC SERPL-MCNC: 26 MG/DL (ref 40–75)
HDLC SERPL: 17.3 % (ref 20–50)
LDLC SERPL CALC-MCNC: 106.2 MG/DL (ref 63–159)
NONHDLC SERPL-MCNC: 124 MG/DL
TRIGL SERPL-MCNC: 89 MG/DL (ref 30–150)

## 2024-07-18 PROCEDURE — 80061 LIPID PANEL: CPT | Mod: HCNC | Performed by: INTERNAL MEDICINE

## 2024-07-18 PROCEDURE — 36415 COLL VENOUS BLD VENIPUNCTURE: CPT | Mod: HCNC | Performed by: INTERNAL MEDICINE

## 2024-07-18 PROCEDURE — 84153 ASSAY OF PSA TOTAL: CPT | Mod: HCNC | Performed by: INTERNAL MEDICINE

## 2024-07-24 ENCOUNTER — PATIENT MESSAGE (OUTPATIENT)
Dept: HEPATOLOGY | Facility: CLINIC | Age: 69
End: 2024-07-24
Payer: MEDICARE

## 2024-08-06 ENCOUNTER — HOSPITAL ENCOUNTER (OUTPATIENT)
Dept: RADIOLOGY | Facility: HOSPITAL | Age: 69
Discharge: HOME | End: 2024-08-06
Attending: INTERNAL MEDICINE
Payer: MEDICARE

## 2024-08-06 DIAGNOSIS — F17.210 CIGARETTE NICOTINE DEPENDENCE WITHOUT COMPLICATION: ICD-10-CM

## 2024-08-06 PROCEDURE — 71271 CT THORAX LUNG CANCER SCR C-: CPT | Mod: TC,HCNC

## 2024-08-06 PROCEDURE — 71271 CT THORAX LUNG CANCER SCR C-: CPT | Mod: 26,HCNC,, | Performed by: RADIOLOGY

## 2024-08-09 ENCOUNTER — TELEPHONE (OUTPATIENT)
Dept: INTERNAL MEDICINE | Facility: CLINIC | Age: 69
End: 2024-08-09
Payer: MEDICARE

## 2024-08-09 DIAGNOSIS — R59.0 AXILLARY LYMPHADENOPATHY: Primary | ICD-10-CM

## 2024-08-12 ENCOUNTER — TELEPHONE (OUTPATIENT)
Dept: INTERNAL MEDICINE | Facility: CLINIC | Age: 69
End: 2024-08-12
Payer: MEDICARE

## 2024-08-12 NOTE — TELEPHONE ENCOUNTER
----- Message from Marian Lombardo MA sent at 8/12/2024  3:51 PM CDT -----  Who called:  Pt   What is the request in detail:  Reasoning for 8/23 scheduled US   Can the clinic reply by [facility]? No    Would the patient rather a call back or a response via [facility]? Call back    Best call back number:  Telephone Information:  Mobile          [phone]     Additional Information:    Thank you.   Quality 431: Preventive Care And Screening: Unhealthy Alcohol Use - Screening: Patient screened for unhealthy alcohol use using a single question and scores less than 2 times per year Quality 226: Preventive Care And Screening: Tobacco Use: Screening And Cessation Intervention: Patient screened for tobacco use and is an ex/non-smoker Detail Level: Detailed

## 2024-08-12 NOTE — TELEPHONE ENCOUNTER
Called and explained to patient that his CT came back abnormal and they wanted closer inspection of the area in question.

## 2024-08-14 DIAGNOSIS — I82.722 CHRONIC DEEP VEIN THROMBOSIS (DVT) OF BRACHIAL VEIN OF LEFT UPPER EXTREMITY: ICD-10-CM

## 2024-08-14 DIAGNOSIS — K21.9 GASTROESOPHAGEAL REFLUX DISEASE WITHOUT ESOPHAGITIS: ICD-10-CM

## 2024-08-14 RX ORDER — ESOMEPRAZOLE MAGNESIUM 40 MG/1
CAPSULE, DELAYED RELEASE ORAL
Refills: 0 | OUTPATIENT
Start: 2024-08-14

## 2024-08-14 RX ORDER — TAMSULOSIN HYDROCHLORIDE 0.4 MG/1
0.4 CAPSULE ORAL DAILY
Qty: 30 CAPSULE | Refills: 2 | Status: SHIPPED | OUTPATIENT
Start: 2024-08-14

## 2024-08-14 NOTE — TELEPHONE ENCOUNTER
No care due was identified.  [facility] Embedded Care Due Messages. Reference number: [id number].   8/14/2024 9:01:05 AM CDT

## 2024-08-14 NOTE — TELEPHONE ENCOUNTER
Refill Decision Note   Lj Coleman  is requesting a refill authorization.  Brief Assessment and Rationale for Refill:  Quick Discontinue     Medication Therapy Plan:    Pharmacy is requesting new scripts for the following medications without required information, (sig/ frequency/qty/etc)      Medication Reconciliation Completed: No     Comments: Pharmacies have been requesting medications for patients without required information, (sig, frequency, qty, etc.). In addition, requests are sent for medication(s) pt. are currently not taking, and medications patients have never taken.    We have spoken to the pharmacies about these request types and advised their teams previously that we are unable to assess these New Script requests and require all details for these requests. This is a known issue and has been reported.     Note composed:1:32 PM 08/14/2024

## 2024-08-15 ENCOUNTER — PATIENT MESSAGE (OUTPATIENT)
Dept: PODIATRY | Facility: CLINIC | Age: 69
End: 2024-08-15
Payer: MEDICARE

## 2024-08-20 ENCOUNTER — TELEPHONE (OUTPATIENT)
Dept: INTERNAL MEDICINE | Facility: CLINIC | Age: 69
End: 2024-08-20
Payer: MEDICARE

## 2024-08-20 NOTE — TELEPHONE ENCOUNTER
----- Message from Cindy Zavala sent at 8/20/2024  4:07 PM CDT -----  Contact: Lj  Patient is calling to receive a call back at .[phone]. Reports wanting to get ct scan results explained.

## 2024-08-20 NOTE — TELEPHONE ENCOUNTER
No evidence of lung cancer seen on your CT.  There is an enlarged lymph node in your left arm pit. It is likely reactive. I will recheck the area with an ultrasound in a couple of weeks.    Gave patient these results and advised he keep the appt to have his U/S done     FINDINGS:  Lungs: There are no abnormal opacities that require further evaluation.  Scattered mild scarring in granulomatous changes noted.  The lungs show findings consistent with emphysema.    he was concerned about this statement on his report

## 2024-08-22 NOTE — TELEPHONE ENCOUNTER
Emphysema is a chronic lung disease and in his case is likely due to smoking.   I recommend he quit smoking.

## 2024-08-23 ENCOUNTER — HOSPITAL ENCOUNTER (OUTPATIENT)
Dept: RADIOLOGY | Facility: HOSPITAL | Age: 69
Discharge: HOME | End: 2024-08-23
Attending: INTERNAL MEDICINE
Payer: MEDICARE

## 2024-08-23 DIAGNOSIS — R59.0 AXILLARY LYMPHADENOPATHY: ICD-10-CM

## 2024-08-23 PROCEDURE — 76882 US LMTD JT/FCL EVL NVASC XTR: CPT | Mod: 26,HCNC,LT, | Performed by: STUDENT IN AN ORGANIZED HEALTH CARE EDUCATION/TRAINING PROGRAM

## 2024-08-23 PROCEDURE — 76882 US LMTD JT/FCL EVL NVASC XTR: CPT | Mod: TC,HCNC,LT

## 2024-08-27 ENCOUNTER — PATIENT OUTREACH (OUTPATIENT)
Dept: ADMINISTRATIVE | Facility: HOSPITAL | Age: 69
End: 2024-08-27
Payer: MEDICARE

## 2024-08-27 NOTE — PROGRESS NOTES
Working Statin CVD DM Report.  No allergy or intolerance noted. Not on any statin.  Per AWV on 3/13/24, pt has never been on a statin.  No code dropped.

## 2024-08-28 ENCOUNTER — TELEPHONE (OUTPATIENT)
Dept: INTERNAL MEDICINE | Facility: CLINIC | Age: 69
End: 2024-08-28
Payer: MEDICARE

## 2024-08-28 DIAGNOSIS — R59.0 AXILLARY LYMPHADENOPATHY: Primary | ICD-10-CM

## 2024-09-06 ENCOUNTER — OFFICE VISIT (OUTPATIENT)
Dept: PODIATRY | Facility: CLINIC | Age: 69
End: 2024-09-06
Payer: MEDICARE

## 2024-09-06 VITALS — WEIGHT: 219.81 LBS | HEIGHT: 69 IN | BODY MASS INDEX: 32.56 KG/M2

## 2024-09-06 DIAGNOSIS — D84.9 IMMUNOSUPPRESSION: ICD-10-CM

## 2024-09-06 DIAGNOSIS — Z86.19 HISTORY OF HEPATITIS C: ICD-10-CM

## 2024-09-06 DIAGNOSIS — Q82.8 POROKERATOSIS: Primary | ICD-10-CM

## 2024-09-06 DIAGNOSIS — Z94.4 LIVER TRANSPLANTED: ICD-10-CM

## 2024-09-06 DIAGNOSIS — M79.672 PAIN IN LEFT FOOT: ICD-10-CM

## 2024-09-06 PROCEDURE — 99999 PR PBB SHADOW E&M-EST. PATIENT-LVL III: CPT | Mod: PBBFAC,HCNC,, | Performed by: PODIATRIST

## 2024-09-06 NOTE — PROGRESS NOTES
Subjective:       Patient ID: Lj Coleman is a 68 y.o. male.    Chief Complaint: Foot Pain (Pain in center of bottom left foot, rate pain 9/10, pt is wearing tennis shoes, nondiabetic )    HPI: Lj Coleman presents to the office today, with areas of concern to the plantar aspect of the  left foot.  States that it feels as if he is walking on rocks.  Denies any puncture wounds or injuries.  States 9/10 States this pain is been ongoing for some time without significant improvement.  No specific memory of walking barefoot or causing injury.  States visualization of new developed lesions to the foot.  No signs of acute infection.    Review of patient's allergies indicates:   Allergen Reactions    Codeine Other (See Comments)     Upset stomach       Past Medical History:   Diagnosis Date    Acute deep vein thrombosis (DVT) of left upper extremity 04/04/2023    Alcohol dependence     stopped 2012    Angina pectoris     Arthritis     back    Atherosclerosis of native coronary artery without angina pectoris/ LAD 09/08/2016    Back pain     Chronic hepatitis C with cirrhosis     Depression     Hepatoma     Prior to liver transplant    History of colon polyps 09/09/2015    History of transplantation, liver 04/2012    History of vertebral fracture     Hypertension     Immune deficiency disorder     Incisional hernia following transplant 04/09/2014    Liver failure 11/2011    Related to chemotherapy for hepatoma    Liver transplanted 04/2012    Obesity     PVCs (premature ventricular contractions)     Tobacco abuse 09/09/2016    Trouble in sleeping     Vertebral fracture 1975       Family History   Problem Relation Name Age of Onset    Cancer Mother          lung    Cancer Father          bladder    Diabetes Maternal Uncle      Cancer Maternal Grandmother          uterine?    Cancer Other      Heart disease Neg Hx      Kidney disease Neg Hx      Stroke Neg Hx         Social History     Socioeconomic  History    Marital status:     Highest education level: 10th grade   Tobacco Use    Smoking status: Every Day     Current packs/day: 0.75     Average packs/day: 0.8 packs/day for 36.7 years (27.5 ttl pk-yrs)     Types: Cigarettes     Start date: 1988    Smokeless tobacco: Never    Tobacco comments:     Currently smokes a few (3-5) a day as of 6/12/24   Substance and Sexual Activity    Alcohol use: No     Alcohol/week: 0.0 standard drinks of alcohol     Comment: Quit alcohol after some alcohol abuse, prior to his liver transplant    Drug use: No    Sexual activity: Not Currently     Social Determinants of Health     Financial Resource Strain: Medium Risk (3/13/2024)    Overall Financial Resource Strain (CARDIA)     Difficulty of Paying Living Expenses: Somewhat hard   Food Insecurity: No Food Insecurity (3/13/2024)    Hunger Vital Sign     Worried About Running Out of Food in the Last Year: Never true     Ran Out of Food in the Last Year: Never true   Transportation Needs: Unmet Transportation Needs (3/13/2024)    PRAPARE - Transportation     Lack of Transportation (Medical): Yes     Lack of Transportation (Non-Medical): No   Physical Activity: Inactive (3/13/2024)    Exercise Vital Sign     Days of Exercise per Week: 0 days     Minutes of Exercise per Session: 0 min   Stress: No Stress Concern Present (3/13/2024)    [employer] - Occupational Stress Questionnaire     Feeling of Stress : Not at all   Housing Stability: Low Risk  (3/13/2024)    Housing Stability Vital Sign     Unable to Pay for Housing in the Last Year: No     Number of Places Lived in the Last Year: 1     Unstable Housing in the Last Year: No       Past Surgical History:   Procedure Laterality Date    COLONOSCOPY N/A 1/20/2021    Procedure: COLONOSCOPY;  Surgeon: Emerson Helm MD;  Location: [facility];  Service: Endoscopy;  Laterality: N/A;    HERNIA REPAIR Right 2013    incisional    LIVER TRANSPLANT  April  "2012    MOUTH SURGERY      TONSILLECTOMY  1958       Review of Systems       Objective:   Ht 5' 9" (1.753 m)   Wt 99.7 kg (219 lb 12.8 oz)   BMI 32.46 kg/m²     US Soft Tissue Axilla, Left  Narrative: EXAM: US SOFT TISSUE AXILLA, LEFT    CLINICAL HISTORY: Enlarged lymph nodes.    TECHNIQUE:  Soft tissue left axilla ultrasound with grayscale, and color Doppler ultrasound over the area of concern.    FINDINGS:  Multiple lymph nodes measuring 2.9 x 1 x 4.6 cm, 2.3 x 1 x 2.4 cm and more normal-appearing 1 cm lymph nodes.  The largest lymph nodes have some loss of normal morphology.    Additionally there is a rounded 0.9 x 0.9 x 2.9 cm lymph node with loss of normal fatty hilum.  Impression:  Enlarged and abnormal-appearing lymph nodes.  Follow-up or tissue sampling based on clinical concerns.    Finalized on: 8/23/2024 11:06 AM By:  Juventino Talamantes MD  [facility]# [id number]      2024-08-23 11:09:05.643    [facility]       Physical Exam   LOWER EXTREMITY PHYSICAL EXAMINATION    Vascular:  The right dorsalis pedis pulse 2/4 and the right posterior tibial pulse 2/4.  The left dorsalis pedis pulse 2/4 and posterior tibial pulse on the left is 2/4.  Capillary refill is intact.  Pedal hair growth intact     Musculoskeletal: Manual Muscle Testing is 5/5 with dorsiflexion, plantar flexion, abduction, and adduction.   There is normal range of motion in the forefoot, hindfoot, and Ankle joint.   Pain on direct palpation of the lesion to the left medial foot.    Dermatological:  Skin is supple and moist.  There is 1 lesion present to the plantar aspect of the foot which have a resemblance to a plantar porokeratosis.  Centralize core is noted.  There is no acute signs of infection.  No signs of underlying ulceration.  There is no absence of skin line.  No obvious capillary hemorrhaging.      Imaging:       Results for orders placed during the hospital encounter of 01/25/23    X-Ray Foot Complete Right    Narrative  EXAM: XR FOOT COMPLETE 3 VIEW " RIGHT    CLINICAL INDICATION:   [M10.9]-Gout, unspecified.    FINDINGS:  No acute fracture or dislocation.  Bandage material overlies the great toe obscuring osseous detail.  There is some osseous erosion near the distal tuft of the great toe.    Impression  Osseous erosion involving the distal tuft of the right great toe findings suggest osteomyelitis.    Finalized on: 1/25/2023 4:20 PM By:  Myles Mcghee MD  [facility]# [id number]      2023-01-25 16:22:25.767    [facility]          Assessment:     1. Porokeratosis    2. Pain in left foot    3. Immunosuppression    4. Liver transplanted    5. History of hepatitis C        Plan:     Porokeratosis    Pain in left foot    Immunosuppression    Liver transplanted    History of hepatitis C        We discussed the etiology and pathology associated with acquired plantar porokeratosis.  We discussed the importance of removing the centralize core and alleviating pain discomfort.  We also discussed utilizing a pumice stone at home to reduce callus formation and subsequent recurrence of this area.  Recommend to apply hydrating creams and lotions this area daily to ensure that there is no subsequent buildup.    Porokeratotic skin formation, as outlined within the examination portion of this note, is surgically debrided with sharp #10/#15 blade, to alleviate discomfort with weight bearing and ambulation, and to lessen the possibility of skin complications, e.g., ulceration due to pressure. No ulceration(s) is are noted with/post debridement. The lesion is completed healed and resolved. No evidence of infection.         Future Appointments   Date Time Provider Department Center   9/6/2024 10:45 AM Renee Edwards DPM [facility] POD [facility]   9/11/2024  9:40 AM Sg Knight MD [facility] GENSUR [facility]   10/3/2024 10:20 AM LABORATORY, [facility] ON [facility]   10/21/2024 10:30 AM Teresa Cartagena MD [facility] HEPAT [facility]   11/18/2024  8:00 AM ON XR1- ON [facility]   11/18/2024  8:15 AM  [facility]   11/18/2024  9:00 [facility]   11/18/2024 11:00 [facility]   1/27/2025 10:20 [facility]

## 2024-09-19 ENCOUNTER — PATIENT OUTREACH (OUTPATIENT)
Dept: ADMINISTRATIVE | Facility: HOSPITAL | Age: 69
End: 2024-09-19
Payer: MEDICARE

## 2024-09-19 NOTE — PROGRESS NOTES
Working Statin CVD DM Report.  No allergy or intolerance noted. Not on any statin.  Per AWV on 3/13/24, pt has never been on a statin.  No code dropped.    Pt has appt scheduled, 1/27/25. Remind me set.

## 2024-09-30 ENCOUNTER — TELEPHONE (OUTPATIENT)
Dept: INTERNAL MEDICINE | Facility: CLINIC | Age: 69
End: 2024-09-30
Payer: MEDICARE

## 2024-09-30 ENCOUNTER — OFFICE VISIT (OUTPATIENT)
Dept: SURGERY | Facility: CLINIC | Age: 69
End: 2024-09-30
Payer: MEDICARE

## 2024-09-30 ENCOUNTER — TELEPHONE (OUTPATIENT)
Dept: TRANSPLANT | Facility: CLINIC | Age: 69
End: 2024-09-30
Payer: MEDICARE

## 2024-09-30 VITALS
HEART RATE: 92 BPM | DIASTOLIC BLOOD PRESSURE: 82 MMHG | SYSTOLIC BLOOD PRESSURE: 140 MMHG | HEIGHT: 69 IN | TEMPERATURE: 98 F | WEIGHT: 223.75 LBS | BODY MASS INDEX: 33.14 KG/M2

## 2024-09-30 DIAGNOSIS — R59.0 CERVICAL LYMPHADENOPATHY: Primary | ICD-10-CM

## 2024-09-30 DIAGNOSIS — D84.9 IMMUNOSUPPRESSION: ICD-10-CM

## 2024-09-30 DIAGNOSIS — Z94.4 LIVER TRANSPLANTED: ICD-10-CM

## 2024-09-30 DIAGNOSIS — I82.622 DEEP VEIN THROMBOSIS (DVT) OF LEFT UPPER EXTREMITY, UNSPECIFIED CHRONICITY, UNSPECIFIED VEIN: ICD-10-CM

## 2024-09-30 DIAGNOSIS — R59.0 AXILLARY LYMPHADENOPATHY: ICD-10-CM

## 2024-09-30 PROCEDURE — 99999 PR PBB SHADOW E&M-EST. PATIENT-LVL IV: CPT | Mod: PBBFAC,HCNC,, | Performed by: SURGERY

## 2024-09-30 PROCEDURE — 3077F SYST BP >= 140 MM HG: CPT | Mod: HCNC,CPTII,S$GLB, | Performed by: SURGERY

## 2024-09-30 PROCEDURE — 1125F AMNT PAIN NOTED PAIN PRSNT: CPT | Mod: HCNC,CPTII,S$GLB, | Performed by: SURGERY

## 2024-09-30 PROCEDURE — 3079F DIAST BP 80-89 MM HG: CPT | Mod: HCNC,CPTII,S$GLB, | Performed by: SURGERY

## 2024-09-30 PROCEDURE — 1159F MED LIST DOCD IN RCRD: CPT | Mod: HCNC,CPTII,S$GLB, | Performed by: SURGERY

## 2024-09-30 PROCEDURE — 3008F BODY MASS INDEX DOCD: CPT | Mod: HCNC,CPTII,S$GLB, | Performed by: SURGERY

## 2024-09-30 PROCEDURE — 99204 OFFICE O/P NEW MOD 45 MIN: CPT | Mod: HCNC,S$GLB,, | Performed by: SURGERY

## 2024-09-30 NOTE — TELEPHONE ENCOUNTER
----- Message from Hao Washburn MD sent at 9/30/2024 10:53 AM CDT -----  He was found to have axillary adenopathy on a CT chest.  He also has right cervical adenopathy versus a enlarged salivary gland on exam.      I recommended a PET scan to start.      The patient also has a provoked DVT and it was been on Eliquis for almost 10 months.  He was not had any follow up imaging studies for this.  It was never stopped his Eliquis.        I would recommend an ultrasound be done of his left upper extremity to determine if the DVT has resolved and if ongoing Eliquis is needed.  I would defer ordering this test to his primary care with the transplant service as I will not be determining his need Eliquis.      Before any surgical intervention he would need to have his pulmonary workup completed.      Given his transplant history I would ask his transplant coordinator to determine if he needs to be seen in liver transplant Clinic for this.      Please feel free to contact me with any questions or concerns

## 2024-09-30 NOTE — PATIENT INSTRUCTIONS
You need to have a PET scan.  This is a scan to look for enlarged lymph nodes in your body that may be metabolically active.      You need to have an ultrasound of your arm to see if the blood clot went away.      It needs to be determine if you can stop your Eliquis.      Your transplant doctors also need to know about this as they may want to further investigate this.      I will call you with the results of the PET scan\

## 2024-09-30 NOTE — LETTER
September 30, 2024        Gertrudis Arredondo RN             [facility]  [address] [place] [address] [address] [address]  Phone: [phone]  Fax: [phone]   Patient: Lj Coleman   MR Number: [id number]   YOB: 1955   Date of Visit: 9/30/2024       Dear Dr. Arredondo:    Thank you for referring Lj Coleman to me for evaluation. Below are the relevant portions of my assessment and plan of care.        Please review the general surgical notes    If you have questions, please do not hesitate to call me. I look forward to following Lj along with you.    Sincerely,      Hao Washburn MD           CC    No Recipients

## 2024-09-30 NOTE — PROGRESS NOTES
Patient ID: Lj Coleman is a 68 y.o. male.    Chief Complaint: Consult (Biopsy)      HPI:  68-year-old male who presents for evaluation of axillary adenopathy.      The patient underwent a CT scan of his chest for lung cancer screening due to his smoking history.  This showed enlarged lymph nodes of the axilla.  An ultrasound was done.    The ultrasound raise the question of reactive lymph nodes versus potential malignancy.      The patient has a history of a provoked left upper extremity DVT secondary to a PICC line that was placed from antibiotics.  He has been on Eliquis for this.  He was started treatment in October of 2024.  He has had an ultrasound done almost a year ago that showed that there was a partial clot.  He was not had any additional imaging for his provoked DVT of the upper extremity.  He was never been told he can stop his Eliquis.      The patient does report night sweats but no fever.  Also has significant shortness of breath with dyspnea with any activity including walking.  He was scheduled for pulmonary function tests and a pulmonary stress test.      He was sent for surgical evaluation for his CT and ultrasound findings.    Review of Systems   Constitutional: Negative.    HENT:          Enlarged lymph nodes of the right neck   Eyes: Negative.    Respiratory:  Positive for shortness of breath.    Cardiovascular: Negative.    Gastrointestinal: Negative.         Bulging of the abdominal wall   Endocrine: Negative.    Genitourinary: Negative.    Musculoskeletal: Negative.    Skin: Negative.    Allergic/Immunologic: Negative.    Neurological: Negative.    Hematological: Negative.    Psychiatric/Behavioral: Negative.       Current Outpatient Medications   Medication Sig Dispense Refill    albuterol (VENTOLIN HFA) 90 mcg/actuation inhaler Inhale 2 puffs into the lungs every 6 (six) hours as needed for Wheezing or Shortness of Breath. Rescue 18 g 3    apixaban (ELIQUIS) 5 mg Tab Take 1  tablet (5 mg total) by mouth 2 (two) times daily. 180 tablet 3    esomeprazole (NEXIUM) 40 MG capsule Take 1 capsule (40 mg total) by mouth once daily. 90 capsule 3    FLUAD QUAD 2023-24,65Y UP,,PF, 60 mcg (15 mcg x 4)/0.5 mL Syrg       fluticasone propionate (FLONASE) 50 mcg/actuation nasal spray 1 spray (50 mcg total) by Each Nostril route once daily. 9.9 mL 0    fluticasone-umeclidin-vilanter (TRELEGY ELLIPTA) 100-62.5-25 mcg DsDv Inhale 1 puff into the lungs once daily. 60 each 5    metoprolol succinate (TOPROL-XL) 25 MG 24 hr tablet TAKE 1 TABLET EVERY DAY 90 tablet 3    tacrolimus (PROGRAF) 0.5 MG Cap TAKE 2 CAPSULES EVERY 12 HOURS 360 capsule 3    tamsulosin (FLOMAX) 0.4 mg Cap Take 1 capsule (0.4 mg total) by mouth once daily. 30 capsule 2    diclofenac sodium (VOLTAREN) 1 % Gel Apply 2 g topically 4 (four) times daily. for 10 days 100 g 0    traMADoL (ULTRAM) 50 mg tablet Take 1 tablet (50 mg total) by mouth daily as needed for Pain. 30 tablet 0     No current facility-administered medications for this visit.     Facility-Administered Medications Ordered in Other Visits   Medication Dose Route Frequency Provider Last Rate Last Admin    lactated ringers infusion   Intravenous Continuous Linwood Ellsworth MD   New Bag at 01/20/21 0734    sodium chloride 0.9% flush 2 mL  2 mL Intravenous PRN Linwood Ellsworth MD           Review of patient's allergies indicates:   Allergen Reactions    Codeine Other (See Comments)     Upset stomach       Past Medical History:   Diagnosis Date    Acute deep vein thrombosis (DVT) of left upper extremity 04/04/2023    Alcohol dependence     stopped 2012    Angina pectoris     Arthritis     back    Atherosclerosis of native coronary artery without angina pectoris/ LAD 09/08/2016    Back pain     Chronic hepatitis C with cirrhosis     Depression     Hepatoma     Prior to liver transplant    History of colon polyps 09/09/2015    History of transplantation, liver 04/2012    History of  vertebral fracture     Hypertension     Immune deficiency disorder     Incisional hernia following transplant 04/09/2014    Liver failure 11/2011    Related to chemotherapy for hepatoma    Liver transplanted 04/2012    Obesity     PVCs (premature ventricular contractions)     Tobacco abuse 09/09/2016    Trouble in sleeping     Vertebral fracture 1975       Past Surgical History:   Procedure Laterality Date    COLONOSCOPY N/A 1/20/2021    Procedure: COLONOSCOPY;  Surgeon: Emerson Helm MD;  Location: [facility];  Service: Endoscopy;  Laterality: N/A;    HERNIA REPAIR Right 2013    incisional    LIVER TRANSPLANT  April 2012    MOUTH SURGERY      TONSILLECTOMY  1958       Social History     Socioeconomic History    Marital status:     Highest education level: 10th grade   Tobacco Use    Smoking status: Every Day     Current packs/day: 0.75     Average packs/day: 0.8 packs/day for 36.7 years (27.6 ttl pk-yrs)     Types: Cigarettes     Start date: 1988    Smokeless tobacco: Never    Tobacco comments:     Currently smokes a few (3-5) a day as of 6/12/24   Substance and Sexual Activity    Alcohol use: No     Alcohol/week: 0.0 standard drinks of alcohol     Comment: Quit alcohol after some alcohol abuse, prior to his liver transplant    Drug use: No    Sexual activity: Not Currently     Social Drivers of Health     Financial Resource Strain: Medium Risk (3/13/2024)    Overall Financial Resource Strain (CARDIA)     Difficulty of Paying Living Expenses: Somewhat hard   Food Insecurity: No Food Insecurity (3/13/2024)    Hunger Vital Sign     Worried About Running Out of Food in the Last Year: Never true     Ran Out of Food in the Last Year: Never true   Transportation Needs: Unmet Transportation Needs (3/13/2024)    PRAPARE - Transportation     Lack of Transportation (Medical): Yes     Lack of Transportation (Non-Medical): No   Physical Activity: Inactive (3/13/2024)    Exercise Vital Sign     Days of Exercise per  Week: 0 days     Minutes of Exercise per Session: 0 min   Stress: No Stress Concern Present (3/13/2024)    [employer] - Occupational Stress Questionnaire     Feeling of Stress : Not at all   Housing Stability: Low Risk  (3/13/2024)    Housing Stability Vital Sign     Unable to Pay for Housing in the Last Year: No     Number of Places Lived in the Last Year: 1     Unstable Housing in the Last Year: No       Vitals:    09/30/24 0918   BP: (!) 140/82   Pulse: 92   Temp: 98.3 °F (36.8 °C)       Physical Exam  Constitutional:       General: He is not in acute distress.     Appearance: He is well-developed. Ill appearance: mild chronic.   HENT:      Head: Normocephalic and atraumatic.   Eyes:      General: No scleral icterus.     Pupils: Pupils are equal, round, and reactive to light.   Neck:      Thyroid: No thyromegaly.      Vascular: No JVD.      Trachea: No tracheal deviation.   Cardiovascular:      Rate and Rhythm: Normal rate and regular rhythm.      Heart sounds: Normal heart sounds.   Pulmonary:      Effort: Pulmonary effort is normal.      Breath sounds: Normal breath sounds.   Abdominal:      General: Bowel sounds are normal. There is no distension.      Palpations: Abdomen is soft. There is no mass.      Tenderness: There is no abdominal tenderness. There is no guarding or rebound.      Comments: Somewhat protuberant.  Reducible wide-based upper abdominal hernia   Musculoskeletal:         General: Normal range of motion.      Cervical back: Normal range of motion and neck supple.   Lymphadenopathy:      Cervical: No cervical adenopathy (right submandibular versus salivary gland).   Skin:     General: Skin is warm and dry.      Comments: No significant axillary adenopathy is palpable   Neurological:      Mental Status: He is alert and oriented to person, place, and time.   Psychiatric:         Behavior: Behavior normal.         Thought Content: Thought content normal.          Judgment: Judgment normal.     Ultrasound December 23 showing partial DVT  Reading Physician Reading Date Result Priority   Uriah Fernandez MD  [phone] 12/28/2023      Narrative & Impression  EXAM: US UPPER EXTREMITY VEINS LEFT     CLINICAL HISTORY: Left upper extremity pain, swelling and possible DVT.     TECHNIQUE:  Grayscale, spectral and color Doppler evaluation of the deep veins of the left upper extremity and left neck were evaluated using compression and augmentation techniques.     FINDINGS: Partially occlusive thrombus seen within the left brachial vein extending to the distal subclavian vein consistent with chronic DVT.  The left internal jugular,  axillary,  basilic, cephalic, radial and ulnar veins are patent and compressible with normal phasic flow and normal response to augmentation.        Impression:     Partially occlusive thrombus seen within the left brachial vein extending to the distal subclavian vein consistent with chronic DVT.  Patient has a history of DVT.     Finalized on: 12/28/2023 8:33 AM By:  Uriah Fernandez MD  [facility]# [id number]      2023-12-28 08:35:48.097    [facility]    CT chest    EXAMINATION:  CT CHEST LUNG SCREENING LOW DOSE     CLINICAL HISTORY:  Lung cancer screening, >= 30 pk-yr current smoker (Age 55-80y); Nicotine dependence, cigarettes, uncomplicated     TECHNIQUE:  CT of the thorax was performed with low dose, lung screening protocol.  No contrast was administered.  Sagittal and coronal reconstructions were obtained.     COMPARISON:  04/04/2023 and 06/11/2015     FINDINGS:  Lungs: There are no abnormal opacities that require further evaluation.  Scattered mild scarring in granulomatous changes noted.  The lungs show findings consistent with emphysema.     Pleura:   No effusion..     Heart and pericardium: Normal size without effusion.     Aorta and vasculature: Mild atherosclerotic plaquing of the aortic arch.     Chest wall and skeletal structures: Unremarkable  except age-appropriate degenerative changes.     Upper abdomen: Unremarkable.     Miscellaneous: Prominent left axillary lymph node present which may be reactive.  No pathologically enlarged right axillary, mediastinal hilar lymph nodes with small mediastinal lymph nodes again noted.     Impression:     Lung-RADS Category:  1 - Negative - Continue annual screening with LDCT in 12 months.     Clinically or potentially clinically significant non lung cancer finding:  None.     Prior Lung Cancer Modifier:  No history of prior lung cancer.     Enlarged left axillary lymph, likely reactive.  Follow-up axillary ultrasound can performed to ensure resolution.        Electronically signed by:Shay Edmond MD  Date:                                            08/06/2024  Time:                                           13:29    Axillary ultrasound       CLINICAL HISTORY: Enlarged lymph nodes.     TECHNIQUE:  Soft tissue left axilla ultrasound with grayscale, and color Doppler ultrasound over the area of concern.     FINDINGS:  Multiple lymph nodes measuring 2.9 x 1 x 4.6 cm, 2.3 x 1 x 2.4 cm and more normal-appearing 1 cm lymph nodes.  The largest lymph nodes have some loss of normal morphology.     Additionally there is a rounded 0.9 x 0.9 x 2.9 cm lymph node with loss of normal fatty hilum.        Impression:   Enlarged and abnormal-appearing lymph nodes.  Follow-up or tissue sampling based on clinical concerns.     Finalized on: 8/23/2024 11:06 AM By:  Juventino Talamantes MD  [facility]# [id number]      2024-08-23 11:09:05.643    [facility]        Exam Ended: 08/23/24 09:15 CDT Last Resulted: 08/23/24 11:06 CDT         Assessment & Plan:    Cervical and axillary lymphadenopathy.      Based on the enlarged lymph nodes and given his history of immunosuppression lymphoma needs considered.  Feel the next step is to a PET scan to look and see if the axillary and cervical areas are reactive and or if there is additional reactive lymph nodes.       History of a provoked DVT which was last in December of last year.  He has been on Eliquis since then.  History of a liver transplant with immunosuppression    Profound shortness of breath with pulmonary workup and progress    Recommendation    PET scan  Ultrasound of his left arm either by primary care to determine if the left upper extremity clot has resolved as this is a provoked DVT and long term treatment may not be needed.  If it is needed then a decision would need to be made if the patient can stop his Eliquis without bridging for any surgical procedures.      Should the patient need a lymph node biopsy I would start with a ultrasound and image guided biopsy as he was likely an increased risk for surgical intervention due to his pulmonary status.  The lymph node we would likely need to be identified with an interactive marker to facilitate excision.        Communication was sent to his primary care doctor and transplant coordinator

## 2024-09-30 NOTE — TELEPHONE ENCOUNTER
Isabella Sutton DO Golightly, George, MD; Gertrudis Arredondo RN  Last US done in December indicated chronic DVT (after the initial diagnosis in April 2023). I have ordered another one and will have my staff to get it scheduled.      ----- Message from Teresa Cartagena MD sent at 9/30/2024 12:46 PM CDT -----  No, I do not see any transplant specific issues at this time.    ----- Message -----  From: Gertrudis Arredondo RN  Sent: 9/30/2024  11:31 AM CDT  To: Isabella Sutton DO; Teresa Cartagena MD    Hi Dr Cartagena, please see below, let me know if you need him seen in clinic or if you want any further testing/referral ordered.  ----- Message -----  From: Hao Washburn MD  Sent: 9/30/2024  10:56 AM CDT  To: Isabella Sutton DO; Gertrudis Arredondo RN    He was found to have axillary adenopathy on a CT chest.  He also has right cervical adenopathy versus a enlarged salivary gland on exam.      I recommended a PET scan to start.      The patient also has a provoked DVT and it was been on Eliquis for almost 10 months.  He was not had any follow up imaging studies for this.  It was never stopped his Eliquis.        I would recommend an ultrasound be done of his left upper extremity to determine if the DVT has resolved and if ongoing Eliquis is needed.  I would defer ordering this test to his primary care with the transplant service as I will not be determining his need Eliquis.      Before any surgical intervention he would need to have his pulmonary workup completed.      Given his transplant history I would ask his transplant coordinator to determine if he needs to be seen in liver transplant Clinic for this.      Please feel free to contact me with any questions or concerns

## 2024-10-03 ENCOUNTER — HOSPITAL ENCOUNTER (OUTPATIENT)
Dept: RADIOLOGY | Facility: HOSPITAL | Age: 69
Discharge: HOME | End: 2024-10-03
Attending: SURGERY
Payer: MEDICARE

## 2024-10-03 DIAGNOSIS — R59.0 AXILLARY LYMPHADENOPATHY: ICD-10-CM

## 2024-10-03 DIAGNOSIS — R59.0 CERVICAL LYMPHADENOPATHY: ICD-10-CM

## 2024-10-03 PROCEDURE — A9552 F18 FDG: HCPCS | Mod: HCNC | Performed by: SURGERY

## 2024-10-03 PROCEDURE — 78815 PET IMAGE W/CT SKULL-THIGH: CPT | Mod: TC,HCNC

## 2024-10-03 PROCEDURE — 78815 PET IMAGE W/CT SKULL-THIGH: CPT | Mod: 26,HCNC,PI, | Performed by: STUDENT IN AN ORGANIZED HEALTH CARE EDUCATION/TRAINING PROGRAM

## 2024-10-03 RX ORDER — FLUDEOXYGLUCOSE F18 500 MCI/ML
8.7 INJECTION INTRAVENOUS
Status: COMPLETED | OUTPATIENT
Start: 2024-10-03 | End: 2024-10-03

## 2024-10-03 RX ADMIN — FLUDEOXYGLUCOSE F-18 8.7 MILLICURIE: 500 INJECTION INTRAVENOUS at 01:10

## 2024-10-04 ENCOUNTER — TELEPHONE (OUTPATIENT)
Dept: SURGERY | Facility: CLINIC | Age: 69
End: 2024-10-04
Payer: MEDICARE

## 2024-10-04 ENCOUNTER — TELEPHONE (OUTPATIENT)
Dept: SURGERY | Facility: HOSPITAL | Age: 69
End: 2024-10-04
Payer: MEDICARE

## 2024-10-04 ENCOUNTER — HOSPITAL ENCOUNTER (OUTPATIENT)
Dept: RADIOLOGY | Facility: HOSPITAL | Age: 69
Discharge: HOME | End: 2024-10-04
Attending: INTERNAL MEDICINE
Payer: MEDICARE

## 2024-10-04 DIAGNOSIS — I82.622 DEEP VEIN THROMBOSIS (DVT) OF LEFT UPPER EXTREMITY, UNSPECIFIED CHRONICITY, UNSPECIFIED VEIN: ICD-10-CM

## 2024-10-04 PROCEDURE — 93971 EXTREMITY STUDY: CPT | Mod: 26,HCNC,LT, | Performed by: RADIOLOGY

## 2024-10-04 PROCEDURE — 93971 EXTREMITY STUDY: CPT | Mod: TC,HCNC,LT

## 2024-10-04 NOTE — TELEPHONE ENCOUNTER
Patient was called about his PET scan results.  Concerning for lymphoma.  He was undergo an ultrasound of his upper extremities to evaluate his previous DVT in his area.      We will have him follow up in the office next Thursday to discuss and arrange a lymph node biopsy.

## 2024-10-04 NOTE — TELEPHONE ENCOUNTER
----- Message from Hao Washburn MD sent at 10/4/2024  9:28 AM CDT -----  Patient needs an appointment to see me on Thursday next week

## 2024-10-04 NOTE — TELEPHONE ENCOUNTER
Contacted the patient about his needed appointment date and time with Dr. Washburn on 10/10/2024 for 4:40 pm to discuss possible surgery. The patient verbalized understanding

## 2024-10-09 ENCOUNTER — LAB VISIT (OUTPATIENT)
Dept: LAB | Facility: HOSPITAL | Age: 69
End: 2024-10-09
Attending: INTERNAL MEDICINE
Payer: MEDICARE

## 2024-10-09 DIAGNOSIS — Z94.4 S/P LIVER TRANSPLANT: ICD-10-CM

## 2024-10-09 LAB
ALBUMIN SERPL BCP-MCNC: 2.9 G/DL (ref 3.5–5.2)
ALP SERPL-CCNC: 138 U/L (ref 55–135)
ALT SERPL W/O P-5'-P-CCNC: 16 U/L (ref 10–44)
ANION GAP SERPL CALC-SCNC: 8 MMOL/L (ref 8–16)
AST SERPL-CCNC: 21 U/L (ref 10–40)
BASOPHILS # BLD AUTO: 0.01 K/UL (ref 0–0.2)
BASOPHILS NFR BLD: 0.3 % (ref 0–1.9)
BILIRUB SERPL-MCNC: 2 MG/DL (ref 0.1–1)
BUN SERPL-MCNC: 31 MG/DL (ref 8–23)
CALCIUM SERPL-MCNC: 8.2 MG/DL (ref 8.7–10.5)
CHLORIDE SERPL-SCNC: 110 MMOL/L (ref 95–110)
CO2 SERPL-SCNC: 21 MMOL/L (ref 23–29)
CREAT SERPL-MCNC: 2.2 MG/DL (ref 0.5–1.4)
DIFFERENTIAL METHOD BLD: ABNORMAL
EOSINOPHIL # BLD AUTO: 0.1 K/UL (ref 0–0.5)
EOSINOPHIL NFR BLD: 1.6 % (ref 0–8)
ERYTHROCYTE [DISTWIDTH] IN BLOOD BY AUTOMATED COUNT: 14.9 % (ref 11.5–14.5)
EST. GFR  (NO RACE VARIABLE): 31.4 ML/MIN/1.73 M^2
GLUCOSE SERPL-MCNC: 104 MG/DL (ref 70–110)
HCT VFR BLD AUTO: 37.4 % (ref 40–54)
HGB BLD-MCNC: 12.4 G/DL (ref 14–18)
IMM GRANULOCYTES # BLD AUTO: 0.01 K/UL (ref 0–0.04)
IMM GRANULOCYTES NFR BLD AUTO: 0.3 % (ref 0–0.5)
LYMPHOCYTES # BLD AUTO: 0.3 K/UL (ref 1–4.8)
LYMPHOCYTES NFR BLD: 8.4 % (ref 18–48)
MCH RBC QN AUTO: 30.5 PG (ref 27–31)
MCHC RBC AUTO-ENTMCNC: 33.2 G/DL (ref 32–36)
MCV RBC AUTO: 92 FL (ref 82–98)
MONOCYTES # BLD AUTO: 0.3 K/UL (ref 0.3–1)
MONOCYTES NFR BLD: 8.7 % (ref 4–15)
NEUTROPHILS # BLD AUTO: 2.5 K/UL (ref 1.8–7.7)
NEUTROPHILS NFR BLD: 80.7 % (ref 38–73)
NRBC BLD-RTO: 0 /100 WBC
PLATELET # BLD AUTO: 62 K/UL (ref 150–450)
PMV BLD AUTO: 13 FL (ref 9.2–12.9)
POTASSIUM SERPL-SCNC: 4 MMOL/L (ref 3.5–5.1)
PROT SERPL-MCNC: 6.7 G/DL (ref 6–8.4)
RBC # BLD AUTO: 4.06 M/UL (ref 4.6–6.2)
SODIUM SERPL-SCNC: 139 MMOL/L (ref 136–145)
WBC # BLD AUTO: 3.09 K/UL (ref 3.9–12.7)

## 2024-10-09 PROCEDURE — 36415 COLL VENOUS BLD VENIPUNCTURE: CPT | Mod: HCNC | Performed by: INTERNAL MEDICINE

## 2024-10-09 PROCEDURE — 85025 COMPLETE CBC W/AUTO DIFF WBC: CPT | Mod: HCNC | Performed by: INTERNAL MEDICINE

## 2024-10-09 PROCEDURE — 80053 COMPREHEN METABOLIC PANEL: CPT | Mod: HCNC | Performed by: INTERNAL MEDICINE

## 2024-10-09 PROCEDURE — 80197 ASSAY OF TACROLIMUS: CPT | Mod: HCNC | Performed by: INTERNAL MEDICINE

## 2024-10-10 ENCOUNTER — OFFICE VISIT (OUTPATIENT)
Dept: SURGERY | Facility: CLINIC | Age: 69
End: 2024-10-10
Payer: MEDICARE

## 2024-10-10 ENCOUNTER — HOSPITAL ENCOUNTER (OUTPATIENT)
Dept: CARDIOLOGY | Facility: HOSPITAL | Age: 69
Discharge: HOME | End: 2024-10-10
Attending: SURGERY
Payer: MEDICARE

## 2024-10-10 VITALS
SYSTOLIC BLOOD PRESSURE: 109 MMHG | HEART RATE: 104 BPM | BODY MASS INDEX: 32.52 KG/M2 | WEIGHT: 220.25 LBS | DIASTOLIC BLOOD PRESSURE: 62 MMHG

## 2024-10-10 DIAGNOSIS — R59.0 AXILLARY LYMPHADENOPATHY: Primary | ICD-10-CM

## 2024-10-10 DIAGNOSIS — R59.0 INGUINAL LYMPHADENOPATHY: ICD-10-CM

## 2024-10-10 DIAGNOSIS — R59.0 AXILLARY LYMPHADENOPATHY: ICD-10-CM

## 2024-10-10 LAB — TACROLIMUS BLD-MCNC: 9.7 NG/ML (ref 5–15)

## 2024-10-10 PROCEDURE — 93005 ELECTROCARDIOGRAM TRACING: CPT | Mod: HCNC

## 2024-10-10 PROCEDURE — 99999 PR PBB SHADOW E&M-EST. PATIENT-LVL IV: CPT | Mod: PBBFAC,HCNC,, | Performed by: SURGERY

## 2024-10-10 PROCEDURE — 93010 ELECTROCARDIOGRAM REPORT: CPT | Mod: HCNC,,, | Performed by: INTERNAL MEDICINE

## 2024-10-10 RX ORDER — ONDANSETRON 8 MG/1
8 TABLET, ORALLY DISINTEGRATING ORAL EVERY 8 HOURS PRN
OUTPATIENT
Start: 2024-10-10

## 2024-10-10 RX ORDER — CEFAZOLIN SODIUM 2 G/50ML
2 SOLUTION INTRAVENOUS
OUTPATIENT
Start: 2024-10-10

## 2024-10-10 RX ORDER — LIDOCAINE HYDROCHLORIDE 10 MG/ML
1 INJECTION, SOLUTION EPIDURAL; INFILTRATION; INTRACAUDAL; PERINEURAL ONCE
Status: SHIPPED | OUTPATIENT
Start: 2024-10-10

## 2024-10-10 NOTE — H&P (VIEW-ONLY)
Patient ID: Lj Coleman is a 68 y.o. male.    Chief Complaint: No chief complaint on file.      HPI:  68-year-old male who was initially sent for evaluation of axillary lymphadenopathy.  The patient underwent a PET scan that showed extensive lymphadenopathy concerning for malignancy.  He presents now to discuss lymph node biopsy.      The patient also had a upper extremity ultrasound that shows persistence of a partially occlusive DVT caused by a PICC line.      The patient is anticoagulated on Eliquis.      He also was immunosuppressed after his liver transplant.      Presents now to discuss his PET scan results and to schedule lymph node biopsy    Review of Systems   Constitutional: Negative.         Poor appetite   HENT: Negative.     Eyes: Negative.    Respiratory: Negative.     Cardiovascular: Negative.    Gastrointestinal: Negative.    Endocrine: Negative.    Genitourinary: Negative.    Musculoskeletal: Negative.    Skin: Negative.    Allergic/Immunologic: Negative.    Neurological: Negative.    Hematological: Negative.    Psychiatric/Behavioral: Negative.       Current Outpatient Medications   Medication Sig Dispense Refill    albuterol (VENTOLIN HFA) 90 mcg/actuation inhaler Inhale 2 puffs into the lungs every 6 (six) hours as needed for Wheezing or Shortness of Breath. Rescue 18 g 3    apixaban (ELIQUIS) 5 mg Tab Take 1 tablet (5 mg total) by mouth 2 (two) times daily. 180 tablet 3    esomeprazole (NEXIUM) 40 MG capsule Take 1 capsule (40 mg total) by mouth once daily. 90 capsule 3    FLUAD QUAD 2023-24,65Y UP,,PF, 60 mcg (15 mcg x 4)/0.5 mL Syrg       fluticasone propionate (FLONASE) 50 mcg/actuation nasal spray 1 spray (50 mcg total) by Each Nostril route once daily. 9.9 mL 0    fluticasone-umeclidin-vilanter (TRELEGY ELLIPTA) 100-62.5-25 mcg DsDv Inhale 1 puff into the lungs once daily. 60 each 5    metoprolol succinate (TOPROL-XL) 25 MG 24 hr tablet TAKE 1 TABLET EVERY DAY 90 tablet 3     tacrolimus (PROGRAF) 0.5 MG Cap TAKE 2 CAPSULES EVERY 12 HOURS 360 capsule 3    tamsulosin (FLOMAX) 0.4 mg Cap Take 1 capsule (0.4 mg total) by mouth once daily. 30 capsule 2    diclofenac sodium (VOLTAREN) 1 % Gel Apply 2 g topically 4 (four) times daily. for 10 days 100 g 0     Current Facility-Administered Medications   Medication Dose Route Frequency Provider Last Rate Last Admin    LIDOcaine (PF) 10 mg/ml (1%) injection 10 mg  1 mL Intradermal Once Hao Washburn MD         Facility-Administered Medications Ordered in Other Visits   Medication Dose Route Frequency Provider Last Rate Last Admin    lactated ringers infusion   Intravenous Continuous Linwood Ellsworth MD   New Bag at 01/20/21 0734    sodium chloride 0.9% flush 2 mL  2 mL Intravenous PRN Linwood Ellsworth MD           Review of patient's allergies indicates:   Allergen Reactions    Codeine Other (See Comments)     Upset stomach       Past Medical History:   Diagnosis Date    Acute deep vein thrombosis (DVT) of left upper extremity 04/04/2023    Alcohol dependence     stopped 2012    Angina pectoris     Arthritis     back    Atherosclerosis of native coronary artery without angina pectoris/ LAD 09/08/2016    Back pain     Chronic hepatitis C with cirrhosis     Depression     Hepatoma     Prior to liver transplant    History of colon polyps 09/09/2015    History of transplantation, liver 04/2012    History of vertebral fracture     Hypertension     Immune deficiency disorder     Incisional hernia following transplant 04/09/2014    Liver failure 11/2011    Related to chemotherapy for hepatoma    Liver transplanted 04/2012    Obesity     PVCs (premature ventricular contractions)     Tobacco abuse 09/09/2016    Trouble in sleeping     Vertebral fracture 1975       Past Surgical History:   Procedure Laterality Date    COLONOSCOPY N/A 1/20/2021    Procedure: COLONOSCOPY;  Surgeon: Emerson Helm MD;  Location: [facility];  Service: Endoscopy;  Laterality:  N/A;    HERNIA REPAIR Right 2013    incisional    LIVER TRANSPLANT  April 2012    MOUTH SURGERY      TONSILLECTOMY  1958       Social History     Socioeconomic History    Marital status:     Highest education level: 10th grade   Tobacco Use    Smoking status: Every Day     Current packs/day: 0.75     Average packs/day: 0.7 packs/day for 36.8 years (27.6 ttl pk-yrs)     Types: Cigarettes     Start date: 1988    Smokeless tobacco: Never    Tobacco comments:     Currently smokes a few (3-5) a day as of 6/12/24   Substance and Sexual Activity    Alcohol use: No     Alcohol/week: 0.0 standard drinks of alcohol     Comment: Quit alcohol after some alcohol abuse, prior to his liver transplant    Drug use: No    Sexual activity: Not Currently     Social Drivers of Health     Financial Resource Strain: Medium Risk (3/13/2024)    Overall Financial Resource Strain (CARDIA)     Difficulty of Paying Living Expenses: Somewhat hard   Food Insecurity: No Food Insecurity (3/13/2024)    Hunger Vital Sign     Worried About Running Out of Food in the Last Year: Never true     Ran Out of Food in the Last Year: Never true   Transportation Needs: Unmet Transportation Needs (3/13/2024)    PRAPARE - Transportation     Lack of Transportation (Medical): Yes     Lack of Transportation (Non-Medical): No   Physical Activity: Inactive (3/13/2024)    Exercise Vital Sign     Days of Exercise per Week: 0 days     Minutes of Exercise per Session: 0 min   Stress: No Stress Concern Present (3/13/2024)    [employer] - Occupational Stress Questionnaire     Feeling of Stress : Not at all   Housing Stability: Low Risk  (3/13/2024)    Housing Stability Vital Sign     Unable to Pay for Housing in the Last Year: No     Number of Places Lived in the Last Year: 1     Unstable Housing in the Last Year: No       Vitals:    10/10/24 1536   BP: 109/62   Pulse: 104       Physical Exam  Constitutional:       General: He is not in  acute distress.     Appearance: He is well-developed. Ill appearance: mild chronic.   HENT:      Head: Normocephalic and atraumatic.      Comments: Right submandibular lymph node  Eyes:      General: No scleral icterus.     Pupils: Pupils are equal, round, and reactive to light.   Neck:      Thyroid: No thyromegaly.      Vascular: No JVD.      Trachea: No tracheal deviation.   Cardiovascular:      Rate and Rhythm: Normal rate and regular rhythm.      Heart sounds: Normal heart sounds.   Pulmonary:      Effort: Pulmonary effort is normal.      Breath sounds: Normal breath sounds.   Abdominal:      General: Bowel sounds are normal. There is no distension.      Palpations: Abdomen is soft. There is no mass.      Tenderness: There is no abdominal tenderness. There is no guarding or rebound.      Hernia: Hernia: large shallow incisional hernia, reducible.      Comments: Bilateral inguinal lymph node   Musculoskeletal:         General: Normal range of motion.      Cervical back: Normal range of motion and neck supple.   Lymphadenopathy:      Cervical: No cervical adenopathy.   Skin:     General: Skin is warm and dry.   Neurological:      Mental Status: He is alert and oriented to person, place, and time.   Psychiatric:         Behavior: Behavior normal.         Thought Content: Thought content normal.         Judgment: Judgment normal.     Narrative & Impression  EXAMINATION:  US UPPER EXTREMITY VEINS LEFT     CLINICAL HISTORY:  Acute embolism and thrombosis of deep veins of left upper extremity     TECHNIQUE:  Duplex and color flow Doppler evaluation and dynamic compression was performed of the left upper extremity veins.     COMPARISON:  Prior ultrasounds     FINDINGS:  Left upper extremity: In internal jugular and subclavian veins normal.  Nonocclusive partial thrombus remains within the brachial evidence axillary vein.     basilic and cephalic veins are patent and compressible.     Right subclavian/internal jugular  veins: Subclavian and internal jugular veins are patent and free of thrombus.     Other findings: None.     Impression:     Persistent nonocclusive thrombus within the left brachial and axillary veins.        Electronically signed by:Shay Edmond MD  Date:                                            10/04/2024  Time:                                           14:53    Reading Physician Reading Date Result Priority   Compa Carreon MD  [phone] 10/3/2024 Routine     Narrative & Impression  EXAMINATION:  NM PET CT FDG SKULL BASE TO MID THIGH     CLINICAL HISTORY:  cerivcal and axilary lypmphadenopathy, liver transplant, imunosuppression; Localized enlarged lymph nodes     TECHNIQUE:  8.7 mCi of F18-FDG was administered intravenously in the right hand.  After an approximately 60 min distribution time, PET/CT images were acquired from the skull base to the mid thigh. Transmission images were acquired to correct for attenuation using a whole body low-dose CT scan without contrast with the arms positioned above the head. Glycemia at the time of injection was 105 mg/dL.     COMPARISON:  CT lung screening from 08/06/2024     FINDINGS:  Quality of the study: Adequate.     Head/neck: Multiple FDG avid lymph nodes in the right neck the most avid of measuring 2 x 1.2 cm posterior to the right SCM at the level of L2-L3.  SUV max measuresv 11.  There is an additional focus of avid uptake in the right jaw with an SUV max of 11.  There is mildly increased uptake in the left jaw.     Chest: Bilateral increased uptake within the enlarged axillary lymph nodes.  The largest lymph node on the left measuring 2.5 cm in short access and an SUV max of 22.   There is additional avid hilar lymph nodes.  Mild avidity in the upper mediastinal paratracheal lymph node.  Mildly increased uptake in a lymph node anterior to the right lower lobe.     Abdomen: Enlarged avid lymph nodes anterior to the IVC the largest measuring 2.3 cm short  axis with SUV max of 17.  There are additional avid retroperitoneal lymph nodes.  Multiple avid lymph nodes in the pelvis adjacent to the bilateral iliac vessels.  Enlarged bilateral inguinal lymph nodes the largest on the right measuring 1.9 cm in short axis and an SUV max of 19.     Physiologic uptake of the tracer is present within the brain, salivary glands, myocardium, GI and  tracts.     Incidental CT findings: 4 mm non avid right perihilar lung nodule.  Additional 4 mm calcified nodule in the anterior right lower lobe without evidence of increased avidity.  Status post liver transplant with associated postoperative changes.  Moderate atherosclerotic disease.  Age-appropriate degenerative changes.  Bilateral renal cysts.     Impression:     Extensive lymphadenopathy with increased metabolic activity as described above.  Additionally there is a focus of increased avidity in the right mandible.  Given the extensive nature and increased SUV uptake, metastatic disease should be excluded; thus, tissue sampling is recommended.        Electronically signed by:Compa Carreon  Date:                                            10/03/2024  Time:                                           17:48    CBC and CMP were reviewed.  There is a slight increase in his liver function tests  Assessment & Plan:      Extensive lymphadenopathy.  I am concerned that this could represent lymphoma.  Other metastatic disease is a concern.  I am less concerned about an infectious etiology.      I would recommend an inguinal lymph node biopsy as this can be done under sedation local anesthetic as he does have some increase in his liver function tests and it would spare him the need for general anesthetic.      The risks of lymph node biopsy include infection, bleeding, hematoma, seroma, lymphocele and unable to obtain a diagnosis.      The lymph node would need to be sent for culture or bacteria, acid-fast bacteria and fungus as well for  routine pathologic analysis and lymphoma and leukemia screen.      The patient will need an EKG preoperatively.      He was agreed to proceed.  Surgery be scheduled or October 16th at [facility]    The patient can hold his Eliquis without bridging per recommendations of his primary care doctor.      You will hold the Eliquis on October 14th and 15th for surgery on the 16th.  He was resume the Eliquis on the 17th

## 2024-10-10 NOTE — PROGRESS NOTES
Patient ID: Lj Coleman is a 68 y.o. male.    Chief Complaint: No chief complaint on file.      HPI:  68-year-old male who was initially sent for evaluation of axillary lymphadenopathy.  The patient underwent a PET scan that showed extensive lymphadenopathy concerning for malignancy.  He presents now to discuss lymph node biopsy.      The patient also had a upper extremity ultrasound that shows persistence of a partially occlusive DVT caused by a PICC line.      The patient is anticoagulated on Eliquis.      He also was immunosuppressed after his liver transplant.      Presents now to discuss his PET scan results and to schedule lymph node biopsy    Review of Systems   Constitutional: Negative.         Poor appetite   HENT: Negative.     Eyes: Negative.    Respiratory: Negative.     Cardiovascular: Negative.    Gastrointestinal: Negative.    Endocrine: Negative.    Genitourinary: Negative.    Musculoskeletal: Negative.    Skin: Negative.    Allergic/Immunologic: Negative.    Neurological: Negative.    Hematological: Negative.    Psychiatric/Behavioral: Negative.       Current Outpatient Medications   Medication Sig Dispense Refill    albuterol (VENTOLIN HFA) 90 mcg/actuation inhaler Inhale 2 puffs into the lungs every 6 (six) hours as needed for Wheezing or Shortness of Breath. Rescue 18 g 3    apixaban (ELIQUIS) 5 mg Tab Take 1 tablet (5 mg total) by mouth 2 (two) times daily. 180 tablet 3    esomeprazole (NEXIUM) 40 MG capsule Take 1 capsule (40 mg total) by mouth once daily. 90 capsule 3    FLUAD QUAD 2023-24,65Y UP,,PF, 60 mcg (15 mcg x 4)/0.5 mL Syrg       fluticasone propionate (FLONASE) 50 mcg/actuation nasal spray 1 spray (50 mcg total) by Each Nostril route once daily. 9.9 mL 0    fluticasone-umeclidin-vilanter (TRELEGY ELLIPTA) 100-62.5-25 mcg DsDv Inhale 1 puff into the lungs once daily. 60 each 5    metoprolol succinate (TOPROL-XL) 25 MG 24 hr tablet TAKE 1 TABLET EVERY DAY 90 tablet 3     tacrolimus (PROGRAF) 0.5 MG Cap TAKE 2 CAPSULES EVERY 12 HOURS 360 capsule 3    tamsulosin (FLOMAX) 0.4 mg Cap Take 1 capsule (0.4 mg total) by mouth once daily. 30 capsule 2    diclofenac sodium (VOLTAREN) 1 % Gel Apply 2 g topically 4 (four) times daily. for 10 days 100 g 0     Current Facility-Administered Medications   Medication Dose Route Frequency Provider Last Rate Last Admin    LIDOcaine (PF) 10 mg/ml (1%) injection 10 mg  1 mL Intradermal Once Hao Washburn MD         Facility-Administered Medications Ordered in Other Visits   Medication Dose Route Frequency Provider Last Rate Last Admin    lactated ringers infusion   Intravenous Continuous Linwood Ellsworth MD   New Bag at 01/20/21 0734    sodium chloride 0.9% flush 2 mL  2 mL Intravenous PRN Linwood Ellsworth MD           Review of patient's allergies indicates:   Allergen Reactions    Codeine Other (See Comments)     Upset stomach       Past Medical History:   Diagnosis Date    Acute deep vein thrombosis (DVT) of left upper extremity 04/04/2023    Alcohol dependence     stopped 2012    Angina pectoris     Arthritis     back    Atherosclerosis of native coronary artery without angina pectoris/ LAD 09/08/2016    Back pain     Chronic hepatitis C with cirrhosis     Depression     Hepatoma     Prior to liver transplant    History of colon polyps 09/09/2015    History of transplantation, liver 04/2012    History of vertebral fracture     Hypertension     Immune deficiency disorder     Incisional hernia following transplant 04/09/2014    Liver failure 11/2011    Related to chemotherapy for hepatoma    Liver transplanted 04/2012    Obesity     PVCs (premature ventricular contractions)     Tobacco abuse 09/09/2016    Trouble in sleeping     Vertebral fracture 1975       Past Surgical History:   Procedure Laterality Date    COLONOSCOPY N/A 1/20/2021    Procedure: COLONOSCOPY;  Surgeon: Emerson Helm MD;  Location: [facility];  Service: Endoscopy;  Laterality:  N/A;    HERNIA REPAIR Right 2013    incisional    LIVER TRANSPLANT  April 2012    MOUTH SURGERY      TONSILLECTOMY  1958       Social History     Socioeconomic History    Marital status:     Highest education level: 10th grade   Tobacco Use    Smoking status: Every Day     Current packs/day: 0.75     Average packs/day: 0.7 packs/day for 36.8 years (27.6 ttl pk-yrs)     Types: Cigarettes     Start date: 1988    Smokeless tobacco: Never    Tobacco comments:     Currently smokes a few (3-5) a day as of 6/12/24   Substance and Sexual Activity    Alcohol use: No     Alcohol/week: 0.0 standard drinks of alcohol     Comment: Quit alcohol after some alcohol abuse, prior to his liver transplant    Drug use: No    Sexual activity: Not Currently     Social Drivers of Health     Financial Resource Strain: Medium Risk (3/13/2024)    Overall Financial Resource Strain (CARDIA)     Difficulty of Paying Living Expenses: Somewhat hard   Food Insecurity: No Food Insecurity (3/13/2024)    Hunger Vital Sign     Worried About Running Out of Food in the Last Year: Never true     Ran Out of Food in the Last Year: Never true   Transportation Needs: Unmet Transportation Needs (3/13/2024)    PRAPARE - Transportation     Lack of Transportation (Medical): Yes     Lack of Transportation (Non-Medical): No   Physical Activity: Inactive (3/13/2024)    Exercise Vital Sign     Days of Exercise per Week: 0 days     Minutes of Exercise per Session: 0 min   Stress: No Stress Concern Present (3/13/2024)    [employer] [place] [employer] - Occupational Stress Questionnaire     Feeling of Stress : Not at all   Housing Stability: Low Risk  (3/13/2024)    Housing Stability Vital Sign     Unable to Pay for Housing in the Last Year: No     Number of Places Lived in the Last Year: 1     Unstable Housing in the Last Year: No       Vitals:    10/10/24 1536   BP: 109/62   Pulse: 104       Physical Exam  Constitutional:       General: He is not in  acute distress.     Appearance: He is well-developed. Ill appearance: mild chronic.   HENT:      Head: Normocephalic and atraumatic.      Comments: Right submandibular lymph node  Eyes:      General: No scleral icterus.     Pupils: Pupils are equal, round, and reactive to light.   Neck:      Thyroid: No thyromegaly.      Vascular: No JVD.      Trachea: No tracheal deviation.   Cardiovascular:      Rate and Rhythm: Normal rate and regular rhythm.      Heart sounds: Normal heart sounds.   Pulmonary:      Effort: Pulmonary effort is normal.      Breath sounds: Normal breath sounds.   Abdominal:      General: Bowel sounds are normal. There is no distension.      Palpations: Abdomen is soft. There is no mass.      Tenderness: There is no abdominal tenderness. There is no guarding or rebound.      Hernia: Hernia: large shallow incisional hernia, reducible.      Comments: Bilateral inguinal lymph node   Musculoskeletal:         General: Normal range of motion.      Cervical back: Normal range of motion and neck supple.   Lymphadenopathy:      Cervical: No cervical adenopathy.   Skin:     General: Skin is warm and dry.   Neurological:      Mental Status: He is alert and oriented to person, place, and time.   Psychiatric:         Behavior: Behavior normal.         Thought Content: Thought content normal.         Judgment: Judgment normal.     Narrative & Impression  EXAMINATION:  US UPPER EXTREMITY VEINS LEFT     CLINICAL HISTORY:  Acute embolism and thrombosis of deep veins of left upper extremity     TECHNIQUE:  Duplex and color flow Doppler evaluation and dynamic compression was performed of the left upper extremity veins.     COMPARISON:  Prior ultrasounds     FINDINGS:  Left upper extremity: In internal jugular and subclavian veins normal.  Nonocclusive partial thrombus remains within the brachial evidence axillary vein.     basilic and cephalic veins are patent and compressible.     Right subclavian/internal jugular  veins: Subclavian and internal jugular veins are patent and free of thrombus.     Other findings: None.     Impression:     Persistent nonocclusive thrombus within the left brachial and axillary veins.        Electronically signed by:Shay Edmond MD  Date:                                            10/04/2024  Time:                                           14:53    Reading Physician Reading Date Result Priority   Compa Carreon MD  [phone] 10/3/2024 Routine     Narrative & Impression  EXAMINATION:  NM PET CT FDG SKULL BASE TO MID THIGH     CLINICAL HISTORY:  cerivcal and axilary lypmphadenopathy, liver transplant, imunosuppression; Localized enlarged lymph nodes     TECHNIQUE:  8.7 mCi of F18-FDG was administered intravenously in the right hand.  After an approximately 60 min distribution time, PET/CT images were acquired from the skull base to the mid thigh. Transmission images were acquired to correct for attenuation using a whole body low-dose CT scan without contrast with the arms positioned above the head. Glycemia at the time of injection was 105 mg/dL.     COMPARISON:  CT lung screening from 08/06/2024     FINDINGS:  Quality of the study: Adequate.     Head/neck: Multiple FDG avid lymph nodes in the right neck the most avid of measuring 2 x 1.2 cm posterior to the right SCM at the level of L2-L3.  SUV max measuresv 11.  There is an additional focus of avid uptake in the right jaw with an SUV max of 11.  There is mildly increased uptake in the left jaw.     Chest: Bilateral increased uptake within the enlarged axillary lymph nodes.  The largest lymph node on the left measuring 2.5 cm in short access and an SUV max of 22.   There is additional avid hilar lymph nodes.  Mild avidity in the upper mediastinal paratracheal lymph node.  Mildly increased uptake in a lymph node anterior to the right lower lobe.     Abdomen: Enlarged avid lymph nodes anterior to the IVC the largest measuring 2.3 cm short  axis with SUV max of 17.  There are additional avid retroperitoneal lymph nodes.  Multiple avid lymph nodes in the pelvis adjacent to the bilateral iliac vessels.  Enlarged bilateral inguinal lymph nodes the largest on the right measuring 1.9 cm in short axis and an SUV max of 19.     Physiologic uptake of the tracer is present within the brain, salivary glands, myocardium, GI and  tracts.     Incidental CT findings: 4 mm non avid right perihilar lung nodule.  Additional 4 mm calcified nodule in the anterior right lower lobe without evidence of increased avidity.  Status post liver transplant with associated postoperative changes.  Moderate atherosclerotic disease.  Age-appropriate degenerative changes.  Bilateral renal cysts.     Impression:     Extensive lymphadenopathy with increased metabolic activity as described above.  Additionally there is a focus of increased avidity in the right mandible.  Given the extensive nature and increased SUV uptake, metastatic disease should be excluded; thus, tissue sampling is recommended.        Electronically signed by:Compa Carreon  Date:                                            10/03/2024  Time:                                           17:48    CBC and CMP were reviewed.  There is a slight increase in his liver function tests  Assessment & Plan:      Extensive lymphadenopathy.  I am concerned that this could represent lymphoma.  Other metastatic disease is a concern.  I am less concerned about an infectious etiology.      I would recommend an inguinal lymph node biopsy as this can be done under sedation local anesthetic as he does have some increase in his liver function tests and it would spare him the need for general anesthetic.      The risks of lymph node biopsy include infection, bleeding, hematoma, seroma, lymphocele and unable to obtain a diagnosis.      The lymph node would need to be sent for culture or bacteria, acid-fast bacteria and fungus as well for  routine pathologic analysis and lymphoma and leukemia screen.      The patient will need an EKG preoperatively.      He was agreed to proceed.  Surgery be scheduled or October 16th at [facility]    The patient can hold his Eliquis without bridging per recommendations of his primary care doctor.      You will hold the Eliquis on October 14th and 15th for surgery on the 16th.  He was resume the Eliquis on the 17th

## 2024-10-10 NOTE — PATIENT INSTRUCTIONS
Your Node biopsy is scheduled for Wednesday October 16th at 11:00 a.m. in the morning at the [facility]    You will not take your Eliquis on Monday or Tuesday October 14th or 15th prior to the surgery.      You will resume your Eliquis on October 16th the day after the surgery    Please take all your usual morning medications with a sip of water on the day of surgery      No solid food after midnight on October 15th but you may have clear liquids up to 3 hours before arriving at the hospital the hospital call you the night before with a time of arrival    Our office phone numbers are  [phone] and

## 2024-10-11 LAB
OHS QRS DURATION: 66 MS
OHS QTC CALCULATION: 464 MS

## 2024-10-14 ENCOUNTER — TELEPHONE (OUTPATIENT)
Dept: PREADMISSION TESTING | Facility: HOSPITAL | Age: 69
End: 2024-10-14
Payer: MEDICARE

## 2024-10-14 DIAGNOSIS — Z94.4 LIVER TRANSPLANTED: ICD-10-CM

## 2024-10-14 DIAGNOSIS — D84.9 IMMUNOSUPPRESSION: ICD-10-CM

## 2024-10-14 NOTE — TELEPHONE ENCOUNTER
Pre op instructions reviewed with Pt over telephone, verbalized understanding.    Procedure Date: 10/16/24  Arrival Time: We will call you after 2pm the day before your procedure with your arrival time.    Address:   [facility] (Off [facility], [facility] on the left)  [address] [place] [address] [place] [address]  >>>Please enter through front entrance Lobby of 1st floor to Registration desk<<<      !!!INSTRUCTIONS IMPORTANT!!!  DO NOT Eat, Drink, or Smoke after 12 midnight unless instructed otherwise by your Surgeon. OK to brush teeth, no gum, candy or mints!    >>>MEDICATION INSTRUCTIONS<<<: Morning of Surgery, take small sip of water with ONLY these medications:  NEXIUM  TRELEGY  METOPROLOL  TACROLIMUS  TAMSULOSIN  FLONASE    Additional Instructions: BRING ALBUTEROL INHALER TO PRE OP DAY OF SURGERY!    *Blood Thinners:HOLD ELIQUIS 10/14/24 AND 10/15/24 per Physician Instructions! Call your Surgeon office to inquire about any questions regarding your blood thinner medication.    *Diabetic/ Prediabetic Patients: !!!If you take diabetic or weight loss medication, Do NOT take morning of surgery unless instructed by Doctor!!!  Metformin to be stopped 24 hrs prior to surgery.   Long Acting Insulin Instructions: HOLD the night before surgery unless instructed differently by Provider!  Ozempic/ Mounjaro/ Wegovy/ Trulicity/ Semaglutide injections or weight loss medication to be stopped 7 days prior to surgery.    !!!STOP ALL Aspirins, NSAIDS, WEIGHT LOSS INJECTIONS/PILLS, Herbal supplements, & Vitamins 7 DAYS BEFORE SURGERY!!!    ____  Avoid Alcoholic beverages 3 days prior to surgery, as it can thin the blood.  ____  NO Acrylic/fake nails or nail polish worn day of surgery (specifically hand/arm & foot surgeries).  ____  NO powder, lotions, deodorants, oils or cream on body.  ____  Remove all jewelry & piercings & foreign objects before arrival & leave at home.  ____  Remove Dentures, Hearing Aids & Contact  Lens prior to surgery.  ____  Bring photo ID and insurance information to hospital (Leave Valuables at Home).  ____  If going home the same day, arrange for a ride home. You will not be able to drive for 24 hrs if Anesthesia was used.   ____  Females (ages 11-60): may need to give a urine sample the morning of surgery; please see Pre op Nurse prior to using the restroom.  ____  Males: Stop ED medications (Viagra, Cialis) 24 hrs prior to surgery.  ____  Wear clean, loose fitting clothing to allow for dressings/ bandages.      Bathing Instructions:    -Shower with anti-bacterial Soap (Hibiclens or Dial) the night before surgery and the morning of.   -Do not use Hibiclens on your face or genitals.   -Apply clean clothes after shower.  -Do not shave your face or body 2 days prior to surgery unless instructed otherwise by your Surgeon.  -Do not shave pubic hair 7 days prior to surgery (gyn pt's).    [facility]/Ride Policy:  Only 2 adults allowed in pre op/recovery area during your procedure. You MUST HAVE A RIDE HOME from a responsible adult that you know and trust. Medical Transport, Uber or Lyft can ONLY be used if patient has a responsible adult to accompany them during ride home.       *Signs and symptoms of Infection Before or After Surgery:               !!!If you experience any fever, chills, nausea/ vomiting, foul odor/ excessive drainage from surgical site, flu-like symptoms, new wounds or cuts, PLEASE CALL THE SURGEON OFFICE at [phone] or SEND MESSAGE THROUGH [facility] PORTAL!!!     *If you are running late the morning of surgery, please call the Hospital Surgery Dept @ [phone].     *Billing questions:  [phone]     Thank you,  -[facility] Pre Admit Dept.  [phone] or [phone]  M-F 7:30 am-4:00 pm (Closed Major Holidays)

## 2024-10-14 NOTE — TELEPHONE ENCOUNTER
Writer called patient and patient made aware txp labs reviewed by physician and to decrease his tac as per below with follow up labs planned 10/21/2024.patient verbalized understanding            ----- Message from Teresa Cartagena MD sent at 10/13/2024  8:38 PM CDT -----  Labs are off from baseline patient does have appt coming up. Decrease tacro to 0.5mg twice a day and repeat labs in a week

## 2024-10-15 ENCOUNTER — PATIENT MESSAGE (OUTPATIENT)
Dept: PREADMISSION TESTING | Facility: HOSPITAL | Age: 69
End: 2024-10-15
Payer: MEDICARE

## 2024-10-15 NOTE — PRE-PROCEDURE INSTRUCTIONS
Called and spoke with pt about the following:     Please arrive to [facility] ([facility] at 7 am on 10/16/2024 for your scheduled procedure.  Address: [address] [place] [address] [place] [address] (2nd Building on left, 1st Floor Lobby)    !!!NO FOOD after midnight! You may have clear liquids up to 3 hrs before your arrival to the Hospital!!!  Clear liquids include Gatorade, water, soda, black coffee or tea (no milk or creamer), and clear juices.  Clear liquids do NOT include anything with pulp or food particles (Chicken broth, ice cream, yogurt, Jello, etc.)    Thank you,  -[facility] Pre Admit Dept.  Mon-Fri 7:30 am - 4 pm [phone]

## 2024-10-16 ENCOUNTER — HOSPITAL ENCOUNTER (OUTPATIENT)
Facility: HOSPITAL | Age: 69
Discharge: HOME | End: 2024-10-16
Attending: SURGERY | Admitting: SURGERY
Payer: MEDICARE

## 2024-10-16 ENCOUNTER — ANESTHESIA EVENT (OUTPATIENT)
Dept: SURGERY | Facility: HOSPITAL | Age: 69
End: 2024-10-16
Payer: MEDICARE

## 2024-10-16 ENCOUNTER — ANESTHESIA (OUTPATIENT)
Dept: SURGERY | Facility: HOSPITAL | Age: 69
End: 2024-10-16
Payer: MEDICARE

## 2024-10-16 VITALS
DIASTOLIC BLOOD PRESSURE: 61 MMHG | SYSTOLIC BLOOD PRESSURE: 102 MMHG | OXYGEN SATURATION: 96 % | TEMPERATURE: 99 F | WEIGHT: 221.69 LBS | HEART RATE: 87 BPM | BODY MASS INDEX: 32.84 KG/M2 | RESPIRATION RATE: 20 BRPM | HEIGHT: 69 IN

## 2024-10-16 DIAGNOSIS — R59.0 AXILLARY LYMPHADENOPATHY: ICD-10-CM

## 2024-10-16 DIAGNOSIS — R59.0 INGUINAL LYMPHADENOPATHY: ICD-10-CM

## 2024-10-16 LAB
ALBUMIN SERPL BCP-MCNC: 2.8 G/DL (ref 3.5–5.2)
ALP SERPL-CCNC: 134 U/L (ref 55–135)
ALT SERPL W/O P-5'-P-CCNC: 13 U/L (ref 10–44)
ANION GAP SERPL CALC-SCNC: 11 MMOL/L (ref 8–16)
AST SERPL-CCNC: 18 U/L (ref 10–40)
BASOPHILS # BLD AUTO: 0.01 K/UL (ref 0–0.2)
BASOPHILS NFR BLD: 0.3 % (ref 0–1.9)
BILIRUB SERPL-MCNC: 1.7 MG/DL (ref 0.1–1)
BUN SERPL-MCNC: 41 MG/DL (ref 8–23)
CALCIUM SERPL-MCNC: 8.2 MG/DL (ref 8.7–10.5)
CHLORIDE SERPL-SCNC: 110 MMOL/L (ref 95–110)
CO2 SERPL-SCNC: 17 MMOL/L (ref 23–29)
CREAT SERPL-MCNC: 2.6 MG/DL (ref 0.5–1.4)
DIFFERENTIAL METHOD BLD: ABNORMAL
EOSINOPHIL # BLD AUTO: 0 K/UL (ref 0–0.5)
EOSINOPHIL NFR BLD: 0.6 % (ref 0–8)
ERYTHROCYTE [DISTWIDTH] IN BLOOD BY AUTOMATED COUNT: 14.6 % (ref 11.5–14.5)
EST. GFR  (NO RACE VARIABLE): 26 ML/MIN/1.73 M^2
GLUCOSE SERPL-MCNC: 111 MG/DL (ref 70–110)
HCT VFR BLD AUTO: 32.7 % (ref 40–54)
HGB BLD-MCNC: 11 G/DL (ref 14–18)
IMM GRANULOCYTES # BLD AUTO: 0.02 K/UL (ref 0–0.04)
IMM GRANULOCYTES NFR BLD AUTO: 0.6 % (ref 0–0.5)
LYMPHOCYTES # BLD AUTO: 0.3 K/UL (ref 1–4.8)
LYMPHOCYTES NFR BLD: 8 % (ref 18–48)
MCH RBC QN AUTO: 30.3 PG (ref 27–31)
MCHC RBC AUTO-ENTMCNC: 33.6 G/DL (ref 32–36)
MCV RBC AUTO: 90 FL (ref 82–98)
MONOCYTES # BLD AUTO: 0.4 K/UL (ref 0.3–1)
MONOCYTES NFR BLD: 12.5 % (ref 4–15)
NEUTROPHILS # BLD AUTO: 2.6 K/UL (ref 1.8–7.7)
NEUTROPHILS NFR BLD: 78 % (ref 38–73)
NRBC BLD-RTO: 0 /100 WBC
PLATELET # BLD AUTO: 44 K/UL (ref 150–450)
PMV BLD AUTO: 12 FL (ref 9.2–12.9)
POTASSIUM SERPL-SCNC: 4 MMOL/L (ref 3.5–5.1)
PROT SERPL-MCNC: 6.6 G/DL (ref 6–8.4)
RBC # BLD AUTO: 3.63 M/UL (ref 4.6–6.2)
SODIUM SERPL-SCNC: 138 MMOL/L (ref 136–145)
WBC # BLD AUTO: 3.37 K/UL (ref 3.9–12.7)

## 2024-10-16 PROCEDURE — 63600175 PHARM REV CODE 636 W HCPCS: Mod: HCNC | Performed by: SURGERY

## 2024-10-16 PROCEDURE — 87206 SMEAR FLUORESCENT/ACID STAI: CPT | Mod: HCNC | Performed by: SURGERY

## 2024-10-16 PROCEDURE — 63600175 PHARM REV CODE 636 W HCPCS: Mod: JZ,JG,HCNC | Performed by: SURGERY

## 2024-10-16 PROCEDURE — 38531 OPEN BX/EXC INGUINOFEM NODES: CPT | Mod: HCNC,LT,, | Performed by: SURGERY

## 2024-10-16 PROCEDURE — 88184 FLOWCYTOMETRY/ TC 1 MARKER: CPT | Mod: HCNC | Performed by: PATHOLOGY

## 2024-10-16 PROCEDURE — 87102 FUNGUS ISOLATION CULTURE: CPT | Mod: HCNC | Performed by: SURGERY

## 2024-10-16 PROCEDURE — 88189 FLOWCYTOMETRY/READ 16 & >: CPT | Mod: HCNC,,, | Performed by: PATHOLOGY

## 2024-10-16 PROCEDURE — 36000706: Mod: HCNC | Performed by: SURGERY

## 2024-10-16 PROCEDURE — 36000707: Mod: HCNC | Performed by: SURGERY

## 2024-10-16 PROCEDURE — 37000009 HC ANESTHESIA EA ADD 15 MINS: Mod: HCNC | Performed by: SURGERY

## 2024-10-16 PROCEDURE — 71000015 HC POSTOP RECOV 1ST HR: Mod: HCNC | Performed by: SURGERY

## 2024-10-16 PROCEDURE — 87116 MYCOBACTERIA CULTURE: CPT | Mod: HCNC | Performed by: SURGERY

## 2024-10-16 PROCEDURE — 63600175 PHARM REV CODE 636 W HCPCS: Mod: HCNC | Performed by: FAMILY MEDICINE

## 2024-10-16 PROCEDURE — 37000008 HC ANESTHESIA 1ST 15 MINUTES: Mod: HCNC | Performed by: SURGERY

## 2024-10-16 PROCEDURE — 71000033 HC RECOVERY, INTIAL HOUR: Mod: HCNC | Performed by: SURGERY

## 2024-10-16 PROCEDURE — 85025 COMPLETE CBC W/AUTO DIFF WBC: CPT | Mod: HCNC | Performed by: STUDENT IN AN ORGANIZED HEALTH CARE EDUCATION/TRAINING PROGRAM

## 2024-10-16 PROCEDURE — 88185 FLOWCYTOMETRY/TC ADD-ON: CPT | Mod: HCNC | Performed by: PATHOLOGY

## 2024-10-16 PROCEDURE — 25000003 PHARM REV CODE 250: Mod: HCNC | Performed by: SURGERY

## 2024-10-16 PROCEDURE — 87070 CULTURE OTHR SPECIMN AEROBIC: CPT | Mod: HCNC | Performed by: SURGERY

## 2024-10-16 PROCEDURE — 87176 TISSUE HOMOGENIZATION CULTR: CPT | Mod: HCNC | Performed by: SURGERY

## 2024-10-16 PROCEDURE — 80053 COMPREHEN METABOLIC PANEL: CPT | Mod: HCNC | Performed by: STUDENT IN AN ORGANIZED HEALTH CARE EDUCATION/TRAINING PROGRAM

## 2024-10-16 RX ORDER — HYDROMORPHONE HYDROCHLORIDE 1 MG/ML
0.5 INJECTION, SOLUTION INTRAMUSCULAR; INTRAVENOUS; SUBCUTANEOUS EVERY 4 HOURS PRN
Status: CANCELLED | OUTPATIENT
Start: 2024-10-16

## 2024-10-16 RX ORDER — PROPOFOL 10 MG/ML
VIAL (ML) INTRAVENOUS
Status: DISCONTINUED | OUTPATIENT
Start: 2024-10-16 | End: 2024-10-18

## 2024-10-16 RX ORDER — TACROLIMUS 0.5 MG/1
0.5 CAPSULE ORAL EVERY 12 HOURS
Qty: 180 CAPSULE | Refills: 3 | Status: SHIPPED | OUTPATIENT
Start: 2024-10-16 | End: 2025-10-16

## 2024-10-16 RX ORDER — HYDROMORPHONE HYDROCHLORIDE 1 MG/ML
0.2 INJECTION, SOLUTION INTRAMUSCULAR; INTRAVENOUS; SUBCUTANEOUS EVERY 5 MIN PRN
Status: DISCONTINUED | OUTPATIENT
Start: 2024-10-16 | End: 2024-10-16 | Stop reason: HOSPADM

## 2024-10-16 RX ORDER — HYDROCODONE BITARTRATE AND ACETAMINOPHEN 7.5; 325 MG/1; MG/1
1 TABLET ORAL EVERY 6 HOURS PRN
Status: DISCONTINUED | OUTPATIENT
Start: 2024-10-16 | End: 2024-10-16 | Stop reason: HOSPADM

## 2024-10-16 RX ORDER — CEFAZOLIN 2 G/1
2 INJECTION, POWDER, FOR SOLUTION INTRAMUSCULAR; INTRAVENOUS
Status: COMPLETED | OUTPATIENT
Start: 2024-10-16 | End: 2024-10-16

## 2024-10-16 RX ORDER — ONDANSETRON HYDROCHLORIDE 2 MG/ML
4 INJECTION, SOLUTION INTRAVENOUS DAILY PRN
Status: DISCONTINUED | OUTPATIENT
Start: 2024-10-16 | End: 2024-10-16 | Stop reason: HOSPADM

## 2024-10-16 RX ORDER — MIDAZOLAM HYDROCHLORIDE 1 MG/ML
INJECTION INTRAMUSCULAR; INTRAVENOUS
Status: DISCONTINUED | OUTPATIENT
Start: 2024-10-16 | End: 2024-10-18

## 2024-10-16 RX ORDER — ONDANSETRON 8 MG/1
8 TABLET, ORALLY DISINTEGRATING ORAL EVERY 8 HOURS PRN
Status: DISCONTINUED | OUTPATIENT
Start: 2024-10-16 | End: 2024-10-16 | Stop reason: HOSPADM

## 2024-10-16 RX ORDER — GLUCAGON 1 MG
1 KIT INJECTION
Status: DISCONTINUED | OUTPATIENT
Start: 2024-10-16 | End: 2024-10-16 | Stop reason: HOSPADM

## 2024-10-16 RX ORDER — OXYCODONE AND ACETAMINOPHEN 5; 325 MG/1; MG/1
1 TABLET ORAL
Status: DISCONTINUED | OUTPATIENT
Start: 2024-10-16 | End: 2024-10-16 | Stop reason: HOSPADM

## 2024-10-16 RX ORDER — LIDOCAINE HYDROCHLORIDE 10 MG/ML
INJECTION, SOLUTION EPIDURAL; INFILTRATION; INTRACAUDAL; PERINEURAL
Status: DISCONTINUED | OUTPATIENT
Start: 2024-10-16 | End: 2024-10-16 | Stop reason: HOSPADM

## 2024-10-16 RX ORDER — BUPIVACAINE HYDROCHLORIDE 2.5 MG/ML
INJECTION, SOLUTION EPIDURAL; INFILTRATION; INTRACAUDAL
Status: DISCONTINUED | OUTPATIENT
Start: 2024-10-16 | End: 2024-10-16 | Stop reason: HOSPADM

## 2024-10-16 RX ORDER — LIDOCAINE HYDROCHLORIDE 20 MG/ML
INJECTION, SOLUTION EPIDURAL; INFILTRATION; INTRACAUDAL; PERINEURAL
Status: DISCONTINUED | OUTPATIENT
Start: 2024-10-16 | End: 2024-10-18

## 2024-10-16 RX ORDER — HYDROCODONE BITARTRATE AND ACETAMINOPHEN 5; 325 MG/1; MG/1
1 TABLET ORAL EVERY 4 HOURS PRN
Status: DISCONTINUED | OUTPATIENT
Start: 2024-10-16 | End: 2024-10-16 | Stop reason: HOSPADM

## 2024-10-16 RX ORDER — FENTANYL CITRATE 50 UG/ML
INJECTION, SOLUTION INTRAMUSCULAR; INTRAVENOUS
Status: DISCONTINUED | OUTPATIENT
Start: 2024-10-16 | End: 2024-10-18

## 2024-10-16 RX ORDER — ONDANSETRON HYDROCHLORIDE 8 MG/1
8 TABLET, FILM COATED ORAL EVERY 12 HOURS PRN
Qty: 20 TABLET | Refills: 0 | Status: SHIPPED | OUTPATIENT
Start: 2024-10-16

## 2024-10-16 RX ORDER — HYDROCODONE BITARTRATE AND ACETAMINOPHEN 5; 325 MG/1; MG/1
1 TABLET ORAL EVERY 6 HOURS PRN
Qty: 15 TABLET | Refills: 0 | Status: SHIPPED | OUTPATIENT
Start: 2024-10-16 | End: 2024-10-21

## 2024-10-16 RX ADMIN — PROPOFOL 30 MG: 10 INJECTION, EMULSION INTRAVENOUS at 09:10

## 2024-10-16 RX ADMIN — HYDROCODONE BITARTRATE AND ACETAMINOPHEN 1 TABLET: 7.5; 325 TABLET ORAL at 09:10

## 2024-10-16 RX ADMIN — PROPOFOL 30 MG: 10 INJECTION, EMULSION INTRAVENOUS at 08:10

## 2024-10-16 RX ADMIN — SODIUM CHLORIDE, SODIUM LACTATE, POTASSIUM CHLORIDE, AND CALCIUM CHLORIDE: .6; .31; .03; .02 INJECTION, SOLUTION INTRAVENOUS at 08:10

## 2024-10-16 RX ADMIN — FENTANYL CITRATE 100 MCG: 50 INJECTION, SOLUTION INTRAMUSCULAR; INTRAVENOUS at 08:10

## 2024-10-16 RX ADMIN — MIDAZOLAM HYDROCHLORIDE 2 MG: 1 INJECTION, SOLUTION INTRAMUSCULAR; INTRAVENOUS at 08:10

## 2024-10-16 RX ADMIN — LIDOCAINE HYDROCHLORIDE 50 MG: 20 INJECTION, SOLUTION EPIDURAL; INFILTRATION; INTRACAUDAL; PERINEURAL at 08:10

## 2024-10-16 RX ADMIN — CEFAZOLIN 2 G: 2 INJECTION, POWDER, FOR SOLUTION INTRAMUSCULAR; INTRAVENOUS at 08:10

## 2024-10-16 NOTE — INTERVAL H&P NOTE
The patient has been examined and the H&P has been reviewed:    I concur with the findings and no changes have occurred since H&P was written.    Surgery risks, benefits and alternative options discussed and understood by patient/family.      Patient was stopped his Eliquis    There are no hospital problems to display for this patient.

## 2024-10-16 NOTE — DISCHARGE INSTRUCTIONS
PLEASE RESUME YOUR ELIQUIS TOMORROW    THE PATHOLOGY RESULTS WILL APPEAR IN YOUR INBOX AS SOON AS THEY ARE COMPLETED.  THE 1ST WILL BE A LYMPHOMA AND A LEUKEMIA SCREEN RESULT.  THE 2ND WILL BE THE PATHOLOGY RESULTS    I WILL CALL YOU WITH THE PATHOLOGY RESULTS AS SOON AS I AM AWARE OF THE pathology results    Please call for any fever, increase in pain, nausea or vomiting or redness or drainage from incision(s).    There are no limits on your activity however no driving if taking the pain medicine    No driving if taking the pain medicine    May shower     Removed the glue when it becomes loose, this usually takes 10-14 days.      You may want to take a stool softener or Glycolax or MiraLax while taking pain medicine to avoid constipation    If you become constipated from the pain medication you can use a double dose of Miralax or Glycolax, or milk of magnesia for severe constipation.    Our office phone numbers are  [phone] and     Our office fax  number is #[phone]

## 2024-10-16 NOTE — OP NOTE
[facility] ([facility]ospital)  Operative Note      Date of Procedure: 10/16/2024     Procedure: Procedure(s) (LRB):  LYMPH NODE BIOPSY/EXCISION (Left)     Surgeons and Role:     * Hao Washburn MD - Primary    Assisting Surgeon: None    Pre-Operative Diagnosis: Inguinal lymphadenopathy [R59.0]    Post-Operative Diagnosis: Post-Op Diagnosis Codes:     * Inguinal lymphadenopathy [R59.0]    Anesthesia: Local MAC    Operative Findings (including complications, if any):     A enlarged lymph node measuring 2 x 1.5 x 1 cm    The lymph node was sent for:  Pathology  Lymphoma and leukemia screen  aerobic culture  Acid-fast bacteria culture,  Fungal culture    Description of Technical Procedures:     Patient was brought into the operative room placed on the operative table in the supine position.  IV sedation was provided.  Antibiotics were administered.  Pneumatic compression devices on lower extremity.  The left inguinal area was clipped, prepped and draped in standard fashion.      A time-out was performed.      Lidocaine was infiltrated.  A tangential incision was made.  Subcutaneous tissues were dissected using electrocautery.  As we opened Mallika's fascia we encountered several large lymph nodes.  One of these lymph nodes was dissected circumferentially with electrocautery.  Hemostasis was achieved.      Marcaine was infiltrated.  The deep layers were closed with 3-0 Vicryl in interrupted fashion.  The deep dermis with 3-0 Vicryl in a running fashion.  The skin with 4-0 Monocryl in a subcuticular fashion.      A small portion of the lymph node was sent for the cultures lifting above.      The remainder of the lymph node was sent for pathologic analysis.      The pathologist confirmed there was sufficient healthy lymph node for analysis    Dermabond was then placed    Significant Surgical Tasks Conducted by the Assistant(s), if Applicable:  None    Estimated Blood Loss (EBL): * 5 mL   IV fluids 250 mL   Lidocaine 1%  8mL   Marcaine 0.25% 10 mL *           Implants: * No implants in log *    Specimens:   Specimen (24h ago, onward)       Start     Ordered    10/16/24 0905  Specimen to Pathology, Surgery General Surgery  Once        Comments: Pre-op Diagnosis: Inguinal lymphadenopathy [R59.0]Procedure(s):LYMPH NODE BIOPSY/EXCISION Number of specimens: 1Name of specimens: 1)  left inguinal lymph node     References:    Click here for ordering Quick Tip   Question Answer Comment   Procedure Type: General Surgery    Specimen Class: Known or suspected malignancy    Release to patient Immediate        10/16/24 0908                            Condition: Stable    Disposition: PACU - hemodynamically stable.    Attestation: I performed the procedure.    Discharge Note    OUTCOME: Patient tolerated treatment/procedure well without complication and is now ready for discharge.    Left inguinal lymph node biopsy with tissue sent for culture and pathologic analysis    DISPOSITION: Home or Self Care    FINAL DIAGNOSIS:  Inguinal lymphadenopathy    FOLLOWUP: In clinic    DISCHARGE INSTRUCTIONS:    Discharge Procedure Orders   Diet general     Call MD for:  temperature >100.4     Call MD for:  persistent nausea and vomiting     Call MD for:  severe uncontrolled pain     Call MD for:  difficulty breathing, headache or visual disturbances     Call MD for:  redness, tenderness, or signs of infection (pain, swelling, redness, odor or green/yellow discharge around incision site)     No dressing needed     Activity as tolerated        Clinical Reference Documents Added to Patient Instructions         Document    HYDROCODONE AND ACETAMINOPHEN, ADULT (ENGLISH)    ONDANSETRON, ADULT (ENGLISH)

## 2024-10-16 NOTE — ANESTHESIA PREPROCEDURE EVALUATION
10/16/2024  Lj Coleman is a 68 y.o., male    Patient Active Problem List   Diagnosis    Obesity (BMI 35.0-39.9 without comorbidity)    Chronic low back pain    GERD (gastroesophageal reflux disease)    Insomnia    History of hepatitis C    Immunosuppression    Liver transplanted    History of liver cancer    Essential hypertension    Atherosclerosis of native coronary artery without angina pectoris/ LAD    Adjustment disorder with mixed anxiety and depressed mood    History of alcohol dependence    History of thrombocytopenia    Ventral hernia    HARDING (dyspnea on exertion)    PVC (premature ventricular contraction)    Tobacco abuse disorder    Osteomyelitis of ankle and foot    Chronic deep vein thrombosis (DVT) of left upper extremity    Family history of bladder cancer    COPD suggested by initial evaluation     Past Medical History:   Diagnosis Date    Acute deep vein thrombosis (DVT) of left upper extremity 04/04/2023    Alcohol dependence     stopped 2012    Angina pectoris     Arthritis     back    Atherosclerosis of native coronary artery without angina pectoris/ LAD 09/08/2016    Back pain     Chronic hepatitis C with cirrhosis     Depression     Hepatoma     Prior to liver transplant    History of colon polyps 09/09/2015    History of transplantation, liver 04/2012    History of vertebral fracture     Hypertension     Immune deficiency disorder     Incisional hernia following transplant 04/09/2014    Liver failure 11/2011    Related to chemotherapy for hepatoma    Liver transplanted 04/2012    Obesity     PVCs (premature ventricular contractions)     Tobacco abuse 09/09/2016    Trouble in sleeping     Vertebral fracture 1975     Past Surgical History:   Procedure Laterality Date    COLONOSCOPY N/A 1/20/2021    Procedure: COLONOSCOPY;  Surgeon: Emerson Helm MD;  Location: [facility];   Service: Endoscopy;  Laterality: N/A;    HERNIA REPAIR Right 2013    incisional    LIVER TRANSPLANT  April 2012    MOUTH SURGERY      TONSILLECTOMY  1958     ECHO (2021):  Summary    The left ventricle is normal in size with concentric remodeling and normal systolic function.  The estimated ejection fraction is 60%.  Normal left ventricular diastolic function.  Normal right ventricular size with normal right ventricular systolic function.  Mild tricuspid regurgitation.  Normal central venous pressure (3 mmHg).  The estimated PA systolic pressure is 25 mmHg.    Nuc Stress (2021):  Interpretation Summary         Normal myocardial perfusion scan. There is no evidence of myocardial ischemia or infarction.    The gated perfusion images showed an ejection fraction of 72% at rest. The gated perfusion images showed an ejection fraction of 63% post stress.    The EKG portion of this study is negative for ischemia.    The patient reported no chest pain during the stress test.    During stress, frequent PVCs are noted.      Pre-op Assessment    I have reviewed the Patient Summary Reports.    I have reviewed the NPO Status.   I have reviewed the Medications.     Review of Systems  Anesthesia Hx:  No problems with previous Anesthesia   History of prior surgery of interest to airway management or planning:  Previous anesthesia: General, MAC          Denies Personal Hx of Anesthesia complications.                    Social:  Former Smoker, No Alcohol Use Long smoking hx - Recently quit      Hx of alcohol abuse - quit prior to liver transplant       Hematology/Oncology:    Oncology Normal    -- Anemia:               Hematology Comments: Thrombocytopenia (PLT 62) - acute                     Cardiovascular:     Hypertension   CAD      Angina      HARDING  ECG has been reviewed. Sinus tachycardia (101)  Otherwise normal ECG   When compared with ECG of 24-JAN-2022 08:43,   No significant change was found   Confirmed by SUSANNE MONIQUE MD  ([id number]) on 10/11/2024 4:39:58 PM     Chronic LUE DVT - on Eliquis; See U/S report (10/4/24) below   Impression:     Persistent nonocclusive thrombus within the left brachial and axillary veins.    *Last Eliquis dose:10/13/24                             Pulmonary:   COPD   Shortness of breath   Not using his inhalers regularly                Renal/:  Chronic Renal Disease, ARF   Acute bump in BUN/Cre - meds being adjusted by Heme/Onc team              Hepatic/GI:     GERD Liver Disease, Hepatitis, C Hx of alcoholic cirrhosis - s/p successful Liver transplant (2011)    HepC pos after transplant - treated and cured              Musculoskeletal:  Musculoskeletal Normal                Neurological:  Neurology Normal                                      Endocrine:     BMI 33      Obesity / BMI > 30  Psych:    depression                Physical Exam  General: Well nourished, Cooperative, Alert and Oriented    Airway:  Mallampati: II   Mouth Opening: Normal  TM Distance: Normal  Tongue: Normal  Neck ROM: Normal ROM    Dental:  Edentulous, Dentures        Anesthesia Plan  Type of Anesthesia, risks & benefits discussed:    Anesthesia Type: MAC  Intra-op Monitoring Plan: Standard ASA Monitors  Post Op Pain Control Plan: multimodal analgesia and IV/PO Opioids PRN  Induction:  IV  Informed Consent: Informed consent signed with the Patient and all parties understand the risks and agree with anesthesia plan.  All questions answered.   ASA Score: 3  Day of Surgery Review of History & Physical: H&P Update referred to the surgeon/provider.  Anesthesia Plan Notes: *Repeat Labs pending     Ready For Surgery From Anesthesia Perspective.     .

## 2024-10-17 LAB
ACID FAST MOD KINY STN SPEC: NORMAL
MYCOBACTERIUM SPEC QL CULT: NORMAL

## 2024-10-18 LAB
FLOW CYTOMETRY ANTIBODIES ANALYZED - LYMPH NODE: NORMAL
FLOW CYTOMETRY COMMENT - LYMPH NODE: NORMAL
FLOW CYTOMETRY INTERPRETATION - LYMPH NODE: NORMAL

## 2024-10-18 NOTE — ANESTHESIA POSTPROCEDURE EVALUATION
Anesthesia Post Evaluation    Patient: Lj Coleman    Procedure(s) Performed: Procedure(s) (LRB):  LYMPH NODE BIOPSY/EXCISION (Left)    Final Anesthesia Type: MAC      Patient location during evaluation: PACU  Patient participation: Yes- Able to Participate  Level of consciousness: awake and alert and oriented  Post-procedure vital signs: reviewed and stable  Pain management: adequate  Airway patency: patent  NORBERT mitigation strategies: Multimodal analgesia  PONV status at discharge: No PONV  Anesthetic complications: no      Cardiovascular status: blood pressure returned to baseline and hemodynamically stable  Respiratory status: unassisted  Hydration status: euvolemic  Follow-up not needed.              Vitals Value Taken Time   /61 10/16/24 0936   Temp 36.9 °C (98.5 °F) 10/16/24 0925   Pulse 89 10/16/24 0940   Resp 19 10/16/24 0940   SpO2 98 % 10/16/24 0940   Vitals shown include unfiled device data.      Event Time   Out of Recovery 09:43:16         Pain/Reginaldo Score: No data recorded

## 2024-10-18 NOTE — TRANSFER OF CARE
"Anesthesia Transfer of Care Note    Patient: Lj Coleman    Procedure(s) Performed: Procedure(s) (LRB):  LYMPH NODE BIOPSY/EXCISION (Left)    Patient location: PACU    Anesthesia Type: MAC    Transport from OR: Transported from OR on room air with adequate spontaneous ventilation    Post pain: adequate analgesia    Post assessment: no apparent anesthetic complications    Post vital signs: stable    Level of consciousness: sedated    Nausea/Vomiting: no nausea/vomiting    Complications: none    Transfer of care protocol was followed      Last vitals: Visit Vitals  /61 (BP Location: Right arm, Patient Position: Lying)   Pulse 87   Temp 36.9 °C (98.5 °F) (Temporal)   Resp 20   Ht 5' 9" (1.753 m)   Wt 100.5 kg (221 lb 10.8 oz)   SpO2 96%   BMI 32.74 kg/m²     "

## 2024-10-21 ENCOUNTER — LAB VISIT (OUTPATIENT)
Dept: LAB | Facility: HOSPITAL | Age: 69
End: 2024-10-21
Attending: INTERNAL MEDICINE
Payer: MEDICARE

## 2024-10-21 ENCOUNTER — TELEPHONE (OUTPATIENT)
Dept: SURGERY | Facility: HOSPITAL | Age: 69
End: 2024-10-21
Payer: MEDICARE

## 2024-10-21 ENCOUNTER — OFFICE VISIT (OUTPATIENT)
Dept: HEPATOLOGY | Facility: CLINIC | Age: 69
End: 2024-10-21
Payer: MEDICARE

## 2024-10-21 VITALS
HEIGHT: 69 IN | SYSTOLIC BLOOD PRESSURE: 111 MMHG | WEIGHT: 224.63 LBS | DIASTOLIC BLOOD PRESSURE: 72 MMHG | BODY MASS INDEX: 33.27 KG/M2 | HEART RATE: 114 BPM

## 2024-10-21 DIAGNOSIS — R59.1 LYMPHADENOPATHY: ICD-10-CM

## 2024-10-21 DIAGNOSIS — C85.80 LARGE CELL LYMPHOMA: Primary | ICD-10-CM

## 2024-10-21 DIAGNOSIS — Z94.4 LIVER TRANSPLANTED: ICD-10-CM

## 2024-10-21 DIAGNOSIS — N17.9 AKI (ACUTE KIDNEY INJURY): Primary | ICD-10-CM

## 2024-10-21 DIAGNOSIS — Z94.4 S/P LIVER TRANSPLANT: ICD-10-CM

## 2024-10-21 DIAGNOSIS — D84.9 IMMUNOSUPPRESSION: ICD-10-CM

## 2024-10-21 LAB
ALBUMIN SERPL BCP-MCNC: 2.8 G/DL (ref 3.5–5.2)
ALP SERPL-CCNC: 121 U/L (ref 40–150)
ALT SERPL W/O P-5'-P-CCNC: 11 U/L (ref 10–44)
ANION GAP SERPL CALC-SCNC: 10 MMOL/L (ref 8–16)
AST SERPL-CCNC: 20 U/L (ref 10–40)
BACTERIA SPEC AEROBE CULT: NO GROWTH
BASOPHILS # BLD AUTO: 0.01 K/UL (ref 0–0.2)
BASOPHILS NFR BLD: 0.3 % (ref 0–1.9)
BILIRUB SERPL-MCNC: 2 MG/DL (ref 0.1–1)
BUN SERPL-MCNC: 29 MG/DL (ref 8–23)
CALCIUM SERPL-MCNC: 8.4 MG/DL (ref 8.7–10.5)
CHLORIDE SERPL-SCNC: 112 MMOL/L (ref 95–110)
CO2 SERPL-SCNC: 21 MMOL/L (ref 23–29)
COMMENT: ABNORMAL
CREAT SERPL-MCNC: 2 MG/DL (ref 0.5–1.4)
DIFFERENTIAL METHOD BLD: ABNORMAL
EOSINOPHIL # BLD AUTO: 0.1 K/UL (ref 0–0.5)
EOSINOPHIL NFR BLD: 1.4 % (ref 0–8)
ERYTHROCYTE [DISTWIDTH] IN BLOOD BY AUTOMATED COUNT: 15.7 % (ref 11.5–14.5)
EST. GFR  (NO RACE VARIABLE): 35.2 ML/MIN/1.73 M^2
FINAL PATHOLOGIC DIAGNOSIS: ABNORMAL
FUNGUS SPEC CULT: NORMAL
GLUCOSE SERPL-MCNC: 110 MG/DL (ref 70–110)
GROSS: ABNORMAL
HCT VFR BLD AUTO: 32.6 % (ref 40–54)
HGB BLD-MCNC: 10.9 G/DL (ref 14–18)
IMM GRANULOCYTES # BLD AUTO: 0.01 K/UL (ref 0–0.04)
IMM GRANULOCYTES NFR BLD AUTO: 0.3 % (ref 0–0.5)
LYMPHOCYTES # BLD AUTO: 0.3 K/UL (ref 1–4.8)
LYMPHOCYTES NFR BLD: 8.4 % (ref 18–48)
Lab: ABNORMAL
MCH RBC QN AUTO: 30.5 PG (ref 27–31)
MCHC RBC AUTO-ENTMCNC: 33.4 G/DL (ref 32–36)
MCV RBC AUTO: 91 FL (ref 82–98)
MICROSCOPIC EXAM: ABNORMAL
MONOCYTES # BLD AUTO: 0.4 K/UL (ref 0.3–1)
MONOCYTES NFR BLD: 10.1 % (ref 4–15)
NEUTROPHILS # BLD AUTO: 2.7 K/UL (ref 1.8–7.7)
NEUTROPHILS NFR BLD: 79.5 % (ref 38–73)
NRBC BLD-RTO: 0 /100 WBC
PLATELET # BLD AUTO: 42 K/UL (ref 150–450)
PMV BLD AUTO: ABNORMAL FL (ref 9.2–12.9)
POTASSIUM SERPL-SCNC: 4.5 MMOL/L (ref 3.5–5.1)
PROT SERPL-MCNC: 6.7 G/DL (ref 6–8.4)
RBC # BLD AUTO: 3.57 M/UL (ref 4.6–6.2)
SODIUM SERPL-SCNC: 143 MMOL/L (ref 136–145)
WBC # BLD AUTO: 3.45 K/UL (ref 3.9–12.7)

## 2024-10-21 PROCEDURE — 3008F BODY MASS INDEX DOCD: CPT | Mod: HCNC,CPTII,S$GLB, | Performed by: INTERNAL MEDICINE

## 2024-10-21 PROCEDURE — 80053 COMPREHEN METABOLIC PANEL: CPT | Mod: HCNC | Performed by: INTERNAL MEDICINE

## 2024-10-21 PROCEDURE — 80197 ASSAY OF TACROLIMUS: CPT | Mod: HCNC | Performed by: INTERNAL MEDICINE

## 2024-10-21 PROCEDURE — 99999 PR PBB SHADOW E&M-EST. PATIENT-LVL III: CPT | Mod: PBBFAC,HCNC,, | Performed by: INTERNAL MEDICINE

## 2024-10-21 PROCEDURE — 99215 OFFICE O/P EST HI 40 MIN: CPT | Mod: HCNC,S$GLB,, | Performed by: INTERNAL MEDICINE

## 2024-10-21 PROCEDURE — 1126F AMNT PAIN NOTED NONE PRSNT: CPT | Mod: HCNC,CPTII,S$GLB, | Performed by: INTERNAL MEDICINE

## 2024-10-21 PROCEDURE — 85025 COMPLETE CBC W/AUTO DIFF WBC: CPT | Mod: HCNC | Performed by: INTERNAL MEDICINE

## 2024-10-21 PROCEDURE — 3288F FALL RISK ASSESSMENT DOCD: CPT | Mod: HCNC,CPTII,S$GLB, | Performed by: INTERNAL MEDICINE

## 2024-10-21 PROCEDURE — 1159F MED LIST DOCD IN RCRD: CPT | Mod: HCNC,CPTII,S$GLB, | Performed by: INTERNAL MEDICINE

## 2024-10-21 PROCEDURE — 1160F RVW MEDS BY RX/DR IN RCRD: CPT | Mod: HCNC,CPTII,S$GLB, | Performed by: INTERNAL MEDICINE

## 2024-10-21 PROCEDURE — 36415 COLL VENOUS BLD VENIPUNCTURE: CPT | Mod: HCNC | Performed by: INTERNAL MEDICINE

## 2024-10-21 PROCEDURE — 3074F SYST BP LT 130 MM HG: CPT | Mod: HCNC,CPTII,S$GLB, | Performed by: INTERNAL MEDICINE

## 2024-10-21 PROCEDURE — 3078F DIAST BP <80 MM HG: CPT | Mod: HCNC,CPTII,S$GLB, | Performed by: INTERNAL MEDICINE

## 2024-10-21 PROCEDURE — 1101F PT FALLS ASSESS-DOCD LE1/YR: CPT | Mod: HCNC,CPTII,S$GLB, | Performed by: INTERNAL MEDICINE

## 2024-10-21 NOTE — PROGRESS NOTES
Transplant Hepatology  Liver Transplant Recipient Follow-up    Transplant Date: 1/2/2012  [facility] Native Liver Dx:     Lj is here for follow up of his liver transplant.        Subjective:     Interval History:  Currently, he is doing with difficulty. Current complaints include needing frequent medical care.  More recently he was found to have lymphadenopathy.  He had a biopsy of the lymph node.  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  Also during this time we have been changing his tacrolimus as his level has been running higher than needed.  Also his renal function has been worse without clear etiology.    His liver tests have been normal.  There been no significant liver issues since last visit.    Review of Systems    Objective:     Physical Exam  Vitals reviewed.   Constitutional:       General: He is not in acute distress.     Appearance: He is well-developed.   HENT:      Head: Normocephalic and atraumatic.      Mouth/Throat:      Pharynx: No oropharyngeal exudate.   Eyes:      General: No scleral icterus.        Right eye: No discharge.         Left eye: No discharge.      Conjunctiva/sclera: Conjunctivae normal.      Pupils: Pupils are equal, round, and reactive to light.   Pulmonary:      Effort: Pulmonary effort is normal. No respiratory distress.      Breath sounds: No wheezing.   Abdominal:      General: There is no distension.      Palpations: Abdomen is soft.      Tenderness: There is no abdominal tenderness.   Musculoskeletal:      Right lower leg: No edema.      Left lower leg: No edema.   Neurological:      Mental Status: He is alert and oriented to person, place, and time.   Psychiatric:         Behavior: Behavior normal.         WBC   Date Value Ref Range Status   10/16/2024 3.37 (L) 3.90 - 12.70 K/uL Final     Hemoglobin   Date Value Ref Range Status   10/16/2024 11.0 (L) 14.0 - 18.0 g/dL Final     Hematocrit   Date Value Ref  Range Status   10/16/2024 32.7 (L) 40.0 - 54.0 % Final     Platelets   Date Value Ref Range Status   10/16/2024 44 (L) 150 - 450 K/uL Final     BUN   Date Value Ref Range Status   10/16/2024 41 (H) 8 - 23 mg/dL Final     Creatinine   Date Value Ref Range Status   10/16/2024 2.6 (H) 0.5 - 1.4 mg/dL Final     Glucose   Date Value Ref Range Status   10/16/2024 111 (H) 70 - 110 mg/dL Final     Calcium   Date Value Ref Range Status   10/16/2024 8.2 (L) 8.7 - 10.5 mg/dL Final     Sodium   Date Value Ref Range Status   10/16/2024 138 136 - 145 mmol/L Final     Potassium   Date Value Ref Range Status   10/16/2024 4.0 3.5 - 5.1 mmol/L Final     Chloride   Date Value Ref Range Status   10/16/2024 110 95 - 110 mmol/L Final     Magnesium   Date Value Ref Range Status   04/06/2023 1.8 1.6 - 2.6 mg/dL Final     AST   Date Value Ref Range Status   10/16/2024 18 10 - 40 U/L Final     ALT   Date Value Ref Range Status   10/16/2024 13 10 - 44 U/L Final     Alkaline Phosphatase   Date Value Ref Range Status   10/16/2024 134 55 - 135 U/L Final     Total Bilirubin   Date Value Ref Range Status   10/16/2024 1.7 (H) 0.1 - 1.0 mg/dL Final     Comment:     For infants and newborns, interpretation of results should be based  on gestational age, weight and in agreement with clinical  observations.    Premature Infant recommended reference ranges:  Up to 24 hours.............<8.0 mg/dL  Up to 48 hours............<12.0 mg/dL  3-5 days..................<15.0 mg/dL  6-29 days.................<15.0 mg/dL       Albumin   Date Value Ref Range Status   10/16/2024 2.8 (L) 3.5 - 5.2 g/dL Final     INR   Date Value Ref Range Status   04/04/2023 1.0 0.8 - 1.2 Final     Comment:     Coumadin Therapy:  2.0 - 3.0 for INR for all indicators except mechanical heart valves  and antiphospholipid syndromes which should use 2.5 - 3.5.       Lab Results   Component Value Date    TACROLIMUS 9.7 10/09/2024           Assessment/Plan:     1. NINO (acute kidney injury)     2. Lymphadenopathy    3. Immunosuppression    4. Liver transplanted      NINO (acute kidney injury)-unclear etiology.  Uncertain if it is related to tacrolimus level running higher.  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  Discuss with patient.  Explained that he will need to see Oncology.  Advised that they will be able to answer all of his questions in more detail.    Immunosuppression-given the concern for lymphoma I recommend we move forward with getting patient's labs and urine checked in preparation for switching him to sirolimus    Liver transplanted-good allograft function    Return to clinic in 6 months; will monitor more closely given new diagnosis of lymphoma and changes to immunosuppression        Teresa Cartagena MD           [facility] Patient Status  Functional Status: 50% - Requires considerable assistance and frequent medical care  Physical Capacity: Limited Mobility

## 2024-10-21 NOTE — TELEPHONE ENCOUNTER
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  - Pending  TP63 and DUSP22 rearrangement analysis by FISH.  See comment.    Comment: Interp By Isabel Pierre MD, Signed on 10/21/2024 at 09:23   Comment  Abnormal   Flow cytometric analysis of lymph node detects an abnormal population of T lymphocytes showing expression of CD2, CD5, CD30 with restriction to CD4 . This population show loss of CD3, CD7, CD8. This population doesn't expression of T cell  receptoralpha/beta  or gamma/delta. This population is negative for CD34, CD56, CD57. .     Flow differential:  Lymphocytes 7.8%, Monocytes 4.1%, Granulocytes  3.4%, Blast  0.0%, Debris/nRBC 31.8%,  Viability 88.4%.    Immunohistochemical studies were performed on the paraffin embedded tissue block 1 a for the greater sensitivity and further architecture evaluation with adequate positive and negative controls.  The large atypical lymphocytes positive for CD4, CD30  (100%), CD43, high Ki-67(>95%) and are negative for CD3, CD20, PAX5, ALK-1, AE1/AE3.  In Situ hybridization for EBV is negative.    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 FISH analysis for TP 63 and DUSP22 rearrangement is pending.  A supplemental rep ort will follow.    Microscopic Exam Sections of lymph node show diffusely infiltrated with large atypical lymphocytes. Abnormal    Gross  Abnormal   Surgery ID:  [id number]   Pathology ID:  [id number]  1.  Received in formalin labeled &quot;left inguinal lymph node&quot; is a 2.1 x 1.4 x 1.2 cm soft, pink-red lymph node.  The lymph node is bisected and submitted in cassette 1A.  A portion of tissue is submitted for lymphoma screening.  VNY--1-A      Grossed by: Morris Mena MS, PA([facility])    Disclaimer  Abnormal   Unless the case is a 'gross only' or additional testing only, the final diagnosis for each specimen is based on a microscopic examination of appropriate tissue sections.  This test was developed and its performance characteristics determined by [facility], Department of Pathology and Laboratory Medicine. It has not been cleared or approved by the US Food and Drug Administration. The FDA has determined that  such clearance or approval is not necessary. This test is used for clinical purposes. It should not be regarded as investigational or for research. This laboratory is certified under the Clinical Laboratory Improvement Admendments (CLIA) as qualified to  perform such high complexity clinical laboratory testing  CD20 (L26) immunohistochemical staining (close L26, DAB detection method) is performed on formalin-fixed (10% neutral buffered formalin), paraffin embedded tissues sections. The presence of an appropriately colored reaction product within the target  cells is indicative of positive reactivity. Positive staining intensity should be assessed within the context of any background staining of the negative reagent control. This test was developed and performance characteristics determined by [facility], Section of Anatomic Pathology. It has been cleared by the U.S. Food and Drug Administration.    Resulting Agency [facility]              Narrative    Order: [id number]  Status: Final result       Visible to patient: Yes (not seen)       Next appt: 10/31/2024 at 01:00 PM in Surgery (Hao Washburn MD)    0 Result Notes       1 Follow-up Encounter

## 2024-10-22 ENCOUNTER — TELEPHONE (OUTPATIENT)
Dept: SURGERY | Facility: CLINIC | Age: 69
End: 2024-10-22
Payer: MEDICARE

## 2024-10-22 LAB — TACROLIMUS BLD-MCNC: 4.7 NG/ML (ref 5–15)

## 2024-10-22 NOTE — TELEPHONE ENCOUNTER
Attempted to contact the patient to schedule an appointment with oncology, I spoke to the patient's daughter Dahlia she was given the appointment date and time. She stated that this date should work but also stated that she will relay the message to the patient and contact Dr. Peralta office if the appointment date does not work.

## 2024-10-23 ENCOUNTER — TELEPHONE (OUTPATIENT)
Dept: TRANSPLANT | Facility: CLINIC | Age: 69
End: 2024-10-23
Payer: MEDICARE

## 2024-10-23 DIAGNOSIS — Z94.4 LIVER TRANSPLANTED: Primary | ICD-10-CM

## 2024-10-23 DIAGNOSIS — D84.9 IMMUNOSUPPRESSION: ICD-10-CM

## 2024-10-23 NOTE — TELEPHONE ENCOUNTER
Patient made aware of below plan, labs scheduled for 11/1 and ordered under Dr Odom as he will become new provider in that time frame. Report given to oncoming coordinator of plan to see if upc ration and lipid profile ok for rapa conversion        Teresa Cartagena MD Dworak, Maria, RN  Yes and repeat labs in 2 weeks          Previous Messages       ----- Message -----  From: Gertrudis Arredondo, RN  Sent: 10/22/2024  10:35 AM CDT  To: Teresa Cartagena MD    Pt with new dx of lymphoma, tac recently dropped to 0.5mg q12hrs. Good at that dosage? Next labs?    Will add upc ratio and lipid profile to next labs as I saw your clinic visit stating to move forward on seeing if ok to switch to rapa.      ----- Message from Teresa Cartagena MD sent at 10/21/2024  3:32 PM CDT -----  Kidney function is improving and liver tests are normal.  Awaiting immunosuppression level.  Although we will be planning to switch patient from tacrolimus to sirolimus.

## 2024-10-23 NOTE — LETTER
October 23, 2024    Lj Coleman  [address]  [address]  [place] [address]          Dear Lj Coleman:  MRN: [id number]    This is a follow up to your recent labs, your lab results were stable.  There are no medicine changes.  Please have your labs drawn again on 11/1/2024.  With the 11/1 labs we will check a urine test and a lipid profile to see if you are a candidate to switch from tacrolimus to sirolimus, so at that apt be prepared to provide a urine sample, thanks!    If you cannot have your labs drawn on the scheduled date, it is your responsibility to call the transplant department to reschedule.  Please call [phone] and ask to speak to Isabella CASTILLO -  for all scheduling requests.     Sincerely,    Gertrudis SLOANN, RN    Your Liver Transplant Coordinator    [facility]  [address] [place] [id number]  [phone]

## 2024-10-24 ENCOUNTER — TELEPHONE (OUTPATIENT)
Dept: HEMATOLOGY/ONCOLOGY | Facility: CLINIC | Age: 69
End: 2024-10-24
Payer: MEDICARE

## 2024-10-24 NOTE — TELEPHONE ENCOUNTER
Spoke to patient who inquired if Dr. Peralta could adjust his prograf medication due to  his most recent lab results.    Instructed  patient that he should seek assistance from the provider who ordered the prograf and the labs. This provider would have the better answers to his questions and concerns.    Patient verbalized  understanding

## 2024-10-31 ENCOUNTER — OFFICE VISIT (OUTPATIENT)
Dept: SURGERY | Facility: CLINIC | Age: 69
End: 2024-10-31
Payer: MEDICARE

## 2024-10-31 VITALS
HEART RATE: 106 BPM | WEIGHT: 224.63 LBS | HEIGHT: 69 IN | BODY MASS INDEX: 33.27 KG/M2 | DIASTOLIC BLOOD PRESSURE: 68 MMHG | TEMPERATURE: 98 F | SYSTOLIC BLOOD PRESSURE: 110 MMHG

## 2024-10-31 DIAGNOSIS — C84.70 ANAPLASTIC ALK-NEGATIVE LARGE CELL LYMPHOMA, UNSPECIFIED BODY REGION: Primary | ICD-10-CM

## 2024-10-31 PROCEDURE — 99024 POSTOP FOLLOW-UP VISIT: CPT | Mod: HCNC,S$GLB,, | Performed by: SURGERY

## 2024-10-31 PROCEDURE — 1159F MED LIST DOCD IN RCRD: CPT | Mod: HCNC,CPTII,S$GLB, | Performed by: SURGERY

## 2024-10-31 PROCEDURE — 3074F SYST BP LT 130 MM HG: CPT | Mod: HCNC,CPTII,S$GLB, | Performed by: SURGERY

## 2024-10-31 PROCEDURE — 1160F RVW MEDS BY RX/DR IN RCRD: CPT | Mod: HCNC,CPTII,S$GLB, | Performed by: SURGERY

## 2024-10-31 PROCEDURE — 99999 PR PBB SHADOW E&M-EST. PATIENT-LVL III: CPT | Mod: PBBFAC,HCNC,, | Performed by: SURGERY

## 2024-10-31 PROCEDURE — 3078F DIAST BP <80 MM HG: CPT | Mod: HCNC,CPTII,S$GLB, | Performed by: SURGERY

## 2024-10-31 PROCEDURE — 1126F AMNT PAIN NOTED NONE PRSNT: CPT | Mod: HCNC,CPTII,S$GLB, | Performed by: SURGERY

## 2024-10-31 NOTE — H&P (VIEW-ONLY)
Patient ID: Lj Coleman is a 68 y.o. male.    Chief Complaint: Post-op Evaluation      HPI:  Patient returns after left inguinal lymph node biopsy.  This showed anaplastic lymphoma.  Patient states he was recovering well.  Patient was to see Oncology tomorrow to discuss this finding.      He has multiple questions in regards to treatment of lymphoma but we will defer these to the Oncology Service    Review of Systems   Constitutional: Negative.    HENT: Negative.     Eyes: Negative.    Respiratory: Negative.     Cardiovascular: Negative.    Gastrointestinal: Negative.    Endocrine: Negative.    Genitourinary: Negative.    Musculoskeletal: Negative.    Skin: Negative.         Multiple enlarged lymph node   Allergic/Immunologic: Negative.    Neurological: Negative.    Hematological: Negative.    Psychiatric/Behavioral: Negative.       Current Outpatient Medications   Medication Sig Dispense Refill    albuterol (VENTOLIN HFA) 90 mcg/actuation inhaler Inhale 2 puffs into the lungs every 6 (six) hours as needed for Wheezing or Shortness of Breath. Rescue 18 g 3    apixaban (ELIQUIS) 5 mg Tab Take 1 tablet (5 mg total) by mouth 2 (two) times daily. (Patient taking differently: Take 5 mg by mouth once daily.) 180 tablet 3    esomeprazole (NEXIUM) 40 MG capsule Take 1 capsule (40 mg total) by mouth once daily. 90 capsule 3    fluticasone-umeclidin-vilanter (TRELEGY ELLIPTA) 100-62.5-25 mcg DsDv Inhale 1 puff into the lungs once daily. 60 each 5    metoprolol succinate (TOPROL-XL) 25 MG 24 hr tablet TAKE 1 TABLET EVERY DAY 90 tablet 3    ondansetron (ZOFRAN) 8 MG tablet Take 1 tablet (8 mg total) by mouth every 12 (twelve) hours as needed for Nausea. 20 tablet 0    tacrolimus (PROGRAF) 0.5 MG Cap Take 1 capsule (0.5 mg total) by mouth every 12 (twelve) hours. 180 capsule 3    tamsulosin (FLOMAX) 0.4 mg Cap Take 1 capsule (0.4 mg total) by mouth once daily. 30 capsule 2    diclofenac sodium (VOLTAREN) 1 % Gel Apply  2 g topically 4 (four) times daily. for 10 days 100 g 0     No current facility-administered medications for this visit.     Facility-Administered Medications Ordered in Other Visits   Medication Dose Route Frequency Provider Last Rate Last Admin    lactated ringers infusion   Intravenous Continuous Linwood Ellsworth MD   New Bag at 01/20/21 0734    sodium chloride 0.9% flush 2 mL  2 mL Intravenous PRN Linwood Ellsworth MD           Review of patient's allergies indicates:   Allergen Reactions    Codeine Other (See Comments)     Upset stomach       Past Medical History:   Diagnosis Date    Acute deep vein thrombosis (DVT) of left upper extremity 04/04/2023    Alcohol dependence     stopped 2012    Angina pectoris     Arthritis     back    Atherosclerosis of native coronary artery without angina pectoris/ LAD 09/08/2016    Back pain     Chronic hepatitis C with cirrhosis     Depression     Hepatoma     Prior to liver transplant    History of colon polyps 09/09/2015    History of transplantation, liver 04/2012    History of vertebral fracture     Hypertension     Immune deficiency disorder     Incisional hernia following transplant 04/09/2014    Liver failure 11/2011    Related to chemotherapy for hepatoma    Liver transplanted 04/2012    Obesity     PVCs (premature ventricular contractions)     Tobacco abuse 09/09/2016    Trouble in sleeping     Vertebral fracture 1975       Past Surgical History:   Procedure Laterality Date    COLONOSCOPY N/A 1/20/2021    Procedure: COLONOSCOPY;  Surgeon: Emerson Helm MD;  Location: [facility] ENDO;  Service: Endoscopy;  Laterality: N/A;    HERNIA REPAIR Right 2013    incisional    LIVER TRANSPLANT  April 2012    LYMPH NODE BIOPSY/EXCISION Left 10/16/2024    Procedure: LYMPH NODE BIOPSY/EXCISION;  Surgeon: Hao Washburn MD;  Location: [facility] OR;  Service: General;  Laterality: Left;    MOUTH SURGERY      TONSILLECTOMY  1958       Social History     Socioeconomic History    Marital status:      Highest education level: 10th grade   Tobacco Use    Smoking status: Former     Current packs/day: 0.75     Average packs/day: 0.8 packs/day for 36.8 years (27.6 ttl pk-yrs)     Types: Cigarettes     Start date: 1988    Smokeless tobacco: Never    Tobacco comments:     Currently smokes a few (3-5) a day as of 6/12/24   Substance and Sexual Activity    Alcohol use: No     Alcohol/week: 0.0 standard drinks of alcohol     Comment: Quit alcohol after some alcohol abuse, prior to his liver transplant    Drug use: No    Sexual activity: Not Currently     Social Drivers of Health     Financial Resource Strain: Medium Risk (3/13/2024)    Overall Financial Resource Strain (CARDIA)     Difficulty of Paying Living Expenses: Somewhat hard   Food Insecurity: No Food Insecurity (3/13/2024)    Hunger Vital Sign     Worried About Running Out of Food in the Last Year: Never true     Ran Out of Food in the Last Year: Never true   Transportation Needs: Unmet Transportation Needs (3/13/2024)    PRAPARE - Transportation     Lack of Transportation (Medical): Yes     Lack of Transportation (Non-Medical): No   Physical Activity: Inactive (3/13/2024)    Exercise Vital Sign     Days of Exercise per Week: 0 days     Minutes of Exercise per Session: 0 min   Stress: No Stress Concern Present (3/13/2024)    [employer] - Occupational Stress Questionnaire     Feeling of Stress : Not at all   Housing Stability: Low Risk  (3/13/2024)    Housing Stability Vital Sign     Unable to Pay for Housing in the Last Year: No     Number of Places Lived in the Last Year: 1     Unstable Housing in the Last Year: No       Vitals:    10/31/24 1200   BP: 110/68   Pulse: 106   Temp: 98.1 °F (36.7 °C)       Physical Exam  Constitutional:       General: He is not in acute distress.     Appearance: He is well-developed.   HENT:      Head: Normocephalic and atraumatic.   Eyes:      General: No scleral icterus.     Pupils: Pupils  are equal, round, and reactive to light.   Neck:      Thyroid: No thyromegaly.      Vascular: No JVD.      Trachea: No tracheal deviation.   Cardiovascular:      Rate and Rhythm: Normal rate and regular rhythm.      Heart sounds: Normal heart sounds.   Pulmonary:      Effort: Pulmonary effort is normal.      Breath sounds: Normal breath sounds.   Abdominal:      General: Bowel sounds are normal. There is no distension.      Palpations: Abdomen is soft. There is no mass.      Tenderness: There is no abdominal tenderness. There is no guarding or rebound.      Hernia: Hernia: incision.      Comments: Protuberant   Musculoskeletal:         General: Normal range of motion.      Cervical back: Normal range of motion and neck supple.   Lymphadenopathy:      Cervical: Cervical adenopathy: There is palpable right jugular and submandibular adenopathy.   Skin:     General: Skin is warm and dry.      Comments: Palpable right inguinal adenopathy.      Left inguinal incision is healing well   Neurological:      Mental Status: He is alert and oriented to person, place, and time.   Psychiatric:         Behavior: Behavior normal.         Thought Content: Thought content normal.         Judgment: Judgment normal.     . 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   - Pending  TP63 and DUSP22 rearrangement analysis by FISH.  See comment.     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      Oncology consultation tomorrow.      Inguinal incision is healing well.      Should the patient need a MediPort this could be arranged as the risks benefits and complications of port placement was discussed.  Patient was given a copy of the consent form but consent will be needed if MediPort placement is requested by the Oncology Service    The need for MediPort placement was discussed. The risks,benefits, and potential complications were discussed.  This  includes infection, bleeding, Pneumothorax, hemothorax, injury to the great vessels, loss of the Guidewire or catheter.  I also discussed embolism of air or fat, pericardial tamponade, narrowing or stenosis of the vessels and infection of the port site.    Complete list of complications were reviewed and are listed on the consent form.

## 2024-11-01 ENCOUNTER — OFFICE VISIT (OUTPATIENT)
Dept: HEMATOLOGY/ONCOLOGY | Facility: CLINIC | Age: 69
End: 2024-11-01
Payer: MEDICARE

## 2024-11-01 ENCOUNTER — TELEPHONE (OUTPATIENT)
Dept: SURGERY | Facility: CLINIC | Age: 69
End: 2024-11-01
Payer: MEDICARE

## 2024-11-01 ENCOUNTER — PATIENT MESSAGE (OUTPATIENT)
Dept: ADMINISTRATIVE | Facility: OTHER | Age: 69
End: 2024-11-01
Payer: MEDICARE

## 2024-11-01 ENCOUNTER — TELEPHONE (OUTPATIENT)
Dept: SURGERY | Facility: HOSPITAL | Age: 69
End: 2024-11-01
Payer: MEDICARE

## 2024-11-01 ENCOUNTER — LAB VISIT (OUTPATIENT)
Dept: LAB | Facility: HOSPITAL | Age: 69
End: 2024-11-01
Attending: INTERNAL MEDICINE
Payer: MEDICARE

## 2024-11-01 ENCOUNTER — E-CONSULT (OUTPATIENT)
Dept: HEPATOLOGY | Facility: HOSPITAL | Age: 69
End: 2024-11-01
Payer: MEDICARE

## 2024-11-01 VITALS
TEMPERATURE: 98 F | WEIGHT: 217.63 LBS | HEART RATE: 101 BPM | DIASTOLIC BLOOD PRESSURE: 76 MMHG | BODY MASS INDEX: 32.24 KG/M2 | SYSTOLIC BLOOD PRESSURE: 125 MMHG | HEIGHT: 69 IN | OXYGEN SATURATION: 98 %

## 2024-11-01 DIAGNOSIS — D84.9 IMMUNOSUPPRESSION: ICD-10-CM

## 2024-11-01 DIAGNOSIS — K21.9 GASTROESOPHAGEAL REFLUX DISEASE WITHOUT ESOPHAGITIS: Chronic | ICD-10-CM

## 2024-11-01 DIAGNOSIS — Z86.19 HISTORY OF HEPATITIS C: ICD-10-CM

## 2024-11-01 DIAGNOSIS — C84.75 ANAPLASTIC ALK-NEGATIVE LARGE CELL LYMPHOMA OF LYMPH NODE OF LOWER EXTREMITY: Primary | ICD-10-CM

## 2024-11-01 DIAGNOSIS — Z94.4 LIVER TRANSPLANTED: ICD-10-CM

## 2024-11-01 DIAGNOSIS — C84.70 ANAPLASTIC LARGE CELL LYMPHOMA, ALK NEGATIVE: ICD-10-CM

## 2024-11-01 DIAGNOSIS — I82.722 CHRONIC DEEP VEIN THROMBOSIS (DVT) OF LEFT UPPER EXTREMITY, UNSPECIFIED VEIN: ICD-10-CM

## 2024-11-01 DIAGNOSIS — D84.9 IMMUNOSUPPRESSION: Primary | ICD-10-CM

## 2024-11-01 DIAGNOSIS — C85.80 LARGE CELL LYMPHOMA: ICD-10-CM

## 2024-11-01 DIAGNOSIS — R94.5 ABNORMAL RESULTS OF LIVER FUNCTION STUDIES: ICD-10-CM

## 2024-11-01 DIAGNOSIS — Z72.0 TOBACCO ABUSE DISORDER: ICD-10-CM

## 2024-11-01 LAB
ALBUMIN SERPL BCP-MCNC: 2.8 G/DL (ref 3.5–5.2)
ALP SERPL-CCNC: 127 U/L (ref 40–150)
ALT SERPL W/O P-5'-P-CCNC: 11 U/L (ref 10–44)
ANION GAP SERPL CALC-SCNC: 7 MMOL/L (ref 8–16)
AST SERPL-CCNC: 20 U/L (ref 10–40)
BASOPHILS # BLD AUTO: 0.01 K/UL (ref 0–0.2)
BASOPHILS NFR BLD: 0.4 % (ref 0–1.9)
BILIRUB SERPL-MCNC: 1.3 MG/DL (ref 0.1–1)
BUN SERPL-MCNC: 19 MG/DL (ref 8–23)
CALCIUM SERPL-MCNC: 8.7 MG/DL (ref 8.7–10.5)
CHLORIDE SERPL-SCNC: 107 MMOL/L (ref 95–110)
CHOLEST SERPL-MCNC: 124 MG/DL (ref 120–199)
CHOLEST/HDLC SERPL: 8.3 {RATIO} (ref 2–5)
CO2 SERPL-SCNC: 22 MMOL/L (ref 23–29)
CREAT SERPL-MCNC: 1.6 MG/DL (ref 0.5–1.4)
DIFFERENTIAL METHOD BLD: ABNORMAL
EOSINOPHIL # BLD AUTO: 0.1 K/UL (ref 0–0.5)
EOSINOPHIL NFR BLD: 2.2 % (ref 0–8)
ERYTHROCYTE [DISTWIDTH] IN BLOOD BY AUTOMATED COUNT: 16.7 % (ref 11.5–14.5)
EST. GFR  (NO RACE VARIABLE): 46.1 ML/MIN/1.73 M^2
GLUCOSE SERPL-MCNC: 103 MG/DL (ref 70–110)
HCT VFR BLD AUTO: 31.8 % (ref 40–54)
HDLC SERPL-MCNC: 15 MG/DL (ref 40–75)
HDLC SERPL: 12.1 % (ref 20–50)
HGB BLD-MCNC: 10.2 G/DL (ref 14–18)
IMM GRANULOCYTES # BLD AUTO: 0.01 K/UL (ref 0–0.04)
IMM GRANULOCYTES NFR BLD AUTO: 0.4 % (ref 0–0.5)
LDLC SERPL CALC-MCNC: 86.2 MG/DL (ref 63–159)
LYMPHOCYTES # BLD AUTO: 0.3 K/UL (ref 1–4.8)
LYMPHOCYTES NFR BLD: 9.7 % (ref 18–48)
MCH RBC QN AUTO: 30.9 PG (ref 27–31)
MCHC RBC AUTO-ENTMCNC: 32.1 G/DL (ref 32–36)
MCV RBC AUTO: 96 FL (ref 82–98)
MONOCYTES # BLD AUTO: 0.3 K/UL (ref 0.3–1)
MONOCYTES NFR BLD: 11.2 % (ref 4–15)
NEUTROPHILS # BLD AUTO: 2.1 K/UL (ref 1.8–7.7)
NEUTROPHILS NFR BLD: 76.1 % (ref 38–73)
NONHDLC SERPL-MCNC: 109 MG/DL
NRBC BLD-RTO: 0 /100 WBC
PLATELET # BLD AUTO: 51 K/UL (ref 150–450)
PMV BLD AUTO: 13.6 FL (ref 9.2–12.9)
POTASSIUM SERPL-SCNC: 3.9 MMOL/L (ref 3.5–5.1)
PROT SERPL-MCNC: 6.5 G/DL (ref 6–8.4)
RBC # BLD AUTO: 3.3 M/UL (ref 4.6–6.2)
SODIUM SERPL-SCNC: 136 MMOL/L (ref 136–145)
TRIGL SERPL-MCNC: 114 MG/DL (ref 30–150)
WBC # BLD AUTO: 2.77 K/UL (ref 3.9–12.7)

## 2024-11-01 PROCEDURE — 80061 LIPID PANEL: CPT | Mod: HCNC | Performed by: INTERNAL MEDICINE

## 2024-11-01 PROCEDURE — 99999 PR PBB SHADOW E&M-EST. PATIENT-LVL IV: CPT | Mod: PBBFAC,HCNC,, | Performed by: INTERNAL MEDICINE

## 2024-11-01 PROCEDURE — 36415 COLL VENOUS BLD VENIPUNCTURE: CPT | Mod: HCNC | Performed by: INTERNAL MEDICINE

## 2024-11-01 PROCEDURE — 80053 COMPREHEN METABOLIC PANEL: CPT | Mod: HCNC | Performed by: INTERNAL MEDICINE

## 2024-11-01 PROCEDURE — 85025 COMPLETE CBC W/AUTO DIFF WBC: CPT | Mod: HCNC | Performed by: INTERNAL MEDICINE

## 2024-11-01 PROCEDURE — 80197 ASSAY OF TACROLIMUS: CPT | Mod: HCNC | Performed by: INTERNAL MEDICINE

## 2024-11-01 RX ORDER — LIDOCAINE HYDROCHLORIDE 10 MG/ML
1 INJECTION, SOLUTION EPIDURAL; INFILTRATION; INTRACAUDAL; PERINEURAL ONCE
Status: DISCONTINUED | OUTPATIENT
Start: 2024-11-01 | End: 2024-11-01

## 2024-11-01 RX ORDER — ONDANSETRON 8 MG/1
8 TABLET, ORALLY DISINTEGRATING ORAL EVERY 8 HOURS PRN
OUTPATIENT
Start: 2024-11-01

## 2024-11-01 NOTE — PRE-PROCEDURE INSTRUCTIONS
Pre op instructions reviewed with patient over telephone, verbalized understanding.    To confirm, Surgery is scheduled on 11/06/24. We will call you late afternoon the business day prior to surgery with your arrival time.    *Please report to the [facility] Lobby (1st Floor) located off of [facility] (2nd Entrance/Building on the left, in front of the flag pole).  Address: [address] [place] [address] [place]. [address]      INSTRUCTIONS IMPORTANT!!!  !!!NO FOOD after midnight! You may have clear liquids up to 3 hrs before your arrival to the Hospital!!!  - Clear liquids include Gatorade, water, soda, black coffee or tea, and clear juices,chicken broth, jello, (no milk or creamer).  - Clear liquids do NOT include anything with pulp or food particles (Ice cream, yogurt, etc.)      >>>MEDICATION INSTRUCTIONS<<<: Morning of Surgery, take small sip of water with ONLY these medications:  METOPROLOL    *Blood Thinners: Take your last ELIQUIS before surgery on 11/03/24 per Physician Instructions! Call your Surgeon office to inquire about any questions regarding your blood thinner medication.    If your are taking Ozempic/ Mounjaro/ Wegovy/ Trulicity/ Semaglutide injections or weight loss medication, such as Phentermine,  please notify us immediately as they must be stopped 7 days prior to surgery.    !!!STOP ALL Aspirins, NSAIDS, WEIGHT LOSS INJECTIONS/PILLS, Herbal supplements, & Vitamins 7 DAYS BEFORE SURGERY!!!    ____  Avoid Alcoholic beverages 3 days prior to surgery, as it can thin the blood.  ____  NO Acrylic/fake nails or nail polish worn day of surgery (specifically hand/arm & foot surgeries).  ____  NO powder, lotions, deodorants, oils or cream on body.  ____  Remove all jewelry & piercings & foreign objects before arrival & leave at home.  ____  Remove Dentures, Hearing Aids & Contact Lens prior to surgery.  ____  Bring photo ID and insurance information to hospital (Leave Valuables at Home).  ____  If  going home the same day, arrange for a ride home. You will not be able to drive for 24 hrs if Anesthesia was used. ____  Females (ages 11-60): may need to give a urine sample the morning of surgery; please see Pre op Nurse prior to using the restroom.  ____  Males: Stop ED medications (Viagra, Cialis) 24 hrs prior to surgery.  ____  Wear clean, loose fitting clothing to allow for dressings/ bandages.      Bathing Instructions:    -Shower with anti-bacterial Soap (Hibiclens or Dial) the night before surgery and the morning of.   -Do not use Hibiclens on your face or genitals.   -Apply clean clothes after shower.  -Do not shave your face or body 3 days prior to surgery unless instructed otherwise by your Surgeon.    [facility] Vis[facility]/Ride Policy:  Only 2 adults allowed in pre op/recovery area during your procedure. You MUST HAVE A RIDE HOME from a responsible adult that you know and trust. Medical Transport, Uber or Lyft can ONLY be used if patient has a responsible adult to accompany them during ride home.       *Signs and symptoms of Infection Before or After Surgery:               !!!If you experience any fever, chills, nausea/ vomiting, foul odor/ excessive drainage from surgical site, flu-like symptoms, new wounds or cuts, PLEASE CALL THE SURGEON OFFICE at [phone] or SEND MESSAGE THROUGH [facility] PORTAL!!!     *If you are running late the morning of surgery, please call the Hospital Surgery Dept @ [phone].     *Billing questions:  [phone]     Thank you,  -[facility] Pre Admit Dept.  [phone] Harris   or [phone]  M-F 7:30 am-4:00 pm (Closed Major Holidays)

## 2024-11-02 LAB — TACROLIMUS BLD-MCNC: 3 NG/ML (ref 5–15)

## 2024-11-04 ENCOUNTER — HOSPITAL ENCOUNTER (OUTPATIENT)
Dept: CARDIOLOGY | Facility: HOSPITAL | Age: 69
Discharge: HOME | End: 2024-11-04
Attending: INTERNAL MEDICINE
Payer: MEDICARE

## 2024-11-04 ENCOUNTER — TELEPHONE (OUTPATIENT)
Dept: HEMATOLOGY/ONCOLOGY | Facility: CLINIC | Age: 69
End: 2024-11-04
Payer: MEDICARE

## 2024-11-04 VITALS
WEIGHT: 217 LBS | HEART RATE: 83 BPM | BODY MASS INDEX: 32.14 KG/M2 | DIASTOLIC BLOOD PRESSURE: 76 MMHG | HEIGHT: 69 IN | SYSTOLIC BLOOD PRESSURE: 125 MMHG

## 2024-11-04 DIAGNOSIS — C84.75 ANAPLASTIC ALK-NEGATIVE LARGE CELL LYMPHOMA OF LYMPH NODE OF LOWER EXTREMITY: ICD-10-CM

## 2024-11-04 LAB
AORTIC ROOT ANNULUS: 3 CM
ASCENDING AORTA: 2.9 CM
AV INDEX (PROSTH): 0.64
AV MEAN GRADIENT: 4.3 MMHG
AV PEAK GRADIENT: 9 MMHG
AV REGURGITATION PRESSURE HALF TIME: 274.38 MS
AV VALVE AREA BY VELOCITY RATIO: 2.3 CM²
AV VALVE AREA: 2.4 CM²
AV VELOCITY RATIO: 0.6
BSA FOR ECHO PROCEDURE: 2.19 M2
CV ECHO LV RWT: 0.47 CM
DOP CALC AO PEAK VEL: 1.5 M/S
DOP CALC AO VTI: 27.2 CM
DOP CALC LVOT AREA: 3.8 CM2
DOP CALC LVOT DIAMETER: 2.2 CM
DOP CALC LVOT PEAK VEL: 0.9 M/S
DOP CALC LVOT STROKE VOLUME: 66.1 CM3
DOP CALC RVOT PEAK VEL: 0.71 M/S
DOP CALC RVOT VTI: 16.1 CM
DOP CALCLVOT PEAK VEL VTI: 17.4 CM
E WAVE DECELERATION TIME: 202.83 MSEC
E/A RATIO: 0.82
E/E' RATIO: 6.67 M/S
ECHO LV POSTERIOR WALL: 1.1 CM (ref 0.6–1.1)
EJECTION FRACTION: 60 %
FRACTIONAL SHORTENING: 36.2 % (ref 28–44)
INTERVENTRICULAR SEPTUM: 1 CM (ref 0.6–1.1)
IVC DIAMETER: 1.53 CM
IVRT: 97.05 MSEC
LA MAJOR: 4.92 CM
LA MINOR: 4.25 CM
LA WIDTH: 3.6 CM
LEFT ATRIUM AREA SYSTOLIC (APICAL 2 CHAMBER): 14.13 CM2
LEFT ATRIUM AREA SYSTOLIC (APICAL 4 CHAMBER): 16.55 CM2
LEFT ATRIUM SIZE: 3.2 CM
LEFT ATRIUM VOLUME INDEX MOD: 17.5 ML/M2
LEFT ATRIUM VOLUME INDEX: 20.9 ML/M2
LEFT ATRIUM VOLUME MOD: 37.52 ML
LEFT ATRIUM VOLUME: 44.66 CM3
LEFT INTERNAL DIMENSION IN SYSTOLE: 3 CM (ref 2.1–4)
LEFT VENTRICLE DIASTOLIC VOLUME INDEX: 48.02 ML/M2
LEFT VENTRICLE DIASTOLIC VOLUME: 102.76 ML
LEFT VENTRICLE END SYSTOLIC VOLUME APICAL 2 CHAMBER: 32.85 ML
LEFT VENTRICLE END SYSTOLIC VOLUME APICAL 4 CHAMBER: 40.42 ML
LEFT VENTRICLE MASS INDEX: 82.2 G/M2
LEFT VENTRICLE SYSTOLIC VOLUME INDEX: 16.5 ML/M2
LEFT VENTRICLE SYSTOLIC VOLUME: 35.35 ML
LEFT VENTRICULAR INTERNAL DIMENSION IN DIASTOLE: 4.7 CM (ref 3.5–6)
LEFT VENTRICULAR MASS: 175.8 G
LV LATERAL E/E' RATIO: 6.92 M/S
LV SEPTAL E/E' RATIO: 6.43 M/S
LVED V (TEICH): 102.76 ML
LVES V (TEICH): 35.35 ML
LVOT MG: 1.88 MMHG
LVOT MV: 0.64 CM/S
MV PEAK A VEL: 1.1 M/S
MV PEAK E VEL: 0.9 M/S
MV STENOSIS PRESSURE HALF TIME: 58.82 MS
MV VALVE AREA P 1/2 METHOD: 3.74 CM2
PISA AR MAX VEL: 4.12 M/S
PISA TR MAX VEL: 2.88 M/S
PULM VEIN S/D RATIO: 1.66
PV MEAN GRADIENT: 1 MMHG
PV PEAK D VEL: 0.58 M/S
PV PEAK GRADIENT: 6 MMHG
PV PEAK S VEL: 0.96 M/S
PV PEAK VELOCITY: 1.23 M/S
RA MAJOR: 4.29 CM
RA PRESSURE ESTIMATED: 3 MMHG
RA WIDTH: 3.72 CM
RIGHT VENTRICULAR END-DIASTOLIC DIMENSION: 3.41 CM
RV TB RVSP: 6 MMHG
SINUS: 2.7 CM
STJ: 2.51 CM
TDI LATERAL: 0.13 M/S
TDI SEPTAL: 0.14 M/S
TDI: 0.14 M/S
TR MAX PG: 33 MMHG
TV REST PULMONARY ARTERY PRESSURE: 36 MMHG
Z-SCORE OF LEFT VENTRICULAR DIMENSION IN END DIASTOLE: -3.81
Z-SCORE OF LEFT VENTRICULAR DIMENSION IN END SYSTOLE: -2.65

## 2024-11-04 PROCEDURE — 93306 TTE W/DOPPLER COMPLETE: CPT | Mod: HCNC

## 2024-11-04 PROCEDURE — 93306 TTE W/DOPPLER COMPLETE: CPT | Mod: 26,HCNC,, | Performed by: STUDENT IN AN ORGANIZED HEALTH CARE EDUCATION/TRAINING PROGRAM

## 2024-11-04 NOTE — TELEPHONE ENCOUNTER
Called and spoke to pt.  He was not aware of referral to [facility].  He will see Dr Peralta tomorrow in [place]. Pt will call me once he sees Dr Peralta tomorrow.  He has my name and direct number.

## 2024-11-04 NOTE — TELEPHONE ENCOUNTER
----- Message from Davis Garnerdontae sent at 11/4/2024  7:00 AM CST -----  Regarding: RE: lymphoma specialist  I have Verified medical and transplant benefit for patient for Mika gaffney    [place]  ----- Message -----  From: Abelardo Lara, BRANDI  Sent: 11/1/2024   2:31 PM CST  To: Davis Garnerdontae  Subject: FW: lymphoma specialist                          Mrn [id number] please verify car t tx benefits  ----- Message -----  From: Lena Rodriguez  Sent: 11/1/2024   1:11 PM CDT  To: Abelardo Lara RN  Subject: lymphoma specialist                              Hello,     This patient needs a lymphoma specialist.     Thank you.  ----- Message -----  From: Timmy Peralta MD  Sent: 11/1/2024   9:26 AM CDT  To: Isabella Sutton DO; Hao Washburn MD; #    Patient newly diagnosed with anaplastic lymphoma.  Will ask Dr. Greene to place MediPort; patient overwhelmed with information today I have placed on study labs.  In placed case through the via pathways.  Will ask lymphoma specialist to review power will also need to contact liver transplant to review case to see if any recommendation modifications needed.

## 2024-11-05 ENCOUNTER — E-CONSULT (OUTPATIENT)
Dept: HEPATOLOGY | Facility: CLINIC | Age: 69
End: 2024-11-05
Payer: MEDICARE

## 2024-11-05 ENCOUNTER — TELEPHONE (OUTPATIENT)
Dept: SURGERY | Facility: CLINIC | Age: 69
End: 2024-11-05
Payer: MEDICARE

## 2024-11-05 ENCOUNTER — PATIENT MESSAGE (OUTPATIENT)
Dept: PREADMISSION TESTING | Facility: HOSPITAL | Age: 69
End: 2024-11-05
Payer: MEDICARE

## 2024-11-05 DIAGNOSIS — B18.1 CHRONIC VIRAL HEPATITIS B WITHOUT DELTA AGENT AND WITHOUT COMA: Primary | ICD-10-CM

## 2024-11-05 DIAGNOSIS — C84.75 ANAPLASTIC ALK-NEGATIVE LARGE CELL LYMPHOMA OF LYMPH NODE OF LOWER EXTREMITY: Primary | ICD-10-CM

## 2024-11-05 PROCEDURE — 99499 UNLISTED E&M SERVICE: CPT | Mod: S$GLB,,, | Performed by: INTERNAL MEDICINE

## 2024-11-05 NOTE — TELEPHONE ENCOUNTER
Contacting pt back to make him aware that we were eligible to move his start time back on his surgery for him to be able to get here. I advised arrival time for surgery on 11/6/24 is 8am at the hospital on [address]. Pt verbalized his understanding.

## 2024-11-05 NOTE — PRE-PROCEDURE INSTRUCTIONS
Called and spoke with pt about the following:     Please arrive to [facility] ([facility] at 0630 am on 11/6/2024 for your scheduled procedure.  Address: [address] [place] [address] [place] [address] (2nd Building on left, 1st Floor Lobby)    !!!NO FOOD after midnight! You may have clear liquids up to 3 hrs before your arrival to the Hospital!!!  Clear liquids include Gatorade, water, soda, black coffee or tea (no milk or creamer), and clear juices.  Clear liquids do NOT include anything with pulp or food particles (Chicken broth, ice cream, yogurt, Jello, etc.)    Thank you,  -[facility] Pre [facility] Dept.  Mon-Fri 7:30 am - 4 pm [phone]

## 2024-11-05 NOTE — CONSULTS
[facility] - Hepatology  Response for E-Consult     Patient Name: Lj Coleman  MRN: [id number]  Primary Care Provider: Isabella Sutton DO   Requesting Provider: Timmy Peralta MD  E-Consult to Hepatology Outpatient  Consult performed by: Duc Odom MD  Consult ordered by: Timmy Peralta MD          Recommendation: patient need to be on Tenofovir ( Vemlidy 25 mg) daily till he is on rituxan and for 1 year after stopping. Monitor HBV DNA every 6 months    Contingency that warrants a repeat eConsult or referral:     Total time of Consultation: 20 minute    I did not speak to the requesting provider verbally about this.     *This eConsult is based on the clinical data available to me and is furnished without benefit of a physical examination. The eConsult will need to be interpreted in light of any clinical issues or changes in patient status not available to me at the time of filing this eConsults. Significant changes in patient condition or level of acuity should result in immediate formal consultation and reevaluation. Please alert me if you have further questions.    Thank you for this eConsult referral.     Duc Odom MD  [facility] - Hepatology

## 2024-11-06 ENCOUNTER — HOSPITAL ENCOUNTER (OUTPATIENT)
Facility: HOSPITAL | Age: 69
Discharge: HOME | End: 2024-11-06
Attending: SURGERY | Admitting: SURGERY
Payer: MEDICARE

## 2024-11-06 ENCOUNTER — ANESTHESIA EVENT (OUTPATIENT)
Dept: SURGERY | Facility: HOSPITAL | Age: 69
End: 2024-11-06
Payer: MEDICARE

## 2024-11-06 ENCOUNTER — ANESTHESIA (OUTPATIENT)
Dept: SURGERY | Facility: HOSPITAL | Age: 69
End: 2024-11-06
Payer: MEDICARE

## 2024-11-06 DIAGNOSIS — C84.70 ANAPLASTIC LARGE CELL LYMPHOMA, ALK NEGATIVE: ICD-10-CM

## 2024-11-06 DIAGNOSIS — Z94.4 LIVER TRANSPLANTED: ICD-10-CM

## 2024-11-06 DIAGNOSIS — C85.80 LARGE CELL LYMPHOMA: Primary | ICD-10-CM

## 2024-11-06 PROBLEM — Z95.828 PORT-A-CATH IN PLACE: Status: ACTIVE | Noted: 2024-11-06

## 2024-11-06 PROCEDURE — 36561 INSERT TUNNELED CV CATH: CPT | Mod: 79,HCNC,LT, | Performed by: SURGERY

## 2024-11-06 PROCEDURE — 36000707: Mod: HCNC | Performed by: SURGERY

## 2024-11-06 PROCEDURE — 25000003 PHARM REV CODE 250: Mod: HCNC | Performed by: NURSE ANESTHETIST, CERTIFIED REGISTERED

## 2024-11-06 PROCEDURE — 37000009 HC ANESTHESIA EA ADD 15 MINS: Mod: HCNC | Performed by: SURGERY

## 2024-11-06 PROCEDURE — 77001 FLUOROGUIDE FOR VEIN DEVICE: CPT | Mod: 26,HCNC,, | Performed by: SURGERY

## 2024-11-06 PROCEDURE — 63600175 PHARM REV CODE 636 W HCPCS: Mod: HCNC | Performed by: NURSE ANESTHETIST, CERTIFIED REGISTERED

## 2024-11-06 PROCEDURE — 63600175 PHARM REV CODE 636 W HCPCS: Mod: JZ,JG,HCNC | Performed by: SURGERY

## 2024-11-06 PROCEDURE — 71000015 HC POSTOP RECOV 1ST HR: Mod: HCNC | Performed by: SURGERY

## 2024-11-06 PROCEDURE — 36000706: Mod: HCNC | Performed by: SURGERY

## 2024-11-06 PROCEDURE — C1788 PORT, INDWELLING, IMP: HCPCS | Mod: HCNC | Performed by: SURGERY

## 2024-11-06 PROCEDURE — 37000008 HC ANESTHESIA 1ST 15 MINUTES: Mod: HCNC | Performed by: SURGERY

## 2024-11-06 PROCEDURE — 71000033 HC RECOVERY, INTIAL HOUR: Mod: HCNC | Performed by: SURGERY

## 2024-11-06 DEVICE — PORT POWER CLEAR VIEW: Type: IMPLANTABLE DEVICE | Site: CHEST | Status: FUNCTIONAL

## 2024-11-06 RX ORDER — HYDROCODONE BITARTRATE AND ACETAMINOPHEN 5; 325 MG/1; MG/1
1 TABLET ORAL EVERY 4 HOURS PRN
Status: DISCONTINUED | OUTPATIENT
Start: 2024-11-06 | End: 2024-11-06 | Stop reason: HOSPADM

## 2024-11-06 RX ORDER — ONDANSETRON HYDROCHLORIDE 2 MG/ML
INJECTION, SOLUTION INTRAVENOUS
Status: DISCONTINUED | OUTPATIENT
Start: 2024-11-06 | End: 2024-11-06

## 2024-11-06 RX ORDER — ONDANSETRON 8 MG/1
8 TABLET, ORALLY DISINTEGRATING ORAL EVERY 8 HOURS PRN
Status: DISCONTINUED | OUTPATIENT
Start: 2024-11-06 | End: 2024-11-06 | Stop reason: HOSPADM

## 2024-11-06 RX ORDER — HYDROCODONE BITARTRATE AND ACETAMINOPHEN 7.5; 325 MG/1; MG/1
1 TABLET ORAL EVERY 6 HOURS PRN
Status: DISCONTINUED | OUTPATIENT
Start: 2024-11-06 | End: 2024-11-06 | Stop reason: HOSPADM

## 2024-11-06 RX ORDER — BUPIVACAINE HYDROCHLORIDE 2.5 MG/ML
INJECTION, SOLUTION EPIDURAL; INFILTRATION; INTRACAUDAL
Status: DISCONTINUED | OUTPATIENT
Start: 2024-11-06 | End: 2024-11-06 | Stop reason: HOSPADM

## 2024-11-06 RX ORDER — CEFAZOLIN SODIUM 1 G/3ML
INJECTION, POWDER, FOR SOLUTION INTRAMUSCULAR; INTRAVENOUS
Status: DISCONTINUED | OUTPATIENT
Start: 2024-11-06 | End: 2024-11-06

## 2024-11-06 RX ORDER — HYDROCODONE BITARTRATE AND ACETAMINOPHEN 5; 325 MG/1; MG/1
1 TABLET ORAL EVERY 6 HOURS PRN
Qty: 12 TABLET | Refills: 0 | Status: SHIPPED | OUTPATIENT
Start: 2024-11-06

## 2024-11-06 RX ORDER — CEFAZOLIN 2 G/1
2 INJECTION, POWDER, FOR SOLUTION INTRAMUSCULAR; INTRAVENOUS
Status: DISCONTINUED | OUTPATIENT
Start: 2024-11-06 | End: 2024-11-06 | Stop reason: HOSPADM

## 2024-11-06 RX ORDER — MIDAZOLAM HYDROCHLORIDE 1 MG/ML
INJECTION, SOLUTION INTRAMUSCULAR; INTRAVENOUS
Status: DISCONTINUED | OUTPATIENT
Start: 2024-11-06 | End: 2024-11-06

## 2024-11-06 RX ORDER — LIDOCAINE HYDROCHLORIDE 20 MG/ML
INJECTION, SOLUTION EPIDURAL; INFILTRATION; INTRACAUDAL; PERINEURAL
Status: DISCONTINUED | OUTPATIENT
Start: 2024-11-06 | End: 2024-11-06

## 2024-11-06 RX ORDER — FENTANYL CITRATE 50 UG/ML
INJECTION, SOLUTION INTRAMUSCULAR; INTRAVENOUS
Status: DISCONTINUED | OUTPATIENT
Start: 2024-11-06 | End: 2024-11-06

## 2024-11-06 RX ORDER — PROPOFOL 10 MG/ML
VIAL (ML) INTRAVENOUS
Status: DISCONTINUED | OUTPATIENT
Start: 2024-11-06 | End: 2024-11-06

## 2024-11-06 RX ORDER — HYDROMORPHONE HYDROCHLORIDE 1 MG/ML
1 INJECTION, SOLUTION INTRAMUSCULAR; INTRAVENOUS; SUBCUTANEOUS EVERY 4 HOURS PRN
Status: DISCONTINUED | OUTPATIENT
Start: 2024-11-06 | End: 2024-11-06 | Stop reason: HOSPADM

## 2024-11-06 RX ADMIN — SODIUM CHLORIDE: 9 INJECTION, SOLUTION INTRAVENOUS at 08:11

## 2024-11-06 RX ADMIN — LIDOCAINE HYDROCHLORIDE 50 MG: 20 INJECTION, SOLUTION EPIDURAL; INFILTRATION; INTRACAUDAL; PERINEURAL at 08:11

## 2024-11-06 RX ADMIN — MIDAZOLAM HYDROCHLORIDE 1 MG: 1 INJECTION, SOLUTION INTRAMUSCULAR; INTRAVENOUS at 09:11

## 2024-11-06 RX ADMIN — FENTANYL CITRATE 25 MCG: 50 INJECTION, SOLUTION INTRAMUSCULAR; INTRAVENOUS at 09:11

## 2024-11-06 RX ADMIN — MIDAZOLAM HYDROCHLORIDE 1 MG: 1 INJECTION, SOLUTION INTRAMUSCULAR; INTRAVENOUS at 08:11

## 2024-11-06 RX ADMIN — CEFAZOLIN 2 G: 330 INJECTION, POWDER, FOR SOLUTION INTRAMUSCULAR; INTRAVENOUS at 08:11

## 2024-11-06 RX ADMIN — ONDANSETRON 4 MG: 2 INJECTION INTRAMUSCULAR; INTRAVENOUS at 09:11

## 2024-11-06 RX ADMIN — PROPOFOL 100 MG: 10 INJECTION, EMULSION INTRAVENOUS at 08:11

## 2024-11-06 NOTE — DISCHARGE INSTRUCTIONS
You can resume your Eliquis tomorrow 11/07/2024    Please call for any fever, increase in pain, nausea or vomiting or redness or drainage from incision(s).    No lifting more than 30 pounds for 2 weeks    No driving if taking the pain medicine    May shower     Removed the glue when it becomes loose, this usually takes 10-14 days.      You may want to take a stool softener or Glycolax or MiraLax while taking pain medicine to avoid constipation    If you become constipated from the pain medication you can use a double dose of Miralax or Glycolax, or milk of magnesia for severe constipation.    Our office phone numbers are  [phone] and     Our office fax  number is #[phone]

## 2024-11-06 NOTE — ANESTHESIA PREPROCEDURE EVALUATION
11/06/2024  Lj Coelman is a 68 y.o., male.      Pre-op Assessment    I have reviewed the Patient Summary Reports.     I have reviewed the Nursing Notes. I have reviewed the NPO Status.      Review of Systems  Anesthesia Hx:  No problems with previous Anesthesia                Hematology/Oncology:  Hematology Normal   Oncology Normal                                   Cardiovascular:     Hypertension   CAD      Angina      HARDING                              Pulmonary:      Shortness of breath                  Renal/:  Renal/ Normal                 Hepatic/GI:     GERD Liver Disease, Hepatitis              Neurological:  Neurology Normal                                      Endocrine:  Endocrine Normal            Dermatological:  Skin Normal    Psych:  Psychiatric History                  Physical Exam  General: Well nourished, Cooperative, Alert and Oriented    Airway:  Mallampati: II / II  Mouth Opening: Normal  TM Distance: Normal  Tongue: Normal  Neck ROM: Normal ROM    Dental:  Intact      Patient Active Problem List   Diagnosis    Obesity (BMI 35.0-39.9 without comorbidity)    Chronic low back pain    GERD (gastroesophageal reflux disease)    Insomnia    History of hepatitis C    Immunosuppression    Liver transplanted    History of liver cancer    Essential hypertension    Atherosclerosis of native coronary artery without angina pectoris/ LAD    Adjustment disorder with mixed anxiety and depressed mood    History of alcohol dependence    History of thrombocytopenia    Ventral hernia    HARDING (dyspnea on exertion)    PVC (premature ventricular contraction)    Tobacco abuse disorder    Osteomyelitis of ankle and foot    Chronic deep vein thrombosis (DVT) of left upper extremity    Family history of bladder cancer    COPD suggested by initial evaluation    Inguinal lymphadenopathy    Anaplastic  ALK-negative large cell lymphoma     Results for orders placed during the hospital encounter of 11/04/24    Echo    Interpretation Summary    Left Ventricle: The left ventricle is normal in size. Normal wall thickness. There is concentric remodeling. Normal wall motion. There is normal systolic function with a visually estimated ejection fraction of 60 - 65%. Ejection fraction is approximately 60%. There is normal diastolic function.    Right Ventricle: Normal right ventricular cavity size. Right ventricle wall motion  is normal. Systolic function is normal.    Mitral Valve: There is mild regurgitation.    Tricuspid Valve: There is mild regurgitation.    Pulmonary Artery: The estimated pulmonary artery systolic pressure is 36 mmHg.    IVC/SVC: Normal venous pressure at 3 mmHg.    No SHAMIKA due to arrhythmia.      Anesthesia Plan  Type of Anesthesia, risks & benefits discussed:    Anesthesia Type: Gen ETT, Gen Supraglottic Airway  Intra-op Monitoring Plan: Standard ASA Monitors  Post Op Pain Control Plan: multimodal analgesia  Induction:  IV  Airway Plan: Direct  Informed Consent: Informed consent signed with the Patient and all parties understand the risks and agree with anesthesia plan.  All questions answered.   ASA Score: 3  Day of Surgery Review of History & Physical: H&P Update referred to the surgeon/provider.I have interviewed and examined the patient. I have reviewed the patient's H&P dated: There are no significant changes. H&P completed by Anesthesiologist.    Ready For Surgery From Anesthesia Perspective.     .

## 2024-11-06 NOTE — OP NOTE
[facility] - Surgery (Hospital)  Operative Note      Date of Procedure: 11/6/2024     Procedure: Procedure(s) (LRB):  [id number] (N/A)     Surgeons and Role:     * Hao Washburn MD - Primary    Assisting Surgeon: None    Pre-Operative Diagnosis: 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 of lymph node of lower extremity [C84.75]    Post-Operative Diagnosis: Post-Op Diagnosis Codes:     * 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 of lymph node of lower extremity [C84.75]    Anesthesia: General    Operative Findings (including complications, if any):     Left subclavian CT compatible an MRI safe MediPort    Description of Technical Procedures:     The patient was brought in the operative room placed on the operative table in the supine position.  Laryngeal mask anesthesia was induced.  Pneumatic compressions on lower extremities.  His arms were tucked at his side.  The upper chest and neck were clipped, prepped and draped in the standard fashion.      A time-out was performed.      The patient was placed in Trendelenburg position.  The left subclavian vein was accessed percutaneously.  Once blood flow was returned a guidewire was advanced through the needle and its position confirmed in the superior vena cava using fluoroscopy.      A chest wall incision was made a proximally 1-1/2-2 cm below the guidewire.  Subcutaneous tissues were dissected using electrocautery.  A subcutaneous pocket was formed above the pectoralis fascia.  The guidewire was then brought into the pocket with a combination of sharp and blunt dissection.      Under fluoroscopic visualization the dilator introducer was placed over the guidewire and advanced into the superior vena cava.  The dilator and guidewire were removed.  The MediPort catheter was placed through the introducer and advanced into the superior vena cava.  The introducer was split and removed.  The MediPort catheter was brought back to 25 cm and found to be in good  position.      The MediPort catheter was cut and connected to the reservoir.  The reservoir was secured in the pocket with 0 Vicryl in interrupted fashion.  The reservoir was accessed there was brisk return of blood and flushed with ease.      Marcaine was infiltrated.  The pocket was irrigated.  Mallika's fascia was closed with 3-0 Vicryl in a running fashion.  The deep dermis was closed with 3-0 Vicryl in a running fashion.  The skin was closed with 4-0 Monocryl.  Dermabond was placed.      Final counts were correct.          Significant Surgical Tasks Conducted by the Assistant(s), if Applicable:  None    Estimated Blood Loss (EBL):  10 mL   IV fluids 300 mL   Marcaine 0.25% 30 mL           Implants:   Implant Name Type Inv. Item Serial No.  Lot No. LRB No. Used Action   PORT POWER CLEAR VIEW - [id number]  PORT POWER CLEAR VIEW  [employer] [id number] Left 1 Implanted       Specimens:   Specimen (24h ago, onward)      None                    Condition: Stable    Disposition: PACU - hemodynamically stable.    Attestation: I performed the procedure.    Discharge Note    OUTCOME: Patient tolerated treatment/procedure well without complication and is now ready for discharge.    Left subclavian MediPort    DISPOSITION: Home or Self Care    FINAL DIAGNOSIS:  Anaplastic lymphoma    FOLLOWUP:  [facility] for chemotherapy    DISCHARGE INSTRUCTIONS:    Discharge Procedure Orders   Diet general     Call MD for:  temperature >100.4     Call MD for:  persistent nausea and vomiting     Call MD for:  severe uncontrolled pain     Call MD for:  difficulty breathing, headache or visual disturbances     Call MD for:  redness, tenderness, or signs of infection (pain, swelling, redness, odor or green/yellow discharge around incision site)     No dressing needed        Clinical Reference Documents Added to Patient Instructions         Document    PORTACAT DISCHARGE INSTRUCTIONS (ENGLISH)

## 2024-11-06 NOTE — TRANSFER OF CARE
"Anesthesia Transfer of Care Note    Patient: Lj Coleman    Procedure(s) Performed: Procedure(s) (LRB):  [id number]-CATH (N/A)    Patient location: PACU    Anesthesia Type: general    Transport from OR: Transported from OR on room air with adequate spontaneous ventilation    Post pain: adequate analgesia    Post assessment: no apparent anesthetic complications    Post vital signs: stable    Level of consciousness: sedated    Nausea/Vomiting: no nausea/vomiting    Complications: none    Transfer of care protocol was followed      Last vitals: Visit Vitals  /69 (BP Location: Right arm, Patient Position: Sitting)   Pulse 109   Temp 36.6 °C (97.9 °F) (Temporal)   Resp 18   Ht 5' 9" (1.753 m)   Wt 96.9 kg (213 lb 10 oz)   SpO2 96%   BMI 31.55 kg/m²     "

## 2024-11-06 NOTE — INTERVAL H&P NOTE
The patient has been examined and the H&P has been reviewed:    I concur with the findings and no changes have occurred since H&P was written.    Surgery risks, benefits and alternative options discussed and understood by patient/family.    He was held his anticoagulation.      We will proceed with port    The need for MediPort placement was discussed. The risks,benefits, and potential complications were discussed.  This includes infection, bleeding, Pneumothorax, hemothorax, injury to the great vessels, loss of the Guidewire or catheter.  I also discussed embolism of air or fat, pericardial tamponade, narrowing or stenosis of the vessels and infection of the port site.    Complete list of complications were reviewed and are listed on the consent form.  Informed consent was obtained.  Surgery  was scheduled.  Preoperative instructions were given       There are no hospital problems to display for this patient.

## 2024-11-06 NOTE — ANESTHESIA POSTPROCEDURE EVALUATION
Anesthesia Post Evaluation    Patient: Lj Coleman    Procedure(s) Performed: Procedure(s) (LRB):  [id number]-F-CATH (N/A)    Final Anesthesia Type: general      Patient location during evaluation: PACU  Patient participation: Yes- Able to Participate  Level of consciousness: awake and alert, oriented and awake  Post-procedure vital signs: reviewed and stable  Pain management: adequate  Airway patency: patent    PONV status at discharge: No PONV  Anesthetic complications: no      Cardiovascular status: blood pressure returned to baseline  Respiratory status: unassisted  Hydration status: euvolemic  Follow-up not needed.              Vitals Value Taken Time   /70 11/06/24 1005   Temp 36.7 °C (98 °F) 11/06/24 0934   Pulse 88 11/06/24 1007   Resp 18 11/06/24 1007   SpO2 95 % 11/06/24 1007   Vitals shown include unfiled device data.      No case tracking events are documented in the log.      Pain/Reginaldo Score: Reginaldo Score: 10 (11/6/2024 10:00 AM)

## 2024-11-06 NOTE — ANESTHESIA PROCEDURE NOTES
Intubation    Date/Time: 11/6/2024 8:44 AM    Performed by: Shivam Boyce CRNA  Authorized by: Roxi Cloud MD    Intubation:     Induction:  Intravenous    Mask Ventilation:  Easy mask    Attempts:  1    Attempted By:  CRNA    Difficult Airway Encountered?: No      Complications:  None    Airway Device:  Supraglottic airway/LMA    Airway Device Size:  5.0    Style/Cuff Inflation:  Uncuffed    Secured at:  The lips    Placement Verified By:  Capnometry    Complicating Factors:  None

## 2024-11-07 ENCOUNTER — DOCUMENTATION ONLY (OUTPATIENT)
Dept: HEMATOLOGY/ONCOLOGY | Facility: CLINIC | Age: 69
End: 2024-11-07
Payer: MEDICARE

## 2024-11-07 ENCOUNTER — TELEPHONE (OUTPATIENT)
Dept: HEMATOLOGY/ONCOLOGY | Facility: CLINIC | Age: 69
End: 2024-11-07
Payer: MEDICARE

## 2024-11-07 VITALS
SYSTOLIC BLOOD PRESSURE: 127 MMHG | HEIGHT: 69 IN | OXYGEN SATURATION: 95 % | BODY MASS INDEX: 31.64 KG/M2 | HEART RATE: 88 BPM | RESPIRATION RATE: 18 BRPM | DIASTOLIC BLOOD PRESSURE: 70 MMHG | TEMPERATURE: 98 F | WEIGHT: 213.63 LBS

## 2024-11-07 NOTE — TELEPHONE ENCOUNTER
----- Message from Davis Garnerdontae sent at 11/4/2024  7:00 AM CST -----  Regarding: RE: lymphoma specialist  I have Verified medical and transplant benefit for patient for Mika gaffney    [place]  ----- Message -----  From: Abelardo Lara, BRANDI  Sent: 11/1/2024   2:31 PM CST  To: Davis Garnerdontae  Subject: FW: lymphoma specialist                          Mrn [id number] please verify car t tx benefits  ----- Message -----  From: Lena Rodriguez  Sent: 11/1/2024   1:11 PM CDT  To: Abelardo Lara RN  Subject: lymphoma specialist                              Hello,     This patient needs a lymphoma specialist.     Thank you.  ----- Message -----  From: Timmy Peralta MD  Sent: 11/1/2024   9:26 AM CDT  To: Isabella Sutton DO; Hao Washburn MD; #    Patient newly diagnosed with anaplastic lymphoma.  Will ask Dr. Greene to place MediPort; patient overwhelmed with information today I have placed on study labs.  In placed case through the via pathways.  Will ask lymphoma specialist to review power will also need to contact liver transplant to review case to see if any recommendation modifications needed.

## 2024-11-07 NOTE — TELEPHONE ENCOUNTER
Called and spoke to Patient .  Appointment scheduled on 12/18 with Dr Acevedo.  All questions and concerns addressed.   Provided my name and direct number and instructed Patient  to call with any questions and/or concerns.       NATHALIA CisnerosN, RN-BC  BMT Nurse Navigator  [facility] [address], [place], [place] [address]  _________________________  o [phone]

## 2024-11-07 NOTE — TELEPHONE ENCOUNTER
"Oncology Navigation   Intake  Cancer Type: Lymphoma  Type of Referral: Internal  Date of Referral: 24  First Appointment Available: 24  Appointment Date: 24  First Available Date vs. Scheduled Date (days): 0     Treatment  Current Status: Active       Medical Oncologist: Timmy Peralta MD  Consult Date: 24  Chemotherapy: Planned (start date 2024)  Chemotherapy Regimen: A + CHP - BRENTUXIMAB CYCLOPHOSPHAMIDE DOXORUBICIN PREDNISONE       Procedures: Echo  Echo Schedule Date: 24    General Referrals: Chemo Education; Palliative Care  Palliative Care Referral Date: 24          Support Systems: Family members  Barriers of Care: Barriers to Care "Assessment completed-no barriers noted"     Acuity  Systemic Treatment - predicted or initiated: Chemotherapy Regimen with Multiple drugs (+1)  Treatment Tolerability: Has not started treatment yet/treatment fully completed and side effects resolved  ECO  Comorbidities in Medical History: 2  Hospitalization Within the Past Month: 0   Needed: 0  Support: 0  Verbalizes Financial Concerns: 0  Transportation: 0  History of noncompliance/frequent no shows and cancellations: 0  Verbalizes the need for more education: 1  Navigation Acuity: 3     Follow Up  No follow-ups on file.       "

## 2024-11-07 NOTE — PROGRESS NOTES
"  Called to review C1D1 appts with pt. He allowed me to go though  appts for education and labs. I gave him my direct number to use. Pt v/u. Pt then thanked me and ended the call. Will review rest of appts with him during Tx Ed appt tomorrow.   Oncology Navigation   Intake      Treatment  Current Status: Active       Medical Oncologist: Timmy Peralta MD  Consult Date: 24  Chemotherapy: Planned (start date 2024)  Chemotherapy Regimen: A + CHP - BRENTUXIMAB CYCLOPHOSPHAMIDE DOXORUBICIN PREDNISONE       Procedures: Echo  Echo Schedule Date: 24    General Referrals: Chemo Education; Palliative Care  Palliative Care Referral Date: 24          Support Systems: Family members  Barriers of Care: Barriers to Care "Assessment completed-no barriers noted"     Acuity  Systemic Treatment - predicted or initiated: Chemotherapy Regimen with Multiple drugs (+1)  Treatment Tolerability: Has not started treatment yet/treatment fully completed and side effects resolved  ECO  Comorbidities in Medical History: 2  Hospitalization Within the Past Month: 0   Needed: 0  Support: 0  Verbalizes Financial Concerns: 0  Transportation: 0  History of noncompliance/frequent no shows and cancellations: 0  Verbalizes the need for more education: 1  Navigation Acuity: 3     Follow Up  No follow-ups on file.       "

## 2024-11-08 ENCOUNTER — LAB VISIT (OUTPATIENT)
Dept: LAB | Facility: HOSPITAL | Age: 69
End: 2024-11-08
Attending: INTERNAL MEDICINE
Payer: MEDICARE

## 2024-11-08 ENCOUNTER — DOCUMENTATION ONLY (OUTPATIENT)
Dept: HEMATOLOGY/ONCOLOGY | Facility: CLINIC | Age: 69
End: 2024-11-08
Payer: MEDICARE

## 2024-11-08 DIAGNOSIS — Z94.4 LIVER TRANSPLANTED: ICD-10-CM

## 2024-11-08 DIAGNOSIS — C84.75 ANAPLASTIC ALK-NEGATIVE LARGE CELL LYMPHOMA OF LYMPH NODE OF LOWER EXTREMITY: ICD-10-CM

## 2024-11-08 DIAGNOSIS — C85.80 LARGE CELL LYMPHOMA: ICD-10-CM

## 2024-11-08 LAB
ALBUMIN SERPL BCP-MCNC: 3 G/DL (ref 3.5–5.2)
ALP SERPL-CCNC: 129 U/L (ref 40–150)
ALT SERPL W/O P-5'-P-CCNC: 6 U/L (ref 10–44)
ANION GAP SERPL CALC-SCNC: 10 MMOL/L (ref 8–16)
AST SERPL-CCNC: 15 U/L (ref 10–40)
BASOPHILS # BLD AUTO: 0.02 K/UL (ref 0–0.2)
BASOPHILS NFR BLD: 0.6 % (ref 0–1.9)
BILIRUB SERPL-MCNC: 0.9 MG/DL (ref 0.1–1)
BUN SERPL-MCNC: 18 MG/DL (ref 8–23)
CALCIUM SERPL-MCNC: 8.5 MG/DL (ref 8.7–10.5)
CHLORIDE SERPL-SCNC: 109 MMOL/L (ref 95–110)
CO2 SERPL-SCNC: 22 MMOL/L (ref 23–29)
CREAT SERPL-MCNC: 1.6 MG/DL (ref 0.5–1.4)
DIFFERENTIAL METHOD BLD: ABNORMAL
EOSINOPHIL # BLD AUTO: 0.1 K/UL (ref 0–0.5)
EOSINOPHIL NFR BLD: 3.5 % (ref 0–8)
ERYTHROCYTE [DISTWIDTH] IN BLOOD BY AUTOMATED COUNT: 16.2 % (ref 11.5–14.5)
EST. GFR  (NO RACE VARIABLE): 46 ML/MIN/1.73 M^2
GLUCOSE SERPL-MCNC: 99 MG/DL (ref 70–110)
HCT VFR BLD AUTO: 32.8 % (ref 40–54)
HGB BLD-MCNC: 10.3 G/DL (ref 14–18)
IMM GRANULOCYTES # BLD AUTO: 0.01 K/UL (ref 0–0.04)
IMM GRANULOCYTES NFR BLD AUTO: 0.3 % (ref 0–0.5)
LYMPHOCYTES # BLD AUTO: 0.4 K/UL (ref 1–4.8)
LYMPHOCYTES NFR BLD: 12.9 % (ref 18–48)
MCH RBC QN AUTO: 31 PG (ref 27–31)
MCHC RBC AUTO-ENTMCNC: 31.4 G/DL (ref 32–36)
MCV RBC AUTO: 99 FL (ref 82–98)
MONOCYTES # BLD AUTO: 0.4 K/UL (ref 0.3–1)
MONOCYTES NFR BLD: 11 % (ref 4–15)
NEUTROPHILS # BLD AUTO: 2.3 K/UL (ref 1.8–7.7)
NEUTROPHILS NFR BLD: 71.7 % (ref 38–73)
NRBC BLD-RTO: 0 /100 WBC
PLATELET # BLD AUTO: 70 K/UL (ref 150–450)
PMV BLD AUTO: 12.8 FL (ref 9.2–12.9)
POTASSIUM SERPL-SCNC: 3.8 MMOL/L (ref 3.5–5.1)
PROT SERPL-MCNC: 7 G/DL (ref 6–8.4)
RBC # BLD AUTO: 3.32 M/UL (ref 4.6–6.2)
SODIUM SERPL-SCNC: 141 MMOL/L (ref 136–145)
WBC # BLD AUTO: 3.17 K/UL (ref 3.9–12.7)

## 2024-11-08 PROCEDURE — 80053 COMPREHEN METABOLIC PANEL: CPT | Mod: HCNC | Performed by: INTERNAL MEDICINE

## 2024-11-08 PROCEDURE — 85025 COMPLETE CBC W/AUTO DIFF WBC: CPT | Mod: HCNC | Performed by: INTERNAL MEDICINE

## 2024-11-08 PROCEDURE — 36415 COLL VENOUS BLD VENIPUNCTURE: CPT | Mod: HCNC | Performed by: INTERNAL MEDICINE

## 2024-11-08 PROCEDURE — 87516 HEPATITIS B DNA AMP PROBE: CPT | Mod: HCNC | Performed by: INTERNAL MEDICINE

## 2024-11-11 ENCOUNTER — OFFICE VISIT (OUTPATIENT)
Dept: HEMATOLOGY/ONCOLOGY | Facility: CLINIC | Age: 69
End: 2024-11-11
Payer: MEDICARE

## 2024-11-11 ENCOUNTER — DOCUMENTATION ONLY (OUTPATIENT)
Dept: HEMATOLOGY/ONCOLOGY | Facility: CLINIC | Age: 69
End: 2024-11-11
Payer: MEDICARE

## 2024-11-11 ENCOUNTER — SOCIAL WORK (OUTPATIENT)
Dept: HEMATOLOGY/ONCOLOGY | Facility: CLINIC | Age: 69
End: 2024-11-11
Payer: MEDICARE

## 2024-11-11 VITALS
OXYGEN SATURATION: 97 % | HEART RATE: 104 BPM | SYSTOLIC BLOOD PRESSURE: 104 MMHG | BODY MASS INDEX: 29.09 KG/M2 | DIASTOLIC BLOOD PRESSURE: 66 MMHG | TEMPERATURE: 97 F | WEIGHT: 214.75 LBS | HEIGHT: 72 IN

## 2024-11-11 DIAGNOSIS — Z85.05 HISTORY OF LIVER CANCER: ICD-10-CM

## 2024-11-11 DIAGNOSIS — Z79.899 IMMUNODEFICIENCY DUE TO CHEMOTHERAPY: ICD-10-CM

## 2024-11-11 DIAGNOSIS — C84.75 ANAPLASTIC ALK-NEGATIVE LARGE CELL LYMPHOMA OF LYMPH NODE OF LOWER EXTREMITY: Primary | ICD-10-CM

## 2024-11-11 DIAGNOSIS — D84.9 IMMUNOSUPPRESSION: ICD-10-CM

## 2024-11-11 DIAGNOSIS — C85.80 LARGE CELL LYMPHOMA: Primary | ICD-10-CM

## 2024-11-11 DIAGNOSIS — C84.75 ANAPLASTIC ALK-NEGATIVE LARGE CELL LYMPHOMA OF LYMPH NODE OF LOWER EXTREMITY: ICD-10-CM

## 2024-11-11 DIAGNOSIS — D84.821 IMMUNODEFICIENCY DUE TO CHEMOTHERAPY: ICD-10-CM

## 2024-11-11 DIAGNOSIS — T45.1X5A IMMUNODEFICIENCY DUE TO CHEMOTHERAPY: ICD-10-CM

## 2024-11-11 DIAGNOSIS — Z95.828 PORT-A-CATH IN PLACE: ICD-10-CM

## 2024-11-11 PROBLEM — Z79.69 IMMUNODEFICIENCY DUE TO CHEMOTHERAPY: Status: ACTIVE | Noted: 2024-11-11

## 2024-11-11 LAB — HBV DNA SERPL QL NAA+PROBE: NOT DETECTED

## 2024-11-11 PROCEDURE — 3288F FALL RISK ASSESSMENT DOCD: CPT | Mod: HCNC,CPTII,S$GLB, | Performed by: INTERNAL MEDICINE

## 2024-11-11 PROCEDURE — 3078F DIAST BP <80 MM HG: CPT | Mod: HCNC,CPTII,S$GLB, | Performed by: INTERNAL MEDICINE

## 2024-11-11 PROCEDURE — 99999 PR PBB SHADOW E&M-EST. PATIENT-LVL IV: CPT | Mod: PBBFAC,HCNC,, | Performed by: INTERNAL MEDICINE

## 2024-11-11 PROCEDURE — 1160F RVW MEDS BY RX/DR IN RCRD: CPT | Mod: HCNC,CPTII,S$GLB, | Performed by: INTERNAL MEDICINE

## 2024-11-11 PROCEDURE — 1159F MED LIST DOCD IN RCRD: CPT | Mod: HCNC,CPTII,S$GLB, | Performed by: INTERNAL MEDICINE

## 2024-11-11 PROCEDURE — 99215 OFFICE O/P EST HI 40 MIN: CPT | Mod: HCNC,S$GLB,, | Performed by: INTERNAL MEDICINE

## 2024-11-11 PROCEDURE — 1126F AMNT PAIN NOTED NONE PRSNT: CPT | Mod: HCNC,CPTII,S$GLB, | Performed by: INTERNAL MEDICINE

## 2024-11-11 PROCEDURE — 3008F BODY MASS INDEX DOCD: CPT | Mod: HCNC,CPTII,S$GLB, | Performed by: INTERNAL MEDICINE

## 2024-11-11 PROCEDURE — 3074F SYST BP LT 130 MM HG: CPT | Mod: HCNC,CPTII,S$GLB, | Performed by: INTERNAL MEDICINE

## 2024-11-11 PROCEDURE — 1101F PT FALLS ASSESS-DOCD LE1/YR: CPT | Mod: HCNC,CPTII,S$GLB, | Performed by: INTERNAL MEDICINE

## 2024-11-11 RX ORDER — SULFAMETHOXAZOLE AND TRIMETHOPRIM 800; 160 MG/1; MG/1
1 TABLET ORAL
Qty: 12 TABLET | Refills: 4 | Status: SHIPPED | OUTPATIENT
Start: 2024-11-11

## 2024-11-11 RX ORDER — ACYCLOVIR 400 MG/1
400 TABLET ORAL 2 TIMES DAILY
Qty: 60 TABLET | Refills: 4 | Status: SHIPPED | OUTPATIENT
Start: 2024-11-11

## 2024-11-11 RX ORDER — PREDNISONE 50 MG/1
100 TABLET ORAL DAILY
Qty: 8 TABLET | Refills: 5 | Status: SHIPPED | OUTPATIENT
Start: 2024-11-12 | End: 2024-11-14 | Stop reason: SDUPTHER

## 2024-11-11 RX ORDER — LIDOCAINE AND PRILOCAINE 25; 25 MG/G; MG/G
CREAM TOPICAL
Qty: 30 G | Refills: 11 | Status: SHIPPED | OUTPATIENT
Start: 2024-11-11

## 2024-11-11 RX ORDER — PROCHLORPERAZINE MALEATE 5 MG
5 TABLET ORAL EVERY 6 HOURS PRN
Qty: 20 TABLET | Refills: 5 | Status: SHIPPED | OUTPATIENT
Start: 2024-11-11

## 2024-11-11 RX ORDER — ALLOPURINOL 300 MG/1
300 TABLET ORAL DAILY
Qty: 30 TABLET | Refills: 0 | Status: SHIPPED | OUTPATIENT
Start: 2024-11-11 | End: 2024-11-11 | Stop reason: SDUPTHER

## 2024-11-11 RX ORDER — ALLOPURINOL 300 MG/1
300 TABLET ORAL DAILY
Qty: 30 TABLET | Refills: 0 | Status: SHIPPED | OUTPATIENT
Start: 2024-11-11

## 2024-11-11 NOTE — PROGRESS NOTES
Met with patient to discuss upcoming systemic therapy initiation.  Discussed patient diagnosis including staging information. Also discussed that therapy regimen prescribed involves the following drugs: Brentuximab, Cyclophosphamide, Doxorubicin, and Prednisone and timeline of therapy administration. Went over what to expect on first day of treatment, including what to bring with you, visitor policy, and infusion suite guidelines. Also discussed supportive and shared services available to patient, including social work, financial counseling, oncology nutrition, and oncology psychology.      Covered with patient potential side effects and symptom management. Reviewed supportive medications that will be given before, during, and after treatment. Also stressed that other side effects are possible outside of those listed. Spent additional time on signs of infection, infection prevention, and proper nutrition/hydration.    Education provided to patient  via [employer] specific drug information and chemotherapy education binder.  Also provided contact information for clinic and information related to [facility] communication.  Discussed communication process for after-hours needs and some common scenarios in which patient should call provider for guidance vs. immediately report to the emergency room.     Finally, patient was given my contact information. Encouraged patient to call with any questions, concerns, or needs. Patient verbalized understanding of all above information.

## 2024-11-11 NOTE — PROGRESS NOTES
Subjective:       Patient ID: Lj Coleman is a 68 y.o. male.    Chief Complaint: Results, Chemotherapy, and Lymphoma    HPI:  68-year-old male history of alk negative lymphoma.  Patient returns for review patient is to begin treatment with cycle 1 day 1OP A + CHP - BRENTUXIMAB CYCLOPHOSPHAMIDE DOXORUBICIN PREDNISON patient previous history of hepatitis-B patient will be treated with prophylaxis.  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    Angina pectoris     Arthritis     back    Atherosclerosis of native coronary artery without angina pectoris/ LAD 09/08/2016    Back pain     Chronic hepatitis C with cirrhosis     Depression     Hepatoma     Prior to liver transplant    History of colon polyps 09/09/2015    History of transplantation, liver 04/2012    History of vertebral fracture     Hypertension     Immune deficiency disorder     Incisional hernia following transplant 04/09/2014    Liver failure 11/2011    Related to chemotherapy for hepatoma    Liver transplanted 04/2012    Obesity     PVCs (premature ventricular contractions)     Tobacco abuse 09/09/2016    Trouble in sleeping     Vertebral fracture 1975     Family History   Problem Relation Name Age of Onset    Cancer Mother          lung    Cancer Father          bladder    Diabetes Maternal Uncle      Cancer Maternal Grandmother          uterine?    Cancer Other      Heart disease Neg Hx      Kidney disease Neg Hx      Stroke Neg Hx       Social History     Socioeconomic History    Marital status:     Highest education level: 10th grade   Tobacco Use    Smoking status: Former     Current packs/day: 0.75     Average packs/day: 0.7 packs/day for 36.9 years (27.6 ttl pk-yrs)     Types: Cigarettes     Start date: 1988    Smokeless tobacco:  Never    Tobacco comments:     Currently smokes a few (3-5) a day as of 6/12/24   Substance and Sexual Activity    Alcohol use: No     Alcohol/week: 0.0 standard drinks of alcohol     Comment: Quit alcohol after some alcohol abuse, prior to his liver transplant    Drug use: No    Sexual activity: Not Currently     Social Drivers of Health     Financial Resource Strain: Medium Risk (3/13/2024)    Overall Financial Resource Strain (CARDIA)     Difficulty of Paying Living Expenses: Somewhat hard   Food Insecurity: No Food Insecurity (3/13/2024)    Hunger Vital Sign     Worried About Running Out of Food in the Last Year: Never true     Ran Out of Food in the Last Year: Never true   Transportation Needs: Unmet Transportation Needs (3/13/2024)    PRAPARE - Transportation     Lack of Transportation (Medical): Yes     Lack of Transportation (Non-Medical): No   Physical Activity: Inactive (3/13/2024)    Exercise Vital Sign     Days of Exercise per Week: 0 days     Minutes of Exercise per Session: 0 min   Stress: No Stress Concern Present (3/13/2024)    [employer] - Occupational Stress Questionnaire     Feeling of Stress : Not at all   Housing Stability: Low Risk  (3/13/2024)    Housing Stability Vital Sign     Unable to Pay for Housing in the Last Year: No     Number of Places Lived in the Last Year: 1     Unstable Housing in the Last Year: No     Past Surgical History:   Procedure Laterality Date    COLONOSCOPY N/A 1/20/2021    Procedure: COLONOSCOPY;  Surgeon: Emerson Helm MD;  Location: [facility] ENDO;  Service: Endoscopy;  Laterality: N/A;    HERNIA REPAIR Right 2013    incisional    INSERTION OF TUNNELED CENTRAL VENOUS CATHETER (CVC) WITH SUBCUTANEOUS PORT Left 11/6/2024    Procedure: [id number]-C-CATH;  Surgeon: Hao Washburn MD;  Location: [facility] OR;  Service: General;  Laterality: Left;    LIVER TRANSPLANT  April 2012    LYMPH NODE BIOPSY/EXCISION Left 10/16/2024    Procedure:  "LYMPH NODE BIOPSY/EXCISION;  Surgeon: Hao Washburn MD;  Location: [facility];  Service: General;  Laterality: Left;    MOUTH SURGERY      TONSILLECTOMY  1958       Labs:  Lab Results   Component Value Date    WBC 3.17 (L) 11/08/2024    HGB 10.3 (L) 11/08/2024    HCT 32.8 (L) 11/08/2024    MCV 99 (H) 11/08/2024    PLT 70 (L) 11/08/2024     BMP  Lab Results   Component Value Date     11/08/2024    K 3.8 11/08/2024     11/08/2024    CO2 22 (L) 11/08/2024    BUN 18 11/08/2024    CREATININE 1.6 (H) 11/08/2024    CALCIUM 8.5 (L) 11/08/2024    ANIONGAP 10 11/08/2024    ESTGFRAFRICA >60.0 06/08/2022    EGFRNONAA >60.0 06/08/2022     Lab Results   Component Value Date    ALT 6 (L) 11/08/2024    AST 15 11/08/2024    GGT 32 09/26/2016    ALKPHOS 129 11/08/2024    BILITOT 0.9 11/08/2024       Lab Results   Component Value Date    IRON 74 11/04/2024    TIBC 287 11/04/2024    FERRITIN 325 (H) 11/04/2024     No results found for: "YNTVHZAY07"  No results found for: "FOLATE"  Lab Results   Component Value Date    TSH 1.168 06/06/2023         Review of Systems   Constitutional:  Negative for activity change, appetite change, chills, diaphoresis, fatigue, fever and unexpected weight change.   HENT:  Negative for congestion, dental problem, drooling, ear discharge, ear pain, facial swelling, hearing loss, mouth sores, nosebleeds, postnasal drip, rhinorrhea, sinus pressure, sneezing, sore throat, tinnitus, trouble swallowing and voice change.    Eyes:  Negative for photophobia, pain, discharge, redness, itching and visual disturbance.   Respiratory:  Negative for apnea, cough, choking, chest tightness, shortness of breath, wheezing and stridor.    Cardiovascular:  Negative for chest pain, palpitations and leg swelling.   Gastrointestinal:  Negative for abdominal distention, abdominal pain, anal bleeding, blood in stool, constipation, diarrhea, nausea, rectal pain and vomiting.   Endocrine: Negative for cold intolerance, " heat intolerance, polydipsia, polyphagia and polyuria.   Genitourinary:  Negative for decreased urine volume, difficulty urinating, dysuria, enuresis, flank pain, frequency, genital sores, hematuria, penile discharge, penile pain, penile swelling, scrotal swelling, testicular pain and urgency.   Musculoskeletal:  Negative for arthralgias, back pain, gait problem, joint swelling, myalgias, neck pain and neck stiffness.   Skin:  Negative for color change, pallor, rash and wound.   Allergic/Immunologic: Negative for environmental allergies, food allergies and immunocompromised state.   Neurological:  Negative for dizziness, tremors, seizures, syncope, facial asymmetry, speech difficulty, weakness, light-headedness, numbness and headaches.   Hematological:  Negative for adenopathy. Does not bruise/bleed easily.   Psychiatric/Behavioral:  Negative for agitation, behavioral problems, confusion, decreased concentration, dysphoric mood, hallucinations, self-injury, sleep disturbance and suicidal ideas. The patient is not nervous/anxious and is not hyperactive.        Objective:      Physical Exam  Vitals reviewed.   Constitutional:       General: He is not in acute distress.     Appearance: He is well-developed. He is not diaphoretic.   HENT:      Head: Normocephalic.      Right Ear: External ear normal.      Left Ear: External ear normal.      Nose: Nose normal.      Right Sinus: No maxillary sinus tenderness or frontal sinus tenderness.      Left Sinus: No maxillary sinus tenderness or frontal sinus tenderness.      Mouth/Throat:      Pharynx: No oropharyngeal exudate.   Eyes:      General: Lids are normal. No scleral icterus.        Right eye: No discharge.         Left eye: No discharge.      Extraocular Movements:      Right eye: Normal extraocular motion.      Left eye: Normal extraocular motion.      Conjunctiva/sclera:      Right eye: Right conjunctiva is not injected. No hemorrhage.     Left eye: Left conjunctiva  is not injected. No hemorrhage.     Pupils: Pupils are equal, round, and reactive to light.   Neck:      Thyroid: No thyromegaly.      Vascular: No JVD.      Trachea: No tracheal deviation.   Cardiovascular:      Rate and Rhythm: Normal rate.   Pulmonary:      Effort: Pulmonary effort is normal. No respiratory distress.      Breath sounds: No stridor.   Abdominal:      General: Bowel sounds are normal.      Palpations: Abdomen is soft. There is no hepatomegaly, splenomegaly or mass.      Tenderness: There is no abdominal tenderness.   Musculoskeletal:         General: No tenderness. Normal range of motion.      Cervical back: Normal range of motion and neck supple.   Lymphadenopathy:      Head:      Right side of head: No posterior auricular or occipital adenopathy.      Left side of head: No posterior auricular or occipital adenopathy.      Cervical: No cervical adenopathy.      Right cervical: No superficial, deep or posterior cervical adenopathy.     Left cervical: No superficial, deep or posterior cervical adenopathy.      Upper Body:      Right upper body: No supraclavicular adenopathy.      Left upper body: No supraclavicular adenopathy.   Skin:     General: Skin is dry.      Findings: No erythema or rash.      Nails: There is no clubbing.   Neurological:      Mental Status: He is alert and oriented to person, place, and time.      Cranial Nerves: No cranial nerve deficit.      Coordination: Coordination normal.   Psychiatric:         Behavior: Behavior normal.         Thought Content: Thought content normal.         Judgment: Judgment normal.             Assessment:      1. Large cell lymphoma    2. 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 Immunosuppression    4. History of liver cancer    5. CT compatible an MRI safe Port-A-Cath in place    6. Immunodeficiency due to chemotherapy           Med Onc Chart Routing      Follow up with physician . Return in 10-14 days to  be seen can be seen by myself or APAP with CBC CMP uric acid   Follow up with ALEX    Infusion scheduling note    Injection scheduling note    Labs    Imaging    Pharmacy appointment    Other referrals                   Plan:      patient to begin cycle 1 day 1  seen patient's day clinical pharmacist as reviewed information on medications.  Patient is to start treatment tomorrow will see back 10-14 days for interim counts on 1st cycle with CBC CMP and uric acid discussed implications of answered questions.  Orders written reviewed consent for treatment signed for tomorrowConsented the patient to the treatment plan and the patient was educated on the planned duration of the treatment and schedule of the treatment administration.  Referral made for advanced care documentation if not already done soAdvance Care Planning           Timmy Peralta Jr, MD FACP

## 2024-11-11 NOTE — PROGRESS NOTES
JAMES was consulted to assist patient with transportation for his first treatment on tomorrow. JAMES scheduled patient via [employer] and explained the process. REGINAR called pharmacy to price scripts and patient did not have a copay. Pt states he lives by himself. REGINAR educated patient on home health to assist him in the home. REGINAR will place order for provider's signature.

## 2024-11-12 ENCOUNTER — TELEPHONE (OUTPATIENT)
Dept: HEMATOLOGY/ONCOLOGY | Facility: CLINIC | Age: 69
End: 2024-11-12
Payer: MEDICARE

## 2024-11-12 ENCOUNTER — DOCUMENTATION ONLY (OUTPATIENT)
Dept: NUTRITION | Facility: CLINIC | Age: 69
End: 2024-11-12
Payer: MEDICARE

## 2024-11-12 ENCOUNTER — INFUSION (OUTPATIENT)
Dept: INFUSION THERAPY | Facility: HOSPITAL | Age: 69
End: 2024-11-12
Attending: INTERNAL MEDICINE
Payer: MEDICARE

## 2024-11-12 VITALS
WEIGHT: 211.88 LBS | BODY MASS INDEX: 28.7 KG/M2 | OXYGEN SATURATION: 98 % | SYSTOLIC BLOOD PRESSURE: 124 MMHG | HEART RATE: 89 BPM | TEMPERATURE: 98 F | DIASTOLIC BLOOD PRESSURE: 73 MMHG | HEIGHT: 72 IN | RESPIRATION RATE: 18 BRPM

## 2024-11-12 DIAGNOSIS — C84.75 ANAPLASTIC ALK-NEGATIVE LARGE CELL LYMPHOMA OF LYMPH NODE OF LOWER EXTREMITY: Primary | ICD-10-CM

## 2024-11-12 PROCEDURE — 96417 CHEMO IV INFUS EACH ADDL SEQ: CPT | Mod: HCNC

## 2024-11-12 PROCEDURE — 96411 CHEMO IV PUSH ADDL DRUG: CPT | Mod: HCNC

## 2024-11-12 PROCEDURE — 96413 CHEMO IV INFUSION 1 HR: CPT | Mod: HCNC

## 2024-11-12 PROCEDURE — 25000003 PHARM REV CODE 250: Mod: HCNC | Performed by: INTERNAL MEDICINE

## 2024-11-12 PROCEDURE — 96367 TX/PROPH/DG ADDL SEQ IV INF: CPT | Mod: HCNC

## 2024-11-12 PROCEDURE — 63600175 PHARM REV CODE 636 W HCPCS: Mod: HCNC | Performed by: INTERNAL MEDICINE

## 2024-11-12 RX ORDER — HEPARIN 100 UNIT/ML
500 SYRINGE INTRAVENOUS
Status: CANCELLED | OUTPATIENT
Start: 2024-11-12

## 2024-11-12 RX ORDER — HEPARIN 100 UNIT/ML
500 SYRINGE INTRAVENOUS
Status: DISCONTINUED | OUTPATIENT
Start: 2024-11-12 | End: 2024-11-12 | Stop reason: HOSPADM

## 2024-11-12 RX ORDER — EPINEPHRINE 0.3 MG/.3ML
0.3 INJECTION SUBCUTANEOUS ONCE AS NEEDED
Status: CANCELLED | OUTPATIENT
Start: 2024-11-12

## 2024-11-12 RX ORDER — EPINEPHRINE 0.3 MG/.3ML
0.3 INJECTION SUBCUTANEOUS ONCE AS NEEDED
Status: DISCONTINUED | OUTPATIENT
Start: 2024-11-12 | End: 2024-11-12 | Stop reason: HOSPADM

## 2024-11-12 RX ORDER — SODIUM CHLORIDE 0.9 % (FLUSH) 0.9 %
10 SYRINGE (ML) INJECTION
Status: CANCELLED | OUTPATIENT
Start: 2024-11-12

## 2024-11-12 RX ORDER — DOXORUBICIN HYDROCHLORIDE 2 MG/ML
50 INJECTION, SOLUTION INTRAVENOUS
Status: COMPLETED | OUTPATIENT
Start: 2024-11-12 | End: 2024-11-12

## 2024-11-12 RX ORDER — DIPHENHYDRAMINE HYDROCHLORIDE 50 MG/ML
50 INJECTION INTRAMUSCULAR; INTRAVENOUS ONCE AS NEEDED
Status: DISCONTINUED | OUTPATIENT
Start: 2024-11-12 | End: 2024-11-12 | Stop reason: HOSPADM

## 2024-11-12 RX ORDER — SODIUM CHLORIDE 0.9 % (FLUSH) 0.9 %
10 SYRINGE (ML) INJECTION
Status: DISCONTINUED | OUTPATIENT
Start: 2024-11-12 | End: 2024-11-12 | Stop reason: HOSPADM

## 2024-11-12 RX ORDER — DOXORUBICIN HYDROCHLORIDE 2 MG/ML
50 INJECTION, SOLUTION INTRAVENOUS
Status: CANCELLED | OUTPATIENT
Start: 2024-11-12

## 2024-11-12 RX ORDER — DIPHENHYDRAMINE HYDROCHLORIDE 50 MG/ML
50 INJECTION INTRAMUSCULAR; INTRAVENOUS ONCE AS NEEDED
Status: CANCELLED | OUTPATIENT
Start: 2024-11-12

## 2024-11-12 RX ADMIN — HEPARIN 500 UNITS: 100 SYRINGE at 11:11

## 2024-11-12 RX ADMIN — DOXORUBICIN HYDROCHLORIDE 110 MG: 2 INJECTION, SOLUTION INTRAVENOUS at 09:11

## 2024-11-12 RX ADMIN — CYCLOPHOSPHAMIDE 1640 MG: 200 INJECTION, SOLUTION INTRAVENOUS at 09:11

## 2024-11-12 RX ADMIN — DEXAMETHASONE SODIUM PHOSPHATE 0.25 MG: 4 INJECTION, SOLUTION INTRA-ARTICULAR; INTRALESIONAL; INTRAMUSCULAR; INTRAVENOUS; SOFT TISSUE at 08:11

## 2024-11-12 RX ADMIN — BRENTUXIMAB VEDOTIN 177.5 MG: 50 INJECTION, POWDER, LYOPHILIZED, FOR SOLUTION INTRAVENOUS at 10:11

## 2024-11-12 NOTE — PLAN OF CARE
Discussed plan of care with pt. Addressed any and ongoing concerns. Pt denies   Problem: Adult Inpatient Plan of Care  Goal: Plan of Care Review  Outcome: Progressing  Goal: Patient-Specific Goal (Individualized)  Outcome: Progressing  Flowsheets (Taken 11/12/2024 0904)  Individualized Care Needs: Reclined position, coffee with splenda, Pharmacist at chair to discuss medications will also arrange for pt to speak with SW  Anxieties, Fears or Concerns: Very anxious about starting treatment, a lot of questions answered about do's and don'ts  Patient/Family-Specific Goals (Include Timeframe): Will complete treatment today as scheduled and not experiece any immediate side affects  Goal: Absence of Hospital-Acquired Illness or Injury  Outcome: Progressing  Intervention: Identify and Manage Fall Risk  Flowsheets (Taken 11/12/2024 0904)  Safety Promotion/Fall Prevention:   in recliner, wheels locked   assistive device/personal item within reach  Intervention: Prevent Infection  Flowsheets (Taken 11/12/2024 0904)  Infection Prevention:   equipment surfaces disinfected   hand hygiene promoted   personal protective equipment utilized  Goal: Optimal Comfort and Wellbeing  Outcome: Progressing  Intervention: Provide Person-Centered Care  Flowsheets (Taken 11/12/2024 0904)  Trust Relationship/Rapport: (other team members added for increased reassurance)   care explained   questions encouraged   choices provided   emotional support provided   reassurance provided   empathic listening provided   thoughts/feelings acknowledged   questions answered

## 2024-11-12 NOTE — TELEPHONE ENCOUNTER
JAMES received call from patient stating he had arrived to the cancer center. JAMES states his treatment does start until 8am. He states his landlord dropped him off and informed JAMES she can cancel the Ly[employer] that was scheduled. JAMES will remain available.

## 2024-11-12 NOTE — PROGRESS NOTES
SWER received call from patient on tonight. SWER provided patient her cell number for emergency purposes. Pt called due to being confused with medications. SWER asked patient to refer back to his notebook where pharmacist placed notes to explain. SWER states she will have pharmacist to come and explain to him on tomorrow on what to take and what not to take. SWER addressed distress score of 7. Pt lives by himself with his cats. He does have a daughter but she does not reside locally. Pt was worried about diagnosis with being a transplant patient. SWER validated patient's feelings and will remain available. Distress Screening Results: Psychosocial Distress screening score of 7  noted and reviewed. No intervention indicated.

## 2024-11-12 NOTE — PROGRESS NOTES
Introduced myself to new oncology patient in infusion. Gave my business card with dietitian contact information along with guidance on when to contact me about an appointment, for concerns like significant loss of appetite and weight loss.   Signature: Krystin Mitchell, MPH, RD, LDN

## 2024-11-13 ENCOUNTER — OFFICE VISIT (OUTPATIENT)
Dept: PALLIATIVE MEDICINE | Facility: CLINIC | Age: 69
End: 2024-11-13
Payer: MEDICARE

## 2024-11-13 ENCOUNTER — DOCUMENTATION ONLY (OUTPATIENT)
Dept: HEMATOLOGY/ONCOLOGY | Facility: CLINIC | Age: 69
End: 2024-11-13
Payer: MEDICARE

## 2024-11-13 ENCOUNTER — INFUSION (OUTPATIENT)
Dept: INFUSION THERAPY | Facility: HOSPITAL | Age: 69
End: 2024-11-13
Attending: INTERNAL MEDICINE
Payer: MEDICARE

## 2024-11-13 ENCOUNTER — DOCUMENTATION ONLY (OUTPATIENT)
Dept: PALLIATIVE MEDICINE | Facility: HOSPITAL | Age: 69
End: 2024-11-13
Payer: MEDICARE

## 2024-11-13 ENCOUNTER — PATIENT MESSAGE (OUTPATIENT)
Dept: ADMINISTRATIVE | Facility: OTHER | Age: 69
End: 2024-11-13
Payer: MEDICARE

## 2024-11-13 VITALS
TEMPERATURE: 98 F | RESPIRATION RATE: 18 BRPM | HEART RATE: 107 BPM | SYSTOLIC BLOOD PRESSURE: 131 MMHG | WEIGHT: 215.19 LBS | HEIGHT: 72 IN | OXYGEN SATURATION: 96 % | BODY MASS INDEX: 29.15 KG/M2 | DIASTOLIC BLOOD PRESSURE: 74 MMHG

## 2024-11-13 VITALS
HEART RATE: 107 BPM | SYSTOLIC BLOOD PRESSURE: 131 MMHG | TEMPERATURE: 98 F | DIASTOLIC BLOOD PRESSURE: 74 MMHG | RESPIRATION RATE: 18 BRPM | OXYGEN SATURATION: 96 %

## 2024-11-13 DIAGNOSIS — F17.200 NEEDS SMOKING CESSATION EDUCATION: ICD-10-CM

## 2024-11-13 DIAGNOSIS — R22.43 LOCALIZED SWELLING OF BOTH LOWER LEGS: ICD-10-CM

## 2024-11-13 DIAGNOSIS — C84.75 ANAPLASTIC ALK-NEGATIVE LARGE CELL LYMPHOMA OF LYMPH NODE OF LOWER EXTREMITY: Primary | ICD-10-CM

## 2024-11-13 DIAGNOSIS — Z51.5 PALLIATIVE CARE ENCOUNTER: ICD-10-CM

## 2024-11-13 PROCEDURE — 3078F DIAST BP <80 MM HG: CPT | Mod: HCNC,CPTII,S$GLB,

## 2024-11-13 PROCEDURE — 99497 ADVNCD CARE PLAN 30 MIN: CPT | Mod: HCNC,S$GLB,,

## 2024-11-13 PROCEDURE — 3075F SYST BP GE 130 - 139MM HG: CPT | Mod: HCNC,CPTII,S$GLB,

## 2024-11-13 PROCEDURE — 63600175 PHARM REV CODE 636 W HCPCS: Mod: JZ,JG,HCNC | Performed by: INTERNAL MEDICINE

## 2024-11-13 PROCEDURE — 99999 PR PBB SHADOW E&M-EST. PATIENT-LVL III: CPT | Mod: PBBFAC,HCNC,,

## 2024-11-13 PROCEDURE — 1125F AMNT PAIN NOTED PAIN PRSNT: CPT | Mod: HCNC,CPTII,S$GLB,

## 2024-11-13 PROCEDURE — 96372 THER/PROPH/DIAG INJ SC/IM: CPT | Mod: HCNC

## 2024-11-13 PROCEDURE — 99203 OFFICE O/P NEW LOW 30 MIN: CPT | Mod: HCNC,S$GLB,,

## 2024-11-13 PROCEDURE — 3008F BODY MASS INDEX DOCD: CPT | Mod: HCNC,CPTII,S$GLB,

## 2024-11-13 RX ADMIN — PEGFILGRASTIM-JMDB 6 MG: 6 INJECTION SUBCUTANEOUS at 11:11

## 2024-11-13 NOTE — PROGRESS NOTES
"Palliative Medicine Clinic Note  Consult        Consult Requested By: Dr. Timmy Peralta      Reason for Consult: Advance Care Planning/Goals of Care Discussion    Chief Complaint: ACP discussion          ASSESSMENT/PLAN:      Plan/Recommendations:      1. 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 Prealta.  On BRENTUXIMAB CYCLOPHOSPHAMIDE DOXORUBICIN PREDNISONE- Curative intent  PET 10/03/2024: Extensive lymphadenopathy with increased metabolic activity as described above. Additionally there is a focus of increased avidity in the right mandible. Given the extensive nature and increased SUV uptake, metastatic disease should be excluded; thus, tissue sampling is recommended      2. Localized swelling of both lower legs  Assessment & Plan:  Chronic per pt.   He declined PCP f/u at this time   Echo- 11/04/2024-  Left Ventricle: The left ventricle is normal in size. Normal wall thickness. There is concentric remodeling. Normal wall motion. There is normal systolic function with a visually estimated ejection fraction of 60 - 65%. Ejection fraction is approximately 60%. There is normal diastolic function.    Right Ventricle: Normal right ventricular cavity size. Right ventricle wall motion  is normal. Systolic function is normal.    Mitral Valve: There is mild regurgitation.    Tricuspid Valve: There is mild regurgitation.    Pulmonary Artery: The estimated pulmonary artery systolic pressure is 36 mmHg.    IVC/SVC: Normal venous pressure at 3 mmHg.      3. Palliative care encounter  Assessment & Plan:    -Code status: Full Code. He stated "I don't want to suffer". He did not elaborate further.  -HCPOA: Not assigned. He is  with 2 adult children.   -GOC:  Continue cancer treatment, symptom management, maintain independence and functional status.  -See HPI for further details                Advance Care Planning   Advance Directives:   Living " "Will: No    LaPOST: No    Do Not Resuscitate Status: No    Medical Power of : No    Agent's Name:  2 adult children. Pt is     Decision Making:  Patient answered questions  Goals of Care: The patient endorses that what is most important right now is to focus on remaining as independent as possible, symptom/pain control, and quality of life, even if it means sacrificing a little time    Accordingly, we have decided that the best plan to meet the patient's goals includes continuing with treatment.    Pt states he wishes to proceed with cancer treatment and remain as independent as possible.   He stated he has not thought about his code status, but "I don't want to suffer".   He wishes to complete a living will soon. He is  with 2 adult children.                     Follow up: PRN     Plan discussed with: Patient       SUBJECTIVE:      History of Present Illness / Interval History:  Lj Coleman is 68 y.o. male with Large Cell Lymphoma.    HX- Liver Transplant 2012, Chronic Hep C, DVT, COPD, NINO  Followed by Dr. Peralta.  On BRENTUXIMAB CYCLOPHOSPHAMIDE DOXORUBICIN PREDNISONE- Curative intent  Presents to Palliative Care Clinic for advance care planning, and clarification of goals of care.   Please see oncology note for more details on pt's care.       11/13/24  History obtained from: Patient and medical record  Pt attended clinic alone. He was in no acute distress. Tachycardic 107. Asymptomatic on room air.   I explained the role palliative care often plays in supporting patients with serious illness and their families regarding symptom management, advance care planning, and goals of care discussion.   Pt verbalized  understanding.   He stated he is doing well overall. He takes Norco 5mg as needed for chronic back pain 4/10.   He is intermittently fatigued with decreased appetite and insomnia, but able to care for himself and his pets. He ambulates with a cane without difficulty.   He is " "aware of legs swelling. He states this is due to his increase salt intake. He declined PCP follow-up at this time.   We discussed advance care planning, goals of care, and code status, Full Code vs. DNR including risks, benefits, and alternatives.  Pt states he wishes to proceed with cancer treatment and remain as independent as possible.   He stated he has not thought about his code status, but "I don't want to suffer".   He wishes to complete a living will soon. He is  with 2 adult children.       ROS:  Review of Systems   Constitutional:  Positive for fatigue.   HENT:  Positive for hearing loss. Negative for trouble swallowing and voice change.    Cardiovascular:  Positive for leg swelling (Bilateral).   Gastrointestinal:  Negative for vomiting.   Musculoskeletal:  Positive for back pain and gait problem (Ambulates with cane.).   Psychiatric/Behavioral:  Positive for sleep disturbance (Duye to worrying about his health). The patient is nervous/anxious.        Review of Symptoms      Symptom Assessment (ESAS 0-10 Scale)  Pain:  4  Dyspnea:  0  Anxiety:  6  Nausea:  0  Depression:  0  Anorexia:  0  Fatigue:  8  Insomnia:  7  Restlessness:  3  Agitation:  0     CAM / Delirium:  Negative  Constipation:  Negative  Diarrhea:  Negative    Anxiety:  Is nervous/anxious    Pain Assessment:    Location(s): back    Back       Location: lower        Quality: Aching        Quantity: 4/10 in intensity        Chronicity: Onset 0 year(s) ago, stable since Norco 5mg PRN        Aggravating Factors: Activity        Alleviating Factors: Opiates       Associated Symptoms: Arthralgias and myalgias    Modified Richard Scale:  0    Performance Status:  80    ECOG Performance Status ndGndrndanddndend:nd nd2nd Living Arrangements:  Lives alone and Lives in home    Psychosocial/Cultural:   See Palliative Psychosocial Note: Yes  Retired . . 2 adult children.   **Primary  to Follow**  Palliative Care  " Consult: No            Medications:    Current Outpatient Medications:     acyclovir (ZOVIRAX) 400 MG tablet, Take 1 tablet (400 mg total) by mouth 2 (two) times daily. While on chemotherapy, Disp: 60 tablet, Rfl: 4    albuterol (VENTOLIN HFA) 90 mcg/actuation inhaler, Inhale 2 puffs into the lungs every 6 (six) hours as needed for Wheezing or Shortness of Breath. Rescue, Disp: 18 g, Rfl: 3    allopurinoL (ZYLOPRIM) 300 MG tablet, Take 1 tablet (300 mg total) by mouth once daily., Disp: 30 tablet, Rfl: 0    apixaban (ELIQUIS) 5 mg Tab, Take 1 tablet (5 mg total) by mouth 2 (two) times daily., Disp: 180 tablet, Rfl: 3    diclofenac sodium (VOLTAREN) 1 % Gel, Apply 2 g topically 4 (four) times daily. for 10 days, Disp: 100 g, Rfl: 0    esomeprazole (NEXIUM) 40 MG capsule, Take 1 capsule (40 mg total) by mouth once daily., Disp: 90 capsule, Rfl: 3    fluticasone-umeclidin-vilanter (TRELEGY ELLIPTA) 100-62.5-25 mcg DsDv, Inhale 1 puff into the lungs once daily., Disp: 60 each, Rfl: 5    HYDROcodone-acetaminophen (NORCO) 5-325 mg per tablet, Take 1 tablet by mouth every 6 (six) hours as needed for Pain., Disp: 12 tablet, Rfl: 0    LIDOcaine-prilocaine (EMLA) cream, Apply topically as needed., Disp: 30 g, Rfl: 11    metoprolol succinate (TOPROL-XL) 25 MG 24 hr tablet, TAKE 1 TABLET EVERY DAY, Disp: 90 tablet, Rfl: 3    ondansetron (ZOFRAN) 8 MG tablet, Take 1 tablet (8 mg total) by mouth every 12 (twelve) hours as needed for Nausea., Disp: 20 tablet, Rfl: 0    predniSONE (DELTASONE) 50 MG Tab, Take 2 tablets (100 mg total) by mouth once daily. on days 2,3,4, 5 of each chemotherapy cycle, Disp: 8 tablet, Rfl: 5    prochlorperazine (COMPAZINE) 5 MG tablet, Take 1 tablet (5 mg total) by mouth every 6 (six) hours as needed (nausea)., Disp: 20 tablet, Rfl: 5    sulfamethoxazole-trimethoprim 800-160mg (BACTRIM DS) 800-160 mg Tab, Take 1 tablet by mouth 3 (three) times a week., Disp: 12 tablet, Rfl: 4    tacrolimus (PROGRAF) 0.5  MG Cap, Take 1 capsule (0.5 mg total) by mouth every 12 (twelve) hours., Disp: 180 capsule, Rfl: 3    tamsulosin (FLOMAX) 0.4 mg Cap, Take 1 capsule (0.4 mg total) by mouth once daily., Disp: 30 capsule, Rfl: 2    tenofovir alafenamide (VEMLIDY) 25 mg Tab, Take 1 tablet (25 mg total) by mouth Daily., Disp: 30 tablet, Rfl: 11    tenofovir alafenamide (VEMLIDY) 25 mg Tab, Take 1 tablet by mouth once daily, Disp: 30 tablet, Rfl: 11  No current facility-administered medications for this visit.    Facility-Administered Medications Ordered in Other Visits:     lactated ringers infusion, , Intravenous, Continuous, Linwood Ellsworth MD, New Bag at 01/20/21 0734    sodium chloride 0.9% flush 2 mL, 2 mL, Intravenous, PRN, Linwood Ellsworth MD      External  database queried on 11/13/2024  by CAM [place] :      Review of patient's allergies indicates:   Allergen Reactions    Codeine Other (See Comments)     Upset stomach           OBJECTIVE:         Physical Exam:  Vitals: Temp: 97.5 °F (36.4 °C) (11/13/24 1350)  Pulse: 107 (11/13/24 1350)  Resp: 18 (11/13/24 1350)  BP: 131/74 (11/13/24 1350)  SpO2: 96 % (11/13/24 1350)    Physical Exam  Vitals and nursing note reviewed.   Constitutional:       General: He is not in acute distress.     Appearance: Normal appearance. He is not ill-appearing.   HENT:      Head: Normocephalic and atraumatic.      Comments: [place]- No hearing aids      Nose: Nose normal. No congestion or rhinorrhea.      Mouth/Throat:      Mouth: Mucous membranes are moist.      Pharynx: Oropharynx is clear. No oropharyngeal exudate or posterior oropharyngeal erythema.   Eyes:      General: No scleral icterus.        Right eye: No discharge.         Left eye: No discharge.      Conjunctiva/sclera: Conjunctivae normal.      Pupils: Pupils are equal, round, and reactive to light.   Cardiovascular:      Rate and Rhythm: Normal rate and regular rhythm.      Pulses: Normal pulses.      Heart sounds: Normal heart  sounds. No murmur heard.     No gallop.   Pulmonary:      Effort: Pulmonary effort is normal. No respiratory distress.      Breath sounds: Normal breath sounds. No stridor. No wheezing.   Chest:      Chest wall: No tenderness.   Abdominal:      General: Bowel sounds are normal. There is no distension.      Palpations: Abdomen is soft.      Tenderness: There is no abdominal tenderness.   Genitourinary:     Comments: Deferred  Musculoskeletal:         General: No swelling or tenderness.      Cervical back: Normal range of motion and neck supple.      Right lower le+ Pitting Edema present.      Left lower le+ Pitting Edema present.   Skin:     General: Skin is warm and dry.      Capillary Refill: Capillary refill takes less than 2 seconds.      Coloration: Skin is not jaundiced or pale.      Findings: No rash.   Neurological:      Mental Status: He is alert and oriented to person, place, and time.      Motor: Weakness (left leg) present.      Gait: Gait abnormal (Ambulates with cane.).   Psychiatric:         Attention and Perception: Attention and perception normal.         Mood and Affect: Mood and affect normal.         Speech: Speech normal.         Behavior: Behavior normal. Behavior is cooperative.         Thought Content: Thought content normal. Thought content does not include suicidal ideation. Thought content does not include suicidal plan.         Cognition and Memory: Cognition and memory normal.         Judgment: Judgment normal.           Labs: BMP  Lab Results   Component Value Date     2024    K 3.8 2024     2024    CO2 22 (L) 2024    BUN 18 2024    CREATININE 1.6 (H) 2024    CALCIUM 8.5 (L) 2024    ANIONGAP 10 2024    EGFRNORACEVR 46 (A) 2024     Lab Results   Component Value Date    WBC 3.17 (L) 2024    HGB 10.3 (L) 2024    HCT 32.8 (L) 2024    MCV 99 (H) 2024    PLT 70 (L) 2024             Imaging:  PET 10/03/2024: Extensive lymphadenopathy with increased metabolic activity as described above. Additionally there is a focus of increased avidity in the right mandible. Given the extensive nature and increased SUV uptake, metastatic disease should be excluded; thus, tissue sampling is recommended.        I spent a total of 56 minutes on the day of the visit. This includes face to face time in discussion of goals of care, symptom assessment, coordination of care and emotional support.  This also includes non-face to face time preparing to see the patient (eg, review of tests/imaging), obtaining and/or reviewing separately obtained history, documenting clinical information in the electronic or other health record, independently interpreting results and communicating results to the patient/family/caregiver, or care coordinator.     Additional 16 min time spent on a voluntary advance care planning and /or goals of care discussion, providing emotional support, formulating and communicating prognosis and exploring burden/benefit of various approaches of treatment.       CAM DE LOS SANTOS NP

## 2024-11-13 NOTE — PROGRESS NOTES
Palliative Referral Note Nurse:  Nurse reached out to pt for scheduling, pt requested a little more details about palliative, nurse explained symptom management, GOC, and ACP. Pt requested ACP/GOC stated he was feeling fine at this moment. Provider JENNIFER notified pt wishes to be seen today at cancer after his injection, she worked pt in to her scheduled, pt was very pleased.

## 2024-11-13 NOTE — PROGRESS NOTES
"Pt called post C1D1. Pt is having problems remembering which drugs to take when. After conferring with clinical pharmacist pt was advised to follow the directions for each medication as it is written on the bottle. Pt verbalized understanding. Pt had no other questions or concerns at this time  Oncology Navigation   Intake  Cancer Type: Lymphoma  Type of Referral: Internal  Date of Referral: 24  First Appointment Available: 24  Appointment Date: 24  First Available Date vs. Scheduled Date (days): 0     Treatment  Current Status: Active       Medical Oncologist: Timmy Peralta MD  Consult Date: 24  Chemotherapy: Planned (start date 2024)  Chemotherapy Regimen: A + [facility] - BRENTUXIMAB CYCLOPHOSPHAMIDE DOXORUBICIN PREDNISONE       Procedures: Echo  Echo Schedule Date: 24    General Referrals: Chemo Education; Palliative Care  Palliative Care Referral Date: 24          Support Systems: Family members  Barriers of Care: Barriers to Care "Assessment completed-no barriers noted"     Acuity  Systemic Treatment - predicted or initiated: Chemotherapy Regimen with Multiple drugs (+1)  Treatment Tolerability: Has not started treatment yet/treatment fully completed and side effects resolved  ECO  Comorbidities in Medical History: 2  Hospitalization Within the Past Month: 0   Needed: 0  Support: 0  Verbalizes Financial Concerns: 0  Transportation: 0  History of noncompliance/frequent no shows and cancellations: 0  Verbalizes the need for more education: 1  Navigation Acuity: 3     Follow Up  No follow-ups on file.         "

## 2024-11-13 NOTE — PATIENT INSTRUCTIONS
"Please bring health care power of  back to the palliative care clinic  We will complete the "witness" part  You do not have to make an appointment to bring the form back.   Let us know if you would like see your doctor for the swelling in your legs.   "

## 2024-11-14 DIAGNOSIS — C84.75 ANAPLASTIC ALK-NEGATIVE LARGE CELL LYMPHOMA OF LYMPH NODE OF LOWER EXTREMITY: ICD-10-CM

## 2024-11-14 RX ORDER — PREDNISONE 50 MG/1
100 TABLET ORAL DAILY
Qty: 8 TABLET | Refills: 5 | Status: SHIPPED | OUTPATIENT
Start: 2024-11-14

## 2024-11-14 NOTE — TELEPHONE ENCOUNTER
----- Message from Tia sent at 11/14/2024  9:48 AM CST -----  Who Called: Larry    What is the request in detail: Requesting call back to discuss faxing request for short supply to be sent to local pharmacy. Please advise    predniSONE (DELTASONE) 50 MG Tab    [id number] - [place] - [address] AT [facility]  [address]  [place] [address]  Phone: [phone] Fax: [phone]    [facility] [address] [place] [address]  Phone: [phone] Fax: [phone]      Can the clinic reply by [facility]? No    Best Call Back Number: [phone]      Additional Information:

## 2024-11-15 ENCOUNTER — TELEPHONE (OUTPATIENT)
Dept: HEMATOLOGY/ONCOLOGY | Facility: CLINIC | Age: 69
End: 2024-11-15
Payer: MEDICARE

## 2024-11-15 NOTE — TELEPHONE ENCOUNTER
Pt called for advise on taking at home medication. Had questions about the directions on the bottle. Advised pt that they way he was reading it was correct.  Pt v/u

## 2024-11-18 ENCOUNTER — OFFICE VISIT (OUTPATIENT)
Dept: PULMONOLOGY | Facility: CLINIC | Age: 69
End: 2024-11-18
Attending: PHYSICIAN ASSISTANT
Payer: MEDICARE

## 2024-11-18 ENCOUNTER — HOSPITAL ENCOUNTER (OUTPATIENT)
Dept: RADIOLOGY | Facility: HOSPITAL | Age: 69
Discharge: HOME | End: 2024-11-18
Attending: PHYSICIAN ASSISTANT
Payer: MEDICARE

## 2024-11-18 VITALS
BODY MASS INDEX: 28.58 KG/M2 | OXYGEN SATURATION: 97 % | WEIGHT: 211 LBS | RESPIRATION RATE: 20 BRPM | HEIGHT: 72 IN | WEIGHT: 211 LBS | BODY MASS INDEX: 28.58 KG/M2 | DIASTOLIC BLOOD PRESSURE: 71 MMHG | SYSTOLIC BLOOD PRESSURE: 133 MMHG | HEIGHT: 72 IN | HEART RATE: 70 BPM

## 2024-11-18 DIAGNOSIS — J44.9 COPD SUGGESTED BY INITIAL EVALUATION: ICD-10-CM

## 2024-11-18 DIAGNOSIS — D49.89: ICD-10-CM

## 2024-11-18 DIAGNOSIS — Z23 NEED FOR IMMUNIZATION AGAINST INFLUENZA: ICD-10-CM

## 2024-11-18 DIAGNOSIS — I82.722 CHRONIC DEEP VEIN THROMBOSIS (DVT) OF LEFT UPPER EXTREMITY, UNSPECIFIED VEIN: ICD-10-CM

## 2024-11-18 DIAGNOSIS — Z23 NEED FOR VACCINATION: ICD-10-CM

## 2024-11-18 DIAGNOSIS — Z94.4 LIVER TRANSPLANTED: ICD-10-CM

## 2024-11-18 DIAGNOSIS — J44.9 MODERATE COPD (CHRONIC OBSTRUCTIVE PULMONARY DISEASE): ICD-10-CM

## 2024-11-18 DIAGNOSIS — F17.210 SMOKING GREATER THAN 30 PACK YEARS: Primary | ICD-10-CM

## 2024-11-18 DIAGNOSIS — D84.9 IMMUNOSUPPRESSION: ICD-10-CM

## 2024-11-18 LAB
BRPFT: ABNORMAL
DLCO ADJ PRE: 18.45 ML/(MIN*MMHG) (ref 21.94–35.8)
DLCO SINGLE BREATH LLN: 21.94
DLCO SINGLE BREATH PRE REF: 54.5 %
DLCO SINGLE BREATH REF: 28.87
DLCOC SBVA LLN: 2.74
DLCOC SBVA PRE REF: 80.6 %
DLCOC SBVA REF: 3.83
DLCOC SINGLE BREATH LLN: 21.94
DLCOC SINGLE BREATH PRE REF: 63.9 %
DLCOC SINGLE BREATH REF: 28.87
DLCOVA LLN: 2.74
DLCOVA PRE REF: 68.8 %
DLCOVA PRE: 2.64 ML/(MIN*MMHG*L) (ref 2.74–4.93)
DLCOVA REF: 3.83
DLVAADJ PRE: 3.09 ML/(MIN*MMHG*L) (ref 2.74–4.93)
ERV LLN: -16448.89
ERV PRE REF: 121.6 %
ERV REF: 1.11
FEF 25 75 LLN: 1.53
FEF 25 75 PRE REF: 37.7 %
FEF 25 75 REF: 3.24
FEV1 FVC LLN: 62
FEV1 FVC PRE REF: 86.8 %
FEV1 FVC REF: 76
FEV1 LLN: 2.44
FEV1 PRE REF: 70.6 %
FEV1 REF: 3.39
FRCPLETH LLN: 2.83
FRCPLETH PREREF: 157.3 %
FRCPLETH REF: 3.82
FVC LLN: 3.34
FVC PRE REF: 80.9 %
FVC REF: 4.51
IVC PRE: 3.39 L (ref 3.34–5.7)
IVC SINGLE BREATH LLN: 3.34
IVC SINGLE BREATH PRE REF: 75.2 %
IVC SINGLE BREATH REF: 4.51
MVV LLN: 114
MVV PRE REF: 59.6 %
MVV REF: 134
PEF LLN: 6.37
PEF PRE REF: 66.9 %
PEF REF: 8.82
PRE DLCO: 15.75 ML/(MIN*MMHG) (ref 21.94–35.8)
PRE ERV: 1.35 L (ref -16448.89–16451.11)
PRE FEF 25 75: 1.22 L/S (ref 1.53–4.95)
PRE FET 100: 11.86 SEC
PRE FEV1 FVC: 65.56 % (ref 62.06–87.44)
PRE FEV1: 2.39 L (ref 2.44–4.28)
PRE FRC PL: 6.01 L (ref 2.83–4.81)
PRE FVC: 3.65 L (ref 3.34–5.7)
PRE MVV: 79.72 L/MIN (ref 113.69–153.82)
PRE PEF: 5.9 L/S (ref 6.37–11.27)
PRE RV: 4.65 L (ref 2.03–3.38)
PRE TLC: 8.3 L (ref 6.38–8.68)
RAW LLN: 3.06
RAW PRE REF: 140.9 %
RAW PRE: 4.31 CMH2O*S/L (ref 3.06–3.06)
RAW REF: 3.06
RV LLN: 2.03
RV PRE REF: 172 %
RV REF: 2.71
RVTLC LLN: 32
RVTLC PRE REF: 135.8 %
RVTLC PRE: 56.05 % (ref 32.28–50.24)
RVTLC REF: 41
TLC LLN: 6.38
TLC PRE REF: 110.3 %
TLC REF: 7.53
VA PRE: 5.98 L (ref 7.38–7.38)
VA SINGLE BREATH LLN: 7.38
VA SINGLE BREATH PRE REF: 81 %
VA SINGLE BREATH REF: 7.38
VC LLN: 3.34
VC PRE REF: 80.9 %
VC PRE: 3.65 L (ref 3.34–5.7)
VC REF: 4.51

## 2024-11-18 PROCEDURE — 94726 PLETHYSMOGRAPHY LUNG VOLUMES: CPT | Mod: HCNC,S$GLB,, | Performed by: INTERNAL MEDICINE

## 2024-11-18 PROCEDURE — 90653 IIV ADJUVANT VACCINE IM: CPT | Mod: HCNC,S$GLB,, | Performed by: INTERNAL MEDICINE

## 2024-11-18 PROCEDURE — 94010 BREATHING CAPACITY TEST: CPT | Mod: 59,HCNC,S$GLB, | Performed by: INTERNAL MEDICINE

## 2024-11-18 PROCEDURE — 1160F RVW MEDS BY RX/DR IN RCRD: CPT | Mod: HCNC,CPTII,S$GLB, | Performed by: INTERNAL MEDICINE

## 2024-11-18 PROCEDURE — 99214 OFFICE O/P EST MOD 30 MIN: CPT | Mod: 25,HCNC,S$GLB, | Performed by: INTERNAL MEDICINE

## 2024-11-18 PROCEDURE — 99999 PR PBB SHADOW E&M-EST. PATIENT-LVL V: CPT | Mod: PBBFAC,HCNC,, | Performed by: INTERNAL MEDICINE

## 2024-11-18 PROCEDURE — 94618 PULMONARY STRESS TESTING: CPT | Mod: HCNC,S$GLB,, | Performed by: INTERNAL MEDICINE

## 2024-11-18 PROCEDURE — 3078F DIAST BP <80 MM HG: CPT | Mod: HCNC,CPTII,S$GLB, | Performed by: INTERNAL MEDICINE

## 2024-11-18 PROCEDURE — 71046 X-RAY EXAM CHEST 2 VIEWS: CPT | Mod: TC,HCNC

## 2024-11-18 PROCEDURE — 3008F BODY MASS INDEX DOCD: CPT | Mod: HCNC,CPTII,S$GLB, | Performed by: INTERNAL MEDICINE

## 2024-11-18 PROCEDURE — 1125F AMNT PAIN NOTED PAIN PRSNT: CPT | Mod: HCNC,CPTII,S$GLB, | Performed by: INTERNAL MEDICINE

## 2024-11-18 PROCEDURE — G0008 ADMIN INFLUENZA VIRUS VAC: HCPCS | Mod: HCNC,S$GLB,, | Performed by: INTERNAL MEDICINE

## 2024-11-18 PROCEDURE — 1159F MED LIST DOCD IN RCRD: CPT | Mod: HCNC,CPTII,S$GLB, | Performed by: INTERNAL MEDICINE

## 2024-11-18 PROCEDURE — 1101F PT FALLS ASSESS-DOCD LE1/YR: CPT | Mod: HCNC,CPTII,S$GLB, | Performed by: INTERNAL MEDICINE

## 2024-11-18 PROCEDURE — 3288F FALL RISK ASSESSMENT DOCD: CPT | Mod: HCNC,CPTII,S$GLB, | Performed by: INTERNAL MEDICINE

## 2024-11-18 PROCEDURE — 94729 DIFFUSING CAPACITY: CPT | Mod: HCNC,S$GLB,, | Performed by: INTERNAL MEDICINE

## 2024-11-18 PROCEDURE — 3075F SYST BP GE 130 - 139MM HG: CPT | Mod: HCNC,CPTII,S$GLB, | Performed by: INTERNAL MEDICINE

## 2024-11-18 PROCEDURE — 71046 X-RAY EXAM CHEST 2 VIEWS: CPT | Mod: 26,HCNC,, | Performed by: RADIOLOGY

## 2024-11-18 RX ORDER — VARENICLINE TARTRATE 1 MG/1
1 TABLET, FILM COATED ORAL 2 TIMES DAILY
Qty: 30 TABLET | Refills: 1 | Status: SHIPPED | OUTPATIENT
Start: 2024-11-18 | End: 2025-01-18

## 2024-11-18 RX ORDER — VARENICLINE TARTRATE 0.5 MG/1
0.5 TABLET, FILM COATED ORAL DAILY
Qty: 30 TABLET | Refills: 0 | Status: SHIPPED | OUTPATIENT
Start: 2024-11-18 | End: 2024-12-18

## 2024-11-18 RX ORDER — IBUPROFEN 200 MG
1 TABLET ORAL DAILY
Qty: 28 PATCH | Refills: 2 | Status: SHIPPED | OUTPATIENT
Start: 2024-11-18

## 2024-11-18 NOTE — PROCEDURES
[facility] - Pulmonary Function  Six Minute Walk     SUMMARY     Ordering Provider: ANDRIA Campos   Interpreting Provider: Dr. Gray  Performing nurse/tech/RT: MITALI Raya RRT  Diagnosis: COPD  Height: 6' (182.9 cm)  Weight: 95.7 kg (211 lb)  BMI (Calculated): 28.6                Phase Oxygen Assessment Supplemental O2 Heart   Rate Blood Pressure Richard Dyspnea Scale Rating   Resting 97 % Room Air 96 bpm 133/71 3   Exercise        Minute        1 97 % Room Air 102 bpm     2 96 % Room Air 129 bpm     3 97 % Room Air 130 bpm     4 98 % Room Air 112 bpm     5 95 % Room Air 142 bpm     6  97 % Room Air 122 bpm 149/67 4   Recovery        Minute        1 98 % Room Air 113 bpm     2 98 % Room Air 101 bpm     3 98 % Room Air 98 bpm     4 99 % Room Air 99 bpm 134/63 0     Six Minute Walk Summary  6MWT Status: completed with stops  Patient Reported: Dyspnea (chest tight)     Interpretation:  Did the patient stop or pause?: Yes  How many times did the patient stop or pause?: 1  Stop Time 1: 180  Restart Time 1: 270  Pause Time 1: 90 seconds                             Total Time Walked (Calculated): 270 seconds  Final Partial Lap Distance (feet): 100 feet  Total Distance Meters (Calculated): 335.28 meters  Predicted Distance Meters (Calculated): 555.71 meters  Percentage of Predicted (Calculated): 60.33  Peak VO2 (Calculated): 14.04  Mets: 4.01  Has The Patient Had a Previous Six Minute Walk Test?: No       Previous 6MWT Results  Has The Patient Had a Previous Six Minute Walk Test?: No

## 2024-11-18 NOTE — PROGRESS NOTES
Pulmonary Outpatient Follow Up Visit     Subjective:    This patient is new to me.  Previous records with colleagues including office visits, testing were personally reviewed.  Outside records under care everywhere if available that includes office visits and testing were reviewed personally as well.     Patient ID: Lj Coleman is a 68 y.o. male.    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        Occupation: retired,  for construction; worked in [employer] and [employer], 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  Mother  of lung cancer  S/p liver transplant  for liver cancer    Review of Systems   Respiratory:  Positive for cough and shortness of breath.        Outpatient Encounter Medications as of 2024   Medication Sig Dispense Refill    acyclovir (ZOVIRAX) 400 MG tablet Take 1 tablet (400 mg total) by mouth 2 (two) times daily. While on chemotherapy 60 tablet 4    albuterol (VENTOLIN HFA) 90 mcg/actuation inhaler Inhale 2 puffs into the lungs every 6 (six) hours as needed for Wheezing or Shortness of Breath. Rescue 18 g 3    allopurinoL (ZYLOPRIM) 300 MG tablet Take 1 tablet (300 mg total) by mouth once daily. 30 tablet 0    apixaban (ELIQUIS) 5 mg Tab Take 1 tablet (5 mg total) by mouth 2 (two) times daily. 180 tablet 3    diclofenac sodium (VOLTAREN) 1 % Gel Apply 2 g topically 4 (four) times daily. for 10 days 100 g 0    esomeprazole (NEXIUM) 40 MG capsule Take 1 capsule (40 mg total) by mouth once daily. 90 capsule 3    fluticasone-umeclidin-vilanter (TRELEGY ELLIPTA) 100-62.5-25 mcg DsDv Inhale 1 puff into the lungs once daily. 60 each 5    HYDROcodone-acetaminophen (NORCO) 5-325 mg per tablet Take 1 tablet by mouth every 6 (six) hours as needed for Pain. 12 tablet 0     LIDOcaine-prilocaine (EMLA) cream Apply topically as needed. 30 g 11    metoprolol succinate (TOPROL-XL) 25 MG 24 hr tablet TAKE 1 TABLET EVERY DAY 90 tablet 3    ondansetron (ZOFRAN) 8 MG tablet Take 1 tablet (8 mg total) by mouth every 12 (twelve) hours as needed for Nausea. 20 tablet 0    predniSONE (DELTASONE) 50 MG Tab Take 2 tablets (100 mg total) by mouth once daily. on days 2,3,4, 5 of each chemotherapy cycle 8 tablet 5    prochlorperazine (COMPAZINE) 5 MG tablet Take 1 tablet (5 mg total) by mouth every 6 (six) hours as needed (nausea). 20 tablet 5    sulfamethoxazole-trimethoprim 800-160mg (BACTRIM DS) 800-160 mg Tab Take 1 tablet by mouth 3 (three) times a week. 12 tablet 4    tacrolimus (PROGRAF) 0.5 MG Cap Take 1 capsule (0.5 mg total) by mouth every 12 (twelve) hours. 180 capsule 3    tamsulosin (FLOMAX) 0.4 mg Cap Take 1 capsule (0.4 mg total) by mouth once daily. 30 capsule 2    tenofovir alafenamide (VEMLIDY) 25 mg Tab Take 1 tablet (25 mg total) by mouth Daily. 30 tablet 11    tenofovir alafenamide (VEMLIDY) 25 mg Tab Take 1 tablet by mouth once daily 30 tablet 11    nicotine (NICODERM CQ) 21 mg/24 hr Place 1 patch onto the skin once daily. 28 patch 2    varenicline (CHANTIX) 0.5 MG Tab Take 1 tablet (0.5 mg total) by mouth once daily. 30 tablet 0    varenicline (CHANTIX) 1 mg Tab Take 1 tablet (1 mg total) by mouth 2 (two) times daily. 30 tablet 1    [DISCONTINUED] FLUAD QUAD 2023-24,65Y UP,,PF, 60 mcg (15 mcg x 4)/0.5 mL Syrg  (Patient not taking: Reported on 10/21/2024)      [DISCONTINUED] fluticasone propionate (FLONASE) 50 mcg/actuation nasal spray 1 spray (50 mcg total) by Each Nostril route once daily. (Patient not taking: Reported on 10/21/2024) 9.9 mL 0    [DISCONTINUED] HYDROcodone-acetaminophen (NORCO) 5-325 mg per tablet Take 1 tablet by mouth every 6 (six) hours as needed for Pain. (Patient not taking: Reported on 10/21/2024) 15 tablet 0    [DISCONTINUED] predniSONE (DELTASONE) 50 MG  Tab Take 2 tablets (100 mg total) by mouth once daily. on days 2,3,4, 5 of each chemotherapy cycle 8 tablet 5    [DISCONTINUED] tenofovir alafenamide (VEMLIDY) 25 mg Tab Take 1 tablet (25 mg total) by mouth Daily. 30 tablet 11     Facility-Administered Encounter Medications as of 11/18/2024   Medication Dose Route Frequency Provider Last Rate Last Admin    lactated ringers infusion   Intravenous Continuous Linwood Ellsworth MD   New Bag at 01/20/21 0734    sodium chloride 0.9% flush 2 mL  2 mL Intravenous PRN Linwood Ellsworth MD           Objective:     Vital Signs (Most Recent)  Vital Signs  Pulse: 70  Resp: 20  SpO2: 97 %  BP: 133/71  Patient Position: Sitting  Pain Score:   6  Pain Loc: Back  Height and Weight  Height: 6' (182.9 cm)  Weight: 95.7 kg (211 lb)  BSA (Calculated - sq m): 2.2 sq meters  BMI (Calculated): 28.6  Weight in (lb) to have BMI = 25: 183.9]  Wt Readings from Last 2 Encounters:   11/18/24 95.7 kg (211 lb)   11/18/24 95.7 kg (211 lb)       Physical Exam   Constitutional: He is oriented to person, place, and time. He appears well-developed and well-nourished.   Pulmonary/Chest: Normal expansion and effort normal. No respiratory distress.   Neurological: He is alert and oriented to person, place, and time.   Psychiatric: His behavior is normal.       Laboratory  Lab Results   Component Value Date    WBC 3.17 (L) 11/08/2024    RBC 3.32 (L) 11/08/2024    HGB 10.3 (L) 11/08/2024    HCT 32.8 (L) 11/08/2024    MCV 99 (H) 11/08/2024    MCH 31.0 11/08/2024    MCHC 31.4 (L) 11/08/2024    RDW 16.2 (H) 11/08/2024    PLT 70 (L) 11/08/2024    MPV 12.8 11/08/2024    GRAN 2.3 11/08/2024    GRAN 71.7 11/08/2024    LYMPH 0.4 (L) 11/08/2024    LYMPH 12.9 (L) 11/08/2024    MONO 0.4 11/08/2024    MONO 11.0 11/08/2024    EOS 0.1 11/08/2024    BASO 0.02 11/08/2024    EOSINOPHIL 3.5 11/08/2024    BASOPHIL 0.6 11/08/2024       BMP  Lab Results   Component Value Date     11/08/2024    K 3.8 11/08/2024     11/08/2024  "   CO2 22 (L) 11/08/2024    BUN 18 11/08/2024    CREATININE 1.6 (H) 11/08/2024    CALCIUM 8.5 (L) 11/08/2024    ANIONGAP 10 11/08/2024    ESTGFRAFRICA >60.0 06/08/2022    EGFRNONAA >60.0 06/08/2022    AST 15 11/08/2024    ALT 6 (L) 11/08/2024    PROT 7.0 11/08/2024       Lab Results   Component Value Date    BNP 15 06/11/2015       Lab Results   Component Value Date    TSH 1.168 06/06/2023       Lab Results   Component Value Date    SEDRATE 19 (H) 03/29/2023       Lab Results   Component Value Date    CRP 24.0 (H) 03/29/2023     No results found for: "IGE"     No results found for: "ASPERGILLUS"  No results found for: "AFUMIGATUSCL"     No results found for: "ACE"     Diagnostic Results:  I have personally reviewed today the following studies:          Low-dose CT scan of the chest low risk findings August 2024    Mild obstructive findings on PFT and mild deficit on exercise capacity on 6 minute walking test 11/18/2024  Assessment/Plan:   Smoking greater than 30 pack years  -     CT Chest Lung Screening Low Dose; Future; Expected date: 11/18/2024  -     nicotine (NICODERM CQ) 21 mg/24 hr; Place 1 patch onto the skin once daily.  Dispense: 28 patch; Refill: 2  -     varenicline (CHANTIX) 0.5 MG Tab; Take 1 tablet (0.5 mg total) by mouth once daily.  Dispense: 30 tablet; Refill: 0  -     varenicline (CHANTIX) 1 mg Tab; Take 1 tablet (1 mg total) by mouth 2 (two) times daily.  Dispense: 30 tablet; Refill: 1  -     Ambulatory referral/consult to Smoking Cessation Program; Future; Expected date: 11/25/2024    Need for immunization against influenza  -     Influenza - Trivalent (Adjuvanted)    Need for vaccination  -     Influenza - Trivalent (Adjuvanted)    Moderate COPD (chronic obstructive pulmonary disease)  -     Ambulatory referral/consult to Pulmonary Disease Management w/ Respiratory Therapist; Future; Expected date: 11/25/2024    Liver transplanted    Immunosuppression    Chronic deep vein thrombosis (DVT) of " left upper extremity, unspecified vein  -     Ambulatory referral/consult to Pulmonary Disease Management w/ Respiratory Therapist; Future; Expected date: 11/25/2024    Neoplasm of inguinal lymph nodes    Continue albuterol continue Trelegy Ellipta     Immunosuppression + chemotherapy.      Follow-up with Oncology and transplant team.      Anticoagulation for DVT persistent positive ultrasound left upper extremity last October 4, 2024.      Follow up in about 3 months (around 2/18/2025).    This note was prepared using voice recognition system and is likely to have sound alike errors that may have been overlooked even after proof reading.  Please call me with any questions    Discussed diagnosis, its evaluation, treatment and usual course. All questions answered.      Jacob Martinez MD

## 2024-11-20 ENCOUNTER — TELEPHONE (OUTPATIENT)
Dept: HEMATOLOGY/ONCOLOGY | Facility: CLINIC | Age: 69
End: 2024-11-20
Payer: MEDICARE

## 2024-11-20 PROBLEM — R22.43 LOCALIZED SWELLING OF BOTH LOWER LEGS: Status: ACTIVE | Noted: 2024-11-20

## 2024-11-20 PROBLEM — Z51.5 PALLIATIVE CARE ENCOUNTER: Status: ACTIVE | Noted: 2024-11-20

## 2024-11-20 NOTE — ASSESSMENT & PLAN NOTE
Followed by Dr. Peralta.  On BRENTUXIMAB CYCLOPHOSPHAMIDE DOXORUBICIN PREDNISONE- Curative intent  PET 10/03/2024: Extensive lymphadenopathy with increased metabolic activity as described above. Additionally there is a focus of increased avidity in the right mandible. Given the extensive nature and increased SUV uptake, metastatic disease should be excluded; thus, tissue sampling is recommended

## 2024-11-20 NOTE — ASSESSMENT & PLAN NOTE
Chronic per pt.   He declined PCP f/u at this time   Echo- 11/04/2024-  Left Ventricle: The left ventricle is normal in size. Normal wall thickness. There is concentric remodeling. Normal wall motion. There is normal systolic function with a visually estimated ejection fraction of 60 - 65%. Ejection fraction is approximately 60%. There is normal diastolic function.    Right Ventricle: Normal right ventricular cavity size. Right ventricle wall motion  is normal. Systolic function is normal.    Mitral Valve: There is mild regurgitation.    Tricuspid Valve: There is mild regurgitation.    Pulmonary Artery: The estimated pulmonary artery systolic pressure is 36 mmHg.    IVC/SVC: Normal venous pressure at 3 mmHg.

## 2024-11-20 NOTE — TELEPHONE ENCOUNTER
Returned pt's call and answered questions about how he should be taking his home meds. Pt will call back for any other concerns.

## 2024-11-20 NOTE — ASSESSMENT & PLAN NOTE
"  -Code status: Full Code. He stated "I don't want to suffer". He did not elaborate further.  -HCPOA: Not assigned. He is  with 2 adult children.   -GOC:  Continue cancer treatment, symptom management, maintain independence and functional status.  -See HPI for further details   "

## 2024-11-23 ENCOUNTER — HOSPITAL ENCOUNTER (INPATIENT)
Facility: HOSPITAL | Age: 69
LOS: 2 days | Discharge: HOME | DRG: 311 | End: 2024-11-25
Attending: EMERGENCY MEDICINE | Admitting: INTERNAL MEDICINE
Payer: MEDICARE

## 2024-11-23 DIAGNOSIS — I21.4 NSTEMI (NON-ST ELEVATED MYOCARDIAL INFARCTION): Primary | ICD-10-CM

## 2024-11-23 DIAGNOSIS — R07.9 CHEST PAIN: ICD-10-CM

## 2024-11-23 DIAGNOSIS — I21.4 NON-ST ELEVATION MYOCARDIAL INFARCTION (NSTEMI): ICD-10-CM

## 2024-11-23 DIAGNOSIS — Z94.4 HISTORY OF LIVER TRANSPLANT: ICD-10-CM

## 2024-11-23 DIAGNOSIS — C84.75 ANAPLASTIC ALK-NEGATIVE LARGE CELL LYMPHOMA OF LYMPH NODE OF LOWER EXTREMITY: ICD-10-CM

## 2024-11-23 DIAGNOSIS — R00.0 TACHYCARDIA: ICD-10-CM

## 2024-11-23 DIAGNOSIS — N28.9 RENAL DYSFUNCTION: ICD-10-CM

## 2024-11-23 DIAGNOSIS — D64.9 ANEMIA, UNSPECIFIED TYPE: ICD-10-CM

## 2024-11-23 DIAGNOSIS — I21.4 NSTEMI (NON-ST ELEVATION MYOCARDIAL INFARCTION): ICD-10-CM

## 2024-11-23 DIAGNOSIS — R79.89 ELEVATED TROPONIN: ICD-10-CM

## 2024-11-23 DIAGNOSIS — C85.90 LYMPHOMA, UNSPECIFIED BODY REGION, UNSPECIFIED LYMPHOMA TYPE: ICD-10-CM

## 2024-11-23 PROBLEM — D61.818 PANCYTOPENIA: Status: ACTIVE | Noted: 2024-11-23

## 2024-11-23 PROBLEM — N17.9 AKI (ACUTE KIDNEY INJURY): Status: ACTIVE | Noted: 2024-11-23

## 2024-11-23 LAB
ALBUMIN SERPL BCP-MCNC: 3.1 G/DL (ref 3.5–5.2)
ALP SERPL-CCNC: 112 U/L (ref 40–150)
ALT SERPL W/O P-5'-P-CCNC: 19 U/L (ref 10–44)
AMMONIA PLAS-SCNC: 29 UMOL/L (ref 10–50)
ANION GAP SERPL CALC-SCNC: 8 MMOL/L (ref 8–16)
ANISOCYTOSIS BLD QL SMEAR: SLIGHT
APTT PPP: 30 SEC (ref 21–32)
AST SERPL-CCNC: 14 U/L (ref 10–40)
BASOPHILS # BLD AUTO: ABNORMAL K/UL (ref 0–0.2)
BASOPHILS NFR BLD: 0 % (ref 0–1.9)
BILIRUB SERPL-MCNC: 0.5 MG/DL (ref 0.1–1)
BNP SERPL-MCNC: 198 PG/ML (ref 0–99)
BUN SERPL-MCNC: 14 MG/DL (ref 8–23)
CALCIUM SERPL-MCNC: 8.5 MG/DL (ref 8.7–10.5)
CHLORIDE SERPL-SCNC: 104 MMOL/L (ref 95–110)
CO2 SERPL-SCNC: 20 MMOL/L (ref 23–29)
CREAT SERPL-MCNC: 2.2 MG/DL (ref 0.5–1.4)
DACRYOCYTES BLD QL SMEAR: ABNORMAL
DIFFERENTIAL METHOD BLD: ABNORMAL
EOSINOPHIL # BLD AUTO: ABNORMAL K/UL (ref 0–0.5)
EOSINOPHIL NFR BLD: 0 % (ref 0–8)
ERYTHROCYTE [DISTWIDTH] IN BLOOD BY AUTOMATED COUNT: 15 % (ref 11.5–14.5)
EST. GFR  (NO RACE VARIABLE): 31 ML/MIN/1.73 M^2
GLUCOSE SERPL-MCNC: 125 MG/DL (ref 70–110)
HCT VFR BLD AUTO: 24 % (ref 40–54)
HGB BLD-MCNC: 8.1 G/DL (ref 14–18)
IMM GRANULOCYTES # BLD AUTO: ABNORMAL K/UL (ref 0–0.04)
IMM GRANULOCYTES NFR BLD AUTO: ABNORMAL % (ref 0–0.5)
INFLUENZA A, MOLECULAR: NEGATIVE
INFLUENZA B, MOLECULAR: NEGATIVE
INR PPP: 1.1 (ref 0.8–1.2)
LYMPHOCYTES # BLD AUTO: ABNORMAL K/UL (ref 1–4.8)
LYMPHOCYTES NFR BLD: 13 % (ref 18–48)
MCH RBC QN AUTO: 31.6 PG (ref 27–31)
MCHC RBC AUTO-ENTMCNC: 33.8 G/DL (ref 32–36)
MCV RBC AUTO: 94 FL (ref 82–98)
METAMYELOCYTES NFR BLD MANUAL: 1 %
MONOCYTES # BLD AUTO: ABNORMAL K/UL (ref 0.3–1)
MONOCYTES NFR BLD: 12 % (ref 4–15)
NEUTROPHILS NFR BLD: 74 % (ref 38–73)
NRBC BLD-RTO: 0 /100 WBC
OHS QRS DURATION: 74 MS
OHS QTC CALCULATION: 503 MS
OVALOCYTES BLD QL SMEAR: ABNORMAL
PLATELET # BLD AUTO: 41 K/UL (ref 150–450)
PLATELET BLD QL SMEAR: ABNORMAL
PMV BLD AUTO: 13.4 FL (ref 9.2–12.9)
POIKILOCYTOSIS BLD QL SMEAR: SLIGHT
POTASSIUM SERPL-SCNC: 3.7 MMOL/L (ref 3.5–5.1)
PROT SERPL-MCNC: 6.4 G/DL (ref 6–8.4)
PROTHROMBIN TIME: 11.5 SEC (ref 9–12.5)
RBC # BLD AUTO: 2.56 M/UL (ref 4.6–6.2)
SARS-COV-2 RDRP RESP QL NAA+PROBE: NEGATIVE
SODIUM SERPL-SCNC: 132 MMOL/L (ref 136–145)
SPECIMEN SOURCE: NORMAL
TROPONIN I SERPL DL<=0.01 NG/ML-MCNC: 0.81 NG/ML (ref 0–0.03)
WBC # BLD AUTO: 3.04 K/UL (ref 3.9–12.7)

## 2024-11-23 PROCEDURE — 85730 THROMBOPLASTIN TIME PARTIAL: CPT | Mod: HCNC | Performed by: EMERGENCY MEDICINE

## 2024-11-23 PROCEDURE — 11000001 HC ACUTE MED/SURG PRIVATE ROOM: Mod: HCNC

## 2024-11-23 PROCEDURE — 63600175 PHARM REV CODE 636 W HCPCS: Mod: HCNC | Performed by: EMERGENCY MEDICINE

## 2024-11-23 PROCEDURE — 83880 ASSAY OF NATRIURETIC PEPTIDE: CPT | Mod: HCNC | Performed by: EMERGENCY MEDICINE

## 2024-11-23 PROCEDURE — 25000003 PHARM REV CODE 250: Mod: HCNC | Performed by: EMERGENCY MEDICINE

## 2024-11-23 PROCEDURE — 85027 COMPLETE CBC AUTOMATED: CPT | Mod: HCNC | Performed by: EMERGENCY MEDICINE

## 2024-11-23 PROCEDURE — 82140 ASSAY OF AMMONIA: CPT | Mod: HCNC | Performed by: EMERGENCY MEDICINE

## 2024-11-23 PROCEDURE — 87635 SARS-COV-2 COVID-19 AMP PRB: CPT | Mod: HCNC | Performed by: EMERGENCY MEDICINE

## 2024-11-23 PROCEDURE — 93010 ELECTROCARDIOGRAM REPORT: CPT | Mod: HCNC,,, | Performed by: INTERNAL MEDICINE

## 2024-11-23 PROCEDURE — 84484 ASSAY OF TROPONIN QUANT: CPT | Mod: HCNC | Performed by: EMERGENCY MEDICINE

## 2024-11-23 PROCEDURE — 96374 THER/PROPH/DIAG INJ IV PUSH: CPT | Mod: HCNC

## 2024-11-23 PROCEDURE — 80053 COMPREHEN METABOLIC PANEL: CPT | Mod: HCNC | Performed by: EMERGENCY MEDICINE

## 2024-11-23 PROCEDURE — 85007 BL SMEAR W/DIFF WBC COUNT: CPT | Mod: HCNC | Performed by: EMERGENCY MEDICINE

## 2024-11-23 PROCEDURE — 87502 INFLUENZA DNA AMP PROBE: CPT | Mod: HCNC | Performed by: EMERGENCY MEDICINE

## 2024-11-23 PROCEDURE — 93005 ELECTROCARDIOGRAM TRACING: CPT | Mod: HCNC

## 2024-11-23 PROCEDURE — 99291 CRITICAL CARE FIRST HOUR: CPT | Mod: HCNC

## 2024-11-23 PROCEDURE — 85610 PROTHROMBIN TIME: CPT | Mod: HCNC | Performed by: EMERGENCY MEDICINE

## 2024-11-23 RX ORDER — IBUPROFEN 200 MG
1 TABLET ORAL DAILY
Status: DISCONTINUED | OUTPATIENT
Start: 2024-11-23 | End: 2024-11-25 | Stop reason: HOSPADM

## 2024-11-23 RX ORDER — METOPROLOL SUCCINATE 25 MG/1
25 TABLET, EXTENDED RELEASE ORAL DAILY
Status: DISCONTINUED | OUTPATIENT
Start: 2024-11-24 | End: 2024-11-25 | Stop reason: HOSPADM

## 2024-11-23 RX ORDER — TAMSULOSIN HYDROCHLORIDE 0.4 MG/1
0.4 CAPSULE ORAL DAILY
Status: DISCONTINUED | OUTPATIENT
Start: 2024-11-24 | End: 2024-11-25 | Stop reason: HOSPADM

## 2024-11-23 RX ORDER — NAPROXEN SODIUM 220 MG/1
81 TABLET, FILM COATED ORAL DAILY
Status: DISCONTINUED | OUTPATIENT
Start: 2024-11-24 | End: 2024-11-25 | Stop reason: HOSPADM

## 2024-11-23 RX ORDER — NITROGLYCERIN 0.4 MG/1
0.4 TABLET SUBLINGUAL EVERY 5 MIN PRN
Status: DISCONTINUED | OUTPATIENT
Start: 2024-11-23 | End: 2024-11-25 | Stop reason: HOSPADM

## 2024-11-23 RX ORDER — NAPROXEN SODIUM 220 MG/1
162 TABLET, FILM COATED ORAL
Status: COMPLETED | OUTPATIENT
Start: 2024-11-23 | End: 2024-11-23

## 2024-11-23 RX ORDER — TALC
6 POWDER (GRAM) TOPICAL NIGHTLY PRN
Status: DISCONTINUED | OUTPATIENT
Start: 2024-11-23 | End: 2024-11-25 | Stop reason: HOSPADM

## 2024-11-23 RX ORDER — SODIUM CHLORIDE 9 MG/ML
INJECTION, SOLUTION INTRAVENOUS CONTINUOUS
Status: DISCONTINUED | OUTPATIENT
Start: 2024-11-23 | End: 2024-11-24

## 2024-11-23 RX ORDER — IPRATROPIUM BROMIDE AND ALBUTEROL SULFATE 2.5; .5 MG/3ML; MG/3ML
3 SOLUTION RESPIRATORY (INHALATION) EVERY 6 HOURS PRN
Status: DISCONTINUED | OUTPATIENT
Start: 2024-11-23 | End: 2024-11-25 | Stop reason: HOSPADM

## 2024-11-23 RX ORDER — ACETAMINOPHEN, DIPHENHYDRAMINE HCL, PHENYLEPHRINE HCL 325; 25; 5 MG/1; MG/1; MG/1
TABLET ORAL
COMMUNITY

## 2024-11-23 RX ORDER — HEPARIN SODIUM,PORCINE/D5W 25000/250
0-40 INTRAVENOUS SOLUTION INTRAVENOUS CONTINUOUS
Status: DISCONTINUED | OUTPATIENT
Start: 2024-11-23 | End: 2024-11-23

## 2024-11-23 RX ADMIN — ASPIRIN 81 MG CHEWABLE TABLET 162 MG: 81 TABLET CHEWABLE at 09:11

## 2024-11-23 RX ADMIN — HEPARIN SODIUM 12 UNITS/KG/HR: 10000 INJECTION, SOLUTION INTRAVENOUS at 09:11

## 2024-11-23 NOTE — Clinical Note
Diagnosis: NSTEMI (non-ST elevated myocardial infarction) [655437]   Reason for IP Medical Treatment  (Clinical interventions that can only be accomplished in the IP setting? ) :: nstemi   Plans for Post-Acute care--if anticipated (pick the single best option):: A. No post acute care anticipated at this time

## 2024-11-24 PROBLEM — R79.89 TROPONIN LEVEL ELEVATED: Status: ACTIVE | Noted: 2024-11-23

## 2024-11-24 LAB
ABO + RH BLD: NORMAL
ALBUMIN SERPL BCP-MCNC: 2.9 G/DL (ref 3.5–5.2)
ALP SERPL-CCNC: 113 U/L (ref 40–150)
ALT SERPL W/O P-5'-P-CCNC: 18 U/L (ref 10–44)
ANION GAP SERPL CALC-SCNC: 10 MMOL/L (ref 8–16)
ANISOCYTOSIS BLD QL SMEAR: SLIGHT
AST SERPL-CCNC: 15 U/L (ref 10–40)
BASOPHILS # BLD AUTO: ABNORMAL K/UL (ref 0–0.2)
BASOPHILS NFR BLD: 0 % (ref 0–1.9)
BILIRUB SERPL-MCNC: 0.4 MG/DL (ref 0.1–1)
BLD GP AB SCN CELLS X3 SERPL QL: NORMAL
BUN SERPL-MCNC: 14 MG/DL (ref 8–23)
CALCIUM SERPL-MCNC: 7.9 MG/DL (ref 8.7–10.5)
CHLORIDE SERPL-SCNC: 105 MMOL/L (ref 95–110)
CO2 SERPL-SCNC: 19 MMOL/L (ref 23–29)
CREAT SERPL-MCNC: 2.2 MG/DL (ref 0.5–1.4)
DACRYOCYTES BLD QL SMEAR: ABNORMAL
DIFFERENTIAL METHOD BLD: ABNORMAL
EOSINOPHIL # BLD AUTO: ABNORMAL K/UL (ref 0–0.5)
EOSINOPHIL NFR BLD: 0 % (ref 0–8)
ERYTHROCYTE [DISTWIDTH] IN BLOOD BY AUTOMATED COUNT: 14.9 % (ref 11.5–14.5)
EST. GFR  (NO RACE VARIABLE): 31 ML/MIN/1.73 M^2
GLUCOSE SERPL-MCNC: 111 MG/DL (ref 70–110)
HCT VFR BLD AUTO: 24.1 % (ref 40–54)
HGB BLD-MCNC: 8.1 G/DL (ref 14–18)
IMM GRANULOCYTES # BLD AUTO: ABNORMAL K/UL (ref 0–0.04)
IMM GRANULOCYTES NFR BLD AUTO: ABNORMAL % (ref 0–0.5)
LYMPHOCYTES # BLD AUTO: ABNORMAL K/UL (ref 1–4.8)
LYMPHOCYTES NFR BLD: 13 % (ref 18–48)
MAGNESIUM SERPL-MCNC: 1.4 MG/DL (ref 1.6–2.6)
MCH RBC QN AUTO: 31.8 PG (ref 27–31)
MCHC RBC AUTO-ENTMCNC: 33.6 G/DL (ref 32–36)
MCV RBC AUTO: 95 FL (ref 82–98)
MONOCYTES # BLD AUTO: ABNORMAL K/UL (ref 0.3–1)
MONOCYTES NFR BLD: 8 % (ref 4–15)
NEUTROPHILS NFR BLD: 79 % (ref 38–73)
NRBC BLD-RTO: 0 /100 WBC
OHS QRS DURATION: 74 MS
OHS QTC CALCULATION: 503 MS
OVALOCYTES BLD QL SMEAR: ABNORMAL
PLATELET # BLD AUTO: 42 K/UL (ref 150–450)
PLATELET BLD QL SMEAR: ABNORMAL
PMV BLD AUTO: 13.7 FL (ref 9.2–12.9)
POIKILOCYTOSIS BLD QL SMEAR: SLIGHT
POTASSIUM SERPL-SCNC: 3.6 MMOL/L (ref 3.5–5.1)
PROT SERPL-MCNC: 6 G/DL (ref 6–8.4)
RBC # BLD AUTO: 2.55 M/UL (ref 4.6–6.2)
SODIUM SERPL-SCNC: 134 MMOL/L (ref 136–145)
SPECIMEN OUTDATE: NORMAL
TROPONIN I SERPL DL<=0.01 NG/ML-MCNC: 0.71 NG/ML (ref 0–0.03)
TROPONIN I SERPL DL<=0.01 NG/ML-MCNC: 0.83 NG/ML (ref 0–0.03)
WBC # BLD AUTO: 2.9 K/UL (ref 3.9–12.7)

## 2024-11-24 PROCEDURE — 84484 ASSAY OF TROPONIN QUANT: CPT | Mod: HCNC | Performed by: NURSE PRACTITIONER

## 2024-11-24 PROCEDURE — 99900035 HC TECH TIME PER 15 MIN (STAT): Mod: HCNC

## 2024-11-24 PROCEDURE — 84484 ASSAY OF TROPONIN QUANT: CPT | Mod: 91,HCNC | Performed by: NURSE PRACTITIONER

## 2024-11-24 PROCEDURE — 86850 RBC ANTIBODY SCREEN: CPT | Mod: HCNC | Performed by: NURSE PRACTITIONER

## 2024-11-24 PROCEDURE — 99223 1ST HOSP IP/OBS HIGH 75: CPT | Mod: HCNC,,, | Performed by: STUDENT IN AN ORGANIZED HEALTH CARE EDUCATION/TRAINING PROGRAM

## 2024-11-24 PROCEDURE — 85007 BL SMEAR W/DIFF WBC COUNT: CPT | Mod: HCNC | Performed by: NURSE PRACTITIONER

## 2024-11-24 PROCEDURE — 25000003 PHARM REV CODE 250: Mod: HCNC | Performed by: NURSE PRACTITIONER

## 2024-11-24 PROCEDURE — 80053 COMPREHEN METABOLIC PANEL: CPT | Mod: HCNC | Performed by: NURSE PRACTITIONER

## 2024-11-24 PROCEDURE — 97530 THERAPEUTIC ACTIVITIES: CPT | Mod: HCNC

## 2024-11-24 PROCEDURE — 21400001 HC TELEMETRY ROOM: Mod: HCNC

## 2024-11-24 PROCEDURE — 97166 OT EVAL MOD COMPLEX 45 MIN: CPT | Mod: HCNC

## 2024-11-24 PROCEDURE — 11000001 HC ACUTE MED/SURG PRIVATE ROOM: Mod: HCNC

## 2024-11-24 PROCEDURE — 85027 COMPLETE CBC AUTOMATED: CPT | Mod: HCNC | Performed by: NURSE PRACTITIONER

## 2024-11-24 PROCEDURE — 36415 COLL VENOUS BLD VENIPUNCTURE: CPT | Mod: HCNC | Performed by: NURSE PRACTITIONER

## 2024-11-24 PROCEDURE — 94761 N-INVAS EAR/PLS OXIMETRY MLT: CPT | Mod: HCNC

## 2024-11-24 PROCEDURE — 93005 ELECTROCARDIOGRAM TRACING: CPT | Mod: HCNC

## 2024-11-24 PROCEDURE — 25000003 PHARM REV CODE 250: Mod: HCNC | Performed by: INTERNAL MEDICINE

## 2024-11-24 PROCEDURE — 63600175 PHARM REV CODE 636 W HCPCS: Mod: HCNC | Performed by: INTERNAL MEDICINE

## 2024-11-24 PROCEDURE — 83735 ASSAY OF MAGNESIUM: CPT | Mod: HCNC | Performed by: NURSE PRACTITIONER

## 2024-11-24 PROCEDURE — 93010 ELECTROCARDIOGRAM REPORT: CPT | Mod: HCNC,,, | Performed by: INTERNAL MEDICINE

## 2024-11-24 RX ORDER — TACROLIMUS 0.5 MG/1
0.5 CAPSULE ORAL 2 TIMES DAILY
Status: DISCONTINUED | OUTPATIENT
Start: 2024-11-24 | End: 2024-11-25 | Stop reason: HOSPADM

## 2024-11-24 RX ORDER — MAGNESIUM SULFATE HEPTAHYDRATE 40 MG/ML
4 INJECTION, SOLUTION INTRAVENOUS ONCE
Status: COMPLETED | OUTPATIENT
Start: 2024-11-24 | End: 2024-11-24

## 2024-11-24 RX ORDER — SODIUM CHLORIDE 9 MG/ML
INJECTION, SOLUTION INTRAVENOUS CONTINUOUS
Status: ACTIVE | OUTPATIENT
Start: 2024-11-24 | End: 2024-11-25

## 2024-11-24 RX ORDER — MUPIROCIN 20 MG/G
OINTMENT TOPICAL 2 TIMES DAILY
Status: DISCONTINUED | OUTPATIENT
Start: 2024-11-24 | End: 2024-11-25 | Stop reason: HOSPADM

## 2024-11-24 RX ORDER — POTASSIUM CHLORIDE 20 MEQ/1
40 TABLET, EXTENDED RELEASE ORAL
Status: COMPLETED | OUTPATIENT
Start: 2024-11-24 | End: 2024-11-24

## 2024-11-24 RX ORDER — ACYCLOVIR 400 MG/1
400 TABLET ORAL 2 TIMES DAILY
Status: DISCONTINUED | OUTPATIENT
Start: 2024-11-24 | End: 2024-11-25 | Stop reason: HOSPADM

## 2024-11-24 RX ADMIN — POTASSIUM CHLORIDE 40 MEQ: 1500 TABLET, EXTENDED RELEASE ORAL at 02:11

## 2024-11-24 RX ADMIN — SODIUM CHLORIDE: 9 INJECTION, SOLUTION INTRAVENOUS at 05:11

## 2024-11-24 RX ADMIN — TACROLIMUS 0.5 MG: 0.5 CAPSULE ORAL at 10:11

## 2024-11-24 RX ADMIN — MAGNESIUM SULFATE HEPTAHYDRATE 4 G: 40 INJECTION, SOLUTION INTRAVENOUS at 09:11

## 2024-11-24 RX ADMIN — POTASSIUM CHLORIDE 40 MEQ: 1500 TABLET, EXTENDED RELEASE ORAL at 09:11

## 2024-11-24 RX ADMIN — POTASSIUM CHLORIDE 40 MEQ: 1500 TABLET, EXTENDED RELEASE ORAL at 11:11

## 2024-11-24 RX ADMIN — SODIUM CHLORIDE: 9 INJECTION, SOLUTION INTRAVENOUS at 02:11

## 2024-11-24 RX ADMIN — ASPIRIN 81 MG CHEWABLE TABLET 81 MG: 81 TABLET CHEWABLE at 09:11

## 2024-11-24 RX ADMIN — MUPIROCIN: 20 OINTMENT TOPICAL at 08:11

## 2024-11-24 RX ADMIN — ACYCLOVIR 400 MG: 400 TABLET ORAL at 09:11

## 2024-11-24 RX ADMIN — TACROLIMUS 0.5 MG: 0.5 CAPSULE ORAL at 05:11

## 2024-11-24 RX ADMIN — TAMSULOSIN HYDROCHLORIDE 0.4 MG: 0.4 CAPSULE ORAL at 09:11

## 2024-11-24 RX ADMIN — METOPROLOL SUCCINATE 25 MG: 25 TABLET, EXTENDED RELEASE ORAL at 09:11

## 2024-11-24 RX ADMIN — ACYCLOVIR 400 MG: 400 TABLET ORAL at 08:11

## 2024-11-24 NOTE — HPI
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 He complains of chest pain described  as intermittent in left upper chest radiating into left arm and left hand with tingling in left hand.  He states that it has been going on for past 3-4 days, had severe episode last night which resolved, and when it came back today he decided to come and get it checked out.  He also has shortness of breath on exertion which he states is his baseline and he has had some weakness in his lower extremities  and decreased appetite over the past several days as well. on arrival to ED, temp 97.8°, heart rate 109, respiration 16, blood pressure 100/57, 98% SpO2 on room air.  Lab workup shows WBC 3.04, RBC 2.56, hemoglobin 8.1, hematocrit 24.0, platelet count 41 K, PT 11.5, INR 1.1, PTT 30.0, sodium 132, potassium 3.7, CO2 20, BUN 14, creatinine 2.2, glucose 125, calcium 8.5, albumin 3.1, normal LFTs, ammonia level 29, , troponin 0.807.  Chest x-ray demonstrates lungs are clear.  He is negative for Flu A/B, negative for Covid-19.  EKG ST with no concerning ST changes. He was given ASA and heparin bolus/drip was ordered -- which has been subsequently discontinued after consulting with cardiology , as he has PLT 41K. The chest pain is intermittent, described as mild currently. He denies need for pain medication. Hospital Medicine was consutled for admission due to NSTEMI.

## 2024-11-24 NOTE — ED PROVIDER NOTES
SCRIBE #1 NOTE: I, Shalini Hernandez, am scribing for, and in the presence of, Jonathon Sandra Jr., MD. I have scribed the entire note.       History     Chief Complaint   Patient presents with    Chest Pain     Pt states he started having chest pain about 45 mins ago. Pt states he also feels like he has a fever. Pt is currently receiving chemo      Review of patient's allergies indicates:   Allergen Reactions    Codeine Other (See Comments)     Upset stomach         History of Present Illness     HPI    11/23/2024, 8:36 PM  History obtained from the patient      History of Present Illness: Lj Coleman is a 68 y.o. 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 Pt states that his chest tightness is intermittent, but is no longer as severe as last night. He also reports tingling in his L-hand. Pt is currently undergoing chemotherapy and was informed by his oncologist to come to the ED if he ever experiences CP/chest tightness. Pt take eliquis for a DVT in his LUE and prograf following his liver transplant. He is compliant with his medications. Associated sxs include decreased appetite and L-hand tingling. Patient denies any SOB, fever, nausea, and all other sxs at this time. No prior Tx reported. No further complaints or concerns at this time.       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 10/31/2024    Anemia 11/23/2024    Angina pectoris     Arthritis     back    Atherosclerosis of native coronary  artery without angina pectoris/ LAD 09/08/2016    Back pain     Chronic hepatitis C with cirrhosis     COPD (chronic obstructive pulmonary disease)     Depression     Hepatoma     Prior to liver transplant    History of colon polyps 09/09/2015    History of transplantation, liver 04/2012    History of vertebral fracture     Hypertension     Immune deficiency disorder     Incisional hernia following transplant 04/09/2014    Liver failure 11/2011    Related to chemotherapy for hepatoma    Liver transplanted 04/2012    NSTEMI (non-ST elevated myocardial infarction) 11/23/2024    Obesity     PVCs (premature ventricular contractions)     Renal dysfunction 11/23/2024    Tobacco abuse 09/09/2016    Trouble in sleeping     Vertebral fracture 1975       Past Surgical History:  Past Surgical History:   Procedure Laterality Date    COLONOSCOPY N/A 1/20/2021    Procedure: COLONOSCOPY;  Surgeon: Emerson Helm MD;  Location: [facility] ENDO;  Service: Endoscopy;  Laterality: N/A;    HERNIA REPAIR Right 2013    incisional    INSERTION OF TUNNELED CENTRAL VENOUS CATHETER (CVC) WITH SUBCUTANEOUS PORT Left 11/6/2024    Procedure: [id number];  Surgeon: Hao Washburn MD;  Location: [facility] OR;  Service: General;  Laterality: Left;    LIVER TRANSPLANT  April 2012    LYMPH NODE BIOPSY/EXCISION Left 10/16/2024    Procedure: LYMPH NODE BIOPSY/EXCISION;  Surgeon: Hao Washburn MD;  Location: [facility] OR;  Service: General;  Laterality: Left;    MOUTH SURGERY      TONSILLECTOMY  1958         Family History:  Family History   Problem Relation Name Age of Onset    Cancer Mother          lung    Cancer Father          bladder    Diabetes Maternal Uncle      Cancer Maternal Grandmother          uterine?    Cancer Other      Heart disease Neg Hx      Kidney disease Neg Hx      Stroke Neg Hx         Social History:  Social History     Tobacco Use    Smoking status: Every Day     Current packs/day: 0.75     Average packs/day: 0.8  packs/day for 36.9 years (27.7 ttl pk-yrs)     Types: Cigarettes     Start date: 1988    Smokeless tobacco: Never    Tobacco comments:     Currently smokes a few (3-5) a day as of 6/12/24   Substance and Sexual Activity    Alcohol use: No     Alcohol/week: 0.0 standard drinks of alcohol     Comment: Quit alcohol after some alcohol abuse, prior to his liver transplant    Drug use: No    Sexual activity: Not Currently        Review of Systems     Review of Systems   Constitutional:  Positive for appetite change (decreased). Negative for fever.   HENT:  Negative for sore throat.    Respiratory:  Positive for chest tightness. Negative for shortness of breath.    Cardiovascular:  Negative for chest pain.   Gastrointestinal:  Negative for nausea.   Genitourinary:  Negative for dysuria.   Musculoskeletal:  Negative for back pain.   Skin:  Negative for rash.   Neurological:  Negative for weakness.        (+) tingling in L-hand   Hematological:  Does not bruise/bleed easily.      Physical Exam     Initial Vitals   BP Pulse Resp Temp SpO2   11/23/24 1816 11/23/24 1816 11/23/24 1816 11/23/24 1817 11/23/24 1816   (!) 100/57 109 16 97.8 °F (36.6 °C) 98 %      MAP       --                 Physical Exam  Nursing Notes and Vital Signs Reviewed.  Constitutional: Patient is in no acute distress. Well-developed and well-nourished.  Head: Atraumatic. Normocephalic.  Eyes: PERRL. EOM intact. Conjunctivae are not pale. No scleral icterus.  ENT: Mucous membranes are moist. Oropharynx is clear and symmetric.    Neck: Supple. Full ROM. No lymphadenopathy.  Cardiovascular:Tachycardic. Regular rhythm. No murmurs, rubs, or gallops. Distal pulses are 2+ and symmetric.  Pulmonary/Chest: No respiratory distress. Clear to auscultation bilaterally. No wheezing or rales. Port in L-upper chest wall. No overt signs of infection.  Abdominal: Abdomen is distended but not tense. There is no tenderness.  No rebound, guarding, or rigidity. Good bowel  sounds.  Genitourinary: No CVA tenderness  Musculoskeletal: Moves all extremities. No obvious deformities. No edema. No calf tenderness.  Skin: Warm and dry.  Neurological:  Alert, awake, and appropriate.  Normal speech.  No acute focal neurological deficits are appreciated. GCS 15.  Psychiatric: Normal affect. Good eye contact. Appropriate in content.     ED Course   Critical Care    Date/Time: 11/23/2024 9:43 PM    Performed by: Jonathon Sandra Jr., MD  Authorized by: Jonathon Sandra Jr., MD  Direct patient critical care time: 25 minutes  Additional history critical care time: 15 minutes  Ordering / reviewing critical care time: 15 minutes  Documentation critical care time: 15 minutes  Consulting other physicians critical care time: 5 minutes  Total critical care time (exclusive of procedural time) : 75 minutes  Critical care time was exclusive of separately billable procedures and treating other patients and teaching time.  Critical care was necessary to treat or prevent imminent or life-threatening deterioration of the following conditions: NSTEMI.  Critical care was time spent personally by me on the following activities: blood draw for specimens, development of treatment plan with patient or surrogate, discussions with consultants, interpretation of cardiac output measurements, evaluation of patient's response to treatment, examination of patient, obtaining history from patient or surrogate, ordering and performing treatments and interventions, ordering and review of laboratory studies, ordering and review of radiographic studies, pulse oximetry, review of old charts and re-evaluation of patient's condition.        ED Vital Signs:  Vitals:    11/23/24 2232 11/23/24 2247 11/23/24 2302 11/23/24 2317   BP: 113/65 114/65 (!) 115/58 113/63   Pulse: 100 102 105 100   Resp: 20 13 12 15   Temp:       TempSrc:       SpO2: 100% 100% 100% 99%   Weight:       Height:        11/23/24 2332 11/23/24 2347 11/24/24 0002 11/24/24  "0026   BP: 130/64 (!) 108/56 110/61 127/61   Pulse: 102 103 92 99   Resp: (!) 24 (!) 28 16 18   Temp:    97.6 °F (36.4 °C)   TempSrc:       SpO2: 99% 98% 98% 100%   Weight:       Height:        11/24/24 0034 11/24/24 0037 11/24/24 0102 11/24/24 0300   BP: 127/61      Pulse: 99 101 94 94   Resp: 18      Temp: 97.6 °F (36.4 °C)      TempSrc: Oral      SpO2: 100%      Weight: 95.2 kg (209 lb 14.1 oz)      Height: 5' 9" (1.753 m)       11/24/24 0359 11/24/24 0500 11/24/24 0519   BP:   (!) 112/56   Pulse: 93 94 94   Resp:   18   Temp:   98.4 °F (36.9 °C)   TempSrc:   Oral   SpO2:   98%   Weight:      Height:          Abnormal Lab Results:  Labs Reviewed   CBC W/ AUTO DIFFERENTIAL - Abnormal       Result Value    WBC 3.04 (*)     RBC 2.56 (*)     Hemoglobin 8.1 (*)     Hematocrit 24.0 (*)     MCV 94      MCH 31.6 (*)     MCHC 33.8      RDW 15.0 (*)     Platelets 41 (*)     MPV 13.4 (*)     Immature Granulocytes CANCELED      Immature Grans (Abs) CANCELED      Lymph # CANCELED      Mono # CANCELED      Eos # CANCELED      Baso # CANCELED      nRBC 0      Gran % 74.0 (*)     Lymph % 13.0 (*)     Mono % 12.0      Eosinophil % 0.0      Basophil % 0.0      Metamyelocytes 1.0      Platelet Estimate Decreased (*)     Aniso Slight      Poik Slight      Ovalocytes Occasional      Tear Drop Cells Occasional      Differential Method Manual     COMPREHENSIVE METABOLIC PANEL - Abnormal    Sodium 132 (*)     Potassium 3.7      Chloride 104      CO2 20 (*)     Glucose 125 (*)     BUN 14      Creatinine 2.2 (*)     Calcium 8.5 (*)     Total Protein 6.4      Albumin 3.1 (*)     Total Bilirubin 0.5      Alkaline Phosphatase 112      AST 14      ALT 19      eGFR 31 (*)     Anion Gap 8     B-TYPE NATRIURETIC PEPTIDE - Abnormal     (*)    TROPONIN I - Abnormal    Troponin I 0.807 (*)    INFLUENZA A & B BY MOLECULAR    Influenza A, Molecular Negative      Influenza B, Molecular Negative      Flu A & B Source Nasal swab     PROTIME-INR "    Prothrombin Time 11.5      INR 1.1     AMMONIA    Ammonia 29     SARS-COV-2 RNA AMPLIFICATION, QUAL    SARS-CoV-2 RNA, Amplification, Qual Negative     APTT    aPTT 30.0          All Lab Results:  Results for orders placed or performed during the hospital encounter of 11/23/24   CBC auto differential    Collection Time: 11/23/24  8:09 PM   Result Value Ref Range    WBC 3.04 (L) 3.90 - 12.70 K/uL    RBC 2.56 (L) 4.60 - 6.20 M/uL    Hemoglobin 8.1 (L) 14.0 - 18.0 g/dL    Hematocrit 24.0 (L) 40.0 - 54.0 %    MCV 94 82 - 98 fL    MCH 31.6 (H) 27.0 - 31.0 pg    MCHC 33.8 32.0 - 36.0 g/dL    RDW 15.0 (H) 11.5 - 14.5 %    Platelets 41 (L) 150 - 450 K/uL    MPV 13.4 (H) 9.2 - 12.9 fL    Immature Granulocytes CANCELED 0.0 - 0.5 %    Immature Grans (Abs) CANCELED 0.00 - 0.04 K/uL    Lymph # CANCELED 1.0 - 4.8 K/uL    Mono # CANCELED 0.3 - 1.0 K/uL    Eos # CANCELED 0.0 - 0.5 K/uL    Baso # CANCELED 0.00 - 0.20 K/uL    nRBC 0 0 /100 WBC    Gran % 74.0 (H) 38.0 - 73.0 %    Lymph % 13.0 (L) 18.0 - 48.0 %    Mono % 12.0 4.0 - 15.0 %    Eosinophil % 0.0 0.0 - 8.0 %    Basophil % 0.0 0.0 - 1.9 %    Metamyelocytes 1.0 %    Platelet Estimate Decreased (A)     Aniso Slight     Poik Slight     Ovalocytes Occasional     Tear Drop Cells Occasional     Differential Method Manual    Comprehensive metabolic panel    Collection Time: 11/23/24  8:09 PM   Result Value Ref Range    Sodium 132 (L) 136 - 145 mmol/L    Potassium 3.7 3.5 - 5.1 mmol/L    Chloride 104 95 - 110 mmol/L    CO2 20 (L) 23 - 29 mmol/L    Glucose 125 (H) 70 - 110 mg/dL    BUN 14 8 - 23 mg/dL    Creatinine 2.2 (H) 0.5 - 1.4 mg/dL    Calcium 8.5 (L) 8.7 - 10.5 mg/dL    Total Protein 6.4 6.0 - 8.4 g/dL    Albumin 3.1 (L) 3.5 - 5.2 g/dL    Total Bilirubin 0.5 0.1 - 1.0 mg/dL    Alkaline Phosphatase 112 40 - 150 U/L    AST 14 10 - 40 U/L    ALT 19 10 - 44 U/L    eGFR 31 (A) >60 mL/min/1.73 m^2    Anion Gap 8 8 - 16 mmol/L   Brain natriuretic peptide    Collection Time:  11/23/24  8:09 PM   Result Value Ref Range     (H) 0 - 99 pg/mL   Troponin I    Collection Time: 11/23/24  8:09 PM   Result Value Ref Range    Troponin I 0.807 (H) 0.000 - 0.026 ng/mL   Protime-INR    Collection Time: 11/23/24  8:09 PM   Result Value Ref Range    Prothrombin Time 11.5 9.0 - 12.5 sec    INR 1.1 0.8 - 1.2   APTT    Collection Time: 11/23/24  8:09 PM   Result Value Ref Range    aPTT 30.0 21.0 - 32.0 sec   Influenza A & B by Molecular    Collection Time: 11/23/24  8:43 PM    Specimen: Nasopharyngeal Swab   Result Value Ref Range    Influenza A, Molecular Negative Negative    Influenza B, Molecular Negative Negative    Flu A & B Source Nasal swab    COVID-19 Rapid Screening    Collection Time: 11/23/24  8:43 PM   Result Value Ref Range    SARS-CoV-2 RNA, Amplification, Qual Negative Negative   Ammonia    Collection Time: 11/23/24  9:45 PM   Result Value Ref Range    Ammonia 29 10 - 50 umol/L   Troponin I    Collection Time: 11/24/24 12:35 AM   Result Value Ref Range    Troponin I 0.833 (H) 0.000 - 0.026 ng/mL   Type & Screen    Collection Time: 11/24/24 12:35 AM   Result Value Ref Range    Group & Rh O NEG     Indirect Arturo NEG     Specimen Outdate 11/27/2024 23:59    Troponin I    Collection Time: 11/24/24  4:05 AM   Result Value Ref Range    Troponin I 0.707 (H) 0.000 - 0.026 ng/mL   Magnesium    Collection Time: 11/24/24  4:05 AM   Result Value Ref Range    Magnesium 1.4 (L) 1.6 - 2.6 mg/dL   Comprehensive metabolic panel    Collection Time: 11/24/24  4:05 AM   Result Value Ref Range    Sodium 134 (L) 136 - 145 mmol/L    Potassium 3.6 3.5 - 5.1 mmol/L    Chloride 105 95 - 110 mmol/L    CO2 19 (L) 23 - 29 mmol/L    Glucose 111 (H) 70 - 110 mg/dL    BUN 14 8 - 23 mg/dL    Creatinine 2.2 (H) 0.5 - 1.4 mg/dL    Calcium 7.9 (L) 8.7 - 10.5 mg/dL    Total Protein 6.0 6.0 - 8.4 g/dL    Albumin 2.9 (L) 3.5 - 5.2 g/dL    Total Bilirubin 0.4 0.1 - 1.0 mg/dL    Alkaline Phosphatase 113 40 - 150 U/L     AST 15 10 - 40 U/L    ALT 18 10 - 44 U/L    eGFR 31 (A) >60 mL/min/1.73 m^2    Anion Gap 10 8 - 16 mmol/L   CBC Auto Differential    Collection Time: 11/24/24  4:06 AM   Result Value Ref Range    WBC 2.90 (L) 3.90 - 12.70 K/uL    RBC 2.55 (L) 4.60 - 6.20 M/uL    Hemoglobin 8.1 (L) 14.0 - 18.0 g/dL    Hematocrit 24.1 (L) 40.0 - 54.0 %    MCV 95 82 - 98 fL    MCH 31.8 (H) 27.0 - 31.0 pg    MCHC 33.6 32.0 - 36.0 g/dL    RDW 14.9 (H) 11.5 - 14.5 %    Platelets 42 (L) 150 - 450 K/uL    MPV 13.7 (H) 9.2 - 12.9 fL        Imaging Results:  Imaging Results              X-Ray Chest AP Portable (Final result)  Result time 11/23/24 20:30:14      Final result by Dennis CloudGermán), MD (11/23/24 20:30:14)                   Impression:      Lungs are clear.      Electronically signed by: Dennis Cloud MD  Date:    11/23/2024  Time:    20:30               Narrative:    EXAMINATION:  XR CHEST AP PORTABLE    CLINICAL HISTORY:  <Diagnosis>, Chest Pain;    COMPARISON:  Chest, 11/18/2024.    FINDINGS:  One view.  MediPort catheter tip at the SVC level.  Heart is normal in size.  Lungs are clear.                                       The EKG was ordered, reviewed, and independently interpreted by the ED provider.  Interpretation time: 18:17  Rate: 116 BPM  Rhythm: sinus tachycardia  Interpretation: No acute ST changes. No STEMI.    The EKG was ordered, reviewed, and independently interpreted by the ED provider.  Interpretation time: 20:50  Rate: 104 BPM  Rhythm: sinus tachycardia  Interpretation: No acute ST changes. No STEMI.           The Emergency Provider reviewed the vital signs and test results, which are outlined above.     ED Discussion     9:10 PM: Discussed pt's case with Dr. Choudhury (Cardiology) who agrees with heparin drip. Dr. Choudhury recommends trending pt's troponin and an echo in the AM. Dr. Choudhury will see pt as a consult in the AM.      9:32 PM: Discussed case with Aida Green NP (Hospital Medicine).   Mac agrees with current care and management of pt and accepts admission.   Admitting Service: Hospital Medicine  Admitting Physician: Dr. Watts  Admit to: inpt tele     69yo M c/o chest pressure that radiates to left arm x 1 day. hx liver transplant, lymphoma, DVT on eliquis. in working pt up: hb: 8.1, cr: 2.2, trop: 0.8, EKG: sinus tach, no stemi, cxr: naf. starting heparin drip and discussed c Dr. Choudhury (cardiology) and she will see pt in consult, trend trop, echo in am.  admission for chest pain, nstemi, elevated troponin, anemia, renal dysfunction     ED Course as of 11/24/24 0541   Sat Nov 23, 2024 2050 X-Ray Chest AP Portable [LV]   2101 Rhythm strip reviewed. Sinus tachycardia, no stemi. 104bpm [LV]      ED Course User Index  [LV] Jonathon Sandra Jr., MD     Medical Decision Making  Amount and/or Complexity of Data Reviewed  Labs: ordered. Decision-making details documented in ED Course.  Radiology: ordered and independent interpretation performed. Decision-making details documented in ED Course.  ECG/medicine tests: ordered and independent interpretation performed. Decision-making details documented in ED Course.    Risk  OTC drugs.  Prescription drug management.  Parenteral controlled substances.  Decision regarding hospitalization.  Risk Details: OTC drugs, prescription drugs and controlled substances considered.  Due to patient's symptoms improving and pain controlled pain medications ordered appropriately.  DDX: MI, CAD, PUlmonary disease, PE, AAA, Pneumonia, Costochondritis, PTX, Liver disease, Pancreatitis                  ED Medication(s):  Medications   metoprolol succinate (TOPROL-XL) 24 hr tablet 25 mg (has no administration in time range)   tamsulosin 24 hr capsule 0.4 mg (has no administration in time range)   nicotine 21 mg/24 hr 1 patch (0 patches Transdermal Hold 11/23/24 2200)   melatonin tablet 6 mg (has no administration in time range)   aspirin chewable tablet 81 mg (has no  administration in time range)   nitroGLYCERIN SL tablet 0.4 mg (0.4 mg Sublingual Not Given 11/24/24 0505)   0.9% NaCl infusion ( Intravenous New Bag 11/24/24 0250)   albuterol-ipratropium 2.5 mg-0.5 mg/3 mL nebulizer solution 3 mL (has no administration in time range)   aspirin chewable tablet 162 mg (162 mg Oral Given 11/23/24 2137)   heparin 25,000 units in dextrose 5% (100 units/ml) IV bolus from bag LOW INTENSITY nomogram - [facility] (4,000 Units Intravenous Bolus from Bag 11/23/24 2135)       Current Discharge Medication List                  Scribe Attestation:   Scribe #1: I performed the above scribed service and the documentation accurately describes the services I performed. I attest to the accuracy of the note.     Attending:   Physician Attestation Statement for Scribe #1: I, Jonathon Sandra Jr., MD, personally performed the services described in this documentation, as scribed by Shalini Hernandez, in my presence, and it is both accurate and complete.       Scribe Attestation:   Scribe #1: I performed the above scribed service and the documentation accurately describes the services I performed. I attest to the accuracy of the note.         Clinical Impression       ICD-10-CM ICD-9-CM   1. NSTEMI (non-ST elevated myocardial infarction)  I21.4 410.70   2. Chest pain  R07.9 786.50   3. Renal dysfunction  N28.9 593.9   4. Elevated troponin  R79.89 790.6   5. Anemia, unspecified type  D64.9 285.9   6. History of liver transplant  Z94.4 V42.7   7. Lymphoma, unspecified body region, unspecified lymphoma type  C85.90 202.80   8. 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 of lymph node of lower extremity  C84.75 200.65   9. Non-ST elevation myocardial infarction (NSTEMI)  I21.4 410.70   10. NSTEMI (non-ST elevation myocardial infarction)  I21.4 410.70       Disposition:   Disposition: Admitted  Condition: Jonathon Conway Jr., MD  11/24/24 2872

## 2024-11-24 NOTE — SUBJECTIVE & OBJECTIVE
Interval History:  Patient seen and examined at bedside.  He reports being hungry and having belly pain.  Cardiology consult pending.    Review of Systems   Constitutional:  Positive for appetite change and fatigue. Negative for chills, diaphoresis and fever.        Decreased appetite   HENT: Negative.     Eyes: Negative.  Negative for visual disturbance.   Respiratory: Negative.  Negative for cough, shortness of breath and wheezing.    Gastrointestinal: Negative.  Negative for abdominal distention, abdominal pain, blood in stool, nausea and vomiting.   Endocrine: Negative.    Genitourinary:  Negative for dysuria.   Skin: Negative.    Allergic/Immunologic: Positive for immunocompromised state.   Neurological:  Positive for weakness (improving) and numbness. Negative for dizziness, tremors, seizures, syncope, facial asymmetry, speech difficulty, light-headedness and headaches.   Psychiatric/Behavioral: Negative.       Objective:     Vital Signs (Most Recent):  Temp: 97.6 °F (36.4 °C) (11/24/24 1145)  Pulse: 107 (11/24/24 1145)  Resp: 16 (11/24/24 1145)  BP: 116/62 (11/24/24 1145)  SpO2: 98 % (11/24/24 1145) Vital Signs (24h Range):  Temp:  [97.6 °F (36.4 °C)-98.4 °F (36.9 °C)] 97.6 °F (36.4 °C)  Pulse:  [] 107  Resp:  [12-28] 16  SpO2:  [97 %-100 %] 98 %  BP: ()/(55-66) 116/62     Weight: 96.8 kg (213 lb 6.5 oz)  Body mass index is 31.51 kg/m².    Intake/Output Summary (Last 24 hours) at 11/24/2024 1558  Last data filed at 11/24/2024 0934  Gross per 24 hour   Intake 956.97 ml   Output 400 ml   Net 556.97 ml         Physical Exam  Vitals reviewed.   Constitutional:       General: He is not in acute distress.     Appearance: He is ill-appearing. He is not toxic-appearing or diaphoretic.   HENT:      Head: Normocephalic.      Nose: Nose normal.      Mouth/Throat:      Mouth: Mucous membranes are moist.      Pharynx: Oropharynx is clear.   Eyes:      Extraocular Movements: Extraocular movements intact.       "Pupils: Pupils are equal, round, and reactive to light.   Cardiovascular:      Rate and Rhythm: Normal rate and regular rhythm.      Pulses: Normal pulses.      Heart sounds: Normal heart sounds.   Pulmonary:      Effort: Pulmonary effort is normal. No respiratory distress.      Breath sounds: Normal breath sounds. No stridor. No wheezing, rhonchi or rales.   Chest:      Chest wall: No tenderness.   Abdominal:      General: Bowel sounds are normal. There is no distension.      Palpations: Abdomen is soft.      Tenderness: There is no abdominal tenderness. There is no right CVA tenderness, left CVA tenderness, guarding or rebound.   Musculoskeletal:         General: Normal range of motion.      Cervical back: Neck supple.      Right lower leg: No edema.      Left lower leg: No edema.   Skin:     General: Skin is warm and dry.      Capillary Refill: Capillary refill takes less than 2 seconds.      Coloration: Skin is pale.   Neurological:      General: No focal deficit present.      Mental Status: He is alert and oriented to person, place, and time.      Motor: Weakness present.      Comments: Generalized weakness   Psychiatric:         Mood and Affect: Mood normal.         Behavior: Behavior normal.         Thought Content: Thought content normal.             Significant Labs: All pertinent labs within the past 24 hours have been reviewed.  Blood Culture: No results for input(s): "LABBLOO" in the last 48 hours.  CBC:   Recent Labs   Lab 11/23/24 2009 11/24/24  0406   WBC 3.04* 2.90*   HGB 8.1* 8.1*   HCT 24.0* 24.1*   PLT 41* 42*     CMP:   Recent Labs   Lab 11/23/24 2009 11/24/24  0405   * 134*   K 3.7 3.6    105   CO2 20* 19*   * 111*   BUN 14 14   CREATININE 2.2* 2.2*   CALCIUM 8.5* 7.9*   PROT 6.4 6.0   ALBUMIN 3.1* 2.9*   BILITOT 0.5 0.4   ALKPHOS 112 113   AST 14 15   ALT 19 18   ANIONGAP 8 10     Cardiac Markers:   Recent Labs   Lab 11/23/24 2009   *       Magnesium:   Recent Labs "   Lab 11/24/24  0405   MG 1.4*     Troponin:   Recent Labs   Lab 11/23/24  2009 11/24/24  0035 11/24/24  0405   TROPONINI 0.807* 0.833* 0.707*       Significant Imaging: I have reviewed all pertinent imaging results/findings within the past 24 hours.   [Normal] :  tongue is normal

## 2024-11-24 NOTE — NURSING
Pt received to rm [id number] from ER via stretcher.  Transfer to bed without assist.  Pt AAOx4, even and unlabored respirations.  NAD.  Initial assessment complete.  Refer to flowsheet.

## 2024-11-24 NOTE — ASSESSMENT & PLAN NOTE
NINO is likely due to medications -- hepatology has adjusted medications.   Most recent creatinine and eGFR are listed below.    Recent Labs     11/23/24 2009   CREATININE 2.2*   EGFRNORACEVR 31*      Plan  - Monitor renal function  - Gentle hydration with IV fluids overnight x 1 L

## 2024-11-24 NOTE — ASSESSMENT & PLAN NOTE
Counseled on cessation x 5 minutes -- he states that he is still having cravings, but is barely smoking any more  Nicotine patch ordered for patient

## 2024-11-24 NOTE — ASSESSMENT & PLAN NOTE
Most likely 2/2 recent chemotherapy  But will need further recommendations per hem/onc on restarting eliquis for h/o DVT/PE

## 2024-11-24 NOTE — PLAN OF CARE
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 No  Secondary Emergency Contact: Linda Khan  Mobile Phone: [phone]  Relation: Daughter    Discharge Plan A: Home  Discharge Plan B: Home with family      [id number] - [place] - [address] [place]  [address] [place] [address]  Phone: [phone] Fax: [phone]    [facility]  [place] [address] [facility] [address] [place] [address]  Phone: [phone] Fax: [phone]    [id number] - [place] - [address] AT [facility] [address]  [address]  [place] [address]  Phone: [phone] Fax: [phone]    [facility] - [place], [place] - [address]  [address] [facility] [place] [address]  Phone: [phone] Fax: [phone]    [facility] [address]  [place] [address]  Phone: [phone] Fax: [phone] initial  Assessment (most recent)       Adult Discharge Assessment - 11/24/24 1003          Discharge Assessment    Confirmed/corrected address, phone number and insurance Yes     Confirmed Demographics Correct on Facesheet     Source of Information patient     When was your last doctors appointment? --   this year but cant recall the time    Does patient/caregiver understand observation status Yes     Reason For Admission Chest pain     People in Home alone     Do you expect to return to your current living situation? Yes     Do you have help at home or someone to help you manage your care at home? No     Prior to hospitilization cognitive status: Alert/Oriented     Current cognitive status: Alert/Oriented     Walking or Climbing Stairs Difficulty no     Dressing/Bathing Difficulty no     Equipment Currently Used at Home cane, straight     Readmission within 30 days? No     Patient currently being followed by outpatient case management? No     Do you currently have service(s) that help you manage your care at home? No     Do you take prescription medications? Yes     Do you have prescription coverage? Yes     Coverage [employer]     Do you have any problems affording any of your prescribed medications? No     Is the patient taking medications as prescribed? yes     Who is going to help you get home at discharge? Friend Amrik marshall [phone]     How do you get to doctors appointments? car, drives self     Are you on dialysis? No     Do you take coumadin? No     Discharge Plan A Home     Discharge Plan B Home with family     DME Needed Upon Discharge  none     Discharge Plan discussed with: Patient     Transition of Care Barriers None

## 2024-11-24 NOTE — ASSESSMENT & PLAN NOTE
PRN nebs ordered -- no wheezing on exam  Does endorse shortness of breath with exertion  CXR-- lungs clear

## 2024-11-24 NOTE — ASSESSMENT & PLAN NOTE
Flat trend  Due to demand ischemia in setting of thrombocytopenia and anemia with pancytopenia  Eliquis being held  Needs evaluation of low plts and anemia prior to restarting eliquis  Can consider OP stress test  Hold off on ASA   Transfuse for Hgb <8

## 2024-11-24 NOTE — ASSESSMENT & PLAN NOTE
Intermittent chest pain for past several days -- radiating into left arm to left hand with tingling, trop 0.807  ASA given  Was given heparin bolus, will hold off on further heparin due to thrombocytopenia - PLT 41  Troponin - flat  Cardiac monitoring ordered  Continue ASA 81 mg daily  Continue beta blocker  Cardiology consulted per ED

## 2024-11-24 NOTE — HOSPITAL COURSE
Patient is a 68 year male with a history of large cell lymphoma recently started on chemotherapy 11/4, COPD, liver transplant 2012, history of DVT to the upper extremity on Eliquis, hypertension, coronary artery disease, anemia, and CKD presented emergency department with complain of chest pain.  He was noted to be in the left precordium going down to the left hand with numbness and tingling in hand.  Upon arrival patient was noted to have a heart rate of 109, respirations of 16 and adequate blood pressure.  Workup revealed a white count 3 hemoglobin 8 and [place] of [address].  Troponin elevated at 0.8 which remained flat and subsequent draws, BNP of 198, he was noted to be flu a and B negative.  Patient was initially given aspirin and placed on heparin drip however after Cardiology consultation and a platelet count of 41,000 heparin drip was Dc'ed.  Patient seen in consultation by Cardiology who noted that troponin trend was flat.  They felt he had demand ischemia in the setting of thrombocytopenia, and anemia with pancytopenia.  They recommended evaluation of his thrombocytopenia and anemia prior to restarting Eliquis and follow up outpatient for stress test.  Patient had no further chest pain.  He was seen and examined on day of discharge and stable for discharge home.    ECHO:  Left Ventricle: The left ventricle is normal in size. Normal wall thickness. There is concentric remodeling. Normal wall motion. There is normal systolic function with a visually estimated ejection fraction of 60 - 65%. Ejection fraction is approximately 60%. There is normal diastolic function.    Right Ventricle: Normal right ventricular cavity size. Right ventricle wall motion  is normal. Systolic function is normal.    Mitral Valve: There is mild regurgitation.    Tricuspid Valve: There is mild regurgitation.    Pulmonary Artery: The estimated pulmonary artery systolic pressure is 36 mmHg.    IVC/SVC: Normal venous pressure at 3  mmHg.

## 2024-11-24 NOTE — ASSESSMENT & PLAN NOTE
This patient is found to have pancytopenia, the likely etiology is Drug induced (including chemotherapy) related to chemo , will monitor CBC Daily. Will transfuse red blood cells if the hemoglobin is <7g/dL (or <8 in the setting of ACS). Will transfuse platelets if platelet count is <50k (if undergoing surgical procedure or have active bleeding). Hold DVT prophylaxis if platelets are <50k. The patient's hemoglobin, white blood cell count, and platelet count results have been reviewed and are listed below.  Recent Labs   Lab 11/24/24  0406   HGB 8.1*   WBC 2.90*   PLT 42*       Currently on chemotherapy for lymphoma (per Dr. Peralta)  Due to thrombocytopenia, will stop/hold heparin drip   Type and screen ordered  Repeat labs in AM  Consider consult oncology on Monday

## 2024-11-24 NOTE — ASSESSMENT & PLAN NOTE
NINO is likely due to medications -- hepatology has adjusted medications.   Most recent creatinine and eGFR are listed below.    Recent Labs     11/23/24 2009 11/24/24  0405   CREATININE 2.2* 2.2*   EGFRNORACEVR 31* 31*     Baseline creatinine approximately 2   Plan  - Monitor renal function  - Gentle hydration with IV fluids overnight x 1 L

## 2024-11-24 NOTE — ASSESSMENT & PLAN NOTE
Patients blood pressure range in the last 24 hours was: BP  Min: 97/58  Max: 130/64.The patient's inpatient anti-hypertensive regimen is listed below:  Current Antihypertensives  metoprolol succinate (TOPROL-XL) 24 hr tablet 25 mg, Daily, Oral  nitroGLYCERIN SL tablet 0.4 mg, Every 5 min PRN, Sublingual    Plan  - BP is controlled, no changes needed to their regimen

## 2024-11-24 NOTE — ASSESSMENT & PLAN NOTE
Liver transplant in 2012, followed by hepatology  Restart tacrolimus, Myfortic and prednisone  Check tacrolimus level in a.m.

## 2024-11-24 NOTE — SUBJECTIVE & OBJECTIVE
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 10/31/2024    Anemia 11/23/2024    Angina pectoris     Arthritis     back    Atherosclerosis of native coronary artery without angina pectoris/ LAD 09/08/2016    Back pain     Chronic hepatitis C with cirrhosis     COPD (chronic obstructive pulmonary disease)     Depression     Hepatoma     Prior to liver transplant    History of colon polyps 09/09/2015    History of transplantation, liver 04/2012    History of vertebral fracture     Hypertension     Immune deficiency disorder     Incisional hernia following transplant 04/09/2014    Liver failure 11/2011    Related to chemotherapy for hepatoma    Liver transplanted 04/2012    NSTEMI (non-ST elevated myocardial infarction) 11/23/2024    Obesity     PVCs (premature ventricular contractions)     Renal dysfunction 11/23/2024    Tobacco abuse 09/09/2016    Trouble in sleeping     Vertebral fracture 1975       Past Surgical History:   Procedure Laterality Date    COLONOSCOPY N/A 1/20/2021    Procedure: COLONOSCOPY;  Surgeon: Emerson Helm MD;  Location: [facility];  Service: Endoscopy;  Laterality: N/A;    HERNIA REPAIR Right 2013    incisional    INSERTION OF TUNNELED CENTRAL VENOUS CATHETER (CVC) WITH SUBCUTANEOUS PORT Left 11/6/2024    Procedure: [id number];  Surgeon: Hao Washburn MD;  Location: [facility] OR;  Service: General;  Laterality: Left;    LIVER TRANSPLANT  April 2012    LYMPH NODE BIOPSY/EXCISION Left 10/16/2024    Procedure: LYMPH NODE BIOPSY/EXCISION;  Surgeon: Hao Washburn MD;  Location: [facility] OR;  Service: General;  Laterality: Left;    MOUTH SURGERY      TONSILLECTOMY  1958       Review of patient's allergies indicates:   Allergen Reactions    Codeine Other (See Comments)     Upset stomach       Current Facility-Administered Medications on  File Prior to Encounter   Medication    lactated ringers infusion    sodium chloride 0.9% flush 2 mL     Current Outpatient Medications on File Prior to Encounter   Medication Sig    acyclovir (ZOVIRAX) 400 MG tablet Take 1 tablet (400 mg total) by mouth 2 (two) times daily. While on chemotherapy    allopurinoL (ZYLOPRIM) 300 MG tablet Take 1 tablet (300 mg total) by mouth once daily.    apixaban (ELIQUIS) 5 mg Tab Take 1 tablet (5 mg total) by mouth 2 (two) times daily.    melatonin 10 mg Tab Take by mouth.    metoprolol succinate (TOPROL-XL) 25 MG 24 hr tablet TAKE 1 TABLET EVERY DAY    predniSONE (DELTASONE) 50 MG Tab Take 2 tablets (100 mg total) by mouth once daily. on days 2,3,4, 5 of each chemotherapy cycle    sulfamethoxazole-trimethoprim 800-160mg (BACTRIM DS) 800-160 mg Tab Take 1 tablet by mouth 3 (three) times a week.    tacrolimus (PROGRAF) 0.5 MG Cap Take 1 capsule (0.5 mg total) by mouth every 12 (twelve) hours.    tamsulosin (FLOMAX) 0.4 mg Cap Take 1 capsule (0.4 mg total) by mouth once daily.    tenofovir alafenamide (VEMLIDY) 25 mg Tab Take 1 tablet by mouth once daily    varenicline (CHANTIX) 0.5 MG Tab Take 1 tablet (0.5 mg total) by mouth once daily.    varenicline (CHANTIX) 1 mg Tab Take 1 tablet (1 mg total) by mouth 2 (two) times daily.    albuterol (VENTOLIN HFA) 90 mcg/actuation inhaler Inhale 2 puffs into the lungs every 6 (six) hours as needed for Wheezing or Shortness of Breath. Rescue    diclofenac sodium (VOLTAREN) 1 % Gel Apply 2 g topically 4 (four) times daily. for 10 days    esomeprazole (NEXIUM) 40 MG capsule Take 1 capsule (40 mg total) by mouth once daily.    fluticasone-umeclidin-vilanter (TRELEGY ELLIPTA) 100-62.5-25 mcg DsDv Inhale 1 puff into the lungs once daily.    HYDROcodone-acetaminophen (NORCO) 5-325 mg per tablet Take 1 tablet by mouth every 6 (six) hours as needed for Pain.    LIDOcaine-prilocaine (EMLA) cream Apply topically as needed.    nicotine (NICODERM CQ) 21  mg/24 hr Place 1 patch onto the skin once daily.    ondansetron (ZOFRAN) 8 MG tablet Take 1 tablet (8 mg total) by mouth every 12 (twelve) hours as needed for Nausea.    prochlorperazine (COMPAZINE) 5 MG tablet Take 1 tablet (5 mg total) by mouth every 6 (six) hours as needed (nausea).    tenofovir alafenamide (VEMLIDY) 25 mg Tab Take 1 tablet (25 mg total) by mouth Daily.     Family History       Problem Relation (Age of Onset)    Cancer Mother, Father, Maternal Grandmother, Other    Diabetes Maternal Uncle          Tobacco Use    Smoking status: Every Day     Current packs/day: 0.75     Average packs/day: 0.8 packs/day for 36.9 years (27.7 ttl pk-yrs)     Types: Cigarettes     Start date: 1988    Smokeless tobacco: Never    Tobacco comments:     Currently smokes a few (3-5) a day as of 6/12/24   Substance and Sexual Activity    Alcohol use: No     Alcohol/week: 0.0 standard drinks of alcohol     Comment: Quit alcohol after some alcohol abuse, prior to his liver transplant    Drug use: No    Sexual activity: Not Currently     Review of Systems   Constitutional: Negative for diaphoresis, malaise/fatigue, weight gain and weight loss.   HENT:  Negative for congestion and nosebleeds.    Cardiovascular:  Positive for chest pain. Negative for claudication, cyanosis, dyspnea on exertion, irregular heartbeat, leg swelling, near-syncope, orthopnea, palpitations, paroxysmal nocturnal dyspnea and syncope.   Respiratory:  Negative for cough, hemoptysis, shortness of breath, sleep disturbances due to breathing, snoring, sputum production and wheezing.    Hematologic/Lymphatic: Negative for bleeding problem. Does not bruise/bleed easily.   Skin:  Negative for rash.   Musculoskeletal:  Negative for arthritis, back pain, falls, joint pain, muscle cramps and muscle weakness.   Gastrointestinal:  Negative for abdominal pain, constipation, diarrhea, heartburn, hematemesis, hematochezia, melena, nausea and vomiting.   Genitourinary:   Negative for dysuria, hematuria and nocturia.   Neurological:  Negative for excessive daytime sleepiness, dizziness, headaches, light-headedness, loss of balance, numbness, vertigo and weakness.     Objective:     Vital Signs (Most Recent):  Temp: 97.9 °F (36.6 °C) (11/24/24 1602)  Pulse: 100 (11/24/24 1602)  Resp: 18 (11/24/24 1602)  BP: (!) 126/57 (11/24/24 1602)  SpO2: 98 % (11/24/24 1602) Vital Signs (24h Range):  Temp:  [97.6 °F (36.4 °C)-98.4 °F (36.9 °C)] 97.9 °F (36.6 °C)  Pulse:  [] 100  Resp:  [12-28] 18  SpO2:  [97 %-100 %] 98 %  BP: ()/(55-66) 126/57     Weight: 96.8 kg (213 lb 6.5 oz)  Body mass index is 31.51 kg/m².    SpO2: 98 %         Intake/Output Summary (Last 24 hours) at 11/24/2024 1712  Last data filed at 11/24/2024 0934  Gross per 24 hour   Intake 956.97 ml   Output 400 ml   Net 556.97 ml       Lines/Drains/Airways       Central Venous Catheter Line  Duration                  PowerPort A Cath Single Lumen 11/06/24 0919 Subclavian Left 18 days              Peripheral Intravenous Line  Duration                  Peripheral IV - Single Lumen 20 G Posterior;Right Forearm -- days         Peripheral IV - Single Lumen 11/23/24 2003 20 G Anterior;Distal;Left Forearm <1 day                     Physical Exam  Vitals and nursing note reviewed.   Constitutional:       Appearance: Normal appearance. He is well-developed.   HENT:      Head: Normocephalic.      Mouth/Throat:      Mouth: Mucous membranes are moist.   Neck:      Vascular: No carotid bruit or JVD.   Cardiovascular:      Rate and Rhythm: Normal rate and regular rhythm.      Pulses: Normal pulses.      Heart sounds: Normal heart sounds. No murmur heard.     No friction rub.   Pulmonary:      Effort: Pulmonary effort is normal. No respiratory distress.      Breath sounds: Normal breath sounds. No wheezing or rales.   Abdominal:      General: Bowel sounds are normal. There is no distension.      Palpations: Abdomen is soft.       "Tenderness: There is no abdominal tenderness. There is no guarding.   Musculoskeletal:         General: No swelling or tenderness.      Cervical back: Neck supple. No tenderness.      Right lower leg: No edema.      Left lower leg: No edema.   Skin:     General: Skin is warm and dry.      Capillary Refill: Capillary refill takes less than 2 seconds.      Findings: No rash.   Neurological:      General: No focal deficit present.      Mental Status: He is alert and oriented to person, place, and time.   Psychiatric:         Mood and Affect: Mood normal.         Behavior: Behavior normal.         Thought Content: Thought content normal.          Significant Labs: BMP:   Recent Labs   Lab 11/23/24 2009 11/24/24  0405   * 111*   * 134*   K 3.7 3.6    105   CO2 20* 19*   BUN 14 14   CREATININE 2.2* 2.2*   CALCIUM 8.5* 7.9*   MG  --  1.4*   , CMP   Recent Labs   Lab 11/23/24 2009 11/24/24  0405   * 134*   K 3.7 3.6    105   CO2 20* 19*   * 111*   BUN 14 14   CREATININE 2.2* 2.2*   CALCIUM 8.5* 7.9*   PROT 6.4 6.0   ALBUMIN 3.1* 2.9*   BILITOT 0.5 0.4   ALKPHOS 112 113   AST 14 15   ALT 19 18   ANIONGAP 8 10   , CBC   Recent Labs   Lab 11/23/24 2009 11/24/24  0406   WBC 3.04* 2.90*   HGB 8.1* 8.1*   HCT 24.0* 24.1*   PLT 41* 42*   , INR   Recent Labs   Lab 11/23/24 2009   INR 1.1   , Lipid Panel No results for input(s): "CHOL", "HDL", "LDLCALC", "TRIG", "CHOLHDL" in the last 48 hours., and Troponin   Recent Labs   Lab 11/23/24 2009 11/24/24  0035 11/24/24  0405   TROPONINI 0.807* 0.833* 0.707*       Significant Imaging: Echocardiogram: 2D echo with color flow doppler:   Results for orders placed or performed during the hospital encounter of 06/11/15   2D echo with color flow doppler   Result Value Ref Range    EF + QEF 60 55 - 65    Diastolic Dysfunction No     Est. PA Systolic Pressure 10.24     Tricuspid Valve Regurgitation MILD     Narrative    TEST DESCRIPTION   Technical " Quality: This is a technically challenging study.     Aorta: The aortic root is normal in size. Aortic root measures 3.5 cm at Sinuses of Valsalva.     Left Atrium: The left atrial volume index is normal, measuring 19.83 cc/m2.     Left Ventricle: The left ventricle is normal in size, with an end-diastolic diameter of 3.9 cm, and an end-systolic diameter of 2.6 cm. LV wall thickness is normal, with the septum measuring 1.1 cm and the posterior wall measuring 1.0 cm across. Relative   wall thickness was increased at 0.51, and the LV mass index was 72.5 g/m2 consistent with concentric remodeling. Global left ventricular systolic function appears normal. Visually estimated ejection fraction is 60-65%. The LV Doppler derived stroke   volume equals 72.0 ccs.   The E/e'(lat) is 8, consistent with normal diastolic function.     Right Atrium: The right atrium is normal in size, measuring 4.5 cm in length and 2.2 cm in width in the apical view.     Right Ventricle: The right ventricle is normal in size. Global right ventricular systolic function appears normal. The estimated PA systolic pressure is greater than 10 mmHg.     Aortic Valve:  The aortic valve is not well seen.     Mitral Valve:  The mitral valve is not well seen.     Tricuspid Valve:  The tricuspid valve is not well seen. There is mild tricuspid regurgitation.     Pulmonary Valve:  The pulmonic valve is not well seen.     Intracavitary: There is no evidence of pericardial effusion, intracavity mass, thrombi, or vegetation.         CONCLUSIONS     1 - Concentric remodeling.     2 - Normal left ventricular systolic function (EF 60-65%).     3 - Normal left ventricular diastolic function.     4 - Normal right ventricular systolic function .     5 - Mild tricuspid regurgitation.         This document has been electronically    SIGNED BY: Elton Denis MD On: 06/12/2015 15:36    and Transthoracic echo (TTE) complete (Cupid Only):   Results for orders placed or  performed during the hospital encounter of 11/04/24   Echo   Result Value Ref Range    RA Width 3.72 cm    LA Vol (MOD) 37.52 mL    LV ESV A2C 32.85 mL    LA area A4C 16.55 cm2    LA area A2C 14.13 cm2    LV ESV A4C 40.42 mL    Left Atrium Major Axis 4.92 cm    Left Atrium Minor Axis 4.25 cm    RA Major Axis 4.29 cm    LV Diastolic Volume 102.76 mL    LV Systolic Volume 35.35 mL    PV Peak D [place] 0.58 m/s    PV Peak S [place] 0.96 m/s    MV Peak A [place] 1.10 m/s    MV stenosis pressure 1/2 time 58.82 ms    TR Max [place] 2.88 m/s    AR Max [place] 4.12 m/s    MV Peak E [place] 0.90 m/s    PV mean gradient 1 mmHg    RVOT peak VTI 16.1 cm    RVOT peak [place] 0.71 m/s    Ao VTI 27.2 cm    Ao peak [place] 1.5 m/s    LVOT peak VTI 17.4 cm    LVOT peak [place] 0.9 m/s    LVOT diameter 2.2 cm    IVRT 97.05 msec    E wave deceleration time 202.83 msec    PV peak gradient 6 mmHg    AV mean gradient 4.3 mmHg    AV regurgitation pressure 1/2 time [id number] ms    RVDD 3.41 cm    LA size 3.20 cm    Ascending aorta 2.90 cm    STJ 2.51 cm    Sinus 2.70 cm    LVIDs 3.0 2.1 - 4.0 cm    Ao root annulus 3.00 cm    PW 1.1 0.6 - 1.1 cm    IVS 1.0 0.6 - 1.1 cm    LVIDd 4.7 3.5 - 6.0 cm    PV PEAK VELOCITY 1.23 m/s    TDI LATERAL 0.13 m/s    Left Ventricular Outflow Tract Mean Gradient 1.88 mmHg    Left Ventricular Outflow Tract Mean Velocity 0.64 cm/s    Left Ventricular End Systolic Volume by Teichholz Method 35.35 mL    Left Ventricular End Diastolic Volume by Teichholz Method 102.76 mL    IVC diameter 1.53 cm    TDI SEPTAL 0.14 m/s    LV LATERAL E/E' RATIO 6.92 m/s    LV SEPTAL E/E' RATIO 6.43 m/s    FS 36.2 28 - 44 %    LV mass 175.8 g    Left Ventricle Relative Wall Thickness 0.47 cm    AV valve area 2.4 cm²    AV Velocity Ratio 0.60     AV index (prosthetic) 0.64     MV valve area p 1/2 method 3.74 cm2    E/A ratio 0.82     Mean e' 0.14 m/s    Pulm vein S/D ratio 1.66     LVOT area 3.8 cm2    LVOT stroke volume 66.1 cm3    AV peak gradient 9.0 mmHg     E/E' ratio 6.67 m/s    Triscuspid Valve Regurgitation Peak Gradient 33 mmHg    ANAY by Velocity Ratio 2.3 cm²    BSA 2.19 m2    LV Systolic Volume Index 16.5 mL/m2    LV Diastolic Volume Index 48.02 mL/m2    LV Mass Index 82.2 g/m2    MERCED (MOD) 17.5 mL/m2    ZLVIDS -2.65     ZLVIDD -3.81     MERCED 20.9 mL/m2    LA Vol 44.66 cm3    LA WIDTH 3.6 cm    EF 60 %    TV resting pulmonary artery pressure 36 mmHg    RV TB RVSP 6 mmHg    Est. RA pres 3 mmHg    Narrative      Left Ventricle: The left ventricle is normal in size. Normal wall   thickness. There is concentric remodeling. Normal wall motion. There is   normal systolic function with a visually estimated ejection fraction of 60   - 65%. Ejection fraction is approximately 60%. There is normal diastolic   function.    Right Ventricle: Normal right ventricular cavity size. Right ventricle   wall motion  is normal. Systolic function is normal.    Mitral Valve: There is mild regurgitation.    Tricuspid Valve: There is mild regurgitation.    Pulmonary Artery: The estimated pulmonary artery systolic pressure is   36 mmHg.    IVC/SVC: Normal venous pressure at 3 mmHg.    No SHAMIKA due to arrhythmia.

## 2024-11-24 NOTE — CONSULTS
[facility]  Cardiology  Consult Note    Patient Name: Lj Coleman  MRN: [id number]  Admission Date: 11/23/2024  Hospital Length of Stay: 1 days  Code Status: Full Code   Attending Provider: Chanell Yoon MD   Consulting Provider: Titi Choudhury MD  Primary Care Physician: Isabella Sutton DO  Principal Problem:Troponin level elevated    Patient information was obtained from patient and ER records.     Inpatient consult to Cardiology  Consult performed by: Titi Choudhury MD  Consult ordered by: Jonathon Sandra Jr., MD  Reason for consult: troponin elevation        Subjective:       HPI:   Lj Coleman is a 68 y.o. 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 Pt states the CP is intermittent and located on his left upper chest radiating into his left arm and left hand with tingling in left hand. He states that it has been going on for past 3-4 days, had severe episode last night which resolved, and when it came back today he decided to come and get it checked out. Pt states he also has SOB with exertion but states it is at baseline. Pt also notes he has some weakness in his lower extremities and decreased appetite over the past several days as well. Pt notes he is currently undergoing chemotherapy and was informed by his oncologist to come to the ED if he ever experiences CP/chest tightness. Pt notes he just received his first chemo treatment recently. Pt states he has a family hx of cancer but denies any known hx of heart diseases. He is compliant with his medications. Troponin 0.83->0.87->0.7. . Magnesium at 1.4. Creatinine went from 1.6 to 2.2. Over the last few months  Hgb 16->8.1 on admission, Plts 153->40s-60s currently.  Eliquis was held.     ECHO:  Left Ventricle: The left ventricle is normal in size. Normal wall thickness. There is concentric remodeling. Normal wall motion. There is normal systolic function with a visually estimated ejection fraction of 60 - 65%. Ejection fraction is approximately 60%. There is normal diastolic function.    Right Ventricle: Normal right ventricular cavity size. Right ventricle wall motion  is normal. Systolic function is normal.    Mitral Valve: There is mild regurgitation.    Tricuspid Valve: There is mild regurgitation.    Pulmonary Artery: The estimated pulmonary artery systolic pressure is 36 mmHg.    IVC/SVC: Normal venous pressure at 3 mmHg.       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 10/31/2024    Anemia 11/23/2024    Angina pectoris     Arthritis     back    Atherosclerosis of native coronary artery without angina pectoris/ LAD 09/08/2016    Back pain     Chronic hepatitis C with cirrhosis     COPD (chronic obstructive pulmonary disease)     Depression     Hepatoma     Prior to liver transplant    History of colon polyps 09/09/2015    History of transplantation, liver 04/2012    History of vertebral fracture     Hypertension     Immune deficiency disorder     Incisional hernia following transplant 04/09/2014    Liver failure 11/2011    Related to chemotherapy for hepatoma    Liver transplanted 04/2012    NSTEMI (non-ST elevated myocardial infarction) 11/23/2024    Obesity     PVCs (premature ventricular contractions)     Renal dysfunction 11/23/2024    Tobacco abuse 09/09/2016    Trouble in sleeping     Vertebral fracture 1975       Past Surgical History:   Procedure Laterality Date    COLONOSCOPY N/A 1/20/2021    Procedure:  COLONOSCOPY;  Surgeon: Emerson Helm MD;  Location: [facility];  Service: Endoscopy;  Laterality: N/A;    HERNIA REPAIR Right 2013    incisional    INSERTION OF TUNNELED CENTRAL VENOUS CATHETER (CVC) WITH SUBCUTANEOUS PORT Left 11/6/2024    Procedure: [id number];  Surgeon: Hao Washburn MD;  Location: [facility] OR;  Service: General;  Laterality: Left;    LIVER TRANSPLANT  April 2012    LYMPH NODE BIOPSY/EXCISION Left 10/16/2024    Procedure: LYMPH NODE BIOPSY/EXCISION;  Surgeon: Hao Washburn MD;  Location: [facility] OR;  Service: General;  Laterality: Left;    MOUTH SURGERY      TONSILLECTOMY  1958       Review of patient's allergies indicates:   Allergen Reactions    Codeine Other (See Comments)     Upset stomach       Current Facility-Administered Medications on File Prior to Encounter   Medication    lactated ringers infusion    sodium chloride 0.9% flush 2 mL     Current Outpatient Medications on File Prior to Encounter   Medication Sig    acyclovir (ZOVIRAX) 400 MG tablet Take 1 tablet (400 mg total) by mouth 2 (two) times daily. While on chemotherapy    allopurinoL (ZYLOPRIM) 300 MG tablet Take 1 tablet (300 mg total) by mouth once daily.    apixaban (ELIQUIS) 5 mg Tab Take 1 tablet (5 mg total) by mouth 2 (two) times daily.    melatonin 10 mg Tab Take by mouth.    metoprolol succinate (TOPROL-XL) 25 MG 24 hr tablet TAKE 1 TABLET EVERY DAY    predniSONE (DELTASONE) 50 MG Tab Take 2 tablets (100 mg total) by mouth once daily. on days 2,3,4, 5 of each chemotherapy cycle    sulfamethoxazole-trimethoprim 800-160mg (BACTRIM DS) 800-160 mg Tab Take 1 tablet by mouth 3 (three) times a week.    tacrolimus (PROGRAF) 0.5 MG Cap Take 1 capsule (0.5 mg total) by mouth every 12 (twelve) hours.    tamsulosin (FLOMAX) 0.4 mg Cap Take 1 capsule (0.4 mg total) by mouth once daily.    tenofovir alafenamide (VEMLIDY) 25 mg Tab Take 1 tablet by mouth once daily    varenicline (CHANTIX) 0.5 MG Tab Take 1 tablet  (0.5 mg total) by mouth once daily.    varenicline (CHANTIX) 1 mg Tab Take 1 tablet (1 mg total) by mouth 2 (two) times daily.    albuterol (VENTOLIN HFA) 90 mcg/actuation inhaler Inhale 2 puffs into the lungs every 6 (six) hours as needed for Wheezing or Shortness of Breath. Rescue    diclofenac sodium (VOLTAREN) 1 % Gel Apply 2 g topically 4 (four) times daily. for 10 days    esomeprazole (NEXIUM) 40 MG capsule Take 1 capsule (40 mg total) by mouth once daily.    fluticasone-umeclidin-vilanter (TRELEGY ELLIPTA) 100-62.5-25 mcg DsDv Inhale 1 puff into the lungs once daily.    HYDROcodone-acetaminophen (NORCO) 5-325 mg per tablet Take 1 tablet by mouth every 6 (six) hours as needed for Pain.    LIDOcaine-prilocaine (EMLA) cream Apply topically as needed.    nicotine (NICODERM CQ) 21 mg/24 hr Place 1 patch onto the skin once daily.    ondansetron (ZOFRAN) 8 MG tablet Take 1 tablet (8 mg total) by mouth every 12 (twelve) hours as needed for Nausea.    prochlorperazine (COMPAZINE) 5 MG tablet Take 1 tablet (5 mg total) by mouth every 6 (six) hours as needed (nausea).    tenofovir alafenamide (VEMLIDY) 25 mg Tab Take 1 tablet (25 mg total) by mouth Daily.     Family History       Problem Relation (Age of Onset)    Cancer Mother, Father, Maternal Grandmother, Other    Diabetes Maternal Uncle          Tobacco Use    Smoking status: Every Day     Current packs/day: 0.75     Average packs/day: 0.8 packs/day for 36.9 years (27.7 ttl pk-yrs)     Types: Cigarettes     Start date: 1988    Smokeless tobacco: Never    Tobacco comments:     Currently smokes a few (3-5) a day as of 6/12/24   Substance and Sexual Activity    Alcohol use: No     Alcohol/week: 0.0 standard drinks of alcohol     Comment: Quit alcohol after some alcohol abuse, prior to his liver transplant    Drug use: No    Sexual activity: Not Currently     Review of Systems   Constitutional: Negative for diaphoresis, malaise/fatigue, weight gain and weight loss.    HENT:  Negative for congestion and nosebleeds.    Cardiovascular:  Positive for chest pain. Negative for claudication, cyanosis, dyspnea on exertion, irregular heartbeat, leg swelling, near-syncope, orthopnea, palpitations, paroxysmal nocturnal dyspnea and syncope.   Respiratory:  Negative for cough, hemoptysis, shortness of breath, sleep disturbances due to breathing, snoring, sputum production and wheezing.    Hematologic/Lymphatic: Negative for bleeding problem. Does not bruise/bleed easily.   Skin:  Negative for rash.   Musculoskeletal:  Negative for arthritis, back pain, falls, joint pain, muscle cramps and muscle weakness.   Gastrointestinal:  Negative for abdominal pain, constipation, diarrhea, heartburn, hematemesis, hematochezia, melena, nausea and vomiting.   Genitourinary:  Negative for dysuria, hematuria and nocturia.   Neurological:  Negative for excessive daytime sleepiness, dizziness, headaches, light-headedness, loss of balance, numbness, vertigo and weakness.     Objective:     Vital Signs (Most Recent):  Temp: 97.9 °F (36.6 °C) (11/24/24 1602)  Pulse: 100 (11/24/24 1602)  Resp: 18 (11/24/24 1602)  BP: (!) 126/57 (11/24/24 1602)  SpO2: 98 % (11/24/24 1602) Vital Signs (24h Range):  Temp:  [97.6 °F (36.4 °C)-98.4 °F (36.9 °C)] 97.9 °F (36.6 °C)  Pulse:  [] 100  Resp:  [12-28] 18  SpO2:  [97 %-100 %] 98 %  BP: ()/(55-66) 126/57     Weight: 96.8 kg (213 lb 6.5 oz)  Body mass index is 31.51 kg/m².    SpO2: 98 %         Intake/Output Summary (Last 24 hours) at 11/24/2024 1712  Last data filed at 11/24/2024 0934  Gross per 24 hour   Intake 956.97 ml   Output 400 ml   Net 556.97 ml       Lines/Drains/Airways       Central Venous Catheter Line  Duration                  PowerPort A Cath Single Lumen 11/06/24 0919 Subclavian Left 18 days              Peripheral Intravenous Line  Duration                  Peripheral IV - Single Lumen 20 G Posterior;Right Forearm -- days         Peripheral IV -  Single Lumen 11/23/24 2003 20 G Anterior;Distal;Left Forearm <1 day                     Physical Exam  Vitals and nursing note reviewed.   Constitutional:       Appearance: Normal appearance. He is well-developed.   HENT:      Head: Normocephalic.      Mouth/Throat:      Mouth: Mucous membranes are moist.   Neck:      Vascular: No carotid bruit or JVD.   Cardiovascular:      Rate and Rhythm: Normal rate and regular rhythm.      Pulses: Normal pulses.      Heart sounds: Normal heart sounds. No murmur heard.     No friction rub.   Pulmonary:      Effort: Pulmonary effort is normal. No respiratory distress.      Breath sounds: Normal breath sounds. No wheezing or rales.   Abdominal:      General: Bowel sounds are normal. There is no distension.      Palpations: Abdomen is soft.      Tenderness: There is no abdominal tenderness. There is no guarding.   Musculoskeletal:         General: No swelling or tenderness.      Cervical back: Neck supple. No tenderness.      Right lower leg: No edema.      Left lower leg: No edema.   Skin:     General: Skin is warm and dry.      Capillary Refill: Capillary refill takes less than 2 seconds.      Findings: No rash.   Neurological:      General: No focal deficit present.      Mental Status: He is alert and oriented to person, place, and time.   Psychiatric:         Mood and Affect: Mood normal.         Behavior: Behavior normal.         Thought Content: Thought content normal.          Significant Labs: BMP:   Recent Labs   Lab 11/23/24 2009 11/24/24  0405   * 111*   * 134*   K 3.7 3.6    105   CO2 20* 19*   BUN 14 14   CREATININE 2.2* 2.2*   CALCIUM 8.5* 7.9*   MG  --  1.4*   , CMP   Recent Labs   Lab 11/23/24 2009 11/24/24  0405   * 134*   K 3.7 3.6    105   CO2 20* 19*   * 111*   BUN 14 14   CREATININE 2.2* 2.2*   CALCIUM 8.5* 7.9*   PROT 6.4 6.0   ALBUMIN 3.1* 2.9*   BILITOT 0.5 0.4   ALKPHOS 112 113   AST 14 15   ALT 19 18   ANIONGAP 8 10  "  , CBC   Recent Labs   Lab 11/23/24 2009 11/24/24  0406   WBC 3.04* 2.90*   HGB 8.1* 8.1*   HCT 24.0* 24.1*   PLT 41* 42*   , INR   Recent Labs   Lab 11/23/24 2009   INR 1.1   , Lipid Panel No results for input(s): "CHOL", "HDL", "LDLCALC", "TRIG", "CHOLHDL" in the last 48 hours., and Troponin   Recent Labs   Lab 11/23/24 2009 11/24/24  0035 11/24/24  0405   TROPONINI 0.807* 0.833* 0.707*       Significant Imaging: Echocardiogram: 2D echo with color flow doppler:   Results for orders placed or performed during the hospital encounter of 06/11/15   2D echo with color flow doppler   Result Value Ref Range    EF + QEF 60 55 - 65    Diastolic Dysfunction No     Est. PA Systolic Pressure 10.24     Tricuspid Valve Regurgitation MILD     Narrative    TEST DESCRIPTION   Technical Quality: This is a technically challenging study.     Aorta: The aortic root is normal in size. Aortic root measures 3.5 cm at Sinuses of Valsalva.     Left Atrium: The left atrial volume index is normal, measuring 19.83 cc/m2.     Left Ventricle: The left ventricle is normal in size, with an end-diastolic diameter of 3.9 cm, and an end-systolic diameter of 2.6 cm. LV wall thickness is normal, with the septum measuring 1.1 cm and the posterior wall measuring 1.0 cm across. Relative   wall thickness was increased at 0.51, and the LV mass index was 72.5 g/m2 consistent with concentric remodeling. Global left ventricular systolic function appears normal. Visually estimated ejection fraction is 60-65%. The LV Doppler derived stroke   volume equals 72.0 ccs.   The E/e'(lat) is 8, consistent with normal diastolic function.     Right Atrium: The right atrium is normal in size, measuring 4.5 cm in length and 2.2 cm in width in the apical view.     Right Ventricle: The right ventricle is normal in size. Global right ventricular systolic function appears normal. The estimated PA systolic pressure is greater than 10 mmHg.     Aortic Valve:  The aortic " valve is not well seen.     Mitral Valve:  The mitral valve is not well seen.     Tricuspid Valve:  The tricuspid valve is not well seen. There is mild tricuspid regurgitation.     Pulmonary Valve:  The pulmonic valve is not well seen.     Intracavitary: There is no evidence of pericardial effusion, intracavity mass, thrombi, or vegetation.         CONCLUSIONS     1 - Concentric remodeling.     2 - Normal left ventricular systolic function (EF 60-65%).     3 - Normal left ventricular diastolic function.     4 - Normal right ventricular systolic function .     5 - Mild tricuspid regurgitation.         This document has been electronically    SIGNED BY: Elton Denis MD On: 06/12/2015 15:36    and Transthoracic echo (TTE) complete (Cupid Only):   Results for orders placed or performed during the hospital encounter of 11/04/24   Echo   Result Value Ref Range    RA Width 3.72 cm    LA Vol (MOD) 37.52 mL    LV ESV A2C 32.85 mL    LA area A4C 16.55 cm2    LA area A2C 14.13 cm2    LV ESV A4C 40.42 mL    Left Atrium Major Axis 4.92 cm    Left Atrium Minor Axis 4.25 cm    RA Major Axis 4.29 cm    LV Diastolic Volume 102.76 mL    LV Systolic Volume 35.35 mL    PV Peak D [place] 0.58 m/s    PV Peak S [place] 0.96 m/s    MV Peak A [place] 1.10 m/s    MV stenosis pressure 1/2 time 58.82 ms    TR Max [place] 2.88 m/s    AR Max [place] 4.12 m/s    MV Peak E [place] 0.90 m/s    PV mean gradient 1 mmHg    RVOT peak VTI 16.1 cm    RVOT peak [place] 0.71 m/s    Ao VTI 27.2 cm    Ao peak [place] 1.5 m/s    LVOT peak VTI 17.4 cm    LVOT peak [place] 0.9 m/s    LVOT diameter 2.2 cm    IVRT 97.05 msec    E wave deceleration time 202.83 msec    PV peak gradient 6 mmHg    AV mean gradient 4.3 mmHg    AV regurgitation pressure 1/2 time [id number] ms    RVDD 3.41 cm    LA size 3.20 cm    Ascending aorta 2.90 cm    STJ 2.51 cm    Sinus 2.70 cm    LVIDs 3.0 2.1 - 4.0 cm    Ao root annulus 3.00 cm    PW 1.1 0.6 - 1.1 cm    IVS 1.0 0.6 - 1.1 cm    LVIDd 4.7 3.5 - 6.0 cm     PV PEAK VELOCITY 1.23 m/s    TDI LATERAL 0.13 m/s    Left Ventricular Outflow Tract Mean Gradient 1.88 mmHg    Left Ventricular Outflow Tract Mean Velocity 0.64 cm/s    Left Ventricular End Systolic Volume by Teichholz Method 35.35 mL    Left Ventricular End Diastolic Volume by Teichholz Method 102.76 mL    IVC diameter 1.53 cm    TDI SEPTAL 0.14 m/s    LV LATERAL E/E' RATIO 6.92 m/s    LV SEPTAL E/E' RATIO 6.43 m/s    FS 36.2 28 - 44 %    LV mass 175.8 g    Left Ventricle Relative Wall Thickness 0.47 cm    AV valve area 2.4 cm²    AV Velocity Ratio 0.60     AV index (prosthetic) 0.64     MV valve area p 1/2 method 3.74 cm2    E/A ratio 0.82     Mean e' 0.14 m/s    Pulm vein S/D ratio 1.66     LVOT area 3.8 cm2    LVOT stroke volume 66.1 cm3    AV peak gradient 9.0 mmHg    E/E' ratio 6.67 m/s    Triscuspid Valve Regurgitation Peak Gradient 33 mmHg    ANAY by Velocity Ratio 2.3 cm²    BSA 2.19 m2    LV Systolic Volume Index 16.5 mL/m2    LV Diastolic Volume Index 48.02 mL/m2    LV Mass Index 82.2 g/m2    MERCED (MOD) 17.5 mL/m2    ZLVIDS -2.65     ZLVIDD -3.81     MERCED 20.9 mL/m2    LA Vol 44.66 cm3    LA WIDTH 3.6 cm    EF 60 %    TV resting pulmonary artery pressure 36 mmHg    RV TB RVSP 6 mmHg    Est. RA pres 3 mmHg    Narrative      Left Ventricle: The left ventricle is normal in size. Normal wall   thickness. There is concentric remodeling. Normal wall motion. There is   normal systolic function with a visually estimated ejection fraction of 60   - 65%. Ejection fraction is approximately 60%. There is normal diastolic   function.    Right Ventricle: Normal right ventricular cavity size. Right ventricle   wall motion  is normal. Systolic function is normal.    Mitral Valve: There is mild regurgitation.    Tricuspid Valve: There is mild regurgitation.    Pulmonary Artery: The estimated pulmonary artery systolic pressure is   36 mmHg.    IVC/SVC: Normal venous pressure at 3 mmHg.    No SHAMIKA due to arrhythmia.         Assessment and Plan:     * Troponin level elevated  Flat trend  Due to demand ischemia in setting of thrombocytopenia and anemia with pancytopenia  Eliquis being held  Needs evaluation of low plts and anemia prior to restarting eliquis  Can consider OP stress test  Hold off on ASA   Transfuse for Hgb <8    NINO (acute kidney injury)  Worsening over the last few months  Continue to monitor after receiving IV fluids    Pancytopenia  Most likely 2/2 recent chemotherapy  But will need further recommendations per hem/onc on restarting eliquis for h/o DVT/PE    Chronic deep vein thrombosis (DVT) of left upper extremity  Eliquis being held    History of liver transplant  Continue immunosuppression         VTE Risk Mitigation (From admission, onward)           Ordered     IP VTE HIGH RISK PATIENT  Once         11/23/24 2220     Place sequential compression device  Until discontinued         11/23/24 2220                    Thank you for your consult.     Titi Choudhury MD  Cardiology   [facility]

## 2024-11-24 NOTE — RESPIRATORY THERAPY
RT was called to give a breathing tx RT did assessment on pt, pt stated no Tx was called. Pt indication not needed for a breathing tx, SpO2 99%, breath sounds clear and remains on RA, NAD. RT will continue to monitor.

## 2024-11-24 NOTE — PLAN OF CARE
Discussed poc with pt, pt verbalized understanding    Purposeful rounding every 2hours    VS wnl  Cardiac monitoring in use, pt is NST, tele monitor #[id number]  Fall precautions in place, remains injury free  Pt denies c/o pain     IVFs-NS @100mL  Accurate I&Os  Bed locked at lowest position  Call light within reach    Chart check complete  Will cont with POC

## 2024-11-24 NOTE — CONSULTS
Lj Coleman is a 68 y.o. 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 Pt states the CP is intermittent and located on his left upper chest radiating into his left arm and left hand with tingling in left hand. He states that it has been going on for past 3-4 days, had severe episode last night which resolved, and when it came back today he decided to come and get it checked out. Pt states he also has SOB with exertion but states it is at baseline. Pt also notes he has some weakness in his lower extremities and decreased appetite over the past several days as well. Pt notes he is currently undergoing chemotherapy and was informed by his oncologist to come to the ED if he ever experiences CP/chest tightness. Pt notes he just received his first chemo treatment recently. Pt states he has a family hx of cancer but denies any known hx of heart diseases. He is compliant with his medications. Troponin 0.707. . Magnesium at 1.4. Creatinine went from 1.6 to 2.2.     ECHO:  Left Ventricle: The left ventricle is normal in size. Normal wall thickness. There is concentric remodeling. Normal wall motion. There is normal systolic function with a visually estimated ejection fraction of 60 - 65%. Ejection fraction is approximately 60%. There is normal diastolic function.    Right Ventricle: Normal right ventricular cavity size. Right ventricle wall motion  is normal. Systolic function is normal.    Mitral Valve: There is mild regurgitation.    Tricuspid Valve: There is mild regurgitation.    Pulmonary Artery: The estimated pulmonary artery systolic pressure is 36 mmHg.    IVC/SVC: Normal  venous pressure at 3 mmHg.     No SHAMIKA due to arrhythmia.       EKG:  Sinus tachycardia @ 104 BPM  Possible Left atrial enlargement   Nonspecific T wave abnormality

## 2024-11-24 NOTE — PLAN OF CARE
Problem: Adult Inpatient Plan of Care  Goal: Plan of Care Review  Outcome: Progressing  Goal: Patient-Specific Goal (Individualized)  Outcome: Progressing  Goal: Absence of Hospital-Acquired Illness or Injury  Outcome: Progressing  Goal: Optimal Comfort and Wellbeing  Outcome: Progressing  Goal: Readiness for Transition of Care  Outcome: Progressing     Problem: Acute Coronary Syndrome  Goal: Optimal Adaptation to Illness  Outcome: Progressing  Goal: Absence of Cardiac-Related Pain  Outcome: Progressing  Goal: Normalized Cardiac Rhythm  Outcome: Progressing  Goal: Effective Cardiac Pump Function  Outcome: Progressing  Goal: Adequate Tissue Perfusion  Outcome: Progressing     Problem: Cardiac Catheterization (Diagnostic/Interventional)  Goal: Absence of Bleeding  Outcome: Progressing  Goal: Absence of Contrast-Induced Injury  Outcome: Progressing  Goal: Stable Heart Rate and Rhythm  Outcome: Progressing  Goal: Absence of Embolism Signs and Symptoms  Outcome: Progressing  Goal: Anesthesia/Sedation Recovery  Outcome: Progressing  Goal: Optimal Pain Control and Function  Outcome: Progressing  Goal: Absence of Vascular Access Complication  Outcome: Progressing     Problem: Acute Kidney Injury/Impairment  Goal: Fluid and Electrolyte Balance  Outcome: Progressing  Goal: Improved Oral Intake  Outcome: Progressing  Goal: Effective Renal Function  Outcome: Progressing     Problem: Fall Injury Risk  Goal: Absence of Fall and Fall-Related Injury  Outcome: Progressing

## 2024-11-24 NOTE — ASSESSMENT & PLAN NOTE
Per CTA chest 2015 -- showed atherosclerosis of LAD  Echo- pending  Cardiology consulted per ED  No ST changes noted on EKG  ASA ordered, had normal lipid panel recently  Cardiac monitoring ordered

## 2024-11-24 NOTE — ASSESSMENT & PLAN NOTE
Patients blood pressure range in the last 24 hours was: BP  Min: 97/58  Max: 110/58.The patient's inpatient anti-hypertensive regimen is listed below:  Current Antihypertensives  metoprolol succinate (TOPROL-XL) 24 hr tablet 25 mg, Daily, Oral  nitroGLYCERIN SL tablet 0.4 mg, Every 5 min PRN, Sublingual    Plan  - BP is controlled, no changes needed to their regimen

## 2024-11-24 NOTE — HPI
Lj Coleman is a 68 y.o. 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 Pt states the CP is intermittent and located on his left upper chest radiating into his left arm and left hand with tingling in left hand. He states that it has been going on for past 3-4 days, had severe episode last night which resolved, and when it came back today he decided to come and get it checked out. Pt states he also has SOB with exertion but states it is at baseline. Pt also notes he has some weakness in his lower extremities and decreased appetite over the past several days as well. Pt notes he is currently undergoing chemotherapy and was informed by his oncologist to come to the ED if he ever experiences CP/chest tightness. Pt notes he just received his first chemo treatment recently. Pt states he has a family hx of cancer but denies any known hx of heart diseases. He is compliant with his medications. Troponin 0.83->0.87->0.7. . Magnesium at 1.4. Creatinine went from 1.6 to 2.2. Over the last few months  Hgb 16->8.1 on admission, Plts 153->40s-60s currently. Eliquis was held.     ECHO:  Left Ventricle: The left ventricle is normal in size. Normal wall thickness. There is concentric remodeling. Normal wall motion. There is normal systolic function with a visually estimated ejection fraction of 60 - 65%. Ejection fraction is approximately 60%. There is normal diastolic function.    Right Ventricle: Normal right ventricular cavity size. Right ventricle wall motion  is normal. Systolic function is normal.    Mitral Valve: There is mild regurgitation.    Tricuspid Valve: There is mild regurgitation.    Pulmonary Artery: The estimated pulmonary artery systolic pressure is 36  mmHg.    IVC/SVC: Normal venous pressure at 3 mmHg.       EKG:  Sinus tachycardia @ 104 BPM  Possible Left atrial enlargement   Nonspecific T wave abnormality

## 2024-11-24 NOTE — PROGRESS NOTES
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 He complains of chest pain described  as intermittent in left upper chest radiating into left arm and left hand with tingling in left hand.  He states that it has been going on for past 3-4 days, had severe episode last night which resolved, and when it came back today he decided to come and get it checked out.  He also has shortness of breath on exertion which he states is his baseline and he has had some weakness in his lower extremities  and decreased appetite over the past several days as well. on arrival to ED, temp 97.8°, heart rate 109, respiration 16, blood pressure 100/57, 98% SpO2 on room air.  Lab workup shows WBC 3.04, RBC 2.56, hemoglobin 8.1, hematocrit 24.0, platelet count 41 K, PT 11.5, INR 1.1, PTT 30.0, sodium 132, potassium 3.7, CO2 20, BUN 14, creatinine 2.2, glucose 125, calcium 8.5, albumin 3.1, normal LFTs, ammonia level 29, , troponin 0.807.  Chest x-ray demonstrates lungs are clear.  He is negative for Flu A/B, negative for Covid-19.  EKG ST with no concerning ST changes. He was given ASA and heparin bolus/drip was ordered -- which has been subsequently discontinued after consulting with  cardiology , as he has PLT 41K. The chest pain is intermittent, described as mild currently. He denies need for pain medication. Hospital Medicine was consutled for admission due to NSTEMI.    Overview/Hospital Course:  Patient is a 68 year male with a history of large cell lymphoma recently started on chemotherapy 11/4, COPD, liver transplant 2012, history of DVT to the upper extremity on Eliquis, hypertension, coronary artery disease, anemia, and CKD presented emergency department with complain of chest pain.  He was noted to be in the left precordium going down to the left hand with numbness and tingling in hand.  Upon arrival patient was noted to have a heart rate of 109, respirations of 16 and adequate blood pressure.  Workup revealed a white count 3 hemoglobin 8 and platelet count of 72866.  Troponin elevated at 0.8 which remained flat and subsequent draws, BNP of 198, he was noted to be flu a and B negative.  Patient was initially given aspirin and placed on heparin drip however after Cardiology consultation and a platelet count of 41 1000 heparin drip was Dc'ed.    Interval History:  Patient seen and examined at bedside.  He reports being hungry and having belly pain.  Cardiology consult pending.    Review of Systems   Constitutional:  Positive for appetite change and fatigue. Negative for chills, diaphoresis and fever.        Decreased appetite   HENT: Negative.     Eyes: Negative.  Negative for visual disturbance.   Respiratory: Negative.  Negative for cough, shortness of breath and wheezing.    Gastrointestinal: Negative.  Negative for abdominal distention, abdominal pain, blood in stool, nausea and vomiting.   Endocrine: Negative.    Genitourinary:  Negative for dysuria.   Skin: Negative.    Allergic/Immunologic: Positive for immunocompromised state.   Neurological:  Positive for weakness (improving) and numbness. Negative for dizziness, tremors, seizures, syncope, facial asymmetry, speech difficulty,  light-headedness and headaches.   Psychiatric/Behavioral: Negative.       Objective:     Vital Signs (Most Recent):  Temp: 97.6 °F (36.4 °C) (11/24/24 1145)  Pulse: 107 (11/24/24 1145)  Resp: 16 (11/24/24 1145)  BP: 116/62 (11/24/24 1145)  SpO2: 98 % (11/24/24 1145) Vital Signs (24h Range):  Temp:  [97.6 °F (36.4 °C)-98.4 °F (36.9 °C)] 97.6 °F (36.4 °C)  Pulse:  [] 107  Resp:  [12-28] 16  SpO2:  [97 %-100 %] 98 %  BP: ()/(55-66) 116/62     Weight: 96.8 kg (213 lb 6.5 oz)  Body mass index is 31.51 kg/m².    Intake/Output Summary (Last 24 hours) at 11/24/2024 1558  Last data filed at 11/24/2024 0934  Gross per 24 hour   Intake 956.97 ml   Output 400 ml   Net 556.97 ml         Physical Exam  Vitals reviewed.   Constitutional:       General: He is not in acute distress.     Appearance: He is ill-appearing. He is not toxic-appearing or diaphoretic.   HENT:      Head: Normocephalic.      Nose: Nose normal.      Mouth/Throat:      Mouth: Mucous membranes are moist.      Pharynx: Oropharynx is clear.   Eyes:      Extraocular Movements: Extraocular movements intact.      Pupils: Pupils are equal, round, and reactive to light.   Cardiovascular:      Rate and Rhythm: Normal rate and regular rhythm.      Pulses: Normal pulses.      Heart sounds: Normal heart sounds.   Pulmonary:      Effort: Pulmonary effort is normal. No respiratory distress.      Breath sounds: Normal breath sounds. No stridor. No wheezing, rhonchi or rales.   Chest:      Chest wall: No tenderness.   Abdominal:      General: Bowel sounds are normal. There is no distension.      Palpations: Abdomen is soft.      Tenderness: There is no abdominal tenderness. There is no right CVA tenderness, left CVA tenderness, guarding or rebound.   Musculoskeletal:         General: Normal range of motion.      Cervical back: Neck supple.      Right lower leg: No edema.      Left lower leg: No edema.   Skin:     General: Skin is warm and dry.      Capillary  "Refill: Capillary refill takes less than 2 seconds.      Coloration: Skin is pale.   Neurological:      General: No focal deficit present.      Mental Status: He is alert and oriented to person, place, and time.      Motor: Weakness present.      Comments: Generalized weakness   Psychiatric:         Mood and Affect: Mood normal.         Behavior: Behavior normal.         Thought Content: Thought content normal.             Significant Labs: All pertinent labs within the past 24 hours have been reviewed.  Blood Culture: No results for input(s): "LABBLOO" in the last 48 hours.  CBC:   Recent Labs   Lab 11/23/24 2009 11/24/24  0406   WBC 3.04* 2.90*   HGB 8.1* 8.1*   HCT 24.0* 24.1*   PLT 41* 42*     CMP:   Recent Labs   Lab 11/23/24 2009 11/24/24  0405   * 134*   K 3.7 3.6    105   CO2 20* 19*   * 111*   BUN 14 14   CREATININE 2.2* 2.2*   CALCIUM 8.5* 7.9*   PROT 6.4 6.0   ALBUMIN 3.1* 2.9*   BILITOT 0.5 0.4   ALKPHOS 112 113   AST 14 15   ALT 19 18   ANIONGAP 8 10     Cardiac Markers:   Recent Labs   Lab 11/23/24 2009   *       Magnesium:   Recent Labs   Lab 11/24/24  0405   MG 1.4*     Troponin:   Recent Labs   Lab 11/23/24 2009 11/24/24  0035 11/24/24  0405   TROPONINI 0.807* 0.833* 0.707*       Significant Imaging: I have reviewed all pertinent imaging results/findings within the past 24 hours.    Assessment/Plan:      * NSTEMI (non-ST elevated myocardial infarction)  Intermittent chest pain for past several days -- radiating into left arm to left hand with tingling, trop 0.807  ASA given  Was given heparin bolus, will hold off on further heparin due to thrombocytopenia - PLT 41  Troponin - flat  Cardiac monitoring ordered  Continue ASA 81 mg daily  Continue beta blocker  Cardiology consulted per ED      NINO (acute kidney injury)  NINO is likely due to medications -- hepatology has adjusted medications.   Most recent creatinine and eGFR are listed below.    Recent Labs     " 11/23/24 2009 11/24/24 0405   CREATININE 2.2* 2.2*   EGFRNORACEVR 31* 31*     Baseline creatinine approximately 2   Plan  - Monitor renal function  - Gentle hydration with IV fluids overnight x 1 L    Pancytopenia  This patient is found to have pancytopenia, the likely etiology is Drug induced (including chemotherapy) related to chemo , will monitor CBC Daily. Will transfuse red blood cells if the hemoglobin is <7g/dL (or <8 in the setting of ACS). Will transfuse platelets if platelet count is <50k (if undergoing surgical procedure or have active bleeding). Hold DVT prophylaxis if platelets are <50k. The patient's hemoglobin, white blood cell count, and platelet count results have been reviewed and are listed below.  Recent Labs   Lab 11/24/24  0406   HGB 8.1*   WBC 2.90*   PLT 42*       Currently on chemotherapy for lymphoma (per Dr. 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 Peralta -- currently on chemotherapy 11/13      COPD suggested by initial evaluation  PRN nebs ordered -- no wheezing on exam    CXR-- lungs clear      Chronic deep vein thrombosis (DVT) of left upper extremity  [place] [facility] [place] holding for now due to pancytopenia      Tobacco abuse disorder  Counseled on cessation x 5 minutes -- he states that he is still having cravings, but is barely smoking any more  Nicotine patch ordered for patient      Atherosclerosis of native coronary artery with stable angina pectoris  Per CTA chest 2015 -- showed atherosclerosis of LAD  Echo- pending  Cardiology consulted per ED  No ST changes noted on EKG  ASA ordered, had normal lipid panel recently  Cardiac monitoring ordered       Essential hypertension  Patients blood pressure range in the last 24 hours was: BP  Min: 97/58  Max: 130/64.The patient's inpatient  anti-hypertensive regimen is listed below:  Current Antihypertensives  metoprolol succinate (TOPROL-XL) 24 hr tablet 25 mg, Daily, Oral  nitroGLYCERIN SL tablet 0.4 mg, Every 5 min PRN, Sublingual    Plan  - BP is controlled, no changes needed to their regimen      History of liver transplant  Liver transplant in 2012, followed by hepatology  Restart tacrolimus, Myfortic and prednisone  Check tacrolimus level in a.m.      Immunosuppression  Secondary to immunosuppressants and chemotherapy  Monitor for signs of infection      Insomnia  Continue melatonin        VTE Risk Mitigation (From admission, onward)           Ordered     IP VTE HIGH RISK PATIENT  Once         11/23/24 2220     Place sequential compression device  Until discontinued         11/23/24 2220                    Discharge Planning   ALEXIS:      Code Status: Full Code   Is the patient medically ready for discharge?:     Reason for patient still in hospital (select all that apply): Patient trending condition, Laboratory test, Treatment, and Consult recommendations  Discharge Plan A: Home                  Chanell Jim MD  Department of Hospital Medicine   [facility]

## 2024-11-24 NOTE — H&P
[facility] [place] - Emergency Dept.  [facility] Medicine  History & Physical    Patient Name: Lj Coleman  MRN: [id number]  Patient Class: IP- Inpatient  Admission Date: 11/23/2024  Attending Physician: Jonathon Sandra Jr., MD   Primary Care Provider: Isabella Sutton DO         Patient information was obtained from patient, past medical records, and ER records.     Subjective:     Principal Problem:NSTEMI (non-ST elevated myocardial infarction)    Chief Complaint:   Chief Complaint   Patient presents with    Chest Pain     Pt states he started having chest pain about 45 mins ago. Pt states he also feels like he has a fever. 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 He complains of chest pain described  as intermittent in left upper chest radiating into left arm and left hand with tingling in left hand.  He states that it has been going on for past 3-4 days, had severe episode last night which resolved, and when it came back today he decided to come and get it checked out.  He also has shortness of breath on exertion which he states is his baseline and he has had some weakness in his lower extremities  and decreased appetite over the past several days as well. on arrival to ED, temp 97.8°, heart rate 109, respiration 16, blood pressure 100/57, 98% SpO2 on room air.  Lab workup shows WBC 3.04, RBC 2.56, hemoglobin 8.1, hematocrit 24.0, platelet count 41 K, PT 11.5, INR 1.1, PTT 30.0, sodium 132, potassium 3.7, CO2 20, BUN 14, creatinine 2.2, glucose 125, calcium 8.5, albumin 3.1, normal LFTs, ammonia  level 29, , troponin 0.807.  Chest x-ray demonstrates lungs are clear.  He is negative for Flu A/B, negative for Covid-19.  EKG ST with no concerning ST changes. He was given ASA and heparin bolus/drip was ordered -- which has been subsequently discontinued after consulting with cardiology , as he has PLT 41K. The chest pain is intermittent, described as mild currently. He denies need for pain medication. Hospital Medicine was consutled for admission due to NSTEMI.    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 10/31/2024    Anemia 11/23/2024    Angina pectoris     Arthritis     back    Atherosclerosis of native coronary artery without angina pectoris/ LAD 09/08/2016    Back pain     Chronic hepatitis C with cirrhosis     Depression     Hepatoma     Prior to liver transplant    History of colon polyps 09/09/2015    History of transplantation, liver 04/2012    History of vertebral fracture     Hypertension     Immune deficiency disorder     Incisional hernia following transplant 04/09/2014    Liver failure 11/2011    Related to chemotherapy for hepatoma    Liver transplanted 04/2012    NSTEMI (non-ST elevated myocardial infarction) 11/23/2024    Obesity     PVCs (premature ventricular contractions)     Renal dysfunction 11/23/2024    Tobacco abuse 09/09/2016    Trouble in sleeping     Vertebral fracture 1975       Past Surgical History:   Procedure Laterality Date    COLONOSCOPY N/A 1/20/2021    Procedure: COLONOSCOPY;  Surgeon: Emerson Helm MD;  Location: [facility] ENDO;  Service: Endoscopy;  Laterality: N/A;    HERNIA REPAIR Right 2013    incisional    INSERTION OF TUNNELED CENTRAL VENOUS CATHETER (CVC) WITH SUBCUTANEOUS PORT Left 11/6/2024    Procedure: [id number];  Surgeon: Hao Washburn MD;  Location: [facility] OR;  Service: General;   Laterality: Left;    LIVER TRANSPLANT  April 2012    LYMPH NODE BIOPSY/EXCISION Left 10/16/2024    Procedure: LYMPH NODE BIOPSY/EXCISION;  Surgeon: Hao Washburn MD;  Location: [facility];  Service: General;  Laterality: Left;    MOUTH SURGERY      TONSILLECTOMY  1958       Review of patient's allergies indicates:   Allergen Reactions    Codeine Other (See Comments)     Upset stomach       Current Facility-Administered Medications on File Prior to Encounter   Medication    lactated ringers infusion    sodium chloride 0.9% flush 2 mL     Current Outpatient Medications on File Prior to Encounter   Medication Sig    acyclovir (ZOVIRAX) 400 MG tablet Take 1 tablet (400 mg total) by mouth 2 (two) times daily. While on chemotherapy    allopurinoL (ZYLOPRIM) 300 MG tablet Take 1 tablet (300 mg total) by mouth once daily.    apixaban (ELIQUIS) 5 mg Tab Take 1 tablet (5 mg total) by mouth 2 (two) times daily.    melatonin 10 mg Tab Take by mouth.    metoprolol succinate (TOPROL-XL) 25 MG 24 hr tablet TAKE 1 TABLET EVERY DAY    predniSONE (DELTASONE) 50 MG Tab Take 2 tablets (100 mg total) by mouth once daily. on days 2,3,4, 5 of each chemotherapy cycle    sulfamethoxazole-trimethoprim 800-160mg (BACTRIM DS) 800-160 mg Tab Take 1 tablet by mouth 3 (three) times a week.    tacrolimus (PROGRAF) 0.5 MG Cap Take 1 capsule (0.5 mg total) by mouth every 12 (twelve) hours.    tamsulosin (FLOMAX) 0.4 mg Cap Take 1 capsule (0.4 mg total) by mouth once daily.    tenofovir alafenamide (VEMLIDY) 25 mg Tab Take 1 tablet by mouth once daily    varenicline (CHANTIX) 0.5 MG Tab Take 1 tablet (0.5 mg total) by mouth once daily.    varenicline (CHANTIX) 1 mg Tab Take 1 tablet (1 mg total) by mouth 2 (two) times daily.    albuterol (VENTOLIN HFA) 90 mcg/actuation inhaler Inhale 2 puffs into the lungs every 6 (six) hours as needed for Wheezing or Shortness of Breath. Rescue    diclofenac sodium (VOLTAREN) 1 % Gel Apply 2 g topically 4 (four)  times daily. for 10 days    esomeprazole (NEXIUM) 40 MG capsule Take 1 capsule (40 mg total) by mouth once daily.    fluticasone-umeclidin-vilanter (TRELEGY ELLIPTA) 100-62.5-25 mcg DsDv Inhale 1 puff into the lungs once daily.    HYDROcodone-acetaminophen (NORCO) 5-325 mg per tablet Take 1 tablet by mouth every 6 (six) hours as needed for Pain.    LIDOcaine-prilocaine (EMLA) cream Apply topically as needed.    nicotine (NICODERM CQ) 21 mg/24 hr Place 1 patch onto the skin once daily.    ondansetron (ZOFRAN) 8 MG tablet Take 1 tablet (8 mg total) by mouth every 12 (twelve) hours as needed for Nausea.    prochlorperazine (COMPAZINE) 5 MG tablet Take 1 tablet (5 mg total) by mouth every 6 (six) hours as needed (nausea).    tenofovir alafenamide (VEMLIDY) 25 mg Tab Take 1 tablet (25 mg total) by mouth Daily.     Family History       Problem Relation (Age of Onset)    Cancer Mother, Father, Maternal Grandmother, Other    Diabetes Maternal Uncle          Tobacco Use    Smoking status: Every Day     Current packs/day: 0.75     Average packs/day: 0.7 packs/day for 36.9 years (27.7 ttl pk-yrs)     Types: Cigarettes     Start date: 1988    Smokeless tobacco: Never    Tobacco comments:     Currently smokes a few (3-5) a day as of 6/12/24   Substance and Sexual Activity    Alcohol use: No     Alcohol/week: 0.0 standard drinks of alcohol     Comment: Quit alcohol after some alcohol abuse, prior to his liver transplant    Drug use: No    Sexual activity: Not Currently     Review of Systems   Constitutional:  Positive for appetite change and fatigue. Negative for chills, diaphoresis and fever.        Decreased appetite   HENT: Negative.     Eyes: Negative.  Negative for visual disturbance.   Respiratory: Negative.  Negative for cough, shortness of breath and wheezing.    Cardiovascular:  Positive for chest pain.        Radiating into left arm to left hand  (intermittent)   Gastrointestinal: Negative.  Negative for abdominal  distention, abdominal pain, blood in stool, nausea and vomiting.   Endocrine: Negative.    Genitourinary:  Negative for dysuria.   Musculoskeletal:  Positive for gait problem.   Skin: Negative.    Allergic/Immunologic: Positive for immunocompromised state.   Neurological:  Positive for weakness and numbness. Negative for dizziness, tremors, seizures, syncope, facial asymmetry, speech difficulty, light-headedness and headaches.        Weakness in legs, intermittent tingling left hand   Psychiatric/Behavioral: Negative.       Objective:     Vital Signs (Most Recent):  Temp: 97.8 °F (36.6 °C) (11/23/24 1817)  Pulse: 99 (11/23/24 2132)  Resp: (!) 21 (11/23/24 2132)  BP: (!) 110/58 (11/23/24 2132)  SpO2: 100 % (11/23/24 2132) Vital Signs (24h Range):  Temp:  [97.8 °F (36.6 °C)] 97.8 °F (36.6 °C)  Pulse:  [] 99  Resp:  [16-21] 21  SpO2:  [98 %-100 %] 100 %  BP: ()/(57-60) 110/58     Weight: 95.7 kg (211 lb)  Body mass index is 28.62 kg/m².     Physical Exam  Vitals reviewed.   Constitutional:       General: He is not in acute distress.     Appearance: He is ill-appearing. He is not toxic-appearing or diaphoretic.   HENT:      Head: Normocephalic.      Nose: Nose normal.      Mouth/Throat:      Mouth: Mucous membranes are moist.      Pharynx: Oropharynx is clear.   Eyes:      Extraocular Movements: Extraocular movements intact.      Pupils: Pupils are equal, round, and reactive to light.   Cardiovascular:      Rate and Rhythm: Normal rate and regular rhythm.      Pulses: Normal pulses.      Heart sounds: Normal heart sounds.   Pulmonary:      Effort: Pulmonary effort is normal. No respiratory distress.      Breath sounds: Normal breath sounds. No stridor. No wheezing, rhonchi or rales.   Chest:      Chest wall: No tenderness.   Abdominal:      General: Bowel sounds are normal. There is no distension.      Palpations: Abdomen is soft.      Tenderness: There is no abdominal tenderness. There is no right CVA  tenderness, left CVA tenderness, guarding or rebound.   Musculoskeletal:         General: Normal range of motion.      Cervical back: Neck supple.      Right lower leg: No edema.      Left lower leg: No edema.   Skin:     General: Skin is warm and dry.      Capillary Refill: Capillary refill takes less than 2 seconds.      Coloration: Skin is pale.   Neurological:      General: No focal deficit present.      Mental Status: He is alert and oriented to person, place, and time.      Motor: Weakness present.      Comments: Generalized weakness   Psychiatric:         Mood and Affect: Mood normal.         Behavior: Behavior normal.         Thought Content: Thought content normal.              CRANIAL NERVES     CN III, IV, VI   Pupils are equal, round, and reactive to light.       Significant Labs: All pertinent labs within the past 24 hours have been reviewed.    CBC:   Recent Labs   Lab 11/23/24 2009   WBC 3.04*   HGB 8.1*   HCT 24.0*   PLT 41*     CMP:   Recent Labs   Lab 11/23/24 2009   *   K 3.7      CO2 20*   *   BUN 14   CREATININE 2.2*   CALCIUM 8.5*   PROT 6.4   ALBUMIN 3.1*   BILITOT 0.5   ALKPHOS 112   AST 14   ALT 19   ANIONGAP 8     Coagulation:   Recent Labs   Lab 11/23/24 2009   INR 1.1   APTT 30.0     Troponin:   Recent Labs   Lab 11/23/24 2009   TROPONINI 0.807*         Influenza A & B negative  SARS-Covid-19 negative    EKG reviewed -- ST, , no ST changes noted    Significant Imaging: I have reviewed all pertinent imaging results/findings within the past 24 hours.    CXR reviewed, per my interpretation -- lungs appear clear    Assessment/Plan:     * NSTEMI (non-ST elevated myocardial infarction)  Intermittent chest pain for past several days -- radiating into left arm to left hand with tingling, trop 0.807  ASA given  Was given heparin bolus, will hold off on further heparin due to thrombocytopenia - PLT 41  Trend troponin  Cardiac monitoring ordered  Continue ASA 81  mg daily  Continue beta blocker  Cardiology consulted per ED      Atherosclerosis of native coronary artery with stable angina pectoris  Per CTA chest 2015 -- showed atherosclerosis of LAD  Check Echo  Cardiology consulted per ED  No ST changes noted on EKG  ASA ordered, had normal lipid panel recently  Cardiac monitoring ordered       Pancytopenia  This patient is found to have pancytopenia, the likely etiology is Drug induced (including chemotherapy) related to chemo , will monitor CBC Daily. Will transfuse red blood cells if the hemoglobin is <7g/dL (or <8 in the setting of ACS). Will transfuse platelets if platelet count is <50k (if undergoing surgical procedure or have active bleeding). Hold DVT prophylaxis if platelets are <50k. The patient's hemoglobin, white blood cell count, and platelet count results have been reviewed and are listed below.  Recent Labs   Lab 11/23/24 2009   HGB 8.1*   WBC 3.04*   PLT 41*     Currently on chemotherapy for lymphoma (per Dr. 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 Peralta -- currently on chemotherapy      NINO (acute kidney injury)  NINO is likely due to medications -- hepatology has adjusted medications.   Most recent creatinine and eGFR are listed below.    Recent Labs     11/23/24 2009   CREATININE 2.2*   EGFRNORACEVR 31*      Plan  - Monitor renal function  - Gentle hydration with IV fluids overnight x 1 L    COPD suggested by initial evaluation  PRN nebs ordered -- no wheezing on exam  Does endorse shortness of breath with exertion  CXR-- lungs clear      Chronic deep vein thrombosis (DVT) of left upper extremity  Has been on Eliquis for chronic DVT in [facility] -- holding for now due to pancytopenia      Tobacco abuse disorder  Counseled on cessation x 5 minutes -- he states that he is still having  cravings, but is barely smoking any more  Nicotine patch ordered for patient      Essential hypertension  Patients blood pressure range in the last 24 hours was: BP  Min: 97/58  Max: 110/58.The patient's inpatient anti-hypertensive regimen is listed below:  Current Antihypertensives  metoprolol succinate (TOPROL-XL) 24 hr tablet 25 mg, Daily, Oral  nitroGLYCERIN SL tablet 0.4 mg, Every 5 min PRN, Sublingual    Plan  - BP is controlled, no changes needed to their regimen      History of liver transplant  Liver transplant in 2012, followed by hepatology      Immunosuppression  Secondary to immunosuppressants and chemotherapy  Monitor for signs of infection      Insomnia  Continue melatonin        VTE Risk Mitigation (From admission, onward)           Ordered     IP VTE HIGH RISK PATIENT  Once         11/23/24 2220     Place sequential compression device  Until discontinued         11/23/24 2220                    This patient's case has been reviewed by my supervising physician, Dr. Violet Watts.  Supervising physician will provide additional orders and recommendations at his or her discretion.         Aida Green NP  Department of Hospital Medicine  '[place] - Emergency Dept.

## 2024-11-24 NOTE — ASSESSMENT & PLAN NOTE
Intermittent chest pain for past several days -- radiating into left arm to left hand with tingling, trop 0.807  ASA given  Was given heparin bolus, will hold off on further heparin due to thrombocytopenia - PLT 41  Trend troponin  Cardiac monitoring ordered  Continue ASA 81 mg daily  Continue beta blocker  Cardiology consulted per ED

## 2024-11-24 NOTE — ASSESSMENT & PLAN NOTE
This patient is found to have pancytopenia, the likely etiology is Drug induced (including chemotherapy) related to chemo , will monitor CBC Daily. Will transfuse red blood cells if the hemoglobin is <7g/dL (or <8 in the setting of ACS). Will transfuse platelets if platelet count is <50k (if undergoing surgical procedure or have active bleeding). Hold DVT prophylaxis if platelets are <50k. The patient's hemoglobin, white blood cell count, and platelet count results have been reviewed and are listed below.  Recent Labs   Lab 11/23/24 2009   HGB 8.1*   WBC 3.04*   PLT 41*     Currently on chemotherapy for lymphoma (per Dr. Peralta)  Due to thrombocytopenia, will stop/hold heparin drip   Type and screen ordered  Repeat labs in AM  Consider consult oncology on Monday

## 2024-11-24 NOTE — SUBJECTIVE & OBJECTIVE
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 10/31/2024    Anemia 11/23/2024    Angina pectoris     Arthritis     back    Atherosclerosis of native coronary artery without angina pectoris/ LAD 09/08/2016    Back pain     Chronic hepatitis C with cirrhosis     Depression     Hepatoma     Prior to liver transplant    History of colon polyps 09/09/2015    History of transplantation, liver 04/2012    History of vertebral fracture     Hypertension     Immune deficiency disorder     Incisional hernia following transplant 04/09/2014    Liver failure 11/2011    Related to chemotherapy for hepatoma    Liver transplanted 04/2012    NSTEMI (non-ST elevated myocardial infarction) 11/23/2024    Obesity     PVCs (premature ventricular contractions)     Renal dysfunction 11/23/2024    Tobacco abuse 09/09/2016    Trouble in sleeping     Vertebral fracture 1975       Past Surgical History:   Procedure Laterality Date    COLONOSCOPY N/A 1/20/2021    Procedure: COLONOSCOPY;  Surgeon: Emerson Helm MD;  Location: [facility] ENDO;  Service: Endoscopy;  Laterality: N/A;    HERNIA REPAIR Right 2013    incisional    INSERTION OF TUNNELED CENTRAL VENOUS CATHETER (CVC) WITH SUBCUTANEOUS PORT Left 11/6/2024    Procedure: [id number]-RUZQ-L-CATH;  Surgeon: Hao Washburn MD;  Location: [facility] OR;  Service: General;  Laterality: Left;    LIVER TRANSPLANT  April 2012    LYMPH NODE BIOPSY/EXCISION Left 10/16/2024    Procedure: LYMPH NODE BIOPSY/EXCISION;  Surgeon: Hao Washburn MD;  Location: [facility] OR;  Service: General;  Laterality: Left;    MOUTH SURGERY      TONSILLECTOMY  1958       Review of patient's allergies indicates:   Allergen Reactions    Codeine Other (See Comments)     Upset stomach       Current Facility-Administered Medications on File Prior to Encounter   Medication    lactated  ringers infusion    sodium chloride 0.9% flush 2 mL     Current Outpatient Medications on File Prior to Encounter   Medication Sig    acyclovir (ZOVIRAX) 400 MG tablet Take 1 tablet (400 mg total) by mouth 2 (two) times daily. While on chemotherapy    allopurinoL (ZYLOPRIM) 300 MG tablet Take 1 tablet (300 mg total) by mouth once daily.    apixaban (ELIQUIS) 5 mg Tab Take 1 tablet (5 mg total) by mouth 2 (two) times daily.    melatonin 10 mg Tab Take by mouth.    metoprolol succinate (TOPROL-XL) 25 MG 24 hr tablet TAKE 1 TABLET EVERY DAY    predniSONE (DELTASONE) 50 MG Tab Take 2 tablets (100 mg total) by mouth once daily. on days 2,3,4, 5 of each chemotherapy cycle    sulfamethoxazole-trimethoprim 800-160mg (BACTRIM DS) 800-160 mg Tab Take 1 tablet by mouth 3 (three) times a week.    tacrolimus (PROGRAF) 0.5 MG Cap Take 1 capsule (0.5 mg total) by mouth every 12 (twelve) hours.    tamsulosin (FLOMAX) 0.4 mg Cap Take 1 capsule (0.4 mg total) by mouth once daily.    tenofovir alafenamide (VEMLIDY) 25 mg Tab Take 1 tablet by mouth once daily    varenicline (CHANTIX) 0.5 MG Tab Take 1 tablet (0.5 mg total) by mouth once daily.    varenicline (CHANTIX) 1 mg Tab Take 1 tablet (1 mg total) by mouth 2 (two) times daily.    albuterol (VENTOLIN HFA) 90 mcg/actuation inhaler Inhale 2 puffs into the lungs every 6 (six) hours as needed for Wheezing or Shortness of Breath. Rescue    diclofenac sodium (VOLTAREN) 1 % Gel Apply 2 g topically 4 (four) times daily. for 10 days    esomeprazole (NEXIUM) 40 MG capsule Take 1 capsule (40 mg total) by mouth once daily.    fluticasone-umeclidin-vilanter (TRELEGY ELLIPTA) 100-62.5-25 mcg DsDv Inhale 1 puff into the lungs once daily.    HYDROcodone-acetaminophen (NORCO) 5-325 mg per tablet Take 1 tablet by mouth every 6 (six) hours as needed for Pain.    LIDOcaine-prilocaine (EMLA) cream Apply topically as needed.    nicotine (NICODERM CQ) 21 mg/24 hr Place 1 patch onto the skin once daily.     ondansetron (ZOFRAN) 8 MG tablet Take 1 tablet (8 mg total) by mouth every 12 (twelve) hours as needed for Nausea.    prochlorperazine (COMPAZINE) 5 MG tablet Take 1 tablet (5 mg total) by mouth every 6 (six) hours as needed (nausea).    tenofovir alafenamide (VEMLIDY) 25 mg Tab Take 1 tablet (25 mg total) by mouth Daily.     Family History       Problem Relation (Age of Onset)    Cancer Mother, Father, Maternal Grandmother, Other    Diabetes Maternal Uncle          Tobacco Use    Smoking status: Every Day     Current packs/day: 0.75     Average packs/day: 0.7 packs/day for 36.9 years (27.7 ttl pk-yrs)     Types: Cigarettes     Start date: 1988    Smokeless tobacco: Never    Tobacco comments:     Currently smokes a few (3-5) a day as of 6/12/24   Substance and Sexual Activity    Alcohol use: No     Alcohol/week: 0.0 standard drinks of alcohol     Comment: Quit alcohol after some alcohol abuse, prior to his liver transplant    Drug use: No    Sexual activity: Not Currently     Review of Systems   Constitutional:  Positive for appetite change and fatigue. Negative for chills, diaphoresis and fever.        Decreased appetite   HENT: Negative.     Eyes: Negative.  Negative for visual disturbance.   Respiratory: Negative.  Negative for cough, shortness of breath and wheezing.    Cardiovascular:  Positive for chest pain.        Radiating into left arm to left hand  (intermittent)   Gastrointestinal: Negative.  Negative for abdominal distention, abdominal pain, blood in stool, nausea and vomiting.   Endocrine: Negative.    Genitourinary:  Negative for dysuria.   Musculoskeletal:  Positive for gait problem.   Skin: Negative.    Allergic/Immunologic: Positive for immunocompromised state.   Neurological:  Positive for weakness and numbness. Negative for dizziness, tremors, seizures, syncope, facial asymmetry, speech difficulty, light-headedness and headaches.        Weakness in legs, intermittent tingling left hand    Psychiatric/Behavioral: Negative.       Objective:     Vital Signs (Most Recent):  Temp: 97.8 °F (36.6 °C) (11/23/24 1817)  Pulse: 99 (11/23/24 2132)  Resp: (!) 21 (11/23/24 2132)  BP: (!) 110/58 (11/23/24 2132)  SpO2: 100 % (11/23/24 2132) Vital Signs (24h Range):  Temp:  [97.8 °F (36.6 °C)] 97.8 °F (36.6 °C)  Pulse:  [] 99  Resp:  [16-21] 21  SpO2:  [98 %-100 %] 100 %  BP: ()/(57-60) 110/58     Weight: 95.7 kg (211 lb)  Body mass index is 28.62 kg/m².     Physical Exam  Vitals reviewed.   Constitutional:       General: He is not in acute distress.     Appearance: He is ill-appearing. He is not toxic-appearing or diaphoretic.   HENT:      Head: Normocephalic.      Nose: Nose normal.      Mouth/Throat:      Mouth: Mucous membranes are moist.      Pharynx: Oropharynx is clear.   Eyes:      Extraocular Movements: Extraocular movements intact.      Pupils: Pupils are equal, round, and reactive to light.   Cardiovascular:      Rate and Rhythm: Normal rate and regular rhythm.      Pulses: Normal pulses.      Heart sounds: Normal heart sounds.   Pulmonary:      Effort: Pulmonary effort is normal. No respiratory distress.      Breath sounds: Normal breath sounds. No stridor. No wheezing, rhonchi or rales.   Chest:      Chest wall: No tenderness.   Abdominal:      General: Bowel sounds are normal. There is no distension.      Palpations: Abdomen is soft.      Tenderness: There is no abdominal tenderness. There is no right CVA tenderness, left CVA tenderness, guarding or rebound.   Musculoskeletal:         General: Normal range of motion.      Cervical back: Neck supple.      Right lower leg: No edema.      Left lower leg: No edema.   Skin:     General: Skin is warm and dry.      Capillary Refill: Capillary refill takes less than 2 seconds.      Coloration: Skin is pale.   Neurological:      General: No focal deficit present.      Mental Status: He is alert and oriented to person, place, and time.      Motor:  Weakness present.      Comments: Generalized weakness   Psychiatric:         Mood and Affect: Mood normal.         Behavior: Behavior normal.         Thought Content: Thought content normal.              CRANIAL NERVES     CN III, IV, VI   Pupils are equal, round, and reactive to light.       Significant Labs: All pertinent labs within the past 24 hours have been reviewed.    CBC:   Recent Labs   Lab 11/23/24 2009   WBC 3.04*   HGB 8.1*   HCT 24.0*   PLT 41*     CMP:   Recent Labs   Lab 11/23/24 2009   *   K 3.7      CO2 20*   *   BUN 14   CREATININE 2.2*   CALCIUM 8.5*   PROT 6.4   ALBUMIN 3.1*   BILITOT 0.5   ALKPHOS 112   AST 14   ALT 19   ANIONGAP 8     Coagulation:   Recent Labs   Lab 11/23/24 2009   INR 1.1   APTT 30.0     Troponin:   Recent Labs   Lab 11/23/24 2009   TROPONINI 0.807*         Influenza A & B negative  SARS-Covid-19 negative    EKG reviewed -- ST, , no ST changes noted    Significant Imaging: I have reviewed all pertinent imaging results/findings within the past 24 hours.    CXR reviewed, per my interpretation -- lungs appear clear

## 2024-11-24 NOTE — ASSESSMENT & PLAN NOTE
Per CTA chest 2015 -- showed atherosclerosis of LAD  Check Echo  Cardiology consulted per ED  No ST changes noted on EKG  ASA ordered, had normal lipid panel recently  Cardiac monitoring ordered

## 2024-11-24 NOTE — PT/OT/SLP EVAL
Occupational Therapy   Evaluation and Discharge Note    Name: Lj Coleman  MRN: [id number]  Admitting Diagnosis: NSTEMI (non-ST elevated myocardial infarction)  Recent Surgery: * No surgery found *      Recommendations:     Discharge Recommendations:    Discharge Equipment Recommendations: none  Barriers to discharge:       Assessment:     Lj Coleman is a 68 y.o. male with a medical diagnosis of NSTEMI (non-ST elevated myocardial infarction). At this time, patient is functioning at their prior level of function and does not require further acute OT services.     Plan:     During this hospitalization, patient does not require further acute OT services.  Please re-consult if situation changes.    Plan of Care Reviewed with: patient    Subjective     Chief Complaint:   Patient/Family Comments/goals:     Occupational Profile:  Living Environment: lives alone  in mobile home 5 steps to enter and b rail  Previous level of function: (I) with adl's and functional mobility  Roles and Routines: still drive and do not work  Equipment Used at home: unkown  Assistance upon Discharge: home    Pain/Comfort:  Pain Rating 1: 0/10    Patients cultural, spiritual, Yazidism conflicts given the current situation:      Objective:     Communicated with: nurse Ortiz and [facility] chart review prior to session.  Patient found sitting edge of bed with   upon OT entry to room.    General Precautions: Standard, fall  Orthopedic Precautions: N/A  Braces: N/A  Respiratory Status: Room air     Occupational Performance:  Functional Mobility/Transfers:  Patient completed Sit <> Stand Transfer with independence  with  no assistive device   Functional Mobility: pt ambulated 20 feet (I)'ly with no ae    Activities of Daily Living:  Upper Body Dressing: independence .  Lower Body Dressing: independence .  Toileting: independence per pt report    Cognitive/Visual Perceptual:  Cognitive/Psychosocial Skills:     -       Oriented to:  Person, Place, Time, and Situation   -       Follows Commands/attention:Follows multistep  commands  -       Communication: clear/fluent  -       Memory: No Deficits noted  -       Safety awareness/insight to disability: impaired     Physical Exam:  Upper Extremity Range of Motion:     -       Right Upper Extremity: WFL  -       Left Upper Extremity: WFL  Upper Extremity Strength:    -       Right Upper Extremity: WFL  -       Left Upper Extremity: WFL   Strength:    -       Right Upper Extremity: WFL  -       Left Upper Extremity: WFL    AMPAC 6 Click ADL:  AMPAC Total Score: 24    Treatment & Education:  Patient educated on role of OT in acute setting and benefits of OOB activity. Encouraged OOB throughout the course of hospitalization to decrease risk of hospital related debility. Patient stated understanding and in agreement with POC.    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 10/31/2024    Anemia 11/23/2024    Angina pectoris     Arthritis     back    Atherosclerosis of native coronary artery without angina pectoris/ LAD 09/08/2016    Back pain     Chronic hepatitis C with cirrhosis     COPD (chronic obstructive pulmonary disease)     Depression     Hepatoma     Prior to liver transplant    History of colon polyps 09/09/2015    History of transplantation, liver 04/2012    History of vertebral fracture     Hypertension     Immune deficiency disorder     Incisional hernia following transplant  04/09/2014    Liver failure 11/2011    Related to chemotherapy for hepatoma    Liver transplanted 04/2012    NSTEMI (non-ST elevated myocardial infarction) 11/23/2024    Obesity     PVCs (premature ventricular contractions)     Renal dysfunction 11/23/2024    Tobacco abuse 09/09/2016    Trouble in sleeping     Vertebral fracture 1975         Past Surgical History:   Procedure Laterality Date    COLONOSCOPY N/A 1/20/2021    Procedure: COLONOSCOPY;  Surgeon: Emerson Helm MD;  Location: [facility] ENDO;  Service: Endoscopy;  Laterality: N/A;    HERNIA REPAIR Right 2013    incisional    INSERTION OF TUNNELED CENTRAL VENOUS CATHETER (CVC) WITH SUBCUTANEOUS PORT Left 11/6/2024    Procedure: [id number]-CATH;  Surgeon: Hao Washburn MD;  Location: [facility] OR;  Service: General;  Laterality: Left;    LIVER TRANSPLANT  April 2012    LYMPH NODE BIOPSY/EXCISION Left 10/16/2024    Procedure: LYMPH NODE BIOPSY/EXCISION;  Surgeon: Hao Washburn MD;  Location: [facility] OR;  Service: General;  Laterality: Left;    MOUTH SURGERY      TONSILLECTOMY  1958       Time Tracking:     OT Date of Treatment: 11/24/24  OT Start Time: 1350  OT Stop Time: 1409  OT Total Time (min): 19 min    Billable Minutes:Evaluation 8 minutes  Therapeutic Activity 9 minutes    11/24/2024

## 2024-11-25 ENCOUNTER — TELEPHONE (OUTPATIENT)
Dept: HEMATOLOGY/ONCOLOGY | Facility: CLINIC | Age: 69
End: 2024-11-25
Payer: MEDICARE

## 2024-11-25 ENCOUNTER — TELEPHONE (OUTPATIENT)
Dept: CARDIOLOGY | Facility: HOSPITAL | Age: 69
End: 2024-11-25
Payer: MEDICARE

## 2024-11-25 VITALS
SYSTOLIC BLOOD PRESSURE: 144 MMHG | TEMPERATURE: 98 F | HEART RATE: 100 BPM | RESPIRATION RATE: 18 BRPM | OXYGEN SATURATION: 100 % | BODY MASS INDEX: 31.84 KG/M2 | HEIGHT: 69 IN | WEIGHT: 214.94 LBS | DIASTOLIC BLOOD PRESSURE: 72 MMHG

## 2024-11-25 LAB
ALBUMIN SERPL BCP-MCNC: 2.8 G/DL (ref 3.5–5.2)
ALP SERPL-CCNC: 121 U/L (ref 40–150)
ALT SERPL W/O P-5'-P-CCNC: 18 U/L (ref 10–44)
ANION GAP SERPL CALC-SCNC: 6 MMOL/L (ref 8–16)
AST SERPL-CCNC: 15 U/L (ref 10–40)
BASOPHILS NFR BLD: 0 % (ref 0–1.9)
BILIRUB DIRECT SERPL-MCNC: 0.2 MG/DL (ref 0.1–0.3)
BILIRUB SERPL-MCNC: 0.3 MG/DL (ref 0.1–1)
BUN SERPL-MCNC: 13 MG/DL (ref 8–23)
CALCIUM SERPL-MCNC: 7.3 MG/DL (ref 8.7–10.5)
CHLORIDE SERPL-SCNC: 112 MMOL/L (ref 95–110)
CO2 SERPL-SCNC: 19 MMOL/L (ref 23–29)
CREAT SERPL-MCNC: 1.9 MG/DL (ref 0.5–1.4)
DACRYOCYTES BLD QL SMEAR: ABNORMAL
DIFFERENTIAL METHOD BLD: ABNORMAL
EOSINOPHIL NFR BLD: 0 % (ref 0–8)
ERYTHROCYTE [DISTWIDTH] IN BLOOD BY AUTOMATED COUNT: 15.4 % (ref 11.5–14.5)
EST. GFR  (NO RACE VARIABLE): 37 ML/MIN/1.73 M^2
GLUCOSE SERPL-MCNC: 129 MG/DL (ref 70–110)
HCT VFR BLD AUTO: 24.2 % (ref 40–54)
HGB BLD-MCNC: 7.9 G/DL (ref 14–18)
IMM GRANULOCYTES # BLD AUTO: ABNORMAL K/UL (ref 0–0.04)
IMM GRANULOCYTES NFR BLD AUTO: ABNORMAL % (ref 0–0.5)
LYMPHOCYTES NFR BLD: 16 % (ref 18–48)
MAGNESIUM SERPL-MCNC: 2.4 MG/DL (ref 1.6–2.6)
MCH RBC QN AUTO: 31.3 PG (ref 27–31)
MCHC RBC AUTO-ENTMCNC: 32.6 G/DL (ref 32–36)
MCV RBC AUTO: 96 FL (ref 82–98)
METAMYELOCYTES NFR BLD MANUAL: 1 %
MONOCYTES NFR BLD: 10 % (ref 4–15)
MYELOCYTES NFR BLD MANUAL: 1 %
NEUTROPHILS NFR BLD: 57 % (ref 38–73)
NEUTS BAND NFR BLD MANUAL: 15 %
NRBC BLD-RTO: 0 /100 WBC
OHS QRS DURATION: 72 MS
OHS QRS DURATION: 72 MS
OHS QRS DURATION: 74 MS
OHS QTC CALCULATION: 452 MS
OHS QTC CALCULATION: 478 MS
OHS QTC CALCULATION: 489 MS
OVALOCYTES BLD QL SMEAR: ABNORMAL
PLATELET # BLD AUTO: 64 K/UL (ref 150–450)
PLATELET BLD QL SMEAR: ABNORMAL
PMV BLD AUTO: 12.5 FL (ref 9.2–12.9)
POTASSIUM SERPL-SCNC: 5 MMOL/L (ref 3.5–5.1)
PROT SERPL-MCNC: 5.4 G/DL (ref 6–8.4)
RBC # BLD AUTO: 2.52 M/UL (ref 4.6–6.2)
SODIUM SERPL-SCNC: 137 MMOL/L (ref 136–145)
TACROLIMUS BLD-MCNC: 4.6 NG/ML (ref 5–15)
WBC # BLD AUTO: 3.82 K/UL (ref 3.9–12.7)

## 2024-11-25 PROCEDURE — 85027 COMPLETE CBC AUTOMATED: CPT | Mod: HCNC | Performed by: INTERNAL MEDICINE

## 2024-11-25 PROCEDURE — 97162 PT EVAL MOD COMPLEX 30 MIN: CPT | Mod: HCNC

## 2024-11-25 PROCEDURE — 93010 ELECTROCARDIOGRAM REPORT: CPT | Mod: HCNC,,, | Performed by: INTERNAL MEDICINE

## 2024-11-25 PROCEDURE — 97116 GAIT TRAINING THERAPY: CPT | Mod: HCNC

## 2024-11-25 PROCEDURE — 80197 ASSAY OF TACROLIMUS: CPT | Mod: HCNC | Performed by: INTERNAL MEDICINE

## 2024-11-25 PROCEDURE — 25000003 PHARM REV CODE 250: Mod: HCNC | Performed by: NURSE PRACTITIONER

## 2024-11-25 PROCEDURE — 36415 COLL VENOUS BLD VENIPUNCTURE: CPT | Mod: HCNC | Performed by: NURSE PRACTITIONER

## 2024-11-25 PROCEDURE — 63600175 PHARM REV CODE 636 W HCPCS: Mod: HCNC | Performed by: INTERNAL MEDICINE

## 2024-11-25 PROCEDURE — 80076 HEPATIC FUNCTION PANEL: CPT | Mod: HCNC | Performed by: INTERNAL MEDICINE

## 2024-11-25 PROCEDURE — 80048 BASIC METABOLIC PNL TOTAL CA: CPT | Mod: HCNC | Performed by: INTERNAL MEDICINE

## 2024-11-25 PROCEDURE — 83735 ASSAY OF MAGNESIUM: CPT | Mod: HCNC | Performed by: NURSE PRACTITIONER

## 2024-11-25 PROCEDURE — 25000003 PHARM REV CODE 250: Mod: HCNC | Performed by: INTERNAL MEDICINE

## 2024-11-25 PROCEDURE — 93005 ELECTROCARDIOGRAM TRACING: CPT | Mod: HCNC

## 2024-11-25 PROCEDURE — 85007 BL SMEAR W/DIFF WBC COUNT: CPT | Mod: HCNC | Performed by: INTERNAL MEDICINE

## 2024-11-25 RX ORDER — PANTOPRAZOLE SODIUM 40 MG/1
40 TABLET, DELAYED RELEASE ORAL DAILY
Qty: 30 TABLET | Refills: 0 | Status: SHIPPED | OUTPATIENT
Start: 2024-11-26 | End: 2024-11-25 | Stop reason: HOSPADM

## 2024-11-25 RX ORDER — NAPROXEN SODIUM 220 MG/1
81 TABLET, FILM COATED ORAL DAILY
Qty: 30 TABLET | Refills: 0 | Status: SHIPPED | OUTPATIENT
Start: 2024-11-26 | End: 2024-12-26

## 2024-11-25 RX ORDER — NITROGLYCERIN 0.4 MG/1
0.4 TABLET SUBLINGUAL EVERY 5 MIN PRN
Qty: 25 TABLET | Refills: 0 | Status: SHIPPED | OUTPATIENT
Start: 2024-11-25 | End: 2024-12-25

## 2024-11-25 RX ORDER — ATORVASTATIN CALCIUM 80 MG/1
80 TABLET, FILM COATED ORAL DAILY
Qty: 30 TABLET | Refills: 0 | Status: SHIPPED | OUTPATIENT
Start: 2024-11-26 | End: 2024-12-26

## 2024-11-25 RX ORDER — ALLOPURINOL 300 MG/1
300 TABLET ORAL DAILY
Status: DISCONTINUED | OUTPATIENT
Start: 2024-11-25 | End: 2024-11-25 | Stop reason: HOSPADM

## 2024-11-25 RX ORDER — PANTOPRAZOLE SODIUM 40 MG/1
40 TABLET, DELAYED RELEASE ORAL DAILY
Status: DISCONTINUED | OUTPATIENT
Start: 2024-11-25 | End: 2024-11-25 | Stop reason: HOSPADM

## 2024-11-25 RX ADMIN — ACYCLOVIR 400 MG: 400 TABLET ORAL at 09:11

## 2024-11-25 RX ADMIN — MUPIROCIN: 20 OINTMENT TOPICAL at 09:11

## 2024-11-25 RX ADMIN — PANTOPRAZOLE SODIUM 40 MG: 40 TABLET, DELAYED RELEASE ORAL at 09:11

## 2024-11-25 RX ADMIN — ALLOPURINOL 300 MG: 300 TABLET ORAL at 09:11

## 2024-11-25 RX ADMIN — METOPROLOL SUCCINATE 25 MG: 25 TABLET, EXTENDED RELEASE ORAL at 09:11

## 2024-11-25 RX ADMIN — TAMSULOSIN HYDROCHLORIDE 0.4 MG: 0.4 CAPSULE ORAL at 09:11

## 2024-11-25 RX ADMIN — ASPIRIN 81 MG CHEWABLE TABLET 81 MG: 81 TABLET CHEWABLE at 09:11

## 2024-11-25 RX ADMIN — APIXABAN 5 MG: 2.5 TABLET, FILM COATED ORAL at 09:11

## 2024-11-25 RX ADMIN — TACROLIMUS 0.5 MG: 0.5 CAPSULE ORAL at 09:11

## 2024-11-25 NOTE — TELEPHONE ENCOUNTER
Please arrange hospital f/u any MD this week or next. Needs MPI stress test placed at visit.    Thanks

## 2024-11-25 NOTE — PLAN OF CARE
[facility] - Med Surg  Discharge Final Note    Primary Care Provider: Isabella Sutton DO    Expected Discharge Date: 11/25/2024    Final Discharge Note (most recent)       Final Note - 11/25/24 1325          Final Note    Assessment Type Final Discharge Note     Anticipated Discharge Disposition Home or Self Care        Post-Acute Status    Discharge Delays None known at this time                   No needs at time of chart review. KM,MSW     Important Message from Medicare

## 2024-11-25 NOTE — PLAN OF CARE
Problem: Adult Inpatient Plan of Care  Goal: Plan of Care Review  11/25/2024 0430 by Santa Cervantes RN  Outcome: Progressing  11/25/2024 0429 by Santa Cervantes RN  Outcome: Progressing  Goal: Patient-Specific Goal (Individualized)  11/25/2024 0430 by Santa Cervantes RN  Outcome: Progressing  11/25/2024 0429 by Santa Cervantes RN  Outcome: Progressing  Goal: Absence of Hospital-Acquired Illness or Injury  11/25/2024 0430 by Santa Cervantes RN  Outcome: Progressing  11/25/2024 0429 by Santa Cervantes RN  Outcome: Progressing  Goal: Optimal Comfort and Wellbeing  11/25/2024 0430 by Santa Cervantes RN  Outcome: Progressing  11/25/2024 0429 by Santa Cervantes RN  Outcome: Progressing  Goal: Readiness for Transition of Care  11/25/2024 0430 by Santa Cervantes RN  Outcome: Progressing  11/25/2024 0429 by Santa Cervantes RN  Outcome: Progressing     Problem: Acute Coronary Syndrome  Goal: Optimal Adaptation to Illness  11/25/2024 0430 by Santa Cervantes RN  Outcome: Progressing  11/25/2024 0429 by Santa Cervantes RN  Outcome: Progressing  Goal: Absence of Cardiac-Related Pain  11/25/2024 0430 by Santa Cervantes RN  Outcome: Progressing  11/25/2024 0429 by Santa Cervantes RN  Outcome: Progressing  Goal: Normalized Cardiac Rhythm  11/25/2024 0430 by Santa Cervantes RN  Outcome: Progressing  11/25/2024 0429 by Santa Cervantes RN  Outcome: Progressing  Goal: Effective Cardiac Pump Function  11/25/2024 0430 by Santa Cervantes RN  Outcome: Progressing  11/25/2024 0429 by Santa Cervantes RN  Outcome: Progressing  Goal: Adequate Tissue Perfusion  11/25/2024 0430 by Santa Cervantes RN  Outcome: Progressing  11/25/2024 0429 by Santa Cervantes RN  Outcome: Progressing     Problem: Cardiac Catheterization (Diagnostic/Interventional)  Goal: Absence of Bleeding  11/25/2024 0430 by Santa Cervantes RN  Outcome: Progressing  11/25/2024 0429 by Santa Cervantes RN  Outcome: Progressing  Goal: Absence of Contrast-Induced Injury  11/25/2024 0430 by Billy  BRANDI Pratt  Outcome: Progressing  11/25/2024 0429 by Santa Cervantes RN  Outcome: Progressing  Goal: Stable Heart Rate and Rhythm  11/25/2024 0430 by Santa Cervantes RN  Outcome: Progressing  11/25/2024 0429 by Santa Cervantes RN  Outcome: Progressing  Goal: Absence of Embolism Signs and Symptoms  11/25/2024 0430 by Santa Cervantes RN  Outcome: Progressing  11/25/2024 0429 by Santa Cervantes RN  Outcome: Progressing  Goal: Anesthesia/Sedation Recovery  11/25/2024 0430 by Santa Cervantes RN  Outcome: Progressing  11/25/2024 0429 by Santa Cervantes RN  Outcome: Progressing  Goal: Optimal Pain Control and Function  11/25/2024 0430 by Santa Cervantes RN  Outcome: Progressing  11/25/2024 0429 by Santa Cervantes RN  Outcome: Progressing  Goal: Absence of Vascular Access Complication  11/25/2024 0430 by Santa Cervantes RN  Outcome: Progressing  11/25/2024 0429 by Santa Cervantes RN  Outcome: Progressing     Problem: Acute Kidney Injury/Impairment  Goal: Fluid and Electrolyte Balance  11/25/2024 0430 by Santa Cervantes RN  Outcome: Progressing  11/25/2024 0429 by Santa Cervantes RN  Outcome: Progressing  Goal: Improved Oral Intake  11/25/2024 0430 by Santa Cervantes RN  Outcome: Progressing  11/25/2024 0429 by Santa Cervantes RN  Outcome: Progressing  Goal: Effective Renal Function  11/25/2024 0430 by Santa Cervantes RN  Outcome: Progressing  11/25/2024 0429 by Santa Cervantes RN  Outcome: Progressing     Problem: Fall Injury Risk  Goal: Absence of Fall and Fall-Related Injury  11/25/2024 0430 by Santa Cervantes RN  Outcome: Progressing  11/25/2024 0429 by Santa Cervantes RN  Outcome: Progressing

## 2024-11-25 NOTE — PT/OT/SLP EVAL
Physical Therapy Evaluation and Discharge Note    Patient Name:  Lj Coleman   MRN:  [id number]    Recommendations:     Discharge Recommendations: No Therapy Indicated  Discharge Equipment Recommendations: none   Barriers to discharge: None    Assessment:     Lj Coleman is a 68 y.o. male admitted with a medical diagnosis of Troponin level elevated. .  At this time, patient is functioning at their prior level of function and does not require further acute PT services.     Recent Surgery: * No surgery found *      Plan:     During this hospitalization, patient does not require further acute PT services.  Please re-consult if situation changes.      Subjective     Chief Complaint: PAIN ALL OVER CHRNOIC   Patient/Family Comments/goals: GO HOME TO FEED CATS  Pain/Comfort:  Pain Rating 1: 5/10  Location 1:  (ALL OVER CHRONIC PAIN)  Pain Rating Post-Intervention 1: 5/10    Patients cultural, spiritual, Spiritism conflicts given the current situation:      Living Environment:  PT LIVES AT HOME ALONE IN A TRAILER AND HAS 4 STEPS TO ENTER   Prior to admission, patients level of function was ADRIANE WITH SPC , DRIVES AND DOESN'T WORK.  Equipment used at home: cane, straight, grab bar.  DME owned (not currently used): none.  Upon discharge, patient will have assistance from UNKNOWN .    Objective:     Communicated with NURSE Q AND EPIC CHART REVIEW  prior to session.  Patient found supine with peripheral IV, telemetry upon PT entry to room.    General Precautions: Standard,      Orthopedic Precautions:N/A   Braces: N/A  Respiratory Status: Room air    Exams:  Cognitive Exam:  Patient is oriented to Person, Place, Time, and Situation  RLE ROM: WFL  RLE Strength: WFL  LLE ROM: WFL  LLE Strength: WFL    Functional Mobility:  Bed Mobility:     Rolling Left:  independence  Scooting: independence  Supine to Sit: independence  Transfers:     Sit to Stand:  modified independence with straight cane  Bed to Chair:  modified independence with  straight cane  using  Stand Pivot  Gait: PT GT TRAINED X 200' MOD I WITH SPC     AM-PAC 6 CLICK MOBILITY  Total Score:21       Treatment and Education:  GT. BELT AND  SOCKS DONNED PRIOR TO OOB MOBILITY.  PT GT TRAINED MOD I WITH NO LOB. PT REPORTED THIS TO BE HIS PLOF. PT T/F TO EOB AFTER GT ASSESSMENT. P.T. EDUCATED PT ON NO NEED FOR ACUTE P.T. AND CONT AMBULATING AND OOB FOR ALL MEALS. PT REPORTED UNDERSTANDING.     AM-PAC 6 CLICK MOBILITY  Total Score:21     Patient left sitting edge of bed with call button in reach.    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 10/31/2024    Anemia 11/23/2024    Angina pectoris     Arthritis     back    Atherosclerosis of native coronary artery without angina pectoris/ LAD 09/08/2016    Back pain     Chronic hepatitis C with cirrhosis     COPD (chronic obstructive pulmonary disease)     Depression     Hepatoma     Prior to liver transplant    History of colon polyps 09/09/2015    History of transplantation, liver 04/2012    History of vertebral fracture     Hypertension     Immune deficiency disorder     Incisional hernia following transplant 04/09/2014    Liver failure 11/2011    Related to chemotherapy for hepatoma    Liver transplanted 04/2012    NSTEMI (non-ST elevated myocardial infarction) 11/23/2024    Obesity     PVCs (premature ventricular contractions)     Renal dysfunction 11/23/2024    Tobacco abuse 09/09/2016    Trouble in sleeping     Vertebral fracture 1975       Past Surgical History:   Procedure Laterality Date    COLONOSCOPY N/A 1/20/2021    Procedure: COLONOSCOPY;  Surgeon: Emerson Helm MD;  Location: [facility];  Service: Endoscopy;  Laterality: N/A;    HERNIA  REPAIR Right 2013    incisional    INSERTION OF TUNNELED CENTRAL VENOUS CATHETER (CVC) WITH SUBCUTANEOUS PORT Left 11/6/2024    Procedure: [id number];  Surgeon: Hao Washburn MD;  Location: [facility] OR;  Service: General;  Laterality: Left;    LIVER TRANSPLANT  April 2012    LYMPH NODE BIOPSY/EXCISION Left 10/16/2024    Procedure: LYMPH NODE BIOPSY/EXCISION;  Surgeon: Hao Washburn MD;  Location: [facility] OR;  Service: General;  Laterality: Left;    MOUTH SURGERY      TONSILLECTOMY  1958       Time Tracking:     PT Received On: 11/25/24  PT Start Time: 0755     PT Stop Time: 0820  PT Total Time (min): 25 min     Billable Minutes: Evaluation 15 and Gait Training 10      11/25/2024

## 2024-11-25 NOTE — CONSULTS
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 Male admitted for Troponin level elevated. Pt currently on a Cardiac diet. Visited pt at bedside, pt stated that he has a good appetite and hungry, 100% PO intake charted for breakfast. Pt stated his appetite was so/so at home and only consuming 1 meal/day since starting chemotherapy. Discussed protein/calorie benefits of ONS's, pt receptive to try, RD added Suplena to pt's orders and trays. Pt reported that he has access to adequate food but hard to sometimes afford healthy foods. Pt stated his UBW is 211-218 lbs, current weight charted 11/25/24 214 lbs.     RD educated patient on low sodium, general healthful diet r/t recent hospital diagnosis. Recommended a well balanced diet with a variety of fresh foods, fruits and vegetables (5 cups/day), whole grains (3 oz/day), and fat-free or low fat dairy. Discussed reading food packages, food labels, and nutrition facts labels to identify nutrient content of foods.       Discussed the importance of limiting sodium to less than 2,000 mg per day. Recommended salt free seasonings and other herbs and spices in meals to enhance flavor without additional sodium.       Discussed dietary sources of cholesterol, the importance of incorporating healthy fats into the diet, and avoiding saturated and trans fats for heart health. For a generally healthy diet, aim for total fat less than 25-35% of calories.       Discussed the importance of fiber (especially soluble fiber), " dietary sources, and a goal intake of >20-30g/day.     Pt expressed understanding and appreciation for nutrition education provided, encouraged pt to read handout and use RD contact information provided with any further questions/concerns he may have, also encouraged pt to request a referral for an outpatient RD to assist with recipes and meal planning, pt very receptive. Pt is currently in pre contemplation/contemplation stage of change and would greatly benefit.     Nutrition Related Social Determinants of Health: SDOH: Adequate food in home environment and Unable to afford healthy foods      Visual NFPE performed, pt appears well nourished.   All questions and concerns answered.   Provided handout with dietitian's contact information.   *Please re-consult as needed.   Thank you,   aCrli Cardona, BS, RDN, LDN

## 2024-11-25 NOTE — TELEPHONE ENCOUNTER
Pt missed 14 day check in with ALEX Zuleyka d/t being inpt. Called pt and offered to reschedule for tomorrow 11/26 at 1pm with labs before. Pt v/u   Abdominal Pain, N/V/D

## 2024-11-26 ENCOUNTER — LAB VISIT (OUTPATIENT)
Dept: LAB | Facility: HOSPITAL | Age: 69
End: 2024-11-26
Attending: INTERNAL MEDICINE
Payer: MEDICARE

## 2024-11-26 ENCOUNTER — PATIENT OUTREACH (OUTPATIENT)
Dept: ADMINISTRATIVE | Facility: CLINIC | Age: 69
End: 2024-11-26
Payer: MEDICARE

## 2024-11-26 ENCOUNTER — OFFICE VISIT (OUTPATIENT)
Dept: HEMATOLOGY/ONCOLOGY | Facility: CLINIC | Age: 69
End: 2024-11-26
Payer: MEDICARE

## 2024-11-26 VITALS
WEIGHT: 209.31 LBS | TEMPERATURE: 97 F | SYSTOLIC BLOOD PRESSURE: 118 MMHG | HEART RATE: 102 BPM | OXYGEN SATURATION: 98 % | BODY MASS INDEX: 31 KG/M2 | HEIGHT: 69 IN | DIASTOLIC BLOOD PRESSURE: 75 MMHG

## 2024-11-26 DIAGNOSIS — I82.722 CHRONIC DEEP VEIN THROMBOSIS (DVT) OF OTHER VEIN OF LEFT UPPER EXTREMITY: ICD-10-CM

## 2024-11-26 DIAGNOSIS — C84.75 ANAPLASTIC ALK-NEGATIVE LARGE CELL LYMPHOMA OF LYMPH NODE OF LOWER EXTREMITY: ICD-10-CM

## 2024-11-26 DIAGNOSIS — C84.70 ANAPLASTIC ALK-NEGATIVE LARGE CELL LYMPHOMA, UNSPECIFIED BODY REGION: ICD-10-CM

## 2024-11-26 DIAGNOSIS — T45.1X5A IMMUNODEFICIENCY DUE TO CHEMOTHERAPY: Primary | ICD-10-CM

## 2024-11-26 DIAGNOSIS — D61.818 PANCYTOPENIA: ICD-10-CM

## 2024-11-26 DIAGNOSIS — Z79.899 IMMUNODEFICIENCY DUE TO CHEMOTHERAPY: Primary | ICD-10-CM

## 2024-11-26 DIAGNOSIS — D84.821 IMMUNODEFICIENCY DUE TO CHEMOTHERAPY: Primary | ICD-10-CM

## 2024-11-26 DIAGNOSIS — C85.80 LARGE CELL LYMPHOMA: ICD-10-CM

## 2024-11-26 DIAGNOSIS — Z94.4 LIVER TRANSPLANTED: ICD-10-CM

## 2024-11-26 DIAGNOSIS — Z86.2 HISTORY OF THROMBOCYTOPENIA: ICD-10-CM

## 2024-11-26 LAB
ALBUMIN SERPL BCP-MCNC: 3 G/DL (ref 3.5–5.2)
ALP SERPL-CCNC: 129 U/L (ref 40–150)
ALT SERPL W/O P-5'-P-CCNC: 22 U/L (ref 10–44)
ANION GAP SERPL CALC-SCNC: 8 MMOL/L (ref 8–16)
ANISOCYTOSIS BLD QL SMEAR: SLIGHT
AST SERPL-CCNC: 20 U/L (ref 10–40)
BASOPHILS NFR BLD: 0 % (ref 0–1.9)
BILIRUB SERPL-MCNC: 0.4 MG/DL (ref 0.1–1)
BUN SERPL-MCNC: 14 MG/DL (ref 8–23)
CALCIUM SERPL-MCNC: 8.1 MG/DL (ref 8.7–10.5)
CHLORIDE SERPL-SCNC: 111 MMOL/L (ref 95–110)
CO2 SERPL-SCNC: 20 MMOL/L (ref 23–29)
CREAT SERPL-MCNC: 1.9 MG/DL (ref 0.5–1.4)
DIFFERENTIAL METHOD BLD: ABNORMAL
EOSINOPHIL NFR BLD: 0 % (ref 0–8)
ERYTHROCYTE [DISTWIDTH] IN BLOOD BY AUTOMATED COUNT: 15.6 % (ref 11.5–14.5)
EST. GFR  (NO RACE VARIABLE): 37 ML/MIN/1.73 M^2
GLUCOSE SERPL-MCNC: 113 MG/DL (ref 70–110)
HCT VFR BLD AUTO: 25.3 % (ref 40–54)
HGB BLD-MCNC: 8.2 G/DL (ref 14–18)
IMM GRANULOCYTES # BLD AUTO: ABNORMAL K/UL (ref 0–0.04)
IMM GRANULOCYTES NFR BLD AUTO: ABNORMAL % (ref 0–0.5)
LYMPHOCYTES NFR BLD: 15 % (ref 18–48)
MCH RBC QN AUTO: 32 PG (ref 27–31)
MCHC RBC AUTO-ENTMCNC: 32.4 G/DL (ref 32–36)
MCV RBC AUTO: 99 FL (ref 82–98)
METAMYELOCYTES NFR BLD MANUAL: 8 %
MONOCYTES NFR BLD: 10 % (ref 4–15)
MYELOCYTES NFR BLD MANUAL: 2 %
NEUTROPHILS NFR BLD: 56 % (ref 38–73)
NEUTS BAND NFR BLD MANUAL: 9 %
NRBC BLD-RTO: 0 /100 WBC
OVALOCYTES BLD QL SMEAR: ABNORMAL
PLATELET # BLD AUTO: 117 K/UL (ref 150–450)
PLATELET BLD QL SMEAR: ABNORMAL
PMV BLD AUTO: 11.3 FL (ref 9.2–12.9)
POIKILOCYTOSIS BLD QL SMEAR: SLIGHT
POLYCHROMASIA BLD QL SMEAR: ABNORMAL
POTASSIUM SERPL-SCNC: 4.7 MMOL/L (ref 3.5–5.1)
PROT SERPL-MCNC: 6.2 G/DL (ref 6–8.4)
RBC # BLD AUTO: 2.56 M/UL (ref 4.6–6.2)
SODIUM SERPL-SCNC: 139 MMOL/L (ref 136–145)
URATE SERPL-MCNC: 3.5 MG/DL (ref 3.4–7)
WBC # BLD AUTO: 8.15 K/UL (ref 3.9–12.7)

## 2024-11-26 PROCEDURE — 84550 ASSAY OF BLOOD/URIC ACID: CPT | Mod: HCNC | Performed by: INTERNAL MEDICINE

## 2024-11-26 PROCEDURE — 3078F DIAST BP <80 MM HG: CPT | Mod: HCNC,CPTII,S$GLB, | Performed by: NURSE PRACTITIONER

## 2024-11-26 PROCEDURE — 1160F RVW MEDS BY RX/DR IN RCRD: CPT | Mod: HCNC,CPTII,S$GLB, | Performed by: NURSE PRACTITIONER

## 2024-11-26 PROCEDURE — 3074F SYST BP LT 130 MM HG: CPT | Mod: HCNC,CPTII,S$GLB, | Performed by: NURSE PRACTITIONER

## 2024-11-26 PROCEDURE — 1111F DSCHRG MED/CURRENT MED MERGE: CPT | Mod: HCNC,CPTII,S$GLB, | Performed by: NURSE PRACTITIONER

## 2024-11-26 PROCEDURE — 36415 COLL VENOUS BLD VENIPUNCTURE: CPT | Mod: HCNC | Performed by: INTERNAL MEDICINE

## 2024-11-26 PROCEDURE — 99999 PR PBB SHADOW E&M-EST. PATIENT-LVL V: CPT | Mod: PBBFAC,HCNC,, | Performed by: NURSE PRACTITIONER

## 2024-11-26 PROCEDURE — 1159F MED LIST DOCD IN RCRD: CPT | Mod: HCNC,CPTII,S$GLB, | Performed by: NURSE PRACTITIONER

## 2024-11-26 PROCEDURE — 99215 OFFICE O/P EST HI 40 MIN: CPT | Mod: HCNC,S$GLB,, | Performed by: NURSE PRACTITIONER

## 2024-11-26 PROCEDURE — 85027 COMPLETE CBC AUTOMATED: CPT | Mod: HCNC | Performed by: INTERNAL MEDICINE

## 2024-11-26 PROCEDURE — 1101F PT FALLS ASSESS-DOCD LE1/YR: CPT | Mod: HCNC,CPTII,S$GLB, | Performed by: NURSE PRACTITIONER

## 2024-11-26 PROCEDURE — 3288F FALL RISK ASSESSMENT DOCD: CPT | Mod: HCNC,CPTII,S$GLB, | Performed by: NURSE PRACTITIONER

## 2024-11-26 PROCEDURE — 1125F AMNT PAIN NOTED PAIN PRSNT: CPT | Mod: HCNC,CPTII,S$GLB, | Performed by: NURSE PRACTITIONER

## 2024-11-26 PROCEDURE — 85007 BL SMEAR W/DIFF WBC COUNT: CPT | Mod: HCNC | Performed by: INTERNAL MEDICINE

## 2024-11-26 PROCEDURE — 80053 COMPREHEN METABOLIC PANEL: CPT | Mod: HCNC | Performed by: INTERNAL MEDICINE

## 2024-11-26 PROCEDURE — 3008F BODY MASS INDEX DOCD: CPT | Mod: HCNC,CPTII,S$GLB, | Performed by: NURSE PRACTITIONER

## 2024-11-26 NOTE — PROGRESS NOTES
Subjective:       Patient ID: Lj Coleman is a 68 y.o. male.    Chief Complaint: Interval lab check s/p cycle 1 chemo     Cancer Staging   No matching staging information was found for the patient.      HPI: 68 y.o male with h/o COPD, DVT, former smoker (recently quit) liver transplant (on anti rejection medications), Hep C, newly diagnosed anaplastic alk negative large cell lymphoma (initiated C1D1 OP A + CHP - BRENTUXIMAB + CYCLOPHOSPHAMIDE + DOXORUBICIN + PREDNISONE 11/13/2024).    With regards to Hep C diagnosis per Hepatology patient is to be on Tenofovir ( Vemlidy 25 mg) daily Until he is on rituxan and for 1 year after stopping. Monitor HBV DNA every 6 months     Patient presenting today for interval labs/symptom check following Cycle 1 chemotherapy. Interval hospitalization for demand ischemia. Patient presented to [facility] 11/23/2024 with c/o chest pain. Troponin was mildly elevated. ER Lab workup shows WBC 3.04, RBC 2.56, hemoglobin 8.1, hematocrit 24.0, platelet count 41 K, PT 11.5, INR 1.1, PTT 30.0, sodium 132, potassium 3.7, CO2 20, BUN 14, creatinine 2.2, glucose 125, calcium 8.5, albumin 3.1, normal LFTs, ammonia level 29, , troponin 0.807. subsequent troponin levels remained essentially unchanged. He has upcoming Cardiovascular follow up planned for stress test evaluation    He notes feeling well overall today. He has been taking his Eliquis once daily. Denies noting abnormal bleeding. He seemed very confused on how to take his home medications which were reviewed in detail. Platelets 117K now >50K. Discussed to resume appropriate AC with Eliquis 5 mg PO BID. Reviewed prednisone and other home chemo medications with patient.   Social History     Socioeconomic History    Marital status:     Highest education level: 10th grade   Tobacco Use    Smoking status: Every Day     Current packs/day: 0.75     Average packs/day: 0.8 packs/day for 36.9 years (27.7 ttl pk-yrs)      Types: Cigarettes     Start date: 1988    Smokeless tobacco: Never    Tobacco comments:     Currently smokes a few (3-5) a day as of 6/12/24   Substance and Sexual Activity    Alcohol use: No     Alcohol/week: 0.0 standard drinks of alcohol     Comment: Quit alcohol after some alcohol abuse, prior to his liver transplant    Drug use: No    Sexual activity: Not Currently     Social Drivers of Health     Financial Resource Strain: Patient Declined (11/24/2024)    Overall Financial Resource Strain (CARDIA)     Difficulty of Paying Living Expenses: Patient declined   Food Insecurity: Patient Declined (11/24/2024)    Hunger Vital Sign     Worried About Running Out of Food in the Last Year: Patient declined     Ran Out of Food in the Last Year: Patient declined   Transportation Needs: Patient Declined (11/24/2024)    TRANSPORTATION NEEDS     Transportation : Patient declined   Physical Activity: Inactive (3/13/2024)    Exercise Vital Sign     Days of Exercise per Week: 0 days     Minutes of Exercise per Session: 0 min   Stress: Patient Declined (11/24/2024)    [employer] - Occupational Stress Questionnaire     Feeling of Stress : Patient declined   Housing Stability: Patient Declined (11/24/2024)    Housing Stability Vital Sign     Unable to Pay for Housing in the Last Year: Patient declined     Homeless in the Last Year: Patient declined       Past Medical History:   Diagnosis Date    Acute deep vein thrombosis (DVT) of left upper extremity 04/04/2023    Alcohol dependence     stopped 2012    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 10/31/2024    Anemia 11/23/2024    Angina pectoris     Arthritis     back    Atherosclerosis of native coronary artery without angina pectoris/ LAD 09/08/2016    Back pain     Chronic hepatitis C with cirrhosis     COPD (chronic obstructive pulmonary disease)     Depression     Hepatoma     Prior to liver transplant    History of  colon polyps 09/09/2015    History of transplantation, liver 04/2012    History of vertebral fracture     Hypertension     Immune deficiency disorder     Incisional hernia following transplant 04/09/2014    Liver failure 11/2011    Related to chemotherapy for hepatoma    Liver transplanted 04/2012    NSTEMI (non-ST elevated myocardial infarction) 11/23/2024    Obesity     PVCs (premature ventricular contractions)     Renal dysfunction 11/23/2024    Tobacco abuse 09/09/2016    Trouble in sleeping     Vertebral fracture 1975       Family History   Problem Relation Name Age of Onset    Cancer Mother          lung    Cancer Father          bladder    Diabetes Maternal Uncle      Cancer Maternal Grandmother          uterine?    Cancer Other      Heart disease Neg Hx      Kidney disease Neg Hx      Stroke Neg Hx         Past Surgical History:   Procedure Laterality Date    COLONOSCOPY N/A 1/20/2021    Procedure: COLONOSCOPY;  Surgeon: Emerson Helm MD;  Location: [facility];  Service: Endoscopy;  Laterality: N/A;    HERNIA REPAIR Right 2013    incisional    INSERTION OF TUNNELED CENTRAL VENOUS CATHETER (CVC) WITH SUBCUTANEOUS PORT Left 11/6/2024    Procedure: [id number];  Surgeon: Hao Washburn MD;  Location: [facility] OR;  Service: General;  Laterality: Left;    LIVER TRANSPLANT  April 2012    LYMPH NODE BIOPSY/EXCISION Left 10/16/2024    Procedure: LYMPH NODE BIOPSY/EXCISION;  Surgeon: Hao Washburn MD;  Location: [facility] OR;  Service: General;  Laterality: Left;    MOUTH SURGERY      TONSILLECTOMY  1958       Review of Systems   Constitutional:  Negative for activity change, appetite change, chills, fatigue, fever and unexpected weight change.   HENT:  Negative for congestion, mouth sores, nosebleeds, sore throat, trouble swallowing and voice change.    Respiratory:  Negative for cough, chest tightness, shortness of breath and wheezing.    Cardiovascular:  Negative for  chest pain and leg swelling.   Gastrointestinal:  Negative for abdominal distention, abdominal pain, blood in stool, constipation, diarrhea, nausea and vomiting.   Genitourinary:  Negative for difficulty urinating, dysuria and hematuria.   Musculoskeletal:  Negative for arthralgias, back pain and myalgias.   Skin:  Negative for pallor, rash and wound.   Neurological:  Negative for dizziness, syncope, weakness and headaches.   Hematological:  Negative for adenopathy. Does not bruise/bleed easily.   Psychiatric/Behavioral:  The patient is nervous/anxious.          Medication List with Changes/Refills   Current Medications    ACYCLOVIR (ZOVIRAX) 400 MG TABLET    Take 1 tablet (400 mg total) by mouth 2 (two) times daily. While on chemotherapy    ALBUTEROL (VENTOLIN HFA) 90 MCG/ACTUATION INHALER    Inhale 2 puffs into the lungs every 6 (six) hours as needed for Wheezing or Shortness of Breath. Rescue    ALLOPURINOL (ZYLOPRIM) 300 MG TABLET    Take 1 tablet (300 mg total) by mouth once daily.    APIXABAN (ELIQUIS) 5 MG TAB    Take 1 tablet (5 mg total) by mouth 2 (two) times daily.    ASPIRIN 81 MG CHEW    Take 1 tablet (81 mg total) by mouth once daily.    ATORVASTATIN (LIPITOR) 80 MG TABLET    Take 1 tablet (80 mg total) by mouth once daily.    DICLOFENAC SODIUM (VOLTAREN) 1 % GEL    Apply 2 g topically 4 (four) times daily. for 10 days    ESOMEPRAZOLE (NEXIUM) 40 MG CAPSULE    Take 1 capsule (40 mg total) by mouth once daily.    FLUTICASONE-UMECLIDIN-VILANTER (TRELEGY ELLIPTA) 100-62.5-25 MCG DSDV    Inhale 1 puff into the lungs once daily.    HYDROCODONE-ACETAMINOPHEN (NORCO) 5-325 MG PER TABLET    Take 1 tablet by mouth every 6 (six) hours as needed for Pain.    LIDOCAINE-PRILOCAINE (EMLA) CREAM    Apply topically as needed.    MELATONIN 10 MG TAB    Take by mouth.    METOPROLOL SUCCINATE (TOPROL-XL) 25 MG 24 HR TABLET    TAKE 1 TABLET EVERY DAY    NICOTINE (NICODERM CQ) 21 MG/24 HR    Place 1 patch onto the skin  once daily.    NITROGLYCERIN (NITROSTAT) 0.4 MG SL TABLET    Place 1 tablet (0.4 mg total) under the tongue every 5 (five) minutes as needed for Chest pain (angina).    ONDANSETRON (ZOFRAN) 8 MG TABLET    Take 1 tablet (8 mg total) by mouth every 12 (twelve) hours as needed for Nausea.    PREDNISONE (DELTASONE) 50 MG TAB    Take 2 tablets (100 mg total) by mouth once daily. on days 2,3,4, 5 of each chemotherapy cycle    PROCHLORPERAZINE (COMPAZINE) 5 MG TABLET    Take 1 tablet (5 mg total) by mouth every 6 (six) hours as needed (nausea).    SULFAMETHOXAZOLE-TRIMETHOPRIM 800-160MG (BACTRIM DS) 800-160 MG TAB    Take 1 tablet by mouth 3 (three) times a week.    TACROLIMUS (PROGRAF) 0.5 MG CAP    Take 1 capsule (0.5 mg total) by mouth every 12 (twelve) hours.    TAMSULOSIN (FLOMAX) 0.4 MG CAP    Take 1 capsule (0.4 mg total) by mouth once daily.    TENOFOVIR ALAFENAMIDE (VEMLIDY) 25 MG TAB    Take 1 tablet (25 mg total) by mouth Daily.    TENOFOVIR ALAFENAMIDE (VEMLIDY) 25 MG TAB    Take 1 tablet by mouth once daily    VARENICLINE (CHANTIX) 0.5 MG TAB    Take 1 tablet (0.5 mg total) by mouth once daily.    VARENICLINE (CHANTIX) 1 MG TAB    Take 1 tablet (1 mg total) by mouth 2 (two) times daily.     Objective:     Vitals:    11/26/24 1253   BP: 118/75   Pulse: 102   Temp: 97.2 °F (36.2 °C)     Lab Results   Component Value Date    WBC 8.15 11/26/2024    HGB 8.2 (L) 11/26/2024    HCT 25.3 (L) 11/26/2024    MCV 99 (H) 11/26/2024     (L) 11/26/2024       BMP  Lab Results   Component Value Date     11/26/2024    K 4.7 11/26/2024     (H) 11/26/2024    CO2 20 (L) 11/26/2024    BUN 14 11/26/2024    CREATININE 1.9 (H) 11/26/2024    CALCIUM 8.1 (L) 11/26/2024    ANIONGAP 8 11/26/2024    EGFRNORACEVR 37 (A) 11/26/2024     Lab Results   Component Value Date    ALT 22 11/26/2024    AST 20 11/26/2024    GGT 32 09/26/2016    ALKPHOS 129 11/26/2024    BILITOT 0.4 11/26/2024         Physical Exam  Vitals reviewed.    Constitutional:       Appearance: He is well-developed.   HENT:      Head: Normocephalic.      Right Ear: Decreased hearing noted.      Left Ear: Decreased hearing noted.      Nose: Nose normal.   Eyes:      General: Lids are normal. No scleral icterus.        Right eye: No discharge.         Left eye: No discharge.      Conjunctiva/sclera: Conjunctivae normal.   Neck:      Thyroid: No thyroid mass.   Cardiovascular:      Rate and Rhythm: Normal rate and regular rhythm.      Heart sounds: Normal heart sounds.   Pulmonary:      Effort: Pulmonary effort is normal. No respiratory distress.      Breath sounds: No wheezing or rales.   Genitourinary:     Comments: deferred  Musculoskeletal:         General: Normal range of motion.      Cervical back: Normal range of motion.   Skin:     General: Skin is warm and dry.   Neurological:      Mental Status: He is alert and oriented to person, place, and time.   Psychiatric:         Speech: Speech normal.         Behavior: Behavior normal. Behavior is cooperative.         Thought Content: Thought content normal.        Assessment:     Problem List Items Addressed This Visit          Immunology/Multi System    Immunodeficiency due to chemotherapy - Primary     Infection precautions            Hematology    History of thrombocytopenia     Platelets 117K. Patient has been taking Eliquis 1 tablet daily. Discussed given platelets >50K continue Eliquis at correct dose 1 tablet BID. 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 Interval hospitalization for evaluation of chest pain. Outpatient Cardiology f/u planned. F/u as previously scheduled with Dr. Peralta for consideration of cycle 2 chemotherapy          Pancytopenia     Most likely chemotherapy induced. Improved. 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 Already scheduled   Follow up with ALEX    Infusion scheduling note    Injection scheduling note    Labs    Imaging    Pharmacy appointment    Other referrals              CIRA RogersPNestorC

## 2024-11-26 NOTE — ASSESSMENT & PLAN NOTE
Platelets 117K. Patient has been taking Eliquis 1 tablet daily. Discussed given platelets >50K continue Eliquis at correct dose 1 tablet BID. Bleeding precautions

## 2024-11-26 NOTE — ASSESSMENT & PLAN NOTE
Patient s/p cycle 1 chemo 11/13/2024. Interval hospitalization for evaluation of chest pain. Outpatient Cardiology f/u planned. F/u as previously scheduled with Dr. Peralta for consideration of cycle 2 chemotherapy

## 2024-11-27 ENCOUNTER — TELEPHONE (OUTPATIENT)
Dept: TRANSPLANT | Facility: CLINIC | Age: 69
End: 2024-11-27
Payer: MEDICARE

## 2024-11-27 DIAGNOSIS — Z94.4 LIVER REPLACED BY TRANSPLANT: Primary | ICD-10-CM

## 2024-11-27 NOTE — TELEPHONE ENCOUNTER
[employer] message sent notifying pt that transplant labs will be added to the lab appointment on 12/2.

## 2024-11-29 ENCOUNTER — OFFICE VISIT (OUTPATIENT)
Dept: CARDIOLOGY | Facility: CLINIC | Age: 69
End: 2024-11-29
Payer: MEDICARE

## 2024-11-29 VITALS
HEART RATE: 104 BPM | SYSTOLIC BLOOD PRESSURE: 138 MMHG | WEIGHT: 211 LBS | BODY MASS INDEX: 31.16 KG/M2 | OXYGEN SATURATION: 99 % | DIASTOLIC BLOOD PRESSURE: 79 MMHG

## 2024-11-29 DIAGNOSIS — R79.89 TROPONIN LEVEL ELEVATED: ICD-10-CM

## 2024-11-29 DIAGNOSIS — R06.09 DOE (DYSPNEA ON EXERTION): ICD-10-CM

## 2024-11-29 DIAGNOSIS — I10 ESSENTIAL HYPERTENSION: ICD-10-CM

## 2024-11-29 DIAGNOSIS — J44.9 COPD SUGGESTED BY INITIAL EVALUATION: ICD-10-CM

## 2024-11-29 DIAGNOSIS — F10.21 HISTORY OF ALCOHOL DEPENDENCE: Primary | ICD-10-CM

## 2024-11-29 DIAGNOSIS — I25.118 ATHEROSCLEROSIS OF NATIVE CORONARY ARTERY OF NATIVE HEART WITH STABLE ANGINA PECTORIS: ICD-10-CM

## 2024-11-29 DIAGNOSIS — Z72.0 TOBACCO ABUSE DISORDER: ICD-10-CM

## 2024-11-29 DIAGNOSIS — I49.3 PVC (PREMATURE VENTRICULAR CONTRACTION): ICD-10-CM

## 2024-11-29 PROCEDURE — 99999 PR PBB SHADOW E&M-EST. PATIENT-LVL V: CPT | Mod: PBBFAC,HCNC,, | Performed by: INTERNAL MEDICINE

## 2024-11-29 NOTE — PROGRESS NOTES
Subjective:   Patient ID:  Lj Coleman is a 68 y.o. male who presents for follow-up of Chest Pain (Pt stated he has had some tightness )  Pt is s/p hospitalization for (+) troponin due to demand ischemia with pancytopenia.  Echo is normal. EKG is normal  Pt with occasional CP/tightness. No SOB  Chest Pain   This is a new problem. The current episode started more than 1 month ago. The onset quality is gradual. The problem occurs intermittently. The problem has been waxing and waning. The pain is present in the substernal region. The pain is mild. The quality of the pain is described as tightness. The pain does not radiate. Pertinent negatives include no dizziness, palpitations, shortness of breath or sputum production. The pain is aggravated by nothing. He has tried rest for the symptoms. The treatment provided mild relief.   His past medical history is significant for hyperlipidemia and hypertension.   Pertinent negatives for past medical history include no muscle weakness.   Hyperlipidemia  This is a chronic problem. The current episode started more than 1 year ago. The problem is controlled. Pertinent negatives include no chest pain or shortness of breath. Current antihyperlipidemic treatment includes statins. The current treatment provides moderate improvement of lipids. There are no compliance problems.     Hypertension  This is a chronic problem. The current episode started more than 1 year ago. The problem has been gradually improving since onset. The problem is controlled. Pertinent negatives include no chest pain, palpitations or shortness of breath. Past treatments include beta blockers . The current treatment provides moderate improvement. There are no compliance problems.     Review of Systems   Constitutional: Negative. Negative for weight gain.   HENT: Negative.     Eyes: Negative.    Cardiovascular:  Positive for chest pain. Negative for leg swelling and palpitations.   Respiratory: Negative.   Negative for shortness of breath and sputum production.    Endocrine: Negative.    Hematologic/Lymphatic: Negative.    Skin: Negative.    Musculoskeletal:  Negative for muscle weakness.   Gastrointestinal: Negative.    Genitourinary: Negative.    Neurological: Negative.  Negative for dizziness.   Psychiatric/Behavioral: Negative.     Allergic/Immunologic: Negative.    All other systems reviewed and are negative.    Family History   Problem Relation Name Age of Onset    Cancer Mother          lung    Cancer Father          bladder    Diabetes Maternal Uncle      Cancer Maternal Grandmother          uterine?    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    Anemia 11/23/2024    Angina pectoris     Arthritis     back    Atherosclerosis of native coronary artery without angina pectoris/ LAD 09/08/2016    Back pain     Chronic hepatitis C with cirrhosis     COPD (chronic obstructive pulmonary disease)     Depression     Hepatoma     Prior to liver transplant    History of colon polyps 09/09/2015    History of transplantation, liver 04/2012    History of vertebral fracture     Hypertension     Immune deficiency disorder     Incisional hernia following transplant 04/09/2014    Liver failure 11/2011    Related to chemotherapy for hepatoma    Liver transplanted 04/2012    NSTEMI (non-ST elevated myocardial infarction) 11/23/2024    Obesity     PVCs (premature ventricular contractions)     Renal dysfunction 11/23/2024    Tobacco abuse 09/09/2016    Trouble in sleeping     Vertebral fracture 1975     Social History     Socioeconomic History    Marital status:     Highest education level: 10th grade   Tobacco Use    Smoking status: Every Day     Current packs/day: 0.75      Average packs/day: 0.8 packs/day for 36.9 years (27.7 ttl pk-yrs)     Types: Cigarettes     Start date: 1988    Smokeless tobacco: Never    Tobacco comments:     Currently smokes a few (3-5) a day as of 6/12/24   Substance and Sexual Activity    Alcohol use: No     Alcohol/week: 0.0 standard drinks of alcohol     Comment: Quit alcohol after some alcohol abuse, prior to his liver transplant    Drug use: No    Sexual activity: Not Currently     Social Drivers of Health     Financial Resource Strain: Patient Declined (11/24/2024)    Overall Financial Resource Strain (CARDIA)     Difficulty of Paying Living Expenses: Patient declined   Food Insecurity: Patient Declined (11/24/2024)    Hunger Vital Sign     Worried About Running Out of Food in the Last Year: Patient declined     Ran Out of Food in the Last Year: Patient declined   Transportation Needs: Patient Declined (11/24/2024)    TRANSPORTATION NEEDS     Transportation : Patient declined   Physical Activity: Inactive (3/13/2024)    Exercise Vital Sign     Days of Exercise per Week: 0 days     Minutes of Exercise per Session: 0 min   Stress: Patient Declined (11/24/2024)    [employer] - Occupational Stress Questionnaire     Feeling of Stress : Patient declined   Housing Stability: Patient Declined (11/24/2024)    Housing Stability Vital Sign     Unable to Pay for Housing in the Last Year: Patient declined     Homeless in the Last Year: Patient declined     Current Outpatient Medications on File Prior to Visit   Medication Sig Dispense Refill    acyclovir (ZOVIRAX) 400 MG tablet Take 1 tablet (400 mg total) by mouth 2 (two) times daily. While on chemotherapy 60 tablet 4    albuterol (VENTOLIN HFA) 90 mcg/actuation inhaler Inhale 2 puffs into the lungs every 6 (six) hours as needed for Wheezing or Shortness of Breath. Rescue 18 g 3    allopurinoL (ZYLOPRIM) 300 MG tablet Take 1 tablet (300 mg total) by mouth once daily. 30 tablet 0     apixaban (ELIQUIS) 5 mg Tab Take 1 tablet (5 mg total) by mouth 2 (two) times daily. 180 tablet 3    aspirin 81 MG Chew Take 1 tablet (81 mg total) by mouth once daily. 30 tablet 0    atorvastatin (LIPITOR) 80 MG tablet Take 1 tablet (80 mg total) by mouth once daily. 30 tablet 0    esomeprazole (NEXIUM) 40 MG capsule Take 1 capsule (40 mg total) by mouth once daily. 90 capsule 3    fluticasone-umeclidin-vilanter (TRELEGY ELLIPTA) 100-62.5-25 mcg DsDv Inhale 1 puff into the lungs once daily. 60 each 5    HYDROcodone-acetaminophen (NORCO) 5-325 mg per tablet Take 1 tablet by mouth every 6 (six) hours as needed for Pain. 12 tablet 0    LIDOcaine-prilocaine (EMLA) cream Apply topically as needed. 30 g 11    melatonin 10 mg Tab Take by mouth.      metoprolol succinate (TOPROL-XL) 25 MG 24 hr tablet TAKE 1 TABLET EVERY DAY 90 tablet 3    nicotine (NICODERM CQ) 21 mg/24 hr Place 1 patch onto the skin once daily. 28 patch 2    nitroGLYCERIN (NITROSTAT) 0.4 MG SL tablet Place 1 tablet (0.4 mg total) under the tongue every 5 (five) minutes as needed for Chest pain (angina). 25 tablet 0    ondansetron (ZOFRAN) 8 MG tablet Take 1 tablet (8 mg total) by mouth every 12 (twelve) hours as needed for Nausea. 20 tablet 0    predniSONE (DELTASONE) 50 MG Tab Take 2 tablets (100 mg total) by mouth once daily. on days 2,3,4, 5 of each chemotherapy cycle 8 tablet 5    prochlorperazine (COMPAZINE) 5 MG tablet Take 1 tablet (5 mg total) by mouth every 6 (six) hours as needed (nausea). 20 tablet 5    sulfamethoxazole-trimethoprim 800-160mg (BACTRIM DS) 800-160 mg Tab Take 1 tablet by mouth 3 (three) times a week. 12 tablet 4    tacrolimus (PROGRAF) 0.5 MG Cap Take 1 capsule (0.5 mg total) by mouth every 12 (twelve) hours. 180 capsule 3    tamsulosin (FLOMAX) 0.4 mg Cap Take 1 capsule (0.4 mg total) by mouth once daily. 30 capsule 2    tenofovir alafenamide (VEMLIDY) 25 mg Tab Take 1 tablet (25 mg total) by mouth Daily. 30 tablet 11     tenofovir alafenamide (VEMLIDY) 25 mg Tab Take 1 tablet by mouth once daily 30 tablet 11    varenicline (CHANTIX) 0.5 MG Tab Take 1 tablet (0.5 mg total) by mouth once daily. 30 tablet 0    varenicline (CHANTIX) 1 mg Tab Take 1 tablet (1 mg total) by mouth 2 (two) times daily. 30 tablet 1    diclofenac sodium (VOLTAREN) 1 % Gel Apply 2 g topically 4 (four) times daily. for 10 days 100 g 0     Current Facility-Administered Medications on File Prior to Visit   Medication Dose Route Frequency Provider Last Rate Last Admin    lactated ringers infusion   Intravenous Continuous Linwood Ellsworth MD   New Bag at 01/20/21 0734    sodium chloride 0.9% flush 2 mL  2 mL Intravenous PRN Linwood Ellsworth MD         Review of patient's allergies indicates:   Allergen Reactions    Codeine Other (See Comments)     Upset stomach       Objective:     Physical Exam  Vitals and nursing note reviewed.   Constitutional:       Appearance: He is well-developed.   HENT:      Head: Normocephalic and atraumatic.   Eyes:      Conjunctiva/sclera: Conjunctivae normal.      Pupils: Pupils are equal, round, and reactive to light.   Cardiovascular:      Rate and Rhythm: Normal rate and regular rhythm.      Pulses: Intact distal pulses.      Heart sounds: Normal heart sounds.   Pulmonary:      Effort: Pulmonary effort is normal.      Breath sounds: Normal breath sounds.   Abdominal:      General: Bowel sounds are normal.      Palpations: Abdomen is soft.   Musculoskeletal:      Cervical back: Normal range of motion and neck supple.   Skin:     General: Skin is warm and dry.   Neurological:      Mental Status: He is alert and oriented to person, place, and time.         Assessment:     1. History of alcohol dependence    2. COPD suggested by initial evaluation    3. Troponin level elevated    4. PVC (premature ventricular contraction)    5. Essential hypertension    6. HARDING (dyspnea on exertion)    7. Atherosclerosis of native coronary artery of native heart  with stable angina pectoris    8. Tobacco abuse disorder        Plan:     History of alcohol dependence    COPD suggested by initial evaluation    Troponin level elevated    PVC (premature ventricular contraction)    Essential hypertension    HARDING (dyspnea on exertion)    Atherosclerosis of native coronary artery of native heart with stable angina pectoris    Tobacco abuse disorder        Continue eliquis- LUE  Continue statin-HLP  Continue toprol- HTN  NMT/stress

## 2024-12-02 ENCOUNTER — LAB VISIT (OUTPATIENT)
Dept: LAB | Facility: HOSPITAL | Age: 69
End: 2024-12-02
Attending: INTERNAL MEDICINE
Payer: MEDICARE

## 2024-12-02 ENCOUNTER — OFFICE VISIT (OUTPATIENT)
Dept: HEMATOLOGY/ONCOLOGY | Facility: CLINIC | Age: 69
End: 2024-12-02
Payer: MEDICARE

## 2024-12-02 DIAGNOSIS — Z79.899 IMMUNODEFICIENCY DUE TO CHEMOTHERAPY: ICD-10-CM

## 2024-12-02 DIAGNOSIS — C84.75 ANAPLASTIC ALK-NEGATIVE LARGE CELL LYMPHOMA OF LYMPH NODE OF LOWER EXTREMITY: Primary | ICD-10-CM

## 2024-12-02 DIAGNOSIS — Z94.4 LIVER TRANSPLANTED: ICD-10-CM

## 2024-12-02 DIAGNOSIS — C85.80 LARGE CELL LYMPHOMA: ICD-10-CM

## 2024-12-02 DIAGNOSIS — I82.722 CHRONIC DEEP VEIN THROMBOSIS (DVT) OF LEFT UPPER EXTREMITY, UNSPECIFIED VEIN: ICD-10-CM

## 2024-12-02 DIAGNOSIS — Z86.2 HISTORY OF THROMBOCYTOPENIA: ICD-10-CM

## 2024-12-02 DIAGNOSIS — C84.75 ANAPLASTIC ALK-NEGATIVE LARGE CELL LYMPHOMA OF LYMPH NODE OF LOWER EXTREMITY: ICD-10-CM

## 2024-12-02 DIAGNOSIS — D84.821 IMMUNODEFICIENCY DUE TO CHEMOTHERAPY: ICD-10-CM

## 2024-12-02 DIAGNOSIS — T45.1X5A IMMUNODEFICIENCY DUE TO CHEMOTHERAPY: ICD-10-CM

## 2024-12-02 LAB
ALBUMIN SERPL BCP-MCNC: 3.2 G/DL (ref 3.5–5.2)
ALBUMIN SERPL BCP-MCNC: 3.2 G/DL (ref 3.5–5.2)
ALP SERPL-CCNC: 142 U/L (ref 40–150)
ALP SERPL-CCNC: 142 U/L (ref 40–150)
ALT SERPL W/O P-5'-P-CCNC: 11 U/L (ref 10–44)
ALT SERPL W/O P-5'-P-CCNC: 11 U/L (ref 10–44)
ANION GAP SERPL CALC-SCNC: 9 MMOL/L (ref 8–16)
ANION GAP SERPL CALC-SCNC: 9 MMOL/L (ref 8–16)
ANISOCYTOSIS BLD QL SMEAR: SLIGHT
ANISOCYTOSIS BLD QL SMEAR: SLIGHT
AST SERPL-CCNC: 18 U/L (ref 10–40)
AST SERPL-CCNC: 18 U/L (ref 10–40)
BASO STIPL BLD QL SMEAR: ABNORMAL
BASO STIPL BLD QL SMEAR: ABNORMAL
BASOPHILS # BLD AUTO: ABNORMAL K/UL (ref 0–0.2)
BASOPHILS # BLD AUTO: ABNORMAL K/UL (ref 0–0.2)
BASOPHILS NFR BLD: 0 % (ref 0–1.9)
BASOPHILS NFR BLD: 0 % (ref 0–1.9)
BILIRUB SERPL-MCNC: 0.4 MG/DL (ref 0.1–1)
BILIRUB SERPL-MCNC: 0.4 MG/DL (ref 0.1–1)
BUN SERPL-MCNC: 20 MG/DL (ref 8–23)
BUN SERPL-MCNC: 20 MG/DL (ref 8–23)
CALCIUM SERPL-MCNC: 8.4 MG/DL (ref 8.7–10.5)
CALCIUM SERPL-MCNC: 8.4 MG/DL (ref 8.7–10.5)
CHLORIDE SERPL-SCNC: 110 MMOL/L (ref 95–110)
CHLORIDE SERPL-SCNC: 110 MMOL/L (ref 95–110)
CO2 SERPL-SCNC: 20 MMOL/L (ref 23–29)
CO2 SERPL-SCNC: 20 MMOL/L (ref 23–29)
CREAT SERPL-MCNC: 1.6 MG/DL (ref 0.5–1.4)
CREAT SERPL-MCNC: 1.6 MG/DL (ref 0.5–1.4)
DIFFERENTIAL METHOD BLD: ABNORMAL
DIFFERENTIAL METHOD BLD: ABNORMAL
EOSINOPHIL # BLD AUTO: ABNORMAL K/UL (ref 0–0.5)
EOSINOPHIL # BLD AUTO: ABNORMAL K/UL (ref 0–0.5)
EOSINOPHIL NFR BLD: 0 % (ref 0–8)
EOSINOPHIL NFR BLD: 0 % (ref 0–8)
ERYTHROCYTE [DISTWIDTH] IN BLOOD BY AUTOMATED COUNT: 18.3 % (ref 11.5–14.5)
ERYTHROCYTE [DISTWIDTH] IN BLOOD BY AUTOMATED COUNT: 18.3 % (ref 11.5–14.5)
EST. GFR  (NO RACE VARIABLE): 46 ML/MIN/1.73 M^2
EST. GFR  (NO RACE VARIABLE): 46 ML/MIN/1.73 M^2
GLUCOSE SERPL-MCNC: 111 MG/DL (ref 70–110)
GLUCOSE SERPL-MCNC: 111 MG/DL (ref 70–110)
HCT VFR BLD AUTO: 27.4 % (ref 40–54)
HCT VFR BLD AUTO: 27.4 % (ref 40–54)
HGB BLD-MCNC: 9 G/DL (ref 14–18)
HGB BLD-MCNC: 9 G/DL (ref 14–18)
IMM GRANULOCYTES # BLD AUTO: ABNORMAL K/UL (ref 0–0.04)
IMM GRANULOCYTES # BLD AUTO: ABNORMAL K/UL (ref 0–0.04)
IMM GRANULOCYTES NFR BLD AUTO: ABNORMAL % (ref 0–0.5)
IMM GRANULOCYTES NFR BLD AUTO: ABNORMAL % (ref 0–0.5)
LYMPHOCYTES # BLD AUTO: ABNORMAL K/UL (ref 1–4.8)
LYMPHOCYTES # BLD AUTO: ABNORMAL K/UL (ref 1–4.8)
LYMPHOCYTES NFR BLD: 13 % (ref 18–48)
LYMPHOCYTES NFR BLD: 13 % (ref 18–48)
MCH RBC QN AUTO: 32.8 PG (ref 27–31)
MCH RBC QN AUTO: 32.8 PG (ref 27–31)
MCHC RBC AUTO-ENTMCNC: 32.8 G/DL (ref 32–36)
MCHC RBC AUTO-ENTMCNC: 32.8 G/DL (ref 32–36)
MCV RBC AUTO: 100 FL (ref 82–98)
MCV RBC AUTO: 100 FL (ref 82–98)
METAMYELOCYTES NFR BLD MANUAL: 1 %
METAMYELOCYTES NFR BLD MANUAL: 1 %
MONOCYTES # BLD AUTO: ABNORMAL K/UL (ref 0.3–1)
MONOCYTES # BLD AUTO: ABNORMAL K/UL (ref 0.3–1)
MONOCYTES NFR BLD: 8 % (ref 4–15)
MONOCYTES NFR BLD: 8 % (ref 4–15)
MYELOCYTES NFR BLD MANUAL: 1 %
MYELOCYTES NFR BLD MANUAL: 1 %
NEUTROPHILS NFR BLD: 73 % (ref 38–73)
NEUTROPHILS NFR BLD: 73 % (ref 38–73)
NEUTS BAND NFR BLD MANUAL: 3 %
NEUTS BAND NFR BLD MANUAL: 3 %
NRBC BLD-RTO: 0 /100 WBC
NRBC BLD-RTO: 0 /100 WBC
OVALOCYTES BLD QL SMEAR: ABNORMAL
OVALOCYTES BLD QL SMEAR: ABNORMAL
PLATELET # BLD AUTO: 182 K/UL (ref 150–450)
PLATELET # BLD AUTO: 182 K/UL (ref 150–450)
PLATELET BLD QL SMEAR: ABNORMAL
PLATELET BLD QL SMEAR: ABNORMAL
PMV BLD AUTO: 10.5 FL (ref 9.2–12.9)
PMV BLD AUTO: 10.5 FL (ref 9.2–12.9)
POIKILOCYTOSIS BLD QL SMEAR: SLIGHT
POIKILOCYTOSIS BLD QL SMEAR: SLIGHT
POLYCHROMASIA BLD QL SMEAR: ABNORMAL
POLYCHROMASIA BLD QL SMEAR: ABNORMAL
POTASSIUM SERPL-SCNC: 4.3 MMOL/L (ref 3.5–5.1)
POTASSIUM SERPL-SCNC: 4.3 MMOL/L (ref 3.5–5.1)
PROMYELOCYTES NFR BLD MANUAL: 1 %
PROMYELOCYTES NFR BLD MANUAL: 1 %
PROT SERPL-MCNC: 6.7 G/DL (ref 6–8.4)
PROT SERPL-MCNC: 6.7 G/DL (ref 6–8.4)
RBC # BLD AUTO: 2.74 M/UL (ref 4.6–6.2)
RBC # BLD AUTO: 2.74 M/UL (ref 4.6–6.2)
SODIUM SERPL-SCNC: 139 MMOL/L (ref 136–145)
SODIUM SERPL-SCNC: 139 MMOL/L (ref 136–145)
URATE SERPL-MCNC: 3.8 MG/DL (ref 3.4–7)
WBC # BLD AUTO: 7.64 K/UL (ref 3.9–12.7)
WBC # BLD AUTO: 7.64 K/UL (ref 3.9–12.7)

## 2024-12-02 PROCEDURE — 1101F PT FALLS ASSESS-DOCD LE1/YR: CPT | Mod: HCNC,CPTII,95, | Performed by: INTERNAL MEDICINE

## 2024-12-02 PROCEDURE — 1159F MED LIST DOCD IN RCRD: CPT | Mod: HCNC,CPTII,95, | Performed by: INTERNAL MEDICINE

## 2024-12-02 PROCEDURE — 36415 COLL VENOUS BLD VENIPUNCTURE: CPT | Mod: HCNC | Performed by: INTERNAL MEDICINE

## 2024-12-02 PROCEDURE — 1125F AMNT PAIN NOTED PAIN PRSNT: CPT | Mod: HCNC,CPTII,95, | Performed by: INTERNAL MEDICINE

## 2024-12-02 PROCEDURE — 99443 PR PHYSICIAN TELEPHONE EVALUATION 21-30 MIN: CPT | Mod: HCNC,95,, | Performed by: INTERNAL MEDICINE

## 2024-12-02 PROCEDURE — 85007 BL SMEAR W/DIFF WBC COUNT: CPT | Mod: HCNC | Performed by: INTERNAL MEDICINE

## 2024-12-02 PROCEDURE — 80053 COMPREHEN METABOLIC PANEL: CPT | Mod: HCNC | Performed by: INTERNAL MEDICINE

## 2024-12-02 PROCEDURE — 1160F RVW MEDS BY RX/DR IN RCRD: CPT | Mod: HCNC,CPTII,95, | Performed by: INTERNAL MEDICINE

## 2024-12-02 PROCEDURE — 85027 COMPLETE CBC AUTOMATED: CPT | Mod: HCNC | Performed by: INTERNAL MEDICINE

## 2024-12-02 PROCEDURE — 80197 ASSAY OF TACROLIMUS: CPT | Mod: HCNC | Performed by: INTERNAL MEDICINE

## 2024-12-02 PROCEDURE — 84550 ASSAY OF BLOOD/URIC ACID: CPT | Mod: HCNC | Performed by: INTERNAL MEDICINE

## 2024-12-02 PROCEDURE — 3288F FALL RISK ASSESSMENT DOCD: CPT | Mod: HCNC,CPTII,95, | Performed by: INTERNAL MEDICINE

## 2024-12-02 RX ORDER — SODIUM CHLORIDE 0.9 % (FLUSH) 0.9 %
10 SYRINGE (ML) INJECTION
Status: CANCELLED | OUTPATIENT
Start: 2024-12-03

## 2024-12-02 RX ORDER — EPINEPHRINE 0.3 MG/.3ML
0.3 INJECTION SUBCUTANEOUS ONCE AS NEEDED
Status: CANCELLED | OUTPATIENT
Start: 2024-12-03

## 2024-12-02 RX ORDER — HEPARIN 100 UNIT/ML
500 SYRINGE INTRAVENOUS
Status: CANCELLED | OUTPATIENT
Start: 2024-12-03

## 2024-12-02 RX ORDER — DOXORUBICIN HYDROCHLORIDE 2 MG/ML
50 INJECTION, SOLUTION INTRAVENOUS
Status: CANCELLED | OUTPATIENT
Start: 2024-12-03

## 2024-12-02 RX ORDER — DIPHENHYDRAMINE HYDROCHLORIDE 50 MG/ML
50 INJECTION INTRAMUSCULAR; INTRAVENOUS ONCE AS NEEDED
Status: CANCELLED | OUTPATIENT
Start: 2024-12-03

## 2024-12-02 NOTE — PROGRESS NOTES
Subjective:       Patient ID: Lj Coleman is a 68 y.o. male.    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    Anemia 11/23/2024    Angina pectoris     Arthritis     back    Atherosclerosis of native coronary artery without angina pectoris/ LAD 09/08/2016    Back pain     Chronic hepatitis C with cirrhosis     COPD (chronic obstructive pulmonary disease)     Depression     Hepatoma     Prior to liver transplant    History of colon polyps 09/09/2015    History of transplantation, liver 04/2012    History of vertebral fracture     Hypertension     Immune deficiency disorder     Incisional hernia following transplant 04/09/2014    Liver failure 11/2011    Related to chemotherapy for hepatoma    Liver transplanted 04/2012    NSTEMI (non-ST elevated myocardial infarction) 11/23/2024    Obesity     PVCs (premature ventricular contractions)     Renal dysfunction 11/23/2024    Tobacco abuse 09/09/2016    Trouble in sleeping     Vertebral fracture 1975     Family History   Problem Relation Name Age of Onset    Cancer Mother          lung    Cancer Father          bladder    Diabetes Maternal Uncle      Cancer Maternal Grandmother          uterine?    Cancer Other      Heart disease Neg Hx      Kidney disease Neg Hx      Stroke Neg Hx       Social History     Socioeconomic History    Marital status:     Highest education level: 10th grade   Tobacco Use    Smoking status: Every Day     Current packs/day: 0.75     Average packs/day: 0.8 packs/day for 36.9 years (27.7 ttl  pk-yrs)     Types: Cigarettes     Start date: 1988    Smokeless tobacco: Never    Tobacco comments:     Currently smokes a few (3-5) a day as of 6/12/24   Substance and Sexual Activity    Alcohol use: No     Alcohol/week: 0.0 standard drinks of alcohol     Comment: Quit alcohol after some alcohol abuse, prior to his liver transplant    Drug use: No    Sexual activity: Not Currently     Social Drivers of Health     Financial Resource Strain: Patient Declined (11/24/2024)    Overall Financial Resource Strain (CARDIA)     Difficulty of Paying Living Expenses: Patient declined   Food Insecurity: Patient Declined (11/24/2024)    Hunger Vital Sign     Worried About Running Out of Food in the Last Year: Patient declined     Ran Out of Food in the Last Year: Patient declined   Transportation Needs: Patient Declined (11/24/2024)    TRANSPORTATION NEEDS     Transportation : Patient declined   Physical Activity: Inactive (3/13/2024)    Exercise Vital Sign     Days of Exercise per Week: 0 days     Minutes of Exercise per Session: 0 min   Stress: Patient Declined (11/24/2024)    [employer] - Occupational Stress Questionnaire     Feeling of Stress : Patient declined   Housing Stability: Patient Declined (11/24/2024)    Housing Stability Vital Sign     Unable to Pay for Housing in the Last Year: Patient declined     Homeless in the Last Year: Patient declined     Past Surgical History:   Procedure Laterality Date    COLONOSCOPY N/A 1/20/2021    Procedure: COLONOSCOPY;  Surgeon: Emerson Helm MD;  Location: [facility];  Service: Endoscopy;  Laterality: N/A;    HERNIA REPAIR Right 2013    incisional    INSERTION OF TUNNELED CENTRAL VENOUS CATHETER (CVC) WITH SUBCUTANEOUS PORT Left 11/6/2024    Procedure: [id number]-LXAX-D-CATH;  Surgeon: Hao Washburn MD;  Location: [facility];  Service: General;  Laterality: Left;    LIVER TRANSPLANT  April 2012    LYMPH NODE BIOPSY/EXCISION Left 10/16/2024     "Procedure: LYMPH NODE BIOPSY/EXCISION;  Surgeon: Hao Washburn MD;  Location: [facility];  Service: General;  Laterality: Left;    MOUTH SURGERY      TONSILLECTOMY  1958       Labs:  Lab Results   Component Value Date    WBC 7.64 12/02/2024    WBC 7.64 12/02/2024    HGB 9.0 (L) 12/02/2024    HGB 9.0 (L) 12/02/2024    HCT 27.4 (L) 12/02/2024    HCT 27.4 (L) 12/02/2024     (H) 12/02/2024     (H) 12/02/2024     12/02/2024     12/02/2024     BMP  Lab Results   Component Value Date     12/02/2024     12/02/2024    K 4.3 12/02/2024    K 4.3 12/02/2024     12/02/2024     12/02/2024    CO2 20 (L) 12/02/2024    CO2 20 (L) 12/02/2024    BUN 20 12/02/2024    BUN 20 12/02/2024    CREATININE 1.6 (H) 12/02/2024    CREATININE 1.6 (H) 12/02/2024    CALCIUM 8.4 (L) 12/02/2024    CALCIUM 8.4 (L) 12/02/2024    ANIONGAP 9 12/02/2024    ANIONGAP 9 12/02/2024    ESTGFRAFRICA >60.0 06/08/2022    EGFRNONAA >60.0 06/08/2022     Lab Results   Component Value Date    ALT 11 12/02/2024    ALT 11 12/02/2024    AST 18 12/02/2024    AST 18 12/02/2024    GGT 32 09/26/2016    ALKPHOS 142 12/02/2024    ALKPHOS 142 12/02/2024    BILITOT 0.4 12/02/2024    BILITOT 0.4 12/02/2024       Lab Results   Component Value Date    IRON 74 11/04/2024    TIBC 287 11/04/2024    FERRITIN 325 (H) 11/04/2024     No results found for: "IDSAXQSA63"  No results found for: "FOLATE"  Lab Results   Component Value Date    TSH 1.168 06/06/2023         Review of Systems   Constitutional:  Negative for activity change, appetite change, chills, diaphoresis, fatigue, fever and unexpected weight change.   HENT:  Negative for congestion, dental problem, drooling, ear discharge, ear pain, facial swelling, hearing loss, mouth sores, nosebleeds, postnasal drip, rhinorrhea, sinus pressure, sneezing, sore throat, tinnitus, trouble swallowing and voice change.    Eyes:  Negative for photophobia, pain, discharge, redness, itching and " visual disturbance.   Respiratory:  Negative for apnea, cough, choking, chest tightness, shortness of breath, wheezing and stridor.    Cardiovascular:  Negative for chest pain, palpitations and leg swelling.   Gastrointestinal:  Negative for abdominal distention, abdominal pain, anal bleeding, blood in stool, constipation, diarrhea, nausea, rectal pain and vomiting.   Endocrine: Negative for cold intolerance, heat intolerance, polydipsia, polyphagia and polyuria.   Genitourinary:  Negative for decreased urine volume, difficulty urinating, dysuria, enuresis, flank pain, frequency, genital sores, hematuria, penile discharge, penile pain, penile swelling, scrotal swelling, testicular pain and urgency.   Musculoskeletal:  Negative for arthralgias, back pain, gait problem, joint swelling, myalgias, neck pain and neck stiffness.   Skin:  Negative for color change, pallor, rash and wound.   Allergic/Immunologic: Negative for environmental allergies, food allergies and immunocompromised state.   Neurological:  Negative for dizziness, tremors, seizures, syncope, facial asymmetry, speech difficulty, weakness, light-headedness, numbness and headaches.   Hematological:  Negative for adenopathy. Does not bruise/bleed easily.   Psychiatric/Behavioral:  Negative for agitation, behavioral problems, confusion, decreased concentration, dysphoric mood, hallucinations, self-injury, sleep disturbance and suicidal ideas. The patient is not nervous/anxious and is not hyperactive.        Objective:      Physical Exam  Neurological:      Mental Status: He is alert.             Assessment:      1. 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 History of thrombocytopenia    3. Chronic deep vein thrombosis (DVT) of left upper extremity, unspecified vein    4. Immunodeficiency due to chemotherapy           Med Onc Chart Routing      Follow up with physician . Return three-week would like to see in inpatient  visit CBC CMP cycle 3 day 1   Follow up with [facility]    Infusion scheduling note    Injection scheduling note OP A + CHP - BRENTUXIMAB CYCLOPHOSPHAMIDE DOXORUBICIN PREDNISONE   Labs    Imaging    Pharmacy appointment    Other referrals                   Plan:     AdministrativeThe patient location is:  Home  The chief complaint leading to consultation is:  Lymphoma    Visit type:  Audio  Face to Face time with patient: 25 minutes of total time spent on the encounter, which includes face to face time and non-face to face time preparing to see the patient (eg, review of tests), Obtaining and/or reviewing separately obtained history, Documenting clinical information in the electronic or other health record, Independently interpreting results (not separately reported) and communicating results to the patient/family/caregiver, or Care coordination (not separately reported).         Each patient to whom he or she provides medical services by telemedicine is:  (1) informed of the relationship between the physician and patient and the respective role of any other health care provider with respect to management of the patient; and (2) notified that he or she may decline to receive medical services by telemedicine and may withdraw from such care at any time.    Notes:     Reviewed information counts adequate.  Will proceed with cycle 2 day 1 of treatment.  Would like inpatient visit for cycle 3 day 1 orders written reviewed    Timmy Peralta Jr, MD FACP

## 2024-12-03 ENCOUNTER — INFUSION (OUTPATIENT)
Dept: INFUSION THERAPY | Facility: HOSPITAL | Age: 69
End: 2024-12-03
Attending: INTERNAL MEDICINE
Payer: MEDICARE

## 2024-12-03 ENCOUNTER — DOCUMENTATION ONLY (OUTPATIENT)
Dept: HEMATOLOGY/ONCOLOGY | Facility: CLINIC | Age: 69
End: 2024-12-03
Payer: MEDICARE

## 2024-12-03 ENCOUNTER — HOSPITAL ENCOUNTER (EMERGENCY)
Facility: HOSPITAL | Age: 69
Discharge: HOME | End: 2024-12-03
Attending: EMERGENCY MEDICINE
Payer: MEDICARE

## 2024-12-03 ENCOUNTER — TELEPHONE (OUTPATIENT)
Dept: INFUSION THERAPY | Facility: HOSPITAL | Age: 69
End: 2024-12-03

## 2024-12-03 VITALS
DIASTOLIC BLOOD PRESSURE: 67 MMHG | HEART RATE: 89 BPM | HEIGHT: 68 IN | TEMPERATURE: 99 F | WEIGHT: 203.5 LBS | OXYGEN SATURATION: 96 % | RESPIRATION RATE: 16 BRPM | SYSTOLIC BLOOD PRESSURE: 113 MMHG | BODY MASS INDEX: 30.84 KG/M2

## 2024-12-03 VITALS
OXYGEN SATURATION: 97 % | BODY MASS INDEX: 31.98 KG/M2 | TEMPERATURE: 98 F | RESPIRATION RATE: 18 BRPM | WEIGHT: 211 LBS | DIASTOLIC BLOOD PRESSURE: 75 MMHG | SYSTOLIC BLOOD PRESSURE: 138 MMHG | HEART RATE: 95 BPM | HEIGHT: 68 IN

## 2024-12-03 DIAGNOSIS — R07.9 CHEST PAIN: ICD-10-CM

## 2024-12-03 DIAGNOSIS — R07.9 CHEST PAIN, UNSPECIFIED TYPE: Primary | ICD-10-CM

## 2024-12-03 DIAGNOSIS — I82.722 CHRONIC DEEP VEIN THROMBOSIS (DVT) OF BRACHIAL VEIN OF LEFT UPPER EXTREMITY: ICD-10-CM

## 2024-12-03 DIAGNOSIS — C84.75 ANAPLASTIC ALK-NEGATIVE LARGE CELL LYMPHOMA OF LYMPH NODE OF LOWER EXTREMITY: Primary | ICD-10-CM

## 2024-12-03 LAB
ALBUMIN SERPL BCP-MCNC: 3.6 G/DL (ref 3.5–5.2)
ALP SERPL-CCNC: 122 U/L (ref 40–150)
ALT SERPL W/O P-5'-P-CCNC: 11 U/L (ref 10–44)
ANION GAP SERPL CALC-SCNC: 9 MMOL/L (ref 8–16)
AST SERPL-CCNC: 17 U/L (ref 10–40)
BASOPHILS # BLD AUTO: 0.06 K/UL (ref 0–0.2)
BASOPHILS NFR BLD: 0.6 % (ref 0–1.9)
BILIRUB SERPL-MCNC: 0.4 MG/DL (ref 0.1–1)
BNP SERPL-MCNC: 229 PG/ML (ref 0–99)
BUN SERPL-MCNC: 20 MG/DL (ref 8–23)
CALCIUM SERPL-MCNC: 9 MG/DL (ref 8.7–10.5)
CHLORIDE SERPL-SCNC: 109 MMOL/L (ref 95–110)
CO2 SERPL-SCNC: 21 MMOL/L (ref 23–29)
CREAT SERPL-MCNC: 1.6 MG/DL (ref 0.5–1.4)
DIFFERENTIAL METHOD BLD: ABNORMAL
EOSINOPHIL # BLD AUTO: 0.1 K/UL (ref 0–0.5)
EOSINOPHIL NFR BLD: 0.7 % (ref 0–8)
ERYTHROCYTE [DISTWIDTH] IN BLOOD BY AUTOMATED COUNT: 19.2 % (ref 11.5–14.5)
EST. GFR  (NO RACE VARIABLE): 46 ML/MIN/1.73 M^2
GLUCOSE SERPL-MCNC: 107 MG/DL (ref 70–110)
HCT VFR BLD AUTO: 27 % (ref 40–54)
HGB BLD-MCNC: 8.8 G/DL (ref 14–18)
IMM GRANULOCYTES # BLD AUTO: 0.45 K/UL (ref 0–0.04)
IMM GRANULOCYTES NFR BLD AUTO: 4.5 % (ref 0–0.5)
INR PPP: 0.9 (ref 0.8–1.2)
LYMPHOCYTES # BLD AUTO: 0.9 K/UL (ref 1–4.8)
LYMPHOCYTES NFR BLD: 8.9 % (ref 18–48)
MCH RBC QN AUTO: 32.5 PG (ref 27–31)
MCHC RBC AUTO-ENTMCNC: 32.6 G/DL (ref 32–36)
MCV RBC AUTO: 100 FL (ref 82–98)
MONOCYTES # BLD AUTO: 0.9 K/UL (ref 0.3–1)
MONOCYTES NFR BLD: 9.2 % (ref 4–15)
NEUTROPHILS # BLD AUTO: 7.7 K/UL (ref 1.8–7.7)
NEUTROPHILS NFR BLD: 76.1 % (ref 38–73)
NRBC BLD-RTO: 0 /100 WBC
OHS QRS DURATION: 72 MS
OHS QTC CALCULATION: 452 MS
PLATELET # BLD AUTO: 203 K/UL (ref 150–450)
PMV BLD AUTO: 10.2 FL (ref 9.2–12.9)
POTASSIUM SERPL-SCNC: 4.5 MMOL/L (ref 3.5–5.1)
PROT SERPL-MCNC: 7.2 G/DL (ref 6–8.4)
PROTHROMBIN TIME: 11 SEC (ref 9–12.5)
RBC # BLD AUTO: 2.71 M/UL (ref 4.6–6.2)
SODIUM SERPL-SCNC: 139 MMOL/L (ref 136–145)
TACROLIMUS BLD-MCNC: 2.8 NG/ML (ref 5–15)
TROPONIN I SERPL DL<=0.01 NG/ML-MCNC: 0.02 NG/ML (ref 0–0.03)
TROPONIN I SERPL DL<=0.01 NG/ML-MCNC: 0.02 NG/ML (ref 0–0.03)
WBC # BLD AUTO: 10.07 K/UL (ref 3.9–12.7)

## 2024-12-03 PROCEDURE — 25000003 PHARM REV CODE 250: Mod: HCNC | Performed by: INTERNAL MEDICINE

## 2024-12-03 PROCEDURE — 96413 CHEMO IV INFUSION 1 HR: CPT | Mod: HCNC

## 2024-12-03 PROCEDURE — 96411 CHEMO IV PUSH ADDL DRUG: CPT | Mod: HCNC

## 2024-12-03 PROCEDURE — 83880 ASSAY OF NATRIURETIC PEPTIDE: CPT | Mod: HCNC | Performed by: EMERGENCY MEDICINE

## 2024-12-03 PROCEDURE — A4216 STERILE WATER/SALINE, 10 ML: HCPCS | Mod: HCNC | Performed by: INTERNAL MEDICINE

## 2024-12-03 PROCEDURE — 96375 TX/PRO/DX INJ NEW DRUG ADDON: CPT | Mod: HCNC

## 2024-12-03 PROCEDURE — 80053 COMPREHEN METABOLIC PANEL: CPT | Mod: HCNC | Performed by: EMERGENCY MEDICINE

## 2024-12-03 PROCEDURE — 99285 EMERGENCY DEPT VISIT HI MDM: CPT | Mod: 25,HCNC

## 2024-12-03 PROCEDURE — 85610 PROTHROMBIN TIME: CPT | Mod: HCNC | Performed by: EMERGENCY MEDICINE

## 2024-12-03 PROCEDURE — 85025 COMPLETE CBC W/AUTO DIFF WBC: CPT | Mod: HCNC | Performed by: EMERGENCY MEDICINE

## 2024-12-03 PROCEDURE — 96417 CHEMO IV INFUS EACH ADDL SEQ: CPT | Mod: HCNC

## 2024-12-03 PROCEDURE — 84484 ASSAY OF TROPONIN QUANT: CPT | Mod: HCNC | Performed by: EMERGENCY MEDICINE

## 2024-12-03 PROCEDURE — 96367 TX/PROPH/DG ADDL SEQ IV INF: CPT | Mod: HCNC

## 2024-12-03 PROCEDURE — 93005 ELECTROCARDIOGRAM TRACING: CPT | Mod: HCNC

## 2024-12-03 PROCEDURE — 93010 ELECTROCARDIOGRAM REPORT: CPT | Mod: HCNC,,, | Performed by: INTERNAL MEDICINE

## 2024-12-03 PROCEDURE — 36415 COLL VENOUS BLD VENIPUNCTURE: CPT | Mod: HCNC | Performed by: EMERGENCY MEDICINE

## 2024-12-03 PROCEDURE — 84484 ASSAY OF TROPONIN QUANT: CPT | Mod: 91,HCNC | Performed by: EMERGENCY MEDICINE

## 2024-12-03 PROCEDURE — 25000003 PHARM REV CODE 250: Mod: HCNC | Performed by: EMERGENCY MEDICINE

## 2024-12-03 PROCEDURE — 63600175 PHARM REV CODE 636 W HCPCS: Mod: HCNC | Performed by: INTERNAL MEDICINE

## 2024-12-03 RX ORDER — DOXORUBICIN HYDROCHLORIDE 2 MG/ML
50 INJECTION, SOLUTION INTRAVENOUS
Status: COMPLETED | OUTPATIENT
Start: 2024-12-03 | End: 2024-12-03

## 2024-12-03 RX ORDER — DIPHENHYDRAMINE HYDROCHLORIDE 50 MG/ML
50 INJECTION INTRAMUSCULAR; INTRAVENOUS ONCE AS NEEDED
Status: DISCONTINUED | OUTPATIENT
Start: 2024-12-03 | End: 2024-12-03 | Stop reason: HOSPADM

## 2024-12-03 RX ORDER — SODIUM CHLORIDE 0.9 % (FLUSH) 0.9 %
10 SYRINGE (ML) INJECTION
Status: DISCONTINUED | OUTPATIENT
Start: 2024-12-03 | End: 2024-12-03 | Stop reason: HOSPADM

## 2024-12-03 RX ORDER — HEPARIN 100 UNIT/ML
500 SYRINGE INTRAVENOUS
Status: DISCONTINUED | OUTPATIENT
Start: 2024-12-03 | End: 2024-12-03 | Stop reason: HOSPADM

## 2024-12-03 RX ORDER — EPINEPHRINE 0.3 MG/.3ML
0.3 INJECTION SUBCUTANEOUS ONCE AS NEEDED
Status: DISCONTINUED | OUTPATIENT
Start: 2024-12-03 | End: 2024-12-03 | Stop reason: HOSPADM

## 2024-12-03 RX ADMIN — HYDROCORTISONE SODIUM SUCCINATE 100 MG: 100 INJECTION, POWDER, FOR SOLUTION INTRAMUSCULAR; INTRAVENOUS at 10:12

## 2024-12-03 RX ADMIN — DOXORUBICIN HYDROCHLORIDE 110 MG: 2 INJECTION, SOLUTION INTRAVENOUS at 09:12

## 2024-12-03 RX ADMIN — SODIUM CHLORIDE 177.5 MG: 9 INJECTION, SOLUTION INTRAVENOUS at 11:12

## 2024-12-03 RX ADMIN — CYCLOPHOSPHAMIDE 1640 MG: 200 INJECTION, SOLUTION INTRAVENOUS at 10:12

## 2024-12-03 RX ADMIN — APIXABAN 5 MG: 2.5 TABLET, FILM COATED ORAL at 03:12

## 2024-12-03 RX ADMIN — SODIUM CHLORIDE 0.25 MG: 9 INJECTION, SOLUTION INTRAVENOUS at 09:12

## 2024-12-03 RX ADMIN — Medication 10 ML: at 12:12

## 2024-12-03 RX ADMIN — HEPARIN 500 UNITS: 100 SYRINGE at 12:12

## 2024-12-03 NOTE — PROGRESS NOTES
SW provided pt with 1 case of glucose control Chocolate Boost per pt request. No other needs expressed. SW will remain available.

## 2024-12-03 NOTE — ED PROVIDER NOTES
SCRIBE #1 NOTE: I, Cande Hernandez, am scribing for, and in the presence of, Cristopher Madsen DO. I have scribed the HPI, ROS, and PEx.     SCRIBE #2 NOTE: I, Jong Mckinney, am scribing for, and in the presence of,  Naif Hayes MD. I have scribed the remaining portions of the note not scribed by Scribe #1.      History     Chief Complaint   Patient presents with    Chest Pain     Pt complaining of tightness in chest and tingling in left arm. Pt reports he has a blood clot in left arm but out of eliquis     Review of patient's allergies indicates:   Allergen Reactions    Codeine Other (See Comments)     Upset stomach         History of Present Illness     HPI    12/3/2024, 2:38 AM  History obtained from the patient      History of Present Illness: Lj Coleman is a 68 y.o. male patient with a PMHx of HTN, liver failure, PVCs, DVT, NSTEMI, COPD, anemia, and liver transplant who presents to the Emergency Department for evaluation of chest pain which onset at approximately 8:30 pm. Symptoms are episodic (5-10 minutes each) and moderate in severity. No mitigating or exacerbating factors reported. Pt reports he is out of his Eliquis (last one taken yesterday morning) and that he has a blood clot in his LUE. Associated sxs include chest tightness and LUE numbness. No prior Tx reported. No further complaints or concerns at this time.       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 10/31/2024    Anemia 11/23/2024    Angina pectoris     Arthritis     back    Atherosclerosis of native coronary artery without angina pectoris/ LAD 09/08/2016    Back pain     Chronic hepatitis C with cirrhosis     COPD (chronic obstructive pulmonary disease)     Depression     Hepatoma      Prior to liver transplant    History of colon polyps 09/09/2015    History of transplantation, liver 04/2012    History of vertebral fracture     Hypertension     Immune deficiency disorder     Incisional hernia following transplant 04/09/2014    Liver failure 11/2011    Related to chemotherapy for hepatoma    Liver transplanted 04/2012    NSTEMI (non-ST elevated myocardial infarction) 11/23/2024    Obesity     PVCs (premature ventricular contractions)     Renal dysfunction 11/23/2024    Tobacco abuse 09/09/2016    Trouble in sleeping     Vertebral fracture 1975       Past Surgical History:  Past Surgical History:   Procedure Laterality Date    COLONOSCOPY N/A 1/20/2021    Procedure: COLONOSCOPY;  Surgeon: Emerson Helm MD;  Location: [facility] ENDO;  Service: Endoscopy;  Laterality: N/A;    HERNIA REPAIR Right 2013    incisional    INSERTION OF TUNNELED CENTRAL VENOUS CATHETER (CVC) WITH SUBCUTANEOUS PORT Left 11/6/2024    Procedure: [id number];  Surgeon: Hao Washburn MD;  Location: [facility] OR;  Service: General;  Laterality: Left;    LIVER TRANSPLANT  April 2012    LYMPH NODE BIOPSY/EXCISION Left 10/16/2024    Procedure: LYMPH NODE BIOPSY/EXCISION;  Surgeon: Hao Washburn MD;  Location: [facility] OR;  Service: General;  Laterality: Left;    MOUTH SURGERY      TONSILLECTOMY  1958         Family History:  Family History   Problem Relation Name Age of Onset    Cancer Mother          lung    Cancer Father          bladder    Diabetes Maternal Uncle      Cancer Maternal Grandmother          uterine?    Cancer Other      Heart disease Neg Hx      Kidney disease Neg Hx      Stroke Neg Hx         Social History:  Social History     Tobacco Use    Smoking status: Every Day     Current packs/day: 0.75     Average packs/day: 0.8 packs/day for 36.9 years (27.7 ttl pk-yrs)     Types: Cigarettes     Start date: 1988    Smokeless tobacco: Never    Tobacco comments:     Currently smokes a few (3-5) a day as of  "6/12/24   Substance and Sexual Activity    Alcohol use: No     Alcohol/week: 0.0 standard drinks of alcohol     Comment: Quit alcohol after some alcohol abuse, prior to his liver transplant    Drug use: No    Sexual activity: Not Currently        Review of Systems     Review of Systems   Respiratory:  Positive for chest tightness.    Neurological:  Positive for numbness (LUE).        Physical Exam     Initial Vitals [12/03/24 0045]   BP Pulse Resp Temp SpO2   (!) 144/75 97 18 97.7 °F (36.5 °C) 100 %      MAP       --          Physical Exam  Vitals reviewed.   Constitutional:       General: He is not in acute distress.     Appearance: Normal appearance.   Cardiovascular:      Rate and Rhythm: Normal rate and regular rhythm.      Heart sounds: No murmur heard.  Pulmonary:      Effort: Pulmonary effort is normal.      Breath sounds: No wheezing.   Abdominal:      Palpations: Abdomen is soft.      Tenderness: There is no abdominal tenderness.   Musculoskeletal:         General: Normal range of motion.   Skin:     General: Skin is warm and dry.      Findings: No rash.   Neurological:      General: No focal deficit present.      Mental Status: He is alert and oriented to person, place, and time.      Sensory: No sensory deficit.      Motor: No weakness.          ED Course   Procedures  ED Vital Signs:  Vitals:    12/03/24 0045 12/03/24 0230 12/03/24 0300 12/03/24 0330   BP: (!) 144/75 135/72 134/70 (!) 163/80   Pulse: 97 91 91 88   Resp: 18 (!) 21  17   Temp: 97.7 °F (36.5 °C)      TempSrc: Oral      SpO2: 100% 100% 100% 96%   Weight: 95.7 kg (211 lb)      Height: 5' 8" (1.727 m)       12/03/24 0400 12/03/24 0430 12/03/24 0502 12/03/24 0515   BP: (!) 131/57 (!) 118/53 (!) 143/63 135/63   Pulse: 85 88 89 86   Resp: 16 15 17 17   Temp:       TempSrc:       SpO2: 95% 95% (!) 94% 95%   Weight:       Height:        12/03/24 0516 12/03/24 0545 12/03/24 0632 12/03/24 0700   BP:  (!) 117/58 (!) 153/76 138/73   Pulse: 86 84 88 88 "   Resp: 16 15 17 15   Temp:       TempSrc:       SpO2: 96% 97% 97% 96%   Weight:       Height:        12/03/24 0730   BP: 138/75   Pulse: 95   Resp: 18   Temp: 97.8 °F (36.6 °C)   TempSrc: Oral   SpO2: 97%   Weight:    Height:        Abnormal Lab Results:  Labs Reviewed   CBC W/ AUTO DIFFERENTIAL - Abnormal       Result Value    WBC 10.07      RBC 2.71 (*)     Hemoglobin 8.8 (*)     Hematocrit 27.0 (*)      (*)     MCH 32.5 (*)     MCHC 32.6      RDW 19.2 (*)     Platelets 203      MPV 10.2      Immature Granulocytes 4.5 (*)     Gran # (ANC) 7.7      Immature Grans (Abs) 0.45 (*)     Lymph # 0.9 (*)     Mono # 0.9      Eos # 0.1      Baso # 0.06      nRBC 0      Gran % 76.1 (*)     Lymph % 8.9 (*)     Mono % 9.2      Eosinophil % 0.7      Basophil % 0.6      Differential Method Automated     COMPREHENSIVE METABOLIC PANEL - Abnormal    Sodium 139      Potassium 4.5      Chloride 109      CO2 21 (*)     Glucose 107      BUN 20      Creatinine 1.6 (*)     Calcium 9.0      Total Protein 7.2      Albumin 3.6      Total Bilirubin 0.4      Alkaline Phosphatase 122      AST 17      ALT 11      eGFR 46 (*)     Anion Gap 9     B-TYPE NATRIURETIC PEPTIDE - Abnormal     (*)    PROTIME-INR    Prothrombin Time 11.0      INR 0.9     TROPONIN I   TROPONIN I    Troponin I 0.019     TROPONIN I    Troponin I 0.019          All Lab Results:  Results for orders placed or performed during the hospital encounter of 12/03/24   CBC auto differential    Collection Time: 12/03/24  2:39 AM   Result Value Ref Range    WBC 10.07 3.90 - 12.70 K/uL    RBC 2.71 (L) 4.60 - 6.20 M/uL    Hemoglobin 8.8 (L) 14.0 - 18.0 g/dL    Hematocrit 27.0 (L) 40.0 - 54.0 %     (H) 82 - 98 fL    MCH 32.5 (H) 27.0 - 31.0 pg    MCHC 32.6 32.0 - 36.0 g/dL    RDW 19.2 (H) 11.5 - 14.5 %    Platelets 203 150 - 450 K/uL    MPV 10.2 9.2 - 12.9 fL    Immature Granulocytes 4.5 (H) 0.0 - 0.5 %    Gran # (ANC) 7.7 1.8 - 7.7 K/uL    Immature Grans (Abs) 0.45  (H) 0.00 - 0.04 K/uL    Lymph # 0.9 (L) 1.0 - 4.8 K/uL    Mono # 0.9 0.3 - 1.0 K/uL    Eos # 0.1 0.0 - 0.5 K/uL    Baso # 0.06 0.00 - 0.20 K/uL    nRBC 0 0 /100 WBC    Gran % 76.1 (H) 38.0 - 73.0 %    Lymph % 8.9 (L) 18.0 - 48.0 %    Mono % 9.2 4.0 - 15.0 %    Eosinophil % 0.7 0.0 - 8.0 %    Basophil % 0.6 0.0 - 1.9 %    Differential Method Automated    Comprehensive metabolic panel    Collection Time: 12/03/24  2:39 AM   Result Value Ref Range    Sodium 139 136 - 145 mmol/L    Potassium 4.5 3.5 - 5.1 mmol/L    Chloride 109 95 - 110 mmol/L    CO2 21 (L) 23 - 29 mmol/L    Glucose 107 70 - 110 mg/dL    BUN 20 8 - 23 mg/dL    Creatinine 1.6 (H) 0.5 - 1.4 mg/dL    Calcium 9.0 8.7 - 10.5 mg/dL    Total Protein 7.2 6.0 - 8.4 g/dL    Albumin 3.6 3.5 - 5.2 g/dL    Total Bilirubin 0.4 0.1 - 1.0 mg/dL    Alkaline Phosphatase 122 40 - 150 U/L    AST 17 10 - 40 U/L    ALT 11 10 - 44 U/L    eGFR 46 (A) >60 mL/min/1.73 m^2    Anion Gap 9 8 - 16 mmol/L   Brain natriuretic peptide    Collection Time: 12/03/24  2:39 AM   Result Value Ref Range     (H) 0 - 99 pg/mL   Protime-INR    Collection Time: 12/03/24  2:39 AM   Result Value Ref Range    Prothrombin Time 11.0 9.0 - 12.5 sec    INR 0.9 0.8 - 1.2   Troponin I    Collection Time: 12/03/24  2:39 AM   Result Value Ref Range    Troponin I 0.019 0.000 - 0.026 ng/mL   Troponin I    Collection Time: 12/03/24  5:57 AM   Result Value Ref Range    Troponin I 0.019 0.000 - 0.026 ng/mL     *Note: Due to a large number of results and/or encounters for the requested time period, some results have not been displayed. A complete set of results can be found in Results Review.         Imaging Results:  Imaging Results              X-Ray Chest AP Portable (Final result)  Result time 12/03/24 07:21:38      Final result by Juventino Talamantes MD (12/03/24 07:21:38)                   Impression:      No acute abnormality.      Electronically signed by: Juventino  Vinita  Date:    12/03/2024  Time:    07:21               Narrative:    EXAMINATION:  XR CHEST AP PORTABLE    CLINICAL HISTORY:  Chest Pain;    TECHNIQUE:  Single frontal view of the chest was performed.    COMPARISON:  None    FINDINGS:  The lungs are clear, with normal appearance of pulmonary vasculature and no pleural effusion or pneumothorax.    The cardiac silhouette is normal in size. The hilar and mediastinal contours are unremarkable.    Bones are intact.                                       The EKG was ordered, reviewed, and independently interpreted by the ED provider.  Interpretation time: 0:44  Rate: 93 BPM  Rhythm: normal sinus rhythm  Interpretation: No acute ST WA. No STEMI.           The Emergency Provider reviewed the vital signs and test results, which are outlined above.     ED Discussion     6:00 AM : Dr. Madsen transfers care of patient to Dr. Hayes pending lab results.    7:40 AM: Discussed pt's case with Dr. Marie (Interventional Cardiology) who recommends f/u with Dr Denis in a few days.    7:41 AM: Reassessed pt at this time. Discussed with pt all pertinent ED information and results. Discussed pt dx and plan of tx. Gave pt all f/u and return to the ED instructions. All questions and concerns were addressed at this time. Pt expresses understanding of information and instructions, and is comfortable with plan to discharge. Pt is stable for discharge.    I discussed with patient and/or family/caretaker that evaluation in the ED does not suggest any emergent or life threatening medical conditions requiring immediate intervention beyond what was provided in the ED, and I believe patient is safe for discharge.  Regardless, an unremarkable evaluation in the ED does not preclude the development or presence of a serious of life threatening condition. As such, patient was instructed to return immediately for any worsening or change in current symptoms.         Medical Decision Making  DDx: chest  pain, NSTEMI    Amount and/or Complexity of Data Reviewed  Labs: ordered. Decision-making details documented in ED Course.  Radiology: ordered. Decision-making details documented in ED Course.  ECG/medicine tests: ordered and independent interpretation performed. Decision-making details documented in ED Course.  Discussion of management or test interpretation with external provider(s): Troponin negative X 2.  Discussed with Dr. Barnes.  Can follow up with Dr. Denis in a few days.     Risk  Prescription drug management.                ED Medication(s):  Medications   apixaban tablet 5 mg (5 mg Oral Given 12/3/24 0341)       Discharge Medication List as of 12/3/2024  7:18 AM           Follow-up Information       Elton Denis MD In 2 days.    Specialties: Interventional Cardiology, Cardiology  Contact information:  [address] [place] [address] Dr Yanet ALFONSO [id number]  [phone]               Isabella Sutton DO.    Specialty: Internal Medicine  Contact information:  [address] [place] [address] DR Yanet ALFONSO [address]  [phone]                                 Scribe Attestation:   Scribe #1: I performed the above scribed service and the documentation accurately describes the services I performed. I attest to the accuracy of the note.     Attending:   Physician Attestation Statement for Scribe #1: I, Cristopher Madsen DO, personally performed the services described in this documentation, as scribed by Cande Hernandez, in my presence, and it is both accurate and complete.       Scribe Attestation:   Scribe #2: I performed the above scribed service and the documentation accurately describes the services I performed. I attest to the accuracy of the note.    Attending Attestation:           Physician Attestation for Scribe:    Physician Attestation Statement for Scribe #2: I, Naif Hayes MD, reviewed documentation, as scribed by Jong Mckinney in my presence, and it is both accurate and complete. I also acknowledge  and confirm the content of the note done by Scribe #1.           Clinical Impression       ICD-10-CM ICD-9-CM   1. Chest pain, unspecified type  R07.9 786.50   2. Chest pain  R07.9 786.50   3. Chronic deep vein thrombosis (DVT) of brachial vein of left upper extremity  I82.722 453.72       Disposition:   Disposition: Discharged  Condition: Stable         Naif Hayes MD  12/03/24 0815

## 2024-12-03 NOTE — DISCHARGE INSTRUCTIONS
.[facility]  [address] [address] [address] [facility]  [address]  [phone] phone     [phone] fax  Hours of Operation: Monday- Friday 8:00am- 5:00pm  After hours phone  [phone]  Hematology / Oncology Physicians on call    Dr. Fabian Angel        Nurse Practitioners:    Pearl Oates, ROBER Gardiner, ROBER Gómez, ROBER Salamanca, ROBER Edwards, PA      Please don't hesitate to call if you have any concerns.       .WAYS TO HELP PREVENT INFECTION        WASH YOUR HANDS OFTEN DURING THE DAY, ESPECIALLY BEFORE YOU EAT, AFTER USING THE BATHROOM, AND AFTER TOUCHING ANIMALS    STAY AWAY FROM PEOPLE WHO HAVE ILLNESSES YOU CAN CATCH; SUCH AS COLDS, FLU, CHICKEN POX    TRY TO AVOID CROWDS    STAY AWAY FROM CHILDREN WHO RECENTLY HAVE RECEIVED LIVE VIRUS VACCINES    MAINTAIN GOOD MOUTH CARE    DO NOT SQUEEZE OR SCRATCH PIMPLES    CLEAN CUTS & SCRAPES RIGHT AWAY AND DAILY UNTIL HEALED WITH WARM WATER, SOAP & AN ANTISEPTIC    AVOID CONTACT WITH LITTER BOXES, BIRD CAGES, & FISH TANKS    AVOID STANDING WATER, IE., BIRD BATHS, FLOWER POTS/VASES, OR HUMIDIFIERS    WEAR GLOVES WHEN GARDENING OR CLEANING UP AFTER OTHERS, ESPECIALLY BABIES & SMALL CHILDREN    DO NOT EAT RAW FISH, SEAFOOD, MEAT, OR EGGS

## 2024-12-04 ENCOUNTER — INFUSION (OUTPATIENT)
Dept: INFUSION THERAPY | Facility: HOSPITAL | Age: 69
End: 2024-12-04
Attending: INTERNAL MEDICINE
Payer: MEDICARE

## 2024-12-04 VITALS
RESPIRATION RATE: 16 BRPM | TEMPERATURE: 98 F | SYSTOLIC BLOOD PRESSURE: 122 MMHG | HEART RATE: 106 BPM | DIASTOLIC BLOOD PRESSURE: 66 MMHG | OXYGEN SATURATION: 96 %

## 2024-12-04 DIAGNOSIS — C84.75 ANAPLASTIC ALK-NEGATIVE LARGE CELL LYMPHOMA OF LYMPH NODE OF LOWER EXTREMITY: Primary | ICD-10-CM

## 2024-12-04 PROCEDURE — 96372 THER/PROPH/DIAG INJ SC/IM: CPT | Mod: HCNC

## 2024-12-04 PROCEDURE — 63600175 PHARM REV CODE 636 W HCPCS: Mod: JZ,JG,HCNC | Performed by: INTERNAL MEDICINE

## 2024-12-04 RX ADMIN — PEGFILGRASTIM-JMDB 6 MG: 6 INJECTION SUBCUTANEOUS at 12:12

## 2024-12-04 NOTE — DISCHARGE INSTRUCTIONS
[facility]  [address] [facility]  [address]  [phone] phone     [phone] fax  Hours of Operation: Monday- Friday 8:00am- 5:00pm  After hours phone  [phone]  Hematology / Oncology Physicians on call      ANRDIA Pineda Dr., NP Phaon Dunbar, NP Khelsea Conley, FNP    Please call with any concerns regarding your appointment today.

## 2024-12-04 NOTE — NURSING
Fulphila administered as documented on MAR using aseptic technique. Patient tolerated well. Band aid applied to site.

## 2024-12-05 ENCOUNTER — TELEPHONE (OUTPATIENT)
Dept: TRANSPLANT | Facility: CLINIC | Age: 69
End: 2024-12-05
Payer: MEDICARE

## 2024-12-05 NOTE — TELEPHONE ENCOUNTER
----- Message from Duc Odom MD sent at 12/2/2024  9:33 AM CST -----  Liver transplant blood tests reviewed.  Labs stable,   no change in immunosuppression.   Please continue to monitor liver tests per transplant protocol.

## 2024-12-05 NOTE — DISCHARGE SUMMARY
[facility] Medicine  Discharge Summary      Patient Name: Lj Coleman  MRN: [id number]  MARCI: [id number]  Patient Class: IP- Inpatient  Admission Date: 11/23/2024  Hospital Length of Stay: 2 days  Discharge Date and Time: 11/25/2024  3:20 PM  Attending Physician: No att. 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 He complains of chest pain described  as intermittent in left upper chest radiating into left arm and left hand with tingling in left hand.  He states that it has been going on for past 3-4 days, had severe episode last night which resolved, and when it came back today he decided to come and get it checked out.  He also has shortness of breath on exertion which he states is his baseline and he has had some weakness in his lower extremities  and decreased appetite over the past several days as well. on arrival to ED, temp 97.8°, heart rate 109, respiration 16, blood pressure 100/57, 98% SpO2 on room air.  Lab workup shows WBC 3.04, RBC 2.56, hemoglobin 8.1, hematocrit 24.0, platelet count 41 K, PT 11.5, INR 1.1, PTT 30.0, sodium 132, potassium 3.7, CO2 20, BUN 14, creatinine 2.2, glucose 125, calcium 8.5, albumin 3.1, normal LFTs, ammonia level 29, , troponin 0.807.  Chest x-ray demonstrates lungs are clear.  He is negative for Flu A/B, negative for Covid-19.  EKG ST with no concerning ST changes. He was given ASA and heparin  bolus/drip was ordered -- which has been subsequently discontinued after consulting with cardiology , as he has PLT 41K. The chest pain is intermittent, described as mild currently. He denies need for pain medication. Hospital Medicine was consutled for admission due to NSTEMI.    * No surgery found *      Hospital Course:   Patient is a 68 year male with a history of large cell lymphoma recently started on chemotherapy 11/4, COPD, liver transplant 2012, history of DVT to the upper extremity on Eliquis, hypertension, coronary artery disease, anemia, and CKD presented emergency department with complain of chest pain.  He was noted to be in the left precordium going down to the left hand with numbness and tingling in hand.  Upon arrival patient was noted to have a heart rate of 109, respirations of 16 and adequate blood pressure.  Workup revealed a white count 3 hemoglobin 8 and [place] count of 520[address].  Troponin elevated at 0.8 which remained flat and subsequent draws, BNP of 198, he was noted to be flu a and B negative.  Patient was initially given aspirin and placed on heparin drip however after Cardiology consultation and a platelet count of 41,000 heparin drip was Dc'ed.  Patient seen in consultation by Cardiology who noted that troponin trend was flat.  They felt he had demand ischemia in the setting of thrombocytopenia, and anemia with pancytopenia.  They recommended evaluation of his thrombocytopenia and anemia prior to restarting Eliquis and follow up outpatient for stress test.  Patient had no further chest pain.  He was seen and examined on day of discharge and stable for discharge home.    ECHO:  Left Ventricle: The left ventricle is normal in size. Normal wall thickness. There is concentric remodeling. Normal wall motion. There is normal systolic function with a visually estimated ejection fraction of 60 - 65%. Ejection fraction is approximately 60%. There is normal diastolic function.    Right Ventricle:  Normal right ventricular cavity size. Right ventricle wall motion  is normal. Systolic function is normal.    Mitral Valve: There is mild regurgitation.    Tricuspid Valve: There is mild regurgitation.    Pulmonary Artery: The estimated pulmonary artery systolic pressure is 36 mmHg.    IVC/SVC: Normal venous pressure at 3 mmHg.       Goals of Care Treatment Preferences:  Code Status: Full Code    Health care agent: 2 adult children. Pt is   Health care agent number: No value filed.          What is most important right now is to focus on remaining as independent as possible, symptom/pain control, quality of life, even if it means sacrificing a little time.  Accordingly, we have decided that the best plan to meet the patient's goals includes continuing with treatment.      SDOH Screening:  The patient declined to be screened for utility difficulties, food insecurity, transport difficulties, housing insecurity, and interpersonal safety, so no concerns could be identified this admission.     Consults:   Consults (From admission, onward)          Status Ordering Provider     Inpatient consult to Registered Dietitian/Nutritionist  Once        Provider:  (Not yet assigned)    Completed WILLARD SANTIAGO     Inpatient consult to Social Work/Case Management  Once        Provider:  (Not yet assigned)    Completed WILLARD SANTIAGO     Inpatient consult to Cardiology  Once        Provider:  Titi Choudhury MD    Completed CANDIS PEDRO JR            * Troponin level elevated  Intermittent chest pain for past several days -- radiating into left arm to left hand with tingling, trop 0.807  ASA given  Was given heparin bolus, will hold off on further heparin due to thrombocytopenia - PLT 41  Troponin - flat  Cardiac monitoring ordered  Continue ASA 81 mg daily  Continue beta blocker  Cardiology consulted per ED      NINO (acute kidney injury)  NINO is likely due to medications -- hepatology has adjusted medications.   Most  recent creatinine and eGFR are listed below.    Recent Labs     11/23/24 2009 11/24/24  0405   CREATININE 2.2* 2.2*   EGFRNORACEVR 31* 31*     Baseline creatinine approximately 2   Plan  - Monitor renal function  - Gentle hydration with IV fluids overnight x 1 L    Pancytopenia  This patient is found to have pancytopenia, the likely etiology is Drug induced (including chemotherapy) related to chemo , will monitor CBC Daily. Will transfuse red blood cells if the hemoglobin is <7g/dL (or <8 in the setting of ACS). Will transfuse platelets if platelet count is <50k (if undergoing surgical procedure or have active bleeding). Hold DVT prophylaxis if platelets are <50k. The patient's hemoglobin, white blood cell count, and platelet count results have been reviewed and are listed below.  Recent Labs   Lab 11/24/24  0406   HGB 8.1*   WBC 2.90*   PLT 42*       Currently on chemotherapy for lymphoma (per Dr. 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 Peralta -- currently on chemotherapy 11/13      COPD suggested by initial evaluation  PRN nebs ordered -- no wheezing on exam    CXR-- lungs clear      Chronic deep vein thrombosis (DVT) of left upper extremity  [place] [facility] [place] for now due to pancytopenia      Tobacco abuse disorder  Counseled on cessation x 5 minutes -- he states that he is still having cravings, but is barely smoking any more  Nicotine patch ordered for patient      Atherosclerosis of native coronary artery with stable angina pectoris  Per CTA chest 2015 -- showed atherosclerosis of LAD  Echo- pending  Cardiology consulted per ED  No ST changes noted on EKG  ASA ordered, had normal lipid panel recently  Cardiac monitoring ordered       Essential hypertension  Patients blood pressure range in the last 24  hours was: BP  Min: 97/58  Max: 130/64.The patient's inpatient anti-hypertensive regimen is listed below:  Current Antihypertensives  metoprolol succinate (TOPROL-XL) 24 hr tablet 25 mg, Daily, Oral  nitroGLYCERIN SL tablet 0.4 mg, Every 5 min PRN, Sublingual    Plan  - BP is controlled, no changes needed to their regimen      History of liver transplant  Liver transplant in 2012, followed by hepatology  Restart tacrolimus, Myfortic and prednisone  Check tacrolimus level in a.m.      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 [C84.70] 10/31/2024 Yes    COPD suggested by initial evaluation [J44.9] 08/15/2023 Yes    Chronic deep vein thrombosis (DVT) of left upper extremity [I82.722] 04/04/2023 Yes    Tobacco abuse disorder [Z72.0] 06/14/2021 Yes    Atherosclerosis of native coronary artery with stable angina pectoris [I25.118] 09/08/2016 Yes    Essential hypertension [I10] 07/28/2016 Yes    History of liver transplant [Z94.4] 09/09/2015 Not Applicable    Immunosuppression [D84.9] 09/09/2015 Yes    Insomnia [G47.00] 01/10/2014 Yes      Problems Resolved During this Admission:       Discharged Condition: fair    Disposition: Home or Self Care    Follow Up:    Patient Instructions:      Ambulatory referral/consult to Cardiology   Standing Status: Future   Referral Priority: Routine Referral Type: Consultation   Referral Reason: Specialty Services Required   Requested Specialty: Cardiology   Number of Visits Requested: 1     Ambulatory referral/consult to [facility]   Standing Status: Future   Referral Priority: Routine Referral Type: Consultation    Referral Reason: Specialty Services Required   Number of Visits Requested: 1     Diet Cardiac     Notify your health care provider if you experience any of the following:  temperature >100.4     Notify your health care provider if you experience any of the following:  persistent nausea and vomiting or diarrhea     Notify your health care provider if you experience any of the following:  severe uncontrolled pain     Notify your health care provider if you experience any of the following:  persistent dizziness, light-headedness, or visual disturbances     Notify your health care provider if you experience any of the following:  increased confusion or weakness     REASON FOR NOT PRESCRIBING ANTIPLATELET MEDICATION AT DISCHARGE     Order Specific Question Answer Comments   Reason for not Prescribing: Other (Comment) not indicated     Activity as tolerated       Significant Diagnostic Studies: Labs: All labs within the past 24 hours have been reviewed  Imaging Results              X-Ray Chest AP Portable (Final result)  Result time 11/23/24 20:30:14      Final result by Dennis CloudGermánMD tommy (11/23/24 20:30:14)                   Impression:      Lungs are clear.      Electronically signed by: Dennis Cloud MD  Date:    11/23/2024  Time:    20:30               Narrative:    EXAMINATION:  XR CHEST AP PORTABLE    CLINICAL HISTORY:  <Diagnosis>, Chest Pain;    COMPARISON:  Chest, 11/18/2024.    FINDINGS:  One view.  MediPort catheter tip at the SVC level.  Heart is normal in size.  Lungs are clear.                                      Pending Diagnostic Studies:       None           Medications:  Reconciled Home Medications:      Medication List        START taking these medications      aspirin 81 MG Chew  Take 1 tablet (81 mg total) by mouth once daily.     atorvastatin 80 MG tablet  Commonly known as: LIPITOR  Take 1 tablet (80 mg total) by mouth once daily.     nitroGLYCERIN 0.4 MG SL tablet  Commonly known  as: NITROSTAT  Place 1 tablet (0.4 mg total) under the tongue every 5 (five) minutes as needed for Chest pain (angina).            CONTINUE taking these medications      acyclovir 400 MG tablet  Commonly known as: ZOVIRAX  Take 1 tablet (400 mg total) by mouth 2 (two) times daily. While on chemotherapy     albuterol 90 mcg/actuation inhaler  Commonly known as: VENTOLIN HFA  Inhale 2 puffs into the lungs every 6 (six) hours as needed for Wheezing or Shortness of Breath. Rescue     allopurinoL 300 MG tablet  Commonly known as: ZYLOPRIM  Take 1 tablet (300 mg total) by mouth once daily.     diclofenac sodium 1 % Gel  Commonly known as: VOLTAREN  Apply 2 g topically 4 (four) times daily. for 10 days     esomeprazole 40 MG capsule  Commonly known as: NEXIUM  Take 1 capsule (40 mg total) by mouth once daily.     HYDROcodone-acetaminophen 5-325 mg per tablet  Commonly known as: NORCO  Take 1 tablet by mouth every 6 (six) hours as needed for Pain.     LIDOcaine-prilocaine cream  Commonly known as: EMLA  Apply topically as needed.     melatonin 10 mg Tab  Take by mouth.     metoprolol succinate 25 MG 24 hr tablet  Commonly known as: TOPROL-XL  TAKE 1 TABLET EVERY DAY     nicotine 21 mg/24 hr  Commonly known as: NICODERM CQ  Place 1 patch onto the skin once daily.     ondansetron 8 MG tablet  Commonly known as: ZOFRAN  Take 1 tablet (8 mg total) by mouth every 12 (twelve) hours as needed for Nausea.     predniSONE 50 MG Tab  Commonly known as: DELTASONE  Take 2 tablets (100 mg total) by mouth once daily. on days 2,3,4, 5 of each chemotherapy cycle     prochlorperazine 5 MG tablet  Commonly known as: COMPAZINE  Take 1 tablet (5 mg total) by mouth every 6 (six) hours as needed (nausea).     sulfamethoxazole-trimethoprim 800-160mg 800-160 mg Tab  Commonly known as: BACTRIM DS  Take 1 tablet by mouth 3 (three) times a week.     tacrolimus 0.5 MG Cap  Commonly known as: PROGRAF  Take 1 capsule (0.5 mg total) by mouth every 12  (twelve) hours.     tamsulosin 0.4 mg Cap  Commonly known as: FLOMAX  Take 1 capsule (0.4 mg total) by mouth once daily.     * tenofovir alafenamide 25 mg Tab  Commonly known as: VEMLIDY  Take 1 tablet (25 mg total) by mouth Daily.     * VEMLIDY 25 mg Tab  Generic drug: tenofovir alafenamide  Take 1 tablet by mouth once daily     TRELEGY ELLIPTA 100-62.5-25 mcg Dsdv  Generic drug: fluticasone-umeclidin-vilanter  Inhale 1 puff into the lungs once daily.     * varenicline 0.5 MG Tab  Commonly known as: CHANTIX  Take 1 tablet (0.5 mg total) by mouth once daily.     * varenicline 1 mg Tab  Commonly known as: CHANTIX  Take 1 tablet (1 mg total) by mouth 2 (two) times daily.           * This list has 4 medication(s) that are the same as other medications prescribed for you. Read the directions carefully, and ask your doctor or other care provider to review them with you.                  Indwelling Lines/Drains at time of discharge:   Lines/Drains/Airways       Central Venous Catheter Line  Duration                  PowerPort A Cath Single Lumen 11/06/24 0919 Subclavian Left 28 days                    Time spent on the discharge of patient: 49 minutes         Chanell Jim MD  Department of Hospital Medicine  [facility] - Med S[facility]

## 2024-12-05 NOTE — TELEPHONE ENCOUNTER
Called pt to discuss, no answer.  MyChart message sent for POC and preferred pharmacy.  ----- Message from Duc Odom MD sent at 12/5/2024 10:33 AM CST -----  Yes  Let take Pharm D opinion  ----- Message -----  From: Joelle Hernández RN  Sent: 12/5/2024   9:19 AM CST  To: Duc Odom MD    Okay to switch to [facility] due to new lymphoma?  ----- Message -----  From: Duc Odom MD  Sent: 12/3/2024   9:13 AM CST  To: [facility] Transplant Clinical    Results reviewed. No change in immunosuppression

## 2024-12-05 NOTE — TELEPHONE ENCOUNTER
----- Message from Duc Odom MD sent at 12/3/2024  9:13 AM CST -----  Results reviewed. No change in immunosuppression

## 2024-12-06 DIAGNOSIS — Z94.4 HISTORY OF LIVER TRANSPLANT: Primary | ICD-10-CM

## 2024-12-06 RX ORDER — SIROLIMUS 1 MG/1
TABLET, FILM COATED ORAL
Qty: 33 TABLET | Refills: 11 | Status: SHIPPED | OUTPATIENT
Start: 2024-12-06

## 2024-12-06 NOTE — TELEPHONE ENCOUNTER
Labs completed per protocol, RX sent in by pharmD.  MyChart message sent to notify pt.  Instructed pt to notify transplant coordinator when medication is received for instructions and set-up.  ----- Message from Duc Odom MD sent at 12/5/2024 10:33 AM CST -----  Yes  Let take Pharm D opinion  ----- Message -----  From: Joelle Hernández RN  Sent: 12/5/2024   9:19 AM CST  To: Duc Odom MD    Okay to switch to [facility] due to new lymphoma?  ----- Message -----  From: Duc Odom MD  Sent: 12/3/2024   9:13 AM CST  To: [facility] Post-Liver Transplant Clinical    Results reviewed. No change in immunosuppression

## 2024-12-10 ENCOUNTER — TELEPHONE (OUTPATIENT)
Dept: SMOKING CESSATION | Facility: CLINIC | Age: 69
End: 2024-12-10
Payer: MEDICARE

## 2024-12-16 NOTE — PHYSICIAN QUERY
Provider, due to conflicting documentation please clarify the cardiac diagnosis.      Provider Query Response:  Demand ischemia

## 2024-12-17 ENCOUNTER — TELEPHONE (OUTPATIENT)
Dept: HEMATOLOGY/ONCOLOGY | Facility: CLINIC | Age: 69
End: 2024-12-17
Payer: MEDICARE

## 2024-12-17 NOTE — TELEPHONE ENCOUNTER
"Pt inquired about if pt should be taking acyclovir and appointments. Educated pt on taking acyclovir while undergoing active chemotherapy. Advised pt of new pt appointment on 12/18 at 10:00 AM. Pt said "No one told me my appointment was tomorrow for 10:00 AM. I would have to leave at 3:00 AM to make that 10:00 AM appointment because of the traffic." Offered to reschedule pt's appointment if unable to make appointment on 12/18. Pt requests to reschedule appointment. Inquired if pt could make next available afternoon appointment on 1/24 at 2:00 PM. Pt verbalized understanding and agreeable to appointment on 1/24 at 2:00 PM with Dr. Acevedo.  "

## 2024-12-24 ENCOUNTER — TELEPHONE (OUTPATIENT)
Dept: HEMATOLOGY/ONCOLOGY | Facility: CLINIC | Age: 69
End: 2024-12-24
Payer: MEDICARE

## 2024-12-24 ENCOUNTER — PATIENT MESSAGE (OUTPATIENT)
Dept: CARDIOLOGY | Facility: HOSPITAL | Age: 69
End: 2024-12-24
Payer: COMMERCIAL

## 2024-12-24 NOTE — TELEPHONE ENCOUNTER
Spoke to patient's daughter: accepted 12/26/24 @ 8:20 am [facility] location for F/U with medication review.

## 2024-12-26 ENCOUNTER — PATIENT MESSAGE (OUTPATIENT)
Dept: HEMATOLOGY/ONCOLOGY | Facility: CLINIC | Age: 69
End: 2024-12-26
Payer: COMMERCIAL

## 2024-12-26 ENCOUNTER — LAB VISIT (OUTPATIENT)
Dept: LAB | Facility: HOSPITAL | Age: 69
End: 2024-12-26
Attending: INTERNAL MEDICINE
Payer: MEDICARE

## 2024-12-26 ENCOUNTER — DOCUMENTATION ONLY (OUTPATIENT)
Dept: HEMATOLOGY/ONCOLOGY | Facility: CLINIC | Age: 69
End: 2024-12-26

## 2024-12-26 ENCOUNTER — PATIENT MESSAGE (OUTPATIENT)
Dept: ADMINISTRATIVE | Facility: OTHER | Age: 69
End: 2024-12-26
Payer: COMMERCIAL

## 2024-12-26 ENCOUNTER — OFFICE VISIT (OUTPATIENT)
Dept: HEMATOLOGY/ONCOLOGY | Facility: CLINIC | Age: 69
End: 2024-12-26
Payer: MEDICARE

## 2024-12-26 VITALS
DIASTOLIC BLOOD PRESSURE: 88 MMHG | HEIGHT: 68 IN | RESPIRATION RATE: 19 BRPM | OXYGEN SATURATION: 97 % | HEART RATE: 118 BPM | BODY MASS INDEX: 28.81 KG/M2 | WEIGHT: 190.13 LBS | SYSTOLIC BLOOD PRESSURE: 149 MMHG

## 2024-12-26 DIAGNOSIS — Z85.05 HISTORY OF LIVER CANCER: ICD-10-CM

## 2024-12-26 DIAGNOSIS — Z94.4 LIVER TRANSPLANTED: ICD-10-CM

## 2024-12-26 DIAGNOSIS — C85.80 LARGE CELL LYMPHOMA: ICD-10-CM

## 2024-12-26 DIAGNOSIS — D84.821 IMMUNODEFICIENCY DUE TO CHEMOTHERAPY: ICD-10-CM

## 2024-12-26 DIAGNOSIS — Z79.899 IMMUNODEFICIENCY DUE TO CHEMOTHERAPY: ICD-10-CM

## 2024-12-26 DIAGNOSIS — T45.1X5A IMMUNODEFICIENCY DUE TO CHEMOTHERAPY: ICD-10-CM

## 2024-12-26 DIAGNOSIS — D84.9 IMMUNOSUPPRESSION: ICD-10-CM

## 2024-12-26 DIAGNOSIS — I82.722 CHRONIC DEEP VEIN THROMBOSIS (DVT) OF LEFT UPPER EXTREMITY, UNSPECIFIED VEIN: ICD-10-CM

## 2024-12-26 DIAGNOSIS — C84.75 ANAPLASTIC ALK-NEGATIVE LARGE CELL LYMPHOMA OF LYMPH NODE OF LOWER EXTREMITY: Primary | ICD-10-CM

## 2024-12-26 DIAGNOSIS — C84.75 ANAPLASTIC ALK-NEGATIVE LARGE CELL LYMPHOMA OF LYMPH NODE OF LOWER EXTREMITY: ICD-10-CM

## 2024-12-26 LAB
ALBUMIN SERPL BCP-MCNC: 3.7 G/DL (ref 3.5–5.2)
ALP SERPL-CCNC: 110 U/L (ref 40–150)
ALT SERPL W/O P-5'-P-CCNC: 22 U/L (ref 10–44)
ANION GAP SERPL CALC-SCNC: 12 MMOL/L (ref 8–16)
AST SERPL-CCNC: 21 U/L (ref 10–40)
BASOPHILS # BLD AUTO: 0.04 K/UL (ref 0–0.2)
BASOPHILS NFR BLD: 0.5 % (ref 0–1.9)
BILIRUB SERPL-MCNC: 0.6 MG/DL (ref 0.1–1)
BUN SERPL-MCNC: 13 MG/DL (ref 8–23)
CALCIUM SERPL-MCNC: 8.9 MG/DL (ref 8.7–10.5)
CHLORIDE SERPL-SCNC: 109 MMOL/L (ref 95–110)
CO2 SERPL-SCNC: 19 MMOL/L (ref 23–29)
CREAT SERPL-MCNC: 1.3 MG/DL (ref 0.5–1.4)
DIFFERENTIAL METHOD BLD: ABNORMAL
EOSINOPHIL # BLD AUTO: 0 K/UL (ref 0–0.5)
EOSINOPHIL NFR BLD: 0 % (ref 0–8)
ERYTHROCYTE [DISTWIDTH] IN BLOOD BY AUTOMATED COUNT: 20.4 % (ref 11.5–14.5)
EST. GFR  (NO RACE VARIABLE): 59 ML/MIN/1.73 M^2
GLUCOSE SERPL-MCNC: 98 MG/DL (ref 70–110)
HCT VFR BLD AUTO: 33.1 % (ref 40–54)
HGB BLD-MCNC: 10.9 G/DL (ref 14–18)
IMM GRANULOCYTES # BLD AUTO: 0.09 K/UL (ref 0–0.04)
IMM GRANULOCYTES NFR BLD AUTO: 1 % (ref 0–0.5)
LDH SERPL L TO P-CCNC: 191 U/L (ref 110–260)
LYMPHOCYTES # BLD AUTO: 0.7 K/UL (ref 1–4.8)
LYMPHOCYTES NFR BLD: 7.3 % (ref 18–48)
MCH RBC QN AUTO: 34 PG (ref 27–31)
MCHC RBC AUTO-ENTMCNC: 32.9 G/DL (ref 32–36)
MCV RBC AUTO: 103 FL (ref 82–98)
MONOCYTES # BLD AUTO: 0.6 K/UL (ref 0.3–1)
MONOCYTES NFR BLD: 7.1 % (ref 4–15)
NEUTROPHILS # BLD AUTO: 7.5 K/UL (ref 1.8–7.7)
NEUTROPHILS NFR BLD: 84.1 % (ref 38–73)
NRBC BLD-RTO: 0 /100 WBC
PLATELET # BLD AUTO: 176 K/UL (ref 150–450)
PMV BLD AUTO: 10.1 FL (ref 9.2–12.9)
POTASSIUM SERPL-SCNC: 3.5 MMOL/L (ref 3.5–5.1)
PROT SERPL-MCNC: 6.8 G/DL (ref 6–8.4)
RBC # BLD AUTO: 3.21 M/UL (ref 4.6–6.2)
SODIUM SERPL-SCNC: 140 MMOL/L (ref 136–145)
WBC # BLD AUTO: 8.88 K/UL (ref 3.9–12.7)

## 2024-12-26 PROCEDURE — 3079F DIAST BP 80-89 MM HG: CPT | Mod: HCNC,CPTII,S$GLB, | Performed by: INTERNAL MEDICINE

## 2024-12-26 PROCEDURE — 1160F RVW MEDS BY RX/DR IN RCRD: CPT | Mod: HCNC,CPTII,S$GLB, | Performed by: INTERNAL MEDICINE

## 2024-12-26 PROCEDURE — 85025 COMPLETE CBC W/AUTO DIFF WBC: CPT | Mod: HCNC | Performed by: INTERNAL MEDICINE

## 2024-12-26 PROCEDURE — 80053 COMPREHEN METABOLIC PANEL: CPT | Mod: HCNC | Performed by: INTERNAL MEDICINE

## 2024-12-26 PROCEDURE — 1159F MED LIST DOCD IN RCRD: CPT | Mod: HCNC,CPTII,S$GLB, | Performed by: INTERNAL MEDICINE

## 2024-12-26 PROCEDURE — 1126F AMNT PAIN NOTED NONE PRSNT: CPT | Mod: HCNC,CPTII,S$GLB, | Performed by: INTERNAL MEDICINE

## 2024-12-26 PROCEDURE — 99215 OFFICE O/P EST HI 40 MIN: CPT | Mod: HCNC,S$GLB,, | Performed by: INTERNAL MEDICINE

## 2024-12-26 PROCEDURE — 3008F BODY MASS INDEX DOCD: CPT | Mod: HCNC,CPTII,S$GLB, | Performed by: INTERNAL MEDICINE

## 2024-12-26 PROCEDURE — 3288F FALL RISK ASSESSMENT DOCD: CPT | Mod: HCNC,CPTII,S$GLB, | Performed by: INTERNAL MEDICINE

## 2024-12-26 PROCEDURE — 83615 LACTATE (LD) (LDH) ENZYME: CPT | Mod: HCNC | Performed by: INTERNAL MEDICINE

## 2024-12-26 PROCEDURE — 3077F SYST BP >= 140 MM HG: CPT | Mod: HCNC,CPTII,S$GLB, | Performed by: INTERNAL MEDICINE

## 2024-12-26 PROCEDURE — 99999 PR PBB SHADOW E&M-EST. PATIENT-LVL V: CPT | Mod: PBBFAC,HCNC,, | Performed by: INTERNAL MEDICINE

## 2024-12-26 PROCEDURE — 1101F PT FALLS ASSESS-DOCD LE1/YR: CPT | Mod: HCNC,CPTII,S$GLB, | Performed by: INTERNAL MEDICINE

## 2024-12-26 NOTE — PROGRESS NOTES
Subjective:       Patient ID: Lj Coleman is a 69 y.o. male.    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  Patient presents for cycle 3 day 1 OP A + CHP - BRENTUXIMAB CYCLOPHOSPHAMIDE DOXORUBICIN PREDNISONE patient is accompanied by his daughter this 1st time I am meeting her.  Concern over possible confusion and disorientation.          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 10/31/2024    Anemia 11/23/2024    Angina pectoris     Arthritis     back    Atherosclerosis of native coronary artery without angina pectoris/ LAD 09/08/2016    Back pain     Chronic hepatitis C with cirrhosis     COPD (chronic obstructive pulmonary disease)     Depression     Hepatoma     Prior to liver transplant    History of colon polyps 09/09/2015    History of transplantation, liver 04/2012    History of vertebral fracture     Hypertension     Immune deficiency disorder     Incisional hernia following transplant 04/09/2014    Liver failure 11/2011    Related to chemotherapy for hepatoma    Liver transplanted 04/2012    NSTEMI (non-ST elevated myocardial infarction) 11/23/2024    Obesity     PVCs (premature ventricular contractions)     Renal dysfunction 11/23/2024    Tobacco abuse 09/09/2016    Trouble in sleeping     Vertebral fracture 1975     Family History   Problem Relation Name Age of Onset    Cancer Mother          lung    Cancer Father          bladder    Diabetes Maternal Uncle      Cancer Maternal Grandmother          uterine?    Cancer Other      Heart disease Neg Hx      Kidney disease Neg Hx      Stroke Neg Hx       Social History     Socioeconomic History    Marital status:     Highest education level: 10th grade   Tobacco Use    Smoking status: Every Day     Current packs/day: 0.75      Average packs/day: 0.8 packs/day for 37.0 years (27.7 ttl pk-yrs)     Types: Cigarettes     Start date: 1988    Smokeless tobacco: Never    Tobacco comments:     Currently smokes a few (3-5) a day as of 6/12/24   Substance and Sexual Activity    Alcohol use: No     Alcohol/week: 0.0 standard drinks of alcohol     Comment: Quit alcohol after some alcohol abuse, prior to his liver transplant    Drug use: No    Sexual activity: Not Currently     Social Drivers of Health     Financial Resource Strain: Patient Declined (11/24/2024)    Overall Financial Resource Strain (CARDIA)     Difficulty of Paying Living Expenses: Patient declined   Food Insecurity: Patient Declined (11/24/2024)    Hunger Vital Sign     Worried About Running Out of Food in the Last Year: Patient declined     Ran Out of Food in the Last Year: Patient declined   Transportation Needs: Patient Declined (11/24/2024)    TRANSPORTATION NEEDS     Transportation : Patient declined   Physical Activity: Inactive (3/13/2024)    Exercise Vital Sign     Days of Exercise per Week: 0 days     Minutes of Exercise per Session: 0 min   Stress: Patient Declined (11/24/2024)    [employer] - Occupational Stress Questionnaire     Feeling of Stress : Patient declined   Housing Stability: Patient Declined (11/24/2024)    Housing Stability Vital Sign     Unable to Pay for Housing in the Last Year: Patient declined     Homeless in the Last Year: Patient declined     Past Surgical History:   Procedure Laterality Date    COLONOSCOPY N/A 1/20/2021    Procedure: COLONOSCOPY;  Surgeon: Emerson Helm MD;  Location: [facility] ENDO;  Service: Endoscopy;  Laterality: N/A;    HERNIA REPAIR Right 2013    incisional    INSERTION OF TUNNELED CENTRAL VENOUS CATHETER (CVC) WITH SUBCUTANEOUS PORT Left 11/6/2024    Procedure: [id number];  Surgeon: Hao Washburn MD;  Location: [facility] OR;  Service: General;  Laterality: Left;    LIVER TRANSPLANT   "April 2012    LYMPH NODE BIOPSY/EXCISION Left 10/16/2024    Procedure: LYMPH NODE BIOPSY/EXCISION;  Surgeon: Hao Washburn MD;  Location: [facility];  Service: General;  Laterality: Left;    MOUTH SURGERY      TONSILLECTOMY  1958       Labs:  Lab Results   Component Value Date    WBC 10.07 12/03/2024    HGB 8.8 (L) 12/03/2024    HCT 27.0 (L) 12/03/2024     (H) 12/03/2024     12/03/2024     BMP  Lab Results   Component Value Date     12/03/2024    K 4.5 12/03/2024     12/03/2024    CO2 21 (L) 12/03/2024    BUN 20 12/03/2024    CREATININE 1.6 (H) 12/03/2024    CALCIUM 9.0 12/03/2024    ANIONGAP 9 12/03/2024    ESTGFRAFRICA >60.0 06/08/2022    EGFRNONAA >60.0 06/08/2022     Lab Results   Component Value Date    ALT 11 12/03/2024    AST 17 12/03/2024    GGT 32 09/26/2016    ALKPHOS 122 12/03/2024    BILITOT 0.4 12/03/2024       Lab Results   Component Value Date    IRON 74 11/04/2024    TIBC 287 11/04/2024    FERRITIN 325 (H) 11/04/2024     No results found for: "AEUHNRSL45"  No results found for: "FOLATE"  Lab Results   Component Value Date    TSH 1.168 06/06/2023         Review of Systems   Constitutional:  Positive for fatigue. Negative for activity change, appetite change, chills, diaphoresis, fever and unexpected weight change.   HENT:  Negative for congestion, dental problem, drooling, ear discharge, ear pain, facial swelling, hearing loss, mouth sores, nosebleeds, postnasal drip, rhinorrhea, sinus pressure, sneezing, sore throat, tinnitus, trouble swallowing and voice change.    Eyes:  Negative for photophobia, pain, discharge, redness, itching and visual disturbance.   Respiratory:  Negative for apnea, cough, choking, chest tightness, shortness of breath, wheezing and stridor.    Cardiovascular:  Negative for chest pain, palpitations and leg swelling.   Gastrointestinal:  Negative for abdominal distention, abdominal pain, anal bleeding, blood in stool, constipation, diarrhea, nausea, " rectal pain and vomiting.   Endocrine: Negative for cold intolerance, heat intolerance, polydipsia, polyphagia and polyuria.   Genitourinary:  Negative for decreased urine volume, difficulty urinating, dysuria, enuresis, flank pain, frequency, genital sores, hematuria, penile discharge, penile pain, penile swelling, scrotal swelling, testicular pain and urgency.   Musculoskeletal:  Negative for arthralgias, back pain, gait problem, joint swelling, myalgias, neck pain and neck stiffness.   Skin:  Negative for color change, pallor, rash and wound.   Allergic/Immunologic: Negative for environmental allergies, food allergies and immunocompromised state.   Neurological:  Positive for weakness. Negative for dizziness, tremors, seizures, syncope, facial asymmetry, speech difficulty, light-headedness, numbness and headaches.   Hematological:  Negative for adenopathy. Does not bruise/bleed easily.   Psychiatric/Behavioral:  Positive for decreased concentration and dysphoric mood. Negative for agitation, behavioral problems, confusion, hallucinations, self-injury, sleep disturbance and suicidal ideas. The patient is not nervous/anxious and is not hyperactive.        Objective:      Physical Exam  Vitals reviewed.   Constitutional:       General: He is not in acute distress.     Appearance: He is well-developed. He is not diaphoretic.   HENT:      Head: Normocephalic.      Right Ear: External ear normal.      Left Ear: External ear normal.      Nose: Nose normal.      Right Sinus: No maxillary sinus tenderness or frontal sinus tenderness.      Left Sinus: No maxillary sinus tenderness or frontal sinus tenderness.      Mouth/Throat:      Pharynx: No oropharyngeal exudate.   Eyes:      General: Lids are normal. No scleral icterus.        Right eye: No discharge.         Left eye: No discharge.      Extraocular Movements:      Right eye: Normal extraocular motion.      Left eye: Normal extraocular motion.      Conjunctiva/sclera:       Right eye: Right conjunctiva is not injected. No hemorrhage.     Left eye: Left conjunctiva is not injected. No hemorrhage.     Pupils: Pupils are equal, round, and reactive to light.   Neck:      Thyroid: No thyromegaly.      Vascular: No JVD.      Trachea: No tracheal deviation.   Cardiovascular:      Rate and Rhythm: Normal rate and regular rhythm.      Heart sounds: Normal heart sounds.   Pulmonary:      Effort: Pulmonary effort is normal. No respiratory distress.      Breath sounds: Normal breath sounds. No stridor.   Abdominal:      General: Bowel sounds are normal.      Palpations: Abdomen is soft. There is no hepatomegaly, splenomegaly or mass.      Tenderness: There is no abdominal tenderness.   Musculoskeletal:         General: No tenderness. Normal range of motion.      Cervical back: Normal range of motion and neck supple.   Lymphadenopathy:      Head:      Right side of head: No posterior auricular or occipital adenopathy.      Left side of head: No posterior auricular or occipital adenopathy.      Cervical: No cervical adenopathy.      Right cervical: No superficial, deep or posterior cervical adenopathy.     Left cervical: No superficial, deep or posterior cervical adenopathy.      Upper Body:      Right upper body: No supraclavicular adenopathy.      Left upper body: No supraclavicular adenopathy.      Comments: Dramatic response with decreasing lymphadenopathy after 2 cycles of treatment   Skin:     General: Skin is dry.      Findings: No erythema or rash.      Nails: There is no clubbing.   Neurological:      Mental Status: He is alert and oriented to person, place, and time.      Cranial Nerves: No cranial nerve deficit.      Coordination: Coordination normal.   Psychiatric:         Behavior: Behavior normal.         Thought Content: Thought content normal.         Judgment: Judgment normal.             Assessment:      1. 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 History of liver cancer    3. Chronic deep vein thrombosis (DVT) of left upper extremity, unspecified vein    4. Immunodeficiency due to chemotherapy    5. Immunosuppression           Med Onc Chart Routing      Follow up with physician    Follow up with [facility]    Infusion scheduling note    Injection scheduling note Please scheduled for cycle 3 day 1 of therapy   Labs   Scheduling:  Preferred lab:  Lab interval:  Needs CBC CMP LDH today   Imaging   Needs MRI of the brain stat modest placed   Pharmacy appointment    Other referrals                   Plan:     Reviewed information with daughter dramatic response to therapy.  Was supposedly to receive cycle 3 today but is not scheduled will need laboratory studies to see whether not to proceed with cycle 3 in addition confusion will do MRI brain low likelihood but will need evaluation.  Will have  see and evaluate and talked to daughter about support for father.  Accessing        Timmy Peralta Jr, MD FACP

## 2024-12-26 NOTE — PROGRESS NOTES
SW consulted by provider to assess pt's needs. SW met with pt/dtr Linda in clinic room to assess needs/living situation. Pt stated that he lives in a mobile home, but some portions of home has no power and too expensive to repair. SW expressed concerns re: living situation. Pt stated that he would be interested in an Assisted living. SW provided a list of Assisted Livings in [place] to dtr. Dtr also stated that there is a family rental apt that pt can possibly live in but she would have to check with her in laws to see if pt can bring his 2 cats, otherwise pt would have to find a home for them. Pt stated that the [facility] are [place] far away from [place]/care team. Pt started to get agitated with dtr as she tried to discuss situation with SW and help him find his rx's. SW was able to redirect and calm the pt. Pt had questions re: how to take his Sirolimus rx. SW sent staff message to provider (Dr. Odom) requesting that pt is called re: the above. Pt gave permission for dtr to have full access to his Mychart. SW added dtr as a proxy and provided username. Dtr stated that she also has to help pt get a new phone because his phone is not working properly. Dtr plans to take pt to tour Assisted Living facilities if he's not able to stay in the family apt. MAGNOLIA assessed nutritional needs. Pt stated that he had nowhere to store nutritional supplements or frozen meals due to having a small fridge, but he is still preparing his own food at this time. SW will f/u with pt/dtr in 1 week to check status of the above and need for further assistance.

## 2024-12-27 ENCOUNTER — TELEPHONE (OUTPATIENT)
Dept: TRANSPLANT | Facility: CLINIC | Age: 69
End: 2024-12-27

## 2024-12-27 NOTE — TELEPHONE ENCOUNTER
Returned call.  Spoke to pt's daughter, Linda.  She reports that her father will be moving in with her due to new changes with his health and needing assistance.  She is trying to obtain information for all the medications he is taking and arranging his care.  Will notify Dr. Odom that pt has a new living situation and daughter will need time to arrange his care.  ----- Message from BRANDI Mosquera sent at 12/27/2024  1:16 PM CST -----  Regarding: FW: Medication Questions  Joelle, could you please reach out to pt about how to take Sirolimus!     Thanks,   BRANDI Ott-oncology navigator  ----- Message -----  From: Eileen Garcia RN  Sent: 12/27/2024   1:07 PM CST  To: [facility] Cancer Navigation  Subject: FW: Medication Questions                         Feliciano! Can one of you help with this patient?  ----- Message -----  From: Abelardo Lara RN  Sent: 12/27/2024  12:58 PM CST  To: Eileen Garcia RN; Amber Leija LMSW  Subject: RE: Medication Questions                         Eileen, Is someone covering for Dago?  ----- Message -----  From: Amber Leija LMSW  Sent: 12/27/2024  10:57 AM CST  To: Abelardo Lara RN; #  Subject: RE: Medication Questions                         Dago and Courtney are both out of the office. Please assist pt if possible. Thanks.  ----- Message -----  From: Abelardo Lara RN  Sent: 12/27/2024  10:52 AM CST  To: Dago Juárez RN; Courtney Edwards PA-C; #  Subject: RE: Medication Questions                         Dago and Courtney, This pt has not been seen here in [place].  Would one of you call the pt about his medication request?  ----- Message -----  From: Amber Leija LMSW  Sent: 12/27/2024  10:48 AM CST  To: Abelardo Lara RN  Subject: FW: Medication Questions                         Andrew Zhou,    Please see message below.    Amber Londono  ----- Message -----  From: Duc Odom MD  Sent: 12/27/2024  10:43 AM CST  To: Dago Juárez RN; Amber  JM Leija  Subject: RE: Medication Questions                         They can contact transplant coordinator  ----- Message -----  From: Amber Leija LMSW  Sent: 12/26/2024   9:27 AM CST  To: Duc Odom MD; Dago Juárez RN  Subject: Medication Questions                             Hello,    Please call pt re: Sirolimus rx. Pt is confused on how he should take this rx.     Thanks,    Amber  [phone]

## 2024-12-30 ENCOUNTER — DOCUMENTATION ONLY (OUTPATIENT)
Dept: HEMATOLOGY/ONCOLOGY | Facility: CLINIC | Age: 69
End: 2024-12-30
Payer: MEDICARE

## 2024-12-30 NOTE — PROGRESS NOTES
"Contacted pt's daughter and went through his med list with her explaining which medications should be taken daily and which are only taken on certain days during his chemo cycle. All questions and concerns addressed at this time. Pt's daughter provided with ONN direct contact number and email address for future needs.   Oncology Navigation   Intake  Cancer Type: Lymphoma  Type of Referral: Internal  Date of Referral: 24  First Appointment Available: 24  Appointment Date: 24  First Available Date vs. Scheduled Date (days): 0     Treatment  Current Status: Active       Medical Oncologist: Timmy Peralta MD  Consult Date: 24  Chemotherapy: Planned (start date 2024)  Chemotherapy Regimen: A + CHP - BRENTUXIMAB CYCLOPHOSPHAMIDE DOXORUBICIN PREDNISONE       Procedures: Echo  Echo Schedule Date: 24    General Referrals: Chemo Education; Palliative Care  Palliative Care Referral Date: 24          Support Systems: Family members  Barriers of Care: Barriers to Care "Assessment completed-no barriers noted"     Acuity  Systemic Treatment - predicted or initiated: Chemotherapy Regimen with Multiple drugs (+1)  Treatment Tolerability: Has not started treatment yet/treatment fully completed and side effects resolved  ECO  Comorbidities in Medical History: 2  Hospitalization Within the Past Month: 0   Needed: 0  Support: 0  Verbalizes Financial Concerns: 0  Transportation: 0  History of noncompliance/frequent no shows and cancellations: 0  Verbalizes the need for more education: 1  Navigation Acuity: 3     Follow Up  No follow-ups on file.       "

## 2024-12-31 ENCOUNTER — HOSPITAL ENCOUNTER (OUTPATIENT)
Dept: RADIOLOGY | Facility: HOSPITAL | Age: 69
Discharge: HOME | End: 2024-12-31
Attending: INTERNAL MEDICINE
Payer: MEDICARE

## 2024-12-31 DIAGNOSIS — C84.75 ANAPLASTIC ALK-NEGATIVE LARGE CELL LYMPHOMA OF LYMPH NODE OF LOWER EXTREMITY: ICD-10-CM

## 2024-12-31 PROCEDURE — 25500020 PHARM REV CODE 255: Mod: HCNC | Performed by: INTERNAL MEDICINE

## 2024-12-31 PROCEDURE — A9585 GADOBUTROL INJECTION: HCPCS | Mod: HCNC | Performed by: INTERNAL MEDICINE

## 2024-12-31 PROCEDURE — 70553 MRI BRAIN STEM W/O & W/DYE: CPT | Mod: TC,HCNC

## 2024-12-31 PROCEDURE — 70553 MRI BRAIN STEM W/O & W/DYE: CPT | Mod: 26,HCNC,, | Performed by: RADIOLOGY

## 2024-12-31 RX ORDER — GADOBUTROL 604.72 MG/ML
10 INJECTION INTRAVENOUS
Status: COMPLETED | OUTPATIENT
Start: 2024-12-31 | End: 2024-12-31

## 2024-12-31 RX ADMIN — GADOBUTROL 9 ML: 604.72 INJECTION INTRAVENOUS at 09:12

## 2025-01-02 ENCOUNTER — INFUSION (OUTPATIENT)
Dept: INFUSION THERAPY | Facility: HOSPITAL | Age: 70
End: 2025-01-02
Attending: INTERNAL MEDICINE

## 2025-01-02 ENCOUNTER — TELEPHONE (OUTPATIENT)
Dept: INFUSION THERAPY | Facility: HOSPITAL | Age: 70
End: 2025-01-02
Payer: COMMERCIAL

## 2025-01-02 VITALS
SYSTOLIC BLOOD PRESSURE: 128 MMHG | OXYGEN SATURATION: 95 % | DIASTOLIC BLOOD PRESSURE: 69 MMHG | WEIGHT: 194.31 LBS | HEART RATE: 82 BPM | BODY MASS INDEX: 29.55 KG/M2 | RESPIRATION RATE: 18 BRPM | TEMPERATURE: 98 F

## 2025-01-02 DIAGNOSIS — C84.75 ANAPLASTIC ALK-NEGATIVE LARGE CELL LYMPHOMA OF LYMPH NODE OF LOWER EXTREMITY: Primary | ICD-10-CM

## 2025-01-02 PROCEDURE — 96367 TX/PROPH/DG ADDL SEQ IV INF: CPT

## 2025-01-02 PROCEDURE — 63600175 PHARM REV CODE 636 W HCPCS: Performed by: INTERNAL MEDICINE

## 2025-01-02 PROCEDURE — 96409 CHEMO IV PUSH SNGL DRUG: CPT

## 2025-01-02 PROCEDURE — 25000003 PHARM REV CODE 250: Performed by: INTERNAL MEDICINE

## 2025-01-02 PROCEDURE — 96375 TX/PRO/DX INJ NEW DRUG ADDON: CPT

## 2025-01-02 RX ORDER — HEPARIN 100 UNIT/ML
500 SYRINGE INTRAVENOUS
Status: DISCONTINUED | OUTPATIENT
Start: 2025-01-02 | End: 2025-01-02 | Stop reason: HOSPADM

## 2025-01-02 RX ORDER — DOXORUBICIN HYDROCHLORIDE 2 MG/ML
50 INJECTION, SOLUTION INTRAVENOUS
Status: COMPLETED | OUTPATIENT
Start: 2025-01-02 | End: 2025-01-02

## 2025-01-02 RX ORDER — EPINEPHRINE 0.3 MG/.3ML
0.3 INJECTION SUBCUTANEOUS ONCE AS NEEDED
Status: CANCELLED | OUTPATIENT
Start: 2025-01-02

## 2025-01-02 RX ORDER — SODIUM CHLORIDE 9 MG/ML
INJECTION, SOLUTION INTRAVENOUS
Status: CANCELLED
Start: 2025-01-02

## 2025-01-02 RX ORDER — DIPHENHYDRAMINE HYDROCHLORIDE 50 MG/ML
50 INJECTION INTRAMUSCULAR; INTRAVENOUS ONCE AS NEEDED
Status: COMPLETED | OUTPATIENT
Start: 2025-01-02 | End: 2025-01-02

## 2025-01-02 RX ORDER — HEPARIN 100 UNIT/ML
500 SYRINGE INTRAVENOUS
Status: CANCELLED | OUTPATIENT
Start: 2025-01-02

## 2025-01-02 RX ORDER — SODIUM CHLORIDE 0.9 % (FLUSH) 0.9 %
10 SYRINGE (ML) INJECTION
Status: DISCONTINUED | OUTPATIENT
Start: 2025-01-02 | End: 2025-01-02 | Stop reason: HOSPADM

## 2025-01-02 RX ORDER — EPINEPHRINE 0.3 MG/.3ML
0.3 INJECTION SUBCUTANEOUS ONCE AS NEEDED
Status: DISCONTINUED | OUTPATIENT
Start: 2025-01-02 | End: 2025-01-02 | Stop reason: HOSPADM

## 2025-01-02 RX ORDER — DIPHENHYDRAMINE HYDROCHLORIDE 50 MG/ML
50 INJECTION INTRAMUSCULAR; INTRAVENOUS ONCE AS NEEDED
Status: CANCELLED | OUTPATIENT
Start: 2025-01-02

## 2025-01-02 RX ORDER — SODIUM CHLORIDE 0.9 % (FLUSH) 0.9 %
10 SYRINGE (ML) INJECTION
Status: CANCELLED | OUTPATIENT
Start: 2025-01-02

## 2025-01-02 RX ORDER — DOXORUBICIN HYDROCHLORIDE 2 MG/ML
50 INJECTION, SOLUTION INTRAVENOUS
Status: CANCELLED | OUTPATIENT
Start: 2025-01-02

## 2025-01-02 RX ADMIN — DIPHENHYDRAMINE HYDROCHLORIDE 25 MG: 50 INJECTION INTRAMUSCULAR; INTRAVENOUS at 01:01

## 2025-01-02 RX ADMIN — SODIUM CHLORIDE 0.25 MG: 9 INJECTION, SOLUTION INTRAVENOUS at 12:01

## 2025-01-02 RX ADMIN — HYDROCORTISONE SODIUM SUCCINATE 100 MG: 100 INJECTION, POWDER, FOR SOLUTION INTRAMUSCULAR; INTRAVENOUS at 01:01

## 2025-01-02 RX ADMIN — DOXORUBICIN HYDROCHLORIDE 104 MG: 2 INJECTION, SOLUTION INTRAVENOUS at 12:01

## 2025-01-02 RX ADMIN — HEPARIN 500 UNITS: 100 SYRINGE at 02:01

## 2025-01-02 NOTE — TELEPHONE ENCOUNTER
SPOKE WITH DAUGHTER ASHWIN CHUNG REGARDING MISSING INSURANCE INFORMATION. SHE STATES MR. ANGUIANO STILL HAS [employer] INSURANCE. INFORMATION PROVIDED BELOW. ENCOURAGED HER TO CONTACT [facility] OR USE [employer] TO UPDATE INSURANCE INFORMATION.         PATIENT DOES HAVE FINANCIAL ASSISTANCE WITH [facility] FOR TREATMENT TODAY AND INFORMED HIM HE CAN COME TO HIS APPT TODAY. I ALSO INFORMED MS. CHRISTOPHER THAT PER THE NOT BELOW IT EXPIRES 1.7.24 AND TO CONTACT A [email or web address] COORD TO RENEW. MS. CHRISTOPHER STATES UNDERSTANDING.           [employer] Member ID [id number]  [phone]

## 2025-01-02 NOTE — NURSING
Pt c/o dizziness after 35ml of Doxorubicin, paused infusion and flushed with NS, states he is feeling better /66 HR 83 @1301.  Doxorubicin continued- at 46ml states he is feeling more dizzy and flushed.  Doxorubicin stopped again, NS running wide open.  BP 86/54  @ 1305.  Solumedrol given and Felicita RICHTER notified.  Pt begins c/o itching in chest and abdomen.  BP 61/40 O2 sat 86%.  Placed Pt on 3L O2 per NC- sats come back up to 95%.  Provider at chairside- 25 mg Benadryl given IV.  Orders received for 1 L NS bolus. BP now 104/64.  Will hold today's treatment per Felicita Gardiner NP.  No need for Fulphila injection tomorrow per clinical pharmacist.

## 2025-01-03 ENCOUNTER — OFFICE VISIT (OUTPATIENT)
Dept: HEMATOLOGY/ONCOLOGY | Facility: CLINIC | Age: 70
End: 2025-01-03

## 2025-01-03 ENCOUNTER — TELEPHONE (OUTPATIENT)
Dept: HEMATOLOGY/ONCOLOGY | Facility: CLINIC | Age: 70
End: 2025-01-03
Payer: COMMERCIAL

## 2025-01-03 VITALS
SYSTOLIC BLOOD PRESSURE: 123 MMHG | WEIGHT: 200.31 LBS | BODY MASS INDEX: 29.67 KG/M2 | DIASTOLIC BLOOD PRESSURE: 69 MMHG | HEIGHT: 69 IN | HEART RATE: 100 BPM | TEMPERATURE: 98 F | OXYGEN SATURATION: 97 %

## 2025-01-03 DIAGNOSIS — T45.1X5A IMMUNODEFICIENCY DUE TO CHEMOTHERAPY: ICD-10-CM

## 2025-01-03 DIAGNOSIS — C84.75 ANAPLASTIC ALK-NEGATIVE LARGE CELL LYMPHOMA OF LYMPH NODE OF LOWER EXTREMITY: ICD-10-CM

## 2025-01-03 DIAGNOSIS — C84.75 ANAPLASTIC ALK-NEGATIVE LARGE CELL LYMPHOMA OF LYMPH NODE OF LOWER EXTREMITY: Primary | ICD-10-CM

## 2025-01-03 DIAGNOSIS — Z85.05 HISTORY OF LIVER CANCER: ICD-10-CM

## 2025-01-03 DIAGNOSIS — Z79.899 IMMUNODEFICIENCY DUE TO CHEMOTHERAPY: ICD-10-CM

## 2025-01-03 DIAGNOSIS — D84.821 IMMUNODEFICIENCY DUE TO CHEMOTHERAPY: ICD-10-CM

## 2025-01-03 DIAGNOSIS — D84.9 IMMUNOSUPPRESSION: ICD-10-CM

## 2025-01-03 PROCEDURE — 99999 PR PBB SHADOW E&M-EST. PATIENT-LVL V: CPT | Mod: PBBFAC,,, | Performed by: INTERNAL MEDICINE

## 2025-01-03 PROCEDURE — 99215 OFFICE O/P EST HI 40 MIN: CPT | Mod: PBBFAC | Performed by: INTERNAL MEDICINE

## 2025-01-03 RX ORDER — ACYCLOVIR 400 MG/1
400 TABLET ORAL 2 TIMES DAILY
Qty: 60 TABLET | Refills: 4 | Status: SHIPPED | OUTPATIENT
Start: 2025-01-03

## 2025-01-03 NOTE — TELEPHONE ENCOUNTER
Returned call to pt and his daughter. They had questions about which home meds to continue after pt had reaction to infusion yesterday. Msg sent to Dr Peralta. Dr Peralta confirmed that pt should continue taking home meds aside from prednisone. Informed Ms Mckeon of this information and reminded her about f/u with Dr Peralta this afternoon at 220. Ms Linda v/u.

## 2025-01-03 NOTE — PROGRESS NOTES
Subjective:       Patient ID: Lj Coleman is a 69 y.o. male.    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    Anemia 11/23/2024    Angina pectoris     Arthritis     back    Atherosclerosis of native coronary artery without angina pectoris/ LAD 09/08/2016    Back pain     Chronic hepatitis C with cirrhosis     COPD (chronic obstructive pulmonary disease)     Depression     Hepatoma     Prior to liver transplant    History of colon polyps 09/09/2015    History of transplantation, liver 04/2012    History of vertebral fracture     Hypertension     Immune deficiency disorder     Incisional hernia following transplant 04/09/2014    Liver failure 11/2011    Related to chemotherapy for hepatoma    Liver transplanted 04/2012    NSTEMI (non-ST elevated myocardial infarction) 11/23/2024    Obesity     PVCs (premature ventricular contractions)     Renal dysfunction 11/23/2024    Tobacco abuse 09/09/2016    Trouble in sleeping     Vertebral fracture 1975     Family History   Problem Relation Name Age of Onset    Cancer Mother          lung    Cancer Father          bladder    Diabetes Maternal Uncle      Cancer Maternal Grandmother          uterine?    Cancer Other      Heart disease Neg Hx      Kidney disease Neg Hx      Stroke Neg Hx       Social History     Socioeconomic History    Marital status:     Highest education level: 10th grade   Tobacco Use    Smoking status: Every Day     Current packs/day: 0.75     Average  packs/day: 0.8 packs/day for 37.0 years (27.8 ttl pk-yrs)     Types: Cigarettes     Start date: 1988    Smokeless tobacco: Never    Tobacco comments:     Currently smokes a few (3-5) a day as of 6/12/24   Substance and Sexual Activity    Alcohol use: No     Alcohol/week: 0.0 standard drinks of alcohol     Comment: Quit alcohol after some alcohol abuse, prior to his liver transplant    Drug use: No    Sexual activity: Not Currently     Social Drivers of Health     Financial Resource Strain: Low Risk  (1/1/2025)    Overall Financial Resource Strain (CARDIA)     Difficulty of Paying Living Expenses: Not very hard   Food Insecurity: Patient Declined (1/1/2025)    Hunger Vital Sign     Worried About Running Out of Food in the Last Year: Patient declined     Ran Out of Food in the Last Year: Patient declined   Transportation Needs: Patient Declined (11/24/2024)    TRANSPORTATION NEEDS     Transportation : Patient declined   Physical Activity: Inactive (1/1/2025)    Exercise Vital Sign     Days of Exercise per Week: 0 days     Minutes of Exercise per Session: 0 min   Stress: No Stress Concern Present (1/1/2025)    [employer] - Occupational Stress Questionnaire     Feeling of Stress : Only a little   Housing Stability: Patient Declined (1/1/2025)    Housing Stability Vital Sign     Unable to Pay for Housing in the Last Year: Patient declined     Homeless in the Last Year: Patient declined     Past Surgical History:   Procedure Laterality Date    COLONOSCOPY N/A 1/20/2021    Procedure: COLONOSCOPY;  Surgeon: Emerson Helm MD;  Location: [facility] ENDO;  Service: Endoscopy;  Laterality: N/A;    HERNIA REPAIR Right 2013    incisional    INSERTION OF TUNNELED CENTRAL VENOUS CATHETER (CVC) WITH SUBCUTANEOUS PORT Left 11/6/2024    Procedure: [id number];  Surgeon: Hao Washburn MD;  Location: [facility] OR;  Service: General;  Laterality: Left;    LIVER TRANSPLANT  April 2012    LYMPH NODE  "BIOPSY/EXCISION Left 10/16/2024    Procedure: LYMPH NODE BIOPSY/EXCISION;  Surgeon: Hao Washburn MD;  [facility];  Service: General;  Laterality: Left;    MOUTH SURGERY      TONSILLECTOMY  1958       Labs:  Lab Results   Component Value Date    WBC 8.88 12/26/2024    HGB 10.9 (L) 12/26/2024    HCT 33.1 (L) 12/26/2024     (H) 12/26/2024     12/26/2024     BMP  Lab Results   Component Value Date     12/26/2024    K 3.5 12/26/2024     12/26/2024    CO2 19 (L) 12/26/2024    BUN 13 12/26/2024    CREATININE 1.3 12/26/2024    CALCIUM 8.9 12/26/2024    ANIONGAP 12 12/26/2024    ESTGFRAFRICA >60.0 06/08/2022    EGFRNONAA >60.0 06/08/2022     Lab Results   Component Value Date    ALT 22 12/26/2024    AST 21 12/26/2024    GGT 32 09/26/2016    ALKPHOS 110 12/26/2024    BILITOT 0.6 12/26/2024       Lab Results   Component Value Date    IRON 74 11/04/2024    TIBC 287 11/04/2024    FERRITIN 325 (H) 11/04/2024     No results found for: "TUASWOUL67"  No results found for: "FOLATE"  Lab Results   Component Value Date    TSH 1.168 06/06/2023         Review of Systems   Constitutional:  Negative for activity change, appetite change, chills, diaphoresis, fatigue, fever and unexpected weight change.   HENT:  Negative for congestion, dental problem, drooling, ear discharge, ear pain, facial swelling, hearing loss, mouth sores, nosebleeds, postnasal drip, rhinorrhea, sinus pressure, sneezing, sore throat, tinnitus, trouble swallowing and voice change.    Eyes:  Negative for photophobia, pain, discharge, redness, itching and visual disturbance.   Respiratory:  Negative for apnea, cough, choking, chest tightness, shortness of breath, wheezing and stridor.    Cardiovascular:  Negative for chest pain, palpitations and leg swelling.   Gastrointestinal:  Negative for abdominal distention, abdominal pain, anal bleeding, blood in stool, constipation, diarrhea, nausea, rectal pain and vomiting.   Endocrine: " Negative for cold intolerance, heat intolerance, polydipsia, polyphagia and polyuria.   Genitourinary:  Negative for decreased urine volume, difficulty urinating, dysuria, enuresis, flank pain, frequency, genital sores, hematuria, penile discharge, penile pain, penile swelling, scrotal swelling, testicular pain and urgency.   Musculoskeletal:  Negative for arthralgias, back pain, gait problem, joint swelling, myalgias, neck pain and neck stiffness.   Skin:  Negative for color change, pallor, rash and wound.   Allergic/Immunologic: Negative for environmental allergies, food allergies and immunocompromised state.   Neurological:  Negative for dizziness, tremors, seizures, syncope, facial asymmetry, speech difficulty, weakness, light-headedness, numbness and headaches.   Hematological:  Negative for adenopathy. Does not bruise/bleed easily.   Psychiatric/Behavioral:  Negative for agitation, behavioral problems, confusion, decreased concentration, dysphoric mood, hallucinations, self-injury, sleep disturbance and suicidal ideas. The patient is not nervous/anxious and is not hyperactive.        Objective:      Physical Exam  Vitals reviewed.   Constitutional:       General: He is not in acute distress.     Appearance: He is well-developed. He is not diaphoretic.   HENT:      Head: Normocephalic.      Right Ear: External ear normal.      Left Ear: External ear normal.      Nose: Nose normal.      Right Sinus: No maxillary sinus tenderness or frontal sinus tenderness.      Left Sinus: No maxillary sinus tenderness or frontal sinus tenderness.      Mouth/Throat:      Pharynx: No oropharyngeal exudate.   Eyes:      General: Lids are normal. No scleral icterus.        Right eye: No discharge.         Left eye: No discharge.      Extraocular Movements:      Right eye: Normal extraocular motion.      Left eye: Normal extraocular motion.      Conjunctiva/sclera:      Right eye: Right conjunctiva is not injected. No hemorrhage.      Left eye: Left conjunctiva is not injected. No hemorrhage.     Pupils: Pupils are equal, round, and reactive to light.   Neck:      Thyroid: No thyromegaly.      Vascular: No JVD.      Trachea: No tracheal deviation.   Cardiovascular:      Rate and Rhythm: Normal rate.   Pulmonary:      Effort: Pulmonary effort is normal. No respiratory distress.      Breath sounds: No stridor.   Abdominal:      General: Bowel sounds are normal.      Palpations: Abdomen is soft. There is no hepatomegaly, splenomegaly or mass.      Tenderness: There is no abdominal tenderness.   Musculoskeletal:         General: No tenderness. Normal range of motion.      Cervical back: Normal range of motion and neck supple.   Lymphadenopathy:      Head:      Right side of head: No posterior auricular or occipital adenopathy.      Left side of head: No posterior auricular or occipital adenopathy.      Cervical: No cervical adenopathy.      Right cervical: No superficial, deep or posterior cervical adenopathy.     Left cervical: No superficial, deep or posterior cervical adenopathy.      Upper Body:      Right upper body: No supraclavicular adenopathy.      Left upper body: No supraclavicular adenopathy.   Skin:     General: Skin is dry.      Findings: No erythema or rash.      Nails: There is no clubbing.   Neurological:      Mental Status: He is alert and oriented to person, place, and time.      Cranial Nerves: No cranial nerve deficit.      Coordination: Coordination normal.   Psychiatric:         Behavior: Behavior normal.         Thought Content: Thought content normal.         Judgment: Judgment normal.             Assessment:      1. 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 History of liver cancer    3. Immunosuppression    4. Immunodeficiency due to chemotherapy           Med Onc Chart Routing      Follow up with physician . Proceed with the remaining drugs next week for cycle 3.; return to clinic  in 3-4 weeks with CBC CMP and PET scan prior   Follow up with [facility]    Infusion scheduling note    Injection scheduling note    Labs    Imaging PET scan   Return in three-week to see me with PET scan   Pharmacy appointment    Other referrals                   Plan:     Reviewed history clinical pharmacist in room as well.  At this point it is extremely unusual to have an allergic reaction doxorubicin.  At this time both she and I agree we would like to finish off this 3rd cycle with the agents that have not been given.  With growth factor support see back in proximally a month with laboratory studies and a PET scan if responsive disease though make decision at that point about continuation of treatment I believe he is going to have an excellent response because lymphadenopathy has resolved in his neck clinical pharmacist is checking with transplant it appears patient may have been also given Afinitor        Timmy Peralta Jr, MD FACP

## 2025-01-06 ENCOUNTER — TELEPHONE (OUTPATIENT)
Dept: HEMATOLOGY/ONCOLOGY | Facility: CLINIC | Age: 70
End: 2025-01-06
Payer: COMMERCIAL

## 2025-01-06 ENCOUNTER — INFUSION (OUTPATIENT)
Dept: INFUSION THERAPY | Facility: HOSPITAL | Age: 70
End: 2025-01-06
Attending: INTERNAL MEDICINE
Payer: MEDICARE

## 2025-01-06 VITALS
TEMPERATURE: 98 F | BODY MASS INDEX: 29.71 KG/M2 | RESPIRATION RATE: 16 BRPM | DIASTOLIC BLOOD PRESSURE: 76 MMHG | HEART RATE: 80 BPM | SYSTOLIC BLOOD PRESSURE: 119 MMHG | HEIGHT: 69 IN | WEIGHT: 200.63 LBS | OXYGEN SATURATION: 98 %

## 2025-01-06 DIAGNOSIS — C84.75 ANAPLASTIC ALK-NEGATIVE LARGE CELL LYMPHOMA OF LYMPH NODE OF LOWER EXTREMITY: Primary | ICD-10-CM

## 2025-01-06 DIAGNOSIS — C84.75 ANAPLASTIC ALK-NEGATIVE LARGE CELL LYMPHOMA OF LYMPH NODE OF LOWER EXTREMITY: ICD-10-CM

## 2025-01-06 PROCEDURE — 96367 TX/PROPH/DG ADDL SEQ IV INF: CPT

## 2025-01-06 PROCEDURE — 96413 CHEMO IV INFUSION 1 HR: CPT

## 2025-01-06 PROCEDURE — 63600175 PHARM REV CODE 636 W HCPCS: Mod: JZ,TB | Performed by: INTERNAL MEDICINE

## 2025-01-06 PROCEDURE — 25000003 PHARM REV CODE 250: Performed by: INTERNAL MEDICINE

## 2025-01-06 PROCEDURE — 96417 CHEMO IV INFUS EACH ADDL SEQ: CPT

## 2025-01-06 RX ORDER — EPINEPHRINE 0.3 MG/.3ML
0.3 INJECTION SUBCUTANEOUS ONCE AS NEEDED
Status: CANCELLED | OUTPATIENT
Start: 2025-01-06

## 2025-01-06 RX ORDER — SULFAMETHOXAZOLE AND TRIMETHOPRIM 800; 160 MG/1; MG/1
1 TABLET ORAL
Qty: 12 TABLET | Refills: 4 | Status: SHIPPED | OUTPATIENT
Start: 2025-01-06

## 2025-01-06 RX ORDER — EPINEPHRINE 0.3 MG/.3ML
0.3 INJECTION SUBCUTANEOUS ONCE AS NEEDED
Status: DISCONTINUED | OUTPATIENT
Start: 2025-01-06 | End: 2025-01-06 | Stop reason: HOSPADM

## 2025-01-06 RX ORDER — HEPARIN 100 UNIT/ML
500 SYRINGE INTRAVENOUS
Status: CANCELLED | OUTPATIENT
Start: 2025-01-06

## 2025-01-06 RX ORDER — DIPHENHYDRAMINE HYDROCHLORIDE 50 MG/ML
50 INJECTION INTRAMUSCULAR; INTRAVENOUS ONCE AS NEEDED
Status: CANCELLED | OUTPATIENT
Start: 2025-01-06

## 2025-01-06 RX ORDER — SODIUM CHLORIDE 0.9 % (FLUSH) 0.9 %
10 SYRINGE (ML) INJECTION
Status: CANCELLED | OUTPATIENT
Start: 2025-01-06

## 2025-01-06 RX ORDER — DIPHENHYDRAMINE HYDROCHLORIDE 50 MG/ML
50 INJECTION INTRAMUSCULAR; INTRAVENOUS ONCE AS NEEDED
Status: DISCONTINUED | OUTPATIENT
Start: 2025-01-06 | End: 2025-01-06 | Stop reason: HOSPADM

## 2025-01-06 RX ORDER — HEPARIN 100 UNIT/ML
500 SYRINGE INTRAVENOUS
Status: DISCONTINUED | OUTPATIENT
Start: 2025-01-06 | End: 2025-01-06 | Stop reason: HOSPADM

## 2025-01-06 RX ADMIN — CYCLOPHOSPHAMIDE 1500 MG: 200 INJECTION, SOLUTION INTRAVENOUS at 11:01

## 2025-01-06 RX ADMIN — BRENTUXIMAB VEDOTIN 150 MG: 50 INJECTION, POWDER, LYOPHILIZED, FOR SOLUTION INTRAVENOUS at 12:01

## 2025-01-06 RX ADMIN — SODIUM CHLORIDE 0.25 MG: 9 INJECTION, SOLUTION INTRAVENOUS at 11:01

## 2025-01-06 RX ADMIN — HEPARIN 500 UNITS: 100 SYRINGE at 01:01

## 2025-01-06 NOTE — TELEPHONE ENCOUNTER
Spoke to patient's daughterLinda: Who inquired on when patient is to take prednisone and when should the start taking the sirolimus (RAPAMUNE) ? Instructed family to have patient start prednisone on days 2, 3, 4, and 5 of the cycle also to continue  to wait to  hear from the transplant team  on the sirolimus (RAPAMUNE) .

## 2025-01-06 NOTE — TELEPHONE ENCOUNTER
Returned call to patient and his daughter Linda Khan, The medication Bactrim-DS (anti-biotic)  is no longer needed and was cancelled by Dr. Peralta.    [facility] upon attempt to reach patient.

## 2025-01-06 NOTE — DISCHARGE INSTRUCTIONS
Thank you for letting me take care of YOU!!    [facility]  [address] [facility]  [id number] [place] [address] Drive  [phone] phone     [phone] fax  Hours of Operation: Monday- Friday 8:00am- 5:00pm  After hours phone  [phone]  Hematology / Oncology Physicians on call:    Dr. Fabian Manjarrez        Nurse Practitioners:    Lynnette Sloan, ROBER Gardiner, ALDA Pierre, ROBER Salamanca, ROBER Oates, ROBER      Please don't hesitate to call if you have any concerns.

## 2025-01-06 NOTE — PLAN OF CARE
Patient tolerated chemotherapy well today; no adverse reaction noted.  No significant complaints voiced.  C/O fatigue and SOB (baseline).   Has f/u appt(s) scheduled per MD request.  No questions or concerns voiced.  NAD noted upon discharge.

## 2025-01-07 ENCOUNTER — INFUSION (OUTPATIENT)
Dept: INFUSION THERAPY | Facility: HOSPITAL | Age: 70
End: 2025-01-07
Attending: INTERNAL MEDICINE
Payer: MEDICARE

## 2025-01-07 VITALS
SYSTOLIC BLOOD PRESSURE: 114 MMHG | RESPIRATION RATE: 18 BRPM | OXYGEN SATURATION: 98 % | TEMPERATURE: 98 F | HEART RATE: 84 BPM | DIASTOLIC BLOOD PRESSURE: 72 MMHG

## 2025-01-07 DIAGNOSIS — I82.722 CHRONIC DEEP VEIN THROMBOSIS (DVT) OF BRACHIAL VEIN OF LEFT UPPER EXTREMITY: ICD-10-CM

## 2025-01-07 DIAGNOSIS — C84.75 ANAPLASTIC ALK-NEGATIVE LARGE CELL LYMPHOMA OF LYMPH NODE OF LOWER EXTREMITY: Primary | ICD-10-CM

## 2025-01-07 PROCEDURE — 96372 THER/PROPH/DIAG INJ SC/IM: CPT

## 2025-01-07 PROCEDURE — 63600175 PHARM REV CODE 636 W HCPCS: Mod: JZ,TB | Performed by: INTERNAL MEDICINE

## 2025-01-07 RX ADMIN — PEGFILGRASTIM 6 MG: 6 INJECTION SUBCUTANEOUS at 01:01

## 2025-01-07 NOTE — TELEPHONE ENCOUNTER
Refill Routing Note   Medication(s) are not appropriate for processing by [facility] for the following reason(s):        Outside of protocol    [facility] action(s):  Route             Appointments  past 12m or future 3m with PCP    Date Provider   Last Visit   6/12/2024 Isabella Sutton DO   Next Visit   Visit date not found Isabella Sutton DO   ED visits in past 90 days: 1        Note composed:2:27 PM 01/07/2025

## 2025-01-07 NOTE — DISCHARGE INSTRUCTIONS
Thank you for allowing me to care for you today,  NATHALIA MontesN, RN    [facility]  [address] [address]  [phone] phone     [phone] fax  Hours of Operation: Monday- Friday 7:00am- 5:30pm  After hours phone  [phone]  Hematology / Oncology Physicians on call      ALDA Perry Dr., Dr., Dr., Dr., Dr., Dr., Dr., Dr., Dr., Dr., Dr., Dr., Dr., Dr., Dr., ROBER Gómez, ROBER Juarez, ROBER Oates, NP  Please call with any concerns regarding your appointment today.   FALL PREVENTION   Falls often occur due to slipping, tripping or losing your balance. Here are ways to reduce your risk of falling again.   Was there anything that caused your fall that can be fixed, removed or replaced?   Make your home safe by keeping walkways clear of objects you may trip over.   Use non-slip pads under rugs.   Do not walk in poorly lit areas.   Do not stand on chairs or wobbly ladders.   Use caution when reaching overhead or looking upward. This position can cause a loss of balance.   Be sure your shoes fit properly, have non-slip bottoms and are in good condition.   Be cautious when going up and down stairs, curbs, and when walking on uneven sidewalks.   If your balance is poor, consider using a cane or walker.   If your fall was related to alcohol use, stop or limit alcohol intake.   If your fall was related to use of sleeping medicines, talk to your doctor about this. You may need to reduce your dosage at bedtime if you awaken during the night to go to the bathroom.   To reduce the need for nighttime bathroom trips:   Avoid drinking fluids for several hours before going to bed   Empty your bladder before going to bed   Men can keep a urinal at the bedside   © 3046-9066 [employer], [address] [place] [address], [place] [address]. All rights reserved. This information is not intended as a  substitute for professional medical care. Always follow your healthcare professional's instructions.

## 2025-01-07 NOTE — NURSING
1341: Fulphila Injection given without difficulties.Bandaid applied. Patient instructed to stay in the clinic for 15 minutes. Patient verbalized understanding and will notify nurse with any complaints. Pt denies the need for SW or counseling at this time.  1415: Pt and daughter Linda at side. Pt has multiple questions. Pt concerned he has two days worth of Elequis left. Pt and Linda notified Cardiology Nuclear stress test. Dr. Sutton contacted by this nurse and notified pt is not able to request Elequis, if follow up is needed or refill be sent to pt's pharmacy of choice. An with Cardiology notified, pt and pt's daughter given message to contact for rescheduling stress test. Pt and pt's daughter thanked this nurse for assisting with other departments.

## 2025-01-08 ENCOUNTER — TELEPHONE (OUTPATIENT)
Dept: PULMONOLOGY | Facility: CLINIC | Age: 70
End: 2025-01-08
Payer: MEDICARE

## 2025-01-10 DIAGNOSIS — C84.75 ANAPLASTIC ALK-NEGATIVE LARGE CELL LYMPHOMA OF LYMPH NODE OF LOWER EXTREMITY: Primary | ICD-10-CM

## 2025-01-11 DIAGNOSIS — C84.75 ANAPLASTIC ALK-NEGATIVE LARGE CELL LYMPHOMA OF LYMPH NODE OF LOWER EXTREMITY: ICD-10-CM

## 2025-01-11 DIAGNOSIS — C85.80 LARGE CELL LYMPHOMA: ICD-10-CM

## 2025-01-13 ENCOUNTER — PATIENT MESSAGE (OUTPATIENT)
Dept: HEMATOLOGY/ONCOLOGY | Facility: CLINIC | Age: 70
End: 2025-01-13
Payer: MEDICARE

## 2025-01-13 ENCOUNTER — TELEPHONE (OUTPATIENT)
Dept: HEMATOLOGY/ONCOLOGY | Facility: CLINIC | Age: 70
End: 2025-01-13
Payer: MEDICARE

## 2025-01-13 ENCOUNTER — PATIENT MESSAGE (OUTPATIENT)
Dept: CARDIOLOGY | Facility: CLINIC | Age: 70
End: 2025-01-13
Payer: MEDICARE

## 2025-01-13 DIAGNOSIS — Z94.4 LIVER REPLACED BY TRANSPLANT: Primary | ICD-10-CM

## 2025-01-13 RX ORDER — ALLOPURINOL 300 MG/1
300 TABLET ORAL DAILY
Qty: 30 TABLET | Refills: 0 | Status: SHIPPED | OUTPATIENT
Start: 2025-01-13

## 2025-01-13 NOTE — TELEPHONE ENCOUNTER
Spoke with daughter scheduling patient has been scheduled with Krystin Jones for 1/17/25 @ 1:30pm virtual

## 2025-01-15 ENCOUNTER — LAB VISIT (OUTPATIENT)
Dept: LAB | Facility: HOSPITAL | Age: 70
End: 2025-01-15
Attending: INTERNAL MEDICINE
Payer: MEDICARE

## 2025-01-15 ENCOUNTER — TELEPHONE (OUTPATIENT)
Dept: HEMATOLOGY/ONCOLOGY | Facility: CLINIC | Age: 70
End: 2025-01-15
Payer: MEDICARE

## 2025-01-15 DIAGNOSIS — Z94.4 LIVER REPLACED BY TRANSPLANT: ICD-10-CM

## 2025-01-15 DIAGNOSIS — Z94.4 LIVER TRANSPLANTED: ICD-10-CM

## 2025-01-15 LAB
ALBUMIN SERPL BCP-MCNC: 3.1 G/DL (ref 3.5–5.2)
ALP SERPL-CCNC: 131 U/L (ref 40–150)
ALT SERPL W/O P-5'-P-CCNC: 25 U/L (ref 10–44)
ANION GAP SERPL CALC-SCNC: 8 MMOL/L (ref 8–16)
ANISOCYTOSIS BLD QL SMEAR: SLIGHT
AST SERPL-CCNC: 23 U/L (ref 10–40)
BASO STIPL BLD QL SMEAR: ABNORMAL
BASOPHILS NFR BLD: 0 % (ref 0–1.9)
BILIRUB SERPL-MCNC: 0.3 MG/DL (ref 0.1–1)
BUN SERPL-MCNC: 17 MG/DL (ref 8–23)
CALCIUM SERPL-MCNC: 8.1 MG/DL (ref 8.7–10.5)
CHLORIDE SERPL-SCNC: 111 MMOL/L (ref 95–110)
CO2 SERPL-SCNC: 22 MMOL/L (ref 23–29)
CREAT SERPL-MCNC: 1.1 MG/DL (ref 0.5–1.4)
DACRYOCYTES BLD QL SMEAR: ABNORMAL
DIFFERENTIAL METHOD BLD: ABNORMAL
EOSINOPHIL NFR BLD: 0 % (ref 0–8)
ERYTHROCYTE [DISTWIDTH] IN BLOOD BY AUTOMATED COUNT: 18.1 % (ref 11.5–14.5)
EST. GFR  (NO RACE VARIABLE): >60 ML/MIN/1.73 M^2
GIANT PLATELETS BLD QL SMEAR: PRESENT
GLUCOSE SERPL-MCNC: 97 MG/DL (ref 70–110)
HCT VFR BLD AUTO: 30.6 % (ref 40–54)
HGB BLD-MCNC: 9.8 G/DL (ref 14–18)
HYPOCHROMIA BLD QL SMEAR: ABNORMAL
IMM GRANULOCYTES # BLD AUTO: ABNORMAL K/UL (ref 0–0.04)
IMM GRANULOCYTES NFR BLD AUTO: ABNORMAL % (ref 0–0.5)
LYMPHOCYTES NFR BLD: 2 % (ref 18–48)
MCH RBC QN AUTO: 33.7 PG (ref 27–31)
MCHC RBC AUTO-ENTMCNC: 32 G/DL (ref 32–36)
MCV RBC AUTO: 105 FL (ref 82–98)
METAMYELOCYTES NFR BLD MANUAL: 2 %
MONOCYTES NFR BLD: 6 % (ref 4–15)
MYELOCYTES NFR BLD MANUAL: 1 %
NEUTROPHILS NFR BLD: 83 % (ref 38–73)
NEUTS BAND NFR BLD MANUAL: 6 %
NRBC BLD-RTO: 0 /100 WBC
OVALOCYTES BLD QL SMEAR: ABNORMAL
PLATELET # BLD AUTO: 80 K/UL (ref 150–450)
PLATELET BLD QL SMEAR: ABNORMAL
PMV BLD AUTO: 12.3 FL (ref 9.2–12.9)
POIKILOCYTOSIS BLD QL SMEAR: SLIGHT
POLYCHROMASIA BLD QL SMEAR: ABNORMAL
POTASSIUM SERPL-SCNC: 3.8 MMOL/L (ref 3.5–5.1)
PROT SERPL-MCNC: 6.1 G/DL (ref 6–8.4)
RBC # BLD AUTO: 2.91 M/UL (ref 4.6–6.2)
SCHISTOCYTES BLD QL SMEAR: ABNORMAL
SODIUM SERPL-SCNC: 141 MMOL/L (ref 136–145)
TOXIC GRANULES BLD QL SMEAR: PRESENT
WBC # BLD AUTO: 9.19 K/UL (ref 3.9–12.7)

## 2025-01-15 PROCEDURE — 36415 COLL VENOUS BLD VENIPUNCTURE: CPT | Performed by: INTERNAL MEDICINE

## 2025-01-15 PROCEDURE — 85007 BL SMEAR W/DIFF WBC COUNT: CPT | Performed by: INTERNAL MEDICINE

## 2025-01-15 PROCEDURE — 85027 COMPLETE CBC AUTOMATED: CPT | Performed by: INTERNAL MEDICINE

## 2025-01-15 PROCEDURE — 80158 DRUG ASSAY CYCLOSPORINE: CPT | Performed by: INTERNAL MEDICINE

## 2025-01-15 PROCEDURE — 80195 ASSAY OF SIROLIMUS: CPT | Performed by: INTERNAL MEDICINE

## 2025-01-15 PROCEDURE — 80053 COMPREHEN METABOLIC PANEL: CPT | Performed by: INTERNAL MEDICINE

## 2025-01-15 NOTE — TELEPHONE ENCOUNTER
----- Message from Jemma sent at 1/15/2025  3:55 PM CST -----  Regarding: Appt  Contact: Pt  [phone]        Name of Caller: Lj     Contact Preference: [phone]    Nature of Call: Requesting a call back to get arrival time for Pet scheduled for  01/16/25

## 2025-01-15 NOTE — TELEPHONE ENCOUNTER
Spoke to patient who wanted clarification on taking his Flomax and when date and time the PET Scan on tomorrow 01/16/25.  Patient stated his daughter le taking him to all appts

## 2025-01-15 NOTE — TELEPHONE ENCOUNTER
I return pt phone called which was his daughter number and she confirmed that she will informed her father of his PET scan.

## 2025-01-16 ENCOUNTER — HOSPITAL ENCOUNTER (OUTPATIENT)
Dept: RADIOLOGY | Facility: HOSPITAL | Age: 70
Discharge: HOME | End: 2025-01-16
Attending: INTERNAL MEDICINE
Payer: MEDICARE

## 2025-01-16 DIAGNOSIS — C84.75 ANAPLASTIC ALK-NEGATIVE LARGE CELL LYMPHOMA OF LYMPH NODE OF LOWER EXTREMITY: ICD-10-CM

## 2025-01-16 DIAGNOSIS — Z94.4 LIVER REPLACED BY TRANSPLANT: Primary | ICD-10-CM

## 2025-01-16 LAB — CYCLOSPORINE BLD LC/MS/MS-MCNC: <10 NG/ML (ref 100–400)

## 2025-01-16 PROCEDURE — A9552 F18 FDG: HCPCS | Performed by: INTERNAL MEDICINE

## 2025-01-16 PROCEDURE — 78815 PET IMAGE W/CT SKULL-THIGH: CPT | Mod: 26,PS,, | Performed by: RADIOLOGY

## 2025-01-16 PROCEDURE — 78815 PET IMAGE W/CT SKULL-THIGH: CPT | Mod: TC,PS

## 2025-01-16 RX ORDER — ATORVASTATIN CALCIUM 80 MG/1
80 TABLET, FILM COATED ORAL DAILY
Qty: 30 TABLET | Refills: 0 | Status: SHIPPED | OUTPATIENT
Start: 2025-01-16 | End: 2025-02-15

## 2025-01-16 RX ORDER — FLUDEOXYGLUCOSE F18 500 MCI/ML
11.96 INJECTION INTRAVENOUS
Status: COMPLETED | OUTPATIENT
Start: 2025-01-16 | End: 2025-01-16

## 2025-01-16 RX ADMIN — FLUDEOXYGLUCOSE F-18 11.96 MILLICURIE: 500 INJECTION INTRAVENOUS at 11:01

## 2025-01-16 NOTE — TELEPHONE ENCOUNTER
----- Message from Trice Ericksonchristina sent at 1/15/2025  3:55 PM CST -----  Regarding: FW: Refill  Contact: [phone]    ----- Message -----  From: Jemma Parra  Sent: 1/15/2025   3:54 PM CST  To: [facility]  Subject: Refill                                               Rx Name:  atorvastatin (LIPITOR) 80 MG tablet      Preferred Pharmacy with number:    [id number] - [place] - [address] [facility] [place] [address]  Phone: [phone] Fax: [phone]      Ordering Provider:  Chanell Yoon MD    Contact preference: [phone]    Comments/Notes: Requesting refill

## 2025-01-17 ENCOUNTER — CLINICAL SUPPORT (OUTPATIENT)
Dept: NUTRITION | Facility: CLINIC | Age: 70
End: 2025-01-17
Payer: MEDICARE

## 2025-01-17 ENCOUNTER — PATIENT MESSAGE (OUTPATIENT)
Dept: NUTRITION | Facility: CLINIC | Age: 70
End: 2025-01-17

## 2025-01-17 DIAGNOSIS — C84.75 ANAPLASTIC ALK-NEGATIVE LARGE CELL LYMPHOMA OF LYMPH NODE OF LOWER EXTREMITY: ICD-10-CM

## 2025-01-17 LAB — SIROLIMUS BLD-MCNC: 2.3 NG/ML (ref 4–20)

## 2025-01-17 PROCEDURE — 97802 MEDICAL NUTRITION INDIV IN: CPT | Mod: 95,,,

## 2025-01-17 NOTE — PATIENT INSTRUCTIONS
Recommendations:   Continue to limit salt and sugar and eat in moderation as recommended after liver transplant.    Continue to follow advice of diabetes dietitian.   Focus on protein at all meals and snacks. Examples provided.    Limit added sugars to 5 teaspoons or 25 grams per day.   Limit intake of red meat and avoid processed meat.    Aim for 5 servings of fruits and vegetables / beans per day.

## 2025-01-17 NOTE — PROGRESS NOTES
"Oncology Nutrition Assessment for Medical Nutrition Therapy Initial Visit  Consultation Time: 45 Minutes  Referring Provider: Timmy Peralta MD  Reason for Nutrition Consult: New Patient - Nutrition Counseling and Education  and Nutrition related questions  The patient location is: home. The chief complaint leading to consultation is: lymphoma.   Visit type: audiovisual   Face to Face time with patient: 30 minutes 45 minutes of total time spent on the encounter, which includes face to face time and non-face to face time preparing to see the patient (eg, review of tests), Obtaining and/or reviewing separately obtained history, Documenting clinical information in the electronic or other health record, Independently interpreting results (not separately reported) and communicating results to the patient/family/caregiver, or Care coordination (not separately reported).    Each patient to whom he or she provides medical services by telemedicine is:  (1) informed of the relationship between the physician and patient and the respective role of any other health care provider with respect to management of the patient; and (2) notified that he or she may decline to receive medical services by telemedicine and may withdraw from such care at any time. Note below:     Nutrition Assessment      Patient Information:    Lj Sextonviolet  : 1955   69 y.o. male    Allergies/Intolerances: No known food allergies  Social Data: lives with  daughter .   Anthropometrics:     Weight:   91 kg (200 lb)                                 Height:  5'9" (1.75 m)     BMI: 29.6            Usual BW: 225 lb 3 months ago  Weight Change: loss of 25 lb, intentional from healthy eating      Supplements/Vitamins:    MVI/Supp: No  Drug/Nutrient interactions: No Activity Level:     Low Active     Form of Activity: activities of daily living , walking      Malnutrition Assessment:   Nutrition Risk: Patient does not meet at least 2 " characteristics of the ASPEN/AND criteria at this time.     Food/Nutrition-related history:    Diet/PO Recall:   Appetite: Good  Fluid Intake: Adequate  Diet Recall:  Breakfast: raisin bran with splenda, spinach   Lunch: crackers and boiled eggs with Coke Zero  Dinner: [place] prepared meal: air fryer chicken parmesan and cauliflower or chicken and sausage gumbo over rice   Snacks: 0-1x/day: banana  Drinks: water, Coke Zero  ONS: None   Servings of F/V per day: 2-3x/day  Eating out: 1-2x/week.   Cultural/Spiritual/Personal Preferences: No Preferences     GI Symptoms: weight loss 25 lbs. within the last 3 month(s), intentional  Oncology Nutrition Symptoms: weight loss              Difficulty chewing or swallowing?  yes - bottom teeth missing, can't eat nuts     Patient Notes/Reports: Pt referred for a Nutrition Consultation related to New Patient - Nutrition Counseling and Education  and Nutrition related questions. Patient has a medical diagnosis of large cell lymphoma, on OP A + CHP - BRENTUXIMAB CYCLOPHOSPHAMIDE DOXORUBICIN PREDNISONE chemotherapy. Mr. Calhoun had a liver transplant in 2012 and has diabetes he stated started from steroid medication. He has intentionally lost 25 lb this past year due to healthy eating: see diet recall. Mr. Calhoun had questions about what is okay to eat with cancer, liver transplant, and blood sugar issues combined.   Medical Tests and Procedures:  Patient Active Problem List   Diagnosis    Obesity (BMI 35.0-39.9 without comorbidity)    Chronic low back pain    GERD (gastroesophageal reflux disease)    Insomnia    History of hepatitis C    Immunosuppression    History of liver transplant    History of liver cancer    Essential hypertension    Atherosclerosis of native coronary artery with stable angina pectoris    Adjustment disorder with mixed anxiety and depressed mood    History of alcohol dependence    History of thrombocytopenia    Ventral hernia    Chest pain    HARDING (dyspnea  on exertion)    PVC (premature ventricular contraction)    Tobacco abuse disorder    Osteomyelitis of ankle and foot    Chronic deep vein thrombosis (DVT) of left upper extremity    Family history of bladder cancer    COPD suggested by initial evaluation    Inguinal lymphadenopathy    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    CT compatible an MRI safe Port-A-Cath in place    Immunodeficiency due to chemotherapy    Localized swelling of both lower legs    Palliative care encounter    Pancytopenia    NINO (acute kidney injury)    Elevated troponin    Troponin level elevated      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 10/31/2024    Anemia 11/23/2024    Angina pectoris     Arthritis     back    Atherosclerosis of native coronary artery without angina pectoris/ LAD 09/08/2016    Back pain     Chronic hepatitis C with cirrhosis     COPD (chronic obstructive pulmonary disease)     Depression     Hepatoma     Prior to liver transplant    History of colon polyps 09/09/2015    History of transplantation, liver 04/2012    History of vertebral fracture     Hypertension     Immune deficiency disorder     Incisional hernia following transplant 04/09/2014    Liver failure 11/2011    Related to chemotherapy for hepatoma    Liver transplanted 04/2012    NSTEMI (non-ST elevated myocardial infarction) 11/23/2024    Obesity     PVCs (premature ventricular contractions)     Renal dysfunction 11/23/2024    Tobacco abuse 09/09/2016    Trouble in sleeping     Vertebral fracture 1975     Past Surgical History:   Procedure Laterality Date    COLONOSCOPY N/A 1/20/2021    Procedure: COLONOSCOPY;  Surgeon: Emerson Helm MD;  Location: [facility];  Service: Endoscopy;  Laterality: N/A;    HERNIA REPAIR Right 2013    incisional    INSERTION OF TUNNELED CENTRAL VENOUS CATHETER (CVC)  WITH SUBCUTANEOUS PORT Left 11/6/2024    Procedure: [id number];  Surgeon: Hao Washburn MD;  Location: [facility] OR;  Service: General;  Laterality: Left;    LIVER TRANSPLANT  April 2012    LYMPH NODE BIOPSY/EXCISION Left 10/16/2024    Procedure: LYMPH NODE BIOPSY/EXCISION;  Surgeon: Hao Washburn MD;  Location: [facility] OR;  Service: General;  Laterality: Left;    MOUTH SURGERY      TONSILLECTOMY  1958       Current Outpatient Medications   Medication Instructions    acyclovir (ZOVIRAX) 400 mg, Oral, 2 times daily, While on chemotherapy    albuterol (VENTOLIN HFA) 90 mcg/actuation inhaler 2 puffs, Inhalation, Every 6 hours PRN, Rescue    allopurinoL (ZYLOPRIM) 300 mg, Oral, Daily    apixaban (ELIQUIS) 5 mg, Oral, 2 times daily    aspirin 81 mg, Oral, Daily    atorvastatin (LIPITOR) 80 mg, Oral, Daily    diclofenac sodium (VOLTAREN) 2 g, Topical (Top), 4 times daily    esomeprazole (NEXIUM) 40 mg, Oral, Daily    fluticasone-umeclidin-vilanter (TRELEGY ELLIPTA) 100-62.5-25 mcg DsDv 1 puff, Inhalation, Daily    HYDROcodone-acetaminophen (NORCO) 5-325 mg per tablet 1 tablet, Oral, Every 6 hours PRN    LIDOcaine-prilocaine (EMLA) cream Topical (Top), As needed (PRN)    melatonin 10 mg Tab Take by mouth.    metoprolol succinate (TOPROL-XL) 25 mg, Oral    nicotine (NICODERM CQ) 21 mg/24 hr 1 patch, Transdermal, Daily    nitroGLYCERIN (NITROSTAT) 0.4 mg, Sublingual, Every 5 min PRN    ondansetron (ZOFRAN) 8 mg, Oral, Every 12 hours PRN    predniSONE (DELTASONE) 100 mg, Oral, Daily, on days 2,3,4, 5 of each chemotherapy cycle    prochlorperazine (COMPAZINE) 5 mg, Oral, Every 6 hours PRN    sirolimus (RAPAMUNE) 1 MG Tab Take 3 mg (3 tablets) on day one, then 1 mg (1 tablet) every day thereafter.    sulfamethoxazole-trimethoprim 800-160mg (BACTRIM DS) 800-160 mg Tab 1 tablet, Oral, Three times weekly    tamsulosin (FLOMAX) 0.4 mg, Oral, Daily    tenofovir alafenamide (VEMLIDY) 25 mg Tab Take 1 tablet by mouth once  daily    tenofovir alafenamide (VEMLIDY) 25 mg, Oral, Daily    varenicline (CHANTIX) 1 mg, Oral, 2 times daily      Labs:  Reviewed - followed by MD luna        Nutrition Diagnosis    Nutrition Problem: nutrition related knowledge deficit   Etiology (related to): tumor location and nutrition  Signs/Symptoms (as evidenced by): self reported diet questions/concerns     Nutrition Intervention      Estimated Energy/Fluid Requirements:   Weight used: CBW 91 kg  Calories: 0497-8984 kcal/day (24-26 kcal/kg)  Protein: 91 g/day (1.0 g/kg)  Fluid: 4744-0767 mL/day (1 mL/kcal)   Recommendations:   Continue to limit salt and sugar and eat in moderation as recommended after liver transplant.     Continue to follow advice of diabetes dietitian.   Focus on protein at all meals and snacks. Examples provided.    Limit added sugars to 5 teaspoons or 25 grams per day.   Limit intake of red meat and avoid processed meat.    Aim for 5 servings of fruits and vegetables / beans per day.      Education Needs Satisfied: yes   Education Materials Provided / Reviewed:   Nutrition During Your Cancer Treatment  Reduced-Sugar Recipes    Healthy Plate Method    Barriers to Learning: none identified  Patient and/ or Family Verbalizes understanding: yes      Nutrition Monitoring and Evaluation    Monitor: energy intake, weight, and diet education needs     Goals:   1: Adequate intake of Calories and protein to promote weight maintenance  Indicator: Weight/BMI    2: Adherence to nutrition recommendations for improved symptom management  Indicator: Diet Recall     Follow up Patient provided with dietitian contact number and advised to call with questions or make future appointment if further intervention is needed.    Communication to referring provider/care team: note available in chart  Signature: Krystin Jones, MPH, RD, LDN

## 2025-01-21 ENCOUNTER — PATIENT MESSAGE (OUTPATIENT)
Dept: HEMATOLOGY/ONCOLOGY | Facility: CLINIC | Age: 70
End: 2025-01-21
Payer: MEDICARE

## 2025-01-22 ENCOUNTER — OFFICE VISIT (OUTPATIENT)
Dept: HEMATOLOGY/ONCOLOGY | Facility: CLINIC | Age: 70
End: 2025-01-22
Payer: MEDICARE

## 2025-01-22 DIAGNOSIS — D84.821 IMMUNODEFICIENCY DUE TO CHEMOTHERAPY: ICD-10-CM

## 2025-01-22 DIAGNOSIS — D84.9 IMMUNOSUPPRESSION: ICD-10-CM

## 2025-01-22 DIAGNOSIS — D61.818 PANCYTOPENIA: ICD-10-CM

## 2025-01-22 DIAGNOSIS — Z79.899 IMMUNODEFICIENCY DUE TO CHEMOTHERAPY: ICD-10-CM

## 2025-01-22 DIAGNOSIS — T45.1X5A IMMUNODEFICIENCY DUE TO CHEMOTHERAPY: ICD-10-CM

## 2025-01-22 DIAGNOSIS — C84.75 ANAPLASTIC ALK-NEGATIVE LARGE CELL LYMPHOMA OF LYMPH NODE OF LOWER EXTREMITY: Primary | ICD-10-CM

## 2025-01-22 DIAGNOSIS — I82.722 CHRONIC DEEP VEIN THROMBOSIS (DVT) OF LEFT UPPER EXTREMITY, UNSPECIFIED VEIN: ICD-10-CM

## 2025-01-22 PROCEDURE — 98005 SYNCH AUDIO-VIDEO EST LOW 20: CPT | Mod: 95,,, | Performed by: INTERNAL MEDICINE

## 2025-01-22 RX ORDER — EPINEPHRINE 0.3 MG/.3ML
0.3 INJECTION SUBCUTANEOUS ONCE AS NEEDED
Status: CANCELLED | OUTPATIENT
Start: 2025-01-25

## 2025-01-22 RX ORDER — DIPHENHYDRAMINE HYDROCHLORIDE 50 MG/ML
50 INJECTION INTRAMUSCULAR; INTRAVENOUS ONCE AS NEEDED
Status: CANCELLED | OUTPATIENT
Start: 2025-01-25

## 2025-01-22 RX ORDER — SODIUM CHLORIDE 0.9 % (FLUSH) 0.9 %
10 SYRINGE (ML) INJECTION
Status: CANCELLED | OUTPATIENT
Start: 2025-01-25

## 2025-01-22 RX ORDER — HEPARIN 100 UNIT/ML
500 SYRINGE INTRAVENOUS
Status: CANCELLED | OUTPATIENT
Start: 2025-01-25

## 2025-01-22 RX ORDER — DOXORUBICIN HYDROCHLORIDE 2 MG/ML
50 INJECTION, SOLUTION INTRAVENOUS
Status: CANCELLED | OUTPATIENT
Start: 2025-01-25
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
## 2025-01-23 ENCOUNTER — PATIENT MESSAGE (OUTPATIENT)
Dept: HEMATOLOGY/ONCOLOGY | Facility: CLINIC | Age: 70
End: 2025-01-23
Payer: MEDICARE

## 2025-01-23 ENCOUNTER — TELEPHONE (OUTPATIENT)
Dept: TRANSPLANT | Facility: CLINIC | Age: 70
End: 2025-01-23
Payer: MEDICARE

## 2025-01-23 DIAGNOSIS — Z94.4 LIVER TRANSPLANTED: ICD-10-CM

## 2025-01-23 NOTE — TELEPHONE ENCOUNTER
[place] message sent to notify pt.  Will add sirolimus level to labs tomorrow and then space out.  ----- Message from Duc Odom MD sent at 1/15/2025  4:02 PM CST -----  Liver transplant blood tests reviewed.  Labs stable,   no change in immunosuppression.   Please continue to monitor liver tests per transplant protocol.

## 2025-01-24 ENCOUNTER — TELEPHONE (OUTPATIENT)
Dept: INTERNAL MEDICINE | Facility: CLINIC | Age: 70
End: 2025-01-24
Payer: MEDICARE

## 2025-01-24 ENCOUNTER — LAB VISIT (OUTPATIENT)
Dept: LAB | Facility: HOSPITAL | Age: 70
End: 2025-01-24
Attending: INTERNAL MEDICINE
Payer: MEDICARE

## 2025-01-24 ENCOUNTER — TELEPHONE (OUTPATIENT)
Dept: TRANSPLANT | Facility: CLINIC | Age: 70
End: 2025-01-24
Payer: MEDICARE

## 2025-01-24 ENCOUNTER — TELEPHONE (OUTPATIENT)
Dept: HEMATOLOGY/ONCOLOGY | Facility: CLINIC | Age: 70
End: 2025-01-24
Payer: MEDICARE

## 2025-01-24 DIAGNOSIS — C85.80 LARGE CELL LYMPHOMA: ICD-10-CM

## 2025-01-24 DIAGNOSIS — Z94.4 LIVER TRANSPLANTED: ICD-10-CM

## 2025-01-24 DIAGNOSIS — C84.75 ANAPLASTIC ALK-NEGATIVE LARGE CELL LYMPHOMA OF LYMPH NODE OF LOWER EXTREMITY: ICD-10-CM

## 2025-01-24 DIAGNOSIS — Z94.4 LIVER REPLACED BY TRANSPLANT: ICD-10-CM

## 2025-01-24 LAB
ALBUMIN SERPL BCP-MCNC: 3 G/DL (ref 3.5–5.2)
ALP SERPL-CCNC: 96 U/L (ref 40–150)
ALT SERPL W/O P-5'-P-CCNC: 17 U/L (ref 10–44)
ANION GAP SERPL CALC-SCNC: 8 MMOL/L (ref 8–16)
AST SERPL-CCNC: 18 U/L (ref 10–40)
BASOPHILS # BLD AUTO: 0.04 K/UL (ref 0–0.2)
BASOPHILS NFR BLD: 1 % (ref 0–1.9)
BILIRUB SERPL-MCNC: 0.3 MG/DL (ref 0.1–1)
BUN SERPL-MCNC: 18 MG/DL (ref 8–23)
CALCIUM SERPL-MCNC: 9 MG/DL (ref 8.7–10.5)
CHLORIDE SERPL-SCNC: 110 MMOL/L (ref 95–110)
CO2 SERPL-SCNC: 23 MMOL/L (ref 23–29)
CREAT SERPL-MCNC: 1.1 MG/DL (ref 0.5–1.4)
DIFFERENTIAL METHOD BLD: ABNORMAL
EOSINOPHIL # BLD AUTO: 0 K/UL (ref 0–0.5)
EOSINOPHIL NFR BLD: 0 % (ref 0–8)
ERYTHROCYTE [DISTWIDTH] IN BLOOD BY AUTOMATED COUNT: 16.9 % (ref 11.5–14.5)
EST. GFR  (NO RACE VARIABLE): >60 ML/MIN/1.73 M^2
FERRITIN SERPL-MCNC: 593 NG/ML (ref 20–300)
GLUCOSE SERPL-MCNC: 79 MG/DL (ref 70–110)
HCT VFR BLD AUTO: 30 % (ref 40–54)
HGB BLD-MCNC: 9.5 G/DL (ref 14–18)
IMM GRANULOCYTES # BLD AUTO: 0.04 K/UL (ref 0–0.04)
IMM GRANULOCYTES NFR BLD AUTO: 1 % (ref 0–0.5)
LDH SERPL L TO P-CCNC: 195 U/L (ref 110–260)
LYMPHOCYTES # BLD AUTO: 0.3 K/UL (ref 1–4.8)
LYMPHOCYTES NFR BLD: 8.3 % (ref 18–48)
MAGNESIUM SERPL-MCNC: 1.8 MG/DL (ref 1.6–2.6)
MCH RBC QN AUTO: 33.9 PG (ref 27–31)
MCHC RBC AUTO-ENTMCNC: 31.7 G/DL (ref 32–36)
MCV RBC AUTO: 107 FL (ref 82–98)
MONOCYTES # BLD AUTO: 0.4 K/UL (ref 0.3–1)
MONOCYTES NFR BLD: 10.5 % (ref 4–15)
NEUTROPHILS # BLD AUTO: 3.2 K/UL (ref 1.8–7.7)
NEUTROPHILS NFR BLD: 79.2 % (ref 38–73)
NRBC BLD-RTO: 0 /100 WBC
PLATELET # BLD AUTO: 152 K/UL (ref 150–450)
PMV BLD AUTO: 10.9 FL (ref 9.2–12.9)
POTASSIUM SERPL-SCNC: 3.8 MMOL/L (ref 3.5–5.1)
PROT SERPL-MCNC: 6.1 G/DL (ref 6–8.4)
RBC # BLD AUTO: 2.8 M/UL (ref 4.6–6.2)
SODIUM SERPL-SCNC: 141 MMOL/L (ref 136–145)
WBC # BLD AUTO: 4.09 K/UL (ref 3.9–12.7)

## 2025-01-24 PROCEDURE — 82728 ASSAY OF FERRITIN: CPT | Performed by: INTERNAL MEDICINE

## 2025-01-24 PROCEDURE — 85025 COMPLETE CBC W/AUTO DIFF WBC: CPT | Performed by: INTERNAL MEDICINE

## 2025-01-24 PROCEDURE — 80053 COMPREHEN METABOLIC PANEL: CPT | Performed by: INTERNAL MEDICINE

## 2025-01-24 PROCEDURE — 80195 ASSAY OF SIROLIMUS: CPT | Performed by: INTERNAL MEDICINE

## 2025-01-24 PROCEDURE — 83735 ASSAY OF MAGNESIUM: CPT | Performed by: INTERNAL MEDICINE

## 2025-01-24 PROCEDURE — 36415 COLL VENOUS BLD VENIPUNCTURE: CPT | Performed by: INTERNAL MEDICINE

## 2025-01-24 PROCEDURE — 83540 ASSAY OF IRON: CPT | Performed by: INTERNAL MEDICINE

## 2025-01-24 PROCEDURE — 83615 LACTATE (LD) (LDH) ENZYME: CPT | Performed by: INTERNAL MEDICINE

## 2025-01-24 RX ORDER — FAMOTIDINE 10 MG/ML
20 INJECTION INTRAVENOUS
Status: CANCELLED
Start: 2025-01-25

## 2025-01-24 RX ORDER — DIPHENHYDRAMINE HYDROCHLORIDE 50 MG/ML
25 INJECTION INTRAMUSCULAR; INTRAVENOUS
Status: CANCELLED
Start: 2025-01-25

## 2025-01-24 RX ORDER — DIPHENHYDRAMINE HYDROCHLORIDE 50 MG/ML
12.5 INJECTION INTRAMUSCULAR; INTRAVENOUS
Status: CANCELLED
Start: 2025-01-25

## 2025-01-24 NOTE — TELEPHONE ENCOUNTER
LOV 06/12/2024 Dr Herman SINGH 01/27/2025 Mago Chiang PA-C  Med was refilled yesterday by Dr Peralta

## 2025-01-24 NOTE — TELEPHONE ENCOUNTER
----- Message from Eliu sent at 1/23/2025  8:37 AM CST -----  Regarding: Consult/Advisory  Contact: [phone]  Consult/Advisory     Name Of Caller: Pt         Contact Preference:   [phone]     Nature of call: Would like to discuss an appt that was rescheduled . Please call to advise thank you

## 2025-01-24 NOTE — TELEPHONE ENCOUNTER
----- Message from Mohsen sent at 1/23/2025  9:40 AM CST -----  Contact: Lj  Type:  RX Refill Request    Who Called: Lj  Refill or New Rx:refill  RX Name and Strength:tenofovir alafenamide (VEMLIDY) 25 mg Tab   How is the patient currently taking it? (ex. 1XDay):  Is this a 30 day or 90 day RX:  Preferred Pharmacy with phone number:  [id number] - [place] - [address] [facility] [address] [address]  Phone: [phone] Fax: [phone]    Local or Mail Order:local  Ordering Provider:neha  Would the patient rather a call back or a response via [facility]? call  Best Call Back Number:[phone]  Additional Information: Pt stated Dr. Peralta nurse told him to send refill request to his pcp

## 2025-01-24 NOTE — TELEPHONE ENCOUNTER
----- Message from [employer] sent at 1/24/2025 11:30 AM CST -----  Regarding: Reschedule Existing Appointment  Contact: :Lj Coleman  Reschedule Existing Appointment     Current appt date:01/24     Type of appt :NP     Physician:Curtis     Reason for rescheduling:Patient is calling to reschedule due to weather. Requesting a call back     Caller:Lj Coleman      Contact Preference:[phone] (home)  
[phone]

## 2025-01-25 ENCOUNTER — DOCUMENTATION ONLY (OUTPATIENT)
Dept: HEMATOLOGY/ONCOLOGY | Facility: CLINIC | Age: 70
End: 2025-01-25
Payer: MEDICARE

## 2025-01-25 ENCOUNTER — OFFICE VISIT (OUTPATIENT)
Dept: HEMATOLOGY/ONCOLOGY | Facility: CLINIC | Age: 70
End: 2025-01-25
Payer: MEDICARE

## 2025-01-25 ENCOUNTER — INFUSION (OUTPATIENT)
Dept: INFUSION THERAPY | Facility: HOSPITAL | Age: 70
End: 2025-01-25
Attending: INTERNAL MEDICINE
Payer: MEDICARE

## 2025-01-25 VITALS
TEMPERATURE: 99 F | BODY MASS INDEX: 29.98 KG/M2 | RESPIRATION RATE: 18 BRPM | OXYGEN SATURATION: 98 % | HEART RATE: 90 BPM | WEIGHT: 203.06 LBS | DIASTOLIC BLOOD PRESSURE: 75 MMHG | SYSTOLIC BLOOD PRESSURE: 130 MMHG

## 2025-01-25 DIAGNOSIS — I25.118 ATHEROSCLEROSIS OF NATIVE CORONARY ARTERY OF NATIVE HEART WITH STABLE ANGINA PECTORIS: ICD-10-CM

## 2025-01-25 DIAGNOSIS — M79.89 LEG SWELLING: Primary | ICD-10-CM

## 2025-01-25 DIAGNOSIS — E66.9 OBESITY (BMI 35.0-39.9 WITHOUT COMORBIDITY): ICD-10-CM

## 2025-01-25 DIAGNOSIS — Z79.899 IMMUNODEFICIENCY DUE TO CHEMOTHERAPY: ICD-10-CM

## 2025-01-25 DIAGNOSIS — D84.821 IMMUNODEFICIENCY DUE TO CHEMOTHERAPY: ICD-10-CM

## 2025-01-25 DIAGNOSIS — Z94.4 LIVER TRANSPLANTED: ICD-10-CM

## 2025-01-25 DIAGNOSIS — C84.75 ANAPLASTIC ALK-NEGATIVE LARGE CELL LYMPHOMA OF LYMPH NODE OF LOWER EXTREMITY: Primary | ICD-10-CM

## 2025-01-25 DIAGNOSIS — T45.1X5A IMMUNODEFICIENCY DUE TO CHEMOTHERAPY: ICD-10-CM

## 2025-01-25 DIAGNOSIS — Z94.4 HISTORY OF LIVER TRANSPLANT: ICD-10-CM

## 2025-01-25 DIAGNOSIS — C84.75 ANAPLASTIC ALK-NEGATIVE LARGE CELL LYMPHOMA OF LYMPH NODE OF LOWER EXTREMITY: ICD-10-CM

## 2025-01-25 LAB
IRON SERPL-MCNC: 49 UG/DL (ref 45–160)
SATURATED IRON: 18 % (ref 20–50)
SIROLIMUS BLD-MCNC: 2.6 NG/ML (ref 4–20)
TOTAL IRON BINDING CAPACITY: 269 UG/DL (ref 250–450)
TRANSFERRIN SERPL-MCNC: 182 MG/DL (ref 200–375)

## 2025-01-25 PROCEDURE — 99215 OFFICE O/P EST HI 40 MIN: CPT | Mod: S$PBB,,, | Performed by: INTERNAL MEDICINE

## 2025-01-25 PROCEDURE — 96413 CHEMO IV INFUSION 1 HR: CPT

## 2025-01-25 PROCEDURE — 99999 PR PBB SHADOW E&M-EST. PATIENT-LVL I: CPT | Mod: PBBFAC,,, | Performed by: INTERNAL MEDICINE

## 2025-01-25 PROCEDURE — 96375 TX/PRO/DX INJ NEW DRUG ADDON: CPT

## 2025-01-25 PROCEDURE — 96367 TX/PROPH/DG ADDL SEQ IV INF: CPT

## 2025-01-25 PROCEDURE — 96417 CHEMO IV INFUS EACH ADDL SEQ: CPT

## 2025-01-25 PROCEDURE — 99211 OFF/OP EST MAY X REQ PHY/QHP: CPT | Mod: PBBFAC | Performed by: INTERNAL MEDICINE

## 2025-01-25 PROCEDURE — 25000003 PHARM REV CODE 250: Performed by: INTERNAL MEDICINE

## 2025-01-25 PROCEDURE — 63600175 PHARM REV CODE 636 W HCPCS: Mod: TB | Performed by: INTERNAL MEDICINE

## 2025-01-25 RX ORDER — FUROSEMIDE 20 MG/1
20 TABLET ORAL DAILY
Qty: 30 TABLET | Refills: 3 | Status: SHIPPED | OUTPATIENT
Start: 2025-01-25 | End: 2026-01-25

## 2025-01-25 RX ORDER — DIPHENHYDRAMINE HYDROCHLORIDE 50 MG/ML
50 INJECTION INTRAMUSCULAR; INTRAVENOUS ONCE AS NEEDED
Status: DISCONTINUED | OUTPATIENT
Start: 2025-01-25 | End: 2025-01-25 | Stop reason: HOSPADM

## 2025-01-25 RX ORDER — SODIUM CHLORIDE 9 MG/ML
INJECTION, SOLUTION INTRAVENOUS ONCE
Status: COMPLETED | OUTPATIENT
Start: 2025-01-25 | End: 2025-01-25

## 2025-01-25 RX ORDER — EPINEPHRINE 0.3 MG/.3ML
0.3 INJECTION SUBCUTANEOUS ONCE AS NEEDED
Status: DISCONTINUED | OUTPATIENT
Start: 2025-01-25 | End: 2025-01-25 | Stop reason: HOSPADM

## 2025-01-25 RX ORDER — HEPARIN 100 UNIT/ML
500 SYRINGE INTRAVENOUS
Status: DISCONTINUED | OUTPATIENT
Start: 2025-01-25 | End: 2025-01-25 | Stop reason: HOSPADM

## 2025-01-25 RX ORDER — DIPHENHYDRAMINE HYDROCHLORIDE 50 MG/ML
12.5 INJECTION INTRAMUSCULAR; INTRAVENOUS
Status: COMPLETED | OUTPATIENT
Start: 2025-01-25 | End: 2025-01-25

## 2025-01-25 RX ORDER — FAMOTIDINE 10 MG/ML
20 INJECTION INTRAVENOUS
Status: COMPLETED | OUTPATIENT
Start: 2025-01-25 | End: 2025-01-25

## 2025-01-25 RX ORDER — DOXORUBICIN HYDROCHLORIDE 2 MG/ML
50 INJECTION, SOLUTION INTRAVENOUS
Status: COMPLETED | OUTPATIENT
Start: 2025-01-25 | End: 2025-01-25

## 2025-01-25 RX ADMIN — SODIUM CHLORIDE 0.25 MG: 9 INJECTION, SOLUTION INTRAVENOUS at 08:01

## 2025-01-25 RX ADMIN — HEPARIN 500 UNITS: 100 SYRINGE at 10:01

## 2025-01-25 RX ADMIN — CYCLOPHOSPHAMIDE 1580 MG: 200 INJECTION, SOLUTION INTRAVENOUS at 09:01

## 2025-01-25 RX ADMIN — FAMOTIDINE 20 MG: 10 INJECTION INTRAVENOUS at 08:01

## 2025-01-25 RX ADMIN — SODIUM CHLORIDE: 9 INJECTION, SOLUTION INTRAVENOUS at 09:01

## 2025-01-25 RX ADMIN — DIPHENHYDRAMINE HYDROCHLORIDE 12.5 MG: 50 INJECTION INTRAMUSCULAR; INTRAVENOUS at 08:01

## 2025-01-25 RX ADMIN — BRENTUXIMAB VEDOTIN 150 MG: 50 INJECTION, POWDER, LYOPHILIZED, FOR SOLUTION INTRAVENOUS at 09:01

## 2025-01-25 RX ADMIN — DOXORUBICIN HYDROCHLORIDE 106 MG: 2 INJECTION, SOLUTION INTRAVENOUS at 10:01

## 2025-01-25 NOTE — PROGRESS NOTES
SW consulted by infusion nurse to speak with pt re: cost of Vemlidy ($1,400). SW met with pt at infusion chairside to discuss. Pt stated that he spoke [employer] and was told that he no longer had [employer] SNP because he lost his Medicaid. Pt stated that he spoke with Medicaid rep LAURA Soares with [facility] and she called Medicaid. SW reviewed [facility]  note:   (Self-Pay Screening)  Denied on 1/16/2025  Patients: Lj KATEEros Coleman  Last comment: Walk-in - Confirmed identity/demographics - Called [facility] CSU and Rep Rishi stated pt was just reinstated/approved on 1/1/25 for SLMB effective 2/1/25 - Pt is currently enrolled in QI (both plans only pay for Medicare Part B Premium) - Income provided is over the income threshold for QMB/Medicaid as secondary - Pt is single, receives SS, HH1, no minor children, over age 65, claims disability - FA Case [id number] - Screened Criteria Not Met    Pt stated that the pharmacy sent him a long application to complete for rx assistance with Vemlidy and he has to complete and return. SW copied pt's 's license for him to send back with application. SW advised pt to have dtr assist with completing, but SW available to assist as well. SW researched [email or web address] and found the following PAP- [employer] Patient Assistance Program, but must be uninsured or have commercial insurance. SW will f/u with [facility] pharmacy/pt on Monday to confirm that they are assisting with the above. Pt confirmed that he has been living with his dtr and her in laws, and not in his mobile home that needs repairs. SW will remain available.

## 2025-01-25 NOTE — NURSING
"Discussed treatment plan with Dr. Peralta prior to starting medications.  Dr. Peralta wants pt to get Doxorubicin despite prior reaction.  Dexamethasone dose has been increased and he would like a 500ml NS bolus to hopefully prevent hypotension.  Doxorubicin to be given after all other medications just in case of reaction as then he would have gotten the rest of his treatment regimen.  Pt tolerates all of his medications up until Doxorubicin.  After 40ml Doxorubicin pt begins c/o "pressure in my neck and tingling in my hands" VSS.  At 46 ml pt states he is feeling hot, pressure is worse and c/o lower abdominal pain, Sat drops to 89%, BP stable. Medication stopped, NS running wide open and nasal canula applied at 3L. Dr. Peralta to chairside.  Decided to give the rest of Doxorubicin while Dr. Peralta at chairside.  Pt does tolerate without any new symptoms.  Vital signs now stable on room air.  Discussed 2+ pitting edema to bilateral lower extremeties with Dr. Peralta- he is sending in a prescription for Lasix.  Also discussed Vemlidy cost- pt states he cannot afford this medication as it as $1400 a month- Dr. Peralta states he is going to send prescription to [facility] to see if they can help with this.  "

## 2025-01-25 NOTE — PLAN OF CARE
Discussed plan of care with pt. Addressed any and ongoing concerns. Pt denies    Problem: Adult Inpatient Plan of Care  Goal: Plan of Care Review  Outcome: Progressing  Flowsheets (Taken 1/25/2025 0819)  Plan of Care Reviewed With: patient  Goal: Absence of Hospital-Acquired Illness or Injury  Outcome: Progressing  Intervention: Identify and Manage Fall Risk  Flowsheets (Taken 1/25/2025 0819)  Safety Promotion/Fall Prevention:   room near unit station   in recliner, wheels locked   nonskid shoes/socks when out of bed  Intervention: Prevent Infection  Flowsheets (Taken 1/25/2025 0819)  Infection Prevention:   hand hygiene promoted   equipment surfaces disinfected  Goal: Optimal Comfort and Wellbeing  Outcome: Progressing  Intervention: Monitor Pain and Promote Comfort  Flowsheets (Taken 1/25/2025 0819)  Pain Management Interventions:   quiet environment facilitated   warm blanket provided   relaxation techniques promoted  Intervention: Provide Person-Centered Care  Flowsheets (Taken 1/25/2025 0819)  Trust Relationship/Rapport:   care explained   reassurance provided   emotional support provided   choices provided   thoughts/feelings acknowledged   empathic listening provided   questions answered   questions encouraged

## 2025-01-25 NOTE — PROGRESS NOTES
Subjective:       Patient ID: Lj Coleman is a 69 y.o. male.    Chief Complaint: Results, Chemotherapy, and Lymphoma    HPI:  71-year-old male presents for cycle 4 day 1OP A + CHP - BRENTUXIMAB CYCLOPHOSPHAMIDE DOXORUBICIN PREDNISONE patient recent PET scan demonstrates dramatic response to treatment.  At this time the patient has had questions of whether not use having a reaction to doxorubicin.  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    Anemia 11/23/2024    Angina pectoris     Arthritis     back    Atherosclerosis of native coronary artery without angina pectoris/ LAD 09/08/2016    Back pain     Chronic hepatitis C with cirrhosis     COPD (chronic obstructive pulmonary disease)     Depression     Hepatoma     Prior to liver transplant    History of colon polyps 09/09/2015    History of transplantation, liver 04/2012    History of vertebral fracture     Hypertension     Immune deficiency disorder     Incisional hernia following transplant 04/09/2014    Liver failure 11/2011    Related to chemotherapy for hepatoma    Liver transplanted 04/2012    NSTEMI (non-ST elevated myocardial infarction) 11/23/2024    Obesity     PVCs (premature ventricular contractions)     Renal dysfunction 11/23/2024    Tobacco abuse 09/09/2016    Trouble in sleeping     Vertebral fracture 1975     Family History   Problem Relation Name Age of Onset    Cancer Mother          lung    Cancer Father          bladder    Diabetes Maternal Uncle      Cancer Maternal Grandmother          uterine?    Cancer Other      Heart disease Neg Hx      Kidney disease Neg Hx      Stroke Neg Hx       Social History     Socioeconomic  History    Marital status:     Highest education level: 10th grade   Tobacco Use    Smoking status: Every Day     Current packs/day: 0.75     Average packs/day: 0.8 packs/day for 37.1 years (27.8 ttl pk-yrs)     Types: Cigarettes     Start date: 1988    Smokeless tobacco: Never    Tobacco comments:     Currently smokes a few (3-5) a day as of 6/12/24   Substance and Sexual Activity    Alcohol use: No     Alcohol/week: 0.0 standard drinks of alcohol     Comment: Quit alcohol after some alcohol abuse, prior to his liver transplant    Drug use: No    Sexual activity: Not Currently     Social Drivers of Health     Financial Resource Strain: Low Risk  (1/1/2025)    Overall Financial Resource Strain (CARDIA)     Difficulty of Paying Living Expenses: Not very hard   Food Insecurity: Patient Declined (1/1/2025)    Hunger Vital Sign     Worried About Running Out of Food in the Last Year: Patient declined     Ran Out of Food in the Last Year: Patient declined   Transportation Needs: Patient Declined (11/24/2024)    TRANSPORTATION NEEDS     Transportation : Patient declined   Physical Activity: Inactive (1/1/2025)    Exercise Vital Sign     Days of Exercise per Week: 0 days     Minutes of Exercise per Session: 0 min   Stress: No Stress Concern Present (1/1/2025)    [employer] - Occupational Stress Questionnaire     Feeling of Stress : Only a little   Housing Stability: Patient Declined (1/1/2025)    Housing Stability Vital Sign     Unable to Pay for Housing in the Last Year: Patient declined     Homeless in the Last Year: Patient declined     Past Surgical History:   Procedure Laterality Date    COLONOSCOPY N/A 1/20/2021    Procedure: COLONOSCOPY;  Surgeon: Emerson Helm MD;  Location: [facility];  Service: Endoscopy;  Laterality: N/A;    HERNIA REPAIR Right 2013    incisional    INSERTION OF TUNNELED CENTRAL VENOUS CATHETER (CVC) WITH SUBCUTANEOUS PORT Left 11/6/2024    Procedure:  "[id number];  Surgeon: Hao Washburn MD;  Location: [facility] OR;  Service: General;  Laterality: Left;    LIVER TRANSPLANT  April 2012    LYMPH NODE BIOPSY/EXCISION Left 10/16/2024    Procedure: LYMPH NODE BIOPSY/EXCISION;  Surgeon: Hao Washburn MD;  Location: [facility] OR;  Service: General;  Laterality: Left;    MOUTH SURGERY      TONSILLECTOMY  1958       Labs:  Lab Results   Component Value Date    WBC 4.09 01/24/2025    HGB 9.5 (L) 01/24/2025    HCT 30.0 (L) 01/24/2025     (H) 01/24/2025     01/24/2025     BMP  Lab Results   Component Value Date     01/24/2025    K 3.8 01/24/2025     01/24/2025    CO2 23 01/24/2025    BUN 18 01/24/2025    CREATININE 1.1 01/24/2025    CALCIUM 9.0 01/24/2025    ANIONGAP 8 01/24/2025    ESTGFRAFRICA >60.0 06/08/2022    EGFRNONAA >60.0 06/08/2022     Lab Results   Component Value Date    ALT 17 01/24/2025    AST 18 01/24/2025    GGT 32 09/26/2016    ALKPHOS 96 01/24/2025    BILITOT 0.3 01/24/2025       Lab Results   Component Value Date    IRON 49 01/24/2025    TIBC 269 01/24/2025    FERRITIN 593 (H) 01/24/2025     No results found for: "SCITDGJA65"  No results found for: "FOLATE"  Lab Results   Component Value Date    TSH 1.168 06/06/2023         Review of Systems   Constitutional:  Positive for fatigue. Negative for activity change, appetite change, chills, diaphoresis, fever and unexpected weight change.   HENT:  Negative for congestion, dental problem, drooling, ear discharge, ear pain, facial swelling, hearing loss, mouth sores, nosebleeds, postnasal drip, rhinorrhea, sinus pressure, sneezing, sore throat, tinnitus, trouble swallowing and voice change.    Eyes:  Negative for photophobia, pain, discharge, redness, itching and visual disturbance.   Respiratory:  Negative for apnea, cough, choking, chest tightness, shortness of breath, wheezing and stridor.    Cardiovascular:  Positive for leg swelling. Negative for chest pain and " palpitations.   Gastrointestinal:  Negative for abdominal distention, abdominal pain, anal bleeding, blood in stool, constipation, diarrhea, nausea, rectal pain and vomiting.   Endocrine: Negative for cold intolerance, heat intolerance, polydipsia, polyphagia and polyuria.   Genitourinary:  Negative for decreased urine volume, difficulty urinating, dysuria, enuresis, flank pain, frequency, genital sores, hematuria, penile discharge, penile pain, penile swelling, scrotal swelling, testicular pain and urgency.   Musculoskeletal:  Negative for arthralgias, back pain, gait problem, joint swelling, myalgias, neck pain and neck stiffness.   Skin:  Negative for color change, pallor, rash and wound.   Allergic/Immunologic: Negative for environmental allergies, food allergies and immunocompromised state.   Neurological:  Positive for weakness. Negative for dizziness, tremors, seizures, syncope, facial asymmetry, speech difficulty, light-headedness, numbness and headaches.   Hematological:  Negative for adenopathy. Does not bruise/bleed easily.   Psychiatric/Behavioral:  Negative for agitation, behavioral problems, confusion, decreased concentration, dysphoric mood, hallucinations, self-injury, sleep disturbance and suicidal ideas. The patient is not nervous/anxious and is not hyperactive.        Objective:      Physical Exam  Vitals reviewed.   Constitutional:       General: He is not in acute distress.     Appearance: He is well-developed. He is obese. He is not diaphoretic.   HENT:      Head: Normocephalic.      Right Ear: External ear normal.      Left Ear: External ear normal.      Nose: Nose normal.      Right Sinus: No maxillary sinus tenderness or frontal sinus tenderness.      Left Sinus: No maxillary sinus tenderness or frontal sinus tenderness.      Mouth/Throat:      Pharynx: No oropharyngeal exudate.   Eyes:      General: Lids are normal. No scleral icterus.        Right eye: No discharge.         Left eye: No  discharge.      Extraocular Movements:      Right eye: Normal extraocular motion.      Left eye: Normal extraocular motion.      Conjunctiva/sclera:      Right eye: Right conjunctiva is not injected. No hemorrhage.     Left eye: Left conjunctiva is not injected. No hemorrhage.     Pupils: Pupils are equal, round, and reactive to light.   Neck:      Thyroid: No thyromegaly.      Vascular: No JVD.      Trachea: No tracheal deviation.   Cardiovascular:      Rate and Rhythm: Normal rate and regular rhythm.   Pulmonary:      Effort: Pulmonary effort is normal. No respiratory distress.      Breath sounds: No stridor.   Abdominal:      General: Bowel sounds are normal.      Palpations: Abdomen is soft. There is no hepatomegaly, splenomegaly or mass.      Tenderness: There is no abdominal tenderness.   Musculoskeletal:         General: No tenderness. Normal range of motion.      Cervical back: Normal range of motion and neck supple.      Right lower leg: Edema present.      Left lower leg: Edema present.   Lymphadenopathy:      Head:      Right side of head: No posterior auricular or occipital adenopathy.      Left side of head: No posterior auricular or occipital adenopathy.      Cervical: No cervical adenopathy.      Right cervical: No superficial, deep or posterior cervical adenopathy.     Left cervical: No superficial, deep or posterior cervical adenopathy.      Upper Body:      Right upper body: No supraclavicular adenopathy.      Left upper body: No supraclavicular adenopathy.   Skin:     General: Skin is dry.      Findings: No erythema or rash.      Nails: There is no clubbing.   Neurological:      Mental Status: He is alert and oriented to person, place, and time.      Cranial Nerves: No cranial nerve deficit.      Motor: Weakness present.      Coordination: Coordination normal.      Gait: Gait abnormal.   Psychiatric:         Behavior: Behavior normal.         Thought Content: Thought content normal.          Judgment: Judgment normal.             Assessment:      1. Leg swelling    2. Liver transplanted    3. 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 Immunodeficiency due to chemotherapy    5. Atherosclerosis of native coronary artery of native heart with stable angina pectoris    6. Obesity (BMI 35.0-39.9 without comorbidity)    7. History of liver transplant           Med Onc Chart Routing      Follow up with physician . Continue with current plan treatment   Follow up with [facility]    Infusion scheduling note    Injection scheduling note    Labs    Imaging    Pharmacy appointment    Other referrals                   Plan:     Reviewed situation at bedside do not feel patient has had an allergic reaction to doxorubicin.  Continue with current Infusion in his completed with no lasting affects.  Does have edema of recommend patient start on 20 mg of Lasix daily high likelihood this represents fluid retention secondary to serum that patient is taking variant do recommend that patient continue with follow-up excellent response to therapy variant I have sent his prophylaxis for hepatitis-B to [facility] he states that the cost too much for [facility] to [facility].  As he had previously requested will ask to see whether not this can be given to him.  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  Discussed implications of answered questions        Timmy Peralta Jr, MD FACP

## 2025-01-27 ENCOUNTER — INFUSION (OUTPATIENT)
Dept: INFUSION THERAPY | Facility: HOSPITAL | Age: 70
End: 2025-01-27
Attending: INTERNAL MEDICINE
Payer: MEDICARE

## 2025-01-27 ENCOUNTER — TELEPHONE (OUTPATIENT)
Dept: HEMATOLOGY/ONCOLOGY | Facility: CLINIC | Age: 70
End: 2025-01-27
Payer: MEDICARE

## 2025-01-27 ENCOUNTER — HOSPITAL ENCOUNTER (OUTPATIENT)
Dept: CARDIOLOGY | Facility: HOSPITAL | Age: 70
Discharge: HOME | End: 2025-01-27
Attending: INTERNAL MEDICINE
Payer: MEDICARE

## 2025-01-27 VITALS
TEMPERATURE: 98 F | OXYGEN SATURATION: 97 % | RESPIRATION RATE: 18 BRPM | HEART RATE: 100 BPM | SYSTOLIC BLOOD PRESSURE: 138 MMHG | DIASTOLIC BLOOD PRESSURE: 75 MMHG

## 2025-01-27 VITALS
SYSTOLIC BLOOD PRESSURE: 114 MMHG | DIASTOLIC BLOOD PRESSURE: 72 MMHG | HEART RATE: 97 BPM | BODY MASS INDEX: 30.07 KG/M2 | WEIGHT: 203 LBS | HEIGHT: 69 IN

## 2025-01-27 DIAGNOSIS — C84.75 ANAPLASTIC ALK-NEGATIVE LARGE CELL LYMPHOMA OF LYMPH NODE OF LOWER EXTREMITY: ICD-10-CM

## 2025-01-27 DIAGNOSIS — C84.75 ANAPLASTIC ALK-NEGATIVE LARGE CELL LYMPHOMA OF LYMPH NODE OF LOWER EXTREMITY: Primary | ICD-10-CM

## 2025-01-27 PROCEDURE — 93356 MYOCRD STRAIN IMG SPCKL TRCK: CPT | Mod: ,,, | Performed by: INTERNAL MEDICINE

## 2025-01-27 PROCEDURE — 93306 TTE W/DOPPLER COMPLETE: CPT | Mod: 26,,, | Performed by: INTERNAL MEDICINE

## 2025-01-27 PROCEDURE — 96372 THER/PROPH/DIAG INJ SC/IM: CPT

## 2025-01-27 PROCEDURE — 63600175 PHARM REV CODE 636 W HCPCS: Mod: JZ,TB | Performed by: INTERNAL MEDICINE

## 2025-01-27 PROCEDURE — 93356 MYOCRD STRAIN IMG SPCKL TRCK: CPT

## 2025-01-27 RX ADMIN — PEGFILGRASTIM-JMDB 6 MG: 6 INJECTION SUBCUTANEOUS at 02:01

## 2025-01-27 NOTE — TELEPHONE ENCOUNTER
MAGNOLIA called [phone] (Aileen) who stated that pt's rx insurance has termed so [facility] rx not filled but is at the [facility] pharmacy. MAGNOLIA spoke with dtr Linda to make aware of the above, and explained that pt no longer has [employer] SNP and not eligible for [facility] per [facility] notes due to income. MAGNOLIA informed dtr that pt may have transitioned back to traditional MCR and will need a Part D rx drug plan. Pt may have missed correspondence from [employer] if mail went to his mobile home, since he has been living with his dtr. MAGNOLIA provided #'s for [employer] Medicare sales/enrollment dept [phone], SS office [phone], and Medicare [phone] and advised her to call re: MCR advantage plan with Part D rx plan, or Part D plan with traditional MCR if unable to re-enroll in a [employer] plan, as well as a copy of pt's traditional MCR card if needed. Dtr stated that she would assist pt with the above today and f/u with SW to confirm outcome of the above.

## 2025-01-28 ENCOUNTER — HOSPITAL ENCOUNTER (OUTPATIENT)
Dept: RADIOLOGY | Facility: HOSPITAL | Age: 70
Discharge: HOME | End: 2025-01-28
Attending: INTERNAL MEDICINE
Payer: MEDICARE

## 2025-01-28 ENCOUNTER — TELEPHONE (OUTPATIENT)
Dept: HEMATOLOGY/ONCOLOGY | Facility: CLINIC | Age: 70
End: 2025-01-28
Payer: MEDICARE

## 2025-01-28 ENCOUNTER — HOSPITAL ENCOUNTER (OUTPATIENT)
Dept: PULMONOLOGY | Facility: HOSPITAL | Age: 70
Discharge: HOME | End: 2025-01-28
Attending: INTERNAL MEDICINE
Payer: MEDICARE

## 2025-01-28 VITALS — SYSTOLIC BLOOD PRESSURE: 139 MMHG | DIASTOLIC BLOOD PRESSURE: 80 MMHG | HEART RATE: 92 BPM

## 2025-01-28 DIAGNOSIS — R79.89 TROPONIN LEVEL ELEVATED: ICD-10-CM

## 2025-01-28 LAB
AORTIC ROOT ANNULUS: 3.28 CM
APICAL FOUR CHAMBER EJECTION FRACTION: 68 %
APICAL TWO CHAMBER EJECTION FRACTION: 62 %
AV INDEX (PROSTH): 0.75
AV MEAN GRADIENT: 4 MMHG
AV PEAK GRADIENT: 8 MMHG
AV REGURGITATION PRESSURE HALF TIME: 291 MS
AV VALVE AREA BY VELOCITY RATIO: 2.5 CM²
AV VALVE AREA: 2.6 CM²
AV VELOCITY RATIO: 0.71
BSA FOR ECHO PROCEDURE: 2.12 M2
CV ECHO LV RWT: 0.4 CM
CV STRESS BASE HR: 88 BPM
DIASTOLIC BLOOD PRESSURE: 80 MMHG
DOP CALC AO PEAK VEL: 1.4 M/S
DOP CALC AO VTI: 27.3 CM
DOP CALC LVOT AREA: 3.5 CM2
DOP CALC LVOT DIAMETER: 2.1 CM
DOP CALC LVOT PEAK VEL: 1 M/S
DOP CALC LVOT STROKE VOLUME: 70.6 CM3
DOP CALC RVOT PEAK VEL: 1.12 M/S
DOP CALC RVOT VTI: 24.4 CM
DOP CALCLVOT PEAK VEL VTI: 20.4 CM
E WAVE DECELERATION TIME: 183 MSEC
E/A RATIO: 1.07
E/E' RATIO: 7 M/S
ECHO LV POSTERIOR WALL: 1 CM (ref 0.6–1.1)
EJECTION FRACTION- HIGH: 73 %
END DIASTOLIC INDEX-HIGH: 165 ML/M2
END DIASTOLIC INDEX-LOW: 101 ML/M2
END SYSTOLIC INDEX-HIGH: 64 ML/M2
END SYSTOLIC INDEX-LOW: 28 ML/M2
FRACTIONAL SHORTENING: 38 % (ref 28–44)
GLOBAL LONGITUIDAL STRAIN: 20.3 %
INTERVENTRICULAR SEPTUM: 0.9 CM (ref 0.6–1.1)
IVRT: 114 MSEC
LA MAJOR: 5.1 CM
LA MINOR: 4.8 CM
LA WIDTH: 4 CM
LEFT ATRIUM SIZE: 3.7 CM
LEFT ATRIUM VOLUME INDEX: 30 ML/M2
LEFT ATRIUM VOLUME: 62 CM3
LEFT INTERNAL DIMENSION IN SYSTOLE: 3.1 CM (ref 2.1–4)
LEFT VENTRICLE DIASTOLIC VOLUME INDEX: 55.67 ML/M2
LEFT VENTRICLE DIASTOLIC VOLUME: 115.8 ML
LEFT VENTRICLE END DIASTOLIC VOLUME APICAL 2 CHAMBER: 97.56 ML
LEFT VENTRICLE END DIASTOLIC VOLUME APICAL 4 CHAMBER: 111.38 ML
LEFT VENTRICLE MASS INDEX: 81.7 G/M2
LEFT VENTRICLE SYSTOLIC VOLUME INDEX: 18.1 ML/M2
LEFT VENTRICLE SYSTOLIC VOLUME: 37.64 ML
LEFT VENTRICULAR INTERNAL DIMENSION IN DIASTOLE: 5 CM (ref 3.5–6)
LEFT VENTRICULAR MASS: 169.9 G
LV LATERAL E/E' RATIO: 8 M/S
LV SEPTAL E/E' RATIO: 6.5 M/S
LVED V (TEICH): 115.8 ML
LVES V (TEICH): 37.64 ML
LVOT MG: 2.32 MMHG
LVOT MV: 0.72 CM/S
MV PEAK A VEL: 0.97 M/S
MV PEAK E VEL: 1.04 M/S
MV STENOSIS PRESSURE HALF TIME: 53.14 MS
MV VALVE AREA P 1/2 METHOD: 4.14 CM2
NUC REST EJECTION FRACTION: 72
NUC STRESS EJECTION FRACTION: 63 %
OHS CV CPX 85 PERCENT MAX PREDICTED HEART RATE MALE: 127
OHS CV CPX MAX PREDICTED HEART RATE: 149
OHS CV CPX PATIENT IS FEMALE: 0
OHS CV CPX PATIENT IS MALE: 1
OHS CV CPX PEAK DIASTOLIC BLOOD PRESSURE: 80 MMHG
OHS CV CPX PEAK HEAR RATE: 110 BPM
OHS CV CPX PEAK RATE PRESSURE PRODUCT: NORMAL
OHS CV CPX PEAK SYSTOLIC BLOOD PRESSURE: 139 MMHG
OHS CV CPX PERCENT MAX PREDICTED HEART RATE ACHIEVED: 74
OHS CV CPX RATE PRESSURE PRODUCT PRESENTING: NORMAL
OHS CV INITIAL DOSE: 10 MCG/KG/MIN
OHS CV PEAK DOSE: 30.2 MCG/KG/MIN
OHS CV RV/LV RATIO: 0.88 CM
OHS LV EJECTION FRACTION SIMPSONS BIPLANE MOD: 65 %
PISA AR MAX VEL: 4.01 M/S
PISA TR MAX VEL: 2.7 M/S
PULM VEIN S/D RATIO: 1.3
PV MEAN GRADIENT: 3 MMHG
PV PEAK D VEL: 0.5 M/S
PV PEAK GRADIENT: 6 MMHG
PV PEAK S VEL: 0.65 M/S
PV PEAK VELOCITY: 1.25 M/S
QEF: 69 %
RA MAJOR: 4.32 CM
RA PRESSURE ESTIMATED: 3 MMHG
RA WIDTH: 3.76 CM
RETIRED EF AND QEF - SEE NOTES: 59 %
RIGHT VENTRICLE DIASTOLIC BASEL DIMENSION: 4.4 CM
RIGHT VENTRICULAR END-DIASTOLIC DIMENSION: 4.42 CM
RV TB RVSP: 6 MMHG
SINUS: 3.15 CM
STJ: 3.57 CM
SYSTOLIC BLOOD PRESSURE: 139 MMHG
TDI LATERAL: 0.13 M/S
TDI SEPTAL: 0.16 M/S
TDI: 0.15 M/S
TR MAX PG: 29 MMHG
TRICUSPID ANNULAR PLANE SYSTOLIC EXCURSION: 2.3 CM
TV REST PULMONARY ARTERY PRESSURE: 32 MMHG
Z-SCORE OF LEFT VENTRICULAR DIMENSION IN END DIASTOLE: -2.42
Z-SCORE OF LEFT VENTRICULAR DIMENSION IN END SYSTOLE: -1.81

## 2025-01-28 PROCEDURE — 93018 CV STRESS TEST I&R ONLY: CPT | Mod: ,,, | Performed by: INTERNAL MEDICINE

## 2025-01-28 PROCEDURE — 63600175 PHARM REV CODE 636 W HCPCS: Performed by: INTERNAL MEDICINE

## 2025-01-28 PROCEDURE — A9502 TC99M TETROFOSMIN: HCPCS | Performed by: INTERNAL MEDICINE

## 2025-01-28 PROCEDURE — 78452 HT MUSCLE IMAGE SPECT MULT: CPT | Mod: 26,,, | Performed by: INTERNAL MEDICINE

## 2025-01-28 PROCEDURE — 93016 CV STRESS TEST SUPVJ ONLY: CPT | Mod: ,,, | Performed by: INTERNAL MEDICINE

## 2025-01-28 PROCEDURE — 93017 CV STRESS TEST TRACING ONLY: CPT

## 2025-01-28 RX ORDER — REGADENOSON 0.08 MG/ML
0.4 INJECTION, SOLUTION INTRAVENOUS ONCE
Status: COMPLETED | OUTPATIENT
Start: 2025-01-28 | End: 2025-01-28

## 2025-01-28 RX ADMIN — REGADENOSON 0.4 MG: 0.08 INJECTION, SOLUTION INTRAVENOUS at 12:01

## 2025-01-28 RX ADMIN — TETROFOSMIN 30.2 MILLICURIE: 1.38 INJECTION, POWDER, LYOPHILIZED, FOR SOLUTION INTRAVENOUS at 12:01

## 2025-01-28 RX ADMIN — TETROFOSMIN 10 MILLICURIE: 1.38 INJECTION, POWDER, LYOPHILIZED, FOR SOLUTION INTRAVENOUS at 10:01

## 2025-01-29 ENCOUNTER — PATIENT MESSAGE (OUTPATIENT)
Dept: CARDIOLOGY | Facility: CLINIC | Age: 70
End: 2025-01-29
Payer: MEDICARE

## 2025-01-30 ENCOUNTER — TELEPHONE (OUTPATIENT)
Dept: HEMATOLOGY/ONCOLOGY | Facility: CLINIC | Age: 70
End: 2025-01-30
Payer: MEDICARE

## 2025-01-30 NOTE — TELEPHONE ENCOUNTER
F/u call to dtr Linda. She has not had time to assist pt with calls to TRA/Medicare because her dtr has been ill. SW offered to assist pt with calls. Dtr asked SW to call pt's line. SW called pt, but no answer or vm option. SW will remain available.

## 2025-02-03 ENCOUNTER — TELEPHONE (OUTPATIENT)
Dept: HEMATOLOGY/ONCOLOGY | Facility: CLINIC | Age: 70
End: 2025-02-03
Payer: MEDICARE

## 2025-02-03 NOTE — TELEPHONE ENCOUNTER
MAGNOLIA called pt to discuss problems with insurance. Pt having difficulty understanding what's going on and admits that his memory is not good. MAGNOLIA attempted to explain to pt that he needs Part D rx coverage and what needs to be done to obtain that as discussed with dtr-Linda, but pt was still not comprehending and said he needed to make sure that his dtr was aware of SW call because he was leery to call anyone and provide any of his info. Pt sent text message to dtr to confirm that it was ok to speak with MAGNOLIA and place conference call to [employer]. MAGNOLIA placed conference call to [employer] sales/enrollment [phone], spoke with Beatris Martines ([phone] Ext #[phone]) and explained that pt lost full Medicaid and she stated that pt could enroll under special election since he lost his Medicaid. Beatris assisted with enrolling pt in a [employer] Dual plan H [id number] which includes dental, vision, and transportation (18 one-way trips with 100 miles) as well as $100 OTC spending card that does not roll over at end of year. Policy will be effective 3/1/25. Application # [id number]. Pt will have traditional Medicare in the meantime. Beatris confirmed that pt has Medicaid [employer] only (ID# [id number]) which only covers Medicare part B premium. Pt still has LIS per Beatris, and can call [phone] (Mary Kate) for a 30 day supply of rx's until [employer]/Part D is effective. Call transferred to [facility] for a health risk screening. MAGNOLIA completed conference call to Mary Kate [phone] and spoke with Jannie, who stated that pt is eligible for 30 day supply- BIN#[id number], PCN# [id number], ID# pt's Medicare # to be used, no group #, no person code. Pt will take the info above to his pharmacies for refills. Pt plans to visit [facility]  today with application and proof of income to see if he qualifies for [facility] to assist with copays. Pt is currently living with dtr at [address] [place] [address]. No other needs  expressed. SW will remain available.

## 2025-02-05 ENCOUNTER — PATIENT MESSAGE (OUTPATIENT)
Dept: ADMINISTRATIVE | Facility: CLINIC | Age: 70
End: 2025-02-05
Payer: MEDICARE

## 2025-02-05 ENCOUNTER — PATIENT MESSAGE (OUTPATIENT)
Dept: ADMINISTRATIVE | Facility: OTHER | Age: 70
End: 2025-02-05
Payer: MEDICARE

## 2025-02-05 ENCOUNTER — CLINICAL SUPPORT (OUTPATIENT)
Dept: PULMONOLOGY | Facility: CLINIC | Age: 70
End: 2025-02-05
Payer: MEDICARE

## 2025-02-05 VITALS — HEART RATE: 107 BPM | BODY MASS INDEX: 29.98 KG/M2 | HEIGHT: 69 IN | WEIGHT: 202.38 LBS | OXYGEN SATURATION: 97 %

## 2025-02-05 DIAGNOSIS — I82.722 CHRONIC DEEP VEIN THROMBOSIS (DVT) OF LEFT UPPER EXTREMITY, UNSPECIFIED VEIN: ICD-10-CM

## 2025-02-05 DIAGNOSIS — J44.9 MODERATE COPD (CHRONIC OBSTRUCTIVE PULMONARY DISEASE): ICD-10-CM

## 2025-02-05 DIAGNOSIS — J44.9 COPD SUGGESTED BY INITIAL EVALUATION: Primary | ICD-10-CM

## 2025-02-05 PROCEDURE — 99212 OFFICE O/P EST SF 10 MIN: CPT | Mod: PBBFAC

## 2025-02-05 PROCEDURE — 99999 PR PBB SHADOW E&M-EST. PATIENT-LVL II: CPT | Mod: PBBFAC,,,

## 2025-02-05 NOTE — PROGRESS NOTES
Pulmonary Disease Management  [facility]  CHRONIC CARE MANAGEMENT  Initial Visit    Referring Provider: ANICETO  Diagnosis: COPD  Last Hospital Admission: NA  Last provider visit: 11/18/24      Current Outpatient Medications:     acyclovir (ZOVIRAX) 400 MG tablet, Take 1 tablet (400 mg total) by mouth 2 (two) times daily. While on chemotherapy, Disp: 60 tablet, Rfl: 4    albuterol (VENTOLIN HFA) 90 mcg/actuation inhaler, Inhale 2 puffs into the lungs every 6 (six) hours as needed for Wheezing or Shortness of Breath. Rescue, Disp: 18 g, Rfl: 3    allopurinoL (ZYLOPRIM) 300 MG tablet, Take 1 tablet (300 mg total) by mouth once daily., Disp: 30 tablet, Rfl: 0    apixaban (ELIQUIS) 5 mg Tab, Take 1 tablet (5 mg total) by mouth 2 (two) times daily., Disp: 180 tablet, Rfl: 1    aspirin 81 MG Chew, Take 1 tablet (81 mg total) by mouth once daily., Disp: 30 tablet, Rfl: 0    atorvastatin (LIPITOR) 80 MG tablet, Take 1 tablet (80 mg total) by mouth once daily., Disp: 30 tablet, Rfl: 0    diclofenac sodium (VOLTAREN) 1 % Gel, Apply 2 g topically 4 (four) times daily. for 10 days, Disp: 100 g, Rfl: 0    esomeprazole (NEXIUM) 40 MG capsule, Take 1 capsule (40 mg total) by mouth once daily., Disp: 90 capsule, Rfl: 3    fluticasone-umeclidin-vilanter (TRELEGY ELLIPTA) 100-62.5-25 mcg DsDv, Inhale 1 puff into the lungs once daily., Disp: 60 each, Rfl: 5    furosemide (LASIX) 20 MG tablet, Take 1 tablet (20 mg total) by mouth once daily., Disp: 30 tablet, Rfl: 3    HYDROcodone-acetaminophen (NORCO) 5-325 mg per tablet, Take 1 tablet by mouth every 6 (six) hours as needed for Pain., Disp: 12 tablet, Rfl: 0    LIDOcaine-prilocaine (EMLA) cream, Apply topically as needed., Disp: 30 g, Rfl: 11    melatonin 10 mg Tab, Take by mouth., Disp: , Rfl:     metoprolol succinate (TOPROL-XL) 25 MG 24 hr tablet, TAKE 1 TABLET EVERY DAY, Disp: 90 tablet, Rfl: 3    nicotine (NICODERM CQ) 21 mg/24 hr, Place 1 patch onto the skin once  daily., Disp: 28 patch, Rfl: 2    nitroGLYCERIN (NITROSTAT) 0.4 MG SL tablet, Place 1 tablet (0.4 mg total) under the tongue every 5 (five) minutes as needed for Chest pain (angina)., Disp: 25 tablet, Rfl: 0    ondansetron (ZOFRAN) 8 MG tablet, Take 1 tablet (8 mg total) by mouth every 12 (twelve) hours as needed for Nausea., Disp: 20 tablet, Rfl: 0    predniSONE (DELTASONE) 50 MG Tab, Take 2 tablets (100 mg total) by mouth once daily. on days 2,3,4, 5 of each chemotherapy cycle, Disp: 8 tablet, Rfl: 5    prochlorperazine (COMPAZINE) 5 MG tablet, Take 1 tablet (5 mg total) by mouth every 6 (six) hours as needed (nausea)., Disp: 20 tablet, Rfl: 5    sirolimus (RAPAMUNE) 1 MG Tab, Take 3 mg (3 tablets) on day one, then 1 mg (1 tablet) every day thereafter., Disp: 33 tablet, Rfl: 11    sulfamethoxazole-trimethoprim 800-160mg (BACTRIM DS) 800-160 mg Tab, Take 1 tablet by mouth 3 (three) times a week., Disp: 12 tablet, Rfl: 4    tamsulosin (FLOMAX) 0.4 mg Cap, Take 1 capsule (0.4 mg total) by mouth once daily., Disp: 30 capsule, Rfl: 2    tenofovir alafenamide (VEMLIDY) 25 mg Tab, Take 1 tablet by mouth once daily, Disp: 30 tablet, Rfl: 11    tenofovir alafenamide (VEMLIDY) 25 mg Tab, Take 1 tablet (25 mg total) by mouth Daily., Disp: 30 tablet, Rfl: 11  No current facility-administered medications for this visit.    Facility-Administered Medications Ordered in Other Visits:     lactated ringers infusion, , Intravenous, Continuous, Linwood Ellsworth MD, New Bag at 01/20/21 0734    sodium chloride 0.9% flush 2 mL, 2 mL, Intravenous, PRN, Linwood Ellsworth MD    Review of patient's allergies indicates:   Allergen Reactions    Codeine Other (See Comments)     Upset stomach    Doxorubicin Other (See Comments)     Hypotension, dizziness, itching hives relieved with benadryl. MD ok with rechallenging 1/25/25 with more premeds       Smoking history:   Social History     Tobacco Use   Smoking Status Every Day    Current packs/day: 0.75     "Average packs/day: 0.7 packs/day for 37.1 years (27.8 ttl pk-yrs)    Types: Cigarettes    Start date: 1988   Smokeless Tobacco Never   Tobacco Comments    Currently smokes a few (3-5) a day as of 6/12/24       Pulse: 107  SpO2: 97 %        Height: 5' 9" (175.3 cm)  Weight: 91.8 kg (202 lb 6.1 oz)  BMI (kg/m2): 29.95                        Resting Richard Dyspnea Rating : 0      Current Oxygen Use: No                 Does the patient use BiPAP, CPAP or Trilogy?: No                                                     COPD Questionnaire:  COPD Questionnaire  How often do you cough?: Almost never  How often do you have phlegm (mucus) in your chest?: A little of the time  How often does your chest feel tight?: Never  When you walk up a hill or one flight of stairs, how often are you breathless?: Most of the time  How often are you limited doing any activities at home?: A little of the time  How often are you confident leaving the house despite your lung condition?: All of the time  How often do you sleep soundly?: Most of the time  How often do you have energy?: Some of the time  Total score: 12       PFT Results:  Pre FVC   Date/Time Value Ref Range Status   11/18/2024 08:34 AM 3.65 3.34 - 5.70 L Final     Pre FEV1   Date/Time Value Ref Range Status   11/18/2024 08:34 AM 2.39 (L) 2.44 - 4.28 L Final     Pre FEV1 FVC   Date/Time Value Ref Range Status   11/18/2024 08:34 AM 65.56 62.06 - 87.44 % Final     Pre TLC   Date/Time Value Ref Range Status   11/18/2024 08:34 AM 8.30 6.38 - 8.68 L Final     Pre DLCO   Date/Time Value Ref Range Status   11/18/2024 08:34 AM 15.75 (L) 21.94 - 35.80 ml/(min*mmHg) Final                  Patient Concerns or Expectations:   Patient states he was afraid of side effects of Trelegy.    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    Anemia 11/23/2024    Angina pectoris     Arthritis     back    Atherosclerosis of native coronary artery without angina pectoris/ LAD 09/08/2016    Back pain     Chronic hepatitis C with cirrhosis     COPD (chronic obstructive pulmonary disease)     Depression     Hepatoma     Prior to liver transplant    History of colon polyps 09/09/2015    History of transplantation, liver 04/2012    History of vertebral fracture     Hypertension     Immune deficiency disorder     Incisional hernia following transplant 04/09/2014    Liver failure 11/2011    Related to chemotherapy for hepatoma    Liver transplanted 04/2012    NSTEMI (non-ST elevated myocardial infarction) 11/23/2024    Obesity     PVCs (premature ventricular contractions)     Renal dysfunction 11/23/2024    Tobacco abuse 09/09/2016    Trouble in sleeping     Vertebral fracture 1975             Educational assessment:   [x]            Good  []            Fair  []            Poor    Readiness to learn:   [x]            Good  []            Fair  []            Poor    Vision Status:   [x]            Good  []            Fair  []            Poor    Reading Ability:  [x]            Good  []            Fair  []            Poor    Knowledge of condition:   []            Good  [x]            Fair  []            Poor    Language Barriers:   []            Good  []            Fair  []            Poor  [x]            None    Cognitive/ Physical Barriers:   []            Good  []            Fair  []            Poor  [x]            None    Learning best by:                       [x]            Seeing  [x]            Hearing  [x]            Reading                         [x]            Doing    Describe any barrier /Limitation or financial  implications of care choices identified     []            Financial  []            Emotional  []            Education  []            Vision/Hearing  []            Physical  [x]            None  []                TOPIC /CONTENT FOR TODAY:    [x]            MDI with or without spacer  [x]            Dry powder inhaler  []            Acapella   []           Peak Flow meter  [x]            COPD action plan  []            Nebulizer use  []            Oxygen use safety  [x]            Breathing and cough techniques  [x]            Energy conservation  [x]            Infection prevention  [x]            Asthma trigger checklist        Learner:    [x]            Patient   []            Caregiver    Method:    [x]            Verbal explanation  []            Audio visual    [x]            Literature  [x]            Teach back      Evaluation:    [x]            Teach back  [x]            Demonstrate  [x]            Follow up phone call    []            2 weeks     [x]            4 weeks   []            PRN    Additional comments:   Patient was seen today to review respiratory medication purpose and proper technique for use of inhalers. Patient practiced proper technique using MDI with valved holding chamber (spacer) and DPI inhalers. Patient demonstrated understanding. Literature was given to patient.    Reviewed the difference in controller (TRELEGY) and rescue (ALBUTEROL) medications.    CONTROLLER: TRELEGY   Frequency: 1 PUFF ONCE DAILY    RESCUE:ALBUTEROL   Frequency:  2 PUFFS AS NEEDED EVERY 4 HOURS    Asthma trigger checklist was verbally reviewed and literature given to patient.     Infection prevention was discussed. Patient was reminded to get influenza vaccine. Hand hygiene and cleaning of respiratory equipment was also discussed. Patient verbalized understanding.      Reviewed breathing techniques such as pursed-lip breathing, pearson-cough technique, and diaphragmatic breathing. Patient practiced proper technique and  verbalized understanding. Literature given to patient.     COPD action plan was reviewed and literature was given to patient. Patient verbalized understanding.       Plan:  Monthly Pulmonary Disease Management Questionnaire  Follow-up PDM appointment scheduled for 8/7/25    Reinforce education  Meds: Trelegy, albuterol  DME Needs: na  Action Plan: COPD  Immunization: Pneumococcal- CURRENT, Flu-CURRENT,  Next Provider Visit: 5/19/25  Next Spirometry/CPFT: NOT SCHEDULED  Approximate time spent with patient: 60 MINUTES

## 2025-02-05 NOTE — PATIENT INSTRUCTIONS
ACTION PLAN    GREEN ZONE  My sputum is clear/white/usual color and easily cleared.  My breathing is no harder than usual.  I can do my usual activities.  I can think clearly.   Take your usual medicines, including oxygen, as you are told to do so by your health care provider.   YELLOW ZONE  My sputum has change (color, thickness, amount).  I am more short of breath than usual.  I cough or wheeze more.  I weigh more and my legs/feet swell.  I cannot do my usual activities without resting.   Call your health care provider. You will probably be told to begin taking an antibiotic and prednisone. Have your pharmacy phone number available.   RED ZONE  I cannot cough out my sputum.  I am much more short of breath than normal.  I need to sit up to breathe  I cannot do my usual activities.  I am unable to speak more than one or two words at a time.  I am confused.   Call your health care provider. You may be asked to come in to be seen, told to go to the emergency room, or told to call 9-1-1.     [id number]  Shortness of Breath: Maximizing Your Energy    Fear of shortness of breath may stop you from being as active as you once were. You don't have to live this way. Managing your time and pacing yourself can help you conserve energy and do more. It's even OK if you're short of breath sometimes. You can learn to work through this without limiting your activities.  Manage your time  Shortness of breath can make everyday tasks take longer. This means there's less time to do the things you enjoy. You can help prevent this by managing your time. Try these tips:  Plan ahead so your tasks are spaced throughout the day. As you plan, keep in mind the times of day you tend to have the most energy.  Do only one thing at a time. Finish one task before starting another.  Gather everything you need before you start a task. This cuts out unneeded steps while you're working.  Think about what you really need to do. Be realistic about what you  can get done in a day.      Balance activity and rest  When you're tired, your activities will take longer. Fatigue also makes you more likely to get an infection. Plan your day so that your tasks are spaced throughout the day. To have more energy:  Stop and rest when you need to. Don't wait until you're overtired.  Switch back and forth between hard tasks and easy ones.  Give yourself plenty of time for each task, so you don't have to hurry.  Take 20- to 30-minute rest breaks after meals and throughout the day.  If an activity takes a lot of energy, break it into smaller parts. For instance, fold the laundry first. Then take a break before putting it away.  Try not to exert yourself in extreme cold or heat.       Find ways to conserve energy  Conserving your energy can help you stay active and breathe better. The way you use your body during a task can help you conserve energy. Think of ways to make things easier, and take your time to ease shortness of breath.  For some tasks, you can also use special aids designed to reduce the amount of energy needed. Here are some tips:  Sit whenever possible, and keep your arms close to your body. Use slow, smooth motions.  Sit to dress and undress, shave, brush your teeth, and comb your hair. Use a long-handled reacher to pull on socks and shoes, and long-handled items like shoe horns.  Sit on a bench to shower. Use warm water, not hot. (Steam can make it harder to breathe.) Dry off by wrapping yourself in a terrycloth robe.  Use energy-saving appliances, such as an electric can opener, a power toothbrush, and a .  Use a cart with wheels to move groceries, laundry, dishes, and other items around the house. Some carts have seats so you can rest when you need to.  Use lightweight, nonstick pots and pans to cook. Soak dirty dishes instead of scrubbing them. Air-dry dishes, or use a .  Mix, cook, serve, and store foods in the same dish.  Keep the things you  use most at waist level, so you can get them without reaching or bending.  Use steps slowly, pausing at each step. If you have steps outside or in your home, think about adding ramps or stair lifts.  Think about ways that others can help you. You might get help from friends, family members, or home health aides.      Remember to breathe  Sometimes people with an illness or condition that affects the lungs try to rush through tasks so they won't get short of breath. This uses more energy and can actually increase shortness of breath. Instead, slow down and pace your breathing. These tips may help:  Move slowly during tasks that take a lot of effort, such as climbing stairs or pushing a shopping cart.  Use pursed-lip and diaphragmatic breathing while you go about a task.  Breathe out (exhale) when you exert effort. For example, breathe out as you lift up a grocery bag. Once you're holding the bag, breathe in. Ask the  at the [employer] to pack your grocery bags so they are light and easy to carry.  Focus on taking slow, deep breaths. If your breathing is shallow, you don't take in as much air.  Remember that it's OK to be short of breath. Just pace yourself and do pursed-lip breathing.      Talk with your healthcare provider about:  If you should use supplemental oxygen  If you need a referral to occupational and physical therapy. Therapists can help you with exercise and daily activities, and how to make things easier.      Pursed-lip breathing  This type of breathing helps you exhale better. Breathing this way during activity will help you reduce shortness of breath:  Relax your neck and shoulder muscles. Breathe in (inhale) slowly through your nose for 2 counts or more.  Pucker your lips as if you are going to blow out a candle. Breathe out slowly and gently through your lips for at least twice as long as you inhaled.

## 2025-02-10 ENCOUNTER — PATIENT MESSAGE (OUTPATIENT)
Dept: HEMATOLOGY/ONCOLOGY | Facility: CLINIC | Age: 70
End: 2025-02-10
Payer: MEDICARE

## 2025-02-10 ENCOUNTER — TELEPHONE (OUTPATIENT)
Dept: HEMATOLOGY/ONCOLOGY | Facility: CLINIC | Age: 70
End: 2025-02-10
Payer: MEDICARE

## 2025-02-10 DIAGNOSIS — C84.75 ANAPLASTIC ALK-NEGATIVE LARGE CELL LYMPHOMA OF LYMPH NODE OF LOWER EXTREMITY: ICD-10-CM

## 2025-02-10 DIAGNOSIS — C85.80 LARGE CELL LYMPHOMA: ICD-10-CM

## 2025-02-10 RX ORDER — ACYCLOVIR 400 MG/1
400 TABLET ORAL 2 TIMES DAILY
Qty: 60 TABLET | Refills: 4 | Status: SHIPPED | OUTPATIENT
Start: 2025-02-10

## 2025-02-10 RX ORDER — ALLOPURINOL 300 MG/1
TABLET ORAL
Qty: 30 TABLET | Refills: 0 | Status: SHIPPED | OUTPATIENT
Start: 2025-02-10

## 2025-02-10 NOTE — TELEPHONE ENCOUNTER
Returned call to pt. Pt voiced that he had just received text from pharmacy saying med was ready for . No other issues or concerns at this time. Call ended well.

## 2025-02-10 NOTE — TELEPHONE ENCOUNTER
----- Message from [facility].catie sent at 2/10/2025  8:57 AM CST -----  Contact: Lj  .Type:  Needs Medical Advice    Who Called:  Lj     Would the patient rather a call back or a response via [facility]?  Call back   Best Call Back Number:  [phone]    Additional Information:  Pt is calling in regard to the refill status of the medication and would like to get a call back to discuss concerns due to being completely out of the medication and the pharmacy informed him that the medication has been discontinued per Dr Peralta.      Thanks

## 2025-02-17 ENCOUNTER — PATIENT MESSAGE (OUTPATIENT)
Dept: PULMONOLOGY | Facility: CLINIC | Age: 70
End: 2025-02-17
Payer: MEDICARE

## 2025-02-18 ENCOUNTER — TELEPHONE (OUTPATIENT)
Dept: HEMATOLOGY/ONCOLOGY | Facility: CLINIC | Age: 70
End: 2025-02-18

## 2025-02-18 ENCOUNTER — RESULTS FOLLOW-UP (OUTPATIENT)
Dept: HEPATOLOGY | Facility: CLINIC | Age: 70
End: 2025-02-18
Payer: MEDICARE

## 2025-02-18 ENCOUNTER — INFUSION (OUTPATIENT)
Dept: INFUSION THERAPY | Facility: HOSPITAL | Age: 70
End: 2025-02-18
Attending: INTERNAL MEDICINE
Payer: MEDICARE

## 2025-02-18 ENCOUNTER — RESULTS FOLLOW-UP (OUTPATIENT)
Dept: HEMATOLOGY/ONCOLOGY | Facility: CLINIC | Age: 70
End: 2025-02-18

## 2025-02-18 ENCOUNTER — OFFICE VISIT (OUTPATIENT)
Dept: HEMATOLOGY/ONCOLOGY | Facility: CLINIC | Age: 70
End: 2025-02-18
Payer: MEDICARE

## 2025-02-18 VITALS
SYSTOLIC BLOOD PRESSURE: 127 MMHG | OXYGEN SATURATION: 96 % | TEMPERATURE: 98 F | WEIGHT: 195.75 LBS | HEART RATE: 95 BPM | DIASTOLIC BLOOD PRESSURE: 74 MMHG | HEIGHT: 69 IN | BODY MASS INDEX: 28.99 KG/M2

## 2025-02-18 VITALS
TEMPERATURE: 98 F | SYSTOLIC BLOOD PRESSURE: 130 MMHG | DIASTOLIC BLOOD PRESSURE: 67 MMHG | HEART RATE: 87 BPM | RESPIRATION RATE: 18 BRPM | OXYGEN SATURATION: 98 %

## 2025-02-18 DIAGNOSIS — D84.9 IMMUNOSUPPRESSION: ICD-10-CM

## 2025-02-18 DIAGNOSIS — D84.821 IMMUNODEFICIENCY DUE TO CHEMOTHERAPY: ICD-10-CM

## 2025-02-18 DIAGNOSIS — Z85.05 HISTORY OF LIVER CANCER: ICD-10-CM

## 2025-02-18 DIAGNOSIS — Z86.2 HISTORY OF THROMBOCYTOPENIA: ICD-10-CM

## 2025-02-18 DIAGNOSIS — T45.1X5A IMMUNODEFICIENCY DUE TO CHEMOTHERAPY: ICD-10-CM

## 2025-02-18 DIAGNOSIS — Z79.899 IMMUNODEFICIENCY DUE TO CHEMOTHERAPY: ICD-10-CM

## 2025-02-18 DIAGNOSIS — C84.75 ANAPLASTIC ALK-NEGATIVE LARGE CELL LYMPHOMA OF LYMPH NODE OF LOWER EXTREMITY: Primary | ICD-10-CM

## 2025-02-18 DIAGNOSIS — C84.75 ANAPLASTIC ALK-NEGATIVE LARGE CELL LYMPHOMA OF LYMPH NODE OF LOWER EXTREMITY: ICD-10-CM

## 2025-02-18 PROCEDURE — 63600175 PHARM REV CODE 636 W HCPCS: Mod: TB | Performed by: INTERNAL MEDICINE

## 2025-02-18 PROCEDURE — 99215 OFFICE O/P EST HI 40 MIN: CPT | Mod: PBBFAC | Performed by: INTERNAL MEDICINE

## 2025-02-18 PROCEDURE — 96375 TX/PRO/DX INJ NEW DRUG ADDON: CPT

## 2025-02-18 PROCEDURE — 25000003 PHARM REV CODE 250: Performed by: INTERNAL MEDICINE

## 2025-02-18 PROCEDURE — 96367 TX/PROPH/DG ADDL SEQ IV INF: CPT

## 2025-02-18 PROCEDURE — 96411 CHEMO IV PUSH ADDL DRUG: CPT

## 2025-02-18 PROCEDURE — 96413 CHEMO IV INFUSION 1 HR: CPT

## 2025-02-18 PROCEDURE — 96417 CHEMO IV INFUS EACH ADDL SEQ: CPT

## 2025-02-18 RX ORDER — DIPHENHYDRAMINE HYDROCHLORIDE 50 MG/ML
50 INJECTION INTRAMUSCULAR; INTRAVENOUS ONCE AS NEEDED
Status: DISCONTINUED | OUTPATIENT
Start: 2025-02-18 | End: 2025-02-18 | Stop reason: HOSPADM

## 2025-02-18 RX ORDER — DIPHENHYDRAMINE HYDROCHLORIDE 50 MG/ML
12.5 INJECTION INTRAMUSCULAR; INTRAVENOUS
Status: CANCELLED
Start: 2025-02-18

## 2025-02-18 RX ORDER — EPINEPHRINE 0.3 MG/.3ML
0.3 INJECTION SUBCUTANEOUS ONCE AS NEEDED
Status: DISCONTINUED | OUTPATIENT
Start: 2025-02-18 | End: 2025-02-18 | Stop reason: HOSPADM

## 2025-02-18 RX ORDER — SODIUM CHLORIDE 0.9 % (FLUSH) 0.9 %
10 SYRINGE (ML) INJECTION
Status: CANCELLED | OUTPATIENT
Start: 2025-02-18

## 2025-02-18 RX ORDER — FAMOTIDINE 10 MG/ML
20 INJECTION INTRAVENOUS
Status: CANCELLED
Start: 2025-02-18

## 2025-02-18 RX ORDER — DIPHENHYDRAMINE HYDROCHLORIDE 50 MG/ML
12.5 INJECTION INTRAMUSCULAR; INTRAVENOUS
Status: COMPLETED | OUTPATIENT
Start: 2025-02-18 | End: 2025-02-18

## 2025-02-18 RX ORDER — HEPARIN 100 UNIT/ML
500 SYRINGE INTRAVENOUS
Status: DISCONTINUED | OUTPATIENT
Start: 2025-02-18 | End: 2025-02-18 | Stop reason: HOSPADM

## 2025-02-18 RX ORDER — DOXORUBICIN HYDROCHLORIDE 2 MG/ML
50 INJECTION, SOLUTION INTRAVENOUS
Status: CANCELLED | OUTPATIENT
Start: 2025-02-18

## 2025-02-18 RX ORDER — DOXORUBICIN HYDROCHLORIDE 2 MG/ML
50 INJECTION, SOLUTION INTRAVENOUS
Status: COMPLETED | OUTPATIENT
Start: 2025-02-18 | End: 2025-02-18

## 2025-02-18 RX ORDER — HEPARIN 100 UNIT/ML
500 SYRINGE INTRAVENOUS
Status: CANCELLED | OUTPATIENT
Start: 2025-02-18

## 2025-02-18 RX ORDER — FAMOTIDINE 10 MG/ML
20 INJECTION INTRAVENOUS
Status: COMPLETED | OUTPATIENT
Start: 2025-02-18 | End: 2025-02-18

## 2025-02-18 RX ORDER — ATORVASTATIN CALCIUM 80 MG/1
80 TABLET, FILM COATED ORAL DAILY
Qty: 30 TABLET | Refills: 0 | Status: SHIPPED | OUTPATIENT
Start: 2025-02-18 | End: 2025-03-22

## 2025-02-18 RX ORDER — EPINEPHRINE 0.3 MG/.3ML
0.3 INJECTION SUBCUTANEOUS ONCE AS NEEDED
Status: CANCELLED | OUTPATIENT
Start: 2025-02-18

## 2025-02-18 RX ORDER — DIPHENHYDRAMINE HYDROCHLORIDE 50 MG/ML
50 INJECTION INTRAMUSCULAR; INTRAVENOUS ONCE AS NEEDED
Status: CANCELLED | OUTPATIENT
Start: 2025-02-18

## 2025-02-18 RX ADMIN — HEPARIN SODIUM (PORCINE) LOCK FLUSH IV SOLN 100 UNIT/ML 500 UNITS: 100 SOLUTION at 12:02

## 2025-02-18 RX ADMIN — CYCLOPHOSPHAMIDE 1500 MG: 200 INJECTION, SOLUTION INTRAVENOUS at 10:02

## 2025-02-18 RX ADMIN — BRENTUXIMAB VEDOTIN 150 MG: 50 INJECTION, POWDER, LYOPHILIZED, FOR SOLUTION INTRAVENOUS at 11:02

## 2025-02-18 RX ADMIN — FAMOTIDINE 20 MG: 10 INJECTION, SOLUTION INTRAVENOUS at 09:02

## 2025-02-18 RX ADMIN — SODIUM CHLORIDE 0.25 MG: 9 INJECTION, SOLUTION INTRAVENOUS at 09:02

## 2025-02-18 RX ADMIN — DOXORUBICIN HYDROCHLORIDE 104 MG: 2 INJECTION, SOLUTION INTRAVENOUS at 11:02

## 2025-02-18 RX ADMIN — DIPHENHYDRAMINE HYDROCHLORIDE 12.5 MG: 50 INJECTION INTRAMUSCULAR; INTRAVENOUS at 09:02

## 2025-02-18 NOTE — PROGRESS NOTES
Subjective:       Patient ID: Lj Coleman is a 69 y.o. male.    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    Anemia 11/23/2024    Angina pectoris     Arthritis     back    Atherosclerosis of native coronary artery without angina pectoris/ LAD 09/08/2016    Back pain     Chronic hepatitis C with cirrhosis     COPD (chronic obstructive pulmonary disease)     Depression     Hepatoma     Prior to liver transplant    History of colon polyps 09/09/2015    History of transplantation, liver 04/2012    History of vertebral fracture     Hypertension     Immune deficiency disorder     Incisional hernia following transplant 04/09/2014    Liver failure 11/2011    Related to chemotherapy for hepatoma    Liver transplanted 04/2012    NSTEMI (non-ST elevated myocardial infarction) 11/23/2024    Obesity     PVCs (premature ventricular contractions)     Renal dysfunction 11/23/2024    Tobacco abuse 09/09/2016    Trouble in sleeping     Vertebral fracture 1975     Family History   Problem Relation Name Age of Onset    Cancer Mother          lung    Cancer Father          bladder    Diabetes Maternal Uncle      Cancer Maternal Grandmother          uterine?    Cancer Other      Heart disease Neg Hx      Kidney disease Neg Hx      Stroke Neg Hx       Social History[1]  Past Surgical History:   Procedure Laterality Date    COLONOSCOPY N/A 1/20/2021    Procedure: COLONOSCOPY;  Surgeon: Emerson Helm MD;  Location: [facility];  Service: Endoscopy;  Laterality: N/A;    HERNIA REPAIR Right 2013    incisional    INSERTION OF TUNNELED  "CENTRAL VENOUS CATHETER (CVC) WITH SUBCUTANEOUS PORT Left 11/6/2024    Procedure: [id number];  Surgeon: Hao Washburn MD;  Location: [facility] OR;  Service: General;  Laterality: Left;    LIVER TRANSPLANT  April 2012    LYMPH NODE BIOPSY/EXCISION Left 10/16/2024    Procedure: LYMPH NODE BIOPSY/EXCISION;  Surgeon: Hao Washburn MD;  Location: [facility] OR;  Service: General;  Laterality: Left;    MOUTH SURGERY      TONSILLECTOMY  1958       Labs:  Lab Results   Component Value Date    WBC 4.63 02/18/2025    HGB 9.9 (L) 02/18/2025    HCT 31.8 (L) 02/18/2025     (H) 02/18/2025     (L) 02/18/2025     BMP  Lab Results   Component Value Date     01/27/2025    K 4.2 01/27/2025     01/27/2025    CO2 21 (L) 01/27/2025    BUN 27 (H) 01/27/2025    CREATININE 1.3 01/27/2025    CALCIUM 8.8 01/27/2025    ANIONGAP 11 01/27/2025    ESTGFRAFRICA >60.0 06/08/2022    EGFRNONAA >60.0 06/08/2022     Lab Results   Component Value Date    ALT 16 01/27/2025    AST 22 01/27/2025    GGT 32 09/26/2016    ALKPHOS 101 01/27/2025    BILITOT 0.3 01/27/2025       Lab Results   Component Value Date    IRON 49 01/24/2025    TIBC 269 01/24/2025    FERRITIN 593 (H) 01/24/2025     No results found for: "GQBCHDQJ49"  No results found for: "FOLATE"  Lab Results   Component Value Date    TSH 1.168 06/06/2023         Review of Systems   Constitutional:  Positive for fatigue. Negative for activity change, appetite change, chills, diaphoresis, fever and unexpected weight change.   HENT:  Negative for congestion, dental problem, drooling, ear discharge, ear pain, facial swelling, hearing loss, mouth sores, nosebleeds, postnasal drip, rhinorrhea, sinus pressure, sneezing, sore throat, tinnitus, trouble swallowing and voice change.    Eyes:  Negative for photophobia, pain, discharge, redness, itching and visual disturbance.   Respiratory:  Negative for apnea, cough, choking, chest tightness, shortness of breath, wheezing and " stridor.    Cardiovascular:  Negative for chest pain, palpitations and leg swelling.   Gastrointestinal:  Negative for abdominal distention, abdominal pain, anal bleeding, blood in stool, constipation, diarrhea, nausea, rectal pain and vomiting.   Endocrine: Negative for cold intolerance, heat intolerance, polydipsia, polyphagia and polyuria.   Genitourinary:  Negative for decreased urine volume, difficulty urinating, dysuria, enuresis, flank pain, frequency, genital sores, hematuria, penile discharge, penile pain, penile swelling, scrotal swelling, testicular pain and urgency.   Musculoskeletal:  Negative for arthralgias, back pain, gait problem, joint swelling, myalgias, neck pain and neck stiffness.   Skin:  Negative for color change, pallor, rash and wound.   Allergic/Immunologic: Negative for environmental allergies, food allergies and immunocompromised state.   Neurological:  Positive for weakness. Negative for dizziness, tremors, seizures, syncope, facial asymmetry, speech difficulty, light-headedness, numbness and headaches.   Hematological:  Negative for adenopathy. Does not bruise/bleed easily.   Psychiatric/Behavioral:  Negative for agitation, behavioral problems, confusion, decreased concentration, dysphoric mood, hallucinations, self-injury, sleep disturbance and suicidal ideas. The patient is not nervous/anxious and is not hyperactive.        Objective:      Physical Exam  Vitals reviewed.   Constitutional:       General: He is not in acute distress.     Appearance: He is well-developed. He is not diaphoretic.   HENT:      Head: Normocephalic.      Right Ear: External ear normal.      Left Ear: External ear normal.      Nose: Nose normal.      Right Sinus: No maxillary sinus tenderness or frontal sinus tenderness.      Left Sinus: No maxillary sinus tenderness or frontal sinus tenderness.      Mouth/Throat:      Pharynx: No oropharyngeal exudate.   Eyes:      General: Lids are normal. No scleral  icterus.        Right eye: No discharge.         Left eye: No discharge.      Extraocular Movements:      Right eye: Normal extraocular motion.      Left eye: Normal extraocular motion.      Conjunctiva/sclera:      Right eye: Right conjunctiva is not injected. No hemorrhage.     Left eye: Left conjunctiva is not injected. No hemorrhage.     Pupils: Pupils are equal, round, and reactive to light.   Neck:      Thyroid: No thyromegaly.      Vascular: No JVD.      Trachea: No tracheal deviation.   Cardiovascular:      Rate and Rhythm: Normal rate.      Heart sounds: Normal heart sounds.   Pulmonary:      Effort: Pulmonary effort is normal. No respiratory distress.      Breath sounds: No stridor.   Abdominal:      General: Bowel sounds are normal.      Palpations: Abdomen is soft. There is no hepatomegaly, splenomegaly or mass.      Tenderness: There is no abdominal tenderness.   Musculoskeletal:         General: No tenderness. Normal range of motion.      Cervical back: Normal range of motion and neck supple.   Lymphadenopathy:      Head:      Right side of head: No posterior auricular or occipital adenopathy.      Left side of head: No posterior auricular or occipital adenopathy.      Cervical: No cervical adenopathy.      Right cervical: No superficial, deep or posterior cervical adenopathy.     Left cervical: No superficial, deep or posterior cervical adenopathy.      Upper Body:      Right upper body: No supraclavicular adenopathy.      Left upper body: No supraclavicular adenopathy.   Skin:     General: Skin is dry.      Findings: No erythema or rash.      Nails: There is no clubbing.   Neurological:      Mental Status: He is alert and oriented to person, place, and time.      Cranial Nerves: No cranial nerve deficit.      Coordination: Coordination normal.   Psychiatric:         Behavior: Behavior normal.         Thought Content: Thought content normal.         Judgment: Judgment normal.             Assessment:       1. 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 History of liver cancer    3. History of thrombocytopenia    4. Immunosuppression    5. Immunodeficiency due to chemotherapy           Med Onc Chart Routing      Follow up with physician . Return to clinic in three-week CBC CMP LDH for cycle 6 day 1OP A + CHP - BRENTUXIMAB CYCLOPHOSPHAMIDE DOXORUBICIN PREDNISONE   Follow up with [facility]    Infusion scheduling note    Injection scheduling note Cycle 5 day 1OP A + CHP - BRENTUXIMAB CYCLOPHOSPHAMIDE DOXORUBICIN PREDNISONE   Labs    Imaging    Pharmacy appointment    Other referrals                   Plan:     Recent PET scan demonstrates dramatic response proceed with cycle 5 day 1 of treatment today return in three-week for cycle 6.  Discussed implications answered questions with them orders written reviewed        Timmy Peralta Jr, MD FACP         [1]   Social History  Socioeconomic History    Marital status:     Highest education level: 10th grade   Tobacco Use    Smoking status: Every Day     Current packs/day: 0.75     Average packs/day: 0.8 packs/day for 37.1 years (27.8 ttl pk-yrs)     Types: Cigarettes     Start date: 1988    Smokeless tobacco: Never    Tobacco comments:     Currently smokes a few (3-5) a day as of 6/12/24   Substance and Sexual Activity    Alcohol use: No     Alcohol/week: 0.0 standard drinks of alcohol     Comment: Quit alcohol after some alcohol abuse, prior to his liver transplant    Drug use: No    Sexual activity: Not Currently     Social Drivers of Health     Financial Resource Strain: Low Risk  (1/1/2025)    Overall Financial Resource Strain (CARDIA)     Difficulty of Paying Living Expenses: Not very hard   Food Insecurity: Patient Declined (1/1/2025)    Hunger Vital Sign     Worried About Running Out of Food in the Last Year: Patient declined     Ran Out of Food in the Last Year: Patient declined   Transportation Needs: Patient Declined  (11/24/2024)    TRANSPORTATION NEEDS     Transportation : Patient declined   Physical Activity: Inactive (1/1/2025)    Exercise Vital Sign     Days of Exercise per Week: 0 days     Minutes of Exercise per Session: 0 min   Stress: No Stress Concern Present (1/1/2025)    [employer] - Occupational Stress Questionnaire     Feeling of Stress : Only a little   Housing Stability: Patient Declined (1/1/2025)    Housing Stability Vital Sign     Unable to Pay for Housing in the Last Year: Patient declined     Homeless in the Last Year: Patient declined

## 2025-02-18 NOTE — PLAN OF CARE
Problem: Adult Inpatient Plan of Care  Goal: Plan of Care Review  Outcome: Progressing  Flowsheets (Taken 2/18/2025 0943)  Plan of Care Reviewed With: patient  Goal: Patient-Specific Goal (Individualized)  Outcome: Progressing  Flowsheets (Taken 2/18/2025 0943)  Individualized Care Needs: pillow, reclining position  Anxieties, Fears or Concerns: none verbalized     Problem: Infection  Goal: Absence of Infection Signs and Symptoms  Outcome: Progressing  Intervention: Prevent or Manage Infection  Flowsheets (Taken 2/18/2025 0943)  Infection Management: aseptic technique maintained

## 2025-02-18 NOTE — TELEPHONE ENCOUNTER
Pt's daughter had questions about how he needs to take prednisone. Went over the drug instructions and verified that pt has 5 refills at his [facility]. Ms Linda sung/brandan

## 2025-02-19 ENCOUNTER — INFUSION (OUTPATIENT)
Dept: INFUSION THERAPY | Facility: HOSPITAL | Age: 70
End: 2025-02-19
Attending: INTERNAL MEDICINE
Payer: MEDICARE

## 2025-02-19 VITALS
DIASTOLIC BLOOD PRESSURE: 73 MMHG | OXYGEN SATURATION: 98 % | RESPIRATION RATE: 18 BRPM | HEART RATE: 94 BPM | SYSTOLIC BLOOD PRESSURE: 122 MMHG | TEMPERATURE: 99 F

## 2025-02-19 DIAGNOSIS — C84.75 ANAPLASTIC ALK-NEGATIVE LARGE CELL LYMPHOMA OF LYMPH NODE OF LOWER EXTREMITY: Primary | ICD-10-CM

## 2025-02-19 PROCEDURE — 63600175 PHARM REV CODE 636 W HCPCS: Mod: JZ,TB | Performed by: INTERNAL MEDICINE

## 2025-02-19 PROCEDURE — 96372 THER/PROPH/DIAG INJ SC/IM: CPT

## 2025-02-19 RX ORDER — PREDNISONE 50 MG/1
100 TABLET ORAL DAILY
Qty: 8 TABLET | Refills: 5 | Status: SHIPPED | OUTPATIENT
Start: 2025-02-19

## 2025-02-19 RX ADMIN — PEGFILGRASTIM-JMDB 6 MG: 6 INJECTION SUBCUTANEOUS at 12:02

## 2025-02-20 NOTE — TELEPHONE ENCOUNTER
Called patient and spoke with his daughter Linda. Informed of stable labs. No medications changes. Labs to be repeated in April. Verbalized understanding. Offered time for questions.  Denies any questions.       ----- Message from Duc Odom MD sent at 2/18/2025  9:03 PM CST -----  Liver transplant blood tests reviewed.  Labs stable,   no change in immunosuppression.   Please continue to monitor liver tests per transplant protocol.  ----- Message -----  From: [email or web address]  Sent: 2/18/2025   8:12 AM CST  To: Duc Odom MD

## 2025-02-21 ENCOUNTER — DOCUMENTATION ONLY (OUTPATIENT)
Dept: HEMATOLOGY/ONCOLOGY | Facility: CLINIC | Age: 70
End: 2025-02-21
Payer: MEDICARE

## 2025-02-21 ENCOUNTER — TELEPHONE (OUTPATIENT)
Dept: HEMATOLOGY/ONCOLOGY | Facility: CLINIC | Age: 70
End: 2025-02-21
Payer: MEDICARE

## 2025-02-21 NOTE — PROGRESS NOTES
"  Contacted pt's daughter for 3 mo check in. Daughter states that the pt seems to be tolerating the infusions well. He  has some fatigue but is still able to function. Ms Mckeon will let ONN know if there is anything more we can do for the pt. No questions or concerns at this time. Call ended well.     Oncology Navigation   Intake  Cancer Type: Lymphoma  Type of Referral: Internal  Date of Referral: 24  First Appointment Available: 24  Appointment Date: 24  First Available Date vs. Scheduled Date (days): 0     Treatment  Current Status: Active       Medical Oncologist: Timmy Peralta MD  Consult Date: 24  Chemotherapy: Planned (start date 2024)  Chemotherapy Regimen: A + CHP - BRENTUXIMAB CYCLOPHOSPHAMIDE DOXORUBICIN PREDNISONE       Procedures: Echo  Echo Schedule Date: 24    General Referrals: Chemo Education; Palliative Care  Palliative Care Referral Date: 24          Support Systems: Family members  Barriers of Care: Barriers to Care "Assessment completed-no barriers noted"     Acuity  Systemic Treatment - predicted or initiated: Chemotherapy Regimen with Multiple drugs (+1)  Treatment Tolerability: Minimal symptoms  ECO  Comorbidities in Medical History: 2  Hospitalization Within the Past Month: 0   Needed: 0  Support: 1  Verbalizes Financial Concerns: 1  Transportation: 0  History of noncompliance/frequent no shows and cancellations: 0  Verbalizes the need for more education: 0  Navigation Acuity: 5     Follow Up  No follow-ups on file.         "

## 2025-03-07 ENCOUNTER — OFFICE VISIT (OUTPATIENT)
Dept: CARDIOLOGY | Facility: CLINIC | Age: 70
End: 2025-03-07
Payer: MEDICARE

## 2025-03-07 VITALS
HEART RATE: 60 BPM | SYSTOLIC BLOOD PRESSURE: 130 MMHG | HEIGHT: 69 IN | WEIGHT: 195.44 LBS | BODY MASS INDEX: 28.95 KG/M2 | OXYGEN SATURATION: 99 % | DIASTOLIC BLOOD PRESSURE: 60 MMHG

## 2025-03-07 DIAGNOSIS — Z72.0 TOBACCO ABUSE DISORDER: ICD-10-CM

## 2025-03-07 DIAGNOSIS — I82.722 CHRONIC DEEP VEIN THROMBOSIS (DVT) OF BRACHIAL VEIN OF LEFT UPPER EXTREMITY: ICD-10-CM

## 2025-03-07 DIAGNOSIS — J44.9 COPD SUGGESTED BY INITIAL EVALUATION: ICD-10-CM

## 2025-03-07 DIAGNOSIS — I27.20 PULMONARY HYPERTENSION: Primary | ICD-10-CM

## 2025-03-07 DIAGNOSIS — I25.118 ATHEROSCLEROSIS OF NATIVE CORONARY ARTERY OF NATIVE HEART WITH STABLE ANGINA PECTORIS: ICD-10-CM

## 2025-03-07 DIAGNOSIS — I10 ESSENTIAL HYPERTENSION: ICD-10-CM

## 2025-03-07 PROCEDURE — 99999 PR PBB SHADOW E&M-EST. PATIENT-LVL IV: CPT | Mod: PBBFAC,HCNC,, | Performed by: INTERNAL MEDICINE

## 2025-03-07 RX ORDER — METOPROLOL SUCCINATE 25 MG/1
25 TABLET, EXTENDED RELEASE ORAL DAILY
Qty: 30 TABLET | Refills: 11 | Status: SHIPPED | OUTPATIENT
Start: 2025-03-07

## 2025-03-07 NOTE — PROGRESS NOTES
Subjective:   Patient ID:  Lj Coleman is a 69 y.o. male who presents for follow-up of No chief complaint on file.  Last clinic note 11-24:  Pt is s/p hospitalization for (+) troponin due to demand ischemia with pancytopenia.  Echo is normal. EKG is normal  Pt with occasional CP/tightness. No SOB    Today:  NMT/stress no reversible defects  Patient denies CP, angina or anginal equivalent.  Chest Pain   This is a new problem. The current episode started more than 1 month ago. The onset quality is gradual. The problem occurs intermittently. The problem has been resolved. The pain is present in the substernal region. The pain is mild. The quality of the pain is described as tightness. The pain does not radiate. Pertinent negatives include no dizziness, palpitations, shortness of breath or sputum production. The pain is aggravated by nothing. He has tried rest for the symptoms. The treatment provided mild relief.   His past medical history is significant for hyperlipidemia and hypertension.   Pertinent negatives for past medical history include no muscle weakness.   Hyperlipidemia  This is a chronic problem. The current episode started more than 1 year ago. The problem is controlled. Pertinent negatives include no chest pain or shortness of breath. Current antihyperlipidemic treatment includes statins. The current treatment provides moderate improvement of lipids. There are no compliance problems.     Hypertension  This is a chronic problem. The current episode started more than 1 year ago. The problem has been gradually improving since onset. The problem is controlled. Pertinent negatives include no chest pain, palpitations or shortness of breath. Past treatments include beta blockers . The current treatment provides moderate improvement. There are no compliance problems.       Review of Systems   Constitutional: Negative. Negative for weight gain.   HENT: Negative.     Eyes: Negative.    Cardiovascular:  Negative.  Negative for chest pain, leg swelling and palpitations.   Respiratory: Negative.  Negative for shortness of breath and sputum production.    Endocrine: Negative.    Hematologic/Lymphatic: Negative.    Skin: Negative.    Musculoskeletal:  Negative for muscle weakness.   Gastrointestinal: Negative.    Genitourinary: Negative.    Neurological: Negative.  Negative for dizziness.   Psychiatric/Behavioral: Negative.     Allergic/Immunologic: Negative.    All other systems reviewed and are negative.    Family History   Problem Relation Name Age of Onset    Cancer Mother          lung    Cancer Father          bladder    Diabetes Maternal Uncle      Cancer Maternal Grandmother          uterine?    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    Anemia 11/23/2024    Angina pectoris     Arthritis     back    Atherosclerosis of native coronary artery without angina pectoris/ LAD 09/08/2016    Back pain     Chronic hepatitis C with cirrhosis     COPD (chronic obstructive pulmonary disease)     Depression     Hepatoma     Prior to liver transplant    History of colon polyps 09/09/2015    History of transplantation, liver 04/2012    History of vertebral fracture     Hypertension     Immune deficiency disorder     Incisional hernia following transplant 04/09/2014    Liver failure 11/2011    Related to chemotherapy for hepatoma    Liver transplanted 04/2012    NSTEMI (non-ST elevated myocardial infarction) 11/23/2024    Obesity     PVCs (premature ventricular contractions)     Renal dysfunction 11/23/2024    Tobacco abuse 09/09/2016    Trouble in sleeping     Vertebral fracture 1975     Social History[1]  Medications Ordered Prior to Encounter[2]  Review of patient's  allergies indicates:   Allergen Reactions    Codeine Other (See Comments)     Upset stomach    Doxorubicin Other (See Comments)     Hypotension, dizziness, itching hives relieved with benadryl. MD ok with rechallenging 1/25/25 with more premeds       Objective:     Physical Exam  Vitals and nursing note reviewed.   Constitutional:       Appearance: He is well-developed.   HENT:      Head: Normocephalic and atraumatic.   Eyes:      Conjunctiva/sclera: Conjunctivae normal.      Pupils: Pupils are equal, round, and reactive to light.   Cardiovascular:      Rate and Rhythm: Normal rate and regular rhythm.      Pulses: Intact distal pulses.      Heart sounds: Normal heart sounds.   Pulmonary:      Effort: Pulmonary effort is normal.      Breath sounds: Normal breath sounds.   Abdominal:      General: Bowel sounds are normal.      Palpations: Abdomen is soft.   Musculoskeletal:      Cervical back: Normal range of motion and neck supple.   Skin:     General: Skin is warm and dry.   Neurological:      Mental Status: He is alert and oriented to person, place, and time.         Assessment:     1. Pulmonary hypertension    2. Essential hypertension    3. Atherosclerosis of native coronary artery of native heart with stable angina pectoris    4. Chronic deep vein thrombosis (DVT) of brachial vein of left upper extremity    5. Tobacco abuse disorder    6. COPD suggested by initial evaluation        Plan:     Pulmonary hypertension    Essential hypertension    Atherosclerosis of native coronary artery of native heart with stable angina pectoris    Chronic deep vein thrombosis (DVT) of brachial vein of left upper extremity    Tobacco abuse disorder    COPD suggested by initial evaluation        Continue eliquis- LUE dvt  Continue statin-HLP  Continue toprol- HTN  exercise         [1]   Social History  Socioeconomic History    Marital status:     Highest education level: 10th grade   Tobacco Use    Smoking status: Every Day      Current packs/day: 0.75     Average packs/day: 0.8 packs/day for 37.2 years (27.9 ttl pk-yrs)     Types: Cigarettes     Start date: 1988    Smokeless tobacco: Never    Tobacco comments:     Currently smokes a few (3-5) a day as of 6/12/24   Substance and Sexual Activity    Alcohol use: No     Alcohol/week: 0.0 standard drinks of alcohol     Comment: Quit alcohol after some alcohol abuse, prior to his liver transplant    Drug use: No    Sexual activity: Not Currently     Social Drivers of Health     Financial Resource Strain: Low Risk  (1/1/2025)    Overall Financial Resource Strain (CARDIA)     Difficulty of Paying Living Expenses: Not very hard   Food Insecurity: Patient Declined (1/1/2025)    Hunger Vital Sign     Worried About Running Out of Food in the Last Year: Patient declined     Ran Out of Food in the Last Year: Patient declined   Transportation Needs: Patient Declined (11/24/2024)    TRANSPORTATION NEEDS     Transportation : Patient declined   Physical Activity: Inactive (1/1/2025)    Exercise Vital Sign     Days of Exercise per Week: 0 days     Minutes of Exercise per Session: 0 min   Stress: No Stress Concern Present (1/1/2025)    [employer] - Occupational Stress Questionnaire     Feeling of Stress : Only a little   Housing Stability: Patient Declined (1/1/2025)    Housing Stability Vital Sign     Unable to Pay for Housing in the Last Year: Patient declined     Homeless in the Last Year: Patient declined   [2]   Current Outpatient Medications on File Prior to Visit   Medication Sig Dispense Refill    acyclovir (ZOVIRAX) 400 MG tablet Take 1 tablet (400 mg total) by mouth 2 (two) times daily. While on chemotherapy 60 tablet 4    albuterol (VENTOLIN HFA) 90 mcg/actuation inhaler Inhale 2 puffs into the lungs every 6 (six) hours as needed for Wheezing or Shortness of Breath. Rescue 18 g 3    allopurinoL (ZYLOPRIM) 300 MG tablet TAKE 1 TABLET(300 MG) BY MOUTH DAILY 30 tablet  0    apixaban (ELIQUIS) 5 mg Tab Take 1 tablet (5 mg total) by mouth 2 (two) times daily. 180 tablet 1    aspirin 81 MG Chew Take 1 tablet (81 mg total) by mouth once daily. 30 tablet 0    atorvastatin (LIPITOR) 80 MG tablet Take 1 tablet (80 mg total) by mouth once daily. 30 tablet 0    diclofenac sodium (VOLTAREN) 1 % Gel Apply 2 g topically 4 (four) times daily. for 10 days 100 g 0    esomeprazole (NEXIUM) 40 MG capsule Take 1 capsule (40 mg total) by mouth once daily. 90 capsule 3    fluticasone-umeclidin-vilanter (TRELEGY ELLIPTA) 100-62.5-25 mcg DsDv Inhale 1 puff into the lungs once daily. 60 each 5    furosemide (LASIX) 20 MG tablet Take 1 tablet (20 mg total) by mouth once daily. 30 tablet 3    HYDROcodone-acetaminophen (NORCO) 5-325 mg per tablet Take 1 tablet by mouth every 6 (six) hours as needed for Pain. 12 tablet 0    LIDOcaine-prilocaine (EMLA) cream Apply topically as needed. 30 g 11    melatonin 10 mg Tab Take by mouth.      metoprolol succinate (TOPROL-XL) 25 MG 24 hr tablet TAKE 1 TABLET EVERY DAY 90 tablet 3    nicotine (NICODERM CQ) 21 mg/24 hr Place 1 patch onto the skin once daily. 28 patch 2    nitroGLYCERIN (NITROSTAT) 0.4 MG SL tablet Place 1 tablet (0.4 mg total) under the tongue every 5 (five) minutes as needed for Chest pain (angina). 25 tablet 0    ondansetron (ZOFRAN) 8 MG tablet Take 1 tablet (8 mg total) by mouth every 12 (twelve) hours as needed for Nausea. 20 tablet 0    predniSONE (DELTASONE) 50 MG Tab Take 2 tablets (100 mg total) by mouth once daily. on days 2,3,4, 5 of each chemotherapy cycle 8 tablet 5    prochlorperazine (COMPAZINE) 5 MG tablet Take 1 tablet (5 mg total) by mouth every 6 (six) hours as needed (nausea). 20 tablet 5    sirolimus (RAPAMUNE) 1 MG Tab Take 3 mg (3 tablets) on day one, then 1 mg (1 tablet) every day thereafter. 33 tablet 11    sulfamethoxazole-trimethoprim 800-160mg (BACTRIM DS) 800-160 mg Tab Take 1 tablet by mouth 3 (three) times a week. 12  tablet 4    tamsulosin (FLOMAX) 0.4 mg Cap Take 1 capsule (0.4 mg total) by mouth once daily. 30 capsule 2    tenofovir alafenamide (VEMLIDY) 25 mg Tab Take 1 tablet by mouth once daily 30 tablet 11    tenofovir alafenamide (VEMLIDY) 25 mg Tab Take 1 tablet (25 mg total) by mouth Daily. 30 tablet 11     Current Facility-Administered Medications on File Prior to Visit   Medication Dose Route Frequency Provider Last Rate Last Admin    lactated ringers infusion   Intravenous Continuous Linwood Ellsworth MD   New Bag at 01/20/21 0719    sodium chloride 0.9% flush 2 mL  2 mL Intravenous PRN Linwood Ellsworth MD

## 2025-03-10 DIAGNOSIS — C85.80 LARGE CELL LYMPHOMA: ICD-10-CM

## 2025-03-10 DIAGNOSIS — C84.75 ANAPLASTIC ALK-NEGATIVE LARGE CELL LYMPHOMA OF LYMPH NODE OF LOWER EXTREMITY: ICD-10-CM

## 2025-03-10 RX ORDER — ALLOPURINOL 300 MG/1
TABLET ORAL
Qty: 30 TABLET | Refills: 0 | Status: SHIPPED | OUTPATIENT
Start: 2025-03-10

## 2025-03-11 ENCOUNTER — LAB VISIT (OUTPATIENT)
Dept: LAB | Facility: HOSPITAL | Age: 70
End: 2025-03-11
Attending: INTERNAL MEDICINE
Payer: MEDICARE

## 2025-03-11 ENCOUNTER — PATIENT MESSAGE (OUTPATIENT)
Dept: CARDIOLOGY | Facility: HOSPITAL | Age: 70
End: 2025-03-11
Payer: MEDICARE

## 2025-03-11 ENCOUNTER — INFUSION (OUTPATIENT)
Dept: INFUSION THERAPY | Facility: HOSPITAL | Age: 70
End: 2025-03-11
Attending: INTERNAL MEDICINE
Payer: MEDICARE

## 2025-03-11 ENCOUNTER — OFFICE VISIT (OUTPATIENT)
Dept: HEMATOLOGY/ONCOLOGY | Facility: CLINIC | Age: 70
End: 2025-03-11
Payer: MEDICARE

## 2025-03-11 VITALS
OXYGEN SATURATION: 97 % | TEMPERATURE: 97 F | HEART RATE: 47 BPM | DIASTOLIC BLOOD PRESSURE: 50 MMHG | BODY MASS INDEX: 28.57 KG/M2 | WEIGHT: 192.88 LBS | HEIGHT: 69 IN | SYSTOLIC BLOOD PRESSURE: 99 MMHG | RESPIRATION RATE: 17 BRPM

## 2025-03-11 DIAGNOSIS — T45.1X5A IMMUNODEFICIENCY DUE TO CHEMOTHERAPY: ICD-10-CM

## 2025-03-11 DIAGNOSIS — C84.75 ANAPLASTIC ALK-NEGATIVE LARGE CELL LYMPHOMA OF LYMPH NODE OF LOWER EXTREMITY: Primary | ICD-10-CM

## 2025-03-11 DIAGNOSIS — Z85.05 HISTORY OF LIVER CANCER: ICD-10-CM

## 2025-03-11 DIAGNOSIS — Z79.899 IMMUNODEFICIENCY DUE TO CHEMOTHERAPY: ICD-10-CM

## 2025-03-11 DIAGNOSIS — D84.821 IMMUNODEFICIENCY DUE TO CHEMOTHERAPY: ICD-10-CM

## 2025-03-11 DIAGNOSIS — Z86.2 HISTORY OF THROMBOCYTOPENIA: ICD-10-CM

## 2025-03-11 DIAGNOSIS — C84.75 ANAPLASTIC ALK-NEGATIVE LARGE CELL LYMPHOMA OF LYMPH NODE OF LOWER EXTREMITY: ICD-10-CM

## 2025-03-11 DIAGNOSIS — Z94.4 HISTORY OF LIVER TRANSPLANT: ICD-10-CM

## 2025-03-11 DIAGNOSIS — Z94.4 LIVER TRANSPLANTED: ICD-10-CM

## 2025-03-11 DIAGNOSIS — C85.80 LARGE CELL LYMPHOMA: ICD-10-CM

## 2025-03-11 LAB
ALBUMIN SERPL BCP-MCNC: 3.4 G/DL (ref 3.5–5.2)
ALP SERPL-CCNC: 94 U/L (ref 40–150)
ALT SERPL W/O P-5'-P-CCNC: 13 U/L (ref 10–44)
ANION GAP SERPL CALC-SCNC: 8 MMOL/L (ref 8–16)
AST SERPL-CCNC: 17 U/L (ref 10–40)
BASOPHILS # BLD AUTO: 0.05 K/UL (ref 0–0.2)
BASOPHILS NFR BLD: 0.8 % (ref 0–1.9)
BILIRUB SERPL-MCNC: 0.3 MG/DL (ref 0.1–1)
BUN SERPL-MCNC: 23 MG/DL (ref 8–23)
CALCIUM SERPL-MCNC: 9.2 MG/DL (ref 8.7–10.5)
CHLORIDE SERPL-SCNC: 111 MMOL/L (ref 95–110)
CO2 SERPL-SCNC: 23 MMOL/L (ref 23–29)
CREAT SERPL-MCNC: 1.3 MG/DL (ref 0.5–1.4)
DIFFERENTIAL METHOD BLD: ABNORMAL
EOSINOPHIL # BLD AUTO: 0.1 K/UL (ref 0–0.5)
EOSINOPHIL NFR BLD: 0.8 % (ref 0–8)
ERYTHROCYTE [DISTWIDTH] IN BLOOD BY AUTOMATED COUNT: 17.2 % (ref 11.5–14.5)
EST. GFR  (NO RACE VARIABLE): 59 ML/MIN/1.73 M^2
GLUCOSE SERPL-MCNC: 98 MG/DL (ref 70–110)
HCT VFR BLD AUTO: 31.7 % (ref 40–54)
HGB BLD-MCNC: 9.7 G/DL (ref 14–18)
IMM GRANULOCYTES # BLD AUTO: 0.04 K/UL (ref 0–0.04)
IMM GRANULOCYTES NFR BLD AUTO: 0.7 % (ref 0–0.5)
LDH SERPL L TO P-CCNC: 212 U/L (ref 110–260)
LYMPHOCYTES # BLD AUTO: 0.4 K/UL (ref 1–4.8)
LYMPHOCYTES NFR BLD: 6.8 % (ref 18–48)
MCH RBC QN AUTO: 32.4 PG (ref 27–31)
MCHC RBC AUTO-ENTMCNC: 30.6 G/DL (ref 32–36)
MCV RBC AUTO: 106 FL (ref 82–98)
MONOCYTES # BLD AUTO: 0.7 K/UL (ref 0.3–1)
MONOCYTES NFR BLD: 11.2 % (ref 4–15)
NEUTROPHILS # BLD AUTO: 4.8 K/UL (ref 1.8–7.7)
NEUTROPHILS NFR BLD: 79.7 % (ref 38–73)
NRBC BLD-RTO: 0 /100 WBC
PLATELET # BLD AUTO: 137 K/UL (ref 150–450)
PMV BLD AUTO: 11.7 FL (ref 9.2–12.9)
POTASSIUM SERPL-SCNC: 4.1 MMOL/L (ref 3.5–5.1)
PROT SERPL-MCNC: 6.5 G/DL (ref 6–8.4)
RBC # BLD AUTO: 2.99 M/UL (ref 4.6–6.2)
SODIUM SERPL-SCNC: 142 MMOL/L (ref 136–145)
WBC # BLD AUTO: 6.05 K/UL (ref 3.9–12.7)

## 2025-03-11 PROCEDURE — 80053 COMPREHEN METABOLIC PANEL: CPT | Mod: HCNC | Performed by: INTERNAL MEDICINE

## 2025-03-11 PROCEDURE — 96413 CHEMO IV INFUSION 1 HR: CPT | Mod: HCNC

## 2025-03-11 PROCEDURE — 98006 SYNCH AUDIO-VIDEO EST MOD 30: CPT | Mod: 25,HCNC,95, | Performed by: INTERNAL MEDICINE

## 2025-03-11 PROCEDURE — 96411 CHEMO IV PUSH ADDL DRUG: CPT | Mod: HCNC

## 2025-03-11 PROCEDURE — 96367 TX/PROPH/DG ADDL SEQ IV INF: CPT | Mod: HCNC

## 2025-03-11 PROCEDURE — 96375 TX/PRO/DX INJ NEW DRUG ADDON: CPT | Mod: HCNC

## 2025-03-11 PROCEDURE — 96417 CHEMO IV INFUS EACH ADDL SEQ: CPT | Mod: HCNC

## 2025-03-11 PROCEDURE — 36415 COLL VENOUS BLD VENIPUNCTURE: CPT | Mod: HCNC | Performed by: INTERNAL MEDICINE

## 2025-03-11 PROCEDURE — 83615 LACTATE (LD) (LDH) ENZYME: CPT | Mod: HCNC | Performed by: INTERNAL MEDICINE

## 2025-03-11 PROCEDURE — 1160F RVW MEDS BY RX/DR IN RCRD: CPT | Mod: HCNC,CPTII,95, | Performed by: INTERNAL MEDICINE

## 2025-03-11 PROCEDURE — 1159F MED LIST DOCD IN RCRD: CPT | Mod: HCNC,CPTII,95, | Performed by: INTERNAL MEDICINE

## 2025-03-11 PROCEDURE — 25000003 PHARM REV CODE 250: Mod: HCNC | Performed by: INTERNAL MEDICINE

## 2025-03-11 PROCEDURE — 63600175 PHARM REV CODE 636 W HCPCS: Mod: HCNC | Performed by: INTERNAL MEDICINE

## 2025-03-11 PROCEDURE — 85025 COMPLETE CBC W/AUTO DIFF WBC: CPT | Mod: HCNC | Performed by: INTERNAL MEDICINE

## 2025-03-11 RX ORDER — DOXORUBICIN HYDROCHLORIDE 2 MG/ML
50 INJECTION, SOLUTION INTRAVENOUS
Status: COMPLETED | OUTPATIENT
Start: 2025-03-11 | End: 2025-03-11

## 2025-03-11 RX ORDER — HEPARIN 100 UNIT/ML
500 SYRINGE INTRAVENOUS
Status: CANCELLED | OUTPATIENT
Start: 2025-03-11

## 2025-03-11 RX ORDER — DIPHENHYDRAMINE HYDROCHLORIDE 50 MG/ML
50 INJECTION, SOLUTION INTRAMUSCULAR; INTRAVENOUS ONCE AS NEEDED
Status: CANCELLED | OUTPATIENT
Start: 2025-03-11

## 2025-03-11 RX ORDER — DIPHENHYDRAMINE HYDROCHLORIDE 50 MG/ML
50 INJECTION, SOLUTION INTRAMUSCULAR; INTRAVENOUS ONCE AS NEEDED
Status: DISCONTINUED | OUTPATIENT
Start: 2025-03-11 | End: 2025-03-11 | Stop reason: HOSPADM

## 2025-03-11 RX ORDER — EPINEPHRINE 0.3 MG/.3ML
0.3 INJECTION SUBCUTANEOUS ONCE AS NEEDED
Status: DISCONTINUED | OUTPATIENT
Start: 2025-03-11 | End: 2025-03-11 | Stop reason: HOSPADM

## 2025-03-11 RX ORDER — SODIUM CHLORIDE 0.9 % (FLUSH) 0.9 %
10 SYRINGE (ML) INJECTION
Status: CANCELLED | OUTPATIENT
Start: 2025-03-11

## 2025-03-11 RX ORDER — FAMOTIDINE 10 MG/ML
20 INJECTION, SOLUTION INTRAVENOUS
Status: CANCELLED
Start: 2025-03-11

## 2025-03-11 RX ORDER — HEPARIN 100 UNIT/ML
500 SYRINGE INTRAVENOUS
Status: DISCONTINUED | OUTPATIENT
Start: 2025-03-11 | End: 2025-03-11 | Stop reason: HOSPADM

## 2025-03-11 RX ORDER — FAMOTIDINE 10 MG/ML
20 INJECTION, SOLUTION INTRAVENOUS
Status: COMPLETED | OUTPATIENT
Start: 2025-03-11 | End: 2025-03-11

## 2025-03-11 RX ORDER — DOXORUBICIN HYDROCHLORIDE 2 MG/ML
50 INJECTION, SOLUTION INTRAVENOUS
Status: CANCELLED | OUTPATIENT
Start: 2025-03-11

## 2025-03-11 RX ORDER — DIPHENHYDRAMINE HYDROCHLORIDE 50 MG/ML
12.5 INJECTION, SOLUTION INTRAMUSCULAR; INTRAVENOUS
Status: CANCELLED
Start: 2025-03-11

## 2025-03-11 RX ORDER — ACYCLOVIR 400 MG/1
400 TABLET ORAL 2 TIMES DAILY
Qty: 60 TABLET | Refills: 4 | Status: SHIPPED | OUTPATIENT
Start: 2025-03-11

## 2025-03-11 RX ORDER — SODIUM CHLORIDE 0.9 % (FLUSH) 0.9 %
10 SYRINGE (ML) INJECTION
Status: DISCONTINUED | OUTPATIENT
Start: 2025-03-11 | End: 2025-03-11 | Stop reason: HOSPADM

## 2025-03-11 RX ORDER — EPINEPHRINE 0.3 MG/.3ML
0.3 INJECTION SUBCUTANEOUS ONCE AS NEEDED
Status: CANCELLED | OUTPATIENT
Start: 2025-03-11

## 2025-03-11 RX ORDER — DIPHENHYDRAMINE HYDROCHLORIDE 50 MG/ML
12.5 INJECTION, SOLUTION INTRAMUSCULAR; INTRAVENOUS
Status: COMPLETED | OUTPATIENT
Start: 2025-03-11 | End: 2025-03-11

## 2025-03-11 RX ADMIN — CYCLOPHOSPHAMIDE 1500 MG: 200 INJECTION, SOLUTION INTRAVENOUS at 10:03

## 2025-03-11 RX ADMIN — FAMOTIDINE 20 MG: 10 INJECTION, SOLUTION INTRAVENOUS at 09:03

## 2025-03-11 RX ADMIN — BRENTUXIMAB VEDOTIN 150 MG: 50 INJECTION, POWDER, LYOPHILIZED, FOR SOLUTION INTRAVENOUS at 01:03

## 2025-03-11 RX ADMIN — DEXAMETHASONE SODIUM PHOSPHATE 0.25 MG: 4 INJECTION, SOLUTION INTRA-ARTICULAR; INTRALESIONAL; INTRAMUSCULAR; INTRAVENOUS; SOFT TISSUE at 09:03

## 2025-03-11 RX ADMIN — DOXORUBICIN HYDROCHLORIDE 104 MG: 2 INJECTION, SOLUTION INTRAVENOUS at 09:03

## 2025-03-11 RX ADMIN — DIPHENHYDRAMINE HYDROCHLORIDE 12.5 MG: 50 INJECTION INTRAMUSCULAR; INTRAVENOUS at 09:03

## 2025-03-11 RX ADMIN — HEPARIN SODIUM (PORCINE) LOCK FLUSH IV SOLN 100 UNIT/ML 500 UNITS: 100 SOLUTION at 01:03

## 2025-03-11 NOTE — PLAN OF CARE
Problem: Adult Inpatient Plan of Care  Goal: Plan of Care Review  Outcome: Progressing  Flowsheets (Taken 3/11/2025 0918)  Plan of Care Reviewed With: patient  Goal: Patient-Specific Goal (Individualized)  Outcome: Progressing  Flowsheets (Taken 3/11/2025 0918)  Individualized Care Needs: feet elevated, pillow warm blanket and snack provided  Anxieties, Fears or Concerns: denies     Problem: Chemotherapy Effects  Goal: Anemia Symptom Improvement  Outcome: Progressing  Intervention: Monitor and Manage Anemia  Flowsheets (Taken 3/11/2025 0918)  Safety Promotion/Fall Prevention:   assistive device/personal item within reach   Fall Risk reviewed with patient/family   in recliner, wheels locked   medications reviewed   patient expresses understanding of fall risk and prevention   instructed to call staff for mobility  Fatigue Management: frequent rest breaks encouraged  Goal: Safety Maintained  Outcome: Progressing  Intervention: Promote Safe Chemotherapy Delivery  Flowsheets (Taken 3/11/2025 0918)  Infection Prevention:   environmental surveillance performed   equipment surfaces disinfected   hand hygiene promoted   personal protective equipment utilized  Chemotherapy Environmental Safety:   chemotherapy waste containers in room   meticulous hand hygiene promoted   patient environment monitored for safety   personal protective equipment utilized  Goal: Absence of Hematuria  Outcome: Progressing  Intervention: Prevent or Manage Hemorrhagic Cystitis  Flowsheets (Taken 3/11/2025 0918)  Pain Management Interventions:   position adjusted   quiet environment facilitated   relaxation techniques promoted   warm blanket provided  Goal: Nausea and Vomiting Relief  Outcome: Progressing  Intervention: Prevent or Manage Nausea and Vomiting  Flowsheets (Taken 3/11/2025 0918)  Nausea/Vomiting Interventions: (premedicated) other (see comments)  Environmental Support: calm environment promoted  Goal: Neurotoxicity Symptom  Control  Outcome: Progressing  Intervention: Prevent or Manage Neurotoxicity Effects  Flowsheets (Taken 3/11/2025 0918)  Sensation Impairment Protection: insensate areas closely monitored  Goal: Absence of Infection  Outcome: Progressing  Intervention: Prevent Infection and Maximize Resistance  Flowsheets (Taken 3/11/2025 0918)  Infection Prevention:   environmental surveillance performed   equipment surfaces disinfected   hand hygiene promoted   personal protective equipment utilized  Goal: Absence of Bleeding  Outcome: Progressing  Intervention: Minimize Bleeding Risk  Flowsheets (Taken 3/11/2025 0918)  Bleeding Precautions:   blood pressure closely monitored   monitored for signs of bleeding     Problem: Fall Injury Risk  Goal: Absence of Fall and Fall-Related Injury  Outcome: Progressing  Intervention: Identify and Manage Contributors  Flowsheets (Taken 3/11/2025 0918)  Self-Care Promotion:   independence encouraged   BADL personal objects within reach   BADL personal routines maintained   adaptive equipment use encouraged  Medication Review/Management: medications reviewed  Intervention: Promote Injury-Free Environment  Flowsheets (Taken 3/11/2025 0918)  Safety Promotion/Fall Prevention:   assistive device/personal item within reach   Fall Risk reviewed with patient/family   in recliner, wheels locked   medications reviewed   patient expresses understanding of fall risk and prevention   instructed to call staff for mobility

## 2025-03-11 NOTE — PROGRESS NOTES
Subjective:       Patient ID: Lj Coleman is a 69 y.o. male.    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    Anemia 11/23/2024    Angina pectoris     Arthritis     back    Atherosclerosis of native coronary artery without angina pectoris/ LAD 09/08/2016    Back pain     Chronic hepatitis C with cirrhosis     COPD (chronic obstructive pulmonary disease)     Depression     Hepatoma     Prior to liver transplant    History of colon polyps 09/09/2015    History of transplantation, liver 04/2012    History of vertebral fracture     Hypertension     Immune deficiency disorder     Incisional hernia following transplant 04/09/2014    Liver failure 11/2011    Related to chemotherapy for hepatoma    Liver transplanted 04/2012    NSTEMI (non-ST elevated myocardial infarction) 11/23/2024    Obesity     PVCs (premature ventricular contractions)     Renal dysfunction 11/23/2024    Tobacco abuse 09/09/2016    Trouble in sleeping     Vertebral fracture 1975     Family History   Problem Relation Name Age of Onset    Cancer Mother          lung    Cancer Father          bladder    Diabetes Maternal Uncle      Cancer Maternal Grandmother          uterine?    Cancer Other      Heart disease Neg Hx      Kidney disease Neg Hx      Stroke Neg Hx       Social History[1]  Past Surgical History:   Procedure Laterality Date    COLONOSCOPY N/A 1/20/2021    Procedure: COLONOSCOPY;  Surgeon: Emerson Helm MD;  Location: [facility];  Service: Endoscopy;  Laterality: N/A;    HERNIA REPAIR  "Right 2013    incisional    INSERTION OF TUNNELED CENTRAL VENOUS CATHETER (CVC) WITH SUBCUTANEOUS PORT Left 11/6/2024    Procedure: [id number];  Surgeon: Hao Washburn MD;  Location: [facility] OR;  Service: General;  Laterality: Left;    LIVER TRANSPLANT  April 2012    LYMPH NODE BIOPSY/EXCISION Left 10/16/2024    Procedure: LYMPH NODE BIOPSY/EXCISION;  Surgeon: Hao Washburn MD;  Location: [facility] OR;  Service: General;  Laterality: Left;    MOUTH SURGERY      TONSILLECTOMY  1958       Labs:  Lab Results   Component Value Date    WBC 6.05 03/11/2025    HGB 9.7 (L) 03/11/2025    HCT 31.7 (L) 03/11/2025     (H) 03/11/2025     (L) 03/11/2025     BMP  Lab Results   Component Value Date     03/11/2025    K 4.1 03/11/2025     (H) 03/11/2025    CO2 23 03/11/2025    BUN 23 03/11/2025    CREATININE 1.3 03/11/2025    CALCIUM 9.2 03/11/2025    ANIONGAP 8 03/11/2025    ESTGFRAFRICA >60.0 06/08/2022    EGFRNONAA >60.0 06/08/2022     Lab Results   Component Value Date    ALT 13 03/11/2025    AST 17 03/11/2025    GGT 32 09/26/2016    ALKPHOS 94 03/11/2025    BILITOT 0.3 03/11/2025       Lab Results   Component Value Date    IRON 49 01/24/2025    TIBC 269 01/24/2025    FERRITIN 593 (H) 01/24/2025     No results found for: "[id number]"  No results found for: "FOLATE"  Lab Results   Component Value Date    TSH 1.168 06/06/2023         Review of Systems   Constitutional:  Negative for activity change, appetite change, chills, diaphoresis, fatigue, fever and unexpected weight change.   HENT:  Negative for congestion, dental problem, drooling, ear discharge, ear pain, facial swelling, hearing loss, mouth sores, nosebleeds, postnasal drip, rhinorrhea, sinus pressure, sneezing, sore throat, tinnitus, trouble swallowing and voice change.    Eyes:  Negative for photophobia, pain, discharge, redness, itching and visual disturbance.   Respiratory:  Negative for apnea, cough, choking, chest tightness, " shortness of breath, wheezing and stridor.    Cardiovascular:  Negative for chest pain, palpitations and leg swelling.   Gastrointestinal:  Negative for abdominal distention, abdominal pain, anal bleeding, blood in stool, constipation, diarrhea, nausea, rectal pain and vomiting.   Endocrine: Negative for cold intolerance, heat intolerance, polydipsia, polyphagia and polyuria.   Genitourinary:  Negative for decreased urine volume, difficulty urinating, dysuria, enuresis, flank pain, frequency, genital sores, hematuria, penile discharge, penile pain, penile swelling, scrotal swelling, testicular pain and urgency.   Musculoskeletal:  Negative for arthralgias, back pain, gait problem, joint swelling, myalgias, neck pain and neck stiffness.   Skin:  Negative for color change, pallor, rash and wound.   Allergic/Immunologic: Negative for environmental allergies, food allergies and immunocompromised state.   Neurological:  Negative for dizziness, tremors, seizures, syncope, facial asymmetry, speech difficulty, weakness, light-headedness, numbness and headaches.   Hematological:  Negative for adenopathy. Does not bruise/bleed easily.   Psychiatric/Behavioral:  Negative for agitation, behavioral problems, confusion, decreased concentration, dysphoric mood, hallucinations, self-injury, sleep disturbance and suicidal ideas. The patient is not nervous/anxious and is not hyperactive.        Objective:      Physical Exam  Constitutional:       Appearance: Normal appearance.   Neurological:      Mental Status: He is alert.             Assessment:      1. 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 History of liver cancer    3. History of thrombocytopenia    4. Immunodeficiency due to chemotherapy    5. History of liver transplant           Med Onc Chart Routing      Follow up with physician . Return to clinic in 1 month CBC CMP LDH C-reactive protein and PET scan prior   Follow up with [facility]     Infusion scheduling note    Injection scheduling note Proceed proceed with cycle 6 day 1 today   Labs    Imaging    Pharmacy appointment    Other referrals                   Plan:     The patient [facility]  The chief complaint leading to consultation is:  Lymphoma    Visit type: audiovisual    Face to Face time with patient: 30 minutes of total time spent on the encounter, which includes face to face time and non-face to face time preparing to see the patient (eg, review of tests), Obtaining and/or reviewing separately obtained history, Documenting clinical information in the electronic or other health record, Independently interpreting results (not separately reported) and communicating results to the patient/family/caregiver, or Care coordination (not separately reported).         Each patient to whom he or she provides medical services by telemedicine is:  (1) informed of the relationship between the physician and patient and the respective role of any other health care provider with respect to management of the patient; and (2) notified that he or she may decline to receive medical services by telemedicine and may withdraw from such care at any time.    Notes:   Patient is doing remarkably well feels good patient has excellent clinical response.  Laboratory studies stable recent stress echo demonstrates normal ejection fraction will have cycle 6 day 1 today return in three-week before weeks with repeat labs and PET scan prior for response to therapy      Timmy Peralta Jr, MD FACP         [1]   Social History  Socioeconomic History    Marital status:     Highest education level: 10th grade   Tobacco Use    Smoking status: Every Day     Current packs/day: 0.75     Average packs/day: 0.8 packs/day for 37.2 years (27.9 ttl pk-yrs)     Types: Cigarettes     Start date: 1988    Smokeless tobacco: Never    Tobacco comments:     Currently smokes a few (3-5) a day as of 6/12/24   Substance and  Sexual Activity    Alcohol use: No     Alcohol/week: 0.0 standard drinks of alcohol     Comment: Quit alcohol after some alcohol abuse, prior to his liver transplant    Drug use: No    Sexual activity: Not Currently     Social Drivers of Health     Financial Resource Strain: Low Risk  (1/1/2025)    Overall Financial Resource Strain (CARDIA)     Difficulty of Paying Living Expenses: Not very hard   Food Insecurity: Patient Declined (1/1/2025)    Hunger Vital Sign     Worried About Running Out of Food in the Last Year: Patient declined     Ran Out of Food in the Last Year: Patient declined   Transportation Needs: Patient Declined (11/24/2024)    TRANSPORTATION NEEDS     Transportation : Patient declined   Physical Activity: Inactive (1/1/2025)    Exercise Vital Sign     Days of Exercise per Week: 0 days     Minutes of Exercise per Session: 0 min   Stress: No Stress Concern Present (1/1/2025)    [employer] [place] [employer] - Occupational Stress Questionnaire     Feeling of Stress : Only a little   Housing Stability: Patient Declined (1/1/2025)    Housing Stability Vital Sign     Unable to Pay for Housing in the Last Year: Patient declined     Homeless in the Last Year: Patient declined

## 2025-03-11 NOTE — NURSING
"As I was walking patient out to his truck patient stated "between me and you my daughter isn't taking care of me or feeding me properly". As mandated, a form was submitted through [employer]. Patient did not feel that his life was in immediate danger and he did not feel unsafe in his home environment.   " Hub can read and reschedule:    Called patient to reschedule 11/13 appointment as provider will not be in the office that day.
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
## 2025-03-12 ENCOUNTER — INFUSION (OUTPATIENT)
Dept: INFUSION THERAPY | Facility: HOSPITAL | Age: 70
End: 2025-03-12
Attending: INTERNAL MEDICINE
Payer: MEDICARE

## 2025-03-12 ENCOUNTER — TELEPHONE (OUTPATIENT)
Dept: HEMATOLOGY/ONCOLOGY | Facility: CLINIC | Age: 70
End: 2025-03-12
Payer: MEDICARE

## 2025-03-12 VITALS
HEART RATE: 94 BPM | SYSTOLIC BLOOD PRESSURE: 123 MMHG | DIASTOLIC BLOOD PRESSURE: 67 MMHG | TEMPERATURE: 99 F | RESPIRATION RATE: 18 BRPM

## 2025-03-12 DIAGNOSIS — C84.75 ANAPLASTIC ALK-NEGATIVE LARGE CELL LYMPHOMA OF LYMPH NODE OF LOWER EXTREMITY: Primary | ICD-10-CM

## 2025-03-12 PROCEDURE — 63600175 PHARM REV CODE 636 W HCPCS: Mod: JZ,TB,HCNC | Performed by: INTERNAL MEDICINE

## 2025-03-12 PROCEDURE — 96372 THER/PROPH/DIAG INJ SC/IM: CPT | Mod: HCNC

## 2025-03-12 RX ADMIN — PEGFILGRASTIM-JMDB 6 MG: 6 INJECTION SUBCUTANEOUS at 01:03

## 2025-03-12 NOTE — TELEPHONE ENCOUNTER
----- Message from Timmy Peralta MD sent at 3/12/2025  9:39 AM CDT -----  Contact: Linda VENCES CMP  ----- Message -----  From: Kaylyn Chong MA  Sent: 3/12/2025   9:32 AM CDT  To: Timmy Peralta MD    Patient states he did not finish treatment on yesterday and was told he needed to come back in today to get labs drawn. Which labs would you like patient to get drawn today?  ----- Message -----  From: Balbina Wilson  Sent: 3/12/2025   9:05 AM CDT  To: Fabian KAUR Staff    .Type:  Patient Requesting CallWho Called:Nelson the patient know what this is regarding?:Patients daughter called in requesting a call back in regards to patient getting sick during his infusion yesterday and wanting to know if blood work needs to be redone.Would the patient rather a call back or a response via [facility]? Call [facility] Call Back Number:.[phone] Information:

## 2025-03-12 NOTE — DISCHARGE INSTRUCTIONS
[facility]  [address] [facility]  [address]  [phone] phone     [phone] fax  Hours of Operation: Monday- Friday 8:00am- 5:00pm  After hours phone  [phone]  Hematology / Oncology Physicians on call      ANDRIA Pineda [address] NP Phaon Dunbar, NP Khelsea Conley, FNP    Please call with any concerns regarding your appointment today.

## 2025-03-13 ENCOUNTER — TELEPHONE (OUTPATIENT)
Dept: HEMATOLOGY/ONCOLOGY | Facility: CLINIC | Age: 70
End: 2025-03-13
Payer: MEDICARE

## 2025-03-13 NOTE — TELEPHONE ENCOUNTER
F/u call to pt. Pt confirmed that he received his [employer] insurance card and his coverage and medications are good. Pt stated that everything is ok and he does not have any concerns at this time. Pt confirmed that he has SW contact info saved in his phone if needs arise. SW will remain available.

## 2025-03-18 ENCOUNTER — TELEPHONE (OUTPATIENT)
Dept: HEMATOLOGY/ONCOLOGY | Facility: CLINIC | Age: 70
End: 2025-03-18
Payer: MEDICARE

## 2025-03-18 ENCOUNTER — OFFICE VISIT (OUTPATIENT)
Dept: HEMATOLOGY/ONCOLOGY | Facility: CLINIC | Age: 70
End: 2025-03-18
Payer: MEDICARE

## 2025-03-18 VITALS
HEART RATE: 85 BPM | DIASTOLIC BLOOD PRESSURE: 76 MMHG | TEMPERATURE: 98 F | OXYGEN SATURATION: 100 % | HEIGHT: 69 IN | RESPIRATION RATE: 18 BRPM | SYSTOLIC BLOOD PRESSURE: 147 MMHG | WEIGHT: 193.81 LBS | BODY MASS INDEX: 28.71 KG/M2

## 2025-03-18 DIAGNOSIS — C84.78 ANAPLASTIC ALK-NEGATIVE LARGE CELL LYMPHOMA OF LYMPH NODES OF MULTIPLE REGIONS: Primary | ICD-10-CM

## 2025-03-18 PROCEDURE — 1160F RVW MEDS BY RX/DR IN RCRD: CPT | Mod: HCNC,CPTII,S$GLB, | Performed by: INTERNAL MEDICINE

## 2025-03-18 PROCEDURE — 99215 OFFICE O/P EST HI 40 MIN: CPT | Mod: HCNC,S$GLB,, | Performed by: INTERNAL MEDICINE

## 2025-03-18 PROCEDURE — 1125F AMNT PAIN NOTED PAIN PRSNT: CPT | Mod: HCNC,CPTII,S$GLB, | Performed by: INTERNAL MEDICINE

## 2025-03-18 PROCEDURE — 3078F DIAST BP <80 MM HG: CPT | Mod: HCNC,CPTII,S$GLB, | Performed by: INTERNAL MEDICINE

## 2025-03-18 PROCEDURE — 3288F FALL RISK ASSESSMENT DOCD: CPT | Mod: HCNC,CPTII,S$GLB, | Performed by: INTERNAL MEDICINE

## 2025-03-18 PROCEDURE — 3008F BODY MASS INDEX DOCD: CPT | Mod: HCNC,CPTII,S$GLB, | Performed by: INTERNAL MEDICINE

## 2025-03-18 PROCEDURE — 3077F SYST BP >= 140 MM HG: CPT | Mod: HCNC,CPTII,S$GLB, | Performed by: INTERNAL MEDICINE

## 2025-03-18 PROCEDURE — 1101F PT FALLS ASSESS-DOCD LE1/YR: CPT | Mod: HCNC,CPTII,S$GLB, | Performed by: INTERNAL MEDICINE

## 2025-03-18 PROCEDURE — 99999 PR PBB SHADOW E&M-EST. PATIENT-LVL V: CPT | Mod: PBBFAC,HCNC,, | Performed by: INTERNAL MEDICINE

## 2025-03-18 PROCEDURE — 1159F MED LIST DOCD IN RCRD: CPT | Mod: HCNC,CPTII,S$GLB, | Performed by: INTERNAL MEDICINE

## 2025-03-18 RX ORDER — TACROLIMUS 0.5 MG/1
CAPSULE ORAL
COMMUNITY
Start: 2025-03-03

## 2025-03-18 NOTE — TELEPHONE ENCOUNTER
----- Message from Sunita sent at 3/18/2025 10:21 AM CDT -----  Name of Who is Calling:Matias ellison/ [facility]  What is the request in detail:[facility] ellison[facility] like [facility] call back in regards to a report that was sent over. Please advise thank you   Can the clinic reply by [facility]:no  What Number to Call Back if not in [facility]:[phone]

## 2025-03-18 NOTE — PROGRESS NOTES
HEMATOLOGIC MALIGNANCIES PROGRESS NOTE    IDENTIFYING STATEMENT   Lj Coleman (Lj) is a 69 y.o. 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 Please see documentation of oncologic history and comorbid conditions above.    He presents on cycle 6, day 8 of BV-CHP, which he has tolerated reasonably well though notes that he has substantial fatigue with doxorubicin. He describes it as being very difficult to get through chemotherapy.     He is accompanied by his daughter at this time.     Past Medical History, Past Social History and Past Family History have been reviewed and are unchanged except as noted in the interval history.    MEDICATIONS:     Medications Ordered Prior to Encounter[1]    ALLERGIES:   Review of patient's allergies indicates:   Allergen Reactions    Codeine Other (See Comments)     Upset stomach    Doxorubicin Other (See Comments)     Hypotension, dizziness, itching hives relieved  "with benadryl. MD king with rechallenging 1/25/25 with more premeds        ROS:       Review of Systems   Constitutional:  Positive for fatigue. Negative for diaphoresis, fever and unexpected weight change.   HENT:   Negative for lump/mass and sore throat.    Eyes:  Negative for icterus.   Respiratory:  Negative for cough and shortness of breath.    Cardiovascular:  Negative for chest pain and palpitations.   Gastrointestinal:  Negative for abdominal distention, constipation, diarrhea, nausea and vomiting.   Genitourinary:  Negative for dysuria and frequency.    Musculoskeletal:  Negative for arthralgias, gait problem and myalgias.   Skin:  Negative for rash.   Neurological:  Negative for dizziness, gait problem and headaches.   Hematological:  Negative for adenopathy. Does not bruise/bleed easily.   Psychiatric/Behavioral:  The patient is not nervous/anxious.        PHYSICAL EXAM:  Vitals:    03/18/25 0933 03/18/25 0941   BP: (!) 147/74 (!) 147/76   Pulse: 85    Resp: 18    Temp: 98.1 °F (36.7 °C)    TempSrc: Oral    SpO2: 100%    Weight: 87.9 kg (193 lb 12.6 oz)    Height: 5' 9" (1.753 m)    PainSc:   9        KARNOFSKY PERFORMANCE STATUS 70%  ECOG 1    Physical Exam  Constitutional:       General: He is not in acute distress.     Appearance: He is well-developed.   HENT:      Head: Normocephalic and atraumatic.      Mouth/Throat:      Mouth: No oral lesions.   Eyes:      Conjunctiva/sclera: Conjunctivae normal.   Neck:      Thyroid: No thyromegaly.   Cardiovascular:      Rate and Rhythm: Normal rate and regular rhythm.      Heart sounds: Normal heart sounds. No murmur heard.  Pulmonary:      Breath sounds: Normal breath sounds. No wheezing or rales.   Abdominal:      General: There is no distension.      Palpations: Abdomen is soft. There is no hepatomegaly, splenomegaly or mass.      Tenderness: There is no abdominal tenderness.   Lymphadenopathy:      Cervical: No cervical adenopathy.      Right cervical: No deep " cervical adenopathy.     Left cervical: No deep cervical adenopathy.   Skin:     Findings: No rash.   Neurological:      Mental Status: He is alert and oriented to person, place, and time.      Cranial Nerves: No cranial nerve deficit.      Coordination: Coordination normal.      Deep Tendon Reflexes: Reflexes are normal and symmetric.         LAB:   Results for orders placed or performed in visit on 03/11/25   Comprehensive Metabolic Panel    Collection Time: 03/11/25  7:17 AM   Result Value Ref Range    Sodium 142 136 - 145 mmol/L    Potassium 4.1 3.5 - 5.1 mmol/L    Chloride 111 (H) 95 - 110 mmol/L    CO2 23 23 - 29 mmol/L    Glucose 98 70 - 110 mg/dL    BUN 23 8 - 23 mg/dL    Creatinine 1.3 0.5 - 1.4 mg/dL    Calcium 9.2 8.7 - 10.5 mg/dL    Total Protein 6.5 6.0 - 8.4 g/dL    Albumin 3.4 (L) 3.5 - 5.2 g/dL    Total Bilirubin 0.3 0.1 - 1.0 mg/dL    Alkaline Phosphatase 94 40 - 150 U/L    AST 17 10 - 40 U/L    ALT 13 10 - 44 U/L    eGFR 59 (A) >60 mL/min/1.73 m^2    Anion Gap 8 8 - 16 mmol/L   CBC Auto Differential    Collection Time: 03/11/25  7:17 AM   Result Value Ref Range    WBC 6.05 3.90 - 12.70 K/uL    RBC 2.99 (L) 4.60 - 6.20 M/uL    Hemoglobin 9.7 (L) 14.0 - 18.0 g/dL    Hematocrit 31.7 (L) 40.0 - 54.0 %     (H) 82 - 98 fL    MCH 32.4 (H) 27.0 - 31.0 pg    MCHC 30.6 (L) 32.0 - 36.0 g/dL    RDW 17.2 (H) 11.5 - 14.5 %    Platelets 137 (L) 150 - 450 K/uL    MPV 11.7 9.2 - 12.9 fL    Immature Granulocytes 0.7 (H) 0.0 - 0.5 %    Gran # (ANC) 4.8 1.8 - 7.7 K/uL    Immature Grans (Abs) 0.04 0.00 - 0.04 K/uL    Lymph # 0.4 (L) 1.0 - 4.8 K/uL    Mono # 0.7 0.3 - 1.0 K/uL    Eos # 0.1 0.0 - 0.5 K/uL    Baso # 0.05 0.00 - 0.20 K/uL    nRBC 0 0 /100 WBC    Gran % 79.7 (H) 38.0 - 73.0 %    Lymph % 6.8 (L) 18.0 - 48.0 %    Mono % 11.2 4.0 - 15.0 %    Eosinophil % 0.8 0.0 - 8.0 %    Basophil % 0.8 0.0 - 1.9 %    Differential Method Automated    Lactate Dehydrogenase    Collection Time: 03/11/25  7:17 AM   Result  Value Ref Range     110 - 260 U/L     *Note: Due to a large number of results and/or encounters for the requested time period, some results have not been displayed. A complete set of results can be found in Results Review.       PROBLEMS ASSESSED THIS VISIT:    1. 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 He has completed 6 cycles of BV-CHP with mid-treatment PET/CT suggestive of complete response. He will have end-of-therapy PET/CT.     We discussed the role of consolidative autologous stem cell transplant. We reviewed ASTCT guidelines that recommend high dose therapy with autologous stem cell rescue in first line for T-cell lymphomas, as this is associated with improved PFS compared to patients who may not have autologous stem cell transplant.    We reviewed risks of procedure, particularly around high dose chemotherapy and need for relocation to [place] for the 30-day post transplant period, as well as inpatient hospitalization during stem cell transplant.     Additionally, he has DUSP22 mutation, which may impart more favorable prognosis.    After discussion of risks and benefits, I recommend against ASCT in first line given his extensive comorbid conditions, challenging social circumstances, and more favorable disease characteristics. He is in agreement.    He will continue follow-up with Dr. Peralta.    Follow-up  - PET/CT after this cycle  - ongoing follow-up in [place] with Dr. Peralta  - Return to [facility] as requested by Dr. Fabian Acevedo MD  Hematologic Malignancies, Stem Cell Transplantation, and Cellular Therapy    One hour spent in consultation with this patient       [1]   Current Outpatient Medications on File Prior to Visit   Medication Sig Dispense Refill    acyclovir (ZOVIRAX) 400 MG tablet Take 1 tablet (400 mg total) by mouth 2 (two) times daily.  While on chemotherapy 60 tablet 4    albuterol (VENTOLIN HFA) 90 mcg/actuation inhaler Inhale 2 puffs into the lungs every 6 (six) hours as needed for Wheezing or Shortness of Breath. Rescue 18 g 3    allopurinoL (ZYLOPRIM) 300 MG tablet TAKE 1 TABLET(300 MG) BY MOUTH DAILY 30 tablet 0    apixaban (ELIQUIS) 5 mg Tab Take 1 tablet (5 mg total) by mouth 2 (two) times daily. 180 tablet 1    aspirin 81 MG Chew Take 1 tablet (81 mg total) by mouth once daily. 30 tablet 0    atorvastatin (LIPITOR) 80 MG tablet Take 1 tablet (80 mg total) by mouth once daily. 30 tablet 0    esomeprazole (NEXIUM) 40 MG capsule Take 1 capsule (40 mg total) by mouth once daily. 90 capsule 3    fluticasone-umeclidin-vilanter (TRELEGY ELLIPTA) 100-62.5-25 mcg DsDv Inhale 1 puff into the lungs once daily. 60 each 5    furosemide (LASIX) 20 MG tablet Take 1 tablet (20 mg total) by mouth once daily. 30 tablet 3    HYDROcodone-acetaminophen (NORCO) 5-325 mg per tablet Take 1 tablet by mouth every 6 (six) hours as needed for Pain. 12 tablet 0    LIDOcaine-prilocaine (EMLA) cream Apply topically as needed. 30 g 11    melatonin 10 mg Tab Take by mouth.      metoprolol succinate (TOPROL-XL) 25 MG 24 hr tablet Take 1 tablet (25 mg total) by mouth once daily. 30 tablet 11    nicotine (NICODERM CQ) 21 mg/24 hr Place 1 patch onto the skin once daily. 28 patch 2    nitroGLYCERIN (NITROSTAT) 0.4 MG SL tablet Place 1 tablet (0.4 mg total) under the tongue every 5 (five) minutes as needed for Chest pain (angina). 25 tablet 0    ondansetron (ZOFRAN) 8 MG tablet Take 1 tablet (8 mg total) by mouth every 12 (twelve) hours as needed for Nausea. 20 tablet 0    predniSONE (DELTASONE) 50 MG Tab Take 2 tablets (100 mg total) by mouth once daily. on days 2,3,4, 5 of each chemotherapy cycle 8 tablet 5    prochlorperazine (COMPAZINE) 5 MG tablet Take 1 tablet (5 mg total) by mouth every 6 (six) hours as needed (nausea). 20 tablet 5    sirolimus (RAPAMUNE) 1 MG Tab Take 3  mg (3 tablets) on day one, then 1 mg (1 tablet) every day thereafter. 33 tablet 11    sulfamethoxazole-trimethoprim 800-160mg (BACTRIM DS) 800-160 mg Tab Take 1 tablet by mouth 3 (three) times a week. 12 tablet 4    tacrolimus (PROGRAF) 0.5 MG Cap Take by mouth.      tamsulosin (FLOMAX) 0.4 mg Cap Take 1 capsule (0.4 mg total) by mouth once daily. 30 capsule 2    tenofovir alafenamide (VEMLIDY) 25 mg Tab Take 1 tablet by mouth once daily 30 tablet 11    tenofovir alafenamide (VEMLIDY) 25 mg Tab Take 1 tablet (25 mg total) by mouth Daily. 30 tablet 11    diclofenac sodium (VOLTAREN) 1 % Gel Apply 2 g topically 4 (four) times daily. for 10 days (Patient not taking: Reported on 3/18/2025) 100 g 0     Current Facility-Administered Medications on File Prior to Visit   Medication Dose Route Frequency Provider Last Rate Last Admin    lactated ringers infusion   Intravenous Continuous Linwood Ellsworth MD   New Bag at 01/20/21 0734    sodium chloride 0.9% flush 2 mL  2 mL Intravenous PRN Linwood Ellsworth MD

## 2025-03-19 ENCOUNTER — PATIENT MESSAGE (OUTPATIENT)
Dept: HEMATOLOGY/ONCOLOGY | Facility: CLINIC | Age: 70
End: 2025-03-19
Payer: MEDICARE

## 2025-04-01 ENCOUNTER — RESULTS FOLLOW-UP (OUTPATIENT)
Dept: HEMATOLOGY/ONCOLOGY | Facility: CLINIC | Age: 70
End: 2025-04-01

## 2025-04-01 ENCOUNTER — HOSPITAL ENCOUNTER (OUTPATIENT)
Dept: RADIOLOGY | Facility: HOSPITAL | Age: 70
Discharge: HOME | End: 2025-04-01
Attending: INTERNAL MEDICINE
Payer: MEDICARE

## 2025-04-01 DIAGNOSIS — C84.75 ANAPLASTIC ALK-NEGATIVE LARGE CELL LYMPHOMA OF LYMPH NODE OF LOWER EXTREMITY: ICD-10-CM

## 2025-04-01 PROCEDURE — 78815 PET IMAGE W/CT SKULL-THIGH: CPT | Mod: TC,HCNC,PS

## 2025-04-01 PROCEDURE — A9552 F18 FDG: HCPCS | Mod: HCNC | Performed by: INTERNAL MEDICINE

## 2025-04-01 PROCEDURE — 78815 PET IMAGE W/CT SKULL-THIGH: CPT | Mod: 26,HCNC,PS, | Performed by: RADIOLOGY

## 2025-04-01 RX ORDER — FLUDEOXYGLUCOSE F18 500 MCI/ML
11.55 INJECTION INTRAVENOUS
Status: COMPLETED | OUTPATIENT
Start: 2025-04-01 | End: 2025-04-01

## 2025-04-01 RX ADMIN — FLUDEOXYGLUCOSE F-18 11.55 MILLICURIE: 500 INJECTION INTRAVENOUS at 09:04

## 2025-04-07 DIAGNOSIS — C85.80 LARGE CELL LYMPHOMA: ICD-10-CM

## 2025-04-07 DIAGNOSIS — C84.75 ANAPLASTIC ALK-NEGATIVE LARGE CELL LYMPHOMA OF LYMPH NODE OF LOWER EXTREMITY: ICD-10-CM

## 2025-04-07 RX ORDER — ALLOPURINOL 300 MG/1
TABLET ORAL
Qty: 30 TABLET | Refills: 0 | Status: SHIPPED | OUTPATIENT
Start: 2025-04-07

## 2025-04-08 ENCOUNTER — TELEPHONE (OUTPATIENT)
Dept: HEMATOLOGY/ONCOLOGY | Facility: CLINIC | Age: 70
End: 2025-04-08
Payer: MEDICARE

## 2025-04-08 ENCOUNTER — LAB VISIT (OUTPATIENT)
Dept: LAB | Facility: HOSPITAL | Age: 70
End: 2025-04-08
Attending: INTERNAL MEDICINE
Payer: MEDICARE

## 2025-04-08 DIAGNOSIS — C84.75 ANAPLASTIC ALK-NEGATIVE LARGE CELL LYMPHOMA OF LYMPH NODE OF LOWER EXTREMITY: ICD-10-CM

## 2025-04-08 DIAGNOSIS — Z94.4 LIVER TRANSPLANTED: ICD-10-CM

## 2025-04-08 DIAGNOSIS — Z94.4 LIVER REPLACED BY TRANSPLANT: ICD-10-CM

## 2025-04-08 PROCEDURE — 80053 COMPREHEN METABOLIC PANEL: CPT | Mod: HCNC

## 2025-04-08 PROCEDURE — 36415 COLL VENOUS BLD VENIPUNCTURE: CPT | Mod: HCNC

## 2025-04-08 PROCEDURE — 85025 COMPLETE CBC W/AUTO DIFF WBC: CPT | Mod: HCNC

## 2025-04-08 PROCEDURE — 86140 C-REACTIVE PROTEIN: CPT | Mod: HCNC

## 2025-04-08 PROCEDURE — 80195 ASSAY OF SIROLIMUS: CPT | Mod: HCNC

## 2025-04-08 RX ORDER — ATORVASTATIN CALCIUM 80 MG/1
80 TABLET, FILM COATED ORAL DAILY
Qty: 90 TABLET | Refills: 3 | Status: SHIPPED | OUTPATIENT
Start: 2025-04-08 | End: 2026-04-03

## 2025-04-08 RX ORDER — METOPROLOL SUCCINATE 25 MG/1
25 TABLET, EXTENDED RELEASE ORAL DAILY
Qty: 90 TABLET | Refills: 3 | Status: SHIPPED | OUTPATIENT
Start: 2025-04-08

## 2025-04-08 NOTE — TELEPHONE ENCOUNTER
----- Message from Marya sent at 4/8/2025  9:10 AM CDT -----  Name of Caller:.Lj Coleman When is the first available appointment?todayBe Call Back Number:.[phone]  Additional Information: Patient was doing blood work this morning and he was on my chart but no one was on. Call the patient back to advise. Ruslan

## 2025-04-08 NOTE — TELEPHONE ENCOUNTER
----- Message from Marya sent at 4/8/2025  9:18 AM CDT -----  .Type:  RX Refill RequestWho Called: .Lj Coleman Refill or New Rx:refillRX Name and Strength metoprolol succinate (TOPROL-XL) 25 MG 24 hr tablet and atorvastatin (LIPITOR) 80 MG tabletHow is the patient currently taking it? (ex. 1XDay):dailyIs this a 30 day or 90 day RX:Preferred Pharmacy with phone number:.[id number] [place], [place] - [address] [facility] [id number] [address]Dgqku: [phone] Fax: [phone] Local or Mail Order:localOrdering Provider:Would the patient rather a call back or a response via [facility]? Call [facility] Call Back Number:[phone] Information: .

## 2025-04-09 LAB
ABSOLUTE EOSINOPHIL (OHS): 0.1 K/UL
ABSOLUTE MONOCYTE (OHS): 0.68 K/UL (ref 0.3–1)
ABSOLUTE NEUTROPHIL COUNT (OHS): 3.57 K/UL (ref 1.8–7.7)
ALBUMIN SERPL BCP-MCNC: 3.5 G/DL (ref 3.5–5.2)
ALP SERPL-CCNC: 99 UNIT/L (ref 40–150)
ALT SERPL W/O P-5'-P-CCNC: 14 UNIT/L (ref 10–44)
ANION GAP (OHS): 8 MMOL/L (ref 8–16)
AST SERPL-CCNC: 21 UNIT/L (ref 11–45)
BASOPHILS # BLD AUTO: 0.06 K/UL
BASOPHILS NFR BLD AUTO: 1.2 %
BILIRUB SERPL-MCNC: 0.4 MG/DL (ref 0.1–1)
BUN SERPL-MCNC: 18 MG/DL (ref 8–23)
CALCIUM SERPL-MCNC: 9.1 MG/DL (ref 8.7–10.5)
CHLORIDE SERPL-SCNC: 106 MMOL/L (ref 95–110)
CO2 SERPL-SCNC: 24 MMOL/L (ref 23–29)
CREAT SERPL-MCNC: 1.3 MG/DL (ref 0.5–1.4)
CRP SERPL-MCNC: 5.1 MG/L
ERYTHROCYTE [DISTWIDTH] IN BLOOD BY AUTOMATED COUNT: 16.8 % (ref 11.5–14.5)
GFR SERPLBLD CREATININE-BSD FMLA CKD-EPI: 59 ML/MIN/1.73/M2
GLUCOSE SERPL-MCNC: 91 MG/DL (ref 70–110)
HCT VFR BLD AUTO: 33.5 % (ref 40–54)
HGB BLD-MCNC: 10.4 GM/DL (ref 14–18)
IMM GRANULOCYTES # BLD AUTO: 0.04 K/UL (ref 0–0.04)
IMM GRANULOCYTES NFR BLD AUTO: 0.8 % (ref 0–0.5)
LYMPHOCYTES # BLD AUTO: 0.68 K/UL (ref 1–4.8)
MCH RBC QN AUTO: 31.7 PG (ref 27–31)
MCHC RBC AUTO-ENTMCNC: 31 G/DL (ref 32–36)
MCV RBC AUTO: 102 FL (ref 82–98)
NUCLEATED RBC (/100WBC) (OHS): 0 /100 WBC
PLATELET # BLD AUTO: 169 K/UL (ref 150–450)
PMV BLD AUTO: 12.6 FL (ref 9.2–12.9)
POTASSIUM SERPL-SCNC: 4 MMOL/L (ref 3.5–5.1)
PROT SERPL-MCNC: 7 GM/DL (ref 6–8.4)
RBC # BLD AUTO: 3.28 M/UL (ref 4.6–6.2)
RELATIVE EOSINOPHIL (OHS): 1.9 %
RELATIVE LYMPHOCYTE (OHS): 13.3 % (ref 18–48)
RELATIVE MONOCYTE (OHS): 13.3 % (ref 4–15)
RELATIVE NEUTROPHIL (OHS): 69.5 % (ref 38–73)
SIROLIMUS BLD-MCNC: 2.8 NG/ML (ref 4–20)
SODIUM SERPL-SCNC: 138 MMOL/L (ref 136–145)
WBC # BLD AUTO: 5.13 K/UL (ref 3.9–12.7)

## 2025-04-12 DIAGNOSIS — C84.75 ANAPLASTIC ALK-NEGATIVE LARGE CELL LYMPHOMA OF LYMPH NODE OF LOWER EXTREMITY: ICD-10-CM

## 2025-04-12 DIAGNOSIS — Z94.4 HISTORY OF LIVER TRANSPLANT: ICD-10-CM

## 2025-04-14 RX ORDER — SIROLIMUS 1 MG/1
TABLET, FILM COATED ORAL
Qty: 33 TABLET | Refills: 11 | Status: SHIPPED | OUTPATIENT
Start: 2025-04-14

## 2025-04-14 RX ORDER — ACYCLOVIR 400 MG/1
400 TABLET ORAL 2 TIMES DAILY
Qty: 60 TABLET | Refills: 4 | Status: SHIPPED | OUTPATIENT
Start: 2025-04-14

## 2025-04-21 ENCOUNTER — RESULTS FOLLOW-UP (OUTPATIENT)
Dept: HEPATOLOGY | Facility: HOSPITAL | Age: 70
End: 2025-04-21
Payer: MEDICARE

## 2025-04-21 ENCOUNTER — HOSPITAL ENCOUNTER (OUTPATIENT)
Dept: CARDIOLOGY | Facility: HOSPITAL | Age: 70
Discharge: HOME | End: 2025-04-21
Attending: INTERNAL MEDICINE
Payer: MEDICARE

## 2025-04-21 VITALS
HEART RATE: 76 BPM | BODY MASS INDEX: 28.58 KG/M2 | SYSTOLIC BLOOD PRESSURE: 147 MMHG | DIASTOLIC BLOOD PRESSURE: 76 MMHG | HEIGHT: 69 IN | WEIGHT: 193 LBS

## 2025-04-21 DIAGNOSIS — C84.75 ANAPLASTIC ALK-NEGATIVE LARGE CELL LYMPHOMA OF LYMPH NODE OF LOWER EXTREMITY: ICD-10-CM

## 2025-04-21 LAB
AORTIC ROOT ANNULUS: 2.85 CM
APICAL FOUR CHAMBER EJECTION FRACTION: 62 %
APICAL TWO CHAMBER EJECTION FRACTION: 60 %
ASCENDING AORTA: 2.66 CM
AV INDEX (PROSTH): 0.67
AV MEAN GRADIENT: 4 MMHG
AV PEAK GRADIENT: 7 MMHG
AV REGURGITATION PRESSURE HALF TIME: 278 MS
AV VALVE AREA BY VELOCITY RATIO: 2.6 CM²
AV VALVE AREA: 2.6 CM²
AV VELOCITY RATIO: 0.69
BSA FOR ECHO PROCEDURE: 2.06 M2
CV ECHO LV RWT: 0.43 CM
DOP CALC AO PEAK VEL: 1.3 M/S
DOP CALC AO VTI: 24.6 CM
DOP CALC LVOT AREA: 3.8 CM2
DOP CALC LVOT DIAMETER: 2.2 CM
DOP CALC LVOT PEAK VEL: 0.9 M/S
DOP CALC LVOT STROKE VOLUME: 63.1 CM3
DOP CALC MV VTI: 27.5 CM
DOP CALC RVOT PEAK VEL: 0.57 M/S
DOP CALC RVOT VTI: 9.6 CM
DOP CALCLVOT PEAK VEL VTI: 16.6 CM
E WAVE DECELERATION TIME: 184 MSEC
E/A RATIO: 1.39
E/E' RATIO: 9 M/S
ECHO LV POSTERIOR WALL: 1 CM (ref 0.6–1.1)
FRACTIONAL SHORTENING: 27.7 % (ref 28–44)
GLOBAL LONGITUIDAL STRAIN: 18.6 %
INTERVENTRICULAR SEPTUM: 0.9 CM (ref 0.6–1.1)
IVC DIAMETER: 1.58 CM
IVRT: 97 MSEC
LA MAJOR: 5.2 CM
LA MINOR: 5.1 CM
LA WIDTH: 3.6 CM
LEFT ATRIUM AREA SYSTOLIC (APICAL 2 CHAMBER): 16.94 CM2
LEFT ATRIUM AREA SYSTOLIC (APICAL 4 CHAMBER): 16.76 CM2
LEFT ATRIUM SIZE: 3.8 CM
LEFT ATRIUM VOLUME INDEX MOD: 20 ML/M2
LEFT ATRIUM VOLUME INDEX: 29 ML/M2
LEFT ATRIUM VOLUME MOD: 41 ML
LEFT ATRIUM VOLUME: 60 CM3
LEFT INTERNAL DIMENSION IN SYSTOLE: 3.4 CM (ref 2.1–4)
LEFT VENTRICLE DIASTOLIC VOLUME INDEX: 50.74 ML/M2
LEFT VENTRICLE DIASTOLIC VOLUME: 103 ML
LEFT VENTRICLE END DIASTOLIC VOLUME APICAL 2 CHAMBER: 76.25 ML
LEFT VENTRICLE END DIASTOLIC VOLUME APICAL 4 CHAMBER: 96.19 ML
LEFT VENTRICLE END SYSTOLIC VOLUME APICAL 2 CHAMBER: 41.85 ML
LEFT VENTRICLE END SYSTOLIC VOLUME APICAL 4 CHAMBER: 38.94 ML
LEFT VENTRICLE MASS INDEX: 75.6 G/M2
LEFT VENTRICLE SYSTOLIC VOLUME INDEX: 24.1 ML/M2
LEFT VENTRICLE SYSTOLIC VOLUME: 49 ML
LEFT VENTRICULAR INTERNAL DIMENSION IN DIASTOLE: 4.7 CM (ref 3.5–6)
LEFT VENTRICULAR MASS: 153.4 G
LV LATERAL E/E' RATIO: 9.5 M/S
LV SEPTAL E/E' RATIO: 8 M/S
LVED V (TEICH): 102.52 ML
LVES V (TEICH): 48.9 ML
LVOT MG: 1.63 MMHG
LVOT MV: 0.6 CM/S
MR PISA EROA: 0.57 CM2
MV MEAN GRADIENT: 3 MMHG
MV PEAK A VEL: 0.75 M/S
MV PEAK E VEL: 1.04 M/S
MV PEAK GRADIENT: 5 MMHG
MV STENOSIS PRESSURE HALF TIME: 53.35 MS
MV VALVE AREA BY CONTINUITY EQUATION: 2.29 CM2
MV VALVE AREA P 1/2 METHOD: 4.12 CM2
OHS CV RV/LV RATIO: 0.62 CM
OHS LV EJECTION FRACTION SIMPSONS BIPLANE MOD: 62 %
PISA AR MAX VEL: 4.26 M/S
PISA MRMAX VEL: 5.19 M/S
PISA RADIUS: 1.1 CM
PISA TR MAX VEL: 2.6 M/S
PISA VN NYQUIST MS: 0.39 M/S
PISA VN NYQUIST: 0.39 M/S
PULM VEIN S/D RATIO: 0.74
PV MEAN GRADIENT: 1 MMHG
PV PEAK D VEL: 0.88 M/S
PV PEAK GRADIENT: 5 MMHG
PV PEAK S VEL: 0.65 M/S
PV PEAK VELOCITY: 1.07 M/S
RA MAJOR: 5.07 CM
RA PRESSURE ESTIMATED: 3 MMHG
RA WIDTH: 3.89 CM
RIGHT VENTRICLE DIASTOLIC BASEL DIMENSION: 2.9 CM
RIGHT VENTRICULAR END-DIASTOLIC DIMENSION: 2.87 CM
RV TB RVSP: 6 MMHG
SINUS: 2.3 CM
STJ: 2.2 CM
TDI LATERAL: 0.11 M/S
TDI SEPTAL: 0.13 M/S
TDI: 0.12 M/S
TR MAX PG: 30 MMHG
TV REST PULMONARY ARTERY PRESSURE: 30 MMHG
Z-SCORE OF LEFT VENTRICULAR DIMENSION IN END DIASTOLE: -2.47
Z-SCORE OF LEFT VENTRICULAR DIMENSION IN END SYSTOLE: -0.64

## 2025-04-21 PROCEDURE — 93306 TTE W/DOPPLER COMPLETE: CPT | Mod: 26,HCNC,, | Performed by: INTERNAL MEDICINE

## 2025-04-21 PROCEDURE — 93356 MYOCRD STRAIN IMG SPCKL TRCK: CPT | Mod: HCNC,,, | Performed by: INTERNAL MEDICINE

## 2025-04-21 PROCEDURE — 93306 TTE W/DOPPLER COMPLETE: CPT | Mod: HCNC

## 2025-04-23 ENCOUNTER — OFFICE VISIT (OUTPATIENT)
Dept: HEMATOLOGY/ONCOLOGY | Facility: CLINIC | Age: 70
End: 2025-04-23
Payer: MEDICARE

## 2025-04-23 VITALS
HEART RATE: 93 BPM | HEIGHT: 69 IN | BODY MASS INDEX: 28.08 KG/M2 | WEIGHT: 189.63 LBS | SYSTOLIC BLOOD PRESSURE: 132 MMHG | TEMPERATURE: 97 F | RESPIRATION RATE: 20 BRPM | OXYGEN SATURATION: 98 % | DIASTOLIC BLOOD PRESSURE: 81 MMHG

## 2025-04-23 DIAGNOSIS — I82.722 CHRONIC DEEP VEIN THROMBOSIS (DVT) OF LEFT UPPER EXTREMITY, UNSPECIFIED VEIN: ICD-10-CM

## 2025-04-23 DIAGNOSIS — K11.8 PAROTID MASS: ICD-10-CM

## 2025-04-23 DIAGNOSIS — Z94.4 HISTORY OF LIVER TRANSPLANT: ICD-10-CM

## 2025-04-23 DIAGNOSIS — Z79.69 IMMUNODEFICIENCY DUE TO CHEMOTHERAPY: ICD-10-CM

## 2025-04-23 DIAGNOSIS — T45.1X5A IMMUNODEFICIENCY DUE TO CHEMOTHERAPY: ICD-10-CM

## 2025-04-23 DIAGNOSIS — F11.21 OPIOID DEPENDENCE, IN REMISSION: ICD-10-CM

## 2025-04-23 DIAGNOSIS — C85.80 LARGE CELL LYMPHOMA: ICD-10-CM

## 2025-04-23 DIAGNOSIS — Z86.19 HISTORY OF HEPATITIS C: ICD-10-CM

## 2025-04-23 DIAGNOSIS — C84.78 ANAPLASTIC ALK-NEGATIVE LARGE CELL LYMPHOMA OF LYMPH NODES OF MULTIPLE REGIONS: Primary | ICD-10-CM

## 2025-04-23 DIAGNOSIS — D84.9 IMMUNOSUPPRESSION: ICD-10-CM

## 2025-04-23 DIAGNOSIS — E66.9 OBESITY (BMI 35.0-39.9 WITHOUT COMORBIDITY): ICD-10-CM

## 2025-04-23 DIAGNOSIS — D84.821 IMMUNODEFICIENCY DUE TO CHEMOTHERAPY: ICD-10-CM

## 2025-04-23 PROBLEM — D61.818 PANCYTOPENIA: Status: RESOLVED | Noted: 2024-11-23 | Resolved: 2025-04-23

## 2025-04-23 PROCEDURE — 3008F BODY MASS INDEX DOCD: CPT | Mod: HCNC,CPTII,S$GLB, | Performed by: INTERNAL MEDICINE

## 2025-04-23 PROCEDURE — 3075F SYST BP GE 130 - 139MM HG: CPT | Mod: HCNC,CPTII,S$GLB, | Performed by: INTERNAL MEDICINE

## 2025-04-23 PROCEDURE — 3288F FALL RISK ASSESSMENT DOCD: CPT | Mod: HCNC,CPTII,S$GLB, | Performed by: INTERNAL MEDICINE

## 2025-04-23 PROCEDURE — 3079F DIAST BP 80-89 MM HG: CPT | Mod: HCNC,CPTII,S$GLB, | Performed by: INTERNAL MEDICINE

## 2025-04-23 PROCEDURE — 99214 OFFICE O/P EST MOD 30 MIN: CPT | Mod: HCNC,S$GLB,, | Performed by: INTERNAL MEDICINE

## 2025-04-23 PROCEDURE — 1125F AMNT PAIN NOTED PAIN PRSNT: CPT | Mod: HCNC,CPTII,S$GLB, | Performed by: INTERNAL MEDICINE

## 2025-04-23 PROCEDURE — 1101F PT FALLS ASSESS-DOCD LE1/YR: CPT | Mod: HCNC,CPTII,S$GLB, | Performed by: INTERNAL MEDICINE

## 2025-04-23 PROCEDURE — 1159F MED LIST DOCD IN RCRD: CPT | Mod: HCNC,CPTII,S$GLB, | Performed by: INTERNAL MEDICINE

## 2025-04-23 PROCEDURE — 99999 PR PBB SHADOW E&M-EST. PATIENT-LVL V: CPT | Mod: PBBFAC,HCNC,, | Performed by: INTERNAL MEDICINE

## 2025-04-23 RX ORDER — HEPARIN 100 UNIT/ML
500 SYRINGE INTRAVENOUS
OUTPATIENT
Start: 2025-04-23

## 2025-04-23 RX ORDER — SODIUM CHLORIDE 0.9 % (FLUSH) 0.9 %
10 SYRINGE (ML) INJECTION
OUTPATIENT
Start: 2025-04-23

## 2025-04-23 NOTE — PROGRESS NOTES
Subjective:       Patient ID: Lj Coleman is a 69 y.o. male.    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  Patient has completed 6 cycles of systemic therapy seen and evaluated for possible transplant for bone marrow had previous history of liver transplant and not recommended by Dr. Max Acevedo.  Returns for clinical follow-up to review PET scan from 04/01/2025 ECOG status 1    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 10/31/2024    Anemia 11/23/2024    Angina pectoris     Arthritis     back    Atherosclerosis of native coronary artery without angina pectoris/ LAD 09/08/2016    Back pain     Chronic hepatitis C with cirrhosis     COPD (chronic obstructive pulmonary disease)     Depression     Hepatoma     Prior to liver transplant    History of colon polyps 09/09/2015    History of transplantation, liver 04/2012    History of vertebral fracture     Hypertension     Immune deficiency disorder     Incisional hernia following transplant 04/09/2014    Liver failure 11/2011    Related to chemotherapy for hepatoma    Liver transplanted 04/2012    NSTEMI (non-ST elevated myocardial infarction) 11/23/2024    Obesity     PVCs (premature ventricular contractions)     Renal dysfunction 11/23/2024    Tobacco abuse 09/09/2016    Trouble in sleeping     Vertebral fracture 1975     Family History   Problem Relation Name Age of Onset    Cancer Mother          lung    Cancer Father          bladder    Diabetes Maternal Uncle      Cancer Maternal Grandmother          uterine?    Cancer Other      Heart disease Neg Hx      Kidney disease Neg Hx      Stroke Neg Hx       Social History[1]  Past Surgical History:   Procedure Laterality Date    COLONOSCOPY N/A 1/20/2021    Procedure: COLONOSCOPY;   "Surgeon: Emerson Helm MD;  Location: [facility];  Service: Endoscopy;  Laterality: N/A;    HERNIA REPAIR Right 2013    incisional    INSERTION OF TUNNELED CENTRAL VENOUS CATHETER (CVC) WITH SUBCUTANEOUS PORT Left 11/6/2024    Procedure: [id number];  Surgeon: Hao Washburn MD;  Location: [facility];  Service: General;  Laterality: Left;    LIVER TRANSPLANT  April 2012    LYMPH NODE BIOPSY/EXCISION Left 10/16/2024    Procedure: LYMPH NODE BIOPSY/EXCISION;  Surgeon: Hao Washburn MD;  Location: [facility] OR;  Service: General;  Laterality: Left;    MOUTH SURGERY      TONSILLECTOMY  1958       Labs:  Lab Results   Component Value Date    WBC 5.12 04/21/2025    HGB 9.6 (L) 04/21/2025    HCT 31.9 (L) 04/21/2025     (H) 04/21/2025     (L) 04/21/2025     BMP  Lab Results   Component Value Date     04/21/2025    K 4.4 04/21/2025     04/21/2025    CO2 26 04/21/2025    BUN 28 (H) 04/21/2025    CREATININE 1.4 04/21/2025    CALCIUM 8.5 (L) 04/21/2025    ANIONGAP 5 (L) 04/21/2025    ESTGFRAFRICA >60.0 06/08/2022    EGFRNONAA >60.0 06/08/2022     Lab Results   Component Value Date    ALT 19 04/21/2025    AST 29 04/21/2025    GGT 32 09/26/2016    ALKPHOS 113 04/21/2025    BILITOT 0.3 04/21/2025       Lab Results   Component Value Date    IRON 49 01/24/2025    TIBC 269 01/24/2025    FERRITIN 593 (H) 01/24/2025     No results found for: "CFZJLQJH98"  No results found for: "FOLATE"  Lab Results   Component Value Date    TSH 1.168 06/06/2023         Review of Systems   Constitutional:  Negative for activity change, appetite change, chills, diaphoresis, fatigue, fever and unexpected weight change.   HENT:  Negative for congestion, dental problem, drooling, ear discharge, ear pain, facial swelling, hearing loss, mouth sores, nosebleeds, postnasal drip, rhinorrhea, sinus pressure, sneezing, sore throat, tinnitus, trouble swallowing and voice change.    Eyes:  Negative for photophobia, pain, " discharge, redness, itching and visual disturbance.   Respiratory:  Negative for apnea, cough, choking, chest tightness, shortness of breath, wheezing and stridor.    Cardiovascular:  Negative for chest pain, palpitations and leg swelling.   Gastrointestinal:  Negative for abdominal distention, abdominal pain, anal bleeding, blood in stool, constipation, diarrhea, nausea, rectal pain and vomiting.   Endocrine: Negative for cold intolerance, heat intolerance, polydipsia, polyphagia and polyuria.   Genitourinary:  Negative for decreased urine volume, difficulty urinating, dysuria, enuresis, flank pain, frequency, genital sores, hematuria, penile discharge, penile pain, penile swelling, scrotal swelling, testicular pain and urgency.   Musculoskeletal:  Negative for arthralgias, back pain, gait problem, joint swelling, myalgias, neck pain and neck stiffness.   Skin:  Negative for color change, pallor, rash and wound.   Allergic/Immunologic: Negative for environmental allergies, food allergies and immunocompromised state.   Neurological:  Negative for dizziness, tremors, seizures, syncope, facial asymmetry, speech difficulty, weakness, light-headedness, numbness and headaches.   Hematological:  Negative for adenopathy. Does not bruise/bleed easily.   Psychiatric/Behavioral:  Negative for agitation, behavioral problems, confusion, decreased concentration, dysphoric mood, hallucinations, self-injury, sleep disturbance and suicidal ideas. The patient is not nervous/anxious and is not hyperactive.        Objective:      Physical Exam  Vitals reviewed.   Constitutional:       General: He is not in acute distress.     Appearance: He is well-developed. He is obese. He is not diaphoretic.   HENT:      Head: Normocephalic.      Right Ear: External ear normal.      Left Ear: External ear normal.      Nose: Nose normal.      Right Sinus: No maxillary sinus tenderness or frontal sinus tenderness.      Left Sinus: No maxillary sinus  tenderness or frontal sinus tenderness.      Mouth/Throat:      Pharynx: No oropharyngeal exudate.   Eyes:      General: Lids are normal. No scleral icterus.        Right eye: No discharge.         Left eye: No discharge.      Extraocular Movements:      Right eye: Normal extraocular motion.      Left eye: Normal extraocular motion.      Conjunctiva/sclera:      Right eye: Right conjunctiva is not injected. No hemorrhage.     Left eye: Left conjunctiva is not injected. No hemorrhage.     Pupils: Pupils are equal, round, and reactive to light.   Neck:      Thyroid: No thyromegaly.      Vascular: No JVD.      Trachea: No tracheal deviation.   Cardiovascular:      Rate and Rhythm: Normal rate.   Pulmonary:      Effort: Pulmonary effort is normal. No respiratory distress.      Breath sounds: No stridor.   Abdominal:      General: Bowel sounds are normal.      Palpations: Abdomen is soft. There is no hepatomegaly, splenomegaly or mass.      Tenderness: There is no abdominal tenderness.   Musculoskeletal:         General: No tenderness. Normal range of motion.      Cervical back: Normal range of motion and neck supple.   Lymphadenopathy:      Head:      Right side of head: No posterior auricular or occipital adenopathy.      Left side of head: No posterior auricular or occipital adenopathy.      Cervical: No cervical adenopathy.      Right cervical: No superficial, deep or posterior cervical adenopathy.     Left cervical: No superficial, deep or posterior cervical adenopathy.      Upper Body:      Right upper body: No supraclavicular adenopathy.      Left upper body: No supraclavicular adenopathy.   Skin:     General: Skin is dry.      Findings: No erythema or rash.      Nails: There is no clubbing.   Neurological:      Mental Status: He is alert and oriented to person, place, and time.      Cranial Nerves: No cranial nerve deficit.      Coordination: Coordination normal.   Psychiatric:         Behavior: Behavior normal.          Thought Content: Thought content normal.         Judgment: Judgment normal.             Assessment:      1. 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 Parotid mass    3. Large cell lymphoma    4. Immunodeficiency due to chemotherapy    5. Immunosuppression    6. Chronic deep vein thrombosis (DVT) of left upper extremity, unspecified vein    7. Obesity (BMI 35.0-39.9 without comorbidity)    8. History of liver transplant    9. History of hepatitis C    10. Opioid dependence, in remission           Med Onc Chart Routing      Follow up with physician . Return to clinic to see me in 3 months with CBC CMP C-reactive protein LDH in PET scan prior   Follow up with [facility]    Infusion scheduling note    Injection scheduling note Flush port Q 6 weeks   Labs    Imaging    Pharmacy appointment    Other referrals         Referral ENT parotid mass              Plan:      extensive conversation reviewed information reviewed Dr. Max Acevedo no do not recommend stem cell transplant for alk negative T-cell lymphoma.  Recommend that follow-up in 3 months with labs.  Will flush port Q 6 weeks.  I he does have a persistent PET positive parotid mass where no change has occurred since October of 2020 4.  High likelihood this does not represents lymphoma but other source will refer to ENT for further evaluation.  Discussed implications of answered questions with him          Timmy Peralta Jr, MD FACP         [1]   Social History  Socioeconomic History    Marital status:     Highest education level: 10th grade   Tobacco Use    Smoking status: Every Day     Current packs/day: 0.75     Average packs/day: 0.7 packs/day for 37.3 years (28.0 ttl pk-yrs)     Types: Cigarettes     Start date: 1988    Smokeless tobacco: Never    Tobacco comments:     Currently smokes a few (3-5) a day as of 6/12/24   Substance and Sexual Activity    Alcohol use: No     Alcohol/week: 0.0 standard drinks of  alcohol     Comment: Quit alcohol after some alcohol abuse, prior to his liver transplant    Drug use: No    Sexual activity: Not Currently     Social Drivers of Health     Financial Resource Strain: Low Risk  (1/1/2025)    Overall Financial Resource Strain (CARDIA)     Difficulty of Paying Living Expenses: Not very hard   Food Insecurity: Patient Declined (1/1/2025)    Hunger Vital Sign     Worried About Running Out of Food in the Last Year: Patient declined     Ran Out of Food in the Last Year: Patient declined   Transportation Needs: Patient Declined (11/24/2024)    TRANSPORTATION NEEDS     Transportation : Patient declined   Physical Activity: Inactive (1/1/2025)    Exercise Vital Sign     Days of Exercise per Week: 0 days     Minutes of Exercise per Session: 0 min   Stress: No Stress Concern Present (1/1/2025)    [employer] - Occupational Stress Questionnaire     Feeling of Stress : Only a little   Housing Stability: Patient Declined (1/1/2025)    Housing Stability Vital Sign     Unable to Pay for Housing in the Last Year: Patient declined     Homeless in the Last Year: Patient declined

## 2025-04-25 NOTE — TELEPHONE ENCOUNTER
----- Message from Duc Odom MD sent at 4/21/2025  6:04 PM CDT -----  Liver transplant blood tests reviewed.  Labs stable,   no change in immunosuppression.   Please continue to monitor liver tests per transplant protocol.    ----- Message -----  From: Lab, Background User  Sent: 4/21/2025   5:55 PM CDT  To: Duc Odom MD

## 2025-04-26 PROBLEM — Z51.81 THERAPEUTIC DRUG MONITORING: Status: ACTIVE | Noted: 2025-04-26

## 2025-04-26 PROBLEM — B18.9 CHRONIC VIRAL HEPATITIS: Status: ACTIVE | Noted: 2025-04-26

## 2025-04-26 PROBLEM — R59.1 LYMPHADENOPATHY: Status: ACTIVE | Noted: 2025-04-26

## 2025-04-26 PROBLEM — Z94.4 LIVER TRANSPLANTED: Status: ACTIVE | Noted: 2025-04-26

## 2025-04-26 NOTE — PROGRESS NOTES
Transplant Hepatology  Liver Transplant Recipient Follow Up    PATIENT: Lj Coleman  MRN: [id number]  DATE: 4/29/2025    Provider: Hepatologist - Dr Odom  Referring provider: Dr. Timmy Peralta    Transplant History  Transplant Date: 1/2/2012  UNOS Native Liver Dx:      Lj is here for follow up of his liver transplant.       Chief complaint: follow up, history of OLT    Subjective:   Lj Coleman is a 69 y.o. 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 He reports compliance with Immunosuppression.   He denies recent fever, chills, nausea, vomiting, abdominal pain, diarrhea, headache, tremors.  He is without complaints today except  high BP    He has diagnosis of anaplastic lymphoma. He has been switched to sirolimus. He was also started on Tenofovir as patient has been started  on Rituxan. persistent PET positive parotid mass where no change has occurred since October of 2020         Allograft function:  Allograft function :   ALT   Date Value Ref Range Status   04/21/2025 19 10 - 44 unit/L Final   03/11/2025 13 10 - 44 U/L Final     AST   Date Value Ref Range Status   04/21/2025 29 11 - 45 unit/L Final   03/11/2025 17 10 - 40 U/L Final     Alkaline Phosphatase   Date Value Ref Range Status   03/11/2025 94 40 - 150 U/L Final     ALP   Date Value Ref Range Status   04/21/2025 113 40 - 150 unit/L Final     Tacrolimus Lvl   Date Value Ref Range Status   12/02/2024 2.8 (L) 5.0 - 15.0 ng/mL Final     Comment:     Testing performed by a chemiluminescent microparticle   immunoassay on the [employer] i System.    CAUTION: No firm therapeutic range exists for tacrolimus in whole   blood. The   complexity of the clinical state, individual differences in   sensitivity to   immunosuppressive and  nephrotoxic effects of tacrolimus,   co-administration   of other immunosuppressants, type of transplant, time post-transplant   and a   number of other factors contribute to different requirements for   optimal   blood levels of tacrolimus. Therefore, individual tacrolimus values   cannot   be used as the sole indicator for making changes in treatment regimen   and   each patient should be thoroughly evaluated clinically before changes   in   treatment regimens are made. Each user must establish his or her own   ranges   based on clinical experience.  Therapeutic ranges vary according to the commercial test used, and   therefore   should be established for each commercial test. Values obtained with   different assay methods cannot be used interchangeably due to   differences in   assay methods and cross-reactivity with metabolites, nor should   correction   factors be applied. Therefore, consistent use of one assay for   individual   patients is recommended.          Immunosuppressions  Tacrolimus:                                          no  MMF:                                                   no  Prednisone:                                         no  Cyclosporine:                                       no  Sirolimus:                                             yes 1 mg   Everolimus:                                          no    Post-LT Complications  Biliary anastomotic stricture:               no  HAT:                                                    no  HA stenosis:                                        no  PV stenosis:                                        no    New diabetes onset between last follow-up to the current follow-up: No  Did patient have any acute rejection episodes during the follow-up period: No  Post transplant malignancy: yes        Post-LT Recommended Screening Tests  Dermatology check up:                           no  Colonoscopy:                                          yes  Bone  Mineral Density (BMD):                 yes  HbA1C:                                                    yes      Prior Relevant History:      Review of systems:  A review of 12+ systems was conducted with pertinent positive and negative findings documented in HPI with all other systems reviewed and negative.    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    Anemia 11/23/2024    Angina pectoris     Arthritis     back    Atherosclerosis of native coronary artery without angina pectoris/ LAD 09/08/2016    Back pain     Chronic hepatitis C with cirrhosis     COPD (chronic obstructive pulmonary disease)     Depression     Hepatoma     Prior to liver transplant    History of colon polyps 09/09/2015    History of transplantation, liver 04/2012    History of vertebral fracture     Hypertension     Immune deficiency disorder     Incisional hernia following transplant 04/09/2014    Liver failure 11/2011    Related to chemotherapy for hepatoma    Liver transplanted 04/2012    NSTEMI (non-ST elevated myocardial infarction) 11/23/2024    Obesity     PVCs (premature ventricular contractions)     Renal dysfunction 11/23/2024    Tobacco abuse 09/09/2016    Trouble in sleeping     Vertebral fracture 1975       Past Surgical HIstory:   Past Surgical History:   Procedure Laterality Date    COLONOSCOPY N/A 1/20/2021    Procedure: COLONOSCOPY;  Surgeon: Emerson Helm MD;  Location: [facility] ENDO;  Service: Endoscopy;  Laterality: N/A;    HERNIA REPAIR Right 2013    incisional    INSERTION OF TUNNELED CENTRAL VENOUS CATHETER (CVC) WITH SUBCUTANEOUS PORT Left 11/6/2024    Procedure: [id number];  Surgeon: Hao Washburn MD;  Location: [facility] OR;  Service: General;  Laterality: Left;    LIVER TRANSPLANT  April 2012    LYMPH NODE BIOPSY/EXCISION  Left 10/16/2024    Procedure: LYMPH NODE BIOPSY/EXCISION;  Surgeon: Hao Washburn MD;  Location: [facility] OR;  Service: General;  Laterality: Left;    MOUTH SURGERY      TONSILLECTOMY  1958       Family History:   Family History   Problem Relation Name Age of Onset    Cancer Mother          lung    Cancer Father          bladder    Diabetes Maternal Uncle      Cancer Maternal Grandmother          uterine?    Cancer Other      Heart disease Neg Hx      Kidney disease Neg Hx      Stroke Neg Hx         Social History:  reports that he quit smoking about 2 months ago. His smoking use included cigarettes. He started smoking about 37 years ago. He has a 27.8 pack-year smoking history. He has never used smokeless tobacco. He reports that he does not currently use alcohol. He reports that he does not use drugs.    PFSH:  Past medical, family, and social history reviewed as documented in chart with pertinent positive medical, family, and social history detailed in HPI.    Allergies:  Review of patient's allergies indicates:   Allergen Reactions    Codeine Other (See Comments)     Upset stomach    Doxorubicin Other (See Comments)     Hypotension, dizziness, itching hives relieved with benadryl. MD ok with rechallenging 1/25/25 with more premeds       Medications:  Current Medications[1]    Review of Systems   Constitutional:  Negative for fatigue, fever and unexpected weight change.   HENT:  Negative for ear pain, nosebleeds and trouble swallowing.    Eyes:  Negative for discharge and redness.   Respiratory:  Negative for cough and shortness of breath.    Cardiovascular:  Negative for palpitations and leg swelling.   Gastrointestinal:  Negative for abdominal distention, abdominal pain, diarrhea and vomiting.   Endocrine: Negative for cold intolerance and polyuria.   Genitourinary:  Negative for flank pain and hematuria.   Musculoskeletal:  Negative for back pain.   Skin:  Negative for pallor.   Neurological:  Negative for  "seizures and headaches.   Hematological:  Does not bruise/bleed easily.   Psychiatric/Behavioral:  Negative for confusion and hallucinations.          UNOS Patient Status  Functional Status: 80% - Normal activity with effort: some symptoms of disease  Physical Capacity: No Limitations      Objective:      Vitals:   Vitals:    04/29/25 0906   BP: (!) 152/90   BP Location: Left arm   Patient Position: Sitting   Pulse: 85   SpO2: 95%   Weight: 84.8 kg (186 lb 13.4 oz)   Height: 5' 9" (1.753 m)       Physical Exam  Constitutional:       Appearance: Normal appearance.   HENT:      Head: Normocephalic and atraumatic.      Right Ear: Tympanic membrane and external ear normal.      Left Ear: Tympanic membrane and external ear normal.      Mouth/Throat:      Mouth: Mucous membranes are moist.   Eyes:      Extraocular Movements: Extraocular movements intact.      Pupils: Pupils are equal, round, and reactive to light.   Cardiovascular:      Rate and Rhythm: Normal rate and regular rhythm.      Pulses: Normal pulses.      Heart sounds: Normal heart sounds.   Pulmonary:      Effort: Pulmonary effort is normal.      Breath sounds: Normal breath sounds.   Abdominal:      General: Bowel sounds are normal. There is no distension.      Palpations: Abdomen is soft. There is no mass.      Tenderness: There is no abdominal tenderness.   Musculoskeletal:         General: No swelling or deformity. Normal range of motion.      Cervical back: Normal range of motion and neck supple.   Skin:     Coloration: Skin is not jaundiced.   Neurological:      General: No focal deficit present.      Mental Status: He is alert and oriented to person, place, and time.      Cranial Nerves: No cranial nerve deficit.   Psychiatric:         Mood and Affect: Mood normal.         Behavior: Behavior normal.         Laboratory Data:  No visits with results within 1 Week(s) from this visit.   Latest known visit with results is:   Hospital Outpatient Visit on " 04/21/2025   Component Date Value Ref Range Status    RA Width 04/21/2025 3.89  cm Final    LA Vol (MOD) 04/21/2025 41  mL Final    LV ESV A2C 04/21/2025 41.85  mL Final    LV EDV A4C 04/21/2025 96.19  mL Final    LA area A4C 04/21/2025 16.76  cm2 Final    LA area A2C 04/21/2025 16.94  cm2 Final    LV ESV A4C 04/21/2025 38.94  mL Final    LV EDV A2C 04/21/2025 76.857820817363624  mL Final    Triscuspid Valve Regurgitation Pea* 04/21/2025 30  mmHg Final    Left Atrium Major Axis 04/21/2025 5.2  cm Final    Left Atrium Minor Axis 04/21/2025 5.1  cm Final    RA Major Axis 04/21/2025 5.07  cm Final    LV Diastolic Volume 04/21/2025 103  mL Final    LV Systolic Volume 04/21/2025 49  mL Final    PV Peak D [place] 04/21/2025 0.88  m/s Final    PV Peak S [place] 04/21/2025 0.65  m/s Final    MV Peak A [place] 04/21/2025 0.75  m/s Final    MV stenosis pressure 1/2 time 04/21/2025 53.35  ms Final    MV VTI 04/21/2025 27.5  cm Final    TR Max [place] 04/21/2025 2.6  m/s Final    AR Max [place] 04/21/2025 4.26  m/s Final    MV Peak E [place] 04/21/2025 1.04  m/s Final    Vn Nyquist MS 04/21/2025 0.39  m/s Final    PV mean gradient 04/21/2025 1  mmHg Final    MV peak gradient 04/21/2025 5  mmHg Final    Mr max [place] 04/21/2025 5.19  m/s Final    Radius 04/21/2025 1.10  cm Final    Vn Nyquist 04/21/2025 0.39  m/s Final    RVOT peak VTI 04/21/2025 9.6  cm Final    RVOT peak [place] 04/21/2025 0.57  m/s Final    Ao VTI 04/21/2025 24.6  cm Final    Ao peak london 04/21/2025 1.3  m/s Final    LVOT peak VTI 04/21/2025 16.6  cm Final    LVOT peak london 04/21/2025 0.9  m/s Final    LVOT diameter 04/21/2025 2.2  cm Final    IVRT 04/21/2025 97  msec Final    E wave deceleration time 04/21/2025 184  msec Final    PV peak gradient 04/21/2025 5  mmHg Final    MV mean gradient 04/21/2025 3  mmHg Final    AV mean gradient 04/21/2025 4  mmHg Final    AV regurgitation pressure 1/2 time 04/21/2025 278  ms Final    RV- scott basal diam 04/21/2025 2.9  cm Final    A4C EF 04/21/2025  62  % Final    A2C EF 04/21/2025 60  % Final    RVDD 04/21/2025 2.87  cm Final    LA size 04/21/2025 3.8  cm Final    Ascending aorta 04/21/2025 2.66  cm Final    STJ 04/21/2025 2.20  cm Final    Sinus 04/21/2025 2.30  cm Final    LVIDs 04/21/2025 3.4  2.1 - 4.0 cm Final    Ao root annulus 04/21/2025 2.85  cm Final    PW 04/21/2025 1.0  0.6 - 1.1 cm Final    IVS 04/21/2025 0.9  0.6 - 1.1 cm Final    LVIDd 04/21/2025 4.7  3.5 - 6.0 cm Final    PV PEAK VELOCITY 04/21/2025 1.07  m/s Final    TDI LATERAL 04/21/2025 0.11  m/s Final    Left Ventricular Outflow Tract Elisha* 04/21/2025 1.63  mmHg Final    Left Ventricular Outflow Tract Elisha* 04/21/2025 0.60  cm/s Final    Left Ventricular End Systolic Volu* 04/21/2025 48.90  mL Final    Left Ventricular End Diastolic Vol* 04/21/2025 102.52  mL Final    Suazo's Biplane MOD Ejection Fra* 04/21/2025 62  % Final    IVC diameter 04/21/2025 1.58  cm Final    TDI SEPTAL 04/21/2025 0.13  m/s Final    LV LATERAL E/E' RATIO 04/21/2025 9.5  m/s Final    LV SEPTAL E/E' RATIO 04/21/2025 8.0  m/s Final    RV/LV Ratio 04/21/2025 0.62  cm Final    FS 04/21/2025 27.7 (A)  28 - 44 % Final    LV mass 04/21/2025 153.4  g Final    Left Ventricle Relative Wall Thick* 04/21/2025 0.43  cm Final    AV valve area 04/21/2025 2.6  cm² Final    AV Velocity Ratio 04/21/2025 0.69   Final    AV index (prosthetic) 04/21/2025 0.67   Final    MV valve area p 1/2 method 04/21/2025 4.12  cm2 Final    MV valve area by continuity eq 04/21/2025 2.29  cm2 Final    E/A ratio 04/21/2025 1.39   Final    Mean e' 04/21/2025 0.12  m/s Final    Pulm vein S/D ratio 04/21/2025 0.74   Final    LVOT area 04/21/2025 3.8  cm2 Final    LVOT stroke volume 04/21/2025 63.1  cm3 Final    AV peak gradient 04/21/2025 7  mmHg Final    E/E' ratio 04/21/2025 9  m/s Final    MR PISA EROA 04/21/2025 0.57  cm2 Final    ANAY by Velocity Ratio 04/21/2025 2.6  cm² Final    BSA 04/21/2025 2.06  m2 Final    LV Systolic Volume Index 04/21/2025  24.1  mL/m2 Final    LV Diastolic Volume Index 04/21/2025 50.74  mL/m2 Final    LV Mass Index 04/21/2025 75.6  g/m2 Final    MERCED (MOD) 04/21/2025 20  mL/m2 Final    ZLVIDS 04/21/2025 -0.64   Final    ZLVIDD 04/21/2025 -2.47   Final    MERCED 04/21/2025 29  mL/m2 Final    LA Vol 04/21/2025 60  cm3 Final    GLS 04/21/2025 18.6  % Final    LA WIDTH 04/21/2025 3.6  cm Final    TV resting pulmonary artery pressu* 04/21/2025 30  mmHg Final    RV TB RVSP 04/21/2025 6  mmHg Final    Est. RA pres 04/21/2025 3  mmHg Final       Lab Results   Component Value Date    WBC 5.12 04/21/2025    HGB 9.6 (L) 04/21/2025    HCT 31.9 (L) 04/21/2025     (H) 04/21/2025     (L) 04/21/2025       Lab Results   Component Value Date     04/21/2025    K 4.4 04/21/2025     04/21/2025    CO2 26 04/21/2025    BUN 28 (H) 04/21/2025    CREATININE 1.4 04/21/2025    CALCIUM 8.5 (L) 04/21/2025    ANIONGAP 5 (L) 04/21/2025    ESTGFRAFRICA >60.0 06/08/2022    EGFRNONAA >60.0 06/08/2022       Lab Results   Component Value Date    ALT 19 04/21/2025    AST 29 04/21/2025    GGT 32 09/26/2016    ALKPHOS 113 04/21/2025    BILITOT 0.3 04/21/2025       Lab Results   Component Value Date    TACROLIMUS 2.8 (L) 12/02/2024         I personally reviewed imaging studies and outside records..        Assessment:       1. Chronic viral hepatitis B without delta agent and without coma    2. Immunosuppression    3. Liver transplanted    4. Lymphadenopathy    5. Therapeutic drug monitoring    6. 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   --Additional testing to be completed according to Written Order Guidelines for Post-Liver and Combined Liver/Kidney Transplant Follow-up (LI-09)    Lymphoma  Follow up Oncology  In Remission. do not recommend stem  cell transplant for alk negative T-cell lymphoma.   Continue HBV Prophylaxis    3. Drug therapy requiring intensive monitoring for toxicity    -High Risk Medication Monitoring encounter    -No current medication related issues, no evidence of toxicity      4. Health Maintenance/Screening:  Monitor BP  - Recommend age appropriate cancer screening  - Skin cancer: Recommend use of sunscreen SPF 30 or higher, hat and sunglasses while outside, dermatologist visit annually or sooner if any concerning lesions.  - Osteoporosis: Recommend bone density testing every 2-3 years if previously normal or annually if previously abnormal.  -Dental: Cleaning andfollow up every 6 month      Education:   Material provided to the patient.  Patient reminded to call with any health changes since these can be early signs of significant complications.  Also, I advised the patient to be sure any new medications or changes of old medications are discussed with either a pharmacist or physician knowledgeable with transplant to avoid rejection/drug toxicity related to significant drug interactions.    Patient advised that it is recommended that all transplant candidates, and their close contacts and household members receive Covid vaccination    Return to clinic in 6 months.    I have sent communication to the referring physician and/or primary care provider.    Time spent on the day of this encounter preparing for, treating and managing this patient and over half of that time was spent on counseling and coordination of care.  30 minutes were spend reviewing the previous labs and outside records. I also educated the patient about lifestyle modifications. I have provided the patient with an opportunity to ask questions and have all questions answered to his satisfaction.     Patient was seen in the liver transplant department at [facility] Yanet Odom MD  Transplant Hepatology & Gastroenterology  [facility] [place]         [1]   Current Outpatient Medications   Medication Sig Dispense Refill    acyclovir (ZOVIRAX) 400 MG tablet Take 1 tablet (400 mg total) by mouth 2 (two) times daily. While on chemotherapy 60 tablet 4    albuterol (VENTOLIN HFA) 90 mcg/actuation inhaler Inhale 2 puffs into the lungs every 6 (six) hours as needed for Wheezing or Shortness of Breath. Rescue 18 g 3    apixaban (ELIQUIS) 5 mg Tab Take 1 tablet (5 mg total) by mouth 2 (two) times daily. 180 tablet 1    atorvastatin (LIPITOR) 80 MG tablet Take 1 tablet (80 mg total) by mouth once daily. 90 tablet 3    esomeprazole (NEXIUM) 40 MG capsule Take 1 capsule (40 mg total) by mouth once daily. 90 capsule 3    fluticasone-umeclidin-vilanter (TRELEGY ELLIPTA) 100-62.5-25 mcg DsDv Inhale 1 puff into the lungs once daily. 60 each 5    furosemide (LASIX) 20 MG tablet Take 1 tablet (20 mg total) by mouth once daily. 30 tablet 3    melatonin 10 mg Tab Take by mouth.      metoprolol succinate (TOPROL-XL) 25 MG 24 hr tablet Take 1 tablet (25 mg total) by mouth once daily. 90 tablet 3    nicotine (NICODERM CQ) 21 mg/24 hr Place 1 patch onto the skin once daily. 28 patch 2    nitroGLYCERIN (NITROSTAT) 0.4 MG SL tablet Place 1 tablet (0.4 mg total) under the tongue every 5 (five) minutes as needed for Chest pain (angina). 25 tablet 0    prochlorperazine (COMPAZINE) 5 MG tablet Take 1 tablet (5 mg total) by mouth every 6 (six) hours as needed (nausea). 20 tablet 5    sirolimus (RAPAMUNE) 1 MG Tab Take 3 mg (3 tablets) on day one, then 1 mg (1 tablet) every day thereafter. 33 tablet 11    tamsulosin (FLOMAX) 0.4 mg Cap Take 1 capsule (0.4 mg total) by mouth once daily. 30 capsule 2    tenofovir alafenamide (VEMLIDY) 25 mg Tab Take 1 tablet (25 mg total) by mouth Daily. 30 tablet 11    aspirin 81 MG Chew Take 1 tablet (81 mg total) by mouth once daily. 30 tablet 0    diclofenac sodium (VOLTAREN) 1 % Gel Apply 2 g topically 4 (four) times daily. for  10 days (Patient not taking: Reported on 3/18/2025) 100 g 0    HYDROcodone-acetaminophen (NORCO) 5-325 mg per tablet Take 1 tablet by mouth every 6 (six) hours as needed for Pain. (Patient not taking: Reported on 4/29/2025) 12 tablet 0    LIDOcaine-prilocaine (EMLA) cream Apply topically as needed. (Patient not taking: Reported on 4/29/2025) 30 g 11    ondansetron (ZOFRAN) 8 MG tablet Take 1 tablet (8 mg total) by mouth every 12 (twelve) hours as needed for Nausea. (Patient not taking: Reported on 4/29/2025) 20 tablet 0    sulfamethoxazole-trimethoprim 800-160mg (BACTRIM DS) 800-160 mg Tab Take 1 tablet by mouth 3 (three) times a week. (Patient not taking: Reported on 4/29/2025) 12 tablet 4    tacrolimus (PROGRAF) 0.5 MG Cap Take by mouth. (Patient not taking: Reported on 4/29/2025)      tenofovir alafenamide (VEMLIDY) 25 mg Tab Take 1 tablet by mouth once daily (Patient not taking: Reported on 4/29/2025) 30 tablet 11     No current facility-administered medications for this visit.     Facility-Administered Medications Ordered in Other Visits   Medication Dose Route Frequency Provider Last Rate Last Admin    lactated ringers infusion   Intravenous Continuous Linwood Ellsworth MD   New Bag at 01/20/21 0734    sodium chloride 0.9% flush 2 mL  2 mL Intravenous PRN Linwood Ellsworth MD

## 2025-04-29 ENCOUNTER — TELEPHONE (OUTPATIENT)
Dept: TRANSPLANT | Facility: CLINIC | Age: 70
End: 2025-04-29
Payer: MEDICARE

## 2025-04-29 ENCOUNTER — OFFICE VISIT (OUTPATIENT)
Dept: HEPATOLOGY | Facility: CLINIC | Age: 70
End: 2025-04-29
Payer: MEDICARE

## 2025-04-29 VITALS
HEART RATE: 85 BPM | OXYGEN SATURATION: 95 % | BODY MASS INDEX: 27.67 KG/M2 | DIASTOLIC BLOOD PRESSURE: 90 MMHG | WEIGHT: 186.81 LBS | HEIGHT: 69 IN | SYSTOLIC BLOOD PRESSURE: 152 MMHG

## 2025-04-29 DIAGNOSIS — B18.1 CHRONIC VIRAL HEPATITIS B WITHOUT DELTA AGENT AND WITHOUT COMA: Primary | ICD-10-CM

## 2025-04-29 DIAGNOSIS — Z51.81 THERAPEUTIC DRUG MONITORING: ICD-10-CM

## 2025-04-29 DIAGNOSIS — Z94.4 LIVER TRANSPLANTED: ICD-10-CM

## 2025-04-29 DIAGNOSIS — D84.9 IMMUNOSUPPRESSION: ICD-10-CM

## 2025-04-29 DIAGNOSIS — R59.1 LYMPHADENOPATHY: ICD-10-CM

## 2025-04-29 DIAGNOSIS — C84.75 ANAPLASTIC ALK-NEGATIVE LARGE CELL LYMPHOMA OF LYMPH NODE OF LOWER EXTREMITY: ICD-10-CM

## 2025-04-29 PROCEDURE — 99999 PR PBB SHADOW E&M-EST. PATIENT-LVL IV: CPT | Mod: PBBFAC,HCNC,, | Performed by: INTERNAL MEDICINE

## 2025-04-29 NOTE — TELEPHONE ENCOUNTER
No change  noted.   Transplant Snap Shot updated.       ----- Message from Duc Odom MD sent at 4/29/2025  9:23 AM CDT -----  No change

## 2025-04-29 NOTE — PATIENT INSTRUCTIONS
Maintenance:  -Instructed to f/u with PCP for regular health maintenance care including cancer screenings and BMD  -Reviewed need to avoid sun exposure with use of sunblock, hats, long sleeves related to increased risk of skin cancers  -Recommend f/u with Dermatology for annual skin checks  - continue anti-hypertensive agents, and check with PCP, goal /80 mm Hg  - post liver transplant counseling      Liver transplant recipients should avoid the consumption of water from lakes and rivers.    Liver transplant recipients should avoid unpasteurized milk products and raw and undercooked eggs and meats (particularly uncooked pork, poultry, fish,  and seafood.    Liver transplant recipients should avoid high-risk pets, which include rodents, reptiles,chicks, ducklings, and birds.    Liver transplant recipients should take precautions to prevent vector (including mosquito) -borne diseases. These include avoiding going out during  peak mosquito feeding times (kelli and dusk) and using N,C-rldkpelaevt-ctmyccxvb-containing insect repellants.    For female Liver Transplant recipients of a child-bearing age, preconception counseling about contraception and the risks and outcomes of  pregnancy should start in the pretransplant period and should be reinforced after transplantation.    Contraception should begin before the resumption of sexual activity, although no particular form of contraception can be recommended  over another.    The treatment of diabetes mellitus after LT should aim for a hemoglobin A1c (HbA1c) target goal of <7.0% with a combination of lifestyle modifications and pharmacological  agents as appropriate.    In the first 5 years after transplantation, screening by bone mineral density (BMD) should be done yearly for osteopenic patients and every  2 to 3 years for patients with normal BMD; thereafter, screening depends on the progression of BMD and on risk factors.    The osteopenic LT recipient should  perform regular weight-bearing exercise and receive calcium and vitamin D supplements. Bisphosphonate therapy should be  considered in LT recipients with osteoporosis or recent fractures.      Monitoring of renal function in LT recipients for the detection and management of chronic kidney disease should use  an estimating equation to evaluate the glomerular filtration rate.      The treatment of hypertension should aim for a target goal of 130/80 mm Hg with a combination of lifestyle modifications and  pharmacological agents as appropriate.      The measurement of blood lipids after a 14-hour fast is recommended annually for healthy LT recipients. An elevated LDL >100 mg/dL, with or without  hypertriglyceridemia, requires therapy. If therapeutic lifestyle and dietary changes are not enough, statin therapy should be  introduced. Suboptimal control with statins can be improved by the addition of ezetimibe.    Isolated hypertriglyceridemia is first treated with omega-3 fatty acids (up to 4 g daily if tolerated). If this is not sufficient for  control, gemfibrozil or fenofibrate can be added.    Cancer surveillance

## 2025-04-30 ENCOUNTER — INFUSION (OUTPATIENT)
Dept: INFUSION THERAPY | Facility: HOSPITAL | Age: 70
End: 2025-04-30
Attending: INTERNAL MEDICINE
Payer: MEDICARE

## 2025-04-30 VITALS
WEIGHT: 186.81 LBS | RESPIRATION RATE: 18 BRPM | HEART RATE: 86 BPM | BODY MASS INDEX: 27.67 KG/M2 | OXYGEN SATURATION: 96 % | TEMPERATURE: 97 F | SYSTOLIC BLOOD PRESSURE: 112 MMHG | HEIGHT: 69 IN | DIASTOLIC BLOOD PRESSURE: 70 MMHG

## 2025-04-30 DIAGNOSIS — C85.80 LARGE CELL LYMPHOMA: ICD-10-CM

## 2025-04-30 DIAGNOSIS — M86.9 OSTEOMYELITIS OF ANKLE AND FOOT: Primary | ICD-10-CM

## 2025-04-30 PROCEDURE — A4216 STERILE WATER/SALINE, 10 ML: HCPCS | Mod: HCNC | Performed by: INTERNAL MEDICINE

## 2025-04-30 PROCEDURE — 63600175 PHARM REV CODE 636 W HCPCS: Mod: HCNC | Performed by: INTERNAL MEDICINE

## 2025-04-30 PROCEDURE — 96523 IRRIG DRUG DELIVERY DEVICE: CPT | Mod: HCNC

## 2025-04-30 PROCEDURE — 25000003 PHARM REV CODE 250: Mod: HCNC | Performed by: INTERNAL MEDICINE

## 2025-04-30 RX ORDER — SODIUM CHLORIDE 0.9 % (FLUSH) 0.9 %
10 SYRINGE (ML) INJECTION
OUTPATIENT
Start: 2025-04-30

## 2025-04-30 RX ORDER — SODIUM CHLORIDE 0.9 % (FLUSH) 0.9 %
10 SYRINGE (ML) INJECTION
Status: DISCONTINUED | OUTPATIENT
Start: 2025-04-30 | End: 2025-04-30 | Stop reason: HOSPADM

## 2025-04-30 RX ORDER — HEPARIN 100 UNIT/ML
500 SYRINGE INTRAVENOUS
OUTPATIENT
Start: 2025-04-30

## 2025-04-30 RX ORDER — HEPARIN 100 UNIT/ML
500 SYRINGE INTRAVENOUS
Status: DISCONTINUED | OUTPATIENT
Start: 2025-04-30 | End: 2025-04-30 | Stop reason: HOSPADM

## 2025-04-30 RX ADMIN — HEPARIN 500 UNITS: 100 SYRINGE at 08:04

## 2025-04-30 RX ADMIN — Medication 10 ML: at 08:04

## 2025-05-01 ENCOUNTER — OFFICE VISIT (OUTPATIENT)
Dept: PODIATRY | Facility: CLINIC | Age: 70
End: 2025-05-01
Payer: MEDICARE

## 2025-05-01 DIAGNOSIS — Z94.4 LIVER TRANSPLANTED: ICD-10-CM

## 2025-05-01 DIAGNOSIS — L60.3 ONYCHODYSTROPHY: ICD-10-CM

## 2025-05-01 DIAGNOSIS — Z79.01 ANTICOAGULATED: ICD-10-CM

## 2025-05-01 DIAGNOSIS — D84.9 IMMUNOSUPPRESSION: Primary | ICD-10-CM

## 2025-05-01 DIAGNOSIS — I82.722 CHRONIC DEEP VEIN THROMBOSIS (DVT) OF LEFT UPPER EXTREMITY, UNSPECIFIED VEIN: ICD-10-CM

## 2025-05-01 PROCEDURE — 99999 PR PBB SHADOW E&M-EST. PATIENT-LVL III: CPT | Mod: PBBFAC,HCNC,, | Performed by: PODIATRIST

## 2025-05-01 NOTE — PROGRESS NOTES
Subjective:       Patient ID: Lj Coleman is a 69 y.o. male.    Chief Complaint: Routine Foot Care (4.23.25 Jannette Peralta. He states he has hangnails to bilateral toes. He complains of 8/10 pain at present and is non diabetic. )      HPI: Patient presents to the office today with the chief complaint of elongated, thickened and dystrophic nail plates to the B/L foot.  This patient does have a PMHx. of long-term anticoagulation therapy and currently on immunosuppressive therapy due to history of lymphoma.  Recently completed chemotherapy.  Patient does follow with Primary Care for management of comorbid states. This patient's PMD is Isabella Sutton DO. This patient last saw his/her hematologist/oncologist provider on 4/23/2025, Dr. Peralta .    Review of patient's allergies indicates:   Allergen Reactions    Codeine Other (See Comments)     Upset stomach    Doxorubicin Other (See Comments)     Hypotension, dizziness, itching hives relieved with benadryl. 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    Anemia 11/23/2024    Angina pectoris     Arthritis     back    Atherosclerosis of native coronary artery without angina pectoris/ LAD 09/08/2016    Back pain     Chronic hepatitis C with cirrhosis     COPD (chronic obstructive pulmonary disease)     Depression     Hepatoma     Prior to liver transplant    History of colon polyps 09/09/2015    History of transplantation, liver 04/2012    History of vertebral fracture     Hypertension     Immune deficiency disorder     Incisional hernia following transplant 04/09/2014    Liver failure 11/2011    Related to chemotherapy for hepatoma    Liver transplanted 04/2012    NSTEMI (non-ST elevated myocardial infarction) 11/23/2024    Obesity     PVCs  (premature ventricular contractions)     Renal dysfunction 11/23/2024    Tobacco abuse 09/09/2016    Trouble in sleeping     Vertebral fracture 1975       Family History   Problem Relation Name Age of Onset    Cancer Mother          lung    Cancer Father          bladder    Diabetes Maternal Uncle      Cancer Maternal Grandmother          uterine?    Cancer Other      Heart disease Neg Hx      Kidney disease Neg Hx      Stroke Neg Hx         Social History[1]    Past Surgical History:   Procedure Laterality Date    COLONOSCOPY N/A 1/20/2021    Procedure: COLONOSCOPY;  Surgeon: Emerson Helm MD;  Location: [facility] ENDO;  Service: Endoscopy;  Laterality: N/A;    HERNIA REPAIR Right 2013    incisional    INSERTION OF TUNNELED CENTRAL VENOUS CATHETER (CVC) WITH SUBCUTANEOUS PORT Left 11/6/2024    Procedure: [id number];  Surgeon: Hao Washburn MD;  Location: [facility] OR;  Service: General;  Laterality: Left;    LIVER TRANSPLANT  April 2012    LYMPH NODE BIOPSY/EXCISION Left 10/16/2024    Procedure: LYMPH NODE BIOPSY/EXCISION;  Surgeon: Hao Washburn MD;  Location: [facility] OR;  Service: General;  Laterality: Left;    MOUTH SURGERY      TONSILLECTOMY  1958     Review of Systems    Objective:   There were no vitals taken for this visit.    Physical Exam  LOWER EXTREMITY PHYSICAL EXAMINATION    VASCULAR:  The right dorsalis pedis pulse 2/4 and the right posterior tibial pulse 2/4.  The left dorsalis pedis pulse 2/4 and posterior tibial pulse on the left is 2/4.  Capillary refill is intact.  Pedal hair growth intact    NEUROLOGY: Protective sensation is not intact to the left and right plantar surfaces of the foot and digits, as the patient has no sensation/detection at greater than 4 distinct points of contact with 5.07 [place] monofilament. Sensation to light touch is intact on the left and right foot. Proprioception is intact, bilateral. Sensation to pin prick is reduced to absent. Vibratory  sensation is diminished.    DERMATOLOGY:  Skin is supple, moist, intact.  There is no signs of callusing, ulcerations, other lesions identified to the dorsal or plantar aspect of the right or left foot.  The R1, 2, 5 and left L1,2, 5 are thickened, discolored dystrophic.  There is subungual debris.  Nail plates have area of dark discoloration.  The remaining nails on the right foot and the left foot are elongated but of normal color, thickness, and texture.   There is no signs of ingrowing into the medial or lateral borders.  There is no evidence of wounds or skin breakdown.  No edema or erythema.  No obvious lacerations or fissuring.  Interdigital spaces are clean, dry, intact.  No rashes or scars appreciated.    ORTHOPEDIC: Manual Muscle Testing is 5/5 in all planes on the left and right, without pains, with and without resistance. Gait pattern is non-antalgic.    Assessment:     1. Immunosuppression    2. Liver transplanted    3. Anticoagulated    4. Chronic deep vein thrombosis (DVT) of left upper extremity, unspecified vein    5. Onychodystrophy      Plan:     Immunosuppression    Liver transplanted    Anticoagulated    Chronic deep vein thrombosis (DVT) of left upper extremity, unspecified vein    Onychodystrophy      Thorough discussion is had with the patient today, concerning the diagnosis, its etiology, and the treatment algorithm at present.    Dystrophic nail plates, as outlined above (R#1,2,5  ; L#1,2,5 ), are sharply debrided with double action nail nipper, and/or with the assistance of a mechanical rotary jaret, with removal of all offending nail and nail border(s), for reduction of pains. Nails are reduced in terms of length, width and girth with removal of subungual debris to facilitate pain free weight bearing and ambulation. The elongated nails as outlined in the objective portion of this note, were trimmed to appropriate length, with a double action nail nipper, for alleviation/reduction of pains  as well. Follow up in approx. 3-4 months.      Future Appointments   Date Time Provider Department Center   2025  9:15 AM Jacob Martinez MD [facility]   2025  9:00 AM INJECTION 1, [facility] INFUSION [facility]   2025  8:00 AM [place] [facility]   2025  9:15 AM [facility] CT1 LIMIT 500 LBS [facility] CT SCAN [facility]   2025  9:30 AM PULMONARY DISEASE MANAGEMENT [facility]ULM[facility]   2025 11:20 AM Elton Denis MD [facility] CARDIO [facility]           [1]   Social History  Socioeconomic History    Marital status:     Highest education level: 10th grade   Tobacco Use    Smoking status: Former     Current packs/day: 0.00     Average packs/day: 0.7 packs/day for 37.1 years (27.8 ttl pk-yrs)     Types: Cigarettes     Start date:      Quit date: 2025     Years since quittin.2    Smokeless tobacco: Never    Tobacco comments:     Quit smoking in 2025   Substance and Sexual Activity    Alcohol use: Not Currently     Comment: Occasionally    Drug use: No    Sexual activity: Not Currently     Social Drivers of Health     Financial Resource Strain: Low Risk  (2025)    Overall Financial Resource Strain (CARDIA)     Difficulty of Paying Living Expenses: Not very hard   Food Insecurity: Patient Declined (2025)    Hunger Vital Sign     Worried About Running Out of Food in the Last Year: Patient declined     Ran Out of Food in the Last Year: Patient declined   Transportation Needs: Patient Declined (2024)    TRANSPORTATION NEEDS     Transportation : Patient declined   Physical Activity: Inactive (2025)    Exercise Vital Sign     Days of Exercise per Week: 0 days     Minutes of Exercise per Session: 0 min   Stress: No Stress Concern Present (2025)    [employer] - Occupational Stress Questionnaire     Feeling of Stress : Only a little   Housing Stability: Patient Declined (2025)     Housing Stability Vital Sign     Unable to Pay for Housing in the Last Year: Patient declined     Homeless in the Last Year: Patient declined

## 2025-05-04 ENCOUNTER — PATIENT MESSAGE (OUTPATIENT)
Dept: HEMATOLOGY/ONCOLOGY | Facility: CLINIC | Age: 70
End: 2025-05-04
Payer: MEDICARE

## 2025-05-07 ENCOUNTER — OFFICE VISIT (OUTPATIENT)
Dept: OTOLARYNGOLOGY | Facility: CLINIC | Age: 70
End: 2025-05-07
Payer: MEDICARE

## 2025-05-07 VITALS — BODY MASS INDEX: 28.1 KG/M2 | WEIGHT: 190.25 LBS

## 2025-05-07 DIAGNOSIS — K11.8 PAROTID MASS: ICD-10-CM

## 2025-05-07 DIAGNOSIS — H61.21 RIGHT EAR IMPACTED CERUMEN: Primary | ICD-10-CM

## 2025-05-07 PROCEDURE — 99999 PR PBB SHADOW E&M-EST. PATIENT-LVL IV: CPT | Mod: PBBFAC,HCNC,, | Performed by: OTOLARYNGOLOGY

## 2025-05-07 NOTE — PROGRESS NOTES
Referring Provider:    Timmy Peralta Md  [address]  [place] [address]  Subjective:   Patient: Lj Coleman [id number], :1955   Visit date:2025 10:08 AM    Chief Complaint:  salivary gland (Pt is coming in for salivary gland pt states he is not having any problem at this moment he is doing well )    HPI:    Prior notes reviewed by myself.  Clinical documentation obtained by nursing staff reviewed.      69-year-old gentleman referred by Dr. Peralta for evaluation of parotid mass.  He has been recently treated for anaplastic large-cell lymphoma.  His PET/CT scans have demonstrated a hypermetabolic right-sided parotid lesion.  He was unaware of this lesion and is not having any symptoms that he attributes to this lesion.  He has not had a prior biopsy      Objective:     Physical Exam:  Vitals:  Wt 86.3 kg (190 lb 4.1 oz)   BMI 28.10 kg/m²   General appearance:  Well developed, well nourished    Ears:  Otoscopy of external auditory canals with 90% cerumen impaction right side and tympanic membranes was normal, clinical speech reception thresholds grossly intact, no mass/lesion of auricle.    Nose:  No masses/lesions of external nose, nasal mucosa, septum, and turbinates were within normal limits.    Mouth:  No mass/lesion of lips, teeth, gums, hard/soft palate, tongue, tonsils, or oropharynx.    Neck & Lymphatics:  No cervical lymphadenopathy, no neck mass/crepitus/ asymmetry, trachea is midline, no thyroid enlargement/tenderness/mass.    Procedure Note    CHIEF COMPLAINT:  Cerumen Impaction    Description:  The patient was seated in an exam chair.  An ear speculum was placed in the right EAC and was examined under the microscope.  Suction and/or loop curettes were used to remove a large cerumen impaction.  The tympanic membrane was visualized and was normal in appearance.  The patient tolerated the procedure well.      [x]  Data Reviewed:    Lab Results   Component Value Date     WBC 5.12 04/21/2025    HGB 9.6 (L) 04/21/2025    HCT 31.9 (L) 04/21/2025     (H) 04/21/2025    EOSINOPHIL 0.8 03/11/2025         [x]  Independent interpretation of test: 9.8 SUV right parotid mass  NM PET CT FDG Skull Base to Mid Thigh  Order: [id number]   Status: Final result       Next appt: 05/19/2025 at 09:15 AM in Pulmonology (Jacob Martinez MD)       Dx: Anaplastic ALK-negative large cell ly...    Test Result Released: Yes (seen)       Messages: Seen    1 Result Note       1 Patient Communication  Details    Reading Physician Reading Date Result Priority   Hamlet Goins,   [phone]  1/16/2025 Routine     Narrative & Impression  EXAMINATION:  NM PET CT FDG SKULL BASE TO MID THIGH     CLINICAL HISTORY:  Hematologic malignancy, assess treatment response;  Anaplastic large cell lymphoma, alk-negative, lymph nodes of inguinal region and lower limb     TECHNIQUE:  Segmented attenuation corrected 3-D PET imaging was obtained from the skull base through the mid thighs utilizing 11.96 mCi F-18-FDG.  Glucose level 83 mg/dL noncontrast CT imaging was performed for attenuation correction, diagnosis, and anatomical fusion with PET     COMPARISON:  10/03/2024     FINDINGS:  Head/neck: There is normal physiologic uptake noted within the visualized brain parenchyma. No FDG avid adenopathy within the neck. There is a persistent area of uptake seen within the deep lobe of the right parotid gland that demonstrates an SUV max of 9.8.  This may be representative of residual lymphoma or 2nd primary.  Previously noted lymph nodes within the neck have resolved in the interval.     Chest: No FDG avid pulmonary nodules/masses. No FDG avid mediastinal, hilar or axillary lymph nodes.Previously noted FDG avid bilateral axillary, supraclavicular, mediastinal and hilar lymph nodes have resolved in the interval.     Abdomen/Pelvis: Normal physiologic uptake noted within the liver, spleen, urinary tract, and bowel.The  adrenals are unremarkable in appearance.  The patient is s/p cholecystectomy.  Previously noted periportal and retroperitoneal adenopathy has resolved in the interval.  The iliac chain and inguinal adenopathy is also resolved in the interval.     Skeletal: No FDG avid osseus lesions identified.     Impression:     1. Previously noted FDG avid lymph nodes within the neck, chest, abdomen and pelvis have resolved in the interval consistent with treatment response.  2. Persistent area of uptake seen within the deep lobe of the right parotid gland either representing residual lymphoma or 2nd primary.  Please note benign parotid tumors could also increased uptake.        Electronically signed by:Hamlet Goins,   Date:                                            01/16/2025  Time:                                           12:59        Exam Ended: 01/16/25 12:39 CST Last Resulted: 01/16/25 12:59 CST             Assessment & Plan:   Right ear impacted cerumen    Parotid mass  -     Ambulatory referral/consult to ENT  -     US FNA Biopsy w/ Imaging 1st Lesion; Future; Expected date: 05/07/2025        FDG avid right parotid mass.  Rec u/s guided FNA.  Explained that the majority of parotid lesions are benign, but we can determine next steps after the biopsy.  Debrided right ear without incident.      Milan Tejada M.D.  Department of Otolaryngology - Head & Neck Surgery  [id number] [place] [address].  [place] [id number]  P: [phone]  F: [phone]        DISCLAIMER: This note was prepared with [employer] voice recognition transcription software. Garbled syntax, mangled pronouns, and other bizarre constructions may be attributed to that software system. While efforts were made to correct any mistakes made by this voice recognition program, some errors and/or omissions may remain in the note that were missed when the note was originally created.

## 2025-05-08 ENCOUNTER — TELEPHONE (OUTPATIENT)
Dept: INFUSION THERAPY | Facility: HOSPITAL | Age: 70
End: 2025-05-08
Payer: MEDICARE

## 2025-05-08 NOTE — LETTER
April 5, 2023    [address]  [address]  [facility] [address]          Dear Lj Coleman:  MRN: [id number]    This is a follow up to your recent labs, your lab results were stable.  There are no medicine changes.  Please have your labs drawn again on 4/24/23.      If you cannot have your labs drawn on the scheduled date, it is your responsibility to call the transplant department to reschedule.  Please call [phone] and ask to speak to Isabella CASTILLO   for all scheduling requests.     Sincerely,    Gertrudis SLOANN, RN      Your Liver Transplant Coordinator    [facility]  [address] [place] [id number]  [phone]        [FreeTextEntry1] : Patient is a 47yo F with PAF, ectopy, SVT, NSVT, HTN here for cardiac follow up. Recently admitted to [facility] due to symptomatic rapid AF. Did not response to Ibutilide, was on Cardizem gtt and remained difficult to control so underwent BERTHA/DCCV. Put on metoprolol, cardizem and Eliquis. Feeling tired/exhausted since, dizzy. No syncope/CP/SOB. DOes not feel AF come back but notes some anxiety.   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  with 2 daughters, one graduated in 2023 and youngest starting college now.   ROS: GI and  negative

## 2025-05-19 ENCOUNTER — OFFICE VISIT (OUTPATIENT)
Dept: PULMONOLOGY | Facility: CLINIC | Age: 70
End: 2025-05-19
Payer: MEDICARE

## 2025-05-19 VITALS
HEIGHT: 69 IN | SYSTOLIC BLOOD PRESSURE: 110 MMHG | BODY MASS INDEX: 27.77 KG/M2 | DIASTOLIC BLOOD PRESSURE: 68 MMHG | HEART RATE: 84 BPM | OXYGEN SATURATION: 99 % | RESPIRATION RATE: 12 BRPM | WEIGHT: 187.5 LBS

## 2025-05-19 DIAGNOSIS — Z94.4 LIVER TRANSPLANTED: ICD-10-CM

## 2025-05-19 DIAGNOSIS — C84.78 ANAPLASTIC ALK-NEGATIVE LARGE CELL LYMPHOMA OF LYMPH NODES OF MULTIPLE REGIONS: ICD-10-CM

## 2025-05-19 DIAGNOSIS — D49.89: ICD-10-CM

## 2025-05-19 DIAGNOSIS — Z86.19 HISTORY OF HEPATITIS C: ICD-10-CM

## 2025-05-19 DIAGNOSIS — I82.722 CHRONIC DEEP VEIN THROMBOSIS (DVT) OF LEFT UPPER EXTREMITY, UNSPECIFIED VEIN: ICD-10-CM

## 2025-05-19 DIAGNOSIS — C84.75 ANAPLASTIC ALK-NEGATIVE LARGE CELL LYMPHOMA OF LYMPH NODE OF LOWER EXTREMITY: ICD-10-CM

## 2025-05-19 DIAGNOSIS — J44.9 MODERATE COPD (CHRONIC OBSTRUCTIVE PULMONARY DISEASE): Primary | ICD-10-CM

## 2025-05-19 DIAGNOSIS — K11.8 PAROTID MASS: ICD-10-CM

## 2025-05-19 DIAGNOSIS — D84.9 IMMUNOSUPPRESSION: ICD-10-CM

## 2025-05-19 PROCEDURE — 3078F DIAST BP <80 MM HG: CPT | Mod: CPTII,HCNC,S$GLB, | Performed by: INTERNAL MEDICINE

## 2025-05-19 PROCEDURE — 99999 PR PBB SHADOW E&M-EST. PATIENT-LVL V: CPT | Mod: PBBFAC,HCNC,, | Performed by: INTERNAL MEDICINE

## 2025-05-19 PROCEDURE — 1101F PT FALLS ASSESS-DOCD LE1/YR: CPT | Mod: CPTII,HCNC,S$GLB, | Performed by: INTERNAL MEDICINE

## 2025-05-19 PROCEDURE — 99214 OFFICE O/P EST MOD 30 MIN: CPT | Mod: HCNC,S$GLB,, | Performed by: INTERNAL MEDICINE

## 2025-05-19 PROCEDURE — 1160F RVW MEDS BY RX/DR IN RCRD: CPT | Mod: CPTII,HCNC,S$GLB, | Performed by: INTERNAL MEDICINE

## 2025-05-19 PROCEDURE — 1159F MED LIST DOCD IN RCRD: CPT | Mod: CPTII,HCNC,S$GLB, | Performed by: INTERNAL MEDICINE

## 2025-05-19 PROCEDURE — 1125F AMNT PAIN NOTED PAIN PRSNT: CPT | Mod: CPTII,HCNC,S$GLB, | Performed by: INTERNAL MEDICINE

## 2025-05-19 PROCEDURE — 3074F SYST BP LT 130 MM HG: CPT | Mod: CPTII,HCNC,S$GLB, | Performed by: INTERNAL MEDICINE

## 2025-05-19 PROCEDURE — 3288F FALL RISK ASSESSMENT DOCD: CPT | Mod: CPTII,HCNC,S$GLB, | Performed by: INTERNAL MEDICINE

## 2025-05-19 PROCEDURE — 3008F BODY MASS INDEX DOCD: CPT | Mod: CPTII,HCNC,S$GLB, | Performed by: INTERNAL MEDICINE

## 2025-05-19 RX ORDER — ACYCLOVIR 400 MG/1
400 TABLET ORAL 2 TIMES DAILY
Qty: 60 TABLET | Refills: 4 | Status: SHIPPED | OUTPATIENT
Start: 2025-05-19

## 2025-05-19 NOTE — PROGRESS NOTES
"                                         Pulmonary Outpatient Follow Up Visit     Subjective:       Patient ID: Lj Coleman is a 69 y.o. male.    Chief Complaint: SOB / pulmonary hypertension        History of Present Illness    CHIEF COMPLAINT:  Patient presents today for follow up of COPD and T-cell lymphoma    T-CELL LYMPHOMA:  He is due for next infusion on July 28th. He has an upcoming ultrasound-guided FNA biopsy of parotid mass scheduled for the 16th.    RESPIRATORY STATUS:  He denies respiratory problems or hospitalizations for breathing issues in the last 6 months. He occasionally experiences mucus in his throat. He continues Trelegy as prescribed.    VASCULAR HISTORY:  He has a history of left upper extremity blood clot, currently managed with anticoagulants. He reports easy bruising and bleeding, particularly when scrubbing or bumping against objects. He denies any history of clots in the legs. His bilateral ankle swelling has improved.    SOCIAL HISTORY:  He quit smoking approximately two months ago.         60 pack year smoking history quit 2 months ago.          Review of Systems   Respiratory:  Positive for cough, sputum production, shortness of breath, dyspnea on extertion and use of rescue inhaler.        Encounter Medications[1]    Objective:     Vital Signs (Most Recent)  Vital Signs  Pulse: 84  Resp: 12  SpO2: 99 %  BP: 110/68  Pain Score:   7  Pain Loc: Back  Height and Weight  Height: 5' 9" (175.3 cm)  Weight: 85.1 kg (187 lb 8 oz)  BSA (Calculated - sq m): 2.03 sq meters  BMI (Calculated): 27.7  Weight in (lb) to have BMI = 25: 168.9]  Wt Readings from Last 2 Encounters:   05/19/25 85.1 kg (187 lb 8 oz)   05/07/25 86.3 kg (190 lb 4.1 oz)       Physical Exam   Constitutional: He is oriented to person, place, and time. He appears well-developed and well-nourished. No distress.   HENT:   Head: Normocephalic.   Pulmonary/Chest: Normal expansion and effort normal. No stridor. No " "respiratory distress. He has no wheezes. He has no rhonchi.   Neurological: He is alert and oriented to person, place, and time.   Psychiatric: He has a normal mood and affect. His behavior is normal. Judgment and thought content normal.   Nursing note and vitals reviewed.      Laboratory  Lab Results   Component Value Date    WBC 5.12 04/21/2025    RBC 3.08 (L) 04/21/2025    HGB 9.6 (L) 04/21/2025    HCT 31.9 (L) 04/21/2025     (H) 04/21/2025    MCH 31.2 (H) 04/21/2025    MCHC 30.1 (L) 04/21/2025    RDW 16.8 (H) 04/21/2025     (L) 04/21/2025    MPV 11.6 04/21/2025    GRAN 4.8 03/11/2025    GRAN 79.7 (H) 03/11/2025    LYMPH 12.7 (L) 04/21/2025    LYMPH 0.65 (L) 04/21/2025    MONO 9.6 04/21/2025    MONO 0.49 04/21/2025    EOS 8.4 (H) 04/21/2025    EOS 0.43 04/21/2025    BASO 0.05 03/11/2025    EOSINOPHIL 0.8 03/11/2025    BASOPHIL 1.4 04/21/2025    BASOPHIL 0.07 04/21/2025       BMP  Lab Results   Component Value Date     04/21/2025    K 4.4 04/21/2025     04/21/2025    CO2 26 04/21/2025    BUN 28 (H) 04/21/2025    CREATININE 1.4 04/21/2025    CALCIUM 8.5 (L) 04/21/2025    ANIONGAP 5 (L) 04/21/2025    ESTGFRAFRICA >60.0 06/08/2022    EGFRNONAA >60.0 06/08/2022    AST 29 04/21/2025    ALT 19 04/21/2025    PROT 6.6 04/21/2025       Lab Results   Component Value Date     (H) 12/03/2024     (H) 11/23/2024    BNP 15 06/11/2015       Lab Results   Component Value Date    TSH 1.168 06/06/2023       Lab Results   Component Value Date    SEDRATE 19 (H) 03/29/2023       Lab Results   Component Value Date    CRP 5.1 04/08/2025     No results found for: "IGE"     No results found for: "ASPERGILLUS"  No results found for: "AFUMIGATUSCL"     No results found for: "ACE"     Diagnostic Results:  I have personally reviewed today the following studies:  As above    Assessment/Plan:   Moderate COPD (chronic obstructive pulmonary disease)  -     Spirometry with/without bronchodilator; Future; " Expected date: 11/19/2025  -     Stress test, pulmonary; Future; Expected date: 11/19/2025    Chronic deep vein thrombosis (DVT) of left upper extremity, unspecified vein    Liver transplanted    Immunosuppression    Neoplasm of inguinal lymph nodes    History of hepatitis C    Parotid mass    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  - Noted completed chemotherapy for T-cell lymphoma.  - Evaluated parotid mass, pending biopsy.  - Considered smoking cessation progress and patient to continue cessation efforts.  - Assessed leg swelling and bruising, likely related to anticoagulation therapy.  - Explained persistent mucus in throat is related to COPD, not smoking.    CHRONIC DVT OF LEFT UPPER EXTREMITY, UNSPECIFIED VEIN:  - Explained increased bleeding risk from anticoagulation therapy and advised to avoid scrubbing legs to prevent bleeding.    MODERATE COPD (CHRONIC OBSTRUCTIVE PULMONARY DISEASE):  - Continued Trelegy inhaler with instructions to rinse mouth with regular water after use.  - Continued rescue inhaler: Use 2 puffs as needed for mucus-related symptoms.  - Follow up in 6 months for pulmonary function testing (approximately 1 year from last testing).    LIFESTYLE CHANGES:  - Patient to continue cessation efforts.         Follow up in about 6 months (around 11/19/2025).    This note was prepared using voice recognition system and is likely to have sound alike errors that may have been overlooked even after proof reading.  Please  call me with any questions    Discussed diagnosis, its evaluation, treatment and usual course. All questions answered.      Jacob Martinez MD         [1]   Outpatient Encounter Medications as of 5/19/2025   Medication Sig Dispense Refill    acyclovir (ZOVIRAX) 400 MG tablet Take 1 tablet (400 mg total) by mouth 2 (two) times daily. While on chemotherapy 60 tablet 4    albuterol (VENTOLIN HFA) 90 mcg/actuation inhaler Inhale 2 puffs into the lungs every 6 (six) hours as needed for Wheezing or Shortness of Breath. Rescue 18 g 3    apixaban (ELIQUIS) 5 mg Tab Take 1 tablet (5 mg total) by mouth 2 (two) times daily. 180 tablet 1    aspirin 81 MG Chew Take 1 tablet (81 mg total) by mouth once daily. 30 tablet 0    atorvastatin (LIPITOR) 80 MG tablet Take 1 tablet (80 mg total) by mouth once daily. 90 tablet 3    esomeprazole (NEXIUM) 40 MG capsule Take 1 capsule (40 mg total) by mouth once daily. 90 capsule 3    fluticasone-umeclidin-vilanter (TRELEGY ELLIPTA) 100-62.5-25 mcg DsDv Inhale 1 puff into the lungs once daily. 60 each 5    furosemide (LASIX) 20 MG tablet Take 1 tablet (20 mg total) by mouth once daily. 30 tablet 3    LIDOcaine-prilocaine (EMLA) cream Apply topically as needed. 30 g 11    melatonin 10 mg Tab Take by mouth.      metoprolol succinate (TOPROL-XL) 25 MG 24 hr tablet Take 1 tablet (25 mg total) by mouth once daily. 90 tablet 3    nicotine (NICODERM CQ) 21 mg/24 hr Place 1 patch onto the skin once daily. 28 patch 2    nitroGLYCERIN (NITROSTAT) 0.4 MG SL tablet Place 1 tablet (0.4 mg total) under the tongue every 5 (five) minutes as needed for Chest pain (angina). 25 tablet 0    sirolimus (RAPAMUNE) 1 MG Tab Take 3 mg (3 tablets) on day one, then 1 mg (1 tablet) every day thereafter. 33 tablet 11    sulfamethoxazole-trimethoprim 800-160mg (BACTRIM DS) 800-160 mg Tab Take 1 tablet by mouth 3 (three) times a week. 12 tablet 4    tacrolimus (PROGRAF) 0.5 MG Cap Take by mouth.      tamsulosin (FLOMAX)  0.4 mg Cap Take 1 capsule (0.4 mg total) by mouth once daily. 30 capsule 2    tenofovir alafenamide (VEMLIDY) 25 mg Tab Take 1 tablet by mouth once daily 30 tablet 11    tenofovir alafenamide (VEMLIDY) 25 mg Tab Take 1 tablet (25 mg total) by mouth Daily. 30 tablet 11    diclofenac sodium (VOLTAREN) 1 % Gel Apply 2 g topically 4 (four) times daily. for 10 days 100 g 0    HYDROcodone-acetaminophen (NORCO) 5-325 mg per tablet Take 1 tablet by mouth every 6 (six) hours as needed for Pain. (Patient not taking: Reported on 5/19/2025) 12 tablet 0    ondansetron (ZOFRAN) 8 MG tablet Take 1 tablet (8 mg total) by mouth every 12 (twelve) hours as needed for Nausea. (Patient not taking: Reported on 5/19/2025) 20 tablet 0    prochlorperazine (COMPAZINE) 5 MG tablet Take 1 tablet (5 mg total) by mouth every 6 (six) hours as needed (nausea). (Patient not taking: Reported on 5/19/2025) 20 tablet 5    [DISCONTINUED] allopurinoL (ZYLOPRIM) 300 MG tablet TAKE 1 TABLET(300 MG) BY MOUTH DAILY 30 tablet 0     Facility-Administered Encounter Medications as of 5/19/2025   Medication Dose Route Frequency Provider Last Rate Last Admin    lactated ringers infusion   Intravenous Continuous Linwood Ellsworth MD   New Bag at 01/20/21 7558    sodium chloride 0.9% flush 2 mL  2 mL Intravenous PRN Linwood Ellsworth MD

## 2025-05-19 NOTE — TELEPHONE ENCOUNTER
----- Message from Daniela sent at 5/19/2025  9:27 AM CDT -----  Hi, Mr. Calhoun stopped at the registration desk at [place] [address] to speak with someone in Dr. Peralta office but I informed him that this provider doesn't come to this location. He's asking that a nurse please call him in regards to a medication refill that he's needing. Thank you

## 2025-05-19 NOTE — TELEPHONE ENCOUNTER
Returned call to pt. Pt is asking about refill of acyclovir. Pt stated med is out of stock at [facility] on [address] and wants to know if he can get med at [facility] on [address]. Informed pt that office will route med to requested pharmacy. Verbalized understanding. Call ended well.

## 2025-06-11 ENCOUNTER — INFUSION (OUTPATIENT)
Dept: INFUSION THERAPY | Facility: HOSPITAL | Age: 70
End: 2025-06-11
Attending: INTERNAL MEDICINE
Payer: MEDICARE

## 2025-06-11 DIAGNOSIS — C85.80 LARGE CELL LYMPHOMA: ICD-10-CM

## 2025-06-11 DIAGNOSIS — M86.9 OSTEOMYELITIS OF ANKLE AND FOOT: Primary | ICD-10-CM

## 2025-06-11 PROCEDURE — A4216 STERILE WATER/SALINE, 10 ML: HCPCS | Mod: HCNC | Performed by: INTERNAL MEDICINE

## 2025-06-11 PROCEDURE — 25000003 PHARM REV CODE 250: Mod: HCNC | Performed by: INTERNAL MEDICINE

## 2025-06-11 PROCEDURE — 63600175 PHARM REV CODE 636 W HCPCS: Mod: HCNC | Performed by: INTERNAL MEDICINE

## 2025-06-11 PROCEDURE — 96523 IRRIG DRUG DELIVERY DEVICE: CPT | Mod: HCNC

## 2025-06-11 RX ORDER — SODIUM CHLORIDE 0.9 % (FLUSH) 0.9 %
10 SYRINGE (ML) INJECTION
Status: DISCONTINUED | OUTPATIENT
Start: 2025-06-11 | End: 2025-06-11 | Stop reason: HOSPADM

## 2025-06-11 RX ORDER — HEPARIN 100 UNIT/ML
500 SYRINGE INTRAVENOUS
OUTPATIENT
Start: 2025-06-11

## 2025-06-11 RX ORDER — HEPARIN 100 UNIT/ML
500 SYRINGE INTRAVENOUS
Status: DISCONTINUED | OUTPATIENT
Start: 2025-06-11 | End: 2025-06-11 | Stop reason: HOSPADM

## 2025-06-11 RX ORDER — SODIUM CHLORIDE 0.9 % (FLUSH) 0.9 %
10 SYRINGE (ML) INJECTION
OUTPATIENT
Start: 2025-06-11

## 2025-06-11 RX ADMIN — Medication 10 ML: at 08:06

## 2025-06-11 RX ADMIN — HEPARIN 500 UNITS: 100 SYRINGE at 08:06

## 2025-06-11 NOTE — NURSING
"Pt here for Mediport Flush. Left chestwall mediport accessed with a 20g 1" parker via sterile technique.  Excellent blood return noted.  Flushed with 10ml NS and 5 ml heparin solution.  Needle D/C, site without redness, swelling, or drainage noted.  Dressing applied.  Patient tolerated well.  Patient to return to clinic for PET scan in July.     "

## 2025-06-24 ENCOUNTER — TELEPHONE (OUTPATIENT)
Dept: HEMATOLOGY/ONCOLOGY | Facility: CLINIC | Age: 70
End: 2025-06-24
Payer: MEDICARE

## 2025-06-24 DIAGNOSIS — C84.75 ANAPLASTIC ALK-NEGATIVE LARGE CELL LYMPHOMA OF LYMPH NODE OF LOWER EXTREMITY: ICD-10-CM

## 2025-06-24 RX ORDER — ACYCLOVIR 400 MG/1
400 TABLET ORAL 2 TIMES DAILY
Qty: 60 TABLET | Refills: 4 | Status: SHIPPED | OUTPATIENT
Start: 2025-06-24

## 2025-06-24 NOTE — TELEPHONE ENCOUNTER
Received call from pt in regards to rx for acyclovir. Informed pt that refill was sent to [facility], not [facility]. [Called [facility] pharmacy to verify rx was ready for pickup] Informed pt that rx is ready up. Pt voiced that he would pick it up tomorrow. V/u, call ended well.

## 2025-06-24 NOTE — TELEPHONE ENCOUNTER
Copied from CRM #[id number]. Topic: General Inquiry - Patient Advice  >> Jun 24, 2025  2:36 PM Mo wrote:  Type:  Needs Medical Advice    Who Called: Lj Coleman  Symptoms (please be specific): need to talk to the nurse about getting his Dose Change History: acyclovir (ZOVIRAX) 400 MG tablet medication address change to another location   How long has patient had these symptoms:    Pharmacy name and phone #:    Would the patient rather a call back or a response via [facility]? Call back  Best Call Back Number: [phone]  Additional Information: mrn  [id number]        [id number] - [place] - [address] AT [facility] & [address] [address]  Phone: [phone] Fax: [phone]

## 2025-06-24 NOTE — TELEPHONE ENCOUNTER
Received call from pt for med refill. Pt is wanting refill on Acyclovir sent in to [facility] today. Refill request sent to Dr. Peralta for approval. Verbalized understanding, call ended well.

## 2025-06-30 ENCOUNTER — PATIENT MESSAGE (OUTPATIENT)
Dept: HEMATOLOGY/ONCOLOGY | Facility: CLINIC | Age: 70
End: 2025-06-30
Payer: MEDICARE

## 2025-06-30 ENCOUNTER — PATIENT OUTREACH (OUTPATIENT)
Dept: ADMINISTRATIVE | Facility: HOSPITAL | Age: 70
End: 2025-06-30
Payer: MEDICARE

## 2025-06-30 NOTE — PROGRESS NOTES
Working [employer] - Not seen in 12 months report; Attempted to contact patient, no answer, left voicemail.

## 2025-07-01 ENCOUNTER — TELEPHONE (OUTPATIENT)
Dept: HEMATOLOGY/ONCOLOGY | Facility: CLINIC | Age: 70
End: 2025-07-01
Payer: MEDICARE

## 2025-07-01 NOTE — TELEPHONE ENCOUNTER
Copied from CRM #[id number]. Topic: General Inquiry - Patient Advice  >> Jun 30, 2025  3:45 PM Luz Marina wrote:  .Type:  Needs Medical Advice    Who Called: Lj Coleman   Symptoms (please be specific):    How long has patient had these symptoms:    Pharmacy name and phone #:    Would the patient rather a call back or a response via [facility]? Call back  Best Call Back Number: [phone]  Additional Information: pt has to have a list of medications that comes from physician to give to a caregiver

## 2025-07-01 NOTE — TELEPHONE ENCOUNTER
Returned call to pt. Pt voiced needing current med list faxed to admission coordinator at [facility]. Verbalized understanding. Fax sent to [phone]. Pt asked if confirmation could be sent to him because he's having trouble getting in contact with coordinator. Verbalized to pt that once fax confirmation has been received will forward to him. V/u, call ended well.

## 2025-07-02 ENCOUNTER — TELEPHONE (OUTPATIENT)
Dept: INTERNAL MEDICINE | Facility: CLINIC | Age: 70
End: 2025-07-02
Payer: MEDICARE

## 2025-07-02 NOTE — TELEPHONE ENCOUNTER
Copied from CRM #[id number]. Topic: General Inquiry - Patient Advice  >> Jul 2, 2025 11:30 AM Stephanie wrote:  Type:  Needs Medical Advice    Who Called:  pt   via [facility]?  call  Best Call Back Number:  [phone]    Additional Information: someone told him to come in today

## 2025-07-04 DIAGNOSIS — K21.9 GASTROESOPHAGEAL REFLUX DISEASE WITHOUT ESOPHAGITIS: ICD-10-CM

## 2025-07-04 NOTE — TELEPHONE ENCOUNTER
Care Due:                  Date            Visit Type   Department     Provider  --------------------------------------------------------------------------------                                EP -                              PRIMARY      [facility]  Last Visit: 06-      CARE ([facility])   MEDICINE       Isabella Sutton  Next Visit: None Scheduled  None         None Found                                                            Last  Test          Frequency    Reason                     Performed    Due Date  --------------------------------------------------------------------------------    Office Visit  15 months..  apixaban, esomeprazole...  06- 09-    Health system Embedded Care Due Messages. Reference number: [id number].   7/04/2025 4:27:39 AM CDT

## 2025-07-07 RX ORDER — ESOMEPRAZOLE MAGNESIUM 40 MG/1
40 CAPSULE, DELAYED RELEASE ORAL
Qty: 90 CAPSULE | Refills: 0 | Status: SHIPPED | OUTPATIENT
Start: 2025-07-07

## 2025-07-07 NOTE — TELEPHONE ENCOUNTER
Refill Routing Note   Medication(s) are not appropriate for processing by [facility] for the following reason(s):        ED/Hospital Visit since last OV with provider    [facility] action(s):  Defer   Requires appointment : Yes               Appointments  past 12m or future 3m with PCP    Date Provider   Last Visit   6/12/2024 Isabella Sutton DO   Next Visit   Visit date not found Isabella Sutton DO   ED visits in past 90 days: 0        Note composed:9:41 AM 07/07/2025

## 2025-07-08 ENCOUNTER — OFFICE VISIT (OUTPATIENT)
Dept: INTERNAL MEDICINE | Facility: CLINIC | Age: 70
End: 2025-07-08
Payer: MEDICARE

## 2025-07-08 VITALS
WEIGHT: 173.75 LBS | TEMPERATURE: 97 F | HEIGHT: 69 IN | RESPIRATION RATE: 20 BRPM | HEART RATE: 84 BPM | BODY MASS INDEX: 25.73 KG/M2 | SYSTOLIC BLOOD PRESSURE: 118 MMHG | DIASTOLIC BLOOD PRESSURE: 70 MMHG | OXYGEN SATURATION: 98 %

## 2025-07-08 DIAGNOSIS — I82.722 CHRONIC DEEP VEIN THROMBOSIS (DVT) OF BRACHIAL VEIN OF LEFT UPPER EXTREMITY: ICD-10-CM

## 2025-07-08 DIAGNOSIS — I10 ESSENTIAL HYPERTENSION: ICD-10-CM

## 2025-07-08 DIAGNOSIS — Z00.00 ANNUAL PHYSICAL EXAM: Primary | ICD-10-CM

## 2025-07-08 DIAGNOSIS — J44.9 MODERATE COPD (CHRONIC OBSTRUCTIVE PULMONARY DISEASE): ICD-10-CM

## 2025-07-08 DIAGNOSIS — C85.80 LARGE CELL LYMPHOMA: ICD-10-CM

## 2025-07-08 DIAGNOSIS — K21.9 GASTROESOPHAGEAL REFLUX DISEASE WITHOUT ESOPHAGITIS: ICD-10-CM

## 2025-07-08 PROCEDURE — 3288F FALL RISK ASSESSMENT DOCD: CPT | Mod: CPTII,HCNC,S$GLB, | Performed by: INTERNAL MEDICINE

## 2025-07-08 PROCEDURE — G2211 COMPLEX E/M VISIT ADD ON: HCPCS | Mod: HCNC,S$GLB,, | Performed by: INTERNAL MEDICINE

## 2025-07-08 PROCEDURE — 3074F SYST BP LT 130 MM HG: CPT | Mod: CPTII,HCNC,S$GLB, | Performed by: INTERNAL MEDICINE

## 2025-07-08 PROCEDURE — 99999 PR PBB SHADOW E&M-EST. PATIENT-LVL III: CPT | Mod: PBBFAC,HCNC,, | Performed by: INTERNAL MEDICINE

## 2025-07-08 PROCEDURE — 1125F AMNT PAIN NOTED PAIN PRSNT: CPT | Mod: CPTII,HCNC,S$GLB, | Performed by: INTERNAL MEDICINE

## 2025-07-08 PROCEDURE — 99214 OFFICE O/P EST MOD 30 MIN: CPT | Mod: HCNC,S$GLB,, | Performed by: INTERNAL MEDICINE

## 2025-07-08 PROCEDURE — 1159F MED LIST DOCD IN RCRD: CPT | Mod: CPTII,HCNC,S$GLB, | Performed by: INTERNAL MEDICINE

## 2025-07-08 PROCEDURE — 3008F BODY MASS INDEX DOCD: CPT | Mod: CPTII,HCNC,S$GLB, | Performed by: INTERNAL MEDICINE

## 2025-07-08 PROCEDURE — 3078F DIAST BP <80 MM HG: CPT | Mod: CPTII,HCNC,S$GLB, | Performed by: INTERNAL MEDICINE

## 2025-07-08 PROCEDURE — 1160F RVW MEDS BY RX/DR IN RCRD: CPT | Mod: CPTII,HCNC,S$GLB, | Performed by: INTERNAL MEDICINE

## 2025-07-08 PROCEDURE — 1101F PT FALLS ASSESS-DOCD LE1/YR: CPT | Mod: CPTII,HCNC,S$GLB, | Performed by: INTERNAL MEDICINE

## 2025-07-08 RX ORDER — METOPROLOL SUCCINATE 25 MG/1
25 TABLET, EXTENDED RELEASE ORAL DAILY
Qty: 90 TABLET | Refills: 3 | Status: SHIPPED | OUTPATIENT
Start: 2025-07-08

## 2025-07-15 ENCOUNTER — TELEPHONE (OUTPATIENT)
Dept: HEMATOLOGY/ONCOLOGY | Facility: CLINIC | Age: 70
End: 2025-07-15
Payer: MEDICARE

## 2025-07-15 NOTE — TELEPHONE ENCOUNTER
Received as call back from patient's  friend/ family ( Zee Roman ): Spoke to patient and family fiend. Patient requesting to see an oncologist in the [place] area for paint to his lower rt side that he reports is mesh screen placement done several years ago.     Pt has appt with Dr. Peralta on 7/31/25 for F/U.   Informed patient that he is being advised to return to Dr. Peralta for patient  to his low rt side.      Pt. Will keep scheduled appts.        Attempted to reach patient: No Answer.LVM for patient to return call to our office concerning his question of mesh replacement/repair surgery?     More information needed.

## 2025-07-15 NOTE — TELEPHONE ENCOUNTER
Received as call back from patient's  friend/ family ( Zee Roman ): Spoke to patient and family fiend. Patient requesting to see an oncologist in the [place] area for paint to his lower rt side that he reports is mesh screen placement done several years ago.    Pt has appt with Dr. Peralta on 7/31/25 for F/U.   Informed patient that he is being advised to return to Dr. Peralta for patient  to his low rt side.     Pt. Will keep scheduled appts.      Attempted to reach patient: No Answer.LVM for patient to return call to our office concerning his question of mesh replacement/repair surgery?    More information needed.

## 2025-07-28 ENCOUNTER — TELEPHONE (OUTPATIENT)
Dept: HEMATOLOGY/ONCOLOGY | Facility: CLINIC | Age: 70
End: 2025-07-28
Payer: MEDICARE

## 2025-07-28 ENCOUNTER — HOSPITAL ENCOUNTER (OUTPATIENT)
Dept: RADIOLOGY | Facility: HOSPITAL | Age: 70
Discharge: HOME | End: 2025-07-28
Attending: INTERNAL MEDICINE
Payer: MEDICARE

## 2025-07-28 ENCOUNTER — INFUSION (OUTPATIENT)
Dept: INFUSION THERAPY | Facility: HOSPITAL | Age: 70
End: 2025-07-28
Attending: INTERNAL MEDICINE
Payer: MEDICARE

## 2025-07-28 DIAGNOSIS — C85.80 LARGE CELL LYMPHOMA: ICD-10-CM

## 2025-07-28 DIAGNOSIS — M86.9 OSTEOMYELITIS OF ANKLE AND FOOT: Primary | ICD-10-CM

## 2025-07-28 DIAGNOSIS — C84.78 ANAPLASTIC ALK-NEGATIVE LARGE CELL LYMPHOMA OF LYMPH NODES OF MULTIPLE REGIONS: ICD-10-CM

## 2025-07-28 DIAGNOSIS — Z94.4 LIVER REPLACED BY TRANSPLANT: ICD-10-CM

## 2025-07-28 DIAGNOSIS — I10 ESSENTIAL HYPERTENSION: ICD-10-CM

## 2025-07-28 LAB
ABSOLUTE EOSINOPHIL (OHS): 0.13 K/UL
ABSOLUTE MONOCYTE (OHS): 0.39 K/UL (ref 0.3–1)
ABSOLUTE NEUTROPHIL COUNT (OHS): 3.67 K/UL (ref 1.8–7.7)
ALBUMIN SERPL BCP-MCNC: 3.7 G/DL (ref 3.5–5.2)
ALP SERPL-CCNC: 99 UNIT/L (ref 40–150)
ALT SERPL W/O P-5'-P-CCNC: 19 UNIT/L (ref 10–44)
ANION GAP (OHS): 9 MMOL/L (ref 8–16)
AST SERPL-CCNC: 30 UNIT/L (ref 11–45)
BASOPHILS # BLD AUTO: 0.03 K/UL
BASOPHILS NFR BLD AUTO: 0.6 %
BILIRUB SERPL-MCNC: 0.4 MG/DL (ref 0.1–1)
BUN SERPL-MCNC: 19 MG/DL (ref 8–23)
CALCIUM SERPL-MCNC: 8.7 MG/DL (ref 8.7–10.5)
CHLORIDE SERPL-SCNC: 108 MMOL/L (ref 95–110)
CHOLEST SERPL-MCNC: 115 MG/DL (ref 120–199)
CHOLEST/HDLC SERPL: 3.1 {RATIO} (ref 2–5)
CO2 SERPL-SCNC: 25 MMOL/L (ref 23–29)
CREAT SERPL-MCNC: 1.3 MG/DL (ref 0.5–1.4)
CRP SERPL-MCNC: 1.5 MG/L
ERYTHROCYTE [DISTWIDTH] IN BLOOD BY AUTOMATED COUNT: 16.5 % (ref 11.5–14.5)
GFR SERPLBLD CREATININE-BSD FMLA CKD-EPI: 59 ML/MIN/1.73/M2
GLUCOSE SERPL-MCNC: 106 MG/DL (ref 70–110)
HCT VFR BLD AUTO: 36.4 % (ref 40–54)
HDLC SERPL-MCNC: 37 MG/DL (ref 40–75)
HDLC SERPL: 32.2 % (ref 20–50)
HGB BLD-MCNC: 11.5 GM/DL (ref 14–18)
IMM GRANULOCYTES # BLD AUTO: 0.01 K/UL (ref 0–0.04)
IMM GRANULOCYTES NFR BLD AUTO: 0.2 % (ref 0–0.5)
LDH SERPL-CCNC: 173 U/L (ref 110–260)
LDLC SERPL CALC-MCNC: 57 MG/DL (ref 63–159)
LYMPHOCYTES # BLD AUTO: 0.58 K/UL (ref 1–4.8)
MCH RBC QN AUTO: 27.6 PG (ref 27–31)
MCHC RBC AUTO-ENTMCNC: 31.6 G/DL (ref 32–36)
MCV RBC AUTO: 88 FL (ref 82–98)
NONHDLC SERPL-MCNC: 78 MG/DL
NUCLEATED RBC (/100WBC) (OHS): 0 /100 WBC
PLATELET # BLD AUTO: 136 K/UL (ref 150–450)
PMV BLD AUTO: 10.5 FL (ref 9.2–12.9)
POTASSIUM SERPL-SCNC: 4 MMOL/L (ref 3.5–5.1)
PROT SERPL-MCNC: 6.9 GM/DL (ref 6–8.4)
RBC # BLD AUTO: 4.16 M/UL (ref 4.6–6.2)
RELATIVE EOSINOPHIL (OHS): 2.7 %
RELATIVE LYMPHOCYTE (OHS): 12.1 % (ref 18–48)
RELATIVE MONOCYTE (OHS): 8.1 % (ref 4–15)
RELATIVE NEUTROPHIL (OHS): 76.3 % (ref 38–73)
SODIUM SERPL-SCNC: 142 MMOL/L (ref 136–145)
TRIGL SERPL-MCNC: 105 MG/DL (ref 30–150)
WBC # BLD AUTO: 4.81 K/UL (ref 3.9–12.7)

## 2025-07-28 PROCEDURE — 80061 LIPID PANEL: CPT | Mod: HCNC

## 2025-07-28 PROCEDURE — 85025 COMPLETE CBC W/AUTO DIFF WBC: CPT | Mod: HCNC

## 2025-07-28 PROCEDURE — 84075 ASSAY ALKALINE PHOSPHATASE: CPT | Mod: HCNC

## 2025-07-28 PROCEDURE — 78815 PET IMAGE W/CT SKULL-THIGH: CPT | Mod: 26,HCNC,PS, | Performed by: RADIOLOGY

## 2025-07-28 PROCEDURE — A9552 F18 FDG: HCPCS | Mod: HCNC | Performed by: INTERNAL MEDICINE

## 2025-07-28 PROCEDURE — 36591 DRAW BLOOD OFF VENOUS DEVICE: CPT | Mod: HCNC

## 2025-07-28 PROCEDURE — 83615 LACTATE (LD) (LDH) ENZYME: CPT | Mod: HCNC

## 2025-07-28 PROCEDURE — 78815 PET IMAGE W/CT SKULL-THIGH: CPT | Mod: TC,HCNC,PS

## 2025-07-28 PROCEDURE — 80195 ASSAY OF SIROLIMUS: CPT | Mod: HCNC

## 2025-07-28 PROCEDURE — 63600175 PHARM REV CODE 636 W HCPCS: Mod: HCNC | Performed by: INTERNAL MEDICINE

## 2025-07-28 PROCEDURE — 86140 C-REACTIVE PROTEIN: CPT | Mod: HCNC

## 2025-07-28 RX ORDER — SODIUM CHLORIDE 0.9 % (FLUSH) 0.9 %
10 SYRINGE (ML) INJECTION
OUTPATIENT
Start: 2025-07-28

## 2025-07-28 RX ORDER — FLUDEOXYGLUCOSE F18 500 MCI/ML
11.63 INJECTION INTRAVENOUS
Status: COMPLETED | OUTPATIENT
Start: 2025-07-28 | End: 2025-07-28

## 2025-07-28 RX ORDER — HEPARIN 100 UNIT/ML
500 SYRINGE INTRAVENOUS
OUTPATIENT
Start: 2025-07-28

## 2025-07-28 RX ORDER — HEPARIN 100 UNIT/ML
500 SYRINGE INTRAVENOUS
Status: DISCONTINUED | OUTPATIENT
Start: 2025-07-28 | End: 2025-07-28 | Stop reason: HOSPADM

## 2025-07-28 RX ORDER — SODIUM CHLORIDE 0.9 % (FLUSH) 0.9 %
10 SYRINGE (ML) INJECTION
Status: DISCONTINUED | OUTPATIENT
Start: 2025-07-28 | End: 2025-07-28 | Stop reason: HOSPADM

## 2025-07-28 RX ADMIN — HEPARIN 500 UNITS: 100 SYRINGE at 09:07

## 2025-07-28 RX ADMIN — FLUDEOXYGLUCOSE F-18 11.63 MILLICURIE: 500 INJECTION INTRAVENOUS at 07:07

## 2025-07-28 NOTE — NURSING
Left chest wall mediport accessed with 20g/1 inch parker needle via sterile technique. Positive blood return.  After 10ml blood waste, labs drawn, verified labels with 2 pt identifiers, and sent to lab.  Flushed with 10ml NS. Patient remains accessed for PET scan. Pt to return to infusion room for deaccess upon completion of imaging. Pt verbalized understanding.

## 2025-07-28 NOTE — TELEPHONE ENCOUNTER
SWER called patient on today to follow up on him. SWER did not receive an answer. SWER left VM and will remain available.

## 2025-07-29 ENCOUNTER — RESULTS FOLLOW-UP (OUTPATIENT)
Dept: HEPATOLOGY | Facility: CLINIC | Age: 70
End: 2025-07-29
Payer: MEDICARE

## 2025-07-29 DIAGNOSIS — Z94.4 LIVER TRANSPLANTED: Primary | ICD-10-CM

## 2025-07-29 DIAGNOSIS — B18.1 CHRONIC VIRAL HEPATITIS B WITHOUT DELTA AGENT AND WITHOUT COMA: ICD-10-CM

## 2025-07-29 LAB — SIROLIMUS BLD-MCNC: 4.2 NG/ML (ref 4–20)

## 2025-07-29 NOTE — TELEPHONE ENCOUNTER
Patient messaged through the patient portal:        Mr. Coleman,  Dr. Odom reviewed your labs and they are stable. He did not make changes to your immunosuppression medication.  Please repeat your labs in 4 months on November 17, 2025.  Should you have any questions or concerns, please let me know.  Thanks  Beatriz        ----- Message from Duc Odom MD sent at 7/29/2025 11:40 AM CDT -----  Results reviewed. No change in immunosuppression  ----- Message -----  From: Lab, Background User  Sent: 7/29/2025  11:25 AM CDT  To: Duc Odom MD

## 2025-07-31 ENCOUNTER — TELEPHONE (OUTPATIENT)
Dept: OTOLARYNGOLOGY | Facility: CLINIC | Age: 70
End: 2025-07-31
Payer: MEDICARE

## 2025-07-31 ENCOUNTER — OFFICE VISIT (OUTPATIENT)
Dept: HEMATOLOGY/ONCOLOGY | Facility: CLINIC | Age: 70
End: 2025-07-31
Payer: MEDICARE

## 2025-07-31 VITALS
BODY MASS INDEX: 26.12 KG/M2 | OXYGEN SATURATION: 98 % | DIASTOLIC BLOOD PRESSURE: 61 MMHG | HEIGHT: 69 IN | TEMPERATURE: 98 F | SYSTOLIC BLOOD PRESSURE: 97 MMHG | HEART RATE: 63 BPM | WEIGHT: 176.38 LBS

## 2025-07-31 DIAGNOSIS — Z85.05 HISTORY OF LIVER CANCER: ICD-10-CM

## 2025-07-31 DIAGNOSIS — Z79.69 IMMUNODEFICIENCY DUE TO CHEMOTHERAPY: ICD-10-CM

## 2025-07-31 DIAGNOSIS — I82.722 CHRONIC DEEP VEIN THROMBOSIS (DVT) OF LEFT UPPER EXTREMITY, UNSPECIFIED VEIN: ICD-10-CM

## 2025-07-31 DIAGNOSIS — K11.8 PAROTID MASS: ICD-10-CM

## 2025-07-31 DIAGNOSIS — C84.75 ANAPLASTIC ALK-NEGATIVE LARGE CELL LYMPHOMA OF LYMPH NODE OF LOWER EXTREMITY: Primary | ICD-10-CM

## 2025-07-31 DIAGNOSIS — D84.821 IMMUNODEFICIENCY DUE TO CHEMOTHERAPY: ICD-10-CM

## 2025-07-31 DIAGNOSIS — M62.89 HERNIA OF FASCIA: ICD-10-CM

## 2025-07-31 DIAGNOSIS — T45.1X5A IMMUNODEFICIENCY DUE TO CHEMOTHERAPY: ICD-10-CM

## 2025-07-31 PROBLEM — C84.70 ANAPLASTIC ALK-NEGATIVE LARGE CELL LYMPHOMA: Status: RESOLVED | Noted: 2024-10-31 | Resolved: 2025-07-31

## 2025-07-31 PROCEDURE — 3008F BODY MASS INDEX DOCD: CPT | Mod: CPTII,HCNC,S$GLB, | Performed by: INTERNAL MEDICINE

## 2025-07-31 PROCEDURE — 1101F PT FALLS ASSESS-DOCD LE1/YR: CPT | Mod: CPTII,HCNC,S$GLB, | Performed by: INTERNAL MEDICINE

## 2025-07-31 PROCEDURE — 99999 PR PBB SHADOW E&M-EST. PATIENT-LVL V: CPT | Mod: PBBFAC,HCNC,, | Performed by: INTERNAL MEDICINE

## 2025-07-31 PROCEDURE — 3074F SYST BP LT 130 MM HG: CPT | Mod: CPTII,HCNC,S$GLB, | Performed by: INTERNAL MEDICINE

## 2025-07-31 PROCEDURE — 3288F FALL RISK ASSESSMENT DOCD: CPT | Mod: CPTII,HCNC,S$GLB, | Performed by: INTERNAL MEDICINE

## 2025-07-31 PROCEDURE — 3078F DIAST BP <80 MM HG: CPT | Mod: CPTII,HCNC,S$GLB, | Performed by: INTERNAL MEDICINE

## 2025-07-31 PROCEDURE — 1160F RVW MEDS BY RX/DR IN RCRD: CPT | Mod: CPTII,HCNC,S$GLB, | Performed by: INTERNAL MEDICINE

## 2025-07-31 PROCEDURE — 1159F MED LIST DOCD IN RCRD: CPT | Mod: CPTII,HCNC,S$GLB, | Performed by: INTERNAL MEDICINE

## 2025-07-31 PROCEDURE — 1125F AMNT PAIN NOTED PAIN PRSNT: CPT | Mod: CPTII,HCNC,S$GLB, | Performed by: INTERNAL MEDICINE

## 2025-07-31 PROCEDURE — 99214 OFFICE O/P EST MOD 30 MIN: CPT | Mod: HCNC,S$GLB,, | Performed by: INTERNAL MEDICINE

## 2025-07-31 RX ORDER — TACROLIMUS 0.5 MG/1
CAPSULE ORAL
COMMUNITY
Start: 2025-07-27

## 2025-07-31 NOTE — PROGRESS NOTES
Subjective:       Patient ID: Lj Coleman is a 69 y.o. male.    Chief Complaint: Results and Lymphoma    HPI:  69-year-old male status post anaplastic large-cell lymphoma.  History of tropic liver transplant.  Patient is concerned over fullness in right upper quadrant.  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 10/31/2024    Anemia 11/23/2024    Angina pectoris     Arthritis     back    Atherosclerosis of native coronary artery without angina pectoris/ LAD 09/08/2016    Back pain     Chronic hepatitis C with cirrhosis     COPD (chronic obstructive pulmonary disease)     Depression     Hepatoma     Prior to liver transplant    History of colon polyps 09/09/2015    History of transplantation, liver 04/2012    History of vertebral fracture     Hypertension     Immune deficiency disorder     Incisional hernia following transplant 04/09/2014    Liver failure 11/2011    Related to chemotherapy for hepatoma    Liver transplanted 04/2012    NSTEMI (non-ST elevated myocardial infarction) 11/23/2024    Obesity     PVCs (premature ventricular contractions)     Renal dysfunction 11/23/2024    Tobacco abuse 09/09/2016    Trouble in sleeping     Vertebral fracture 1975     Family History   Problem Relation Name Age of Onset    Cancer Mother          lung    Cancer Father          bladder    Diabetes Maternal Uncle      Cancer Maternal Grandmother          uterine?    Cancer Other      Heart disease Neg Hx      Kidney disease Neg Hx      Stroke Neg Hx       Social History[1]  Past Surgical History:   Procedure Laterality Date    COLONOSCOPY N/A 1/20/2021    Procedure: COLONOSCOPY;  Surgeon: Emerson Helm,  "MD;  Location: [facility];  Service: Endoscopy;  Laterality: N/A;    HERNIA REPAIR Right 2013    incisional    INSERTION OF TUNNELED CENTRAL VENOUS CATHETER (CVC) WITH SUBCUTANEOUS PORT Left 11/6/2024    Procedure: [id number];  Surgeon: Hao Washburn MD;  Location: [facility] OR;  Service: General;  Laterality: Left;    LIVER TRANSPLANT  April 2012    LYMPH NODE BIOPSY/EXCISION Left 10/16/2024    Procedure: LYMPH NODE BIOPSY/EXCISION;  Surgeon: Hao Washburn MD;  Location: [facility] OR;  Service: General;  Laterality: Left;    MOUTH SURGERY      TONSILLECTOMY  1958       Labs:  Lab Results   Component Value Date    WBC 4.81 07/28/2025    HGB 11.5 (L) 07/28/2025    HCT 36.4 (L) 07/28/2025    MCV 88 07/28/2025     (L) 07/28/2025     BMP  Lab Results   Component Value Date     07/28/2025    K 4.0 07/28/2025     07/28/2025    CO2 25 07/28/2025    BUN 19 07/28/2025    CREATININE 1.3 07/28/2025    CALCIUM 8.7 07/28/2025    ANIONGAP 9 07/28/2025    ESTGFRAFRICA >60.0 06/08/2022    EGFRNONAA >60.0 06/08/2022     Lab Results   Component Value Date    ALT 19 07/28/2025    AST 30 07/28/2025    GGT 32 09/26/2016    ALKPHOS 99 07/28/2025    BILITOT 0.4 07/28/2025       Lab Results   Component Value Date    IRON 49 01/24/2025    TIBC 269 01/24/2025    FERRITIN 593 (H) 01/24/2025     No results found for: "IBIQVURW29"  No results found for: "FOLATE"  Lab Results   Component Value Date    TSH 1.168 06/06/2023         Review of Systems   Constitutional:  Negative for activity change, appetite change, chills, diaphoresis, fatigue, fever and unexpected weight change.   HENT:  Negative for congestion, dental problem, drooling, ear discharge, ear pain, facial swelling, hearing loss, mouth sores, nosebleeds, postnasal drip, rhinorrhea, sinus pressure, sneezing, sore throat, tinnitus, trouble swallowing and voice change.    Eyes:  Negative for photophobia, pain, discharge, redness, itching and visual " disturbance.   Respiratory:  Negative for apnea, cough, choking, chest tightness, shortness of breath, wheezing and stridor.    Cardiovascular:  Negative for chest pain, palpitations and leg swelling.   Gastrointestinal:  Negative for abdominal distention, abdominal pain, anal bleeding, blood in stool, constipation, diarrhea, nausea, rectal pain and vomiting.   Endocrine: Negative for cold intolerance, heat intolerance, polydipsia, polyphagia and polyuria.   Genitourinary:  Negative for decreased urine volume, difficulty urinating, dysuria, enuresis, flank pain, frequency, genital sores, hematuria, penile discharge, penile pain, penile swelling, scrotal swelling, testicular pain and urgency.   Musculoskeletal:  Negative for arthralgias, back pain, gait problem, joint swelling, myalgias, neck pain and neck stiffness.   Skin:  Negative for color change, pallor, rash and wound.   Allergic/Immunologic: Negative for environmental allergies, food allergies and immunocompromised state.   Neurological:  Negative for dizziness, tremors, seizures, syncope, facial asymmetry, speech difficulty, weakness, light-headedness, numbness and headaches.   Hematological:  Negative for adenopathy. Does not bruise/bleed easily.   Psychiatric/Behavioral:  Positive for dysphoric mood. Negative for agitation, behavioral problems, confusion, decreased concentration, hallucinations, self-injury, sleep disturbance and suicidal ideas. The patient is nervous/anxious. The patient is not hyperactive.        Objective:      Physical Exam  Vitals reviewed.   Constitutional:       General: He is not in acute distress.     Appearance: He is well-developed. He is not diaphoretic.   HENT:      Head: Normocephalic.      Right Ear: External ear normal.      Left Ear: External ear normal.      Nose: Nose normal.      Right Sinus: No maxillary sinus tenderness or frontal sinus tenderness.      Left Sinus: No maxillary sinus tenderness or frontal sinus  tenderness.      Mouth/Throat:      Pharynx: No oropharyngeal exudate.   Eyes:      General: Lids are normal. No scleral icterus.        Right eye: No discharge.         Left eye: No discharge.      Extraocular Movements:      Right eye: Normal extraocular motion.      Left eye: Normal extraocular motion.      Conjunctiva/sclera:      Right eye: Right conjunctiva is not injected. No hemorrhage.     Left eye: Left conjunctiva is not injected. No hemorrhage.     Pupils: Pupils are equal, round, and reactive to light.   Neck:      Thyroid: No thyromegaly.      Vascular: No JVD.      Trachea: No tracheal deviation.   Cardiovascular:      Rate and Rhythm: Normal rate.   Pulmonary:      Effort: Pulmonary effort is normal. No respiratory distress.      Breath sounds: No stridor.   Abdominal:      General: Bowel sounds are normal.      Palpations: Abdomen is soft. There is no hepatomegaly, splenomegaly or mass.      Tenderness: There is no abdominal tenderness.   Musculoskeletal:         General: No tenderness. Normal range of motion.      Cervical back: Normal range of motion and neck supple.   Lymphadenopathy:      Head:      Right side of head: No posterior auricular or occipital adenopathy.      Left side of head: No posterior auricular or occipital adenopathy.      Cervical: No cervical adenopathy.      Right cervical: No superficial, deep or posterior cervical adenopathy.     Left cervical: No superficial, deep or posterior cervical adenopathy.      Upper Body:      Right upper body: No supraclavicular adenopathy.      Left upper body: No supraclavicular adenopathy.   Skin:     General: Skin is dry.      Findings: No erythema or rash.      Nails: There is no clubbing.   Neurological:      Mental Status: He is alert and oriented to person, place, and time.      Cranial Nerves: No cranial nerve deficit.      Coordination: Coordination normal.   Psychiatric:         Behavior: Behavior normal.         Thought Content:  Thought content normal.         Judgment: Judgment normal.             Assessment:      1. 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 Chronic deep vein thrombosis (DVT) of left upper extremity, unspecified vein    3. Immunodeficiency due to chemotherapy    4. History of liver cancer    5. Hernia of fascia    6. Parotid mass           Med Onc Chart Routing      Follow up with physician . Return in 3 months CBC CMP LDH and C-reactive protein   Follow up with [facility]    Infusion scheduling note    Injection scheduling note    Labs    Imaging    Pharmacy appointment    Other referrals           Needs to be seen by ENT Dr. Yates hands be in follow-up; referral Dr. Hao Greene in surgery          Plan:     Review of laboratory studies in PET scan demonstrates nothing to suggest recurrent lymphoma variant in terms of his abdomen there is a crunching type of a sensation I am wondering if there is mesh in his right upper quadrant now that he has lost weight you can feel.  In addition there is a parotid mass noted on his most recent PET scan less conspicuous.  May represent a benign finding would like ENT to re comment on this will ask surgery to come in on the abnormality in his right upper quadrant nothing to suggest recurrent lymphoma        Timmy Peralta Jr, MD FACP         [1]   Social History  Socioeconomic History    Marital status:     Highest education level: 10th grade   Tobacco Use    Smoking status: Former     Current packs/day: 0.00     Average packs/day: 0.7 packs/day for 37.1 years (27.8 ttl pk-yrs)     Types: Cigarettes     Start date:      Quit date: 2025     Years since quittin.4    Smokeless tobacco: Never    Tobacco comments:     Quit smoking in 2025   Substance and Sexual Activity    Alcohol use: Not Currently     Comment: Occasionally    Drug use: No    Sexual activity: Not Currently     Social Drivers of Health     Financial Resource  Strain: Low Risk  (1/1/2025)    Overall Financial Resource Strain (CARDIA)     Difficulty of Paying Living Expenses: Not very hard   Food Insecurity: Patient Declined (1/1/2025)    Hunger Vital Sign     Worried About Running Out of Food in the Last Year: Patient declined     Ran Out of Food in the Last Year: Patient declined   Transportation Needs: Patient Declined (11/24/2024)    TRANSPORTATION NEEDS     Transportation : Patient declined   Physical Activity: Inactive (1/1/2025)    Exercise Vital Sign     Days of Exercise per Week: 0 days     Minutes of Exercise per Session: 0 min   Stress: No Stress Concern Present (1/1/2025)    [employer] - Occupational Stress Questionnaire     Feeling of Stress : Only a little   Housing Stability: Patient Declined (1/1/2025)    Housing Stability Vital Sign     Unable to Pay for Housing in the Last Year: Patient declined     Homeless in the Last Year: Patient declined

## 2025-07-31 NOTE — TELEPHONE ENCOUNTER
----- Message from Milan Tejada MD sent at 7/31/2025  4:08 PM CDT -----  Can you get this patient back into clinic sometime in the next 1-2 weeks?  Thanks  ----- Message -----  From: Timmy Peralta MD  Sent: 7/31/2025   2:08 PM CDT  To: Milan Tejada MD; [facility] Navigation    Yates, this is a patient you seen in the past he had a large cell lymphoma done remarkably well he has a parotid abnormality that you come in and on it has gotten smaller would you please see back to discuss with him if a biopsy needs to be done the rest of his lymphoma is completely negative.

## 2025-08-01 ENCOUNTER — PATIENT MESSAGE (OUTPATIENT)
Dept: INTERNAL MEDICINE | Facility: CLINIC | Age: 70
End: 2025-08-01
Payer: MEDICARE

## 2025-08-01 ENCOUNTER — TELEPHONE (OUTPATIENT)
Dept: INTERNAL MEDICINE | Facility: CLINIC | Age: 70
End: 2025-08-01
Payer: MEDICARE

## 2025-08-01 NOTE — TELEPHONE ENCOUNTER
Copied from CRM #[id number]. Topic: General Inquiry - Patient Advice  >> Aug 1, 2025  1:38 PM Karli wrote:  .Type:  Needs Medical Advice    Who Called:  Linda/ Daughter     Would the patient rather a call back or a response via [facility]?  Call back   Best Call Back Number:  [phone]  Additional Information: Linda is calling in regards to needing pt current medication list and orders to admit pt into [facility].  Information can be emailed to: [email or web address]  Linda state she will add the check list that the facility is requesting to pt portal  and states there is a spot for pt and would like to get it taking care of as soon as possible

## 2025-08-01 NOTE — TELEPHONE ENCOUNTER
Returned pt call, spoke with daughter . Daughter stated she will send over pt check list he needs for assisting living

## 2025-08-05 ENCOUNTER — OFFICE VISIT (OUTPATIENT)
Dept: INTERNAL MEDICINE | Facility: CLINIC | Age: 70
End: 2025-08-05
Payer: MEDICARE

## 2025-08-05 ENCOUNTER — HOSPITAL ENCOUNTER (OUTPATIENT)
Dept: RADIOLOGY | Facility: HOSPITAL | Age: 70
Discharge: HOME | End: 2025-08-05
Payer: MEDICARE

## 2025-08-05 VITALS
RESPIRATION RATE: 16 BRPM | BODY MASS INDEX: 26.37 KG/M2 | SYSTOLIC BLOOD PRESSURE: 120 MMHG | OXYGEN SATURATION: 98 % | WEIGHT: 178.56 LBS | HEART RATE: 90 BPM | TEMPERATURE: 97 F | DIASTOLIC BLOOD PRESSURE: 64 MMHG

## 2025-08-05 DIAGNOSIS — Z02.2 ENCOUNTER FOR EXAMINATION FOR ADMISSION TO ASSISTED LIVING FACILITY: Primary | ICD-10-CM

## 2025-08-05 DIAGNOSIS — Z02.2 ENCOUNTER FOR EXAMINATION FOR ADMISSION TO ASSISTED LIVING FACILITY: ICD-10-CM

## 2025-08-05 DIAGNOSIS — Z02.89 ENCOUNTER FOR COMPLETION OF FORM WITH PATIENT: ICD-10-CM

## 2025-08-05 PROCEDURE — 99999 PR PBB SHADOW E&M-EST. PATIENT-LVL V: CPT | Mod: PBBFAC,HCNC,,

## 2025-08-05 PROCEDURE — 71046 X-RAY EXAM CHEST 2 VIEWS: CPT | Mod: 26,HCNC,, | Performed by: RADIOLOGY

## 2025-08-05 PROCEDURE — 71046 X-RAY EXAM CHEST 2 VIEWS: CPT | Mod: TC,HCNC

## 2025-08-06 NOTE — PROGRESS NOTES
Lj Marquezramandeep  08/05/2025  [id number]    Isabella Sutton DO  Patient Care Team:  Isabella Sutton DO as PCP - General (Internal Medicine)  Teresa Cartagena MD (Gastroenterology)  Justin Diaz MD as Consulting Physician (Cardiology)  Shivam Beavers MD, MORENA as Consulting Physician (Infectious Diseases)  Mac Nunes OD as Consulting Physician (Optometry)  Jody Campos PA-C as Physician Assistant (Pulmonary Disease)  Renee Edwards DPM as Consulting Physician (Podiatry)  Dago Juárez, BRANDI as Oncology Navigator (Hematology and Oncology)  Timmy Peralta MD as Consulting Physician (Hematology and Oncology)  Tabitha Camacho LCSW as  (Hematology and Oncology)  Sunni Oates NP as Nurse Practitioner (Family Medicine)  Amber Leija LMSW as HemOn  (Hematology and Oncology)          Visit Type:a scheduled routine follow-up visit    Chief Complaint:  Chief Complaint   Patient presents with    Follow-up       History of Present Illness:    History of Present Illness    CHIEF COMPLAINT:  Patient presents today for assisted living facility admission paperwork and required chest XR.    MEDICATIONS:  He is currently taking Sirolimus and has discontinued ProGraf (tacrolimus). He is compliant with current medication regimen.    IMMUNIZATIONS:  He has maintained COVID vaccination status since 2021 with vaccinations received at pharmacy.    LABS:  He has completed multiple recent laboratory studies including collections on the 31st of previous month, this morning at current facility, and during follow-up with Dr. Peralta in hematology oncology.      ROS:  General: -fever, -chills, -fatigue, -weight gain, -weight loss  Eyes: -vision changes, -redness, -discharge  ENT: -ear pain, -nasal congestion, -sore throat  Cardiovascular: -chest pain, -palpitations, -lower extremity edema  Respiratory: -cough, -shortness of breath  Gastrointestinal: -abdominal pain, -nausea, -vomiting,  -diarrhea, -constipation, -blood in stool  Genitourinary: -dysuria, -hematuria, -frequency  Musculoskeletal: -joint pain, -muscle pain  Skin: -rash, -lesion  Neurological: -headache, -dizziness, -numbness, -tingling  Psychiatric: -anxiety, -depression, -sleep difficulty            The following were reviewed at this visit: active problem list, medication list, allergies, family history, social history, and health maintenance.  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 10/31/2024    Anemia 11/23/2024    Angina pectoris     Arthritis     back    Atherosclerosis of native coronary artery without angina pectoris/ LAD 09/08/2016    Back pain     Chronic hepatitis C with cirrhosis     COPD (chronic obstructive pulmonary disease)     Depression     Hepatoma     Prior to liver transplant    History of colon polyps 09/09/2015    History of transplantation, liver 04/2012    History of vertebral fracture     Hypertension     Immune deficiency disorder     Incisional hernia following transplant 04/09/2014    Liver failure 11/2011    Related to chemotherapy for hepatoma    Liver transplanted 04/2012    NSTEMI (non-ST elevated myocardial infarction) 11/23/2024    Obesity     PVCs (premature ventricular contractions)     Renal dysfunction 11/23/2024    Tobacco abuse 09/09/2016    Trouble in sleeping     Vertebral fracture 1975     Past Surgical History:   Procedure Laterality Date    COLONOSCOPY N/A 1/20/2021    Procedure: COLONOSCOPY;  Surgeon: Emerson Helm MD;  Location: [facility];  Service: Endoscopy;  Laterality: N/A;    HERNIA REPAIR Right 2013    incisional    INSERTION OF TUNNELED CENTRAL VENOUS CATHETER (CVC) WITH SUBCUTANEOUS PORT Left 11/6/2024    Procedure: [id number];  Surgeon: Hao Washburn MD;  Location: [facility];  Service: General;   Laterality: Left;    LIVER TRANSPLANT  April 2012    LYMPH NODE BIOPSY/EXCISION Left 10/16/2024    Procedure: LYMPH NODE BIOPSY/EXCISION;  Surgeon: Hao Washburn MD;  Location: [facility];  Service: General;  Laterality: Left;    MOUTH SURGERY      TONSILLECTOMY  1958     Problem List[1]    Medications:  Medications Ordered Prior to Encounter[2]    Medications have been reviewed and reconciled with patient at this visit.    Exam:  Wt Readings from Last 3 Encounters:   08/05/25 81 kg (178 lb 9.2 oz)   07/31/25 80 kg (176 lb 5.9 oz)   07/08/25 78.8 kg (173 lb 11.6 oz)     Temp Readings from Last 3 Encounters:   08/05/25 97.1 °F (36.2 °C) (Tympanic)   07/31/25 98 °F (36.7 °C) (Temporal)   07/08/25 96.6 °F (35.9 °C) (Tympanic)     BP Readings from Last 3 Encounters:   08/05/25 120/64   07/31/25 97/61   07/08/25 118/70     Pulse Readings from Last 3 Encounters:   08/05/25 90   07/31/25 63   07/08/25 84     Body mass index is 26.37 kg/m².      Physical Exam  HENT:      Head: Normocephalic.      Nose: Nose normal.   Cardiovascular:      Pulses: Normal pulses.      Heart sounds: Normal heart sounds.   Pulmonary:      Effort: Pulmonary effort is normal.      Breath sounds: Normal breath sounds.   Abdominal:      General: Bowel sounds are normal.   Musculoskeletal:         General: Normal range of motion.      Cervical back: Normal range of motion.   Skin:     General: Skin is warm and dry.   Neurological:      General: No focal deficit present.      Mental Status: He is alert and oriented to person, place, and time.   Psychiatric:         Mood and Affect: Mood normal.       Physical Exam             Laboratory Reviewed ({Yes)    Lab Results   Component Value Date    WBC 4.81 07/28/2025    HGB 11.5 (L) 07/28/2025    HCT 36.4 (L) 07/28/2025     (L) 07/28/2025    CHOL 115 (L) 07/28/2025    TRIG 105 07/28/2025    HDL 37 (L) 07/28/2025    ALT 19 07/28/2025    AST 30 07/28/2025     07/28/2025    K 4.0 07/28/2025      07/28/2025    CREATININE 1.3 07/28/2025    BUN 19 07/28/2025    CO2 25 07/28/2025    TSH 1.168 06/06/2023    PSA 0.64 07/18/2024    INR 0.9 12/03/2024    HGBA1C 5.4 05/05/2023   Lj was seen today for follow-up.    Diagnoses and all orders for this visit:    Encounter for examination for admission to assisted living facility  -     X-Ray Chest PA And Lateral; Future      Assessment & Plan    IMPRESSION:  - Reviewed recent lab work from 3/31 and noted additional labs collected today.  - Evaluated current medication regimen, including discontinuation of tacrolimus (ProGraf) and switch to sirolimus as per hematology-oncology team.  - Considered immunosuppressed status in relation to vaccination recommendations.    CHRONIC KIDNEY DISEASE, STAGE 3A:  - Monitored patient's condition with recent labs, including CBC performed on the 31st and this morning.  - Evaluated the impact of immunosuppressants on kidney function and discussed medication changes.  - Modified immunosuppressive regimen by discontinuing ProGraf (tacrolimus) while continuing Sirolimus.    PSYCHIATRIC DISORDER:  - Continue Seroquel as prescribed.    ADMINISTRATIVE PROCEDURES:  - Ordered chest XR to fulfill assisted living admission requirements.                   Care Plan/Goals: Reviewed     Follow up: No follow-ups on file.    After visit summary was printed and given to patient upon discharge today.  Patient goals and care plan are included in After Visit Summary.      This note was generated with the assistance of ambient listening technology. Verbal consent was obtained by the patient and accompanying visitor(s) for the recording of patient appointment to facilitate this note. I attest to having reviewed and edited the generated note for accuracy, though some syntax or spelling errors may persist. Please contact the author of this note for any clarification.            [1]   Patient Active Problem List  Diagnosis    Obesity (BMI 35.0-39.9  without comorbidity)    Chronic low back pain    GERD (gastroesophageal reflux disease)    Insomnia    History of hepatitis C    Immunosuppression    History of liver transplant    History of liver cancer    Essential hypertension    Atherosclerosis of native coronary artery with stable angina pectoris    Adjustment disorder with mixed anxiety and depressed mood    History of alcohol dependence    History of thrombocytopenia    Ventral hernia    Chest pain    HARDING (dyspnea on exertion)    PVC (premature ventricular contraction)    Tobacco abuse disorder    Osteomyelitis of ankle and foot    Chronic deep vein thrombosis (DVT) of left upper extremity    Family history of bladder cancer    COPD suggested by initial evaluation    Inguinal lymphadenopathy    CT compatible an MRI safe Port-A-Cath in place    Immunodeficiency due to chemotherapy    Localized swelling of both lower legs    Palliative care encounter    NINO (acute kidney injury)    Elevated troponin    Troponin level elevated    Pulmonary hypertension    Large cell lymphoma    Opioid dependence, in remission    Therapeutic drug monitoring    Lymphadenopathy    Liver transplanted    Chronic viral hepatitis   [2]   Current Outpatient Medications on File Prior to Visit   Medication Sig Dispense Refill    acyclovir (ZOVIRAX) 400 MG tablet Take 1 tablet (400 mg total) by mouth 2 (two) times daily. While on chemotherapy 60 tablet 4    albuterol (VENTOLIN HFA) 90 mcg/actuation inhaler Inhale 2 puffs into the lungs every 6 (six) hours as needed for Wheezing or Shortness of Breath. Rescue 18 g 3    apixaban (ELIQUIS) 5 mg Tab Take 1 tablet (5 mg total) by mouth 2 (two) times daily. 180 tablet 1    aspirin 81 MG Chew Take 1 tablet (81 mg total) by mouth once daily. 30 tablet 0    atorvastatin (LIPITOR) 80 MG tablet Take 1 tablet (80 mg total) by mouth once daily. 90 tablet 3    esomeprazole (NEXIUM) 40 MG capsule Take 1 capsule (40 mg total) by mouth before breakfast.  90 capsule 0    fluticasone-umeclidin-vilanter (TRELEGY ELLIPTA) 100-62.5-25 mcg DsDv Inhale 1 puff into the lungs once daily. 60 each 5    furosemide (LASIX) 20 MG tablet Take 1 tablet (20 mg total) by mouth once daily. 30 tablet 3    HYDROcodone-acetaminophen (NORCO) 5-325 mg per tablet Take 1 tablet by mouth every 6 (six) hours as needed for Pain. 12 tablet 0    LIDOcaine-prilocaine (EMLA) cream Apply topically as needed. 30 g 11    melatonin 10 mg Tab Take by mouth.      metoprolol succinate (TOPROL-XL) 25 MG 24 hr tablet Take 1 tablet (25 mg total) by mouth once daily. 90 tablet 3    nicotine (NICODERM CQ) 21 mg/24 hr Place 1 patch onto the skin once daily. 28 patch 2    nitroGLYCERIN (NITROSTAT) 0.4 MG SL tablet Place 1 tablet (0.4 mg total) under the tongue every 5 (five) minutes as needed for Chest pain (angina). 25 tablet 0    ondansetron (ZOFRAN) 8 MG tablet Take 1 tablet (8 mg total) by mouth every 12 (twelve) hours as needed for Nausea. 20 tablet 0    prochlorperazine (COMPAZINE) 5 MG tablet Take 1 tablet (5 mg total) by mouth every 6 (six) hours as needed (nausea). 20 tablet 5    sirolimus (RAPAMUNE) 1 MG Tab Take 3 mg (3 tablets) on day one, then 1 mg (1 tablet) every day thereafter. 33 tablet 11    tamsulosin (FLOMAX) 0.4 mg Cap Take 1 capsule (0.4 mg total) by mouth once daily. 30 capsule 2    tenofovir alafenamide (VEMLIDY) 25 mg Tab Take 1 tablet by mouth once daily 30 tablet 11    tenofovir alafenamide (VEMLIDY) 25 mg Tab Take 1 tablet (25 mg total) by mouth Daily. 30 tablet 11    [DISCONTINUED] tacrolimus (PROGRAF) 0.5 MG Cap Take by mouth.       Current Facility-Administered Medications on File Prior to Visit   Medication Dose Route Frequency Provider Last Rate Last Admin    lactated ringers infusion   Intravenous Continuous Linwood Ellsworth MD   New Bag at 01/20/21 0769    sodium chloride 0.9% flush 2 mL  2 mL Intravenous PRN Linwood Ellsworth MD

## 2025-08-07 ENCOUNTER — TELEPHONE (OUTPATIENT)
Dept: TRANSPLANT | Facility: CLINIC | Age: 70
End: 2025-08-07
Payer: MEDICARE

## 2025-08-07 ENCOUNTER — HOSPITAL ENCOUNTER (OUTPATIENT)
Dept: RADIOLOGY | Facility: HOSPITAL | Age: 70
Discharge: HOME | End: 2025-08-07
Attending: INTERNAL MEDICINE
Payer: MEDICARE

## 2025-08-07 ENCOUNTER — CLINICAL SUPPORT (OUTPATIENT)
Dept: PULMONOLOGY | Facility: CLINIC | Age: 70
End: 2025-08-07
Payer: MEDICARE

## 2025-08-07 VITALS
OXYGEN SATURATION: 99 % | HEART RATE: 85 BPM | RESPIRATION RATE: 16 BRPM | BODY MASS INDEX: 26.66 KG/M2 | WEIGHT: 180.56 LBS

## 2025-08-07 DIAGNOSIS — J44.9 MODERATE COPD (CHRONIC OBSTRUCTIVE PULMONARY DISEASE): Primary | ICD-10-CM

## 2025-08-07 DIAGNOSIS — J44.9 COPD SUGGESTED BY INITIAL EVALUATION: ICD-10-CM

## 2025-08-07 DIAGNOSIS — F17.210 SMOKING GREATER THAN 30 PACK YEARS: ICD-10-CM

## 2025-08-07 PROCEDURE — 99999 PR PBB SHADOW E&M-EST. PATIENT-LVL II: CPT | Mod: PBBFAC,HCNC,,

## 2025-08-07 PROCEDURE — 71271 CT THORAX LUNG CANCER SCR C-: CPT | Mod: TC,HCNC

## 2025-08-07 PROCEDURE — 71271 CT THORAX LUNG CANCER SCR C-: CPT | Mod: 26,HCNC,, | Performed by: RADIOLOGY

## 2025-08-07 NOTE — PROGRESS NOTES
Pulmonary Disease Management  [facility]  Follow up Visit  Chronic Care Management    Lj Coleman  was seen 8/7/2025  10:00 AM in the [facility] for evaluation, education, reinforcement of self management techniques and exacerbation action plan.    Holden Luu    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 10/31/2024    Anemia 11/23/2024    Angina pectoris     Arthritis     back    Atherosclerosis of native coronary artery without angina pectoris/ LAD 09/08/2016    Back pain     Chronic hepatitis C with cirrhosis     COPD (chronic obstructive pulmonary disease)     Depression     Hepatoma     Prior to liver transplant    History of colon polyps 09/09/2015    History of transplantation, liver 04/2012    History of vertebral fracture     Hypertension     Immune deficiency disorder     Incisional hernia following transplant 04/09/2014    Liver failure 11/2011    Related to chemotherapy for hepatoma    Liver transplanted 04/2012    NSTEMI (non-ST elevated myocardial infarction) 11/23/2024    Obesity     PVCs (premature ventricular contractions)     Renal dysfunction 11/23/2024    Tobacco abuse 09/09/2016    Trouble in sleeping     Vertebral fracture 1975       Patient's Medications   New Prescriptions    No medications on file   Previous Medications    ACYCLOVIR (ZOVIRAX) 400 MG TABLET    Take 1 tablet (400 mg total) by mouth 2 (two) times daily. While on chemotherapy    ALBUTEROL (VENTOLIN HFA) 90 MCG/ACTUATION INHALER    Inhale 2 puffs into the lungs every 6 (six) hours as needed for Wheezing or Shortness of Breath. Rescue    APIXABAN (ELIQUIS) 5 MG TAB    Take 1 tablet (5 mg total) by mouth 2 (two) times daily.    ASPIRIN 81 MG CHEW    Take 1 tablet (81 mg total) by mouth once daily.    ATORVASTATIN (LIPITOR) 80 MG TABLET     Take 1 tablet (80 mg total) by mouth once daily.    ESOMEPRAZOLE (NEXIUM) 40 MG CAPSULE    Take 1 capsule (40 mg total) by mouth before breakfast.    FUROSEMIDE (LASIX) 20 MG TABLET    Take 1 tablet (20 mg total) by mouth once daily.    HYDROCODONE-ACETAMINOPHEN (NORCO) 5-325 MG PER TABLET    Take 1 tablet by mouth every 6 (six) hours as needed for Pain.    LIDOCAINE-PRILOCAINE (EMLA) CREAM    Apply topically as needed.    MELATONIN 10 MG TAB    Take by mouth.    METOPROLOL SUCCINATE (TOPROL-XL) 25 MG 24 HR TABLET    Take 1 tablet (25 mg total) by mouth once daily.    NICOTINE (NICODERM CQ) 21 MG/24 HR    Place 1 patch onto the skin once daily.    NITROGLYCERIN (NITROSTAT) 0.4 MG SL TABLET    Place 1 tablet (0.4 mg total) under the tongue every 5 (five) minutes as needed for Chest pain (angina).    ONDANSETRON (ZOFRAN) 8 MG TABLET    Take 1 tablet (8 mg total) by mouth every 12 (twelve) hours as needed for Nausea.    PROCHLORPERAZINE (COMPAZINE) 5 MG TABLET    Take 1 tablet (5 mg total) by mouth every 6 (six) hours as needed (nausea).    SIROLIMUS (RAPAMUNE) 1 MG TAB    Take 3 mg (3 tablets) on day one, then 1 mg (1 tablet) every day thereafter.    TAMSULOSIN (FLOMAX) 0.4 MG CAP    Take 1 capsule (0.4 mg total) by mouth once daily.    TENOFOVIR ALAFENAMIDE (VEMLIDY) 25 MG TAB    Take 1 tablet by mouth once daily    TENOFOVIR ALAFENAMIDE (VEMLIDY) 25 MG TAB    Take 1 tablet (25 mg total) by mouth Daily.   Modified Medications    Modified Medication Previous Medication    FLUTICASONE-UMECLIDIN-VILANTER (TRELEGY ELLIPTA) 100-62.5-25 MCG DSDV fluticasone-umeclidin-vilanter (TRELEGY ELLIPTA) 100-62.5-25 mcg DsDv       Inhale 1 puff into the lungs once daily.    Inhale 1 puff into the lungs once daily.   Discontinued Medications    No medications on file             Educational assessment:   [x]            Good  []            Fair  []            Poor    Readiness to learn:   [x]            Good  []            Fair  []             Poor    Vision Status:   [x]            Good  []            Fair  []            Poor    Reading Ability:  [x]            Good  []            Fair  []            Poor    Knowledge of condition:   []            Good  [x]            Fair  []            Poor    Language Barriers:   []            Good  []            Fair  []            Poor  [x]            None    Cognitive/ Physical Barriers:   []            Good  [x]            Fair  []            Poor  []            None    Learning best by:                       []            Seeing  []            Hearing  []            Reading                         [x]            Doing    Describe any barrier /Limitation or financial implications of care choices identified     [x]            Financial  []            Emotional  []            Education  []            Vision/Hearing  []            Physical  []            None  []                TOPIC /CONTENT FOR TODAY:    [x]            MDI with or without spacer  [x]            Dry powder inhaler  []            Acapella   []           Peak Flow meter  [x]            COPD action plan  []            Nebulizer use  []            Oxygen use safety  [x]            Breathing and cough techniques  [x]            Energy conservation  [x]            Infection prevention  []           OTHER________________________        Learner:    [x]            Patient   []            Caregiver    Method:    [x]            Verbal explanation  [x]            Audio visual    [x]            Literature  [x]            Teach back      Evaluation:    [x]            Teach back  [x]            Demonstrate  [x]            Follow up phone call    []            2 weeks     []            4 weeks   [x]            PRN    Additional comments:   Patient was seen today to review respiratory medication purpose and proper technique for use of inhalers. Patient practiced proper technique using MDI with valved holding chamber (spacer) and DPI inhalers. Patient  demonstrated understanding. Literature was given to patient.    Patient has not been utilizing Trelegy Ellipta for maintenance therapy. Discussed the difference between maintenance versus rescue medication. Reviewed prescribed frequency of use for Trelegy as maintenance therapy. Reminded patient to rinse mouth thoroughly after use. Understanding stated.      Asthma trigger checklist was verbally reviewed and literature given to patient.     Infection prevention was discussed. Patient was reminded to get influenza vaccine. Hand hygiene and cleaning of respiratory equipment was also discussed. Patient verbalized understanding.      COPD action plan was reviewed and literature was given to patient. Patient verbalized understanding.     Plan:  Monthly Pulmonary Disease Management Questionnaire  Follow-up PDM appointment scheduled for 11/17/25  Reinforce education  Meds: Trelegy, albuterol   DME Needs: n/a   Action Plan  Immunization: Pneumococcal- current, Flu-current , Covid 19- current   Next Provider Visit: 11/17/25  Next Spirometry/CPFT: 11/17/25  Approximate time spent with patient: 30 mins

## 2025-08-08 DIAGNOSIS — J44.9 COPD SUGGESTED BY INITIAL EVALUATION: ICD-10-CM

## 2025-08-08 RX ORDER — ALBUTEROL SULFATE 90 UG/1
2 INHALANT RESPIRATORY (INHALATION) EVERY 6 HOURS PRN
Qty: 18 G | Refills: 3 | Status: SHIPPED | OUTPATIENT
Start: 2025-08-08

## 2025-08-13 ENCOUNTER — OFFICE VISIT (OUTPATIENT)
Dept: SURGERY | Facility: CLINIC | Age: 70
End: 2025-08-13
Payer: MEDICARE

## 2025-08-13 VITALS
SYSTOLIC BLOOD PRESSURE: 95 MMHG | DIASTOLIC BLOOD PRESSURE: 61 MMHG | HEART RATE: 84 BPM | BODY MASS INDEX: 26.89 KG/M2 | WEIGHT: 182.13 LBS

## 2025-08-13 DIAGNOSIS — M79.2 NEUROPATHIC PAIN: Primary | ICD-10-CM

## 2025-08-13 DIAGNOSIS — M62.89 HERNIA OF FASCIA: ICD-10-CM

## 2025-08-13 PROCEDURE — 99999 PR PBB SHADOW E&M-EST. PATIENT-LVL V: CPT | Mod: PBBFAC,HCNC,, | Performed by: SURGERY

## 2025-08-17 DIAGNOSIS — F17.210 SMOKING GREATER THAN 30 PACK YEARS: Primary | ICD-10-CM

## 2025-08-21 ENCOUNTER — PATIENT MESSAGE (OUTPATIENT)
Dept: CARDIOLOGY | Facility: CLINIC | Age: 70
End: 2025-08-21
Payer: MEDICARE

## 2025-08-21 ENCOUNTER — TELEPHONE (OUTPATIENT)
Dept: CARDIOLOGY | Facility: CLINIC | Age: 70
End: 2025-08-21
Payer: MEDICARE

## 2025-08-21 ENCOUNTER — OFFICE VISIT (OUTPATIENT)
Dept: PAIN MEDICINE | Facility: CLINIC | Age: 70
End: 2025-08-21
Payer: MEDICARE

## 2025-08-21 VITALS
BODY MASS INDEX: 27.11 KG/M2 | WEIGHT: 183 LBS | DIASTOLIC BLOOD PRESSURE: 74 MMHG | HEIGHT: 69 IN | SYSTOLIC BLOOD PRESSURE: 124 MMHG

## 2025-08-21 DIAGNOSIS — M47.896 OTHER SPONDYLOSIS, LUMBAR REGION: Primary | ICD-10-CM

## 2025-08-21 DIAGNOSIS — G62.0 CHEMOTHERAPY-INDUCED PERIPHERAL NEUROPATHY: ICD-10-CM

## 2025-08-21 DIAGNOSIS — T45.1X5A CHEMOTHERAPY-INDUCED PERIPHERAL NEUROPATHY: ICD-10-CM

## 2025-08-21 PROCEDURE — 1125F AMNT PAIN NOTED PAIN PRSNT: CPT | Mod: CPTII,HCNC,S$GLB, | Performed by: NURSE PRACTITIONER

## 2025-08-21 PROCEDURE — 99999 PR PBB SHADOW E&M-EST. PATIENT-LVL II: CPT | Mod: PBBFAC,HCNC,, | Performed by: NURSE PRACTITIONER

## 2025-08-21 PROCEDURE — 3288F FALL RISK ASSESSMENT DOCD: CPT | Mod: CPTII,HCNC,S$GLB, | Performed by: NURSE PRACTITIONER

## 2025-08-21 PROCEDURE — 3078F DIAST BP <80 MM HG: CPT | Mod: CPTII,HCNC,S$GLB, | Performed by: NURSE PRACTITIONER

## 2025-08-21 PROCEDURE — 3074F SYST BP LT 130 MM HG: CPT | Mod: CPTII,HCNC,S$GLB, | Performed by: NURSE PRACTITIONER

## 2025-08-21 PROCEDURE — 99214 OFFICE O/P EST MOD 30 MIN: CPT | Mod: HCNC,S$GLB,, | Performed by: NURSE PRACTITIONER

## 2025-08-21 PROCEDURE — 1101F PT FALLS ASSESS-DOCD LE1/YR: CPT | Mod: CPTII,HCNC,S$GLB, | Performed by: NURSE PRACTITIONER

## 2025-08-21 PROCEDURE — 3008F BODY MASS INDEX DOCD: CPT | Mod: CPTII,HCNC,S$GLB, | Performed by: NURSE PRACTITIONER

## 2025-08-21 RX ORDER — GABAPENTIN 100 MG/1
CAPSULE ORAL
Qty: 90 CAPSULE | Refills: 1 | Status: SHIPPED | OUTPATIENT
Start: 2025-08-21 | End: 2025-11-19

## 2025-08-21 RX ORDER — GABAPENTIN 300 MG/1
CAPSULE ORAL
Qty: 175 CAPSULE | Refills: 1 | Status: SHIPPED | OUTPATIENT
Start: 2025-08-21 | End: 2025-08-21

## 2025-08-26 ENCOUNTER — TELEPHONE (OUTPATIENT)
Dept: INTERNAL MEDICINE | Facility: CLINIC | Age: 70
End: 2025-08-26
Payer: MEDICARE

## 2025-09-02 DIAGNOSIS — I82.722 CHRONIC DEEP VEIN THROMBOSIS (DVT) OF BRACHIAL VEIN OF LEFT UPPER EXTREMITY: ICD-10-CM

## 2025-09-02 RX ORDER — APIXABAN 5 MG/1
5 TABLET, FILM COATED ORAL 2 TIMES DAILY
Qty: 180 TABLET | Refills: 3 | Status: SHIPPED | OUTPATIENT
Start: 2025-09-02

## (undated) DEVICE — SPONGE COTTON TRAY 4X4IN

## (undated) DEVICE — SUT MONOCRYL 4-0 PS-1 UND

## (undated) DEVICE — CONNECTOR TUBING STR 5 IN 1

## (undated) DEVICE — NDL ECLIPSE SAFETY 23G 1.5IN

## (undated) DEVICE — TOWEL OR DISP STRL BLUE 4/PK

## (undated) DEVICE — ADHESIVE MASTISOL VIAL 48/BX

## (undated) DEVICE — PACK BASIC SETUP SC BR

## (undated) DEVICE — GOWN POLY REINF BRTH SLV XL

## (undated) DEVICE — SYR LUER LOCK STERILE 10ML

## (undated) DEVICE — NDL ECLIPSE SAF REG 25GX1.5IN

## (undated) DEVICE — EVACUATOR PENCIL SMOKE NEPTUNE

## (undated) DEVICE — TUBING MEDI-VAC 20FT .25IN

## (undated) DEVICE — SUT VICRYL 3-0 27 SH

## (undated) DEVICE — COVER LIGHT HANDLE 80/CA

## (undated) DEVICE — DRAPE THYROID SOFT STERILE

## (undated) DEVICE — DRESSING TRANS 4X4 TEGADERM

## (undated) DEVICE — ELECTRODE REM PLYHSV RETURN 9

## (undated) DEVICE — CONTAINER SPECIMEN OR STER 4OZ

## (undated) DEVICE — CHLORAPREP 10.5 ML APPLICATOR

## (undated) DEVICE — APPLICATOR CHLORAPREP ORN 26ML

## (undated) DEVICE — MANIFOLD 4 PORT

## (undated) DEVICE — SUT MCRYL PLUS 4-0 PS2 27IN

## (undated) DEVICE — DRAPE MOBILE C-ARM

## (undated) DEVICE — SUT VICRYL 0 SH